# Patient Record
Sex: MALE | Race: WHITE | Employment: OTHER | URBAN - METROPOLITAN AREA
[De-identification: names, ages, dates, MRNs, and addresses within clinical notes are randomized per-mention and may not be internally consistent; named-entity substitution may affect disease eponyms.]

---

## 2017-01-12 ENCOUNTER — DOCUMENTATION ONLY (OUTPATIENT)
Dept: SLEEP MEDICINE | Age: 72
End: 2017-01-12

## 2017-01-12 ENCOUNTER — OFFICE VISIT (OUTPATIENT)
Dept: SLEEP MEDICINE | Age: 72
End: 2017-01-12

## 2017-01-12 VITALS
BODY MASS INDEX: 34.27 KG/M2 | SYSTOLIC BLOOD PRESSURE: 123 MMHG | OXYGEN SATURATION: 95 % | HEART RATE: 68 BPM | WEIGHT: 253 LBS | DIASTOLIC BLOOD PRESSURE: 72 MMHG | TEMPERATURE: 99 F | HEIGHT: 72 IN

## 2017-01-12 DIAGNOSIS — G47.33 OBSTRUCTIVE SLEEP APNEA: ICD-10-CM

## 2017-01-12 DIAGNOSIS — G25.81 RLS (RESTLESS LEGS SYNDROME): Primary | ICD-10-CM

## 2017-01-12 RX ORDER — ROPINIROLE 0.5 MG/1
1 TABLET, FILM COATED ORAL
Qty: 60 TAB | Refills: 5 | Status: SHIPPED | OUTPATIENT
Start: 2017-01-12 | End: 2017-04-17 | Stop reason: SDUPTHER

## 2017-01-12 NOTE — PROGRESS NOTES
PAP Education     · Physician orders were reviewed. · Patient was educated on the PAP usage with humidity  and proper settings   · A request for Resmed S9 Climate line be ordered, as patient has been dealing with humidity issues   · The patient demonstrated good understanding of the positive airway pressure device. General information regarding operations and maintenance of the device was provided. He was provided information on sleep apnea including coresponding risk factors and the importance of proper treatment. Marisa Hernandez He was asked to contact our office for any problems regarding his PAP therapy.

## 2017-01-12 NOTE — PROGRESS NOTES
217 [facility] [place]. [place], [address]  Tel.  [phone]  Fax. 100 [facility] 60  [place], [address] [facility]  Tel.  [phone]  Fax. [phone] [address] [address] [address]   Tel.  [phone]  Fax. [phone]     S>Bulmaro Maurer. is a 66 y.o. male seen for a positive airway pressure follow-up. He reports no problems using the device. He is 99% compliant over the past 6 months. The following problems are identified:    Drowsiness no Problems exhaling no   Snoring no Forget to put on no   Mask Comfortable yes Can't fall asleep no   Dry Mouth yes Mask falls off no   Air Leaking no Frequent awakenings no     Download reviewed. RLS symptoms controlled on 1 mg of requip. He needs a refill   He admits that his sleep has improved. Allergies   Allergen Reactions    Penicillins Hives    Bactrim [Sulfamethoxazole-Trimethoprim] Hives    Lisinopril (Bulk) Cough    Neurontin [Gabapentin] Other (comments)     drowsy    Zostavax [Zoster Vaccine Live (Pf)] Rash     See 9/10/13 visit       He has a current medication list which includes the following prescription(s): ropinirole, metoprolol succinate, clopidogrel, citalopram, omeprazole, valsartan, atorvastatin, vitamin d3, oxygen-air delivery systems, chlorthalidone, amlodipine, glucosamine-chondroitin, acetaminophen, aspirin delayed-release, allopurinol, folic acid/mv,fe,min, and hydrocodone-acetaminophen. .      He  has a past medical history of Abnormal stress echo (5/12/2015); Anemia NEC (03/2013); Anxiety; CAD (coronary artery disease) (2009); Calculus of kidney; Chronic kidney disease, stage III (moderate) (10/11/2009); Diabetes ([facility] [address] [facility] Difficult intubation; DJD (degenerative joint disease); GERD (gastroesophageal reflux disease) (10/11/2009); Gout; Hypertension; Kidney disease; Liver disease; Obesity; Obstructive sleep apnea (adult) (pediatric) (10/11/2009);  Peripheral neuropathy ([facility] [address] [facility] (10/11/2009); Prediabetes (1/7/2012); RLS (restless legs syndrome) (10/11/2009); and S/P coronary artery stent placement (5/12/2015). He also has no past medical history of Abuse; Congestive heart failure, unspecified; Contact dermatitis and other eczema, due to unspecified cause; Depression; Headache(784.0); Psychotic disorder; or Trauma. [place] Sleepiness Score: 3   and Modified F.O.S.Q. Score Total / 2: 20   which reflect improved sleep quality over therapy time. O>    Visit Vitals    /72    Pulse 68    Temp 99 °F (37.2 °C)    Ht 6' (1.829 m)    Wt 253 lb (114.8 kg)    SpO2 95%    BMI 34.31 kg/m2           General:   Alert, oriented, not in distress   Neck:   No JVD    Chest/Lungs:  symetrical lung expansion , no accessory muscle use    Extremities:  no obvious rashes , negative edema    Neuro:  No focal deficits ; No obvious tremor    Psych:  Normal affect ,  Normal countenance ;         A>    ICD-10-CM ICD-9-CM    1. RLS (restless legs syndrome) G25.81 333.94    2. Obstructive sleep apnea G47.33 327.23 rOPINIRole (REQUIP) 0.5 mg tablet      AMB SUPPLY ORDER     AHI = 50(4-14). On CPAP :  12 cmH2O. Compliant:      yes    Therapeutic Response:  Positive    P>      * Follow-up Disposition:  Return in about 1 year (around 1/12/2018). he will continue on his current pressure settings. I have ordered replacement supplies and climateline tubing    Teach how to adjust humidifer  * He was asked to contact our office for any problems regarding PAP therapy. * Counseling was provided regarding the importance of regular PAP use and on proper sleep hygiene and safe driving. * Re-enforced proper and regular cleaning for the device. 2. Restless legs syndrome - I have reviewed some aggravating factors for RLS with him. Requip renewed. Stretching exercises were advised and information regarding restless legs syndrome and stretches were provided at the time of discharge.     Stacie Quinteros MD  Diplomate in Sleep Medicine  [place]

## 2017-01-12 NOTE — PATIENT INSTRUCTIONS
217 [facility] [place]. [place], [address]  Tel.  [phone]  Fax. 100 [facility] 60  [place], [address] [facility] [address]  Tel.  [phone]  Fax. [phone] [address]  Tel.  [phone]  Fax. [phone]     PROPER SLEEP HYGIENE    What to avoid  · Do not have drinks with caffeine, such as coffee or black tea, for 8 hours before bed. · Do not smoke or use other types of tobacco near bedtime. Nicotine is a stimulant and can keep you awake. · Avoid drinking alcohol late in the evening, because it can cause you to wake in the middle of the night. · Do not eat a big meal close to bedtime. If you are hungry, eat a light snack. · Do not drink a lot of water close to bedtime, because the need to urinate may wake you up during the night. · Do not read or watch TV in bed. Use the bed only for sleeping and sexual activity. What to try  · Go to bed at the same time every night, and wake up at the same time every morning. Do not take naps during the day. · Keep your bedroom quiet, dark, and cool. · Get regular exercise, but not within 3 to 4 hours of your bedtime. .  · Sleep on a comfortable pillow and mattress. · If watching the clock makes you anxious, turn it facing away from you so you cannot see the time. · If you worry when you lie down, start a worry book. Well before bedtime, write down your worries, and then set the book and your concerns aside. · Try meditation or other relaxation techniques before you go to bed. · If you cannot fall asleep, get up and go to another room until you feel sleepy. Do something relaxing. Repeat your bedtime routine before you go to bed again. · Make your house quiet and calm about an hour before bedtime. Turn down the lights, turn off the TV, log off the computer, and turn down the volume on music. This can help you relax after a busy day.     Drowsy Driving  The [address] [place] [address] Road Traffic Safety Administration cites drowsiness as a causing factor in more than 199,447 police reported crashes annually, resulting in 76,000 injuries and 1,500 deaths. Other surveys suggest 55% of people polled have driven while drowsy in the past year, 23% had fallen asleep but not crashed, 3% crashed, and 2% had and accident due to drowsy driving. Who is at risk? Young Drivers: One study of drowsy driving accidents states that 55% of the drivers were under 25 years. Of those, 75% were male. Shift Workers and Travelers: People who work overnight or travel across time zones frequently are at higher risk of experiencing Circadian Rhythm Disorders. They are trying to work and function when their body is programed to sleep. Sleep Deprived: Lack of sleep has a serious impact on your ability to pay attention or focus on a task. Consistently getting less than the average of 8 hours your body needs creates partial or cumulative sleep deprivation. Untreated Sleep Disorders: Sleep Apnea, Narcolepsy, R.L.S., and other sleep disorders (untreated) prevent a person from getting enough restful sleep. This leads to excessive daytime sleepiness and increases the risk for drowsy driving accidents by up to 7 times. Medications / Alcohol: Even over the counter medications can cause drowsiness. Medications that impair a drivers attention should have a warning label. Alcohol naturally makes you sleepy and on its own can cause accidents. Combined with excessive drowsiness its effects are amplified. Signs of Drowsy Driving:   * You don't remember driving the last few miles   * You may drift out of your ella   * You are unable to focus and your thoughts wander   * You may yawn more often than normal   * You have difficulty keeping your eyes open / nodding off   * Missing traffic signs, speeding, or tailgating  Prevention-   Good sleep hygiene, lifestyle and behavioral choices have the most impact on drowsy driving.  There is no substitute for sleep and the average person requires 8 hours nightly. If you find yourself driving drowsy, stop and sleep. Consider the sleep hygiene tips provided during your visit as well. Medication Refill Policy: Refills for all medications require 1 week advance notice. Please have your pharmacy fax a refill request. We are unable to fax, or call in \"controled substance\" medications and you will need to pick these prescriptions up from our office. [place] [employer] Activation    Thank you for requesting access to Stormpulse. Please follow the instructions below to securely access and download your online medical record. Stormpulse allows you to send messages to your doctor, view your test results, renew your prescriptions, schedule appointments, and more. How Do I Sign Up? 1. In your internet browser, go to [email or web address]. 2. Click on the First Time User? Click Here link in the Sign In box. You will see the New Member Sign Up page. 3. Enter your Stormpulse Access Code exactly as it appears below. You will not need to use this code after youve completed the sign-up process. If you do not sign up before the expiration date, you must request a new code. Stormpulse Access Code: Activation code not generated  Current Stormpulse Status: Patient Declined (This is the date your Stormpulse access code will )    4. Enter the last four digits of your Social Security Number (xxxx) and Date of Birth (mm/dd/yyyy) as indicated and click Submit. You will be taken to the next sign-up page. 5. Create a Stormpulse ID. This will be your Stormpulse login ID and cannot be changed, so think of one that is secure and easy to remember. 6. Create a Stormpulse password. You can change your password at any time. 7. Enter your Password Reset Question and Answer. This can be used at a later time if you forget your password. 8. Enter your e-mail address.  You will receive e-mail notification when new information is available in Theravaschart. 9. Click Sign Up. You can now view and download portions of your medical record. 10. Click the Download Summary menu link to download a portable copy of your medical information. Additional Information    If you have questions, please call [phone]. Remember, Pixellet is NOT to be used for urgent needs. For medical emergencies, dial 913. [address] [place]. [place], [address]  Tel.  [phone]  Fax. 100 [facility] 60  [place], [address]  Tel.  [phone]  Fax. [phone] [address] [facility] [place] [address]  Tel.  [phone]  Fax. [phone]       Restless Legs Syndrome: After Your Visit  Your Care Instructions  Restless legs syndrome is a common nervous system problem. People with this syndrome feel a creeping, achy, or unpleasant feeling in the legs and an overpowering urge to move them. It often occurs in the evening and at night and can lead to sleep problems and tiredness. Your doctor may suggest doing a study of your sleep patterns to figure out what is happening when you try to sleep. For many people, getting regular exercise, eating well, and avoiding caffeine and tobacco relieve symptoms. If you try these changes and still have restless legs, medicine may help. Follow-up care is a key part of your treatment and safety. Be sure to make and go to all appointments, and call your doctor if you are having problems. It's also a good idea to know your test results and keep a list of the medicines you take. How can you care for yourself at home? · Take your medicines exactly as prescribed. Call your doctor if you think you are having a problem with your medicine. · Try bathing in very warm or very cold water. Applying a heating pad or ice bag to your legs may also help symptoms. · Stretch and massage your legs before bed or when discomfort begins.   · Vitamin E 400IU Daily supplementation has been shown to be effective treatment  · Get some exercise for at least 30 minutes a day on most days of the week. Stop exercising at least 3 hours before bedtime. · Try to plan for situations where you will need to remain seated for long stretches. For example, if you are traveling by car, plan some stops so you can get out and walk around. · Tell your doctor about any medicines you are taking. This includes all over-the-counter, prescription, and herbal medicines. Some medicines, such as antidepressants, antihistamines, and cold and sinus medicines, can make your symptoms worse. · Avoid caffeine products, such as coffee, tea, cola, and chocolate. Caffeine can interrupt your sleep and stimulate you. · Do not smoke. Nicotine can make restless legs worse. If you need help quitting, talk to your doctor about stop-smoking programs and medicines. These can increase your chances of quitting for good. Take steps to help you sleep better  · Get plenty of sunlight in the outdoors, particularly later in the afternoon. · Use the evening hours for settling down. Avoid activities that challenge you in the hours before bedtime. · Eat meals at regular times, and do not snack before bedtime. · Keep your bedroom quiet, dark, and cool. Try using a sleep mask and earplugs to help you sleep. · Limit how much you drink at night to reduce your need to get up to urinate. But do not go to bed thirsty. · Run a fan or other steady \"white noise\" during the night if noises wake you up. · [place] the bed for sleeping and sex. Do your reading or TV watching in another room. · Once you are in bed, relax from head to toe, and guide your mind to pleasant thoughts. · Do not stay in bed longer than 8 hours, and try to avoid naps. · If your symptoms usually improve around 4 a.m. to 6 a.m., try going to bed later than usual or allowing extra time for sleeping in to help you get the rest you need. When should you call for help?   Watch closely for changes in your health, and be sure to contact your doctor if:  · You are still not getting enough sleep. · Your symptoms become more severe or happen more often. Where can you learn more? Go to Couchy.com.be  Enter X429 in the search box to learn more about \"Restless Legs Syndrome: After Your Visit. \"   © 3946-8869 Healthwise, IID. Care instructions adapted under license by Shannan Peña (which disclaims liability or warranty for this information). This care instruction is for use with your licensed healthcare professional. If you have questions about a medical condition or this instruction, always ask your healthcare professional. [place] [address] any warranty or liability for your use of this information. Content Version: 2.2.63398; Last Revised: September 21, 2009    Restless Leg Syndrome Exercises  RLS Exercise 1  Start by getting into a standing position with your knees slightly bent, so that you are in a squat position. Your forearms should be resting on your thighs, close to your knees. You may grasp your opposite wrist for stability if needed. While maintaining this position, raise and lower your buttocks over and over until you can no longer do so. Repeat the exercise for as long as possible without feeling muscle strain or discomfort in your back or knees. RLS Exercise 2   Standing with your feet a little wider than shoulder width, squat down as low as you can while keeping your heels on the floor. Press your elbows against the knees. This will increase the stretch in your hips and inner thighs. Remember to keep your torso as upright as possible. RLS Exercise 3  Hang onto a railing or wall that is near a step. Place one foot on the edge of the step with the ball of your foot. Drop your heel and contract your glutes until you feel a stretch in the back of the entire leg.  Hold this position for 20 seconds and repeat with your other leg. If you notice that one calf is much tighter than the other, hold that side longer or stretch twice. RLS Exercise 4  While laying on your back with your leg in the air or while seated in a chair with your leg extended in front of you, perform ankle circles. You can do this by drawing large circles or the alphabet with your toes. There is no need for excessive movement through the knee. Simply move the toes and pivot your ankles. PROPER SLEEP HYGIENE    What to avoid  · Do not have drinks with caffeine, such as coffee or black tea, for 8 hours before bed. · Do not smoke or use other types of tobacco near bedtime. Nicotine is a stimulant and can keep you awake. · Avoid drinking alcohol late in the evening, because it can cause you to wake in the middle of the night. · Do not eat a big meal close to bedtime. If you are hungry, eat a light snack. · Do not drink a lot of water close to bedtime, because the need to urinate may wake you up during the night. · Do not read or watch TV in bed. Use the bed only for sleeping and sexual activity. What to try  · Go to bed at the same time every night, and wake up at the same time every morning. Do not take naps during the day. · Keep your bedroom quiet, dark, and cool. · Get regular exercise, but not within 3 to 4 hours of your bedtime. .  · Sleep on a comfortable pillow and mattress. · If watching the clock makes you anxious, turn it facing away from you so you cannot see the time. · If you worry when you lie down, start a worry book. Well before bedtime, write down your worries, and then set the book and your concerns aside. · Try meditation or other relaxation techniques before you go to bed. · If you cannot fall asleep, get up and go to another room until you feel sleepy. Do something relaxing. Repeat your bedtime routine before you go to bed again. · Make your house quiet and calm about an hour before bedtime.  Turn down the lights, turn off the TV, log off the computer, and turn down the volume on music. This can help you relax after a busy day. Drowsy Driving: The [employer] cites drowsiness as a causing factor in more than 002,521 police reported crashes annually, resulting in 76,000 injuries and 1,500 deaths. Other surveys suggest 55% of people polled have driven while drowsy in the past year, 23% had fallen asleep but not crashed, 3% crashed, and 2% had and accident due to drowsy driving. Who is at risk? Young Drivers: One study of drowsy driving accidents states that 55% of the drivers were under 25 years. Of those, 75% were male. Shift Workers and Travelers: People who work overnight or travel across time zones frequently are at higher risk of experiencing Circadian Rhythm Disorders. They are trying to work and function when their body is programed to sleep. Sleep Deprived: Lack of sleep has a serious impact on your ability to pay attention or focus on a task. Consistently getting less than the average of 8 hours your body needs creates partial or cumulative sleep deprivation. Untreated Sleep Disorders: Sleep Apnea, Narcolepsy, R.L.S., and other sleep disorders (untreated) prevent a person from getting enough restful sleep. This leads to excessive daytime sleepiness and increases the risk for drowsy driving accidents by up to 7 times. Medications / Alcohol: Even over the counter medications can cause drowsiness. Medications that impair a drivers attention should have a warning label. Alcohol naturally makes you sleepy and on its own can cause accidents. Combined with excessive drowsiness its effects are amplified.    Signs of Drowsy Driving:   * You don't remember driving the last few miles   * You may drift out of your ella   * You are unable to focus and your thoughts wander   * You may yawn more often than normal   * You have difficulty keeping your eyes open / nodding off   * Missing traffic signs, speeding, or tailgating  Prevention-   Good sleep hygiene, lifestyle and behavioral choices have the most impact on drowsy driving. There is no substitute for sleep and the average person requires 8 hours nightly. If you find yourself driving drowsy, stop and sleep. Consider the sleep hygiene tips provided during your visit as well. Medication Refill Policy: Refills for all medications require 1 week advance notice. Please have your pharmacy fax a refill request. We are unable to fax, or call in \"controled substance\" medications and you will need to pick these prescriptions up from our office. Photorankhart Activation    Thank you for requesting access to Photofy. Please follow the instructions below to securely access and download your online medical record. Photofy allows you to send messages to your doctor, view your test results, renew your prescriptions, schedule appointments, and more. How Do I Sign Up?    11. In your internet browser, go to [email or web address]. 12. Click on the First Time User? Click Here link in the Sign In box. You will see the New Member Sign Up page. 15. Enter your Photofy Access Code exactly as it appears below. You will not need to use this code after youve completed the sign-up process. If you do not sign up before the expiration date, you must request a new code. Photorankhart Access Code: Activation code not generated  Current Photofy Status: Patient Declined (This is the date your Mobicowt access code will )    14. Enter the last four digits of your Social Security Number (xxxx) and Date of Birth (mm/dd/yyyy) as indicated and click Submit. You will be taken to the next sign-up page. 15. Create a Mobicowt ID. This will be your Photofy login ID and cannot be changed, so think of one that is secure and easy to remember. 12. Create a Photofy password. You can change your password at any time. 16. Enter your Password Reset Question and Answer.  This can be used at a later time if you forget your password. 25. Enter your e-mail address. You will receive e-mail notification when new information is available in [address] 19. Click Sign Up. You can now view and download portions of your medical record. 20. Click the Download Summary menu link to download a portable copy of your medical information. Additional Information    If you have questions, please call [phone]. Remember, Sympler is NOT to be used for urgent needs. For medical emergencies, dial 911.

## 2017-01-12 NOTE — PROGRESS NOTES
Medication refill for Ropinirole 0.5mg tablet was called into [facility] on [address] on 1/12/17

## 2017-01-13 ENCOUNTER — DOCUMENTATION ONLY (OUTPATIENT)
Dept: SLEEP MEDICINE | Age: 72
End: 2017-01-13

## 2017-02-20 ENCOUNTER — TELEPHONE (OUTPATIENT)
Dept: INTERNAL MEDICINE CLINIC | Age: 72
End: 2017-02-20

## 2017-02-20 DIAGNOSIS — J20.9 ACUTE BRONCHITIS, UNSPECIFIED ORGANISM: Primary | ICD-10-CM

## 2017-02-20 RX ORDER — AZITHROMYCIN 250 MG/1
250 TABLET, FILM COATED ORAL SEE ADMIN INSTRUCTIONS
Qty: 6 TAB | Refills: 0 | Status: SHIPPED | OUTPATIENT
Start: 2017-02-20 | End: 2017-08-23

## 2017-02-20 NOTE — TELEPHONE ENCOUNTER
Spoke with [facility]. States pt has been coughing for 1.5 wks. She is not sure if pt has fever but pt has felt \"hot\". She said pt's chest has been hurting today from coughing. She states that pt has coughed up blood from coughing so hard. She also said they both received the pneumonia vaccine 2 wks ago. There is no available slots open. I  Advised her to call the front dest to see if they can get pt in today to be seen. I also advised that if pt can't be seen today to go to the ER or Urgent Care. Mila verbalized understanding.

## 2017-02-20 NOTE — TELEPHONE ENCOUNTER
Pt needs to be seen today. There are no appts available for me to schedule. Can you get pt in yet today?

## 2017-02-20 NOTE — TELEPHONE ENCOUNTER
Jeromy Luu called and states that she is needing a call back in regards getting an appt for the pt as she states that since having his pneumonia vaccine he has cough now. Please call Mila to advise.

## 2017-02-20 NOTE — TELEPHONE ENCOUNTER
Patient called complaining of coughing productive cough for 1.5 weeks. Denies fever, denies chest pain. Patient called this morning and unable to get appointment to see MD. I reviewed note documenting phone call. Denies fever. I reviewed medical problems in the chart and recent labs. Will prescribe z pack - advised to go ER if symptoms worsen tonight otherwise make an appointment in our office at earliest convenience.

## 2017-02-28 ENCOUNTER — OFFICE VISIT (OUTPATIENT)
Dept: INTERNAL MEDICINE CLINIC | Age: 72
End: 2017-02-28

## 2017-02-28 VITALS
RESPIRATION RATE: 19 BRPM | HEIGHT: 72 IN | BODY MASS INDEX: 34.27 KG/M2 | OXYGEN SATURATION: 97 % | DIASTOLIC BLOOD PRESSURE: 79 MMHG | WEIGHT: 253 LBS | TEMPERATURE: 98.2 F | HEART RATE: 65 BPM | SYSTOLIC BLOOD PRESSURE: 132 MMHG

## 2017-02-28 DIAGNOSIS — J40 BRONCHITIS: Primary | ICD-10-CM

## 2017-02-28 DIAGNOSIS — R68.89 FLU-LIKE SYMPTOMS: ICD-10-CM

## 2017-02-28 DIAGNOSIS — J04.11 ACUTE TRACHEITIS WITH OBSTRUCTION: ICD-10-CM

## 2017-02-28 LAB
QUICKVUE INFLUENZA TEST: NEGATIVE
VALID INTERNAL CONTROL?: YES

## 2017-02-28 RX ORDER — ALBUTEROL SULFATE 0.83 MG/ML
2.5 SOLUTION RESPIRATORY (INHALATION) ONCE
Qty: 1 EACH | Refills: 0
Start: 2017-02-28 | End: 2017-02-28

## 2017-02-28 RX ORDER — CODEINE PHOSPHATE AND GUAIFENESIN 10; 100 MG/5ML; MG/5ML
5 SOLUTION ORAL
Qty: 120 ML | Refills: 0 | Status: SHIPPED | OUTPATIENT
Start: 2017-02-28 | End: 2017-03-29

## 2017-02-28 RX ORDER — METHYLPREDNISOLONE 4 MG/1
4 TABLET ORAL
Qty: 1 DOSE PACK | Refills: 0 | Status: SHIPPED | OUTPATIENT
Start: 2017-02-28 | End: 2017-03-29

## 2017-02-28 RX ORDER — ALBUTEROL SULFATE 90 UG/1
1 AEROSOL, METERED RESPIRATORY (INHALATION)
Qty: 1 INHALER | Refills: 0 | Status: SHIPPED | OUTPATIENT
Start: 2017-02-28 | End: 2021-03-15 | Stop reason: DRUGHIGH

## 2017-02-28 RX ORDER — CEFDINIR 300 MG/1
300 CAPSULE ORAL 2 TIMES DAILY
Qty: 20 CAP | Refills: 0 | Status: SHIPPED | OUTPATIENT
Start: 2017-02-28 | End: 2017-03-29

## 2017-02-28 NOTE — PROGRESS NOTES
Tierra Perkins. is a 66 y.o. male who complains of ongoing cough for the past 2 weeks. Was prescribed Pennye Kelvin on 2/20. Not feeling any better. He has nasal congestion, discolored mucous. Bilateral sinus pressure. No ear pain. No fever. Notes chest congestion. Nonsmoker. No asthma. Increased water intake. Took cough medication over the counter. Past Medical History:   Diagnosis Date    Abnormal stress echo 5/12/2015    Anemia NEC 03/2013    Last 2-3 months; finished taking iron supplement    Anxiety     CAD (coronary artery disease) 2009    cath [facility]) revealed 20%RCA and 50%2nd diagonal    Calculus of kidney     Chronic kidney disease, stage III (moderate) 10/11/2009    30% kidney function    Diabetes ([address]     pre diabetic    Difficult intubation     DJD (degenerative joint disease)     GERD (gastroesophageal reflux disease) 10/11/2009    Gout     Hypertension     Kidney disease     Liver disease     fatty liver    Obesity     Obstructive sleep apnea (adult) (pediatric) 10/11/2009    nasal pillows; pressure set at 10-11 - uses cpap    Peripheral neuropathy ([address] 10/11/2009    Prediabetes 1/7/2012    RLS (restless legs syndrome) 10/11/2009    S/P coronary artery stent placement 5/12/2015    5/11/15 PCI./MALI to LCx           Review of Systems  Pertinent items are noted in HPI. Objective:     Visit Vitals    /79 (BP 1 Location: Left arm, BP Patient Position: Sitting)    Pulse 65    Temp 98.2 °F (36.8 °C) (Oral)    Resp 19    Ht 6' (1.829 m)    Wt 253 lb (114.8 kg)    SpO2 97%    BMI 34.31 kg/m2     Gen: Mildly ill appearing male no sob at rest.   HEENT:   PERRL,normal conjunctiva. External ear and canals normal, TMs no opacification or erythema,  Swollen nasal turbinates, no sinus pain on palpation,  OP no erythema, no exudates, MMM  Neck:  Supple. Thyroid normal size, nontender, without nodules. No masses or LAD  Resp:  Coarse rhonchi, diffuse wheezing.  No rales.   CV:  RRR, normal S1S2, no murmur. Assessment/Plan:       ICD-10-CM ICD-9-CM    1. Bronchitis J40 490 albuterol (PROVENTIL VENTOLIN) 2.5 mg /3 mL (0.083 %) nebulizer solution      ALBUTEROL, INHAL. SOL., FDA-APPROVED FINAL, NON-COMPOUND UNIT DOSE, 1 MG      INHAL RX, AIRWAY OBST/DX SPUTUM INDUCT      albuterol (PROVENTIL HFA, VENTOLIN HFA, PROAIR HFA) 90 mcg/actuation inhaler      methylPREDNISolone (MEDROL DOSEPACK) 4 mg tablet      cefdinir (OMNICEF) 300 mg capsule      guaiFENesin-codeine (ROBITUSSIN AC) 100-10 mg/5 mL solution   2. Flu-like symptoms R68.89 780.99 AMB POC RAPID INFLUENZA TEST   3. Acute tracheitis with obstruction  J04.11 464.11 INHAL RX, AIRWAY OBST/DX SPUTUM INDUCT       Follow-up Disposition:  Return if symptoms worsen or fail to improve.     Amanda Salvador MD

## 2017-02-28 NOTE — MR AVS SNAPSHOT
Visit Information Date & Time Provider Department Dept. Phone Encounter #  
 2/28/2017 11:45 AM Kirstin Mcwilliams, [address] [place] [address],[address] [phone] [id number] Follow-up Instructions Return if symptoms worsen or fail to improve. Your Appointments 3/29/2017 11:30 AM  
ROUTINE CARE with Kirstin Mcwilliams MD  
[facility]) Appt Note: 6 month follow up  
 [facility] [address] [address]  
[address] [facility] [address] [place] [address] Upcoming Health Maintenance Date Due  
 GLAUCOMA SCREENING Q2Y 8/5/2016 MEDICARE YEARLY EXAM 3/17/2017 Pneumococcal 65+ High/Highest Risk (2 of 2 - PPSV23) 6/20/2017 COLONOSCOPY 9/24/2024 DTaP/Tdap/Td series (2 - Td) 10/24/2026 Allergies as of 2/28/2017  Review Complete On: 2/28/2017 By: Kirstin Mcwilliams MD  
  
 Severity Noted Reaction Type Reactions Penicillins High 10/01/2009    Hives Bactrim [Sulfamethoxazole-trimethoprim]  03/31/2010   Systemic Hives Lisinopril (Bulk)  10/01/2009    Cough Neurontin [Gabapentin]  10/01/2009   Side Effect Other (comments) drowsy Zostavax [Zoster Vaccine Live (Pf)]  11/20/2013   Not Verified Rash See 9/10/13 visit Current Immunizations  Reviewed on 9/28/2016 Name Date Influenza Vaccine 10/1/2014, 10/1/2013 Influenza Vaccine (Quad) PF 10/7/2016, 9/30/2015 Influenza Vaccine Split 9/19/2012, 10/6/2011, 10/27/2010 Influenza Vaccine Whole 10/23/2008 Pneumococcal Vaccine (Unspecified Type) 6/20/2012 Tdap 10/24/2016 10:04 PM, 3/21/2016 Zoster Vaccine, Live 8/15/2013 Not reviewed this visit You Were Diagnosed With   
  
 Codes Comments Bronchitis    -  Primary ICD-10-CM: Z85 ICD-9-CM: 478 Flu-like symptoms     ICD-10-CM: R68.89 ICD-9-CM: 780.99 Acute tracheitis with obstruction     ICD-10-CM: J04.11 ICD-9-CM: 464.11 Vitals  BP  
 132/79 (BP 1 Location: Left arm, BP Patient Position: Sitting) Vitals History BMI and BSA Data Body Mass Index Body Surface Area  
 34.31 kg/m 2 2.41 m 2 Preferred Pharmacy Pharmacy Name Phone [id number] [place] - [address] [phone] Your Updated Medication List  
  
   
This list is accurate as of: 2/28/17 12:57 PM.  Always use your most recent med list.  
  
  
  
  
 * albuterol 2.5 mg /3 mL (0.083 %) nebulizer solution Commonly known as:  PROVENTIL VENTOLIN  
3 mL by Nebulization route once for 1 dose. * albuterol 90 mcg/actuation inhaler Commonly known as:  PROVENTIL HFA, VENTOLIN HFA, PROAIR HFA Take 1 Puff by inhalation every six (6) hours as needed for Wheezing. allopurinol 100 mg tablet Commonly known as:  Ardie Fleischer Take 100 mg by mouth every morning. amLODIPine 5 mg tablet Commonly known as:  Yell Haven Take 5 mg by mouth daily. aspirin delayed-release 81 mg tablet Take 81 mg by mouth daily. atorvastatin 40 mg tablet Commonly known as:  LIPITOR  
TAKE 1 TABLET BY MOUTH NIGHTLY*****STOP SIMVASTATIN****  
  
 azithromycin 250 mg tablet Commonly known as:  [place] Ping Take 1 Tab by mouth See Admin Instructions for 6 doses. Take 2 tabs on day one followed by 1 tab daily for a total of 5 days. cefdinir 300 mg capsule Commonly known as:  OMNICEF Take 1 Cap by mouth two (2) times a day. CENTRUM PO Take 1 Tab by mouth daily. chlorthalidone 25 mg tablet Commonly known as:  Oscar Nemesio Take 25 mg by mouth daily. citalopram 40 mg tablet Commonly known as:  CELEXA  
TAKE 1 TABLET BY MOUTH EVERY DAY  
  
 clopidogrel 75 mg Tab Commonly known as:  PLAVIX TAKE 1 TABLET BY MOUTH EVERY DAY  
  
 glucosamine-chondroitin 750-600 mg Tab Take 1 Tab by mouth daily. Brand: Move Free guaiFENesin-codeine 100-10 mg/5 mL solution Commonly known as:  ROBITUSSIN AC Take 5 mL by mouth three (3) times daily as needed for Cough. Max Daily Amount: 15 mL. HORIZON NASAL CPAP SYSTEM  
by Does Not Apply route. HYDROcodone-acetaminophen 5-325 mg per tablet Commonly known as:  Margarito Casey Take 1 Tab by mouth every four (4) hours as needed for Pain. Max Daily Amount: 6 Tabs. methylPREDNISolone 4 mg tablet Commonly known as:  Jewell Reason Take 1 Tab by mouth Specific Days and Specific Times. metoprolol succinate 25 mg XL tablet Commonly known as:  TOPROL-XL  
TAKE ONE TABLET BY MOUTH EVERY DAY  
  
 omeprazole 40 mg capsule Commonly known as:  PRILOSEC  
TAKE 1 CAPSULE BY MOUTH DAILY. rOPINIRole 0.5 mg tablet Commonly known as:  Mallissa Poles Take 2 Tabs by mouth daily (after dinner). Take 1.5 to 2 tablets daily after dinner TYLENOL EXTRA STRENGTH 500 mg tablet Generic drug:  acetaminophen Take 500 mg by mouth every eight (8) hours as needed. valsartan 160 mg tablet Commonly known as:  DIOVAN  
TAKE 1 TABLET BY MOUTH DAILY  
  
 VITAMIN D3 2,000 unit Cap capsule Generic drug:  Cholecalciferol (Vitamin D3) Take 2,000 Units by mouth daily. * Notice: This list has 2 medication(s) that are the same as other medications prescribed for you. Read the directions carefully, and ask your doctor or other care provider to review them with you. Prescriptions Printed Refills  
 guaiFENesin-codeine (ROBITUSSIN AC) 100-10 mg/5 mL solution 0 Sig: Take 5 mL by mouth three (3) times daily as needed for Cough. Max Daily Amount: 15 mL. Class: Print Route: Oral  
  
Prescriptions Sent to Pharmacy Refills  
 albuterol (PROVENTIL HFA, VENTOLIN HFA, PROAIR HFA) 90 mcg/actuation inhaler 0 Sig: Take 1 Puff by inhalation every six (6) hours as needed for Wheezing.   
 Class: Normal  
 Pharmacy: [address] Ph #: [phone] Route: Inhalation  
 methylPREDNISolone (MEDROL DOSEPACK) 4 mg tablet 0 Sig: Take 1 Tab by mouth Specific Days and Specific Times. Class: Normal  
 Pharmacy: [facility]/pharmacy [id number] [place] [address] [address] Ph #: [phone] Route: Oral  
 cefdinir (OMNICEF) 300 mg capsule 0 Sig: Take 1 Cap by mouth two (2) times a day. Class: Normal  
 Pharmacy: [facility]/pharmacy [id number] [place] [address] [address] Ph #: [phone] Route: Oral  
  
We Performed the Following ALBUTEROL, INHAL. SOL., FDA-APPROVED FINAL, NON-COMPOUND UNIT DOSE, 1 MG [ [facility]] AMB POC RAPID INFLUENZA TEST [60989 CPT(R)] INHAL RX, AIRWAY OBST/DX SPUTUM INDUCT [id number] CPT(R)] Follow-up Instructions Return if symptoms worsen or fail to improve. Please provide this summary of care documentation to your next provider. Your primary care clinician is listed as Nikki Bhandari. If you have any questions after today's visit, please call [phone].

## 2017-02-28 NOTE — PROGRESS NOTES
Reviewed record in preparation for visit and have obtained necessary documentation. Identified pt with two pt identifiers(name and ).     Chief Complaint   Patient presents with    Cold Symptoms     c/o of cough with green phlemg, sneezing, sore throat, body aches x 2 weeks denies chills            Coordination of Care Questionnaire:  :     1) Have you been to an emergency room, urgent care clinic since your last visit? no   Hospitalized since your last visit? no             2) Have you seen or consulted any other health care providers outside of [address] [place] [address] since your last visit? no  (Include any pap smears or colon screenings in this section.)

## 2017-03-08 ENCOUNTER — TELEPHONE (OUTPATIENT)
Dept: INTERNAL MEDICINE CLINIC | Age: 72
End: 2017-03-08

## 2017-03-08 RX ORDER — PANTOPRAZOLE SODIUM 40 MG/1
40 TABLET, DELAYED RELEASE ORAL DAILY
Qty: 30 TAB | Refills: 5 | Status: SHIPPED | OUTPATIENT
Start: 2017-03-08 | End: 2017-10-19 | Stop reason: SDUPTHER

## 2017-03-08 RX ORDER — ATORVASTATIN CALCIUM 40 MG/1
TABLET, FILM COATED ORAL
Qty: 90 TAB | Refills: 2 | Status: SHIPPED | OUTPATIENT
Start: 2017-03-08 | End: 2017-12-08 | Stop reason: SDUPTHER

## 2017-03-08 NOTE — TELEPHONE ENCOUNTER
Camryn//SPARKLE Carrera indicated states she needs a call back in reference to possible Drug Interaction. Please call.  Thank you

## 2017-03-29 ENCOUNTER — OFFICE VISIT (OUTPATIENT)
Dept: INTERNAL MEDICINE CLINIC | Age: 72
End: 2017-03-29

## 2017-03-29 VITALS
OXYGEN SATURATION: 96 % | SYSTOLIC BLOOD PRESSURE: 123 MMHG | HEART RATE: 71 BPM | RESPIRATION RATE: 18 BRPM | BODY MASS INDEX: 33.59 KG/M2 | TEMPERATURE: 97.9 F | WEIGHT: 248 LBS | HEIGHT: 72 IN | DIASTOLIC BLOOD PRESSURE: 73 MMHG

## 2017-03-29 DIAGNOSIS — G60.3 IDIOPATHIC PROGRESSIVE NEUROPATHY: Primary | ICD-10-CM

## 2017-03-29 DIAGNOSIS — L97.521 FOOT ULCER, LEFT, LIMITED TO BREAKDOWN OF SKIN (HCC): ICD-10-CM

## 2017-03-29 DIAGNOSIS — T14.8XXA BRUISING: ICD-10-CM

## 2017-03-29 DIAGNOSIS — R20.9 SENSATION OF COLD IN LOWER EXTREMITY: ICD-10-CM

## 2017-03-29 DIAGNOSIS — R73.09 ELEVATED GLUCOSE: ICD-10-CM

## 2017-03-29 LAB — HBA1C MFR BLD HPLC: NORMAL %

## 2017-03-29 RX ORDER — CEPHALEXIN 500 MG/1
500 CAPSULE ORAL 3 TIMES DAILY
Qty: 21 CAP | Refills: 0 | Status: SHIPPED | OUTPATIENT
Start: 2017-03-29 | End: 2017-08-23

## 2017-03-29 NOTE — PATIENT INSTRUCTIONS

## 2017-03-29 NOTE — PROGRESS NOTES
Kimberlyn Yusuf. is a 66 y.o. male who presents for followup. Changed to Protonix from prilosec due to plavix interaction. Right foot discolored for the past 1 week. No known injury. On Plavix and aspirin 81mg once a day. No different shoes. No prolonged walking or standing. No change in activities. Has been doing all of the grocery shopping since wife is sick. Has always been active at home. No NSAIDS. Notes an ulcer on the bottom of left foot for 2 weeks. It is painful. Has not taken any medication for discomfort. No new topicals. Has neuropathy bilateral feet to knee. Sees Dr. Lobito James, podiatry. Had bronchitis last week, better now. Had diarrhea, vomiting, resolved. Sees Dr. Breanna Mcarthur, nephrology, has appt next month.        Past Medical History:   Diagnosis Date    Abnormal stress echo 5/12/2015    Anemia NEC 03/2013    Last 2-3 months; finished taking iron supplement    Anxiety     CAD (coronary artery disease) 2009    cath [facility]) revealed 20%RCA and 50%2nd diagonal    Calculus of kidney     Chronic kidney disease, stage III (moderate) 10/11/2009    30% kidney function    Diabetes ([address]     pre diabetic    Difficult intubation     DJD (degenerative joint disease)     GERD (gastroesophageal reflux disease) 10/11/2009    Gout     Hypertension     Kidney disease     Liver disease     fatty liver    Obesity     Obstructive sleep apnea (adult) (pediatric) 10/11/2009    nasal pillows; pressure set at 10-11 - uses cpap    Peripheral neuropathy ([address] 10/11/2009    Prediabetes 1/7/2012    RLS (restless legs syndrome) 10/11/2009    S/P coronary artery stent placement 5/12/2015    5/11/15 PCI./MALI to LCx       Family History   Problem Relation Age of Onset    Heart Disease Father     Hypertension Father     Other Father      abdominal aneurysm     Dementia Mother     Alzheimer Mother     Heart Disease Brother     Heart Attack Brother     Heart Disease Maternal Grandmother     Heart Disease Paternal Grandmother     Heart Attack Paternal Grandmother     Heart Disease Paternal Grandfather     Heart Attack Paternal Grandfather     Elevated Lipids Son     Elevated Lipids Daughter     Cancer Neg Hx     Diabetes Neg Hx     Stroke Neg Hx        Social History     Social History    Marital status:      Spouse name: N/A    Number of children: N/A    Years of education: N/A     Occupational History    Not on file. Social History Main Topics    Smoking status: Former Smoker     Packs/day: 1.00     Years: 10.00     Types: Cigarettes     Quit date: 1/1/1968    Smokeless tobacco: Never Used    Alcohol use No    Drug use: No    Sexual activity: Yes     Partners: Female     Other Topics Concern    Not on file     Social History Narrative           Review of Systems  Pertinent items are noted in HPI. Objective:     Visit Vitals    /73 (BP 1 Location: Left arm, BP Patient Position: Sitting)    Pulse 71    Temp 97.9 °F (36.6 °C) (Oral)    Resp 18    Ht 6' (1.829 m)    Wt 248 lb (112.5 kg)    SpO2 96%    BMI 33.63 kg/m2     Gen: well appearing male  HEENT:   PERRL,normal conjunctiva. External ear and canals normal, TMs no opacification or erythema,  OP no erythema, no exudates, MMM  Neck:  Supple. Thyroid normal size, nontender, without nodules. No masses or LAD  Resp:  No wheezing, no rhonchi, no rales. CV:  RRR, normal S1S2, no murmur. GI: soft, nontender, without masses. No hepatosplenomegaly. Extrem:  +2 pulses, no edema, warm distally, bruising right foot. No cyanosis. Plantar ulcer left foot, clean based. Assessment/Plan:       ICD-10-CM ICD-9-CM    1. 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 Bruising T14.8 924.9    3. Foot ulcer, left, limited to breakdown of skin ([facility] [address] L97.521 707.15    4. Elevated glucose R73.09 790.29 AMB POC HEMOGLOBIN A1C   5. Sensation of cold in lower extremity R20.9 782. 9 ANKLE BRACHIAL INDEX       Follow-up Disposition:  Return for follow up pending tests.  Galdino Potter MD

## 2017-03-29 NOTE — PROGRESS NOTES
Reviewed record in preparation for visit and have obtained necessary documentation. Identified pt with two pt identifiers(name and ).     Chief Complaint   Patient presents with    Hypertension     6 month f/u     Skin Ulcer     c/o of left foot ulcer x 2 weeks c/o of Rt foot discoloration x 1 week     Skin Discoloration     c/o of Rt foot discoloration x 1 week            Coordination of Care Questionnaire:  :     1) Have you been to an emergency room, urgent care clinic since your last visit? no   Hospitalized since your last visit? no             2) Have you seen or consulted any other health care providers outside of [address] [place] [address] since your last visit? no  (Include any pap smears or colon screenings in this section.)

## 2017-03-29 NOTE — MR AVS SNAPSHOT
Visit Information Date & Time Provider Department Dept. Phone Encounter #  
 3/29/2017 11:30 AM Barb Pena, [address] [place] [address],[address] [phone] [id number] Follow-up Instructions Return for follow up pending tests. Heber Mancusohortencia Upcoming Health Maintenance Date Due  
 GLAUCOMA SCREENING Q2Y 8/5/2016 MEDICARE YEARLY EXAM 3/17/2017 Pneumococcal 65+ High/Highest Risk (2 of 2 - PPSV23) 6/20/2017 COLONOSCOPY 9/24/2024 DTaP/Tdap/Td series (2 - Td) 10/24/2026 Allergies as of 3/29/2017  Review Complete On: 3/29/2017 By: Barb Pena MD  
  
 Severity Noted Reaction Type Reactions Penicillins High 10/01/2009    Hives Bactrim [Sulfamethoxazole-trimethoprim]  03/31/2010   Systemic Hives Lisinopril (Bulk)  10/01/2009    Cough Neurontin [Gabapentin]  10/01/2009   Side Effect Other (comments) drowsy Zostavax [Zoster Vaccine Live (Pf)]  11/20/2013   Not Verified Rash See 9/10/13 visit Current Immunizations  Reviewed on 9/28/2016 Name Date Influenza Vaccine 10/1/2014, 10/1/2013 Influenza Vaccine (Quad) PF 10/7/2016, 9/30/2015 Influenza Vaccine Split 9/19/2012, 10/6/2011, 10/27/2010 Influenza Vaccine Whole 10/23/2008 Pneumococcal Vaccine (Unspecified Type) 6/20/2012 Tdap 10/24/2016 10:04 PM, 3/21/2016 Zoster Vaccine, Live 8/15/2013 Not reviewed this visit You Were Diagnosed With   
  
 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 
ICD-9-CM: 790.29 Sensation of cold in lower extremity     ICD-10-CM: R20.9 ICD-9-CM: 489. 9 Vitals BP Pulse Temp Resp Height(growth percentile) Weight(growth percentile)  123/73 (BP 1 Location: Left arm, BP Patient Position: Sitting) 71 97.9 °F (36.6 °C) (Oral) 18 6' (1.829 m) 248 lb (112.5 kg) SpO2 BMI Smoking Status 96% 33.63 kg/m2 Former Smoker Vitals History BMI and BSA Data Body Mass Index Body Surface Area  
 33.63 kg/m 2 2.39 m 2 Preferred Pharmacy Pharmacy Name Phone [id number] [address] [phone] Your Updated Medication List  
  
   
This list is accurate as of: 3/29/17 12:55 PM.  Always use your most recent med list.  
  
  
  
  
 albuterol 90 mcg/actuation inhaler Commonly known as:  PROVENTIL HFA, VENTOLIN HFA, PROAIR HFA Take 1 Puff by inhalation every six (6) hours as needed for Wheezing. allopurinol 100 mg tablet Commonly known as:  Orpha Conquest Take 100 mg by mouth every morning. amLODIPine 5 mg tablet Commonly known as:  Vish Mend Take 5 mg by mouth daily. aspirin delayed-release 81 mg tablet Take 81 mg by mouth daily. atorvastatin 40 mg tablet Commonly known as:  LIPITOR  
TAKE 1 TABLET BY MOUTH NIGHTLY*****STOP SIMVASTATIN****  
  
 azithromycin 250 mg tablet Commonly known as:  Hedwig Pine Take 1 Tab by mouth See Admin Instructions for 6 doses. Take 2 tabs on day one followed by 1 tab daily for a total of 5 days. CENTRUM PO Take 1 Tab by mouth daily. cephALEXin 500 mg capsule Commonly known as:  Ilia Fogo Take 1 Cap by mouth three (3) times daily. chlorthalidone 25 mg tablet Commonly known as:  Painter Males Take 25 mg by mouth daily. citalopram 40 mg tablet Commonly known as:  CELEXA  
TAKE 1 TABLET BY MOUTH EVERY DAY  
  
 clopidogrel 75 mg Tab Commonly known as:  PLAVIX TAKE 1 TABLET BY MOUTH EVERY DAY  
  
 glucosamine-chondroitin 750-600 mg Tab Take 1 Tab by mouth daily. Brand: Move Free HORIZON NASAL CPAP SYSTEM  
by Does Not Apply route. HYDROcodone-acetaminophen 5-325 mg per tablet Commonly known as:  Valdemar Pock  
 Take 1 Tab by mouth every four (4) hours as needed for Pain. Max Daily Amount: 6 Tabs. metoprolol succinate 25 mg XL tablet Commonly known as:  TOPROL-XL  
TAKE ONE TABLET BY MOUTH EVERY DAY  
  
 pantoprazole 40 mg tablet Commonly known as:  PROTONIX Take 1 Tab by mouth daily. rOPINIRole 0.5 mg tablet Commonly known as:  Rand Glaze Take 2 Tabs by mouth daily (after dinner). Take 1.5 to 2 tablets daily after dinner TYLENOL EXTRA STRENGTH 500 mg tablet Generic drug:  acetaminophen Take 500 mg by mouth every eight (8) hours as needed. valsartan 160 mg tablet Commonly known as:  DIOVAN  
TAKE 1 TABLET BY MOUTH DAILY  
  
 VITAMIN D3 2,000 unit Cap capsule Generic drug:  Cholecalciferol (Vitamin D3) Take 2,000 Units by mouth daily. Prescriptions Sent to Pharmacy Refills  
 cephALEXin (KEFLEX) 500 mg capsule 0 Sig: Take 1 Cap by mouth three (3) times daily. Class: Normal  
 Pharmacy: [facility]/pharmacy [address] [place] [address] [address]  #: [phone] Route: Oral  
  
We Performed the Following AMB POC HEMOGLOBIN A1C [75035 CPT(R)] Follow-up Instructions Return for follow up pending tests. Jad Mobley To-Do List   
 03/29/2017 Imaging:  ANKLE BRACHIAL INDEX Patient Instructions Learning About Diabetes Food Guidelines Your Care Instructions Meal planning is important to manage diabetes. It helps keep your blood sugar at a target level (which you set with your doctor). You don't have to eat special foods. You can eat what your family eats, including sweets once in a while. But you do have to pay attention to how often you eat and how much you eat of certain foods. You may want to work with a dietitian or a certified diabetes educator (CDE) to help you plan meals and snacks. A dietitian or CDE can also help you lose weight if that is one of your goals. What should you know about eating carbs? Managing the amount of carbohydrate (carbs) you eat is an important part of healthy meals when you have diabetes. Carbohydrate is found in many foods. · Learn which foods have carbs. And learn the amounts of carbs in different foods. ¨ Bread, cereal, pasta, and rice have about 15 grams of carbs in a serving. A serving is 1 slice of bread (1 ounce), ½ cup of cooked cereal, or 1/3 cup of cooked pasta or rice. ¨ Fruits have 15 grams of carbs in a serving. A serving is 1 small fresh fruit, such as an apple or orange; ½ of a banana; ½ cup of cooked or canned fruit; ½ cup of fruit juice; 1 cup of melon or raspberries; or 2 tablespoons of dried fruit. ¨ Milk and no-sugar-added yogurt have 15 grams of carbs in a serving. A serving is 1 cup of milk or 2/3 cup of no-sugar-added yogurt. ¨ Starchy vegetables have 15 grams of carbs in a serving. A serving is ½ cup of mashed potatoes or sweet potato; 1 cup winter squash; ½ of a small baked potato; ½ cup of cooked beans; or ½ cup cooked corn or green peas. · Learn how much carbs to eat each day and at each meal. A dietitian or CDE can teach you how to keep track of the amount of carbs you eat. This is called carbohydrate counting. · If you are not sure how to count carbohydrate grams, use the Plate Method to plan meals. It is a good, quick way to make sure that you have a balanced meal. It also helps you spread carbs throughout the day. ¨ Divide your plate by types of foods. Put non-starchy vegetables on half the plate, meat or other protein food on one-quarter of the plate, and a grain or starchy vegetable in the final quarter of the plate.  To this you can add a small piece of fruit and 1 cup of milk or yogurt, depending on how many carbs you are supposed to eat at a meal. 
· Try to eat about the same amount of carbs at each meal. Do not \"save up\" your daily allowance of carbs to eat at one meal. 
 · Proteins have very little or no carbs per serving. Examples of proteins are beef, chicken, turkey, fish, eggs, tofu, cheese, cottage cheese, and peanut butter. A serving size of meat is 3 ounces, which is about the size of a deck of cards. Examples of meat substitute serving sizes (equal to 1 ounce of meat) are 1/4 cup of cottage cheese, 1 egg, 1 tablespoon of peanut butter, and ½ cup of tofu. How can you eat out and still eat healthy? · Learn to estimate the serving sizes of foods that have carbohydrate. If you measure food at home, it will be easier to estimate the amount in a serving of restaurant food. · If the meal you order has too much carbohydrate (such as potatoes, corn, or baked beans), ask to have a low-carbohydrate food instead. Ask for a salad or green vegetables. · If you use insulin, check your blood sugar before and after eating out to help you plan how much to eat in the future. · If you eat more carbohydrate at a meal than you had planned, take a walk or do other exercise. This will help lower your blood sugar. What else should you know? · Limit saturated fat, such as the fat from meat and dairy products. This is a healthy choice because people who have diabetes are at higher risk of heart disease. So choose lean cuts of meat and nonfat or low-fat dairy products. Use olive or canola oil instead of butter or shortening when cooking. · Don't skip meals. Your blood sugar may drop too low if you skip meals and take insulin or certain medicines for diabetes. · Check with your doctor before you drink alcohol. Alcohol can cause your blood sugar to drop too low. Alcohol can also cause a bad reaction if you take certain diabetes medicines. Follow-up care is a key part of your treatment and safety. Be sure to make and go to all appointments, and call your doctor if you are having problems. It's also a good idea to know your test results and keep a list of the medicines you take. Where can you learn more? Go to http://antonieta-josie.info/. Enter U189 in the search box to learn more about \"Learning About Diabetes Food Guidelines. \" Current as of: May 23, 2016 Content Version: 11.2 © 4819-5532 [employer]. Care instructions adapted under license by [employer] (which disclaims liability or warranty for this information). If you have questions about a medical condition or this instruction, always ask your healthcare professional. [place] [address] any warranty or liability for your use of this information. Please provide this summary of care documentation to your next provider. Your primary care clinician is listed as Lyle Kaplan. If you have any questions after today's visit, please call [phone].

## 2017-04-04 ENCOUNTER — HOSPITAL ENCOUNTER (OUTPATIENT)
Dept: VASCULAR SURGERY | Age: 72
Discharge: HOME | End: 2017-04-04
Attending: FAMILY MEDICINE
Payer: MEDICARE

## 2017-04-04 DIAGNOSIS — R20.9 SENSATION OF COLD IN LOWER EXTREMITY: ICD-10-CM

## 2017-04-04 PROCEDURE — 93922 UPR/L XTREMITY ART 2 LEVELS: CPT

## 2017-04-04 NOTE — PROCEDURES
[facility]  *** FINAL REPORT ***    Name: Kip Landry  MRN: [id number]    Outpatient  : 26 Aug 1945  HIS Order #: [id number]  [address] [facility] Visit #: [id number]  Date: 2017    TYPE OF TEST: Peripheral Arterial Testing    REASON FOR TEST  Sensation of cold in lower extremity    Right Leg  Doppler:    Normal  Ankle/Brachial: 1.02    Left Leg  Doppler:    Normal  Ankle/Brachial: 1.13    INTERPRETATION/FINDINGS  PROCEDURE:  Ankle, brachial and digital arterial pressures, CW Doppler   waveforms and digital PPG waveforms were performed. 1. No evidence of significant peripheral arterial disease at rest in  the right leg. 2. No evidence of significant peripheral arterial disease at rest in  the left leg. 3. The right ankle/brachial index is 1.02 and the left ankle/brachial  index is 1.13.  4. The right great toe/brachial index is 0.84 and the left great  toe/brachial index is 0.76. ADDITIONAL COMMENTS    I have personally reviewed the data relevant to the interpretation of  this  study.     TECHNOLOGIST: Clemente Ny RVT  Signed: 2017 10:43 AM    PHYSICIAN: Jonathan Bruce MD  Signed: 2017 03:51 PM

## 2017-04-11 NOTE — PROGRESS NOTES
Reviewed results with patient per Dr. Shiela Ortega. I have reviewed the provider's instructions with the patient, answering all questions to his satisfaction.

## 2017-04-17 DIAGNOSIS — G47.33 OBSTRUCTIVE SLEEP APNEA: ICD-10-CM

## 2017-04-17 RX ORDER — ROPINIROLE 0.5 MG/1
1 TABLET, FILM COATED ORAL
Qty: 60 TAB | Refills: 5 | Status: SHIPPED | OUTPATIENT
Start: 2017-04-17 | End: 2017-12-15 | Stop reason: SDUPTHER

## 2017-06-15 RX ORDER — VALSARTAN 160 MG/1
TABLET ORAL
Qty: 90 TAB | Refills: 3 | Status: SHIPPED | OUTPATIENT
Start: 2017-06-15 | End: 2018-06-11 | Stop reason: SDUPTHER

## 2017-06-27 RX ORDER — CLOPIDOGREL BISULFATE 75 MG/1
TABLET ORAL
Qty: 30 TAB | Refills: 5 | Status: SHIPPED | OUTPATIENT
Start: 2017-06-27 | End: 2017-07-07 | Stop reason: SDUPTHER

## 2017-07-06 ENCOUNTER — TELEPHONE (OUTPATIENT)
Dept: CARDIOLOGY CLINIC | Age: 72
End: 2017-07-06

## 2017-07-06 NOTE — TELEPHONE ENCOUNTER
Spoke to patient using 2 patient identifiers. Per Alexis Hines, TODD, tomorrow's appt is fine, but if he worsens or feels he is not able to wait until tomorrow, encouraged him to be seen in the ED. Had a negative stress test in the fall, but they are not 100% reliable, we can get false negative results. Additionally, if he still has in inhaler to utilize it as it may be like an asthma exacerbation/allergy. Patient verbalized understanding with no further questions asked.

## 2017-07-06 NOTE — TELEPHONE ENCOUNTER
Yes, if patient worsens, or feels that he is not able to wait until tomorrow I encourage him to be seen in the ED. He had a neg stress test in the fall, but they are not 100% reliable, we can get false negative results.  Additionally if he still has an inhaler I would utilize this as it may be like an asthma exacerbation/allergy

## 2017-07-06 NOTE — TELEPHONE ENCOUNTER
Spoke to patient using 2 patient identifiers. Patient originally called for rx refill for metoprolol. Last seen on 9-12-16 and no future appt set up. Patient was told a  will call him for an appt and then refills. While conversing with patient, he sounded short of breath. Per patient, onset of other symptoms occurred yesterday after cutting the grass such as chest pressure/tightness, has a little bit hard time breathing, and dizziness. Patient stated he is drinking lots of fluids. Any recommendations for the patient at this time? Please advise.

## 2017-07-07 ENCOUNTER — HOSPITAL ENCOUNTER (OUTPATIENT)
Dept: LAB | Age: 72
Discharge: HOME | End: 2017-07-07
Payer: MEDICARE

## 2017-07-07 ENCOUNTER — OFFICE VISIT (OUTPATIENT)
Dept: CARDIOLOGY CLINIC | Age: 72
End: 2017-07-07

## 2017-07-07 VITALS
HEART RATE: 64 BPM | SYSTOLIC BLOOD PRESSURE: 112 MMHG | HEIGHT: 72 IN | WEIGHT: 251 LBS | OXYGEN SATURATION: 96 % | RESPIRATION RATE: 18 BRPM | BODY MASS INDEX: 34 KG/M2 | DIASTOLIC BLOOD PRESSURE: 60 MMHG

## 2017-07-07 DIAGNOSIS — S91.302A WOUND, OPEN, FOOT WITH COMPLICATION, LEFT, INITIAL ENCOUNTER: ICD-10-CM

## 2017-07-07 DIAGNOSIS — I20.8 ANGINAL CHEST PAIN AT REST (HCC): ICD-10-CM

## 2017-07-07 DIAGNOSIS — R60.9 DEPENDENT EDEMA: ICD-10-CM

## 2017-07-07 DIAGNOSIS — Z95.5 S/P CORONARY ARTERY STENT PLACEMENT: ICD-10-CM

## 2017-07-07 DIAGNOSIS — R06.09 DOE (DYSPNEA ON EXERTION): ICD-10-CM

## 2017-07-07 DIAGNOSIS — I25.10 ASHD (ARTERIOSCLEROTIC HEART DISEASE): ICD-10-CM

## 2017-07-07 DIAGNOSIS — N18.30 CHRONIC KIDNEY DISEASE, STAGE III (MODERATE) (HCC): ICD-10-CM

## 2017-07-07 DIAGNOSIS — I83.90 VARICOSE VEIN OF LEG: ICD-10-CM

## 2017-07-07 DIAGNOSIS — R07.9 CHEST PAIN, UNSPECIFIED TYPE: Primary | ICD-10-CM

## 2017-07-07 PROCEDURE — 85025 COMPLETE CBC W/AUTO DIFF WBC: CPT

## 2017-07-07 PROCEDURE — 85610 PROTHROMBIN TIME: CPT

## 2017-07-07 PROCEDURE — 36415 COLL VENOUS BLD VENIPUNCTURE: CPT

## 2017-07-07 PROCEDURE — 80053 COMPREHEN METABOLIC PANEL: CPT

## 2017-07-07 RX ORDER — NITROGLYCERIN 0.4 MG/1
0.4 TABLET SUBLINGUAL
Qty: 1 BOTTLE | Refills: 0 | Status: SHIPPED | OUTPATIENT
Start: 2017-07-07 | End: 2017-11-13 | Stop reason: SDUPTHER

## 2017-07-07 RX ORDER — CHLORTHALIDONE 25 MG/1
12.5 TABLET ORAL DAILY
Qty: 30 TAB | Refills: 0 | COMMUNITY
Start: 2017-07-07 | End: 2018-08-21 | Stop reason: SDUPTHER

## 2017-07-07 RX ORDER — CLOPIDOGREL BISULFATE 75 MG/1
75 TABLET ORAL DAILY
Qty: 30 TAB | Refills: 0 | Status: SHIPPED | OUTPATIENT
Start: 2017-07-07 | End: 2017-10-18 | Stop reason: SDUPTHER

## 2017-07-07 RX ORDER — ISOSORBIDE MONONITRATE 30 MG/1
15 TABLET, EXTENDED RELEASE ORAL DAILY
Qty: 15 TAB | Refills: 0 | Status: SHIPPED | OUTPATIENT
Start: 2017-07-07 | End: 2017-08-03 | Stop reason: SDUPTHER

## 2017-07-07 NOTE — PROGRESS NOTES
Ariana Kuo DNP, ANP-BC  Subjective/HPI:     Sony Landeros is a 66 y.o. male is here for symptom based appointment. He reports progressive dyspnea on exertion with limited physical activities, most concerning is in the last 3 days he is encountered 2 episodes of significant anterior chest pressure; one episode while cutting his grass which required him to come in and rest and second episode was the following day while sitting he developed sudden onset of chest pressure lasting a few minutes then resolved. Wife will also accompany patient to the appointment also reports he is having significant fatigue, increased amount of sleeping and is notable dyspnea even with taking a shower. He has been compliant with taking all his medications including Plavix. He is status post PTCA and stenting May 2015 of the circumflex however at the time he had a 50% stenosis of the ostium which had a negative FFR at the time. Additional concerns today upon viewing the posterior of PAD in our office, he has been treated next-door with podiatry for a ongoing left foot wound that has recently become reddened and tends to open and closed. Seen yesterday by podiatry wound was cleaned and dressed. It has been greater than 1 year since any vascular assessment has been performed on his lower extremities. He also reports dependent edema towards the end of the day with prolonged standing.     PCP Provider  Chip Faulkner MD  Past Medical History:   Diagnosis Date    Abnormal stress echo 5/12/2015    Anemia NEC 03/2013    Last 2-3 months; finished taking iron supplement    Anxiety     CAD (coronary artery disease) 2009    cath Sylvia Cobb) revealed 20%RCA and 50%2nd diagonal    Calculus of kidney     Chronic kidney disease, stage III (moderate) 10/11/2009    30% kidney function    Diabetes ([address]     pre diabetic    Difficult intubation     DJD (degenerative joint disease)     GERD (gastroesophageal reflux disease) 10/11/2009    Gout     Hypertension     Kidney disease     Liver disease     fatty liver    Obesity     Obstructive sleep apnea (adult) (pediatric) 10/11/2009    nasal pillows; pressure set at 10-11 - uses cpap    Peripheral neuropathy ([address] 10/11/2009    Prediabetes 1/7/2012    RLS (restless legs syndrome) 10/11/2009    S/P coronary artery stent placement 5/12/2015    5/11/15 PCI./MALI to LCx      Past Surgical History:   Procedure Laterality Date    HX HEART CATHETERIZATION  2009    HX HERNIA REPAIR      McTamaney/umbilical    HX ORTHOPAEDIC      left great toe pinning/plate    HX ORTHOPAEDIC      left shoulder manipulation    HX ORTHOPAEDIC  7/23/11     LEFT TOTAL KNEE ARTHROPLASTY    HX UROLOGICAL      basket extraction of kidney stones    OH COLONOSCOPY FLX DX W/COLLJ SPEC WHEN PFRMD  8/5/2010         OH COLSC FLX W/RMVL OF TUMOR POLYP LESION SNARE TQ  9/24/2014          Allergies   Allergen Reactions    Penicillins Hives    Bactrim [Sulfamethoxazole-Trimethoprim] Hives    Lisinopril (Bulk) Cough    Neurontin [Gabapentin] Other (comments)     drowsy    Zostavax [Zoster Vaccine Live (Pf)] Rash     See 9/10/13 visit      Family History   Problem Relation Age of Onset    Heart Disease Father     Hypertension Father     Other Father      abdominal aneurysm     Dementia Mother     Alzheimer Mother     Heart Disease Brother     Heart Attack Brother     Heart Disease Maternal Grandmother     Heart Disease Paternal Grandmother     Heart Attack Paternal Grandmother     Heart Disease Paternal Grandfather     Heart Attack Paternal Grandfather     Elevated Lipids Son     Elevated Lipids Daughter     Cancer Neg Hx     Diabetes Neg Hx     Stroke Neg Hx          Vitals:    07/07/17 0908 07/07/17 0917   BP: 114/62 112/60   Pulse: 64    Resp: 18    SpO2: 96%    Weight: 251 lb (113.9 kg)    Height: 6' (1.829 m)      Social History     Social History    Marital status:      Spouse name: N/A    Number of children: N/A    Years of education: N/A     Occupational History    Not on file. Social History Main Topics    Smoking status: Former Smoker     Packs/day: 1.00     Years: 10.00     Types: Cigarettes     Quit date: 1/1/1968    Smokeless tobacco: Never Used    Alcohol use No    Drug use: No    Sexual activity: Yes     Partners: Female     Other Topics Concern    Not on file     Social History Narrative       I have reviewed the nurses notes, vitals, problem list, allergy list, medical history, family, social history and medications. Review of Symptoms:    General: Pt denies excessive weight gain or loss. Pt is able to conduct ADL's however is experiencing dyspnea  HEENT: Denies blurred vision, headaches, epistaxis and difficulty swallowing. Respiratory: Denies shortness of breath, + SMITH, wheezing or stridor. Cardiovascular: Denies precordial pain, palpitations, + edema or PND  Gastrointestinal: Denies poor appetite, indigestion, abdominal pain or blood in stool  Musculoskeletal: Denies pain or swelling from muscles or joints  Neurologic: Denies tremor, paresthesias, or sensory motor disturbance. Has diffuse numbness bilateral feet  Skin: Denies rash, itching or texture change. Wound on left foot as indicated above  Psych: Denies depression      Physical Exam:      General: Well developed, in no acute distress, cooperative and alert  HEENT: No carotid bruits, no JVD, trach is midline. Neck Supple, PEERL, EOM intact. Heart:  Normal S1/S2 negative S3 or S4. Regular, no murmur, gallop or rub.   Respiratory: Clear bilaterally x 4, no wheezing or rales  Abdomen:   Soft, non-tender, no masses, bowel sounds are active.   Extremities: Trace bilateral ankle edema, normal cap refill, no cyanosis, atraumatic.    Neuro: A&Ox3, speech clear, gait has slight limp secondary to dressing on the left foot  Skin: Skin color is normal with the exception of bilateral feet noting on his left foot digit #2 and 3 has an area of redness, the underside of the foot currently has a dressing. Both feet have trace amounts of mottling. Visible skin on exam today does not show any amounts of excessive bruising or bleeding  Vascular: 2+ pulses symmetric in all extremities. 4 second cap refill bilateral dorsal surface of feet. There is bilateral ropelike varicosities in his lower extremities. Negative Homans sign bilateral    Cardiographics    ECG: Sinus with right bundle branch block unchanged from previous 3 tracings  Results for orders placed or performed during the hospital encounter of 05/11/15   EKG, 12 LEAD, INITIAL   Result Value Ref Range    Ventricular Rate 64 BPM    Atrial Rate 64 BPM    P-R Interval 150 ms    QRS Duration 114 ms    Q-T Interval 470 ms    QTC Calculation (Bezet) 484 ms    Calculated P Axis 56 degrees    Calculated T Axis 9 degrees    Diagnosis       Normal sinus rhythm  Incomplete right bundle branch block    When compared with ECG of 11-MAY-2015 13:26,  No significant change was found  Confirmed by Marcos Duran ([id number]) on 5/12/2015 7:44:46 AM           Cardiology Labs:  Lab Results   Component Value Date/Time    Cholesterol, total 142 09/28/2016 11:20 AM    HDL Cholesterol 37 09/28/2016 11:20 AM    LDL, calculated 48 09/28/2016 11:20 AM    Triglyceride 286 09/28/2016 11:20 AM       Lab Results   Component Value Date/Time    Sodium 144 09/28/2016 11:20 AM    Potassium 4.3 09/28/2016 11:20 AM    Chloride 106 09/28/2016 11:20 AM    CO2 24 09/28/2016 11:20 AM    Anion gap 9 05/12/2015 05:22 AM    Glucose 100 09/28/2016 11:20 AM    BUN 18 09/28/2016 11:20 AM    Creatinine 1.95 09/28/2016 11:20 AM    BUN/Creatinine ratio 9 09/28/2016 11:20 AM    GFR est AA 38 09/28/2016 11:20 AM    GFR est non-AA 33 09/28/2016 11:20 AM    Calcium 9.0 09/28/2016 11:20 AM    Bilirubin, total 0.7 09/28/2016 11:20 AM    AST (SGOT) 19 09/28/2016 11:20 AM    Alk.  phosphatase 98 09/28/2016 11:20 AM    Protein, total 6.7 09/28/2016 11:20 AM    Albumin 4.1 09/28/2016 11:20 AM    Globulin 4.1 12/13/2012 07:20 PM    A-G Ratio 1.6 09/28/2016 11:20 AM    ALT (SGPT) 23 09/28/2016 11:20 AM           Assessment:     Assessment:     Brianna Ortiz was seen today for chest pain. Diagnoses and all orders for this visit:    Chest pain, unspecified type  -     AMB POC EKG ROUTINE W/ 12 LEADS, INTER & REP  -     METABOLIC PANEL, COMPREHENSIVE  -     CBC WITH AUTOMATED DIFF  -     PROTHROMBIN TIME + INR  -     CARDIAC CATHETERIZATION; Future  -     ANKLE BRACHIAL INDEX; Future  -     DUPLEX LOWER EXT VENOUS BILAT; Future    ASHD (arteriosclerotic heart disease)  -     METABOLIC PANEL, COMPREHENSIVE  -     CBC WITH AUTOMATED DIFF  -     PROTHROMBIN TIME + INR  -     CARDIAC CATHETERIZATION; Future  -     ANKLE BRACHIAL INDEX; Future  -     DUPLEX LOWER EXT VENOUS BILAT; Future    SMITH (dyspnea on exertion)  -     METABOLIC PANEL, COMPREHENSIVE  -     CBC WITH AUTOMATED DIFF  -     PROTHROMBIN TIME + INR  -     CARDIAC CATHETERIZATION; Future  -     ANKLE BRACHIAL INDEX; Future  -     DUPLEX LOWER EXT VENOUS BILAT; Future    Chronic kidney disease, stage III (moderate)  -     METABOLIC PANEL, COMPREHENSIVE  -     CBC WITH AUTOMATED DIFF  -     PROTHROMBIN TIME + INR  -     CARDIAC CATHETERIZATION; Future  -     ANKLE BRACHIAL INDEX; Future  -     DUPLEX LOWER EXT VENOUS BILAT; Future    S/P coronary artery stent placement  -     METABOLIC PANEL, COMPREHENSIVE  -     CBC WITH AUTOMATED DIFF  -     PROTHROMBIN TIME + INR  -     CARDIAC CATHETERIZATION; Future  -     ANKLE BRACHIAL INDEX; Future  -     DUPLEX LOWER EXT VENOUS BILAT; Future    Anginal chest pain at rest ([address]  -     METABOLIC PANEL, COMPREHENSIVE  -     CBC WITH AUTOMATED DIFF  -     PROTHROMBIN TIME + INR  -     CARDIAC CATHETERIZATION; Future  -     ANKLE BRACHIAL INDEX; Future  -     DUPLEX LOWER EXT VENOUS BILAT;  Future    Wound, open, foot with complication, left, initial encounter    Varicose vein of leg    Dependent edema    Other orders  -     clopidogrel (PLAVIX) 75 mg tab; Take 1 Tab by mouth daily. -     nitroglycerin (NITROSTAT) 0.4 mg SL tablet; 1 Tab by SubLINGual route every five (5) minutes as needed for Chest Pain.  -     isosorbide mononitrate ER (IMDUR) 30 mg tablet; Take 0.5 Tabs by mouth daily. ICD-10-CM ICD-9-CM    1. Chest pain, unspecified type R07.9 786.50 AMB POC EKG ROUTINE W/ 12 LEADS, INTER & REP      METABOLIC PANEL, COMPREHENSIVE      CBC WITH AUTOMATED DIFF      PROTHROMBIN TIME + INR      CARDIAC CATHETERIZATION      ANKLE BRACHIAL INDEX      DUPLEX LOWER EXT VENOUS BILAT   2. ASHD (arteriosclerotic heart disease) X65.07 784.26 METABOLIC PANEL, COMPREHENSIVE      CBC WITH AUTOMATED DIFF      PROTHROMBIN TIME + INR      CARDIAC CATHETERIZATION      ANKLE BRACHIAL INDEX      DUPLEX LOWER EXT VENOUS BILAT   3. SMITH (dyspnea on exertion) K96.28 248.91 METABOLIC PANEL, COMPREHENSIVE      CBC WITH AUTOMATED DIFF      PROTHROMBIN TIME + INR      CARDIAC CATHETERIZATION      ANKLE BRACHIAL INDEX      DUPLEX LOWER EXT VENOUS BILAT   4. Chronic kidney disease, stage III (moderate) W11.5 525.6 METABOLIC PANEL, COMPREHENSIVE      CBC WITH AUTOMATED DIFF      PROTHROMBIN TIME + INR      CARDIAC CATHETERIZATION      ANKLE BRACHIAL INDEX      DUPLEX LOWER EXT VENOUS BILAT   5. S/P coronary artery stent placement X84.8 S46.95 METABOLIC PANEL, COMPREHENSIVE      CBC WITH AUTOMATED DIFF      PROTHROMBIN TIME + INR      CARDIAC CATHETERIZATION      ANKLE BRACHIAL INDEX      DUPLEX LOWER EXT VENOUS BILAT   6. Anginal chest pain at rest (HCC) N33.8 449.4 METABOLIC PANEL, COMPREHENSIVE      CBC WITH AUTOMATED DIFF      PROTHROMBIN TIME + INR      CARDIAC CATHETERIZATION      ANKLE BRACHIAL INDEX      DUPLEX LOWER EXT VENOUS BILAT   7. Wound, open, foot with complication, left, initial encounter S91.302A 892.1    8. Varicose vein of leg I83.90 454.9    9.  Dependent edema R60.9 782.3      Orders Placed This Encounter    ANKLE BRACHIAL INDEX     Standing Status:   Future     Standing Expiration Date:   2019     Order Specific Question:   Reason for Exam     Answer:   foot wound    DUPLEX LOWER EXT VENOUS BILAT     Standing Status:   Future     Standing Expiration Date:   4/3/8899    METABOLIC PANEL, COMPREHENSIVE    CBC WITH AUTOMATED DIFF    PROTHROMBIN TIME + INR    CARDIAC CATHETERIZATION     Standing Status:   Future     Standing Expiration Date:   2018     Order Specific Question:   Reason for Exam:     Answer:   angina    AMB POC EKG ROUTINE W/ 12 LEADS, INTER & REP     Order Specific Question:   Reason for Exam:     Answer:   routine    clopidogrel (PLAVIX) 75 mg tab     Sig: Take 1 Tab by mouth daily. Dispense:  30 Tab     Refill:  0    chlorthalidone (HYGROTEN) 25 mg tablet     Sig: Take 0.5 Tabs by mouth daily. Dispense:  30 Tab     Refill:  0    nitroglycerin (NITROSTAT) 0.4 mg SL tablet     Si Tab by SubLINGual route every five (5) minutes as needed for Chest Pain. Dispense:  1 Bottle     Refill:  0    isosorbide mononitrate ER (IMDUR) 30 mg tablet     Sig: Take 0.5 Tabs by mouth daily. Dispense:  15 Tab     Refill:  0        Plan:     1. Atherosclerotic heart disease: Patient presents with increasing dyspnea on exertion which is limiting his activities of daily life, new onset of anterior chest pain with and without exertion. He is currently on 2 antianginal medications in the form of beta-blocker and amlodipine. His symptoms are consistent with angina functional class III-IV. Patient was stress tested in 2016 with negative ischemia however extremely poor exercise performance which was consistent with his stress echo performance in 2015 which required PTCA and stenting of the circumflex. Patient reports his symptoms are similar if not worse than his intervention in May 2015.   Discussed risk and benefits of coronary angiography with PTCA and stenting patient willing to proceed. We will add third antianginal in the form of 15 mg of Imdur daily and as needed nitrates. He is to continue Plavix and aspirin. 2.  Mild dizziness with positional change: We will reduce chlorthalidone to 12.5 mg daily encourage hydration, patient advised drinking a gallon of iced tea and one pot of coffee a day is an excessive amount of caffeine and does not contribute to his hydration status. 3.  Left foot wound: Will screen for peripheral arterial disease with TABITHA. Patient reports podiatry is following wound however wife states not as pleased with his outcomes and may consider second opinion. Recommended if patient/wife feel this way I can certainly refer to the wound center behind our office. 4.  Bilateral varicosities and waxing and waning dependent edema: Suspect venous reflux, will evaluate with venous reflux study to be performed after cardiac catheterization. 5.  Hyperlipidemia: Maintain statin therapy  Follow-up when testing and procedure are complete  Coronary angiography planned with Dr. Yenifer Ambrose on July 13. Most likely will need hydration.     Rosio Salvador NP

## 2017-07-07 NOTE — PROGRESS NOTES
Chief Complaint   Patient presents with    Chest Pain     C/O CP and SOB with exertion for a couple of months.

## 2017-07-07 NOTE — MR AVS SNAPSHOT
Visit Information Date & Time Provider Department Dept. Phone Encounter #  
 7/7/2017  9:30 AM Darius Joshi NP [facility] [phone] Your Appointments 7/24/2017 10:00 AM  
ANKLE BRIACHIAL INDEX with VASCULAR, [facility] [address]) Appt Note: ANKLE BRACHIAL INDEX [09866 CPT(R)]  
 [address]  
[phone] [address]  
  
    
 7/25/2017 12:30 PM  
Office Visit with ECHO, [facility] [address]) Appt Note: DUPLEX LOWER EXT VENOUS BILAT [id number] CPT(R)] per m arnold [address]  
[phone] [address] Upcoming Health Maintenance Date Due  
 GLAUCOMA SCREENING Q2Y 8/5/2016 MEDICARE YEARLY EXAM 3/17/2017 Pneumococcal 65+ Low/Medium Risk (2 of 2 - PPSV23) 6/20/2017 INFLUENZA AGE 9 TO ADULT 8/1/2017 COLONOSCOPY 9/24/2024 DTaP/Tdap/Td series (2 - Td) 10/24/2026 Allergies as of 7/7/2017  Review Complete On: 7/7/2017 By: Darius Joshi NP Severity Noted Reaction Type Reactions Penicillins High 10/01/2009    Hives Bactrim [Sulfamethoxazole-trimethoprim]  03/31/2010   Systemic Hives Lisinopril (Bulk)  10/01/2009    Cough Neurontin [Gabapentin]  10/01/2009   Side Effect Other (comments) drowsy Zostavax [Zoster Vaccine Live (Pf)]  11/20/2013   Not Verified Rash See 9/10/13 visit Current Immunizations  Reviewed on 9/28/2016 Name Date Influenza Vaccine 10/1/2014, 10/1/2013 Influenza Vaccine (Quad) PF 10/7/2016, 9/30/2015 Influenza Vaccine Split 9/19/2012, 10/6/2011, 10/27/2010 Influenza Vaccine Whole 10/23/2008 Pneumococcal Vaccine (Unspecified Type) 6/20/2012 Tdap 10/24/2016 10:04 PM, 3/21/2016 Zoster Vaccine, Live 8/15/2013 Not reviewed this visit You Were Diagnosed With   
  
 Codes Comments Chest pain, unspecified type    -  Primary ICD-10-CM: R07.9 ICD-9-CM: 786.50 ASHD (arteriosclerotic heart disease)     ICD-10-CM: I25.10 ICD-9-CM: 414.00   
 SMITH (dyspnea on exertion)     ICD-10-CM: R06.09 
ICD-9-CM: 786.09 Chronic kidney disease, stage III (moderate)     ICD-10-CM: N18.3 ICD-9-CM: 585.3 S/P coronary artery stent placement     ICD-10-CM: Z95.5 ICD-9-CM: V45.82 Anginal chest pain at rest [facility])     ICD-10-CM: I20.8 ICD-9-CM: 413.0 Wound, open, foot with complication, left, initial encounter     ICD-10-CM: B08.814C ICD-9-CM: 892.1 Varicose vein of leg     ICD-10-CM: I83.90 ICD-9-CM: 454.9 Dependent edema     ICD-10-CM: R60.9 ICD-9-CM: 376. 3 Vitals BP Pulse Resp Height(growth percentile) Weight(growth percentile) SpO2  
 112/60 (BP 1 Location: Right arm, BP Patient Position: Sitting) 64 18 6' (1.829 m) 251 lb (113.9 kg) 96% BMI Smoking Status 34.04 kg/m2 Former Smoker Vitals History BMI and BSA Data Body Mass Index Body Surface Area 34.04 kg/m 2 2.41 m 2 Preferred Pharmacy Pharmacy Name Phone [facility]ACY #[id number], [place] - [address] [phone] Your Updated Medication List  
  
   
This list is accurate as of: 7/7/17 10:01 AM.  Always use your most recent med list.  
  
  
  
  
 albuterol 90 mcg/actuation inhaler Commonly known as:  PROVENTIL HFA, VENTOLIN HFA, PROAIR HFA Take 1 Puff by inhalation every six (6) hours as needed for Wheezing. allopurinol 100 mg tablet Commonly known as:  Cary Champ Take 100 mg by mouth every morning. amLODIPine 5 mg tablet Commonly known as:  Olimpia [place] Take 5 mg by mouth daily. aspirin delayed-release 81 mg tablet Take 81 mg by mouth daily. atorvastatin 40 mg tablet Commonly known as:  LIPITOR TAKE 1 TABLET BY MOUTH NIGHTLY*****STOP SIMVASTATIN****  
  
 azithromycin 250 mg tablet Commonly known as:  Mack Ignacio Take 1 Tab by mouth See Admin Instructions for 6 doses. Take 2 tabs on day one followed by 1 tab daily for a total of 5 days. CENTRUM PO Take 1 Tab by mouth daily. cephALEXin 500 mg capsule Commonly known as:  Soraya Hacker Take 1 Cap by mouth three (3) times daily. chlorthalidone 25 mg tablet Commonly known as:  Vanessa Sous Take 0.5 Tabs by mouth daily. citalopram 40 mg tablet Commonly known as:  CELEXA  
TAKE 1 TABLET BY MOUTH EVERY DAY  
  
 clopidogrel 75 mg Tab Commonly known as:  PLAVIX Take 1 Tab by mouth daily. glucosamine-chondroitin 750-600 mg Tab Take 1 Tab by mouth daily. Brand: [employer] NASAL CPAP SYSTEM  
by Does Not Apply route. HYDROcodone-acetaminophen 5-325 mg per tablet Commonly known as:  Jose Pare Take 1 Tab by mouth every four (4) hours as needed for Pain. Max Daily Amount: 6 Tabs. isosorbide mononitrate ER 30 mg tablet Commonly known as:  IMDUR Take 0.5 Tabs by mouth daily. metoprolol succinate 25 mg XL tablet Commonly known as:  TOPROL-XL  
TAKE ONE TABLET BY MOUTH EVERY DAY  
  
 nitroglycerin 0.4 mg SL tablet Commonly known as:  NITROSTAT  
1 Tab by SubLINGual route every five (5) minutes as needed for Chest Pain.  
  
 pantoprazole 40 mg tablet Commonly known as:  PROTONIX Take 1 Tab by mouth daily. rOPINIRole 0.5 mg tablet Commonly known as:  Artice Aquiles Take 2 Tabs by mouth daily (after dinner). Take 1.5 to 2 tablets daily after dinner TYLENOL EXTRA STRENGTH 500 mg tablet Generic drug:  acetaminophen Take 500 mg by mouth every eight (8) hours as needed. valsartan 160 mg tablet Commonly known as:  DIOVAN  
TAKE 1 TABLET BY MOUTH DAILY  
  
 VITAMIN D3 2,000 unit Cap capsule Generic drug:  Cholecalciferol (Vitamin D3) Take 2,000 Units by mouth daily. Prescriptions Sent to Pharmacy Refills  
 clopidogrel (PLAVIX) 75 mg tab 0 Sig: Take 1 Tab by mouth daily. Class: Normal  
 Pharmacy: [facility]/pharmacy [address] [place] [address] [address] Ph #: [phone] Route: Oral  
 nitroglycerin (NITROSTAT) 0.4 mg SL tablet 0 Si Tab by SubLINGual route every five (5) minutes as needed for Chest Pain. Class: Normal  
 Pharmacy: [facility]/pharmacy [address] [place] [address] [address] Ph #: [phone] Route: SubLINGual  
 isosorbide mononitrate ER (IMDUR) 30 mg tablet 0 Sig: Take 0.5 Tabs by mouth daily. Class: Normal  
 Pharmacy: [facility]/pharmacy [address] [place] [address] [address] Ph #: [phone] Route: Oral  
  
We Performed the Following AMB POC EKG ROUTINE W/ 12 LEADS, INTER & REP [38232 CPT(R)] CBC WITH AUTOMATED DIFF [88099 CPT(R)] METABOLIC PANEL, COMPREHENSIVE [44726 CPT(R)] PROTHROMBIN TIME + INR [70559 CPT(R)] To-Do List   
 07/10/2017 Imaging:  ANKLE BRACHIAL INDEX   
  
 2017 Cardiac Services:  CARDIAC CATHETERIZATION   
  
 2017 Imaging:  DUPLEX LOWER EXT VENOUS BILAT Introducing [facility]! Irma Landin introduces [facility] patient portal. Now you can access parts of your medical record, email your doctor's office, and request medication refills online. 1. In your internet browser, go to [email or web address] 2. Click on the First Time User? Click Here link in the Sign In box. You will see the New Member Sign Up page. 3. Enter your [facility] Access Code exactly as it appears below. You will not need to use this code after youve completed the sign-up process. If you do not sign up before the expiration date, you must request a new code. · [facility] Access Code: [email or web address] Expires: 10/5/2017  9:49 AM 
 
 4. Enter the last four digits of your Social Security Number (xxxx) and Date of Birth (mm/dd/yyyy) as indicated and click Submit. You will be taken to the next sign-up page. 5. Create a Fib[employer]ast ID. This will be your [employer] login ID and cannot be changed, so think of one that is secure and easy to remember. 6. Create a [employer] password. You can change your password at any time. 7. Enter your Password Reset Question and Answer. This can be used at a later time if you forget your password. 8. Enter your e-mail address. You will receive e-mail notification when new information is available in [address] 9. Click Sign Up. You can now view and download portions of your medical record. 10. Click the Download Summary menu link to download a portable copy of your medical information. If you have questions, please visit the Frequently Asked Questions section of the [employer] website. Remember, [employer] is NOT to be used for urgent needs. For medical emergencies, dial 911. Now available from your iPhone and Android! Please provide this summary of care documentation to your next provider. Your primary care clinician is listed as Sophia Bose. If you have any questions after today's visit, please call [phone].

## 2017-07-08 LAB
ALBUMIN SERPL-MCNC: 4 G/DL (ref 3.5–4.8)
ALBUMIN/GLOB SERPL: 1.6 {RATIO} (ref 1.2–2.2)
ALP SERPL-CCNC: 92 IU/L (ref 39–117)
ALT SERPL-CCNC: 18 IU/L (ref 0–44)
AST SERPL-CCNC: 17 IU/L (ref 0–40)
BASOPHILS # BLD AUTO: 0 X10E3/UL (ref 0–0.2)
BASOPHILS NFR BLD AUTO: 1 %
BILIRUB SERPL-MCNC: 0.5 MG/DL (ref 0–1.2)
BUN SERPL-MCNC: 20 MG/DL (ref 8–27)
BUN/CREAT SERPL: 10 (ref 10–24)
CALCIUM SERPL-MCNC: 8.9 MG/DL (ref 8.6–10.2)
CHLORIDE SERPL-SCNC: 105 MMOL/L (ref 96–106)
CO2 SERPL-SCNC: 20 MMOL/L (ref 18–29)
CREAT SERPL-MCNC: 2.02 MG/DL (ref 0.76–1.27)
EOSINOPHIL # BLD AUTO: 0.3 X10E3/UL (ref 0–0.4)
EOSINOPHIL NFR BLD AUTO: 5 %
ERYTHROCYTE [DISTWIDTH] IN BLOOD BY AUTOMATED COUNT: 14.6 % (ref 12.3–15.4)
GLOBULIN SER CALC-MCNC: 2.5 G/DL (ref 1.5–4.5)
GLUCOSE SERPL-MCNC: 126 MG/DL (ref 65–99)
HCT VFR BLD AUTO: 34.3 % (ref 37.5–51)
HGB BLD-MCNC: 11.2 G/DL (ref 12.6–17.7)
IMM GRANULOCYTES # BLD: 0 X10E3/UL (ref 0–0.1)
IMM GRANULOCYTES NFR BLD: 1 %
INR PPP: 1.1 (ref 0.8–1.2)
INTERPRETATION: NORMAL
LYMPHOCYTES # BLD AUTO: 1.9 X10E3/UL (ref 0.7–3.1)
LYMPHOCYTES NFR BLD AUTO: 37 %
MCH RBC QN AUTO: 30.7 PG (ref 26.6–33)
MCHC RBC AUTO-ENTMCNC: 32.7 G/DL (ref 31.5–35.7)
MCV RBC AUTO: 94 FL (ref 79–97)
MONOCYTES # BLD AUTO: 0.9 X10E3/UL (ref 0.1–0.9)
MONOCYTES NFR BLD AUTO: 17 %
NEUTROPHILS # BLD AUTO: 2 X10E3/UL (ref 1.4–7)
NEUTROPHILS NFR BLD AUTO: 39 %
PLATELET # BLD AUTO: 189 X10E3/UL (ref 150–379)
POTASSIUM SERPL-SCNC: 4.1 MMOL/L (ref 3.5–5.2)
PROT SERPL-MCNC: 6.5 G/DL (ref 6–8.5)
PROTHROMBIN TIME: 11.1 SEC (ref 9.1–12)
RBC # BLD AUTO: 3.65 X10E6/UL (ref 4.14–5.8)
SODIUM SERPL-SCNC: 143 MMOL/L (ref 134–144)
WBC # BLD AUTO: 5.2 X10E3/UL (ref 3.4–10.8)

## 2017-07-10 ENCOUNTER — TELEPHONE (OUTPATIENT)
Dept: CARDIOLOGY CLINIC | Age: 72
End: 2017-07-10

## 2017-07-10 RX ORDER — METOPROLOL SUCCINATE 25 MG/1
TABLET, EXTENDED RELEASE ORAL
Qty: 30 TAB | Refills: 5 | Status: SHIPPED | OUTPATIENT
Start: 2017-07-10 | End: 2017-10-18 | Stop reason: SDUPTHER

## 2017-07-10 NOTE — TELEPHONE ENCOUNTER
Spoke to patient using 2 patient identifiers that he needs to continue his plavix. Patient verbalized understanding.

## 2017-07-10 NOTE — TELEPHONE ENCOUNTER
Please call patient wants to know if he is supposed to be taking plavix? He said all scripts were called in but that one. Please call.  Thanks

## 2017-07-10 NOTE — TELEPHONE ENCOUNTER
Spoke to patient using 2 patient identifiers. Patient called the office stating that he has ran out of plavix. Rx written on 6/27/17 and 7/7/17. Called local pharmacy, but too soon to fill. Earliest refill is on July 19. Patient unable to locate pill bottle and stated he has been taking plavix once daily and last taken on 7/7/17. Called [facility] pharmacy again and was able to get an override for plavix. Patient stated he is having a cath done on 7/13/17 and  would like to know if he needs to continue on plavix. Please advise.

## 2017-07-13 ENCOUNTER — HOSPITAL ENCOUNTER (OUTPATIENT)
Dept: CARDIAC CATH/INVASIVE PROCEDURES | Age: 72
Discharge: HOME | End: 2017-07-13
Attending: INTERNAL MEDICINE | Admitting: INTERNAL MEDICINE
Payer: MEDICARE

## 2017-07-13 VITALS
OXYGEN SATURATION: 95 % | WEIGHT: 250 LBS | HEIGHT: 72 IN | SYSTOLIC BLOOD PRESSURE: 150 MMHG | RESPIRATION RATE: 18 BRPM | DIASTOLIC BLOOD PRESSURE: 65 MMHG | HEART RATE: 81 BPM | BODY MASS INDEX: 33.86 KG/M2 | TEMPERATURE: 97.6 F

## 2017-07-13 PROCEDURE — 77030019698 HC SYR ANGI MDLON MRTM -A

## 2017-07-13 PROCEDURE — 77030010221 HC SPLNT WR POS TELE -B

## 2017-07-13 PROCEDURE — 77030008543 HC TBNG MON PRSS MRTM -A

## 2017-07-13 PROCEDURE — 77030030195 HC CATH ANGI DX PRF4 MRTM -A

## 2017-07-13 PROCEDURE — 74011000250 HC RX REV CODE- 250: Performed by: INTERNAL MEDICINE

## 2017-07-13 PROCEDURE — 93458 L HRT ARTERY/VENTRICLE ANGIO: CPT

## 2017-07-13 PROCEDURE — 74011636320 HC RX REV CODE- 636/320

## 2017-07-13 PROCEDURE — C1769 GUIDE WIRE: HCPCS

## 2017-07-13 PROCEDURE — 74011250636 HC RX REV CODE- 250/636

## 2017-07-13 PROCEDURE — 77030015766

## 2017-07-13 PROCEDURE — C1894 INTRO/SHEATH, NON-LASER: HCPCS

## 2017-07-13 PROCEDURE — 77030019569 HC BND COMPR RAD TERU -B

## 2017-07-13 PROCEDURE — 77030004549 HC CATH ANGI DX PRF MRTM -A

## 2017-07-13 PROCEDURE — 74011000250 HC RX REV CODE- 250

## 2017-07-13 PROCEDURE — 74011250636 HC RX REV CODE- 250/636: Performed by: INTERNAL MEDICINE

## 2017-07-13 RX ORDER — FENTANYL CITRATE 50 UG/ML
25-50 INJECTION, SOLUTION INTRAMUSCULAR; INTRAVENOUS
Status: DISCONTINUED | OUTPATIENT
Start: 2017-07-13 | End: 2017-07-13 | Stop reason: ALTCHOICE

## 2017-07-13 RX ORDER — LIDOCAINE HYDROCHLORIDE 10 MG/ML
1-30 INJECTION, SOLUTION EPIDURAL; INFILTRATION; INTRACAUDAL; PERINEURAL
Status: DISCONTINUED | OUTPATIENT
Start: 2017-07-13 | End: 2017-07-13 | Stop reason: ALTCHOICE

## 2017-07-13 RX ORDER — HEPARIN SODIUM 200 [USP'U]/100ML
INJECTION, SOLUTION INTRAVENOUS
Status: COMPLETED
Start: 2017-07-13 | End: 2017-07-13

## 2017-07-13 RX ORDER — MIDAZOLAM HYDROCHLORIDE 1 MG/ML
.5-2 INJECTION, SOLUTION INTRAMUSCULAR; INTRAVENOUS
Status: DISCONTINUED | OUTPATIENT
Start: 2017-07-13 | End: 2017-07-13 | Stop reason: ALTCHOICE

## 2017-07-13 RX ORDER — VERAPAMIL HYDROCHLORIDE 2.5 MG/ML
INJECTION, SOLUTION INTRAVENOUS
Status: COMPLETED
Start: 2017-07-13 | End: 2017-07-13

## 2017-07-13 RX ORDER — VERAPAMIL HYDROCHLORIDE 2.5 MG/ML
2.5 INJECTION, SOLUTION INTRAVENOUS ONCE
Status: COMPLETED | OUTPATIENT
Start: 2017-07-13 | End: 2017-07-13

## 2017-07-13 RX ORDER — HEPARIN SODIUM 1000 [USP'U]/ML
2500 INJECTION, SOLUTION INTRAVENOUS; SUBCUTANEOUS ONCE
Status: COMPLETED | OUTPATIENT
Start: 2017-07-13 | End: 2017-07-13

## 2017-07-13 RX ORDER — MIDAZOLAM HYDROCHLORIDE 1 MG/ML
INJECTION, SOLUTION INTRAMUSCULAR; INTRAVENOUS
Status: COMPLETED
Start: 2017-07-13 | End: 2017-07-13

## 2017-07-13 RX ORDER — HEPARIN SODIUM 200 [USP'U]/100ML
500 INJECTION, SOLUTION INTRAVENOUS ONCE
Status: COMPLETED | OUTPATIENT
Start: 2017-07-13 | End: 2017-07-13

## 2017-07-13 RX ORDER — LIDOCAINE HYDROCHLORIDE 10 MG/ML
INJECTION, SOLUTION EPIDURAL; INFILTRATION; INTRACAUDAL; PERINEURAL
Status: COMPLETED
Start: 2017-07-13 | End: 2017-07-13

## 2017-07-13 RX ORDER — HEPARIN SODIUM 1000 [USP'U]/ML
INJECTION, SOLUTION INTRAVENOUS; SUBCUTANEOUS
Status: COMPLETED
Start: 2017-07-13 | End: 2017-07-13

## 2017-07-13 RX ORDER — FENTANYL CITRATE 50 UG/ML
INJECTION, SOLUTION INTRAMUSCULAR; INTRAVENOUS
Status: COMPLETED
Start: 2017-07-13 | End: 2017-07-13

## 2017-07-13 RX ADMIN — HEPARIN SODIUM 1000 UNITS: 200 INJECTION, SOLUTION INTRAVENOUS at 17:15

## 2017-07-13 RX ADMIN — HEPARIN SODIUM 2500 UNITS: 1000 INJECTION, SOLUTION INTRAVENOUS; SUBCUTANEOUS at 17:16

## 2017-07-13 RX ADMIN — FENTANYL CITRATE 50 MCG: 50 INJECTION, SOLUTION INTRAMUSCULAR; INTRAVENOUS at 16:59

## 2017-07-13 RX ADMIN — HEPARIN SODIUM 1000 UNITS: 200 INJECTION, SOLUTION INTRAVENOUS at 17:12

## 2017-07-13 RX ADMIN — NITROGLYCERIN 200 MCG: 5 INJECTION, SOLUTION INTRAVENOUS at 17:16

## 2017-07-13 RX ADMIN — MIDAZOLAM HYDROCHLORIDE 1 MG: 1 INJECTION INTRAMUSCULAR; INTRAVENOUS at 16:59

## 2017-07-13 RX ADMIN — VERAPAMIL HYDROCHLORIDE 2.5 MG: 2.5 INJECTION INTRAVENOUS at 17:16

## 2017-07-13 RX ADMIN — SODIUM ACETATE: 3.28 INJECTION, SOLUTION, CONCENTRATE INTRAVENOUS at 10:42

## 2017-07-13 RX ADMIN — IOPAMIDOL 90 ML: 755 INJECTION, SOLUTION INTRAVENOUS at 17:30

## 2017-07-13 RX ADMIN — LIDOCAINE HYDROCHLORIDE 2 ML: 10 INJECTION, SOLUTION EPIDURAL; INFILTRATION; INTRACAUDAL; PERINEURAL at 17:15

## 2017-07-13 RX ADMIN — MIDAZOLAM HYDROCHLORIDE 1 MG: 1 INJECTION, SOLUTION INTRAMUSCULAR; INTRAVENOUS at 16:59

## 2017-07-13 RX ADMIN — FENTANYL CITRATE 50 MCG: 50 INJECTION, SOLUTION INTRAMUSCULAR; INTRAVENOUS at 17:23

## 2017-07-13 RX ADMIN — MIDAZOLAM HYDROCHLORIDE 1 MG: 1 INJECTION, SOLUTION INTRAMUSCULAR; INTRAVENOUS at 17:22

## 2017-07-13 RX ADMIN — VERAPAMIL HYDROCHLORIDE 2.5 MG: 2.5 INJECTION, SOLUTION INTRAVENOUS at 17:16

## 2017-07-13 NOTE — IP AVS SNAPSHOT
[address] [facility] 
[phone] Patient: Lolis Lucia. MRN: [id number] SHY:[birth year] You are allergic to the following Allergen Reactions Penicillins Hives Bactrim (Sulfamethoxazole-Trimethoprim) Hives Lisinopril (Bulk) Cough Neurontin (Gabapentin) Other (comments) drowsy Zostavax (Zoster Vaccine Live (Pf)) Rash See 9/10/13 visit Recent Documentation Height Weight BMI Smoking Status 1.829 m 113.4 kg 33.91 kg/m2 Former Smoker Emergency Contacts Name Discharge Info Relation Home Work Mobile David Joseph  Spouse [3] [phone] About your hospitalization You were admitted on:  July 13, 2017 You last received care in the:  [facility] You were discharged on:  July 13, 2017 Unit phone number:  [phone] Why you were hospitalized Your primary diagnosis was:  Not on File Providers Seen During Your Hospitalizations Provider Role Specialty Primary office phone Arnold Obrien MD Attending Provider Cardiology [phone] Your Primary Care Physician (PCP) Primary Care Physician Office Phone Office Fax Gasper Felty [phone] Follow-up Information Follow up With Details Comments Contact Info Eliot Marino MD   [facility] [address] [facility] 
[phone] Your Appointments Monday July 24, 2017 10:00 AM EDT ANKLE BRIACHIAL INDEX with VASCULAR, [facility] [address] [facility]  
[phone] Tuesday July 25, 2017 12:30 PM EDT Office Visit with ECHO, [facility] [address] [facility]  
[phone] Current Discharge Medication List  
  
ASK your doctor about these medications Dose & Instructions Dispensing Information Comments Morning Noon Evening Bedtime  
 albuterol 90 mcg/actuation inhaler Commonly known as:  PROVENTIL HFA, VENTOLIN HFA, PROAIR HFA Your last dose was: Your next dose is:    
   
   
 Dose:  1 Puff Take 1 Puff by inhalation every six (6) hours as needed for Wheezing. Quantity:  1 Inhaler Refills:  0  
     
   
   
   
  
 allopurinol 100 mg tablet Commonly known as:  Zahida Campuzano Your last dose was: Your next dose is:    
   
   
 Dose:  100 mg Take 100 mg by mouth every morning. Refills:  0  
     
   
   
   
  
 amLODIPine 5 mg tablet Commonly known as:  Anastasia Richards Your last dose was: Your next dose is:    
   
   
 Dose:  5 mg Take 5 mg by mouth daily. Refills:  0  
     
   
   
   
  
 aspirin delayed-release 81 mg tablet Your last dose was: Your next dose is:    
   
   
 Dose:  81 mg Take 81 mg by mouth daily. Refills:  0  
     
   
   
   
  
 atorvastatin 40 mg tablet Commonly known as:  LIPITOR Your last dose was: Your next dose is: TAKE 1 TABLET BY MOUTH NIGHTLY*****STOP SIMVASTATIN**** Quantity:  90 Tab Refills:  2  
     
   
   
   
  
 azithromycin 250 mg tablet Commonly known as:  Marlo Gent Your last dose was: Your next dose is:    
   
   
 Dose:  250 mg Take 1 Tab by mouth See Admin Instructions for 6 doses. Take 2 tabs on day one followed by 1 tab daily for a total of 5 days. Quantity:  6 Tab Refills:  0 CENTRUM PO Your last dose was: Your next dose is:    
   
   
 Dose:  1 Tab Take 1 Tab by mouth daily. Refills:  0  
     
   
   
   
  
 cephALEXin 500 mg capsule Commonly known as:  Junius Alpers Your last dose was:     
   
Your next dose is:    
   
   
 Dose:  500 mg  
 Take 1 Cap by mouth three (3) times daily. Quantity:  21 Cap Refills:  0 Tolerates cephalosporins  
    
   
   
   
  
 chlorthalidone 25 mg tablet Commonly known as:  Lupis Renee Your last dose was: Your next dose is:    
   
   
 Dose:  12.5 mg Take 0.5 Tabs by mouth daily. Quantity:  30 Tab Refills:  0  
     
   
   
   
  
 citalopram 40 mg tablet Commonly known as:  Alcon Bustamantech Your last dose was: Your next dose is: TAKE 1 TABLET BY MOUTH EVERY DAY Quantity:  90 Tab Refills:  3  
     
   
   
   
  
 clopidogrel 75 mg Tab Commonly known as:  PLAVIX Your last dose was: Your next dose is:    
   
   
 Dose:  75 mg Take 1 Tab by mouth daily. Quantity:  30 Tab Refills:  0  
     
   
   
   
  
 glucosamine-chondroitin 750-600 mg Tab Your last dose was: Your next dose is:    
   
   
 Dose:  1 Tab Take 1 Tab by mouth daily. Brand: Move Free Refills:  0  
     
   
   
   
  
 HORIZON NASAL CPAP SYSTEM Your last dose was: Your next dose is:    
   
   
 by Does Not Apply route. Refills:  0 HYDROcodone-acetaminophen 5-325 mg per tablet Commonly known as:  Tellis Points Your last dose was: Your next dose is:    
   
   
 Dose:  1 Tab Take 1 Tab by mouth every four (4) hours as needed for Pain. Max Daily Amount: 6 Tabs. Quantity:  20 Tab Refills:  0  
     
   
   
   
  
 isosorbide mononitrate ER 30 mg tablet Commonly known as:  IMDUR Your last dose was: Your next dose is:    
   
   
 Dose:  15 mg Take 0.5 Tabs by mouth daily. Quantity:  15 Tab Refills:  0  
     
   
   
   
  
 metoprolol succinate 25 mg XL tablet Commonly known as:  TOPROL-XL Your last dose was: Your next dose is: TAKE ONE TABLET BY MOUTH EVERY DAY Quantity:  30 Tab Refills:  5  
     
   
   
   
  
 nitroglycerin 0.4 mg SL tablet Commonly known as:  NITROSTAT Your last dose was: Your next dose is:    
   
   
 Dose:  0.4 mg  
1 Tab by SubLINGual route every five (5) minutes as needed for Chest Pain. Quantity:  1 Bottle Refills:  0  
     
   
   
   
  
 pantoprazole 40 mg tablet Commonly known as:  PROTONIX Your last dose was: Your next dose is:    
   
   
 Dose:  40 mg Take 1 Tab by mouth daily. Quantity:  30 Tab Refills:  5 In place of prilosec  
    
   
   
   
  
 rOPINIRole 0.5 mg tablet Commonly known as:  Isaias Lute Your last dose was: Your next dose is:    
   
   
 Dose:  1 mg Take 2 Tabs by mouth daily (after dinner). Take 1.5 to 2 tablets daily after dinner Quantity:  60 Tab Refills:  5  
     
   
   
   
  
 TYLENOL EXTRA STRENGTH 500 mg tablet Generic drug:  acetaminophen Your last dose was: Your next dose is:    
   
   
 Dose:  500 mg Take 500 mg by mouth every eight (8) hours as needed. Refills:  0  
     
   
   
   
  
 valsartan 160 mg tablet Commonly known as:  DIOVAN Your last dose was: Your next dose is: TAKE 1 TABLET BY MOUTH DAILY Quantity:  90 Tab Refills:  3 VITAMIN D3 2,000 unit Cap capsule Generic drug:  Cholecalciferol (Vitamin D3) Your last dose was: Your next dose is:    
   
   
 Dose:  2000 Units Take 2,000 Units by mouth daily. Refills:  2 Discharge Instructions None Discharge Orders None ACO Transitions of Care [place] [address] Jaleesa Gallegos offers a voluntary care coordination program to provide high quality service and care to [facility] fee-for-service beneficiaries. Amanda Sims was designed to help you enhance your health and well-being through the following services: ? Transitions of Care  support for individuals who are transitioning from one care setting to another (example: Hospital to home). ? Chronic and Complex Care Coordination  support for individuals and caregivers of those with serious or chronic illnesses or with more than one chronic (ongoing) condition and those who take a number of different medications. If you meet specific medical criteria, a Critical access hospital[address] may call you directly to coordinate your care with your primary care physician and your other care providers. For questions about the [facility] programs, please, contact your physicians office. For general questions or additional information about Accountable Care Organizations: 
Please visit www.medicare.gov/acos. html or call 1-800-MEDICARE (9-654.125.8071) TTY users should call 0-882.363.4878. Introducing [facility] SERVICES! Coral Zaidi introduces DocRun patient portal. Now you can access parts of your medical record, email your doctor's office, and request medication refills online. 1. In your internet browser, go to [email or web address] 2. Click on the First Time User? Click Here link in the Sign In box. You will see the New Member Sign Up page. 3. Enter your DocRun Access Code exactly as it appears below. You will not need to use this code after youve completed the sign-up process. If you do not sign up before the expiration date, you must request a new code. · DocRun Access Code: [email or web address] Expires: 10/5/2017  9:49 AM 
 
4. Enter the last four digits of your Social Security Number (xxxx) and Date of Birth (mm/dd/yyyy) as indicated and click Submit. You will be taken to the next sign-up page. 5. Create a [employer] ID. This will be your DocRun login ID and cannot be changed, so think of one that is secure and easy to remember. 6. Create a [employer] password. You can change your password at any time. 7. Enter your Password Reset Question and Answer. This can be used at a later time if you forget your password. 8. Enter your e-mail address. You will receive e-mail notification when new information is available in [address] 9. Click Sign Up. You can now view and download portions of your medical record. 10. Click the Download Summary menu link to download a portable copy of your medical information. If you have questions, please visit the Frequently Asked Questions section of the [employer] website. Remember, [employer] is NOT to be used for urgent needs. For medical emergencies, dial 911. Now available from your iPhone and Android! General Information Please provide this summary of care documentation to your next provider. Patient Signature:  ____________________________________________________________ Date:  ____________________________________________________________  
  
Nahid Arzola Provider Signature:  ____________________________________________________________ Date:  ____________________________________________________________

## 2017-07-13 NOTE — PROGRESS NOTES
Cardiac Cath Lab Recovery Arrival Note:      Janie Wolf. arrived to Cardiac Cath Lab, Recovery Area. Staff introduced to patient. Patient identifiers verified with NAME and DATE OF BIRTH. Procedure verified with patient. Consent forms reviewed and signed by patient or authorized representative and verified. Allergies verified. Patient and family oriented to department. Patient and family informed of procedure and plan of care. Questions answered with review. Patient prepped for procedure, per orders from physician, prior to arrival.    Patient on cardiac monitor, non-invasive blood pressure, SPO2 monitor. On Room air. Patient is A&Ox 3. Patient reports no complaints of pain or discomfort. Patient in stretcher, in low position, with side rails up, call bell within reach, patient instructed to call if assistance as needed. Patient prep in: [address], [place]. Family in: lobby  Prep by: Jaky Payne.  Myriam Gracia

## 2017-07-13 NOTE — PROGRESS NOTES
TRANSFER - IN REPORT:    Verbal report received from Ignacio(name) on [employer].  being received from cath lab(unit) for routine progression of care      Report consisted of patients Situation, Background, Assessment and   Recommendations(SBAR). Information from the following report(s) Procedure Summary, Intake/Output, MAR, Recent Results and Cardiac Rhythm NSR was reviewed with the receiving nurse. Opportunity for questions and clarification was provided. Assessment completed upon patients arrival to unit and care assumed. 4 cc removed from TR band for patent hemostasis. Discharge instructions reviewed with pt using teach-back method. Pt was able to verbalize understanding of follow up appointments, medications, diet and activity instructions. Opportunity for questions provided. Bedside shift change report given to Clark Walsh RN (oncoming nurse) by Brittany Smith RN (offgoing nurse). Report included the following information SBAR, Kardex, Intake/Output, MAR, Recent Results and Cardiac Rhythm NSR.

## 2017-07-13 NOTE — PROGRESS NOTES
TRANSFER - OUT REPORT:    Verbal report given to Gloria Padilla RN(name) on [employer].  being transferred to IVCU(unit) for routine post - op       Report consisted of patients Situation, Background, Assessment and   Recommendations(SBAR). Information from the following report(s) SBAR, Procedure Summary, MAR and Recent Results was reviewed with the receiving nurse. Lines:   Peripheral IV 07/13/17 Right Antecubital (Active)        Opportunity for questions and clarification was provided.       Patient transported with:   Registered Nurse  Tech

## 2017-07-14 NOTE — PROGRESS NOTES
1900 - Bedside report from [facility].  R radial site benign. No complaints. +PPP. 2030 - Up to chair, voiding, eating and drinking well    2100 - Ambulation in hallway 300 feet without difficulty or complaints. R wrist bengin, +PPP. Dressed, IV out, tele off. Dc w belongings to care of son. Discharge instructions reviewed w and given to pt by prior RN.

## 2017-07-17 ENCOUNTER — PATIENT OUTREACH (OUTPATIENT)
Dept: CARDIOLOGY CLINIC | Age: 72
End: 2017-07-17

## 2017-07-17 NOTE — INTERVAL H&P NOTE
H&P Update:  Patrice Thomas was seen and examined. History and physical has been reviewed. The patient has been examined.  There have been no significant clinical changes since the completion of the originally dated History and Physical.    Signed By: Mateus Trejo MD     July 17, 2017 11:03 AM

## 2017-07-17 NOTE — PROGRESS NOTES
This note will not be viewable in [address] Pt returned my call. Verified name and . We reviewed medications and pt reports that he has all new medications. Pt reports that cardiac cath site is well healed. Patient reminded that there is a cardiologist on call 24 hours a day / 7 days a week (M-F 5pm to 8am and from Friday 5pm until Monday 8a for the weekend) should the patient have questions or concerns. Patient reminded to call 911 if situation is emergent or patient feels the situation is emergent. Confirmed with pt that he has follow up appointments at [facility]. Pt states understanding to not life more than 3-5 lbs for 7 days. Pt states appreciation for call and states that I may continue to call.

## 2017-07-17 NOTE — H&P (VIEW-ONLY)
Julee Peabody DNP, ANP-BC  Subjective/HPI:     Rina Sanders is a 66 y.o. male is here for symptom based appointment. He reports progressive dyspnea on exertion with limited physical activities, most concerning is in the last 3 days he is encountered 2 episodes of significant anterior chest pressure; one episode while cutting his grass which required him to come in and rest and second episode was the following day while sitting he developed sudden onset of chest pressure lasting a few minutes then resolved. Wife will also accompany patient to the appointment also reports he is having significant fatigue, increased amount of sleeping and is notable dyspnea even with taking a shower. He has been compliant with taking all his medications including Plavix. He is status post PTCA and stenting May 2015 of the circumflex however at the time he had a 50% stenosis of the ostium which had a negative FFR at the time. Additional concerns today upon viewing the posterior of PAD in our office, he has been treated next-door with podiatry for a ongoing left foot wound that has recently become reddened and tends to open and closed. Seen yesterday by podiatry wound was cleaned and dressed. It has been greater than 1 year since any vascular assessment has been performed on his lower extremities. He also reports dependent edema towards the end of the day with prolonged standing.     PCP Provider  Julita Conley MD  Past Medical History:   Diagnosis Date    Abnormal stress echo 5/12/2015    Anemia NEC 03/2013    Last 2-3 months; finished taking iron supplement    Anxiety     CAD (coronary artery disease) 2009    cath Kathy Seals) revealed 20%RCA and 50%2nd diagonal    Calculus of kidney     Chronic kidney disease, stage III (moderate) 10/11/2009    30% kidney function    Diabetes ([facility]     pre diabetic    Difficult intubation     DJD (degenerative joint disease)     GERD (gastroesophageal reflux disease) 10/11/2009    Gout     Hypertension     Kidney disease     Liver disease     fatty liver    Obesity     Obstructive sleep apnea (adult) (pediatric) 10/11/2009    nasal pillows; pressure set at 10-11 - uses cpap    Peripheral neuropathy ([address] 10/11/2009    Prediabetes 1/7/2012    RLS (restless legs syndrome) 10/11/2009    S/P coronary artery stent placement 5/12/2015    5/11/15 PCI./MALI to LCx      Past Surgical History:   Procedure Laterality Date    HX HEART CATHETERIZATION  2009    HX HERNIA REPAIR      McTamaney/umbilical    HX ORTHOPAEDIC      left great toe pinning/plate    HX ORTHOPAEDIC      left shoulder manipulation    HX ORTHOPAEDIC  7/23/11     LEFT TOTAL KNEE ARTHROPLASTY    HX UROLOGICAL      basket extraction of kidney stones    SD COLONOSCOPY FLX DX W/COLLJ SPEC WHEN PFRMD  8/5/2010         SD COLSC FLX W/RMVL OF TUMOR POLYP LESION SNARE TQ  9/24/2014          Allergies   Allergen Reactions    Penicillins Hives    Bactrim [Sulfamethoxazole-Trimethoprim] Hives    Lisinopril (Bulk) Cough    Neurontin [Gabapentin] Other (comments)     drowsy    Zostavax [Zoster Vaccine Live (Pf)] Rash     See 9/10/13 visit      Family History   Problem Relation Age of Onset    Heart Disease Father     Hypertension Father     Other Father      abdominal aneurysm     Dementia Mother     Alzheimer Mother     Heart Disease Brother     Heart Attack Brother     Heart Disease Maternal Grandmother     Heart Disease Paternal Grandmother     Heart Attack Paternal Grandmother     Heart Disease Paternal Grandfather     Heart Attack Paternal Grandfather     Elevated Lipids Son     Elevated Lipids Daughter     Cancer Neg Hx     Diabetes Neg Hx     Stroke Neg Hx          Vitals:    07/07/17 0908 07/07/17 0917   BP: 114/62 112/60   Pulse: 64    Resp: 18    SpO2: 96%    Weight: 251 lb (113.9 kg)    Height: 6' (1.829 m)      Social History     Social History    Marital status:      Spouse name: N/A    Number of children: N/A    Years of education: N/A     Occupational History    Not on file. Social History Main Topics    Smoking status: Former Smoker     Packs/day: 1.00     Years: 10.00     Types: Cigarettes     Quit date: 1/1/1968    Smokeless tobacco: Never Used    Alcohol use No    Drug use: No    Sexual activity: Yes     Partners: Female     Other Topics Concern    Not on file     Social History Narrative       I have reviewed the nurses notes, vitals, problem list, allergy list, medical history, family, social history and medications. Review of Symptoms:    General: Pt denies excessive weight gain or loss. Pt is able to conduct ADL's however is experiencing dyspnea  HEENT: Denies blurred vision, headaches, epistaxis and difficulty swallowing. Respiratory: Denies shortness of breath, + SMITH, wheezing or stridor. Cardiovascular: Denies precordial pain, palpitations, + edema or PND  Gastrointestinal: Denies poor appetite, indigestion, abdominal pain or blood in stool  Musculoskeletal: Denies pain or swelling from muscles or joints  Neurologic: Denies tremor, paresthesias, or sensory motor disturbance. Has diffuse numbness bilateral feet  Skin: Denies rash, itching or texture change. Wound on left foot as indicated above  Psych: Denies depression      Physical Exam:      General: Well developed, in no acute distress, cooperative and alert  HEENT: No carotid bruits, no JVD, trach is midline. Neck Supple, PEERL, EOM intact. Heart:  Normal S1/S2 negative S3 or S4. Regular, no murmur, gallop or rub.   Respiratory: Clear bilaterally x 4, no wheezing or rales  Abdomen:   Soft, non-tender, no masses, bowel sounds are active.   Extremities: Trace bilateral ankle edema, normal cap refill, no cyanosis, atraumatic.    Neuro: A&Ox3, speech clear, gait has slight limp secondary to dressing on the left foot  Skin: Skin color is normal with the exception of bilateral feet noting on his left foot digit #2 and 3 has an area of redness, the underside of the foot currently has a dressing. Both feet have trace amounts of mottling. Visible skin on exam today does not show any amounts of excessive bruising or bleeding  Vascular: 2+ pulses symmetric in all extremities. 4 second cap refill bilateral dorsal surface of feet. There is bilateral ropelike varicosities in his lower extremities. Negative Homans sign bilateral    Cardiographics    ECG: Sinus with right bundle branch block unchanged from previous 3 tracings  Results for orders placed or performed during the hospital encounter of 05/11/15   EKG, 12 LEAD, INITIAL   Result Value Ref Range    Ventricular Rate 64 BPM    Atrial Rate 64 BPM    P-R Interval 150 ms    QRS Duration 114 ms    Q-T Interval 470 ms    QTC Calculation (Bezet) 484 ms    Calculated P Axis 56 degrees    Calculated T Axis 9 degrees    Diagnosis       Normal sinus rhythm  Incomplete right bundle branch block    When compared with ECG of 11-MAY-2015 13:26,  No significant change was found  Confirmed by Kelly Hyatt ([id number]) on 5/12/2015 7:44:46 AM           Cardiology Labs:  Lab Results   Component Value Date/Time    Cholesterol, total 142 09/28/2016 11:20 AM    HDL Cholesterol 37 09/28/2016 11:20 AM    LDL, calculated 48 09/28/2016 11:20 AM    Triglyceride 286 09/28/2016 11:20 AM       Lab Results   Component Value Date/Time    Sodium 144 09/28/2016 11:20 AM    Potassium 4.3 09/28/2016 11:20 AM    Chloride 106 09/28/2016 11:20 AM    CO2 24 09/28/2016 11:20 AM    Anion gap 9 05/12/2015 05:22 AM    Glucose 100 09/28/2016 11:20 AM    BUN 18 09/28/2016 11:20 AM    Creatinine 1.95 09/28/2016 11:20 AM    BUN/Creatinine ratio 9 09/28/2016 11:20 AM    GFR est AA 38 09/28/2016 11:20 AM    GFR est non-AA 33 09/28/2016 11:20 AM    Calcium 9.0 09/28/2016 11:20 AM    Bilirubin, total 0.7 09/28/2016 11:20 AM    AST (SGOT) 19 09/28/2016 11:20 AM    Alk.  phosphatase 98 09/28/2016 11:20 AM    Protein, total 6.7 09/28/2016 11:20 AM    Albumin 4.1 09/28/2016 11:20 AM    Globulin 4.1 12/13/2012 07:20 PM    A-G Ratio 1.6 09/28/2016 11:20 AM    ALT (SGPT) 23 09/28/2016 11:20 AM           Assessment:     Assessment:     Siva Aguilar was seen today for chest pain. Diagnoses and all orders for this visit:    Chest pain, unspecified type  -     AMB POC EKG ROUTINE W/ 12 LEADS, INTER & REP  -     METABOLIC PANEL, COMPREHENSIVE  -     CBC WITH AUTOMATED DIFF  -     PROTHROMBIN TIME + INR  -     CARDIAC CATHETERIZATION; Future  -     ANKLE BRACHIAL INDEX; Future  -     DUPLEX LOWER EXT VENOUS BILAT; Future    ASHD (arteriosclerotic heart disease)  -     METABOLIC PANEL, COMPREHENSIVE  -     CBC WITH AUTOMATED DIFF  -     PROTHROMBIN TIME + INR  -     CARDIAC CATHETERIZATION; Future  -     ANKLE BRACHIAL INDEX; Future  -     DUPLEX LOWER EXT VENOUS BILAT; Future    SMITH (dyspnea on exertion)  -     METABOLIC PANEL, COMPREHENSIVE  -     CBC WITH AUTOMATED DIFF  -     PROTHROMBIN TIME + INR  -     CARDIAC CATHETERIZATION; Future  -     ANKLE BRACHIAL INDEX; Future  -     DUPLEX LOWER EXT VENOUS BILAT; Future    Chronic kidney disease, stage III (moderate)  -     METABOLIC PANEL, COMPREHENSIVE  -     CBC WITH AUTOMATED DIFF  -     PROTHROMBIN TIME + INR  -     CARDIAC CATHETERIZATION; Future  -     ANKLE BRACHIAL INDEX; Future  -     DUPLEX LOWER EXT VENOUS BILAT; Future    S/P coronary artery stent placement  -     METABOLIC PANEL, COMPREHENSIVE  -     CBC WITH AUTOMATED DIFF  -     PROTHROMBIN TIME + INR  -     CARDIAC CATHETERIZATION; Future  -     ANKLE BRACHIAL INDEX; Future  -     DUPLEX LOWER EXT VENOUS BILAT; Future    Anginal chest pain at rest ([address]  -     METABOLIC PANEL, COMPREHENSIVE  -     CBC WITH AUTOMATED DIFF  -     PROTHROMBIN TIME + INR  -     CARDIAC CATHETERIZATION; Future  -     ANKLE BRACHIAL INDEX; Future  -     DUPLEX LOWER EXT VENOUS BILAT;  Future    Wound, open, foot with complication, left, initial encounter    Varicose vein of leg    Dependent edema    Other orders  -     clopidogrel (PLAVIX) 75 mg tab; Take 1 Tab by mouth daily. -     nitroglycerin (NITROSTAT) 0.4 mg SL tablet; 1 Tab by SubLINGual route every five (5) minutes as needed for Chest Pain.  -     isosorbide mononitrate ER (IMDUR) 30 mg tablet; Take 0.5 Tabs by mouth daily. ICD-10-CM ICD-9-CM    1. Chest pain, unspecified type R07.9 786.50 AMB POC EKG ROUTINE W/ 12 LEADS, INTER & REP      METABOLIC PANEL, COMPREHENSIVE      CBC WITH AUTOMATED DIFF      PROTHROMBIN TIME + INR      CARDIAC CATHETERIZATION      ANKLE BRACHIAL INDEX      DUPLEX LOWER EXT VENOUS BILAT   2. ASHD (arteriosclerotic heart disease) U53.20 119.49 METABOLIC PANEL, COMPREHENSIVE      CBC WITH AUTOMATED DIFF      PROTHROMBIN TIME + INR      CARDIAC CATHETERIZATION      ANKLE BRACHIAL INDEX      DUPLEX LOWER EXT VENOUS BILAT   3. SMITH (dyspnea on exertion) C22.00 719.04 METABOLIC PANEL, COMPREHENSIVE      CBC WITH AUTOMATED DIFF      PROTHROMBIN TIME + INR      CARDIAC CATHETERIZATION      ANKLE BRACHIAL INDEX      DUPLEX LOWER EXT VENOUS BILAT   4. Chronic kidney disease, stage III (moderate) Y62.3 597.4 METABOLIC PANEL, COMPREHENSIVE      CBC WITH AUTOMATED DIFF      PROTHROMBIN TIME + INR      CARDIAC CATHETERIZATION      ANKLE BRACHIAL INDEX      DUPLEX LOWER EXT VENOUS BILAT   5. S/P coronary artery stent placement O93.4 B72.42 METABOLIC PANEL, COMPREHENSIVE      CBC WITH AUTOMATED DIFF      PROTHROMBIN TIME + INR      CARDIAC CATHETERIZATION      ANKLE BRACHIAL INDEX      DUPLEX LOWER EXT VENOUS BILAT   6. Anginal chest pain at rest ([facility]) M38.1 609.3 METABOLIC PANEL, COMPREHENSIVE      CBC WITH AUTOMATED DIFF      PROTHROMBIN TIME + INR      CARDIAC CATHETERIZATION      ANKLE BRACHIAL INDEX      DUPLEX LOWER EXT VENOUS BILAT   7. Wound, open, foot with complication, left, initial encounter S91.302A 892.1    8. Varicose vein of leg I83.90 454.9    9.  Dependent edema R60.9 782.3      Orders Placed This Encounter    ANKLE BRACHIAL INDEX     Standing Status:   Future     Standing Expiration Date:   2019     Order Specific Question:   Reason for Exam     Answer:   foot wound    DUPLEX LOWER EXT VENOUS BILAT     Standing Status:   Future     Standing Expiration Date:   8890    METABOLIC PANEL, COMPREHENSIVE    CBC WITH AUTOMATED DIFF    PROTHROMBIN TIME + INR    CARDIAC CATHETERIZATION     Standing Status:   Future     Standing Expiration Date:   2018     Order Specific Question:   Reason for Exam:     Answer:   angina    AMB POC EKG ROUTINE W/ 12 LEADS, INTER & REP     Order Specific Question:   Reason for Exam:     Answer:   routine    clopidogrel (PLAVIX) 75 mg tab     Sig: Take 1 Tab by mouth daily. Dispense:  30 Tab     Refill:  0    chlorthalidone (HYGROTEN) 25 mg tablet     Sig: Take 0.5 Tabs by mouth daily. Dispense:  30 Tab     Refill:  0    nitroglycerin (NITROSTAT) 0.4 mg SL tablet     Si Tab by SubLINGual route every five (5) minutes as needed for Chest Pain. Dispense:  1 Bottle     Refill:  0    isosorbide mononitrate ER (IMDUR) 30 mg tablet     Sig: Take 0.5 Tabs by mouth daily. Dispense:  15 Tab     Refill:  0        Plan:     1. Atherosclerotic heart disease: Patient presents with increasing dyspnea on exertion which is limiting his activities of daily life, new onset of anterior chest pain with and without exertion. He is currently on 2 antianginal medications in the form of beta-blocker and amlodipine. His symptoms are consistent with angina functional class IIIIV. Patient was stress tested in 2016 with negative ischemia however extremely poor exercise performance which was consistent with his stress echo performance in 2015 which required PTCA and stenting of the circumflex. Patient reports his symptoms are similar if not worse than his intervention in May 2015.   Discussed risk and benefits of coronary angiography with PTCA and stenting patient willing to proceed. We will add third antianginal in the form of 15 mg of Imdur daily and as needed nitrates. He is to continue Plavix and aspirin. 2.  Mild dizziness with positional change: We will reduce chlorthalidone to 12.5 mg daily encourage hydration, patient advised drinking a gallon of iced tea and one pot of coffee a day is an excessive amount of caffeine and does not contribute to his hydration status. 3.  Left foot wound: Will screen for peripheral arterial disease with TABITHA. Patient reports podiatry is following wound however wife states not as pleased with his outcomes and may consider second opinion. Recommended if patient/wife feel this way I can certainly refer to the wound center behind our office. 4.  Bilateral varicosities and waxing and waning dependent edema: Suspect venous reflux, will evaluate with venous reflux study to be performed after cardiac catheterization. 5.  Hyperlipidemia: Maintain statin therapy  Follow-up when testing and procedure are complete  Coronary angiography planned with Dr. Majo Lai on July 13. Most likely will need hydration.     Pallavi Corona, TODD

## 2017-07-17 NOTE — PROGRESS NOTES
This note will not be viewable in [address] Called pt to follow up on hospital visit to [facility], discharged on 7/13/17. I left a message with pt's wife stating my name, NN at [facility], and direct office telephone number. PCP NN notified of call.

## 2017-07-20 ENCOUNTER — TELEPHONE (OUTPATIENT)
Dept: INTERNAL MEDICINE CLINIC | Age: 72
End: 2017-07-20

## 2017-07-20 DIAGNOSIS — L97.529 FOOT ULCER, LEFT, WITH UNSPECIFIED SEVERITY (HCC): Primary | ICD-10-CM

## 2017-07-24 ENCOUNTER — TELEPHONE (OUTPATIENT)
Dept: CARDIOLOGY CLINIC | Age: 72
End: 2017-07-24

## 2017-07-24 ENCOUNTER — OFFICE VISIT (OUTPATIENT)
Dept: CARDIOLOGY CLINIC | Age: 72
End: 2017-07-24

## 2017-07-24 DIAGNOSIS — M79.89 LEG SWELLING: Primary | ICD-10-CM

## 2017-08-04 ENCOUNTER — OFFICE VISIT (OUTPATIENT)
Dept: CARDIOLOGY CLINIC | Age: 72
End: 2017-08-04

## 2017-08-04 DIAGNOSIS — Z95.5 S/P CORONARY ARTERY STENT PLACEMENT: ICD-10-CM

## 2017-08-04 DIAGNOSIS — I25.10 ASHD (ARTERIOSCLEROTIC HEART DISEASE): ICD-10-CM

## 2017-08-04 DIAGNOSIS — R07.9 CHEST PAIN, UNSPECIFIED TYPE: ICD-10-CM

## 2017-08-04 DIAGNOSIS — N18.30 CHRONIC KIDNEY DISEASE, STAGE III (MODERATE) (HCC): ICD-10-CM

## 2017-08-04 DIAGNOSIS — I20.8 ANGINAL CHEST PAIN AT REST (HCC): ICD-10-CM

## 2017-08-04 DIAGNOSIS — R06.09 DOE (DYSPNEA ON EXERTION): ICD-10-CM

## 2017-08-15 ENCOUNTER — OFFICE VISIT (OUTPATIENT)
Dept: CARDIOLOGY CLINIC | Age: 72
End: 2017-08-15

## 2017-08-15 VITALS
HEIGHT: 72 IN | DIASTOLIC BLOOD PRESSURE: 66 MMHG | WEIGHT: 251.2 LBS | BODY MASS INDEX: 34.02 KG/M2 | SYSTOLIC BLOOD PRESSURE: 122 MMHG | HEART RATE: 77 BPM | RESPIRATION RATE: 18 BRPM | OXYGEN SATURATION: 97 %

## 2017-08-15 DIAGNOSIS — E78.2 MIXED HYPERLIPIDEMIA: Chronic | ICD-10-CM

## 2017-08-15 DIAGNOSIS — I10 ESSENTIAL HYPERTENSION, BENIGN: Primary | Chronic | ICD-10-CM

## 2017-08-15 DIAGNOSIS — Z95.5 S/P CORONARY ARTERY STENT PLACEMENT: ICD-10-CM

## 2017-08-15 DIAGNOSIS — I25.10 CORONARY ARTERY DISEASE INVOLVING NATIVE CORONARY ARTERY OF NATIVE HEART WITHOUT ANGINA PECTORIS: ICD-10-CM

## 2017-08-15 DIAGNOSIS — I25.10 CORONARY ARTERY DISEASE DUE TO LIPID RICH PLAQUE: ICD-10-CM

## 2017-08-15 DIAGNOSIS — R73.03 PREDIABETES: ICD-10-CM

## 2017-08-15 DIAGNOSIS — I25.83 CORONARY ARTERY DISEASE DUE TO LIPID RICH PLAQUE: ICD-10-CM

## 2017-08-15 DIAGNOSIS — G47.33 OBSTRUCTIVE SLEEP APNEA: ICD-10-CM

## 2017-08-15 NOTE — PROGRESS NOTES
Chief Complaint   Patient presents with   [facility] Follow Up     both sides of neck are painful,sharp pain

## 2017-08-15 NOTE — PROGRESS NOTES
[address] [place] [address] [address], [place], [address] [facility]     Terence Azul is a 66 y.o. male. Last seen 1.5 years ago. Subjective: The patient reports that he feels well since catheterization on 7/13/17. He reports sharp pains in his neck. Patient denies any exertional chest pain, dyspnea, palpitations, syncope, orthopnea, edema or paroxysmal nocturnal dyspnea.       Patient Active Problem List    Diagnosis Date Noted    Chest pain 07/07/2017    Coronary artery disease due to lipid rich plaque 04/27/2016    Coronary artery disease involving native coronary artery of native heart without angina pectoris 03/16/2016    S/P coronary artery stent placement 05/12/2015    Benign essential tremor 01/22/2014    Hypertensive kidney disease with chronic kidney disease stage III 11/22/2013    Iron deficiency 05/23/2013    Obesity 01/30/2013    Gout 01/30/2013    Prediabetes 01/07/2012    Chronic kidney disease, stage III (moderate) 10/11/2009    Mixed hyperlipidemia 10/11/2009    Obstructive sleep apnea 10/11/2009    RLS (restless legs syndrome) 10/11/2009    Peripheral neuropathy ([address] 10/11/2009    DJD (degenerative joint disease), multiple sites 10/11/2009    Essential hypertension, benign 10/11/2009    Allergic rhinitis 10/11/2009    GERD (gastroesophageal reflux disease) 10/11/2009    ED (erectile dysfunction) 10/11/2009    Anxiety associated with depression 10/11/2009      Leisa Domínguez MD  Past Medical History:   Diagnosis Date    Abnormal stress echo 5/12/2015    Anemia NEC 03/2013    Last 2-3 months; finished taking iron supplement    Anxiety     CAD (coronary artery disease) 2009    cath Sumayarosemary Sheehan) revealed 20%RCA and 50%2nd diagonal    Calculus of kidney     Chronic kidney disease, stage III (moderate) 10/11/2009    30% kidney function    Diabetes ([address]     pre diabetic    Difficult intubation     DJD (degenerative joint disease)     GERD (gastroesophageal reflux disease) 10/11/2009    Gout     Hypertension     Kidney disease     Liver disease     fatty liver    Obesity     Obstructive sleep apnea (adult) (pediatric) 10/11/2009    nasal pillows; pressure set at 10-11 - uses cpap    Peripheral neuropathy ([address] 10/11/2009    Prediabetes 1/7/2012    RLS (restless legs syndrome) 10/11/2009    S/P coronary artery stent placement 5/12/2015    5/11/15 PCI./MALI to LCx      Past Surgical History:   Procedure Laterality Date    HX HEART CATHETERIZATION  2009    HX HERNIA REPAIR      McTamaney/umbilical    HX ORTHOPAEDIC      left great toe pinning/plate    HX ORTHOPAEDIC      left shoulder manipulation    HX ORTHOPAEDIC  7/23/11     LEFT TOTAL KNEE ARTHROPLASTY    HX UROLOGICAL      basket extraction of kidney stones    IN COLONOSCOPY FLX DX W/COLLJ SPEC WHEN PFRMD  8/5/2010         IN COLSC FLX W/RMVL OF TUMOR POLYP LESION SNARE TQ  9/24/2014          Allergies   Allergen Reactions    Penicillins Hives    Bactrim [Sulfamethoxazole-Trimethoprim] Hives    Lisinopril (Bulk) Cough    Neurontin [Gabapentin] Other (comments)     drowsy    Zostavax [Zoster Vaccine Live (Pf)] Rash     See 9/10/13 visit      Family History   Problem Relation Age of Onset    Heart Disease Father     Hypertension Father     Other Father      abdominal aneurysm     Dementia Mother     Alzheimer Mother     Heart Disease Brother     Heart Attack Brother     Heart Disease Maternal Grandmother     Heart Disease Paternal Grandmother     Heart Attack Paternal Grandmother     Heart Disease Paternal Grandfather     Heart Attack Paternal Grandfather     Elevated Lipids Son     Elevated Lipids Daughter     Cancer Neg Hx     Diabetes Neg Hx     Stroke Neg Hx       Social History     Social History    Marital status:      Spouse name: N/A    Number of children: N/A    Years of education: N/A     Occupational History    Not on file.      Social History Main Topics    Smoking status: Former Smoker     Packs/day: 1.00     Years: 10.00     Types: Cigarettes     Quit date: 1/1/1968    Smokeless tobacco: Never Used    Alcohol use No    Drug use: No    Sexual activity: Yes     Partners: Female     Other Topics Concern    Not on file     Social History Narrative           Review of Systems  Constitutional: Negative for fever, chills, malaise/fatigue and diaphoresis. Respiratory: Negative for cough, hemoptysis, sputum production, shortness of breath and wheezing. Cardiovascular: Negative for chest pain, palpitations, orthopnea, claudication, leg swelling and PND. Gastrointestinal: Negative for heartburn, nausea, vomiting, blood in stool and melena. Genitourinary: Negative for dysuria and flank pain. Musculoskeletal: Negative for joint pain and back pain. Skin: Negative for rash. Neurological: Negative for focal weakness, seizures, loss of consciousness, weakness and headaches. Endo/Heme/Allergies: Does not bruise/bleed easily. Psychiatric/Behavioral: Negative for memory loss. The patient does not have insomnia. Physical Exam:    Visit Vitals    /66 (BP 1 Location: Left arm, BP Patient Position: Sitting)    Pulse 77    Resp 18    Ht 6' (1.829 m)    Wt 251 lb 3.2 oz (113.9 kg)    SpO2 97%    BMI 34.07 kg/m2     Wt Readings from Last 3 Encounters:   08/15/17 251 lb 3.2 oz (113.9 kg)   07/13/17 250 lb (113.4 kg)   07/07/17 251 lb (113.9 kg)       Gen: NAD    Mental Status - Alert. General Appearance - Not in acute distress. Neck - no JVD    Chest and Lung Exam   Inspection: Accessory muscles - No use of accessory muscles in breathing. Auscultation:   Breath sounds: - Normal.     Cardiovascular   Inspection: Jugular vein - Bilateral - Inspection Normal.   Palpation/Percussion:   Apical Impulse: - Normal.   Auscultation: Rhythm - Regular. Heart Sounds - S1 WNL and S2 WNL. No S3 or S4. Murmurs & Other Heart Sounds: Auscultation of the heart reveals - No Murmurs. Peripheral Vascular   Upper Extremity: Inspection - Bilateral - No Cyanotic nailbeds or Digital clubbing. Lower Extremity:   Palpation: Edema - Bilateral - No edema. Abdomen: Soft, non-tender, bowel sounds are active. Neuro: A&O times 3, CN and motor grossly WNL    Cardiographics  EKG 8/15/17 - SR, RBBB     Assessment:     Encounter Diagnoses   Name Primary?  Essential hypertension, benign Yes    Mixed hyperlipidemia     Coronary artery disease due to lipid rich plaque     Coronary artery disease involving native coronary artery of native heart without angina pectoris     S/P coronary artery stent placement     Obstructive sleep apnea     Prediabetes           Plan:     Patient had a negative catheterization on 7/13/17that demonstrated mild disease. BP is at target. EKG is fine. Discussed importance of weight loss and diet. Encouraged him to follow up with Santos Rodriges MD if neck pains worsen. Follow up in 6 months or PRN.      Written by Brannon Manuel, as dictated by Percy Martinez MD.

## 2017-08-23 ENCOUNTER — OFFICE VISIT (OUTPATIENT)
Dept: INTERNAL MEDICINE CLINIC | Age: 72
End: 2017-08-23

## 2017-08-23 VITALS
OXYGEN SATURATION: 96 % | TEMPERATURE: 98.8 F | HEART RATE: 70 BPM | HEIGHT: 72 IN | RESPIRATION RATE: 18 BRPM | DIASTOLIC BLOOD PRESSURE: 57 MMHG | SYSTOLIC BLOOD PRESSURE: 103 MMHG | WEIGHT: 252 LBS | BODY MASS INDEX: 34.13 KG/M2

## 2017-08-23 DIAGNOSIS — Z00.00 ROUTINE MEDICAL EXAM: ICD-10-CM

## 2017-08-23 DIAGNOSIS — G60.3 IDIOPATHIC PROGRESSIVE NEUROPATHY: ICD-10-CM

## 2017-08-23 DIAGNOSIS — I25.10 CORONARY ARTERY DISEASE DUE TO LIPID RICH PLAQUE: ICD-10-CM

## 2017-08-23 DIAGNOSIS — I10 ESSENTIAL HYPERTENSION, BENIGN: Chronic | ICD-10-CM

## 2017-08-23 DIAGNOSIS — N18.30 CHRONIC KIDNEY DISEASE, STAGE III (MODERATE) (HCC): ICD-10-CM

## 2017-08-23 DIAGNOSIS — I25.83 CORONARY ARTERY DISEASE DUE TO LIPID RICH PLAQUE: ICD-10-CM

## 2017-08-23 DIAGNOSIS — R73.03 PREDIABETES: ICD-10-CM

## 2017-08-23 DIAGNOSIS — R73.09 ELEVATED GLUCOSE: ICD-10-CM

## 2017-08-23 DIAGNOSIS — E78.2 MIXED HYPERLIPIDEMIA: Primary | Chronic | ICD-10-CM

## 2017-08-23 NOTE — MR AVS SNAPSHOT
Visit Information Date & Time Provider Department Dept. Phone Encounter #  
 8/23/2017  2:15 PM Julita Courser, [address] [phone] [id number] Follow-up Instructions Return for followup for fasting labs and at 6 months. .  
  
Your Appointments 3/6/2018 10:15 AM  
6 MONTH with Mark Winston MD  
[facility] [address] [facility]) Appt Note: $0CP 8/15/17ksr [address] [facility] [address]  
[phone]  
  
   
 [address] Upcoming Health Maintenance Date Due  
 GLAUCOMA SCREENING Q2Y 8/5/2016 Pneumococcal 65+ Low/Medium Risk (2 of 2 - PPSV23) 6/20/2017 INFLUENZA AGE 9 TO ADULT 8/1/2017 MEDICARE YEARLY EXAM 8/24/2018 COLONOSCOPY 9/24/2024 DTaP/Tdap/Td series (2 - Td) 10/24/2026 Allergies as of 8/23/2017  Review Complete On: 8/23/2017 By: Jaqueline Hall Severity Noted Reaction Type Reactions Penicillins High 10/01/2009    Hives Bactrim [Sulfamethoxazole-trimethoprim]  03/31/2010   Systemic Hives Lisinopril (Bulk)  10/01/2009    Cough Neurontin [Gabapentin]  10/01/2009   Side Effect Other (comments) drowsy Zostavax [Zoster Vaccine Live (Pf)]  11/20/2013   Not Verified Rash See 9/10/13 visit Current Immunizations  Reviewed on 9/28/2016 Name Date Influenza Vaccine 10/1/2014, 10/1/2013 Influenza Vaccine (Quad) PF 10/7/2016, 9/30/2015 Influenza Vaccine Split 9/19/2012, 10/6/2011, 10/27/2010 Influenza Vaccine Whole 10/23/2008 Tdap 10/24/2016 10:04 PM, 3/21/2016 ZZZ-RETIRED (DO NOT USE) Pneumococcal Vaccine (Unspecified Type) 6/20/2012 Zoster Vaccine, Live 8/15/2013 Not reviewed this visit You Were Diagnosed With   
  
 Codes Comments Mixed hyperlipidemia    -  Primary ICD-10-CM: G59.0 ICD-9-CM: 272.2 Essential hypertension, benign     ICD-10-CM: I10 
ICD-9-CM: 401.1 Idiopathic progressive neuropathy     ICD-10-CM: G60.3 ICD-9-CM: 356.4 Coronary artery disease due to lipid rich plaque     ICD-10-CM: I25.10, I25.83 ICD-9-CM: 414.00, 414.3 Prediabetes     ICD-10-CM: R73.03 
ICD-9-CM: 790.29 Chronic kidney disease, stage III (moderate)     ICD-10-CM: N18.3 ICD-9-CM: 173. 3 Elevated glucose     ICD-10-CM: R73.09 
ICD-9-CM: 790.29 Routine medical exam     ICD-10-CM: Z00.00 ICD-9-CM: V70.0 Vitals BP Pulse Temp Resp Height(growth percentile) Weight(growth percentile) 103/57 (BP 1 Location: Left arm, BP Patient Position: Sitting) 70 98.8 °F (37.1 °C) (Oral) 18 6' (1.829 m) 252 lb (114.3 kg) SpO2 BMI Smoking Status 96% 34.18 kg/m2 Former Smoker Vitals History BMI and BSA Data Body Mass Index Body Surface Area  
 34.18 kg/m 2 2.41 m 2 Preferred Pharmacy Pharmacy Name Phone [id number]- [place], [place] - [address] [phone] Your Updated Medication List  
  
   
This list is accurate as of: 8/23/17  2:58 PM.  Always use your most recent med list.  
  
  
  
  
 albuterol 90 mcg/actuation inhaler Commonly known as:  PROVENTIL HFA, VENTOLIN HFA, PROAIR HFA Take 1 Puff by inhalation every six (6) hours as needed for Wheezing. allopurinol 100 mg tablet Commonly known as:  Joseph Merles Take 100 mg by mouth every morning. amLODIPine 5 mg tablet Commonly known as:  Zeyad Ordoñezs Take 5 mg by mouth daily. aspirin delayed-release 81 mg tablet Take 81 mg by mouth daily. atorvastatin 40 mg tablet Commonly known as:  LIPITOR  
TAKE 1 TABLET BY MOUTH NIGHTLY*****STOP SIMVASTATIN****  
  
 CENTRUM PO Take 1 Tab by mouth daily. chlorthalidone 25 mg tablet Commonly known as:  Leonela Noble Take 0.5 Tabs by mouth daily. citalopram 40 mg tablet Commonly known as:  CELEXA  
TAKE 1 TABLET BY MOUTH EVERY DAY  
  
 clopidogrel 75 mg Tab Commonly known as:  PLAVIX Take 1 Tab by mouth daily. glucosamine-chondroitin 750-600 mg Tab Take 1 Tab by mouth daily. Brand: Move [email or web address] NASAL CPAP SYSTEM  
by Does Not Apply route. HYDROcodone-acetaminophen 5-325 mg per tablet Commonly known as:  Verlee Shack Take 1 Tab by mouth every four (4) hours as needed for Pain. Max Daily Amount: 6 Tabs. isosorbide mononitrate ER 30 mg tablet Commonly known as:  IMDUR  
TAKE 0.5 TABS BY MOUTH DAILY. metoprolol succinate 25 mg XL tablet Commonly known as:  TOPROL-XL  
TAKE ONE TABLET BY MOUTH EVERY DAY  
  
 nitroglycerin 0.4 mg SL tablet Commonly known as:  NITROSTAT  
1 Tab by SubLINGual route every five (5) minutes as needed for Chest Pain.  
  
 pantoprazole 40 mg tablet Commonly known as:  PROTONIX Take 1 Tab by mouth daily. rOPINIRole 0.5 mg tablet Commonly known as:  Jemma Grammes Take 2 Tabs by mouth daily (after dinner). Take 1.5 to 2 tablets daily after dinner TYLENOL EXTRA STRENGTH 500 mg tablet Generic drug:  acetaminophen Take 500 mg by mouth every eight (8) hours as needed. valsartan 160 mg tablet Commonly known as:  DIOVAN  
TAKE 1 TABLET BY MOUTH DAILY  
  
 VITAMIN D3 2,000 unit Cap capsule Generic drug:  Cholecalciferol (Vitamin D3) Take 2,000 Units by mouth daily. Follow-up Instructions Return for followup for fasting labs and at 6 months. .  
  
To-Do List   
 08/23/2017 Lab:  HEMOGLOBIN A1C W/O EAG   
  
 08/23/2017 Lab:  LIPID PANEL   
  
 08/23/2017 Lab:  METABOLIC PANEL, BASIC Patient Instructions PATIENT INSTRUCTIONS: 
Prepared by Oksana Lovelaces date: 8-23-17 Medicare Part B Preventive Services Guidelines/Limitations Date last completed and Frequency Due Date Bone Mass Measurement 
(age 72 & older, biennial) Requires diagnosis related to osteoporosis or estrogen deficiency. Biennial benefit unless patient has history of long-term glucocorticoid tx or baseline is needed because initial test was by other method or for 
patients with vertebral abnormalities on x-ray Not Completed:  
 
 
Recommended every 2 years Not indicated. Cardiovascular Screening Blood Tests (every 5 years or annual if on cholesterol lowering meds) Total cholesterol, HDL, Triglycerides Order as a panel if possible  Due now, ordered. HIV Screening HIV Screening (includes patients at high risk and includes any patient that requests the test and pregnant women Covered once every 12 months. N/A N/A Hepatitis C screening tests Indicated for patients with at least one of the following: current or past history of IV drug use, those who had blood transfusion before 1992, those born between [facility]. N/A  N/A Colorectal Cancer Screening 
-Fecal occult blood test (annual) -Flexible sigmoidoscopy (5y) 
-Screening colonoscopy (10y) -Barium Enema Age 49-80; After age [de-identified] if history of abnormal results Completed:  
 9-24-14 Dr Savana Salmeron recommended repeat in 10 years  Due: Sept. 2024 OR As recommended by your PCP or Specialist 
  
Hepatitis B vaccines (covered for patients at high risk- hemophilia, ESRD, DM, body fluid contact 
 
______________ Prostate Cancer Screening (annually up to age 76) -Digital rectal exam 
-Prostate specific antigen (PSA) Three shot series covered once 
 
 
 
 
 
 
_________________ Annually (age 48 or over), ROBERTA not paid separately when covered E/M service is provided on the same date N/A 
 
 
 
 
 
 
 
______________ Completed: 
9-24-14 Recommended 
annually N/A 
 
 
 
 
 
 
 
_____________ Optional.   
Seasonal Influenza Vaccination (annually)  Completed:  
10-7-16 Recommended Annually Due: FALL 2017 TDAP Vaccination Covered by Medicare part D through the pharmacy- PCP provides prescription Completed:  
10-24-16 Recommended every 10 years Due:  
October 2026 Zoster (Shingles) Vaccination Covered by Medicare Part D through the pharmacy- PCP provides prescription Completed:  
8-7-13 Recommended once over age 61 This is a one-time vaccine Pneumococcal Vaccination (once after 65)  Not Completed:  
 
Recommended once over the age of 72 Due: Feb. 2018 
1 year following Prevnar 13 This is a one-time vaccine Prevnar 13 vaccine  Prevnar 13   
2-6-17 Recommended once over the age of 72 This is a one-time vaccine Diabetes Screening Tests (at least every 3 years, Medicare covers annually or at 6-month intervals for prediabetic patients) Fasting blood sugar (FBS) or glucose tolerance test (GTT) Patient must be diagnosed with one of the following: 
-Hypertension, Dyslipidemia, obesity, previous impaired FBS or GTT 
Or any two of the following: overweight, FH of diabetes, age ? 72, history of gestational diabetes, birth of baby weighing more than 9 pounds Completed: Your A1c was 6.0 on 3-16-16 Recommended every -6 months for pre-diabetics Due: NOW 
 
 
 
 
 
OR 
As recommended by your PCP or Specialist  
Lung Cancer Screening-with low dose CT for patients who meet all criteria:  
-Age 50-69 
-either a current smoker or have quit in the past 15 yrs 
-have a smoking history of 30 pack years or more Covered every 12 months N/A Former smoker Quit [id number] N/A Ultrasound Screening for Abdominal Aortic Aneurysm (AAA) (once) Patient must be referred through IPPE and not have had a screening for abdominal aortic aneurysm before under Medicare. Limited to patients who meet one of the following criteria: 
- Men who are 73-68 years old and have smoked more than 100 cigarettes in their lifetime. 
-Anyone with a FH of AAA 
-Anyone recommended for screening by USPSTF Former smoker Quit 0182 Please contact RN/Nurse Navigator with any questions about your visit or instructions today. Thank you for the opportunity for us to participate in your care. Rotator Cuff: Exercises Your Care Instructions Here are some examples of typical rehabilitation exercises for your condition. Start each exercise slowly. Ease off the exercise if you start to have pain. Your doctor or physical therapist will tell you when you can start these exercises and which ones will work best for you. How to do the exercises Pendulum swing Note: If you have pain in your back, do not do this exercise. 1. Hold on to a table or the back of a chair with your good arm. Then bend forward a little and let your sore arm hang straight down. This exercise does not use the arm muscles. Rather, use your legs and your hips to create movement that makes your arm swing freely. 2. Use the movement from your hips and legs to guide the slightly swinging arm back and forth like a pendulum (or elephant trunk). Then guide it in circles that start small (about the size of a dinner plate). Make the circles a bit larger each day, as your pain allows. 3. Do this exercise for 5 minutes, 5 to 7 times each day. 4. As you have less pain, try bending over a little farther to do this exercise. This will increase the amount of movement at your shoulder. Posterior stretching exercise 1. Hold the elbow of your injured arm with your other hand. 2. Use your hand to pull your injured arm gently up and across your body. You will feel a gentle stretch across the back of your injured shoulder. 3. Hold for at least 15 to 30 seconds. Then slowly lower your arm. 4. Repeat 2 to 4 times. Up-the-back stretch Note: Your doctor or physical therapist may want you to wait to do this stretch until you have regained most of your range of motion and strength. You can do this stretch in different ways. Hold any of these stretches for at least 15 to 30 seconds. Repeat them 2 to 4 times. 1. Put your hand in your back pocket. Let it rest there to stretch your shoulder. 2. With your other hand, hold your injured arm (palm outward) behind your back by the wrist. Pull your arm up gently to stretch your shoulder. 3. Next, put a towel over your other shoulder. Put the hand of your injured arm behind your back. Now hold the back end of the towel. With the other hand, hold the front end of the towel in front of your body. Pull gently on the front end of the towel. This will bring your hand farther up your back to stretch your shoulder. Overhead stretch 1. Standing about an arm's length away, grasp onto a solid surface. You could use a countertop, a doorknob, or the back of a sturdy chair. 2. With your knees slightly bent, bend forward with your arms straight. Lower your upper body, and let your shoulders stretch. 3. As your shoulders are able to stretch farther, you may need to take a step or two backward. 4. Hold for at least 15 to 30 seconds. Then stand up and relax. If you had stepped back during your stretch, step forward so you can keep your hands on the solid surface. 5. Repeat 2 to 4 times. Shoulder flexion (lying down) Note: To make a wand for this exercise, use a piece of PVC pipe or a broom handle with the broom removed. Make the wand about a foot wider than your shoulders. 1. Lie on your back, holding a wand with both hands. Your palms should face down as you hold the wand. 2. Keeping your elbows straight, slowly raise your arms over your head. Raise them until you feel a stretch in your shoulders, upper back, and chest. 
3. Hold for 15 to 30 seconds. 4. Repeat 2 to 4 times. Shoulder rotation (lying down) Note: To make a wand for this exercise, use a piece of PVC pipe or a broom handle with the broom removed. Make the wand about a foot wider than your shoulders. 1. Lie on your back. Hold a wand with both hands with your elbows bent and palms up. 2. Keep your elbows close to your body, and move the wand across your body toward the sore arm. 3. Hold for 8 to 12 seconds. 4. Repeat 2 to 4 times. Wall climbing (to the side) Note: Avoid any movement that is straight to your side, and be careful not to arch your back. Your arm should stay about 30 degrees to the front of your side. 1. Stand with your side to a wall so that your fingers can just touch it at an angle about 30 degrees toward the front of your body. 2. Walk the fingers of your injured arm up the wall as high as pain permits. Try not to shrug your shoulder up toward your ear as you move your arm up. 3. Hold that position for a count of at least 15 to 20. 
4. Walk your fingers back down to the starting position. 5. Repeat at least 2 to 4 times. Try to reach higher each time. Wall climbing (to the front) Note: During this stretching exercise, be careful not to arch your back. 1. Face a wall, and stand so your fingers can just touch it. 2. Keeping your shoulder down, walk the fingers of your injured arm up the wall as high as pain permits. (Don't shrug your shoulder up toward your ear.) 3. Hold your arm in that position for at least 15 to 30 seconds. 4. Slowly walk your fingers back down to where you started. 5. Repeat at least 2 to 4 times. Try to reach higher each time. Shoulder blade squeeze 1. Stand with your arms at your sides, and squeeze your shoulder blades together. Do not raise your shoulders up as you squeeze. 2. Hold 6 seconds. 3. Repeat 8 to 12 times. Scapular exercise: Arm reach 1. Lie flat on your back. This exercise is a very slight motion that starts with your arms raised (elbows straight, arms straight). 2. From this position, reach higher toward the scar or ceiling. Keep your elbows straight. All motion should be from your shoulder blade only. 3. Relax your arms back to where you started. 4. Repeat 8 to 12 times. Arm raise to the side Note: During this strengthening exercise, your arm should stay about 30 degrees to the front of your side. 1. Slowly raise your injured arm to the side, with your thumb facing up. Raise your arm 60 degrees at the most (shoulder level is 90 degrees). 2. Hold the position for 3 to 5 seconds. Then lower your arm back to your side. If you need to, bring your \"good\" arm across your body and place it under the elbow as you lower your injured arm. Use your good arm to keep your injured arm from dropping down too fast. 
3. Repeat 8 to 12 times. 4. When you first start out, don't hold any extra weight in your hand. As you get stronger, you may use a 1-pound to 2-pound dumbbell or a small can of food. Shoulder flexor and extensor exercise Note: These are isometric exercises. That means you contract your muscles without actually moving. · Push forward (flex): Stand facing a wall or doorjamb, about 6 inches or less back. Hold your injured arm against your body. Make a closed fist with your thumb on top. Then gently push your hand forward into the wall with about 25% to 50% of your strength. Don't let your body move backward as you push. Hold for about 6 seconds. Relax for a few seconds. Repeat 8 to 12 times. · Push backward (extend): Stand with your back flat against a wall. Your upper arm should be against the wall, with your elbow bent 90 degrees (your hand straight ahead). Push your elbow gently back against the wall with about 25% to 50% of your strength. Don't let your body move forward as you push. Hold for about 6 seconds. Relax for a few seconds. Repeat 8 to 12 times. Scapular exercise: Wall push-ups Note: This exercise is best done with your fingers somewhat turned out, rather than straight up and down. 1. Stand facing a wall, about 12 inches to 18 inches away. 2. Place your hands on the wall at shoulder height. 3. Slowly bend your elbows and bring your face to the wall.  Keep your back and hips straight. 4. Push back to where you started. 5. Repeat 8 to 12 times. 6. When you can do this exercise against a wall comfortably, you can try it against a counter. You can then slowly progress to the end of a couch, then to a sturdy chair, and finally to the floor. Scapular exercise: Retraction Note: For this exercise, you will need elastic exercise material, such as surgical tubing or Thera-Band. 1. Put the band around a solid object at about waist level. (A bedpost will work well.) Each hand should hold an end of the band. 2. With your elbows at your sides and bent to 90 degrees, pull the band back. Your shoulder blades should move toward each other. Then move your arms back where you started. 3. Repeat 8 to 12 times. 4. If you have good range of motion in your shoulders, try this exercise with your arms lifted out to the sides. Keep your elbows at a 90-degree angle. Raise the elastic band up to about shoulder level. Pull the band back to move your shoulder blades toward each other. Then move your arms back where you started. Internal rotator strengthening exercise 1. Start by tying a piece of elastic exercise material to a doorknob. You can use surgical tubing or Thera-Band. 2. Stand or sit with your shoulder relaxed and your elbow bent 90 degrees. Your upper arm should rest comfortably against your side. Squeeze a rolled towel between your elbow and your body for comfort. This will help keep your arm at your side. 3. Hold one end of the elastic band in the hand of the painful arm. 4. Slowly rotate your forearm toward your body until it touches your belly. Slowly move it back to where you started. 5. Keep your elbow and upper arm firmly tucked against the towel roll or at your side. 6. Repeat 8 to 12 times. External rotator strengthening exercise 1. Start by tying a piece of elastic exercise material to a doorknob.  You can use surgical tubing or Thera-Band. (You may also hold one end of the band in each hand.) 2. Stand or sit with your shoulder relaxed and your elbow bent 90 degrees. Your upper arm should rest comfortably against your side. Squeeze a rolled towel between your elbow and your body for comfort. This will help keep your arm at your side. 3. Hold one end of the elastic band with the hand of the painful arm. 4. Start with your forearm across your belly. Slowly rotate the forearm out away from your body. Keep your elbow and upper arm tucked against the towel roll or the side of your body until you begin to feel tightness in your shoulder. Slowly move your arm back to where you started. 5. Repeat 8 to 12 times. Follow-up care is a key part of your treatment and safety. Be sure to make and go to all appointments, and call your doctor if you are having problems. It's also a good idea to know your test results and keep a list of the medicines you take. Where can you learn more? Go to http://antonieta-josie.info/. Enter Kathrin Poster in the search box to learn more about \"Rotator Cuff: Exercises. \" Current as of: March 21, 2017 Content Version: 11.3 © 6809-8928 [employer]. Care instructions adapted under license by [employer] (which disclaims liability or warranty for this information). If you have questions about a medical condition or this instruction, always ask your healthcare professional. [place] [address] any warranty or liability for your use of this information. Introducing [facility]! Trevor Kenny introduces K[employer]ers patient portal. Now you can access parts of your medical record, email your doctor's office, and request medication refills online. 1. In your internet browser, go to [email or web address]. [email or web address]. [email or web address] 2. Click on the First Time User? Click Here link in the Sign In box. You will see the New Member Sign Up page. 3. Enter your Aphria Access Code exactly as it appears below. You will not need to use this code after youve completed the sign-up process. If you do not sign up before the expiration date, you must request a new code. · Aphria Access Code: [email or web address] Expires: 10/5/2017  9:49 AM 
 
4. Enter the last four digits of your Social Security Number (xxxx) and Date of Birth (mm/dd/yyyy) as indicated and click Submit. You will be taken to the next sign-up page. 5. Create a Aphria ID. This will be your Aphria login ID and cannot be changed, so think of one that is secure and easy to remember. 6. Create a Aphria password. You can change your password at any time. 7. Enter your Password Reset Question and Answer. This can be used at a later time if you forget your password. 8. Enter your e-mail address. You will receive e-mail notification when new information is available in [address]. 9. Click Sign Up. You can now view and download portions of your medical record. 10. Click the Download Summary menu link to download a portable copy of your medical information. If you have questions, please visit the Frequently Asked Questions section of the Aphria website. Remember, A[employer]ria is NOT to be used for urgent needs. For medical emergencies, dial 911. Now available from your iPhone and Android! Please provide this summary of care documentation to your next provider. Your primary care clinician is listed as Day Boyce. If you have any questions after today's visit, please call [phone].

## 2017-08-23 NOTE — PROGRESS NOTES
1. Have you been to the ER, urgent care clinic since your last visit? Hospitalized since your last visit? yes    2. Have you seen or consulted any other health care providers outside of the [address] [place] [address] since your last visit? Include any pap smears or colon screening.  no

## 2017-08-23 NOTE — PATIENT INSTRUCTIONS
PATIENT INSTRUCTIONS:  Prepared by Gianfranco Abdalla date: 8-23-17  Medicare Part B Preventive Services Guidelines/Limitations Date last completed and Frequency Due Date   Bone Mass Measurement  (age 72 & older, biennial) Requires diagnosis related to osteoporosis or estrogen deficiency. Biennial benefit unless patient has history of long-term glucocorticoid tx or baseline is needed because initial test was by other method or for  patients with vertebral abnormalities on x-ray   Not Completed:       Recommended every 2 years Not indicated. Cardiovascular Screening Blood Tests (every 5 years or annual if on cholesterol lowering meds)  Total cholesterol, HDL, Triglycerides   Order as a panel if possible  Due now, ordered. HIV Screening  HIV Screening (includes patients at high risk and includes any patient that requests the test and pregnant women   Covered once every 12 months. N/A N/A   Hepatitis C screening tests Indicated for patients with at least one of the following: current or past history of IV drug use, those who had blood transfusion before 1992, those born between [facility]. N/A  N/A   Colorectal Cancer Screening  -Fecal occult blood test (annual)  -Flexible sigmoidoscopy (5y)  -Screening colonoscopy (10y)  -Barium Enema Age 49-80;  After age [de-identified] if history of abnormal results Completed:    9-24-14    Dr Steven Torres recommended repeat in 10 years  Due: Sept. 2024      OR  As recommended by your PCP or Specialist     Hepatitis B vaccines  (covered for patients at high risk- hemophilia, ESRD, DM, body fluid contact    ______________  Prostate Cancer Screening (annually up to age 76)  -Digital rectal exam  -Prostate specific antigen (PSA)   Three shot series covered once              _________________  Annually (age 48 or over), ROBERTA not paid separately when covered E/M service is provided on the same date N/A                ______________  Completed:  9-24-14      Recommended  annually N/A                _____________  Optional.    Seasonal Influenza Vaccination (annually)  Completed:   10-7-16    Recommended Annually   Due: FALL 2017   TDAP Vaccination Covered by Medicare part D through the pharmacy- PCP provides prescription Completed:   10-24-16    Recommended every 10 years   Due:   October 2026   Zoster (Shingles) Vaccination Covered by Medicare Part D through the pharmacy- PCP provides prescription Completed:   8-7-13    Recommended once over age 61    This is a one-time vaccine   Pneumococcal Vaccination (once after 72)  Not Completed:     Recommended once over the age of 72 Due: Feb. 2018  1 year following Prevnar 13   This is a one-time vaccine   Prevnar 13 vaccine  Prevnar 13    2-6-17    Recommended once over the age of 72     This is a one-time vaccine       Diabetes Screening Tests (at least every 3 years, Medicare covers annually or at 6-month intervals for prediabetic patients)     Fasting blood sugar (FBS) or glucose tolerance test (GTT) Patient must be diagnosed with one of the following:  -Hypertension, Dyslipidemia, obesity, previous impaired FBS or GTT  Or any two of the following: overweight, FH of diabetes, age ? 72, history of gestational diabetes, birth of baby weighing more than 9 pounds Completed: Your A1c was 6.0 on 3-16-16          Recommended every -6 months for pre-diabetics Due: NOW            OR  As recommended by your PCP or Specialist   Lung Cancer Screening-with low dose CT for patients who meet all criteria:   -Age 50-69  -either a current smoker or have quit in the past 15 yrs  -have a smoking history of 30 pack years or more   Covered every 12 months N/A    Former smoker  Quit 1968 N/A     Ultrasound Screening for Abdominal Aortic Aneurysm (AAA) (once) Patient must be referred through [facility] and not have had a screening for abdominal aortic aneurysm before under Medicare.   Limited to patients who meet one of the following criteria:  - Men who are 65-75 years old and have smoked more than 100 cigarettes in their lifetime.  -Anyone with a FH of AAA  -Anyone recommended for screening by [facility] Former smoker  Quit 1968      Please contact RN/Nurse Navigator with any questions about your visit or instructions today. Thank you for the opportunity for us to participate in your care. Rotator Cuff: Exercises  Your Care Instructions  Here are some examples of typical rehabilitation exercises for your condition. Start each exercise slowly. Ease off the exercise if you start to have pain. Your doctor or physical therapist will tell you when you can start these exercises and which ones will work best for you. How to do the exercises  Pendulum swing    Note: If you have pain in your back, do not do this exercise. 1. Hold on to a table or the back of a chair with your good arm. Then bend forward a little and let your sore arm hang straight down. This exercise does not use the arm muscles. Rather, use your legs and your hips to create movement that makes your arm swing freely. 2. Use the movement from your hips and legs to guide the slightly swinging arm back and forth like a pendulum (or elephant trunk). Then guide it in circles that start small (about the size of a dinner plate). Make the circles a bit larger each day, as your pain allows. 3. Do this exercise for 5 minutes, 5 to 7 times each day. 4. As you have less pain, try bending over a little farther to do this exercise. This will increase the amount of movement at your shoulder. Posterior stretching exercise    1. Hold the elbow of your injured arm with your other hand. 2. Use your hand to pull your injured arm gently up and across your body. You will feel a gentle stretch across the back of your injured shoulder. 3. Hold for at least 15 to 30 seconds. Then slowly lower your arm. 4. Repeat 2 to 4 times.   Up-the-back stretch    Note: Your doctor or physical therapist may want you to wait to do this stretch until you have regained most of your range of motion and strength. You can do this stretch in different ways. Hold any of these stretches for at least 15 to 30 seconds. Repeat them 2 to 4 times. 1. Put your hand in your back pocket. Let it rest there to stretch your shoulder. 2. With your other hand, hold your injured arm (palm outward) behind your back by the wrist. Pull your arm up gently to stretch your shoulder. 3. Next, put a towel over your other shoulder. Put the hand of your injured arm behind your back. Now hold the back end of the towel. With the other hand, hold the front end of the towel in front of your body. Pull gently on the front end of the towel. This will bring your hand farther up your back to stretch your shoulder. Overhead stretch    1. Standing about an arm's length away, grasp onto a solid surface. You could use a countertop, a doorknob, or the back of a sturdy chair. 2. With your knees slightly bent, bend forward with your arms straight. Lower your upper body, and let your shoulders stretch. 3. As your shoulders are able to stretch farther, you may need to take a step or two backward. 4. Hold for at least 15 to 30 seconds. Then stand up and relax. If you had stepped back during your stretch, step forward so you can keep your hands on the solid surface. 5. Repeat 2 to 4 times. Shoulder flexion (lying down)    Note: To make a wand for this exercise, use a piece of PVC pipe or a broom handle with the broom removed. Make the wand about a foot wider than your shoulders. 1. Lie on your back, holding a wand with both hands. Your palms should face down as you hold the wand. 2. Keeping your elbows straight, slowly raise your arms over your head. Raise them until you feel a stretch in your shoulders, upper back, and chest.  3. Hold for 15 to 30 seconds. 4. Repeat 2 to 4 times.   Shoulder rotation (lying down)    Note: To make a wand for this exercise, use a piece of PVC pipe or a broom handle with the broom removed. Make the wand about a foot wider than your shoulders. 1. Lie on your back. Hold a wand with both hands with your elbows bent and palms up. 2. Keep your elbows close to your body, and move the wand across your body toward the sore arm. 3. Hold for 8 to 12 seconds. 4. Repeat 2 to 4 times. Wall climbing (to the side)    Note: Avoid any movement that is straight to your side, and be careful not to arch your back. Your arm should stay about 30 degrees to the front of your side. 1. Stand with your side to a wall so that your fingers can just touch it at an angle about 30 degrees toward the front of your body. 2. Walk the fingers of your injured arm up the wall as high as pain permits. Try not to shrug your shoulder up toward your ear as you move your arm up. 3. Hold that position for a count of at least 15 to 20.  4. Walk your fingers back down to the starting position. 5. Repeat at least 2 to 4 times. Try to reach higher each time. Wall climbing (to the front)    Note: During this stretching exercise, be careful not to arch your back. 1. Face a wall, and stand so your fingers can just touch it. 2. Keeping your shoulder down, walk the fingers of your injured arm up the wall as high as pain permits. (Don't shrug your shoulder up toward your ear.)  3. Hold your arm in that position for at least 15 to 30 seconds. 4. Slowly walk your fingers back down to where you started. 5. Repeat at least 2 to 4 times. Try to reach higher each time. Shoulder blade squeeze    1. Stand with your arms at your sides, and squeeze your shoulder blades together. Do not raise your shoulders up as you squeeze. 2. Hold 6 seconds. 3. Repeat 8 to 12 times. Scapular exercise: Arm reach    1. Lie flat on your back. This exercise is a very slight motion that starts with your arms raised (elbows straight, arms straight). 2. From this position, reach higher toward the scar or ceiling.  Keep your elbows straight. All motion should be from your shoulder blade only. 3. Relax your arms back to where you started. 4. Repeat 8 to 12 times. Arm raise to the side    Note: During this strengthening exercise, your arm should stay about 30 degrees to the front of your side. 1. Slowly raise your injured arm to the side, with your thumb facing up. Raise your arm 60 degrees at the most (shoulder level is 90 degrees). 2. Hold the position for 3 to 5 seconds. Then lower your arm back to your side. If you need to, bring your \"good\" arm across your body and place it under the elbow as you lower your injured arm. Use your good arm to keep your injured arm from dropping down too fast.  3. Repeat 8 to 12 times. 4. When you first start out, don't hold any extra weight in your hand. As you get stronger, you may use a 1-pound to 2-pound dumbbell or a small can of food. Shoulder flexor and extensor exercise    Note: These are isometric exercises. That means you contract your muscles without actually moving. · Push forward (flex): Stand facing a wall or doorjamb, about 6 inches or less back. Hold your injured arm against your body. Make a closed fist with your thumb on top. Then gently push your hand forward into the wall with about 25% to 50% of your strength. Don't let your body move backward as you push. Hold for about 6 seconds. Relax for a few seconds. Repeat 8 to 12 times. · Push backward (extend): Stand with your back flat against a wall. Your upper arm should be against the wall, with your elbow bent 90 degrees (your hand straight ahead). Push your elbow gently back against the wall with about 25% to 50% of your strength. Don't let your body move forward as you push. Hold for about 6 seconds. Relax for a few seconds. Repeat 8 to 12 times. Scapular exercise: Wall push-ups    Note: This exercise is best done with your fingers somewhat turned out, rather than straight up and down.   1. Stand facing a wall, about 12 inches to 18 inches away. 2. Place your hands on the wall at shoulder height. 3. Slowly bend your elbows and bring your face to the wall. Keep your back and hips straight. 4. Push back to where you started. 5. Repeat 8 to 12 times. 6. When you can do this exercise against a wall comfortably, you can try it against a counter. You can then slowly progress to the end of a couch, then to a sturdy chair, and finally to the floor. Scapular exercise: Retraction    Note: For this exercise, you will need elastic exercise material, such as surgical tubing or Thera-Band. 1. Put the band around a solid object at about waist level. (A bedpost will work well.) Each hand should hold an end of the band. 2. With your elbows at your sides and bent to 90 degrees, pull the band back. Your shoulder blades should move toward each other. Then move your arms back where you started. 3. Repeat 8 to 12 times. 4. If you have good range of motion in your shoulders, try this exercise with your arms lifted out to the sides. Keep your elbows at a 90-degree angle. Raise the elastic band up to about shoulder level. Pull the band back to move your shoulder blades toward each other. Then move your arms back where you started. Internal rotator strengthening exercise    1. Start by tying a piece of elastic exercise material to a doorknob. You can use surgical tubing or Thera-Band. 2. Stand or sit with your shoulder relaxed and your elbow bent 90 degrees. Your upper arm should rest comfortably against your side. Squeeze a rolled towel between your elbow and your body for comfort. This will help keep your arm at your side. 3. Hold one end of the elastic band in the hand of the painful arm. 4. Slowly rotate your forearm toward your body until it touches your belly. Slowly move it back to where you started. 5. Keep your elbow and upper arm firmly tucked against the towel roll or at your side. 6. Repeat 8 to 12 times.   External rotator strengthening exercise    1. Start by tying a piece of elastic exercise material to a doorknob. You can use surgical tubing or Thera-Band. (You may also hold one end of the band in each hand.)  2. Stand or sit with your shoulder relaxed and your elbow bent 90 degrees. Your upper arm should rest comfortably against your side. Squeeze a rolled towel between your elbow and your body for comfort. This will help keep your arm at your side. 3. Hold one end of the elastic band with the hand of the painful arm. 4. Start with your forearm across your belly. Slowly rotate the forearm out away from your body. Keep your elbow and upper arm tucked against the towel roll or the side of your body until you begin to feel tightness in your shoulder. Slowly move your arm back to where you started. 5. Repeat 8 to 12 times. Follow-up care is a key part of your treatment and safety. Be sure to make and go to all appointments, and call your doctor if you are having problems. It's also a good idea to know your test results and keep a list of the medicines you take. Where can you learn more? Go to http://antonieta-josie.info/. Enter Timbo Price in the search box to learn more about \"Rotator Cuff: Exercises. \"  Current as of: March 21, 2017  Content Version: 11.3  © 2569-9486 [employer]. Care instructions adapted under license by [employer] (which disclaims liability or warranty for this information). If you have questions about a medical condition or this instruction, always ask your healthcare professional. [place] [address] any warranty or liability for your use of this information.

## 2017-08-23 NOTE — PROGRESS NOTES
Rina Sanders is a 74 y.o. male who presents for follow up. Seen in March. Changed to Protonix from prilosec due to plavix interaction.      Heart cath 7/13. Has CAD. On Plavix and aspirin 81mg once a day. Walking some. No sx with exertion. BP at goal.  Taking imdur 30mg 1/2 tablet. No recurrent chest pain. Heart cath on 7/13 with medically manageable CAD. On lipitor. No myalgia. Notes an ulcer on the bottom of left foot is healed. Has neuropathy bilateral feet to knee. Sees Dr. Justin Aponte, nephrology, stable at [facility]. Anemic. Every 6 months. No NSAIDS. Drinking water regularly. Glendell Kand on right shoulder, hit a post when he tripped. Pain with movement of shoulder. Occurred about 2 weeks ago.        Past Medical History:   Diagnosis Date    Abnormal stress echo 5/12/2015    Anemia NEC 03/2013    Last 2-3 months; finished taking iron supplement    Anxiety     CAD (coronary artery disease) 2009    cath [facility]) revealed 20%RCA and 50%2nd diagonal    Calculus of kidney     Chronic kidney disease, stage III (moderate) 10/11/2009    30% kidney function    Diabetes ([address]     pre diabetic    Difficult intubation     DJD (degenerative joint disease)     GERD (gastroesophageal reflux disease) 10/11/2009    Gout     Hypertension     Kidney disease     Liver disease     fatty liver    Obesity     Obstructive sleep apnea (adult) (pediatric) 10/11/2009    nasal pillows; pressure set at 10-11 - uses cpap    Peripheral neuropathy ([address] 10/11/2009    Prediabetes 1/7/2012    RLS (restless legs syndrome) 10/11/2009    S/P coronary artery stent placement 5/12/2015    5/11/15 PCI./MALI to LCx       Family History   Problem Relation Age of Onset    Heart Disease Father     Hypertension Father     Other Father      abdominal aneurysm     Dementia Mother     Alzheimer Mother     Heart Disease Brother     Heart Attack Brother     Heart Disease Maternal Grandmother     Heart Disease Paternal Grandmother     Heart Attack Paternal Grandmother     Heart Disease Paternal Grandfather     Heart Attack Paternal Grandfather     Elevated Lipids Son     Elevated Lipids Daughter     Cancer Neg Hx     Diabetes Neg Hx     Stroke Neg Hx        Social History     Social History    Marital status:      Spouse name: N/A    Number of children: N/A    Years of education: N/A     Occupational History    Not on file. Social History Main Topics    Smoking status: Former Smoker     Packs/day: 1.00     Years: 10.00     Types: Cigarettes     Quit date: 1/1/1968    Smokeless tobacco: Never Used    Alcohol use No    Drug use: No    Sexual activity: Yes     Partners: Female     Other Topics Concern    Not on file     Social History Narrative           Review of Systems  Pertinent items are noted in HPI. Objective:     Visit Vitals    /57 (BP 1 Location: Left arm, BP Patient Position: Sitting)    Pulse 70    Temp 98.8 °F (37.1 °C) (Oral)    Resp 18    Ht 6' (1.829 m)    Wt 252 lb (114.3 kg)    SpO2 96%    BMI 34.18 kg/m2     Gen: well appearing male  HEENT:   PERRL,normal conjunctiva. External ear and canals normal, TMs no opacification or erythema,  OP no erythema, no exudates, MMM  Neck:  Supple. Thyroid normal size, nontender, without nodules. No masses or LAD  Resp:  No wheezing, no rhonchi, no rales. CV:  RRR, normal S1S2, no murmur. GI: soft, nontender, without masses. No hepatosplenomegaly. Extrem:  +2 pulses, no edema, warm distally      Assessment/Plan:     1. Mixed hyperlipidemia- Recommend AHA diet and regular cardiovascular exercise. Continue statin therapy. - LIPID PANEL; Future    2. Essential hypertension, benign- Recommend following a lower sodium diet and regular cardiovascular exercise. Blood pressure goal is less than 130/85 on average. Advised compliance with blood pressure medication and regular follow up.     3. Idiopathic progressive neuropathy      4. Coronary artery disease due to lipid rich plaque    5. Prediabetes- Recommend following diabetic diet, exercise and maintain a healthy weight. 6. Chronic kidney disease, stage III (moderate)    - METABOLIC PANEL, BASIC; Future    7. Elevated glucose    - HEMOGLOBIN A1C W/O EAG; Future    8. Routine medical exam- Recommend heart healthy diet and regular cardiovascular exercise. Follow-up Disposition:  Return for followup for fasting labs and at 6 months.  Carlita Hess MD

## 2017-08-24 ENCOUNTER — HOSPITAL ENCOUNTER (OUTPATIENT)
Dept: LAB | Age: 72
Discharge: HOME | End: 2017-08-24
Payer: MEDICARE

## 2017-08-24 ENCOUNTER — LAB ONLY (OUTPATIENT)
Dept: INTERNAL MEDICINE CLINIC | Age: 72
End: 2017-08-24

## 2017-08-24 DIAGNOSIS — N18.30 CHRONIC KIDNEY DISEASE, STAGE III (MODERATE) (HCC): ICD-10-CM

## 2017-08-24 DIAGNOSIS — E78.2 MIXED HYPERLIPIDEMIA: Chronic | ICD-10-CM

## 2017-08-24 DIAGNOSIS — R73.09 ELEVATED GLUCOSE: ICD-10-CM

## 2017-08-24 PROCEDURE — 83036 HEMOGLOBIN GLYCOSYLATED A1C: CPT

## 2017-08-24 PROCEDURE — 36415 COLL VENOUS BLD VENIPUNCTURE: CPT

## 2017-08-24 PROCEDURE — 80061 LIPID PANEL: CPT

## 2017-08-24 PROCEDURE — 80048 BASIC METABOLIC PNL TOTAL CA: CPT

## 2017-08-24 NOTE — LETTER
8/30/2017 2:21 PM 
 
Mr. Amelia Gardner [address] [id number] Dear Amelia Stewart: 
 
Please find your most recent results below. Resulted Orders LIPID PANEL Result Value Ref Range Cholesterol, total 140 100 - 199 mg/dL Triglyceride 168 (H) 0 - 149 mg/dL HDL Cholesterol 45 >39 mg/dL VLDL, calculated 34 5 - 40 mg/dL LDL, calculated 61 0 - 99 mg/dL Narrative Performed at:  [address]  [id number] : Sue Matthew MD, Phone:  [phone] HEMOGLOBIN A1C W/O EAG Result Value Ref Range Hemoglobin A1c 5.8 (H) 4.8 - 5.6 % Comment:  
            Pre-diabetes: 5.7 - 6.4 Diabetes: >6.4 Glycemic control for adults with diabetes: <7.0 Narrative Performed at:  [address]  [id number] : Sue Matthew MD, Phone:  [phone] METABOLIC PANEL, BASIC Result Value Ref Range Glucose 93 65 - 99 mg/dL BUN 20 8 - 27 mg/dL Creatinine 1.83 (H) 0.76 - 1.27 mg/dL GFR est non-AA 36 (L) >59 mL/min/1.73 GFR est AA 41 (L) >59 mL/min/1.73  
 BUN/Creatinine ratio 11 10 - 24 Sodium 144 134 - 144 mmol/L Potassium 4.4 3.5 - 5.2 mmol/L Chloride 105 96 - 106 mmol/L  
 CO2 24 18 - 29 mmol/L Calcium 9.3 8.6 - 10.2 mg/dL Narrative Performed at:  [address]  [id number] : Sue Matthew MD, Phone:  [phone] RECOMMENDATIONS: 
Diabetes improving, cholesterol controlled.  Impaired kidney function is stable. No change in medications.   6 month follow up. Please call me if you have any questions: [phone] Sincerely, [facility]

## 2017-08-25 LAB
BUN SERPL-MCNC: 20 MG/DL (ref 8–27)
BUN/CREAT SERPL: 11 (ref 10–24)
CALCIUM SERPL-MCNC: 9.3 MG/DL (ref 8.6–10.2)
CHLORIDE SERPL-SCNC: 105 MMOL/L (ref 96–106)
CHOLEST SERPL-MCNC: 140 MG/DL (ref 100–199)
CO2 SERPL-SCNC: 24 MMOL/L (ref 18–29)
CREAT SERPL-MCNC: 1.83 MG/DL (ref 0.76–1.27)
GLUCOSE SERPL-MCNC: 93 MG/DL (ref 65–99)
HBA1C MFR BLD: 5.8 % (ref 4.8–5.6)
HDLC SERPL-MCNC: 45 MG/DL
LDLC SERPL CALC-MCNC: 61 MG/DL (ref 0–99)
POTASSIUM SERPL-SCNC: 4.4 MMOL/L (ref 3.5–5.2)
SODIUM SERPL-SCNC: 144 MMOL/L (ref 134–144)
TRIGL SERPL-MCNC: 168 MG/DL (ref 0–149)
VLDLC SERPL CALC-MCNC: 34 MG/DL (ref 5–40)

## 2017-08-27 NOTE — PROGRESS NOTES
Diabetes improving, cholesterol controlled. Impaired kidney function is stable. No change in medications. 6 month follow up.

## 2017-09-20 ENCOUNTER — CLINICAL SUPPORT (OUTPATIENT)
Dept: INTERNAL MEDICINE CLINIC | Age: 72
End: 2017-09-20

## 2017-09-20 DIAGNOSIS — Z23 ENCOUNTER FOR IMMUNIZATION: ICD-10-CM

## 2017-09-20 NOTE — PROGRESS NOTES
After obtaining consent, and per verbal order from Dr. Moncho Hernandez, patient received influenza vaccine given by Lora Schmitt LPN in right Deltoid. Fluzone 0.5 mL IM now. Patient was observed for 10 minutes post injection. Patient tolerated injection well.

## 2017-09-20 NOTE — PATIENT INSTRUCTIONS
Vaccine Information Statement    Influenza (Flu) Vaccine (Inactivated or Recombinant): What you need to know    Many Vaccine Information Statements are available in [place] and other languages. See www.immunize.org/vis  Hojas de Información Sobre Vacunas están disponibles en Español y en muchos otros idiomas. Visite www.immunize.org/vis    1. Why get vaccinated? Influenza (flu) is a contagious disease that spreads around the [place] every year, usually between October and May. Flu is caused by influenza viruses, and is spread mainly by coughing, sneezing, and close contact. Anyone can get flu. Flu strikes suddenly and can last several days. Symptoms vary by age, but can include:   fever/chills   sore throat   muscle aches   fatigue   cough   headache    runny or stuffy nose    Flu can also lead to pneumonia and blood infections, and cause diarrhea and seizures in children. If you have a medical condition, such as heart or lung disease, flu can make it worse. Flu is more dangerous for some people. Infants and young children, people 72years of age and older, pregnant women, and people with certain health conditions or a weakened immune system are at greatest risk. Each year thousands of people in the [facility] die from flu, and many more are hospitalized. Flu vaccine can:   keep you from getting flu,   make flu less severe if you do get it, and   keep you from spreading flu to your family and other people. 2. Inactivated and recombinant flu vaccines    A dose of flu vaccine is recommended every flu season. Children 6 months through 6years of age may need two doses during the same flu season. Everyone else needs only one dose each flu season.        Some inactivated flu vaccines contain a very small amount of a mercury-based preservative called thimerosal. Studies have not shown thimerosal in vaccines to be harmful, but flu vaccines that do not contain thimerosal are available. There is no live flu virus in flu shots. They cannot cause the flu. There are many flu viruses, and they are always changing. Each year a new flu vaccine is made to protect against three or four viruses that are likely to cause disease in the upcoming flu season. But even when the vaccine doesnt exactly match these viruses, it may still provide some protection    Flu vaccine cannot prevent:   flu that is caused by a virus not covered by the vaccine, or   illnesses that look like flu but are not. It takes about 2 weeks for protection to develop after vaccination, and protection lasts through the flu season. 3. Some people should not get this vaccine    Tell the person who is giving you the vaccine:     If you have any severe, life-threatening allergies. If you ever had a life-threatening allergic reaction after a dose of flu vaccine, or have a severe allergy to any part of this vaccine, you may be advised not to get vaccinated. Most, but not all, types of flu vaccine contain a small amount of egg protein.  If you ever had Guillain-Barré Syndrome (also called GBS). Some people with a history of GBS should not get this vaccine. This should be discussed with your doctor.  If you are not feeling well. It is usually okay to get flu vaccine when you have a mild illness, but you might be asked to come back when you feel better. 4. Risks of a vaccine reaction    With any medicine, including vaccines, there is a chance of reactions. These are usually mild and go away on their own, but serious reactions are also possible. Most people who get a flu shot do not have any problems with it.      Minor problems following a flu shot include:    soreness, redness, or swelling where the shot was given     hoarseness   sore, red or itchy eyes   cough   fever   aches   headache   itching   fatigue  If these problems occur, they usually begin soon after the shot and last 1 or 2 days. More serious problems following a flu shot can include the following:     There may be a small increased risk of Guillain-Barré Syndrome (GBS) after inactivated flu vaccine. This risk has been estimated at 1 or 2 additional cases per million people vaccinated. This is much lower than the risk of severe complications from flu, which can be prevented by flu vaccine.  Young children who get the flu shot along with pneumococcal vaccine (PCV13) and/or DTaP vaccine at the same time might be slightly more likely to have a seizure caused by fever. Ask your doctor for more information. Tell your doctor if a child who is getting flu vaccine has ever had a seizure. Problems that could happen after any injected vaccine:      People sometimes faint after a medical procedure, including vaccination. Sitting or lying down for about 15 minutes can help prevent fainting, and injuries caused by a fall. Tell your doctor if you feel dizzy, or have vision changes or ringing in the ears.  Some people get severe pain in the shoulder and have difficulty moving the arm where a shot was given. This happens very rarely.  Any medication can cause a severe allergic reaction. Such reactions from a vaccine are very rare, estimated at about 1 in a million doses, and would happen within a few minutes to a few hours after the vaccination. As with any medicine, there is a very remote chance of a vaccine causing a serious injury or death. The safety of vaccines is always being monitored. For more information, visit: www.cdc.gov/vaccinesafety/    5. What if there is a serious reaction? What should I look for?  Look for anything that concerns you, such as signs of a severe allergic reaction, very high fever, or unusual behavior.     Signs of a severe allergic reaction can include hives, swelling of the face and throat, difficulty breathing, a fast heartbeat, dizziness, and weakness - usually within a few minutes to a few hours after the vaccination. What should I do?  If you think it is a severe allergic reaction or other emergency that cant wait, call 9-1-1 and get the person to the nearest hospital. Otherwise, call your doctor.  Reactions should be reported to the Vaccine Adverse Event Reporting System (VAERS). Your doctor should file this report, or you can do it yourself through  the VAERS web site at www.vaers. [facility].gov, or by calling 0-687.736.6042. VAERS does not give medical advice. 6. The National Vaccine Injury Compensation Program    The [facility] Vaccine Injury Compensation Program (VICP) is a federal program that was created to compensate people who may have been injured by certain vaccines. Persons who believe they may have been injured by a vaccine can learn about the program and about filing a claim by calling 7-150.873.3213 or visiting the [employer] website at www.[facility].gov/vaccinecompensation. There is a time limit to file a claim for compensation. 7. How can I learn more?  Ask your healthcare provider. He or she can give you the vaccine package insert or suggest other sources of information.  Call your local or state health department.  Contact the Centers for Disease Control and Prevention (CDC):  - Call 3-786.637.6967 (1-800-CDC-INFO) or  - Visit CDCs website at www.cdc.gov/flu    Vaccine Information Statement   Inactivated Influenza Vaccine   8/7/2015  40 CHARLIE Do 317EX-29    Department of Health and Human Services  Centers for Disease Control and Prevention    Office Use Only

## 2017-10-01 DIAGNOSIS — F41.8 ANXIETY ASSOCIATED WITH DEPRESSION: ICD-10-CM

## 2017-10-01 RX ORDER — CITALOPRAM 40 MG/1
TABLET, FILM COATED ORAL
Qty: 90 TAB | Refills: 3 | Status: SHIPPED | OUTPATIENT
Start: 2017-10-01 | End: 2018-09-16 | Stop reason: SDUPTHER

## 2017-10-18 RX ORDER — METOPROLOL SUCCINATE 25 MG/1
TABLET, EXTENDED RELEASE ORAL
Qty: 90 TAB | Refills: 1 | Status: SHIPPED | OUTPATIENT
Start: 2017-10-18 | End: 2018-04-20 | Stop reason: SDUPTHER

## 2017-10-18 RX ORDER — CLOPIDOGREL BISULFATE 75 MG/1
75 TABLET ORAL DAILY
Qty: 90 TAB | Refills: 1 | Status: SHIPPED | OUTPATIENT
Start: 2017-10-18 | End: 2018-04-20 | Stop reason: SDUPTHER

## 2017-10-18 RX ORDER — ISOSORBIDE MONONITRATE 30 MG/1
TABLET, EXTENDED RELEASE ORAL
Qty: 45 TAB | Refills: 1 | Status: SHIPPED | OUTPATIENT
Start: 2017-10-18 | End: 2018-04-20 | Stop reason: SDUPTHER

## 2017-10-19 RX ORDER — PANTOPRAZOLE SODIUM 40 MG/1
TABLET, DELAYED RELEASE ORAL
Qty: 30 TAB | Refills: 5 | Status: SHIPPED | OUTPATIENT
Start: 2017-10-19 | End: 2018-04-16 | Stop reason: SDUPTHER

## 2017-11-13 RX ORDER — NITROGLYCERIN 0.4 MG/1
TABLET SUBLINGUAL
Qty: 1 TAB | Refills: 0 | Status: SHIPPED | OUTPATIENT
Start: 2017-11-13 | End: 2019-01-14 | Stop reason: SDUPTHER

## 2017-11-14 ENCOUNTER — TELEPHONE (OUTPATIENT)
Dept: CARDIOLOGY CLINIC | Age: 72
End: 2017-11-14

## 2017-11-14 NOTE — TELEPHONE ENCOUNTER
Rx for nitroglycerin 04 mg tablet called to [facility] pharmacy, qty 1 bottle, no refills, take 1 tab by sublingual route every 5 minutes as needed for chest pain per Titus Scheuermann.

## 2017-12-06 ENCOUNTER — OFFICE VISIT (OUTPATIENT)
Dept: INTERNAL MEDICINE CLINIC | Age: 72
End: 2017-12-06

## 2017-12-06 VITALS
BODY MASS INDEX: 34.1 KG/M2 | DIASTOLIC BLOOD PRESSURE: 68 MMHG | SYSTOLIC BLOOD PRESSURE: 127 MMHG | TEMPERATURE: 98.6 F | HEIGHT: 72 IN | OXYGEN SATURATION: 97 % | RESPIRATION RATE: 16 BRPM | WEIGHT: 251.8 LBS | HEART RATE: 69 BPM

## 2017-12-06 DIAGNOSIS — L98.491 NEUROPATHIC ULCER, LIMITED TO BREAKDOWN OF SKIN (HCC): Primary | ICD-10-CM

## 2017-12-06 RX ORDER — CEPHALEXIN 500 MG/1
500 CAPSULE ORAL 3 TIMES DAILY
Qty: 21 CAP | Refills: 0 | Status: SHIPPED | OUTPATIENT
Start: 2017-12-06 | End: 2018-02-28

## 2017-12-06 NOTE — PROGRESS NOTES
Tiajuana Leyden is a 74 y.o. male who presents with wife. Concerned about a sore on the bottom of left foot for several months. Sees Dr. An Rebolledo, podiatry. Was seeing Dr Keon Quintero in the past.  The wound will heal and recur. No recent antibiotics. The wound is bleeding and draining today. It is sore. No fever.         Past Medical History:   Diagnosis Date    Abnormal stress echo 5/12/2015    Anemia NEC 03/2013    Last 2-3 months; finished taking iron supplement    Anxiety     CAD (coronary artery disease) 2009    cath [facility]) revealed 20%RCA and 50%2nd diagonal    Calculus of kidney     Chronic kidney disease, stage III (moderate) 10/11/2009    30% kidney function    Diabetes ([facility] [address]     pre diabetic    Difficult intubation     DJD (degenerative joint disease)     GERD (gastroesophageal reflux disease) 10/11/2009    Gout     Hypertension     Kidney disease     Liver disease     fatty liver    Obesity     Obstructive sleep apnea (adult) (pediatric) 10/11/2009    nasal pillows; pressure set at 10-11 - uses cpap    Peripheral neuropathy 10/11/2009    Prediabetes 1/7/2012    RLS (restless legs syndrome) 10/11/2009    S/P coronary artery stent placement 5/12/2015    5/11/15 PCI./MALI to LCx       Family History   Problem Relation Age of Onset    Heart Disease Father     Hypertension Father     Other Father      abdominal aneurysm     Dementia Mother     Alzheimer Mother     Heart Disease Brother     Heart Attack Brother     Heart Disease Maternal Grandmother     Heart Disease Paternal Grandmother     Heart Attack Paternal Grandmother     Heart Disease Paternal Grandfather     Heart Attack Paternal Grandfather     Elevated Lipids Son     Elevated Lipids Daughter     Cancer Neg Hx     Diabetes Neg Hx     Stroke Neg Hx        Social History     Social History    Marital status:      Spouse name: N/A    Number of children: N/A    Years of education: N/A Occupational History    Not on file. Social History Main Topics    Smoking status: Former Smoker     Packs/day: 1.00     Years: 10.00     Types: Cigarettes     Quit date: 1/1/1968    Smokeless tobacco: Never Used    Alcohol use No    Drug use: No    Sexual activity: Yes     Partners: Female     Birth control/ protection: None     Other Topics Concern    Not on file     Social History Narrative       Current Outpatient Prescriptions on File Prior to Visit   Medication Sig Dispense Refill    nitroglycerin (NITROSTAT) 0.4 mg SL tablet TAKE 1 TABLET BY SUBLINGUAL ROUTE EVERY 5 MINUTES AS NEEDED FOR CHEST PAIN. 1 Tab 0    pantoprazole (PROTONIX) 40 mg tablet TAKE 1 TAB BY MOUTH DAILY. 30 Tab 5    isosorbide mononitrate ER (IMDUR) 30 mg tablet TAKE 0.5 TABS BY MOUTH DAILY. 45 Tab 1    metoprolol succinate (TOPROL-XL) 25 mg XL tablet TAKE ONE TABLET BY MOUTH EVERY DAY 90 Tab 1    clopidogrel (PLAVIX) 75 mg tab Take 1 Tab by mouth daily. 90 Tab 1    citalopram (CELEXA) 40 mg tablet TAKE 1 TABLET BY MOUTH EVERY DAY 90 Tab 3    chlorthalidone (HYGROTEN) 25 mg tablet Take 0.5 Tabs by mouth daily. 30 Tab 0    valsartan (DIOVAN) 160 mg tablet TAKE 1 TABLET BY MOUTH DAILY 90 Tab 3    rOPINIRole (REQUIP) 0.5 mg tablet Take 2 Tabs by mouth daily (after dinner). Take 1.5 to 2 tablets daily after dinner 60 Tab 5    VITAMIN D3 2,000 unit cap capsule Take 2,000 Units by mouth daily. 2    OXYGEN-AIR DELIVERY SYSTEMS (HORIZON NASAL CPAP SYSTEM) by Does Not Apply route.  amLODIPine (NORVASC) 5 mg tablet Take 5 mg by mouth daily.  GLUCOSAMINE HCL/CHONDR PETERSEN A NA (GLUCOSAMINE-CHONDROITIN) 750-600 mg Tab Take 1 Tab by mouth daily. Brand: Move Free      acetaminophen (TYLENOL EXTRA STRENGTH) 500 mg tablet Take 500 mg by mouth every eight (8) hours as needed.  aspirin delayed-release 81 mg tablet Take 81 mg by mouth daily.  allopurinol (ZYLOPRIM) 100 mg tablet Take 100 mg by mouth every morning.  MULTIVITS W-FE,OTHER MIN (CENTRUM PO) Take 1 Tab by mouth daily.  albuterol (PROVENTIL HFA, VENTOLIN HFA, PROAIR HFA) 90 mcg/actuation inhaler Take 1 Puff by inhalation every six (6) hours as needed for Wheezing. 1 Inhaler 0    HYDROcodone-acetaminophen (NORCO) 5-325 mg per tablet Take 1 Tab by mouth every four (4) hours as needed for Pain. Max Daily Amount: 6 Tabs. 20 Tab 0     No current facility-administered medications on file prior to visit. Review of Systems  Pertinent items are noted in HPI. Objective:     Visit Vitals    /68 (BP 1 Location: Left arm, BP Patient Position: Sitting)    Pulse 69    Temp 98.6 °F (37 °C) (Oral)    Resp 16    Ht 6' (1.829 m)    Wt 251 lb 12.8 oz (114.2 kg)    SpO2 97%    BMI 34.15 kg/m2     Gen: well appearing male  Resp:  No wheezing, no rhonchi, no rales. CV:  RRR, normal S1S2, no murmur. Extrem:  +2 pulses, no edema, warm distally, decreased sensation to light touch both feet plantar surface. Left foot with callus with breakdown, tender on palpation, no purulent discharge. Assessment/Plan:       ICD-10-CM ICD-9-CM    1. Neuropathic ulcer, limited to breakdown of skin ([facility]) L98.491 707.9 cephALEXin (KEFLEX) 500 mg capsule   scheduled with Dr. Humberto Fernandez, podiatry today. Follow-up Disposition:  Return if symptoms worsen or fail to improve.     Luis Melchor MD

## 2017-12-06 NOTE — PROGRESS NOTES
Chief Complaint   Patient presents with    Foot Problem     left foot sore on bottom of sole off and on x 8 months    Foot Pain     x 2 weeks     Visit Vitals    /68 (BP 1 Location: Left arm, BP Patient Position: Sitting)    Pulse 69    Temp 98.6 °F (37 °C) (Oral)    Resp 16    Ht 6' (1.829 m)    Wt 251 lb 12.8 oz (114.2 kg)    SpO2 97%    BMI 34.15 kg/m2     Reviewed record In preparation for visit and have obtained necessary documentation    1. Have you been to the ER, urgent care clinic since your last visit? Hospitalized since your last visit? NO    2. Have you seen or consulted any other health care providers outside of the [address] [place] [address] since your last visit? Include any pap smears or colon screening. NO    Patient does not have advance directive on file.

## 2017-12-07 ENCOUNTER — TELEPHONE (OUTPATIENT)
Dept: INTERNAL MEDICINE CLINIC | Age: 72
End: 2017-12-07

## 2017-12-07 NOTE — TELEPHONE ENCOUNTER
PSR received a call from [facility] with [facility]. Gia Aly transferred call. Pt is requesting for a pre-op appointment prior to 12/21/17. Pt states he is having foot surgery on 12/21/17. No openings prior to 12/21/17. Please advise.

## 2017-12-08 RX ORDER — ATORVASTATIN CALCIUM 40 MG/1
TABLET, FILM COATED ORAL
Qty: 90 TAB | Refills: 2 | Status: SHIPPED | OUTPATIENT
Start: 2017-12-08 | End: 2018-09-05 | Stop reason: SDUPTHER

## 2017-12-13 ENCOUNTER — CLINICAL SUPPORT (OUTPATIENT)
Dept: INTERNAL MEDICINE CLINIC | Age: 72
End: 2017-12-13

## 2017-12-13 ENCOUNTER — HOSPITAL ENCOUNTER (OUTPATIENT)
Dept: LAB | Age: 72
Discharge: HOME | End: 2017-12-13
Payer: MEDICARE

## 2017-12-13 VITALS
RESPIRATION RATE: 18 BRPM | SYSTOLIC BLOOD PRESSURE: 124 MMHG | TEMPERATURE: 98 F | OXYGEN SATURATION: 97 % | BODY MASS INDEX: 33.13 KG/M2 | WEIGHT: 250 LBS | DIASTOLIC BLOOD PRESSURE: 75 MMHG | HEIGHT: 73 IN | HEART RATE: 70 BPM

## 2017-12-13 DIAGNOSIS — Z01.810 PREOPERATIVE CARDIOVASCULAR EXAMINATION: ICD-10-CM

## 2017-12-13 DIAGNOSIS — I25.10 CORONARY ARTERY DISEASE INVOLVING NATIVE CORONARY ARTERY OF NATIVE HEART WITHOUT ANGINA PECTORIS: ICD-10-CM

## 2017-12-13 DIAGNOSIS — L97.521 NEUROPATHIC ULCER OF LEFT FOOT, LIMITED TO BREAKDOWN OF SKIN (HCC): Primary | ICD-10-CM

## 2017-12-13 PROCEDURE — 85025 COMPLETE CBC W/AUTO DIFF WBC: CPT

## 2017-12-13 PROCEDURE — 36415 COLL VENOUS BLD VENIPUNCTURE: CPT

## 2017-12-13 PROCEDURE — 80053 COMPREHEN METABOLIC PANEL: CPT

## 2017-12-13 NOTE — TELEPHONE ENCOUNTER
Patient states he needs a call back in reference to getting a Surgical Clearance appt for upcoming surgery on 12/21/17. Patient states left message last week & has not heard back. Please call.  Thank you

## 2017-12-13 NOTE — PROGRESS NOTES
Chief Complaint   Patient presents with    Pre-op Exam     EKG     1. Have you been to the ER, urgent care clinic since your last visit? Hospitalized since your last visit? No    2. Have you seen or consulted any other health care providers outside of the [address] [place] [address] since your last visit? Include any pap smears or colon screening.  No

## 2017-12-13 NOTE — TELEPHONE ENCOUNTER
Called patient. Two patient identifiers verified. Advised per Dr. Yara Hitchcock, does not need appt. Just needs Nurse visit for EKG.    Patient scheduled for today at 11:30AM.

## 2017-12-14 LAB
ALBUMIN SERPL-MCNC: 4.2 G/DL (ref 3.5–4.8)
ALBUMIN/GLOB SERPL: 1.7 {RATIO} (ref 1.2–2.2)
ALP SERPL-CCNC: 102 IU/L (ref 39–117)
ALT SERPL-CCNC: 19 IU/L (ref 0–44)
AST SERPL-CCNC: 20 IU/L (ref 0–40)
BASOPHILS # BLD AUTO: 0.1 X10E3/UL (ref 0–0.2)
BASOPHILS NFR BLD AUTO: 1 %
BILIRUB SERPL-MCNC: 0.5 MG/DL (ref 0–1.2)
BUN SERPL-MCNC: 23 MG/DL (ref 8–27)
BUN/CREAT SERPL: 11 (ref 10–24)
CALCIUM SERPL-MCNC: 9.2 MG/DL (ref 8.6–10.2)
CHLORIDE SERPL-SCNC: 104 MMOL/L (ref 96–106)
CO2 SERPL-SCNC: 26 MMOL/L (ref 18–29)
CREAT SERPL-MCNC: 2.09 MG/DL (ref 0.76–1.27)
EOSINOPHIL # BLD AUTO: 0.4 X10E3/UL (ref 0–0.4)
EOSINOPHIL NFR BLD AUTO: 4 %
ERYTHROCYTE [DISTWIDTH] IN BLOOD BY AUTOMATED COUNT: 13.9 % (ref 12.3–15.4)
GFR SERPLBLD CREATININE-BSD FMLA CKD-EPI: 30 ML/MIN/1.73
GFR SERPLBLD CREATININE-BSD FMLA CKD-EPI: 35 ML/MIN/1.73
GLOBULIN SER CALC-MCNC: 2.5 G/DL (ref 1.5–4.5)
GLUCOSE SERPL-MCNC: 88 MG/DL (ref 65–99)
HCT VFR BLD AUTO: 32.4 % (ref 37.5–51)
HGB BLD-MCNC: 10.8 G/DL (ref 13–17.7)
IMM GRANULOCYTES # BLD: 0.1 X10E3/UL (ref 0–0.1)
IMM GRANULOCYTES NFR BLD: 1 %
LYMPHOCYTES # BLD AUTO: 2.5 X10E3/UL (ref 0.7–3.1)
LYMPHOCYTES NFR BLD AUTO: 30 %
MCH RBC QN AUTO: 29.9 PG (ref 26.6–33)
MCHC RBC AUTO-ENTMCNC: 33.3 G/DL (ref 31.5–35.7)
MCV RBC AUTO: 90 FL (ref 79–97)
MONOCYTES # BLD AUTO: 1.1 X10E3/UL (ref 0.1–0.9)
MONOCYTES NFR BLD AUTO: 13 %
NEUTROPHILS # BLD AUTO: 4.3 X10E3/UL (ref 1.4–7)
NEUTROPHILS NFR BLD AUTO: 51 %
PLATELET # BLD AUTO: 260 X10E3/UL (ref 150–379)
POTASSIUM SERPL-SCNC: 4.6 MMOL/L (ref 3.5–5.2)
PROT SERPL-MCNC: 6.7 G/DL (ref 6–8.5)
RBC # BLD AUTO: 3.61 X10E6/UL (ref 4.14–5.8)
SODIUM SERPL-SCNC: 142 MMOL/L (ref 134–144)
WBC # BLD AUTO: 8.5 X10E3/UL (ref 3.4–10.8)

## 2017-12-15 DIAGNOSIS — G47.33 OBSTRUCTIVE SLEEP APNEA: ICD-10-CM

## 2017-12-15 RX ORDER — ROPINIROLE 0.5 MG/1
1 TABLET, FILM COATED ORAL
Qty: 60 TAB | Refills: 5 | Status: SHIPPED | OUTPATIENT
Start: 2017-12-15 | End: 2018-06-18 | Stop reason: SDUPTHER

## 2017-12-18 NOTE — PERIOP NOTES
Call to [place]/Dr. Sandoval's office to advise pt taking Plavix and needs instructions, also requested orders- LM on VM.

## 2017-12-18 NOTE — PERIOP NOTES
[facility]  Ambulatory Surgery Unit  Pre-operative Instructions    Surgery/Procedure Date  12/21/17            Tentative Arrival Time 0815      1. On the day of your surgery/procedure, please report to the Ambulatory Surgery Unit Registration Desk and sign in at your designated time. The Ambulatory Surgery Unit is located in [facility] on the [facility] side of the [facility] across from the [facility]. Please have all of your health insurance cards and a photo ID. 2. You must have someone with you to drive you home, as you should not drive a car for 24 hours following anesthesia. Please make arrangements for a responsible adult friend or family member to stay with you for at least the first 24 hours after your surgery. 3. Do not have anything to eat or drink (including water, gum, mints, coffee, juice) after midnight   12/20/17. This may not apply to medications prescribed by your physician. (Please note below the special instructions with medications to take the morning of surgery, if applicable.)    4. We recommend you do not drink any alcoholic beverages for 24 hours before and after your surgery. 5. Contact your surgeons office for instructions on the following medications: non-steroidal anti-inflammatory drugs (i.e. Advil, Aleve), vitamins, and supplements. (Some surgeons will want you to stop these medications prior to surgery and others may allow you to take them)   **If you are currently taking Plavix, Coumadin, Aspirin and/or other blood-thinning agents, contact your surgeon for instructions. ** Your surgeon will partner with the physician prescribing these medications to determine if it is safe to stop or if you need to continue taking. Please do not stop taking these medications without instructions from your surgeon.     6. In an effort to help prevent surgical site infection, we ask that you shower with an anti-bacterial soap (i.e. Dial or Safeguard) for 3 days prior to and on the morning of surgery, using a fresh towel after each shower. (Please begin this process with fresh bed linens.) Do not apply any lotions, powders, or deodorants after the shower on the day of your procedure. If applicable, please do not shave the operative site for 48 hours prior to surgery. 7. Wear comfortable clothes. Wear glasses instead of contacts. Do not bring any jewelry or money (other than copays or fees as instructed). Do not wear make-up, particularly mascara, the morning of your surgery. Do not wear nail polish, particularly if you are having foot /hand surgery. Wear your hair loose or down, no ponytails, buns, richie pins or clips. All body piercings must be removed. 8. You should understand that if you do not follow these instructions your surgery may be cancelled. If your physical condition changes (i.e. fever, cold or flu) please contact your surgeon as soon as possible. 9. It is important that you be on time. If a situation occurs where you may be late, or if you have any questions or problems, please call [phone].    10. Your surgery time may be subject to change. You will receive a phone call the day prior to surgery to confirm your arrival time. 11. Pediatric patients: please bring a change of clothes, diapers, bottle/sippy cup, pacifier, etc.      Special Instructions: Take all medications and inhalers, as prescribed, on the morning of surgery with a sip of water EXCEPT: none      I understand a pre-operative phone call will be made to verify my surgery time. In the event that I am not available, I give permission for a message to be left on my answering service and/or with another person?       Yes     (instructions given verbally during phone assessment- pt voiced understanding)     ___________________      ___________________      ________________  (Signature of Patient)          (Witness)                   (Date and Time)

## 2017-12-19 ENCOUNTER — TELEPHONE (OUTPATIENT)
Dept: INTERNAL MEDICINE CLINIC | Age: 72
End: 2017-12-19

## 2017-12-19 ENCOUNTER — ANESTHESIA EVENT (OUTPATIENT)
Dept: SURGERY | Age: 72
End: 2017-12-19
Payer: MEDICARE

## 2017-12-19 NOTE — TELEPHONE ENCOUNTER
#[phone] Pt states that the physician doing surgery on 12-21-17 has not received any of the pre op information needed.

## 2017-12-19 NOTE — TELEPHONE ENCOUNTER
Called patient. Two patient identifiers verified. Advised patient preop forms, labs, and EKG were faxed on 12/18/17 with confirmation received. Will refax to Dr. Sevilla Slice office. Patient verbalized understanding.

## 2017-12-19 NOTE — PROGRESS NOTES
Notify patient anemia stable. Impaired kidney function stable. Lab flow sheet printed to fax with preop information to Dr. Ivania Mcgraw.  Surgery on 12/21

## 2017-12-20 RX ORDER — FENTANYL CITRATE 50 UG/ML
25 INJECTION, SOLUTION INTRAMUSCULAR; INTRAVENOUS
Status: CANCELLED | OUTPATIENT
Start: 2017-12-20

## 2017-12-20 RX ORDER — HYDROMORPHONE HYDROCHLORIDE 1 MG/ML
.2-.5 INJECTION, SOLUTION INTRAMUSCULAR; INTRAVENOUS; SUBCUTANEOUS ONCE
Status: CANCELLED | OUTPATIENT
Start: 2017-12-20 | End: 2017-12-20

## 2017-12-20 RX ORDER — ONDANSETRON 2 MG/ML
4 INJECTION INTRAMUSCULAR; INTRAVENOUS AS NEEDED
Status: CANCELLED | OUTPATIENT
Start: 2017-12-20

## 2017-12-20 RX ORDER — DIPHENHYDRAMINE HYDROCHLORIDE 50 MG/ML
12.5 INJECTION, SOLUTION INTRAMUSCULAR; INTRAVENOUS AS NEEDED
Status: CANCELLED | OUTPATIENT
Start: 2017-12-20 | End: 2017-12-20

## 2017-12-20 RX ORDER — SODIUM CHLORIDE, SODIUM LACTATE, POTASSIUM CHLORIDE, CALCIUM CHLORIDE 600; 310; 30; 20 MG/100ML; MG/100ML; MG/100ML; MG/100ML
25 INJECTION, SOLUTION INTRAVENOUS CONTINUOUS
Status: CANCELLED | OUTPATIENT
Start: 2017-12-20

## 2017-12-20 RX ORDER — OXYCODONE AND ACETAMINOPHEN 5; 325 MG/1; MG/1
1 TABLET ORAL ONCE
Status: CANCELLED | OUTPATIENT
Start: 2017-12-20 | End: 2017-12-20

## 2017-12-20 RX ORDER — MORPHINE SULFATE 10 MG/ML
2 INJECTION, SOLUTION INTRAMUSCULAR; INTRAVENOUS
Status: CANCELLED | OUTPATIENT
Start: 2017-12-20

## 2017-12-20 RX ORDER — SODIUM CHLORIDE 0.9 % (FLUSH) 0.9 %
5-10 SYRINGE (ML) INJECTION AS NEEDED
Status: CANCELLED | OUTPATIENT
Start: 2017-12-20

## 2017-12-20 NOTE — PROGRESS NOTES
Verified two patient identifiers, name and . Glenys Veliz provided with lab results. Pt verbalized understanding of information discussed w/ no further questions at this time.

## 2017-12-21 ENCOUNTER — TELEPHONE (OUTPATIENT)
Dept: CARDIOLOGY CLINIC | Age: 72
End: 2017-12-21

## 2017-12-21 ENCOUNTER — ANESTHESIA (OUTPATIENT)
Dept: SURGERY | Age: 72
End: 2017-12-21
Payer: MEDICARE

## 2017-12-21 ENCOUNTER — HOSPITAL ENCOUNTER (OUTPATIENT)
Age: 72
Setting detail: OUTPATIENT SURGERY
Discharge: HOME | End: 2017-12-21
Attending: PODIATRIST | Admitting: PODIATRIST
Payer: MEDICARE

## 2017-12-21 VITALS
HEIGHT: 72 IN | HEART RATE: 64 BPM | OXYGEN SATURATION: 98 % | TEMPERATURE: 97.5 F | SYSTOLIC BLOOD PRESSURE: 132 MMHG | BODY MASS INDEX: 33.86 KG/M2 | RESPIRATION RATE: 21 BRPM | DIASTOLIC BLOOD PRESSURE: 69 MMHG | WEIGHT: 250 LBS

## 2017-12-21 PROCEDURE — 74011250636 HC RX REV CODE- 250/636: Performed by: ANESTHESIOLOGY

## 2017-12-21 PROCEDURE — 74011250636 HC RX REV CODE- 250/636

## 2017-12-21 RX ORDER — SODIUM CHLORIDE 0.9 % (FLUSH) 0.9 %
5-10 SYRINGE (ML) INJECTION EVERY 8 HOURS
Status: DISCONTINUED | OUTPATIENT
Start: 2017-12-21 | End: 2017-12-21 | Stop reason: HOSPADM

## 2017-12-21 RX ORDER — SODIUM CHLORIDE 0.9 % (FLUSH) 0.9 %
5-10 SYRINGE (ML) INJECTION AS NEEDED
Status: DISCONTINUED | OUTPATIENT
Start: 2017-12-21 | End: 2017-12-21 | Stop reason: HOSPADM

## 2017-12-21 RX ORDER — CLINDAMYCIN PHOSPHATE 900 MG/50ML
900 INJECTION INTRAVENOUS ONCE
Status: DISCONTINUED | OUTPATIENT
Start: 2017-12-21 | End: 2017-12-21 | Stop reason: HOSPADM

## 2017-12-21 RX ORDER — SODIUM CHLORIDE, SODIUM LACTATE, POTASSIUM CHLORIDE, CALCIUM CHLORIDE 600; 310; 30; 20 MG/100ML; MG/100ML; MG/100ML; MG/100ML
25 INJECTION, SOLUTION INTRAVENOUS CONTINUOUS
Status: DISCONTINUED | OUTPATIENT
Start: 2017-12-21 | End: 2017-12-21 | Stop reason: HOSPADM

## 2017-12-21 RX ORDER — LIDOCAINE HYDROCHLORIDE 10 MG/ML
0.1 INJECTION, SOLUTION EPIDURAL; INFILTRATION; INTRACAUDAL; PERINEURAL AS NEEDED
Status: DISCONTINUED | OUTPATIENT
Start: 2017-12-21 | End: 2017-12-21 | Stop reason: HOSPADM

## 2017-12-21 RX ADMIN — SODIUM CHLORIDE, SODIUM LACTATE, POTASSIUM CHLORIDE, AND CALCIUM CHLORIDE 25 ML/HR: 600; 310; 30; 20 INJECTION, SOLUTION INTRAVENOUS at 09:04

## 2017-12-21 NOTE — ANESTHESIA PREPROCEDURE EVALUATION
Anesthetic History     Increased risk of difficult airway          Review of Systems / Medical History  Patient summary reviewed, nursing notes reviewed and pertinent labs reviewed    Pulmonary        Sleep apnea: CPAP           Neuro/Psych              Cardiovascular    Hypertension: well controlled          CAD (s/p Stent 2015)    Exercise tolerance: >4 METS  Comments: Cath 07/17 with mild CAD   GI/Hepatic/Renal     GERD: well controlled    Renal disease: CRI  Liver disease (Fatty liver)     Endo/Other    Diabetes: well controlled, type 2    Morbid obesity and arthritis    Comments: Diet controlled DMII Other Findings   Comments: Gout  Restless leg syndrome           Physical Exam    Airway  Mallampati: III  TM Distance: > 6 cm  Neck ROM: normal range of motion   Mouth opening: Normal     Cardiovascular  Regular rate and rhythm,  S1 and S2 normal,  no murmur, click, rub, or gallop  Rhythm: regular  Rate: normal      Pertinent negatives: No murmur   Dental    Dentition: Caps/crowns     Pulmonary  Breath sounds clear to auscultation               Abdominal  GI exam deferred       Other Findings            Anesthetic Plan    ASA: 3  Anesthesia type: MAC and total IV anesthesia          Induction: Intravenous  Anesthetic plan and risks discussed with: Patient      Took BB at 7 am

## 2017-12-21 NOTE — DISCHARGE INSTRUCTIONS
Patient, and family, informed to contact cardiology office for recommendations for Plavix for surgery. Also, to contact Dr Rose Bound office for rescheduling surgery, and to resume all home medications.

## 2017-12-21 NOTE — TELEPHONE ENCOUNTER
Spoke with patient  Verified patient with 2 patient identifiers    Informed I have spoken with Keke BROOKS and she says that pt is cleared for surgical procedure from cardiac standpoint. Confirmed with patient last stent was in 2015 and Dr Georgia Viera is aware at time of last OV that he is still taking Plavix. Informed pt was verbally informed by Keke BROOKS can hold Plavix 5 days prior to procedure. Pt verbalized understanding.

## 2017-12-21 NOTE — PERIOP NOTES
Dr Ericka Strickland office informed nursing staff that Dr Fredy Raphael will unable to perform procedure today due to illness in the family. Patient, and family updated, and informed to contact cardiology office for recommendations for Plavix for surgery. Also, to contact Dr Ericka Strickland office for rescheduling surgery.

## 2017-12-21 NOTE — IP AVS SNAPSHOT
[address] [facility] 
[phone] Patient: Dwight Cabrera MRN: [id number] [facility]:[id number]/[id number] About your hospitalization You were admitted on:  December 21, 2017 You last received care in the:  [facility] HOLDING You were discharged on:  December 21, 2017 Why you were hospitalized Your primary diagnosis was:  Not on File Discharge Orders None A check darlene indicates which time of day the medication should be taken. My Medications ASK your physician about these medications Instructions Each Dose to Equal  
 Morning Noon Evening Bedtime  
 albuterol 90 mcg/actuation inhaler Commonly known as:  PROVENTIL HFA, VENTOLIN HFA, PROAIR HFA Your last dose was: Your next dose is: Take 1 Puff by inhalation every six (6) hours as needed for Wheezing. 1 Puff  
    
   
   
   
  
 allopurinol 100 mg tablet Commonly known as:  Helane Sizer Your last dose was: Your next dose is: Take 100 mg by mouth every morning. 100 mg  
    
   
   
   
  
 amLODIPine 5 mg tablet Commonly known as:  [place] Blade Your last dose was: Your next dose is: Take 5 mg by mouth daily. 5 mg  
    
   
   
   
  
 aspirin delayed-release 81 mg tablet Your last dose was: Your next dose is: Take 81 mg by mouth daily. 81 mg  
    
   
   
   
  
 atorvastatin 40 mg tablet Commonly known as:  LIPITOR Your last dose was: Your next dose is: TAKE 1 TABLET BY MOUTH NIGHTLY*****STOP SIMVASTATIN****  
     
   
   
   
  
 CENTRUM PO Your last dose was: Your next dose is: Take 1 Tab by mouth daily. 1 Tab  
    
   
   
   
  
 cephALEXin 500 mg capsule Commonly known as:  Ana Maria Reji Your last dose was: Your next dose is: Take 1 Cap by mouth three (3) times daily. 500 mg  
    
   
   
   
  
 chlorthalidone 25 mg tablet Commonly known as:  Rena Ahumada Your last dose was: Your next dose is: Take 0.5 Tabs by mouth daily. 12.5 mg  
    
   
   
   
  
 citalopram 40 mg tablet Commonly known as:  Yash Rape Your last dose was: Your next dose is: TAKE 1 TABLET BY MOUTH EVERY DAY  
     
   
   
   
  
 clopidogrel 75 mg Tab Commonly known as:  PLAVIX Your last dose was: Your next dose is: Take 1 Tab by mouth daily. 75 mg  
    
   
   
   
  
 glucosamine-chondroitin 750-600 mg Tab Your last dose was: Your next dose is: Take 1 Tab by mouth daily. Brand: Move Free 1 Tab HORIZON NASAL CPAP SYSTEM Your last dose was: Your next dose is:    
   
   
 by Does Not Apply route. HYDROcodone-acetaminophen 5-325 mg per tablet Commonly known as:  Cathy Punt Your last dose was: Your next dose is: Take 1 Tab by mouth every four (4) hours as needed for Pain. Max Daily Amount: 6 Tabs. 1 Tab  
    
   
   
   
  
 isosorbide mononitrate ER 30 mg tablet Commonly known as:  IMDUR Your last dose was: Your next dose is: TAKE 0.5 TABS BY MOUTH DAILY. metoprolol succinate 25 mg XL tablet Commonly known as:  TOPROL-XL Your last dose was: Your next dose is: TAKE ONE TABLET BY MOUTH EVERY DAY  
     
   
   
   
  
 nitroglycerin 0.4 mg SL tablet Commonly known as:  NITROSTAT Your last dose was: Your next dose is: TAKE 1 TABLET BY SUBLINGUAL ROUTE EVERY 5 MINUTES AS NEEDED FOR CHEST PAIN. pantoprazole 40 mg tablet Commonly known as:  PROTONIX Your last dose was: Your next dose is: TAKE 1 TAB BY MOUTH DAILY. rOPINIRole 0.5 mg tablet Commonly known as:  Karma Sexton Your last dose was: Your next dose is: Take 2 Tabs by mouth daily (after dinner). Take 1.5 to 2 tablets daily after dinner 1 mg TYLENOL EXTRA STRENGTH 500 mg tablet Generic drug:  acetaminophen Your last dose was: Your next dose is: Take 500 mg by mouth every eight (8) hours as needed. 500 mg  
    
   
   
   
  
 valsartan 160 mg tablet Commonly known as:  DIOVAN Your last dose was: Your next dose is: TAKE 1 TABLET BY MOUTH DAILY  
     
   
   
   
  
 VITAMIN D3 2,000 unit Cap capsule Generic drug:  Cholecalciferol (Vitamin D3) Your last dose was: Your next dose is: Take 2,000 Units by mouth daily. 2000 Units Discharge Instructions Patient, and family, informed to contact cardiology office for recommendations for Plavix for surgery. Also, to contact Dr Candis Molina office for rescheduling surgery, and to resume all home medications. ACO Transitions of Care [facility] [address] offers a voluntary care coordination program to provide high quality service and care to [facility] fee-for-service beneficiaries. Merrick Teixeirao was designed to help you enhance your health and well-being through the following services: ? Transitions of Care  support for individuals who are transitioning from one care setting to another (example: Hospital to home). ? Chronic and Complex Care Coordination  support for individuals and caregivers of those with serious or chronic illnesses or with more than one chronic (ongoing) condition and those who take a number of different medications.   
 
 
If you meet specific medical criteria, a [employer] Coordinator may call you directly to coordinate your care with your primary care physician and your other care providers. For questions about the [facility] programs, please, contact your physicians office. For general questions or additional information about Accountable Care Organizations: 
Please visit www.medicare.gov/acos. html or call 1-800-MEDICARE (5-739.511.2608) TTY users should call 2-555.848.1394. Introducing [facility]! Georgia Hidalgo introduces [employer] patient portal. Now you can access parts of your medical record, email your doctor's office, and request medication refills online. 1. In your internet browser, go to [email or web address] 2. Click on the First Time User? Click Here link in the Sign In box. You will see the New Member Sign Up page. 3. Enter your [employer] Access Code exactly as it appears below. You will not need to use this code after youve completed the sign-up process. If you do not sign up before the expiration date, you must request a new code. · [employer] Access Code: [email or web address] Expires: 3/7/2018  3:41 PM 
 
4. Enter the last four digits of your Social Security Number (xxxx) and Date of Birth (mm/dd/yyyy) as indicated and click Submit. You will be taken to the next sign-up page. 5. Create a [employer] ID. This will be your [employer] login ID and cannot be changed, so think of one that is secure and easy to remember. 6. Create a [employer] password. You can change your password at any time. 7. Enter your Password Reset Question and Answer. This can be used at a later time if you forget your password. 8. Enter your e-mail address. You will receive e-mail notification when new information is available in [address] 9. Click Sign Up. You can now view and download portions of your medical record. 10. Click the Download Summary menu link to download a portable copy of your medical information. If you have questions, please visit the Frequently Asked Questions section of the MyChart website. Remember, MyChart is NOT to be used for urgent needs. For medical emergencies, dial 911. Now available from your iPhone and Android! Providers Seen During Your Hospitalization Provider Specialty Primary office phone [facility] [phone] Your Primary Care Physician (PCP) Primary Care Physician Office Phone Office Fax Ignacia Hodgkin [phone] You are allergic to the following Allergen Reactions Penicillins Hives Bactrim (Sulfamethoxazole-Trimethoprim) Hives Lisinopril (Bulk) Cough Neurontin (Gabapentin) Other (comments) drowsy Zostavax (Zoster Vaccine Live (Pf)) Rash See 9/10/13 visit Recent Documentation Height Weight BMI Smoking Status 1.829 m 113.4 kg 33.91 kg/m2 Former Smoker Emergency Contacts Name Discharge Info Relation Home Work Mobile David Joseph  Spouse [3] [phone] Patient Belongings The following personal items are in your possession at time of discharge: 
  Dental Appliances: None  Visual Aid: Glasses      Home Medications: None   Jewelry: None  Clothing: With patient    Other Valuables: Eyeglasses (with family) Please provide this summary of care documentation to your next provider. Signatures-by signing, you are acknowledging that this After Visit Summary has been reviewed with you and you have received a copy. Patient Signature:  ____________________________________________________________ Date:  ____________________________________________________________  
  
Althia Kras Provider Signature:  ____________________________________________________________ Date:  ____________________________________________________________

## 2017-12-21 NOTE — TELEPHONE ENCOUNTER
Pt was scheduled to have foot procedure done today that was cancelled. Patient says  Physician now requesting pt see if need cardiac clearance and question if need to stop blood thinner prior to procedure?

## 2017-12-22 NOTE — PERIOP NOTES
[facility]  Preoperative Instructions        Surgery Date 12/26/17        Time of Arrival 6:00am    1. On the day of your surgery, please report to the Surgical Services Registration Desk and sign in at your designated time. The Surgery Center is located to the right of the Emergency Room. 2. You must have someone with you to drive you home. You should not drive a car for 24 hours following surgery. Please make arrangements for a friend or family member to stay with you for the first 24 hours after your surgery. 3. Do not have anything to eat or drink (including water, gum, mints, coffee, juice) after midnight 12/25/17    . ? This may not apply to medications prescribed by your physician. ?(Please note below the special instructions with medications to take the morning of your procedure.)    4. We recommend you do not drink any alcoholic beverages for 24 hours before and after your surgery. 5. Contact your surgeons office for instructions on the following medications: non-steroidal anti-inflammatory drugs (i.e. Advil, Aleve), vitamins, and supplements. (Some surgeons will want you to stop these medications prior to surgery and others may allow you to take them)  **If you are currently taking Plavix, Coumadin, Aspirin and/or other blood-thinning agents, contact your surgeon for instructions. ** Your surgeon will partner with the physician prescribing these medications to determine if it is safe to stop or if you need to continue taking. Please do not stop taking these medications without instructions from your surgeon    6. Wear comfortable clothes. Wear glasses instead of contacts. Do not bring any money or jewelry. Please bring picture ID, insurance card, and any prearranged co-payment or hospital payment. Do not wear make-up, particularly mascara the morning of your surgery. Do not wear nail polish, particularly if you are having foot /hand surgery.   Wear your hair loose or down, no ponytails, buns, richie pins or clips. All body piercings must be removed. Please shower with antibacterial soap for three consecutive days before and on the morning of surgery, but do not apply any lotions, powders or deodorants after the shower on the day of surgery. Please use a fresh towels after each shower. Please sleep in clean clothes and change bed linens the night before surgery. Please do not shave for 48 hours prior to surgery. Shaving of the face is acceptable. 7. You should understand that if you do not follow these instructions your surgery may be cancelled. If your physical condition changes (I.e. fever, cold or flu) please contact your surgeon as soon as possible. 8. It is important that you be on time. If a situation occurs where you may be late, please call [phone] (OR Holding Area). 9. If you have any questions and or problems, please call [phone] (Pre-admission Testing). 10. Your surgery time may be subject to change. You will receive a phone call the evening prior if your time changes. 11.  If having outpatient surgery, you must have someone to drive you here, stay with you during the duration of your stay, and to drive you home at time of discharge. 12.   In an effort to improve the efficiency, privacy, and safety for all of our Pre-op patients visitors are not allowed in the Holding area. Once you arrive and are registered your family/visitors will be asked to remain in the waiting room. The Pre-op staff will get you from the Surgical Waiting Area and will explain to you and your family/visitors that the Pre-op phase is beginning. The staff will answer any questions and provide instructions for tracking of the patient, by use of the existing tracking number and color-coded status board in the waiting room.   At this time the staff will also ask for your designated spokesperson information in the event that the physician or staff need to provide an update or obtain any pertinent information. The designated spokesperson will be notified if the physician needs to speak to family during the pre-operative phase. If at any time your family/visitors has questions or concerns they may approach the volunteer desk in the waiting area for assistance. Special Instructions:May use inhaler as needed    MEDICATIONS TO TAKE THE MORNING OF SURGERY WITH A SIP OF WATER: Amlodipine, Celexa, Isosorbide, Metoprolol, Protonix. May have Tylenol or Norco if needed      I understand a pre-operative phone call will be made to verify my surgery time. In the event that I am not available, I give permission for a message to be left on my answering service and/or with another person?   Yes          ___________________      __________   _________    (Signature of Patient)             (Witness)                (Date and Time)

## 2017-12-26 ENCOUNTER — ANESTHESIA (OUTPATIENT)
Dept: SURGERY | Age: 72
End: 2017-12-26
Payer: MEDICARE

## 2017-12-26 ENCOUNTER — ANESTHESIA EVENT (OUTPATIENT)
Dept: SURGERY | Age: 72
End: 2017-12-26
Payer: MEDICARE

## 2017-12-26 ENCOUNTER — HOSPITAL ENCOUNTER (OUTPATIENT)
Age: 72
Setting detail: OUTPATIENT SURGERY
Discharge: HOME | End: 2017-12-26
Attending: PODIATRIST | Admitting: PODIATRIST
Payer: MEDICARE

## 2017-12-26 VITALS
HEIGHT: 72 IN | OXYGEN SATURATION: 94 % | SYSTOLIC BLOOD PRESSURE: 125 MMHG | HEART RATE: 64 BPM | TEMPERATURE: 98.6 F | WEIGHT: 252.21 LBS | BODY MASS INDEX: 34.16 KG/M2 | RESPIRATION RATE: 16 BRPM | DIASTOLIC BLOOD PRESSURE: 56 MMHG

## 2017-12-26 DIAGNOSIS — G89.18 POST-OPERATIVE PAIN: Primary | ICD-10-CM

## 2017-12-26 PROCEDURE — 74011250636 HC RX REV CODE- 250/636

## 2017-12-26 PROCEDURE — 76060000035 HC ANESTHESIA 2 TO 2.5 HR: Performed by: PODIATRIST

## 2017-12-26 PROCEDURE — 74011000250 HC RX REV CODE- 250

## 2017-12-26 PROCEDURE — 76010000131 HC OR TIME 2 TO 2.5 HR: Performed by: PODIATRIST

## 2017-12-26 PROCEDURE — 77030031139 HC SUT VCRL2 J&J -A: Performed by: PODIATRIST

## 2017-12-26 PROCEDURE — 77030008841 HC WRE K MCRA -A: Performed by: PODIATRIST

## 2017-12-26 PROCEDURE — 77030018836 HC SOL IRR NACL ICUM -A: Performed by: PODIATRIST

## 2017-12-26 PROCEDURE — 77030020268 HC MISC GENERAL SUPPLY: Performed by: PODIATRIST

## 2017-12-26 PROCEDURE — 77030006788 HC BLD SAW OSC STRY -B: Performed by: PODIATRIST

## 2017-12-26 PROCEDURE — 74011250636 HC RX REV CODE- 250/636: Performed by: ANESTHESIOLOGY

## 2017-12-26 PROCEDURE — 77030002916 HC SUT ETHLN J&J -A: Performed by: PODIATRIST

## 2017-12-26 PROCEDURE — 77030020269 HC MISC IMPL: Performed by: PODIATRIST

## 2017-12-26 PROCEDURE — 77030036687 HC SHOE PSTOP S2SG -A

## 2017-12-26 PROCEDURE — 76210000006 HC OR PH I REC 0.5 TO 1 HR: Performed by: PODIATRIST

## 2017-12-26 PROCEDURE — 74011250636 HC RX REV CODE- 250/636: Performed by: PODIATRIST

## 2017-12-26 PROCEDURE — 77030031749 HC WRE K DISP TRIL -B: Performed by: PODIATRIST

## 2017-12-26 PROCEDURE — 77030020782 HC GWN BAIR PAWS FLX 3M -B

## 2017-12-26 PROCEDURE — 76210000020 HC REC RM PH II FIRST 0.5 HR: Performed by: PODIATRIST

## 2017-12-26 PROCEDURE — 77030000032 HC CUF TRNQT ZIMM -B: Performed by: PODIATRIST

## 2017-12-26 PROCEDURE — C1713 ANCHOR/SCREW BN/BN,TIS/BN: HCPCS | Performed by: PODIATRIST

## 2017-12-26 PROCEDURE — 77030011640 HC PAD GRND REM COVD -A: Performed by: PODIATRIST

## 2017-12-26 PROCEDURE — 74011000250 HC RX REV CODE- 250: Performed by: PODIATRIST

## 2017-12-26 DEVICE — Z DUP USE 2304023 K WIRE FIX L6IN DIA0.045IN [16001645] [MICRO AIRE SURGICAL INST]: Type: IMPLANTABLE DEVICE | Status: FUNCTIONAL

## 2017-12-26 DEVICE — IMPLANTABLE DEVICE: Type: IMPLANTABLE DEVICE | Site: FOOT | Status: FUNCTIONAL

## 2017-12-26 RX ORDER — SODIUM CHLORIDE, SODIUM LACTATE, POTASSIUM CHLORIDE, CALCIUM CHLORIDE 600; 310; 30; 20 MG/100ML; MG/100ML; MG/100ML; MG/100ML
25 INJECTION, SOLUTION INTRAVENOUS CONTINUOUS
Status: DISCONTINUED | OUTPATIENT
Start: 2017-12-26 | End: 2017-12-26 | Stop reason: HOSPADM

## 2017-12-26 RX ORDER — KETAMINE HYDROCHLORIDE 100 MG/ML
INJECTION, SOLUTION INTRAMUSCULAR; INTRAVENOUS AS NEEDED
Status: DISCONTINUED | OUTPATIENT
Start: 2017-12-26 | End: 2017-12-26 | Stop reason: HOSPADM

## 2017-12-26 RX ORDER — MIDAZOLAM HYDROCHLORIDE 1 MG/ML
INJECTION, SOLUTION INTRAMUSCULAR; INTRAVENOUS AS NEEDED
Status: DISCONTINUED | OUTPATIENT
Start: 2017-12-26 | End: 2017-12-26 | Stop reason: HOSPADM

## 2017-12-26 RX ORDER — GLYCOPYRROLATE 0.2 MG/ML
INJECTION INTRAMUSCULAR; INTRAVENOUS AS NEEDED
Status: DISCONTINUED | OUTPATIENT
Start: 2017-12-26 | End: 2017-12-26 | Stop reason: HOSPADM

## 2017-12-26 RX ORDER — BUPIVACAINE HYDROCHLORIDE AND EPINEPHRINE 2.5; 5 MG/ML; UG/ML
30 INJECTION, SOLUTION EPIDURAL; INFILTRATION; INTRACAUDAL; PERINEURAL ONCE
Status: DISCONTINUED | OUTPATIENT
Start: 2017-12-26 | End: 2017-12-26 | Stop reason: HOSPADM

## 2017-12-26 RX ORDER — PROMETHAZINE HYDROCHLORIDE 25 MG/1
25 TABLET ORAL
Qty: 20 TAB | Refills: 0 | Status: SHIPPED | OUTPATIENT
Start: 2017-12-26 | End: 2019-06-20

## 2017-12-26 RX ORDER — OXYCODONE AND ACETAMINOPHEN 7.5; 325 MG/1; MG/1
1 TABLET ORAL
Qty: 35 TAB | Refills: 0 | Status: SHIPPED | OUTPATIENT
Start: 2017-12-26 | End: 2018-02-28

## 2017-12-26 RX ORDER — CLINDAMYCIN PHOSPHATE 900 MG/50ML
900 INJECTION INTRAVENOUS ONCE
Status: COMPLETED | OUTPATIENT
Start: 2017-12-26 | End: 2017-12-26

## 2017-12-26 RX ORDER — FENTANYL CITRATE 50 UG/ML
INJECTION, SOLUTION INTRAMUSCULAR; INTRAVENOUS AS NEEDED
Status: DISCONTINUED | OUTPATIENT
Start: 2017-12-26 | End: 2017-12-26 | Stop reason: HOSPADM

## 2017-12-26 RX ORDER — LIDOCAINE HYDROCHLORIDE 20 MG/ML
INJECTION, SOLUTION EPIDURAL; INFILTRATION; INTRACAUDAL; PERINEURAL AS NEEDED
Status: DISCONTINUED | OUTPATIENT
Start: 2017-12-26 | End: 2017-12-26 | Stop reason: HOSPADM

## 2017-12-26 RX ORDER — PROPOFOL 10 MG/ML
INJECTION, EMULSION INTRAVENOUS AS NEEDED
Status: DISCONTINUED | OUTPATIENT
Start: 2017-12-26 | End: 2017-12-26 | Stop reason: HOSPADM

## 2017-12-26 RX ORDER — ONDANSETRON 2 MG/ML
INJECTION INTRAMUSCULAR; INTRAVENOUS AS NEEDED
Status: DISCONTINUED | OUTPATIENT
Start: 2017-12-26 | End: 2017-12-26 | Stop reason: HOSPADM

## 2017-12-26 RX ORDER — PROPOFOL 10 MG/ML
INJECTION, EMULSION INTRAVENOUS
Status: DISCONTINUED | OUTPATIENT
Start: 2017-12-26 | End: 2017-12-26 | Stop reason: HOSPADM

## 2017-12-26 RX ORDER — PHENYLEPHRINE HCL IN 0.9% NACL 0.4MG/10ML
SYRINGE (ML) INTRAVENOUS AS NEEDED
Status: DISCONTINUED | OUTPATIENT
Start: 2017-12-26 | End: 2017-12-26 | Stop reason: HOSPADM

## 2017-12-26 RX ADMIN — LIDOCAINE HYDROCHLORIDE 60 MG: 20 INJECTION, SOLUTION EPIDURAL; INFILTRATION; INTRACAUDAL; PERINEURAL at 08:21

## 2017-12-26 RX ADMIN — CLINDAMYCIN PHOSPHATE 900 MG: 18 INJECTION, SOLUTION INTRAVENOUS at 08:17

## 2017-12-26 RX ADMIN — Medication 80 MCG: at 09:33

## 2017-12-26 RX ADMIN — PROPOFOL 30 MG: 10 INJECTION, EMULSION INTRAVENOUS at 10:16

## 2017-12-26 RX ADMIN — MIDAZOLAM HYDROCHLORIDE 1 MG: 1 INJECTION, SOLUTION INTRAMUSCULAR; INTRAVENOUS at 08:12

## 2017-12-26 RX ADMIN — SODIUM CHLORIDE, SODIUM LACTATE, POTASSIUM CHLORIDE, AND CALCIUM CHLORIDE 25 ML/HR: 600; 310; 30; 20 INJECTION, SOLUTION INTRAVENOUS at 07:22

## 2017-12-26 RX ADMIN — KETAMINE HYDROCHLORIDE 20 MG: 100 INJECTION, SOLUTION INTRAMUSCULAR; INTRAVENOUS at 09:02

## 2017-12-26 RX ADMIN — BUPIVACAINE HYDROCHLORIDE AND EPINEPHRINE 75 MG: 2.5; 5 INJECTION, SOLUTION EPIDURAL; INFILTRATION; INTRACAUDAL; PERINEURAL at 08:30

## 2017-12-26 RX ADMIN — PROPOFOL 30 MG: 10 INJECTION, EMULSION INTRAVENOUS at 09:53

## 2017-12-26 RX ADMIN — GLYCOPYRROLATE 0.1 MG: 0.2 INJECTION INTRAMUSCULAR; INTRAVENOUS at 09:02

## 2017-12-26 RX ADMIN — FENTANYL CITRATE 25 MCG: 50 INJECTION, SOLUTION INTRAMUSCULAR; INTRAVENOUS at 08:21

## 2017-12-26 RX ADMIN — ONDANSETRON 4 MG: 2 INJECTION INTRAMUSCULAR; INTRAVENOUS at 10:10

## 2017-12-26 RX ADMIN — FENTANYL CITRATE 25 MCG: 50 INJECTION, SOLUTION INTRAMUSCULAR; INTRAVENOUS at 09:00

## 2017-12-26 RX ADMIN — PROPOFOL 30 MG: 10 INJECTION, EMULSION INTRAVENOUS at 09:00

## 2017-12-26 RX ADMIN — PROPOFOL 30 MG: 10 INJECTION, EMULSION INTRAVENOUS at 08:21

## 2017-12-26 RX ADMIN — PROPOFOL 100 MCG/KG/MIN: 10 INJECTION, EMULSION INTRAVENOUS at 08:21

## 2017-12-26 NOTE — PERIOP NOTES
Patient: Taryn Wright MRN: [id number]  SSN: [id number]   YOB: 1945  Age: 74 y.o. Sex: male     Patient is status post Procedure(s):  ELEVATING OSTEOTOMY METATARSAL II LEFT FOOT, DEBRIDE ULCER LEFT FOOT, APPLY AMNIOTIC MEMBRANE, REMOVAL OF HARDWARE LEFT FOOT. Surgeon(s) and Role:     * Larwence Blend, DPM - Primary    Local/Dose/Irrigation:  12 ML LOCAL INJECTION LEFT FOOT                  Peripheral IV 12/26/17 Right Antecubital (Active)   Site Assessment Clean, dry, & intact 12/26/2017 10:30 AM   Phlebitis Assessment 0 12/26/2017 10:30 AM   Infiltration Assessment 0 12/26/2017 10:30 AM   Dressing Status Clean, dry, & intact 12/26/2017 10:30 AM   Dressing Type Tape;Transparent 12/26/2017 10:30 AM   Hub Color/Line Status Pink; Infusing 12/26/2017 10:30 AM                           Dressing/Packing:  Wound Foot Left-DRESSING TYPE: 4 x 4;Elastic bandage;Gauze; Non-adherent; Sutures; Xeroform (KERLIX ROLL) (12/26/17 0900)  Splint/Cast:  ]    Other:  NONE

## 2017-12-26 NOTE — ANESTHESIA PREPROCEDURE EVALUATION
Anesthetic History   No history of anesthetic complications            Review of Systems / Medical History  Patient summary reviewed, nursing notes reviewed and pertinent labs reviewed    Pulmonary        Sleep apnea: CPAP        Comments: Former smoker - Quit 1968 - 10 pack years   Neuro/Psych         Psychiatric history    Comments: Restless Legs Syndrome  Anxiety/Depression  Peripheral Neuropathy Cardiovascular    Hypertension: well controlled          CAD and hyperlipidemia    Exercise tolerance: >4 METS     GI/Hepatic/Renal     GERD    Renal disease: CRI and stones  Liver disease    Comments: CRI, Stage III  Fatty Liver Endo/Other    Diabetes: well controlled, type 2    Obesity, arthritis and anemia  Pertinent negatives: No morbid obesity  Comments: Prediabetes Other Findings   Comments: Gout  ED           Physical Exam    Airway  Mallampati: III  TM Distance: > 6 cm  Neck ROM: normal range of motion   Mouth opening: Normal     Cardiovascular  Regular rate and rhythm,  S1 and S2 normal,  no murmur, click, rub, or gallop             Dental    Dentition: Caps/crowns     Pulmonary  Breath sounds clear to auscultation               Abdominal  GI exam deferred       Other Findings            Anesthetic Plan    ASA: 3  Anesthesia type: MAC          Induction: Intravenous  Anesthetic plan and risks discussed with: Patient

## 2017-12-26 NOTE — PERIOP NOTES
For dc home. Vswnl. Denies pain, nausea. dsg to L foot d&i. Went over [employer] instructions w/pt and family including Rx and f/up. Verbalized understanding. dc'd home.

## 2017-12-26 NOTE — ANESTHESIA POSTPROCEDURE EVALUATION
Post-Anesthesia Evaluation and Assessment    Patient: Shannon Lora MRN: [id number]  SSN: [id number]    YOB: 1945  Age: 74 y.o. Sex: male       Cardiovascular Function/Vital Signs  Visit Vitals    /66    Pulse 65    Temp 36.3 °C (97.4 °F)    Resp 14    Ht 6' (1.829 m)    Wt 114.4 kg (252 lb 3.3 oz)    SpO2 100%    BMI 34.21 kg/m2       Patient is status post MAC anesthesia for Procedure(s):  ELEVATING OSTEOTOMY METATARSAL II LEFT FOOT, DEBRIDE ULCER LEFT FOOT, APPLY AMNIOTIC MEMBRANE, REMOVAL OF HARDWARE LEFT FOOT. Nausea/Vomiting: None    Postoperative hydration reviewed and adequate. Pain:  Pain Scale 1: Numeric (0 - 10) (12/26/17 1115)  Pain Intensity 1: 0 (12/26/17 1115)   Managed    Neurological Status:   Neuro (WDL): Exceptions to WDL (12/26/17 1030)  Neuro  Neurologic State: Drowsy (12/26/17 1030)  Orientation Level: Oriented X4 (12/26/17 1030)  Cognition: Follows commands (12/26/17 1030)  Speech: Clear (12/26/17 1030)  LUE Motor Response: Purposeful (12/26/17 1030)  LLE Motor Response: Purposeful;Numbness (neuropathy and local) (12/26/17 1030)  RUE Motor Response: Purposeful (12/26/17 1030)  RLE Motor Response: Purposeful;Numbness (neuropathy) (12/26/17 1030)   At baseline    Mental Status and Level of Consciousness: Arousable    Pulmonary Status:   O2 Device: Room air (12/26/17 1115)   Adequate oxygenation and airway patent    Complications related to anesthesia: None    Post-anesthesia assessment completed.  No concerns    Signed By: Heber Krishna MD     December 26, 2017

## 2017-12-26 NOTE — CONSULTS
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 POSTOPERATIVE DIAGNOSIS:  Plantarflexed second metatarsal chronic ulceration and retained internal fixation. PROCEDURES:  1. Second metatarsal elevating osteotomy. 2.  Removal of internal fixation. 3.  Debridement of ulcer and application of amniotic membrane. SURGEON:  Juma Ortiz DPM    ANESTHESIA:  Monitored anesthesia care. ESTIMATED BLOOD LOSS:  20 mL. COMPLICATIONS:  None. PATHOLOGY:  None. INDICATIONS:  The patient has chronic ulceration submetatarsal 2. We discussed the surgery, the risks, the complications. He is aware and agreeable. DESCRIPTION OF PROCEDURE:  He was taken to the operating room, placed on the operating table in supine position. After adequate induction of general anesthesia, the patient was prepped and draped in usual sterile manner. Attention was directed towards the right foot where an osteotomy was made over the second metatarsal, carried down to the level of superficial fascia. Superficial fascia was peeled back and there was fibrosis and arthritis noted in the joint. I debrided the arthritis with a rongeur and oval lynda and released the joint with a 15 blade and a McGlamry elevator. The screws were going to be in the way. I was trying to cut the osteotomy and the screws had to be removed. Once both screws were removed, a long offset V osteotomy was performed elevated 3 mm and stabilized with two 2.0 screws. Once both screws were tightened down, I got a good position, the area was lavaged. The deep structures reapproximated with 3-0 Vicryl, superficial fascia with 4-0 Vicryl and skin was reapproximated with 4-0 nylon.       The ulcer was debrided with a 10 blade and then an amniotic membrane was placed over this followed by an Adaptic and then a dry sterile dressing was placed over the foot. The patient left the operating room, sent to the recovery room in stable condition.       DENIS Odonnell  D: 12/26/2017 10:33     T: 12/26/2017 11:03  JOB #: [id number]

## 2017-12-26 NOTE — BRIEF OP NOTE
BRIEF OPERATIVE NOTE    Date of Procedure: 12/26/2017   Preoperative Diagnosis: PLANTAR FLEXED METATARSAL 2 LEFT FOOT, ULCER LEFT FOOT  Postoperative Diagnosis: PLANTAR FLEXED METATARSAL 2, LEFT FOOT, ULCER LEFT FOOT, RETAINED INTERNAL FIXATION  Procedure(s):  ELEVATING OSTEOTOMY METATARSAL 2 LEFT FOOT, DEBRIDE ULCER LEFT FOOT, APPLY AMNIOTIC MEMBRANE  REMOVE SCREWS LEFT FOOT  Surgeon(s) and Role:     * Taylor QuezadarDENIS - Primary         Assistant Staff:       Surgical Staff:  Circ-1: Bennye Bumpers, RN; Angélica Meyer RN  Scrub Tech-1: Sammy Burr  Event Time In   Incision Start 1402   Incision Close 1020     Anesthesia: MAC   Estimated Blood Loss: 20CC  Specimens: * No specimens in log *   Findings: CHRONIC ULCER   Complications: NONE  Implants:   Implant Name Type Inv.  Item Serial No.  Lot No. LRB No. Used Action   TRILLIANT TIGER CANNULATED SCREW 2.0, 20 MM   [employer] NA Left 1 Implanted   TIGER CANNULATED 2.0 SCREW 22 MM    [employer] NA Left 1 Implanted   CRYOPRESERVED HUMAN AMNIOTIC MEMBRANE ALLOGRAFT 4 X 4 CM     [id number] [employer] MED[employer] NA Left 1 Implanted

## 2017-12-26 NOTE — PERIOP NOTES
1030-Handoff Report from Operating Room to PACU    Report received from [address] and RACHID Acevedo CRNA regarding [employer]. Surgeon(s):  Yovani Monroe DPM  And Procedure(s) (LRB):  ELEVATING OSTEOTOMY METATARSAL II LEFT FOOT, DEBRIDE ULCER LEFT FOOT, APPLY AMNIOTIC MEMBRANE (Left)  confirmed   with allergies and dressings discussed. Anesthesia type, drugs, patient history, complications, estimated blood loss, vital signs, intake and output, and last pain medication, lines and temperature were reviewed. 1120- TRANSFER - OUT REPORT:    Verbal report given to Cindy Apgar RN(name) on [employer]  being transferred to phase II(unit) for routine post - op       Report consisted of patients Situation, Background, Assessment and   Recommendations(SBAR). Information from the following report(s) SBAR, Kardex, OR Summary, Procedure Summary, Intake/Output, MAR and Recent Results was reviewed with the receiving nurse. Opportunity for questions and clarification was provided.       Patient transported with:   Registered Nurse

## 2017-12-26 NOTE — IP AVS SNAPSHOT
[address] [facility] 
[phone] Patient: Oksana Napier MRN: [id number]:[id number] My Medications STOP taking these medications TYLENOL EXTRA STRENGTH 500 mg tablet Generic drug:  acetaminophen TAKE these medications as instructed Instructions Each Dose to Equal  
 Morning Noon Evening Bedtime  
 albuterol 90 mcg/actuation inhaler Commonly known as:  PROVENTIL HFA, VENTOLIN HFA, PROAIR HFA Your last dose was: Your next dose is: Take 1 Puff by inhalation every six (6) hours as needed for Wheezing. 1 Puff  
    
   
   
   
  
 allopurinol 100 mg tablet Commonly known as:  Kamilavaughn Shields Your last dose was: Your next dose is: Take 100 mg by mouth every morning. 100 mg  
    
   
   
   
  
 amLODIPine 5 mg tablet Commonly known as:  Erick Fisher Your last dose was: Your next dose is: Take 5 mg by mouth daily. 5 mg  
    
   
   
   
  
 aspirin delayed-release 81 mg tablet Your last dose was: Your next dose is: Take 81 mg by mouth daily. 81 mg  
    
   
   
   
  
 atorvastatin 40 mg tablet Commonly known as:  LIPITOR Your last dose was: Your next dose is: TAKE 1 TABLET BY MOUTH NIGHTLY*****STOP SIMVASTATIN****  
     
   
   
   
  
 CENTRUM PO Your last dose was: Your next dose is: Take 1 Tab by mouth daily. 1 Tab  
    
   
   
   
  
 cephALEXin 500 mg capsule Commonly known as:  Amy Desanctis Your last dose was: Your next dose is: Take 1 Cap by mouth three (3) times daily. 500 mg  
    
   
   
   
  
 chlorthalidone 25 mg tablet Commonly known as:  Lakisha Monica Your last dose was: Your next dose is: Take 0.5 Tabs by mouth daily.   
 12.5 mg  
    
   
   
   
  
 citalopram 40 mg tablet Commonly known as:  Karen Porter Your last dose was: Your next dose is: TAKE 1 TABLET BY MOUTH EVERY DAY  
     
   
   
   
  
 clopidogrel 75 mg Tab Commonly known as:  PLAVIX Your last dose was: Your next dose is: Take 1 Tab by mouth daily. 75 mg  
    
   
   
   
  
 glucosamine-chondroitin 750-600 mg Tab Your last dose was: Your next dose is: Take 1 Tab by mouth daily. Brand: Move Free 1 Tab HORIZON NASAL CPAP SYSTEM Your last dose was: Your next dose is:    
   
   
 by Does Not Apply route. HYDROcodone-acetaminophen 5-325 mg per tablet Commonly known as:  Keyla Centers Your last dose was: Your next dose is: Take 1 Tab by mouth every four (4) hours as needed for Pain. Max Daily Amount: 6 Tabs. 1 Tab  
    
   
   
   
  
 isosorbide mononitrate ER 30 mg tablet Commonly known as:  IMDUR Your last dose was: Your next dose is: TAKE 0.5 TABS BY MOUTH DAILY. metoprolol succinate 25 mg XL tablet Commonly known as:  TOPROL-XL Your last dose was: Your next dose is: TAKE ONE TABLET BY MOUTH EVERY DAY  
     
   
   
   
  
 nitroglycerin 0.4 mg SL tablet Commonly known as:  NITROSTAT Your last dose was: Your next dose is: TAKE 1 TABLET BY SUBLINGUAL ROUTE EVERY 5 MINUTES AS NEEDED FOR CHEST PAIN. oxyCODONE-acetaminophen 7.5-325 mg per tablet Commonly known as:  PERCOCET 7.5 Your last dose was: Your next dose is: Take 1 Tab by mouth every four (4) hours as needed for Pain. Max Daily Amount: 6 Tabs. Indications: Pain 1 Tab  
    
   
   
   
  
 pantoprazole 40 mg tablet Commonly known as:  PROTONIX Your last dose was: Your next dose is: TAKE 1 TAB BY MOUTH DAILY. promethazine 25 mg tablet Commonly known as:  PHENERGAN Your last dose was: Your next dose is: Take 1 Tab by mouth every six (6) hours as needed for Nausea. Indications: Pain Treatment Adjunct, POST-OPERATIVE NAUSEA AND VOMITING  
 25 mg  
    
   
   
   
  
 rOPINIRole 0.5 mg tablet Commonly known as:  Mathew Desai Your last dose was: Your next dose is: Take 2 Tabs by mouth daily (after dinner). Take 1.5 to 2 tablets daily after dinner 1 mg  
    
   
   
   
  
 valsartan 160 mg tablet Commonly known as:  DIOVAN Your last dose was: Your next dose is: TAKE 1 TABLET BY MOUTH DAILY  
     
   
   
   
  
 VITAMIN D3 2,000 unit Cap capsule Generic drug:  Cholecalciferol (Vitamin D3) Your last dose was: Your next dose is: Take 2,000 Units by mouth daily. 2000 Units Where to Get Your Medications Information on where to get these meds will be given to you by the nurse or doctor. ! Ask your nurse or doctor about these medications  
  oxyCODONE-acetaminophen 7.5-325 mg per tablet  
 promethazine 25 mg tablet

## 2017-12-26 NOTE — DISCHARGE INSTRUCTIONS
INSTRUCTIONS FOLLOWING FOOT SURGERY    ACTIVITY:  · Elevate feet for 48 hours  · Use ice as directed by your doctor  · Use crutches / walker as directed by your doctor, and follow your doctors instructions as to your weight bearing status - you can bear weight on the surgical side but you must be in a surgical shoe    DIET:  · Clear liquids until no nausea or vomiting; then light diet for the first day  · An advance to regular diet on second day, unless your doctor orders otherwise. PAIN:  · Take pain medication as directed by your doctor. · Call your doctor if pain is not relieved by medication. · Do not take aspirin or blood thinners until directed by your doctor. DRESSING CARE: keep dressing clean and dry, do not remove       FOLLOW-UP PHONE CALLS:  · Calls will be made by nursing staff. · If you had any problems, call your doctor as needed. CALL YOUR DOCTOR IF:  · Excessive bleeding that does not stop after holding mild pressure over the area  · Temperature of 101° F or above  · Redness, excessive swelling or bruising, and/or green or yellow, smelly discharge from incision  · Loss of sensation -cold, white, or blue toes    AFTER ANESTHESIA :   · For the first 24 hours: do not drive, drink alcoholic beverages, or make important decisions. · Be aware of dizziness following anesthesia and while taking pain medication.       DISCHARGE MEDICATIONS:         APPOINTMENT DATE/TIME: please call for an appointment    YOUR DOCTORS PHONE NUMBER: [phone]    Patients signature:  Date: 12/26/2017  Discharging Nurse:     Guille MANZANARES VISIT Appointment in: One Week      DISCHARGE SUMMARY from Nurse    PATIENT INSTRUCTIONS:    After general anesthesia or intravenous sedation, for 24 hours or while taking prescription Narcotics:  · Limit your activities  · Do not drive and operate hazardous machinery  · Do not make important personal or business decisions  · Do  not drink alcoholic beverages  · If you have not urinated within 8 hours after discharge, please contact your surgeon on call. Report the following to your surgeon:  · Excessive pain, swelling, redness or odor of or around the surgical area  · Temperature over 100.5  · Nausea and vomiting lasting longer than 4 hours or if unable to take medications  · Any signs of decreased circulation or nerve impairment to extremity: change in color, persistent  numbness, tingling, coldness or increase pain  · Any questions    What to do at Home:    *  Please give a list of your current medications to your Primary Care Provider. *  Please update this list whenever your medications are discontinued, doses are      changed, or new medications (including over-the-counter products) are added. *  Please carry medication information at all times in case of emergency situations. These are general instructions for a healthy lifestyle:    No smoking/ No tobacco products/ Avoid exposure to second hand smoke  Surgeon General's Warning:  Quitting smoking now greatly reduces serious risk to your health. Obesity, smoking, and sedentary lifestyle greatly increases your risk for illness    A healthy diet, regular physical exercise & weight monitoring are important for maintaining a healthy lifestyle    You may be retaining fluid if you have a history of heart failure or if you experience any of the following symptoms:  Weight gain of 3 pounds or more overnight or 5 pounds in a week, increased swelling in our hands or feet or shortness of breath while lying flat in bed. Please call your doctor as soon as you notice any of these symptoms; do not wait until your next office visit. Recognize signs and symptoms of STROKE:    F-face looks uneven    A-arms unable to move or move unevenly    S-speech slurred or non-existent    T-time-call 911 as soon as signs and symptoms begin-DO NOT go       Back to bed or wait to see if you get better-TIME IS BRAIN.     Warning Signs of HEART ATTACK     Call 911 if you have these symptoms:   Chest discomfort. Most heart attacks involve discomfort in the center of the chest that lasts more than a few minutes, or that goes away and comes back. It can feel like uncomfortable pressure, squeezing, fullness, or pain.  Discomfort in other areas of the upper body. Symptoms can include pain or discomfort in one or both arms, the back, neck, jaw, or stomach.  Shortness of breath with or without chest discomfort.  Other signs may include breaking out in a cold sweat, nausea, or lightheadedness. Don't wait more than five minutes to call 911 - MINUTES MATTER! Fast action can save your life. Calling 911 is almost always the fastest way to get lifesaving treatment. Emergency Medical Services staff can begin treatment when they arrive -- up to an hour sooner than if someone gets to the hospital by car. The discharge information has been reviewed with the patient and caregiver. The patient and caregiver verbalized understanding. Discharge medications reviewed with the patient and caregiver and appropriate educational materials and side effects teaching were provided. ___________________________________________________________________________________________________________________________________    A common side effect of anesthesia following surgery is nausea and/or vomiting. In order to decrease symptoms, it is wise to avoid foods that are high in fat, greasy foods, milk products, and spicy foods for the first 24 hours. Acceptable foods for the first 24 hours following surgery include but are not limited to:     soup   broth    toast    crackers    applesauce    bananas    mashed potatoes,   soft or scrambled eggs   oatmeal    jello    It is important to eat when taking your pain medication. This will help to prevent nausea. If possible, please try to time your meals with your medications.     It is very important to stay hydrated following surgery. Sip fluids frequently while awake. Avoid acidic drinks such as citrus juices and soda for 24 hours. Carbonated beverages may cause bloating and gas. Acceptable fluids include:    - water (flavor packets may add variety)  - coffee or tea (in moderation)  - Gatorade  - Rob-aid  - apple juice  - cranberry juice    You are encouraged to cough and deep breathe every hour when awake. This will help to prevent respiratory complications following anesthesia. You may want to hug a pillow when coughing and sneezing to add additional support to the surgical area and to decrease discomfort if you had abdominal or chest surgery. If you are discharged home with support stockings, you may remove them after 24 hours. Support stockings are used to help prevent blood clots in the legs following surgery. Please take time to review all of your Home Care Instructions and Medication Information sheets provided in your discharge packet. If you have any questions, please contact your surgeons office. Thank you. Narcotic-Analgesic/Acetaminophen (By mouth)   Relieves pain. Brand Name(s): Capital w/Codeine, [place], Echt, [place], Lorcet HD, Lorcet Plus, Lortab 10/325, Lortab 5/325, Lortab 7.5/325, Lortab Elixir, [place], [place], Kiran, Trezix, Tylenol With Codeine No. 4   There may be other brand names for this medicine. When This Medicine Should Not Be Used: You should not use this medicine if you have had an allergic reaction to acetaminophen, codeine, hydrocodone, propoxyphene, or sulfites. You should not use this medicine if you have had an allergic reaction to any other opioid pain medicine. How to Use This Medicine:   Liquid, Tablet, Capsule  · Your doctor will tell you how much medicine to use. Do not use more than directed. · This medicine contains acetaminophen.  Read the labels of all other medicines you are using to see if they also contain acetaminophen, or ask your doctor or pharmacist. Do not use more than 4 grams (4,000 milligrams) total of acetaminophen in one day. · Drink plenty of liquids to help avoid constipation. If a dose is missed:   · Some of these medicines need to be used on a fixed schedule. If you miss a dose or forget to use your medicine, call your doctor pharmacist for instructions. Do not use extra medicine to make up for a missed dose. How to Store and Dispose of This Medicine:   · Store the medicine in a closed container at room temperature, away from heat, moisture, and direct light. Do not refrigerate or freeze the medicine. · Ask your pharmacist, doctor, or health caregiver about the best way to dispose of any outdated medicine or medicine no longer needed. · Keep all medicine out of the reach of children. Never share your medicine with anyone. Drugs and Foods to Avoid:   Ask your doctor or pharmacist before using any other medicine, including over-the-counter medicines, vitamins, and herbal products. · Make sure your doctor knows if you are using a monoamine oxidase inhibitor (MAOI) medicine, such as Eldepryl®, Marplan®, [place], or Parnate®. Make sure your doctor knows if you are also using a medicine to treat depression, such as amitriptyline, doxepin, nortriptyline, Elavil®, Pamelor®, or Sinequan®. Make sure your doctor knows if you are taking an anticholinergic medicine, such as atropine, methscopolamine, or scopolamine. · Tell your doctor if you use anything else that makes you sleepy. Some examples are allergy medicine, narcotic pain medicine, and alcohol. · Do not drink alcohol while you are using this medicine. Warnings While Using This Medicine:   · Make sure your doctor knows if you are pregnant or breast feeding, or if you have a head injury, or other conditions that may cause an increase in intercranial pressure (pressure inside your head).  Make sure your doctor knows if you have severe kidney problems or severe liver problems, or if you have hypothyroidism (lack of thyroid function). Make sure your doctor knows if you have [place]'s disease (adrenal gland disease), or if you have enlarged prostate or urethral stricture. Make sure your doctor knows if you have any abdominal problems, or if you have lung disease or asthma. · This medicine may make you dizzy or drowsy. Avoid driving, using machines, or anything else that could be dangerous if you are not alert. · This medicine can be habit-forming. Do not use more than your prescribed dose. Call your doctor if you think your medicine is not working. · Tell any doctor or dentist who treats you that you are using this medicine. This medicine may affect certain medical test results. · This medicine may cause constipation, especially with long-term use. Ask your doctor if you should use a laxative to prevent and treat constipation. · When a mother is breastfeeding and takes codeine, there is a very small chance that this medicine could cause serious side effects in the baby. This is because codeine works differently in a few women, so their breast milk contains too much medicine. If you take codeine, be alert for these signs of overdose in your nursing baby: sleeping more than usual, trouble breastfeeding, trouble breathing, or being limp and weak. Call the baby's doctor right away if you think there is a problem. If you cannot talk to the doctor, take the baby to the emergency room or call 911. Possible Side Effects While Using This Medicine:   Call your doctor right away if you notice any of these side effects:  · Allergic reaction: Itching or hives, swelling in your face or hands, swelling or tingling in your mouth or throat, chest tightness, trouble breathing  · Dizziness. · Seeing or hearing things that are not there. · Very slow heartbeat. If you notice these less serious side effects, talk with your doctor:   · Change in how much or how often you urinate. · Cold, clammy skin.   · Feeling unusually anxious, excited, fearful, or tired. · Nausea, vomiting, constipation, stomach pain or upset, or heartburn. · Skin rash. · Vision changes. If you notice other side effects that you think are caused by this medicine, tell your doctor. Call your doctor for medical advice about side effects. You may report side effects to FDA at 0-662-FDA-9539  © 2017 [employer] Information is for End User's use only and may not be sold, redistributed or otherwise used for commercial purposes. The above information is an  only. It is not intended as medical advice for individual conditions or treatments. Talk to your doctor, nurse or pharmacist before following any medical regimen to see if it is safe and effective for you. Promethazine (Phenergan, Promacot) - (By mouth)   Why this medicine is used:   Treats allergies and motion sickness. Also helps control nausea and vomiting. Contact a nurse or doctor right away if you have:  · Fast, pounding, or uneven heartbeat; lightheadedness or fainting  · Slow heartbeat, trouble breathing or speaking  · Extreme tiredness or weakness  · Fever, sweating, confusion, uneven heartbeat, muscle stiffness  · Twitching, muscle movements you cannot control     Common side effects:  · Drowsiness  · Nausea, vomiting, constipation, dry mouth  © 2017 [employer] Information is for End User's use only and may not be sold, redistributed or otherwise used for commercial purposes.

## 2017-12-26 NOTE — IP AVS SNAPSHOT
[address] [facility] 
[phone] Patient: Graciela Dial MRN: [id number] About your hospitalization You were admitted on:  December 26, 2017 You last received care in the:  [facility] PACU You were discharged on:  December 26, 2017 Why you were hospitalized Your primary diagnosis was:  Not on File Things You Need To Do (next 8 weeks) Follow up with Levy Miramontes MD  
  
Phone:  [phone] Where:  [address] [facility] [address], [address] [facility] [address], [facility], [address] [address] Discharge Orders None A check darlene indicates which time of day the medication should be taken. My Medications STOP taking these medications TYLENOL EXTRA STRENGTH 500 mg tablet Generic drug:  acetaminophen TAKE these medications as instructed Instructions Each Dose to Equal  
 Morning Noon Evening Bedtime  
 albuterol 90 mcg/actuation inhaler Commonly known as:  PROVENTIL HFA, VENTOLIN HFA, PROAIR HFA Your last dose was: Your next dose is: Take 1 Puff by inhalation every six (6) hours as needed for Wheezing. 1 Puff  
    
   
   
   
  
 allopurinol 100 mg tablet Commonly known as:  [place] Ringer Your last dose was: Your next dose is: Take 100 mg by mouth every morning. 100 mg  
    
   
   
   
  
 amLODIPine 5 mg tablet Commonly known as:  Arva Brazen Your last dose was: Your next dose is: Take 5 mg by mouth daily. 5 mg  
    
   
   
   
  
 aspirin delayed-release 81 mg tablet Your last dose was: Your next dose is: Take 81 mg by mouth daily. 81 mg  
    
   
   
   
  
 atorvastatin 40 mg tablet Commonly known as:  LIPITOR Your last dose was: Your next dose is: TAKE 1 TABLET BY MOUTH NIGHTLY*****STOP SIMVASTATIN****  
     
   
   
   
  
 CENTRUM PO Your last dose was: Your next dose is: Take 1 Tab by mouth daily. 1 Tab  
    
   
   
   
  
 cephALEXin 500 mg capsule Commonly known as:  Jing Ha Your last dose was: Your next dose is: Take 1 Cap by mouth three (3) times daily. 500 mg  
    
   
   
   
  
 chlorthalidone 25 mg tablet Commonly known as:  Penelope Bedoya Your last dose was: Your next dose is: Take 0.5 Tabs by mouth daily. 12.5 mg  
    
   
   
   
  
 citalopram 40 mg tablet Commonly known as:  Aaron Hinojosa Your last dose was: Your next dose is: TAKE 1 TABLET BY MOUTH EVERY DAY  
     
   
   
   
  
 clopidogrel 75 mg Tab Commonly known as:  PLAVIX Your last dose was: Your next dose is: Take 1 Tab by mouth daily. 75 mg  
    
   
   
   
  
 glucosamine-chondroitin 750-600 mg Tab Your last dose was: Your next dose is: Take 1 Tab by mouth daily. Brand: Move Free 1 Tab HORIZON NASAL CPAP SYSTEM Your last dose was: Your next dose is:    
   
   
 by Does Not Apply route. HYDROcodone-acetaminophen 5-325 mg per tablet Commonly known as:  Emerson Tavarez Your last dose was: Your next dose is: Take 1 Tab by mouth every four (4) hours as needed for Pain. Max Daily Amount: 6 Tabs. 1 Tab  
    
   
   
   
  
 isosorbide mononitrate ER 30 mg tablet Commonly known as:  IMDUR Your last dose was: Your next dose is: TAKE 0.5 TABS BY MOUTH DAILY. metoprolol succinate 25 mg XL tablet Commonly known as:  TOPROL-XL Your last dose was: Your next dose is:  TAKE ONE TABLET BY MOUTH EVERY DAY  
     
   
   
   
  
 nitroglycerin 0.4 mg SL tablet Commonly known as:  NITROSTAT Your last dose was: Your next dose is: TAKE 1 TABLET BY SUBLINGUAL ROUTE EVERY 5 MINUTES AS NEEDED FOR CHEST PAIN. oxyCODONE-acetaminophen 7.5-325 mg per tablet Commonly known as:  PERCOCET 7.5 Your last dose was: Your next dose is: Take 1 Tab by mouth every four (4) hours as needed for Pain. Max Daily Amount: 6 Tabs. Indications: Pain 1 Tab  
    
   
   
   
  
 pantoprazole 40 mg tablet Commonly known as:  PROTONIX Your last dose was: Your next dose is: TAKE 1 TAB BY MOUTH DAILY. promethazine 25 mg tablet Commonly known as:  PHENERGAN Your last dose was: Your next dose is: Take 1 Tab by mouth every six (6) hours as needed for Nausea. Indications: Pain Treatment Adjunct, POST-OPERATIVE NAUSEA AND VOMITING  
 25 mg  
    
   
   
   
  
 rOPINIRole 0.5 mg tablet Commonly known as:  Marlan Ball Your last dose was: Your next dose is: Take 2 Tabs by mouth daily (after dinner). Take 1.5 to 2 tablets daily after dinner 1 mg  
    
   
   
   
  
 valsartan 160 mg tablet Commonly known as:  DIOVAN Your last dose was: Your next dose is: TAKE 1 TABLET BY MOUTH DAILY  
     
   
   
   
  
 VITAMIN D3 2,000 unit Cap capsule Generic drug:  Cholecalciferol (Vitamin D3) Your last dose was: Your next dose is: Take 2,000 Units by mouth daily. 2000 Units Where to Get Your Medications Information on where to get these meds will be given to you by the nurse or doctor. ! Ask your nurse or doctor about these medications  
  oxyCODONE-acetaminophen 7.5-325 mg per tablet  
 promethazine 25 mg tablet Discharge Instructions INSTRUCTIONS FOLLOWING FOOT SURGERY ACTIVITY: 
· Elevate feet for 48 hours · Use ice as directed by your doctor · Use crutches / walker as directed by your doctor, and follow your doctors instructions as to your weight bearing status - you can bear weight on the surgical side but you must be in a surgical shoe DIET: 
· Clear liquids until no nausea or vomiting; then light diet for the first day · An advance to regular diet on second day, unless your doctor orders otherwise. PAIN: 
· Take pain medication as directed by your doctor. · Call your doctor if pain is not relieved by medication. · Do not take aspirin or blood thinners until directed by your doctor. DRESSING CARE: keep dressing clean and dry, do not remove FOLLOW-UP PHONE CALLS: 
· Calls will be made by nursing staff. · If you had any problems, call your doctor as needed. CALL YOUR DOCTOR IF: 
· Excessive bleeding that does not stop after holding mild pressure over the area · Temperature of 101° F or above · Redness, excessive swelling or bruising, and/or green or yellow, smelly discharge from incision · Loss of sensation cold, white, or blue toes AFTER ANESTHESIA :  
· For the first 24 hours: do not drive, drink alcoholic beverages, or make important decisions. · Be aware of dizziness following anesthesia and while taking pain medication. DISCHARGE MEDICATIONS:  
   
 
APPOINTMENT DATE/TIME: please call for an appointment YOUR DOCTORS PHONE NUMBER: [phone] Patients signature: 
Date: 12/26/2017 Discharging Nurse:  
 
FOLLOW UP VISIT Appointment in: One Week DISCHARGE SUMMARY from Nurse PATIENT INSTRUCTIONS: 
 
After general anesthesia or intravenous sedation, for 24 hours or while taking prescription Narcotics: · Limit your activities · Do not drive and operate hazardous machinery · Do not make important personal or business decisions · Do  not drink alcoholic beverages · If you have not urinated within 8 hours after discharge, please contact your surgeon on call. Report the following to your surgeon: 
· Excessive pain, swelling, redness or odor of or around the surgical area · Temperature over 100.5 · Nausea and vomiting lasting longer than 4 hours or if unable to take medications · Any signs of decreased circulation or nerve impairment to extremity: change in color, persistent  numbness, tingling, coldness or increase pain · Any questions What to do at Home: *  Please give a list of your current medications to your Primary Care Provider. *  Please update this list whenever your medications are discontinued, doses are 
    changed, or new medications (including over-the-counter products) are added. *  Please carry medication information at all times in case of emergency situations. These are general instructions for a healthy lifestyle: No smoking/ No tobacco products/ Avoid exposure to second hand smoke Surgeon General's Warning:  Quitting smoking now greatly reduces serious risk to your health. Obesity, smoking, and sedentary lifestyle greatly increases your risk for illness A healthy diet, regular physical exercise & weight monitoring are important for maintaining a healthy lifestyle You may be retaining fluid if you have a history of heart failure or if you experience any of the following symptoms:  Weight gain of 3 pounds or more overnight or 5 pounds in a week, increased swelling in our hands or feet or shortness of breath while lying flat in bed. Please call your doctor as soon as you notice any of these symptoms; do not wait until your next office visit. Recognize signs and symptoms of STROKE: 
 
 
? soup 
? broth 
?  toast  
? crackers ? applesauce 
? bananas  
? mashed potatoes, 
? soft or scrambled eggs 
? oatmeal 
?  jello It is important to eat when taking your pain medication. This will help to prevent nausea. If possible, please try to time your meals with your medications. It is very important to stay hydrated following surgery. Sip fluids frequently while awake. Avoid acidic drinks such as citrus juices and soda for 24 hours. Carbonated beverages may cause bloating and gas. Acceptable fluids include: 
 
? water (flavor packets may add variety) ? coffee or tea (in moderation) ? Gatorade ? Albert Luster ? apple juice 
? cranberry juice You are encouraged to cough and deep breathe every hour when awake. This will help to prevent respiratory complications following anesthesia. You may want to hug a pillow when coughing and sneezing to add additional support to the surgical area and to decrease discomfort if you had abdominal or chest surgery. If you are discharged home with support stockings, you may remove them after 24 hours. Support stockings are used to help prevent blood clots in the legs following surgery. Please take time to review all of your Home Care Instructions and Medication Information sheets provided in your discharge packet. If you have any questions, please contact your surgeons office. Thank you. Narcotic-Analgesic/Acetaminophen (By mouth) Relieves pain. Brand Name(s): Capital w/Codeine, [place], Echt, [place], Lorcet HD, Lorcet Plus, Lortab 10/325, Lortab 5/325, Lortab 7.5/325, Lortab Elixir, [place], [place], Kiran, Trezix, Tylenol With Codeine No. 4 There may be other brand names for this medicine. When This Medicine Should Not Be Used: You should not use this medicine if you have had an allergic reaction to acetaminophen, codeine, hydrocodone, propoxyphene, or sulfites. You should not use this medicine if you have had an allergic reaction to any other opioid pain medicine. How to Use This Medicine:  
Liquid, Tablet, Capsule · Your doctor will tell you how much medicine to use. Do not use more than directed. · This medicine contains acetaminophen.  Read the labels of all other medicines you are using to see if they also contain acetaminophen, or ask your doctor or pharmacist. Do not use more than 4 grams (4,000 milligrams) total of acetaminophen in one day. · Drink plenty of liquids to help avoid constipation. If a dose is missed: · Some of these medicines need to be used on a fixed schedule. If you miss a dose or forget to use your medicine, call your doctor pharmacist for instructions. Do not use extra medicine to make up for a missed dose. How to Store and Dispose of This Medicine: · Store the medicine in a closed container at room temperature, away from heat, moisture, and direct light. Do not refrigerate or freeze the medicine. · Ask your pharmacist, doctor, or health caregiver about the best way to dispose of any outdated medicine or medicine no longer needed. · Keep all medicine out of the reach of children. Never share your medicine with anyone. Drugs and Foods to Avoid: Ask your doctor or pharmacist before using any other medicine, including over-the-counter medicines, vitamins, and herbal products. · Make sure your doctor knows if you are using a monoamine oxidase inhibitor (MAOI) medicine, such as Eldepryl®, Marplan®, [place], or Parnate®. Make sure your doctor knows if you are also using a medicine to treat depression, such as amitriptyline, doxepin, nortriptyline, Elavil®, Pamelor®, or Sinequan®. Make sure your doctor knows if you are taking an anticholinergic medicine, such as atropine, methscopolamine, or scopolamine. · Tell your doctor if you use anything else that makes you sleepy. Some examples are allergy medicine, narcotic pain medicine, and alcohol. · Do not drink alcohol while you are using this medicine. Warnings While Using This Medicine: · Make sure your doctor knows if you are pregnant or breast feeding, or if you have a head injury, or other conditions that may cause an increase in intercranial pressure (pressure inside your head). Make sure your doctor knows if you have severe kidney problems or severe liver problems, or if you have hypothyroidism (lack of thyroid function). Make sure your doctor knows if you have Clark's disease (adrenal gland disease), or if you have enlarged prostate or urethral stricture. Make sure your doctor knows if you have any abdominal problems, or if you have lung disease or asthma. · This medicine may make you dizzy or drowsy. Avoid driving, using machines, or anything else that could be dangerous if you are not alert. · This medicine can be habit-forming. Do not use more than your prescribed dose. Call your doctor if you think your medicine is not working. · Tell any doctor or dentist who treats you that you are using this medicine. This medicine may affect certain medical test results. · This medicine may cause constipation, especially with long-term use. Ask your doctor if you should use a laxative to prevent and treat constipation. · When a mother is breastfeeding and takes codeine, there is a very small chance that this medicine could cause serious side effects in the baby. This is because codeine works differently in a few women, so their breast milk contains too much medicine. If you take codeine, be alert for these signs of overdose in your nursing baby: sleeping more than usual, trouble breastfeeding, trouble breathing, or being limp and weak. Call the baby's doctor right away if you think there is a problem. If you cannot talk to the doctor, take the baby to the emergency room or call 911. Possible Side Effects While Using This Medicine:  
Call your doctor right away if you notice any of these side effects: · Allergic reaction: Itching or hives, swelling in your face or hands, swelling or tingling in your mouth or throat, chest tightness, trouble breathing · Dizziness. · Seeing or hearing things that are not there. · Very slow heartbeat. If you notice these less serious side effects, talk with your doctor: · Change in how much or how often you urinate. · Cold, clammy skin. · Feeling unusually anxious, excited, fearful, or tired. · Nausea, vomiting, constipation, stomach pain or upset, or heartburn. · Skin rash. · Vision changes. If you notice other side effects that you think are caused by this medicine, tell your doctor. Call your doctor for medical advice about side effects. You may report side effects to FDA at 7-692-FDA-6959 © 2017 [address] Information is for End User's use only and may not be sold, redistributed or otherwise used for commercial purposes. The above information is an  only. It is not intended as medical advice for individual conditions or treatments. Talk to your doctor, nurse or pharmacist before following any medical regimen to see if it is safe and effective for you. Promethazine (Phenergan, Promacot) - (By mouth) Why this medicine is used:  
Treats allergies and motion sickness. Also helps control nausea and vomiting. Contact a nurse or doctor right away if you have: 
· Fast, pounding, or uneven heartbeat; lightheadedness or fainting · Slow heartbeat, trouble breathing or speaking · Extreme tiredness or weakness · Fever, sweating, confusion, uneven heartbeat, muscle stiffness · Twitching, muscle movements you cannot control Common side effects: 
· Drowsiness · Nausea, vomiting, constipation, dry mouth © 2017 [address] Information is for End User's use only and may not be sold, redistributed or otherwise used for commercial purposes. ACO Transitions of Care Introducing [employer] [address] Jaleesa Gallegos offers a voluntary care coordination program to provide high quality service and care to [facility] fee-for-service beneficiaries. Jame Lazo was designed to help you enhance your health and well-being through the following services: ? Transitions of Care  support for individuals who are transitioning from one care setting to another (example: Hospital to home). ? Chronic and Complex Care Coordination  support for individuals and caregivers of those with serious or chronic illnesses or with more than one chronic (ongoing) condition and those who take a number of different medications. If you meet specific medical criteria, a [facility] [address] may call you directly to coordinate your care with your primary care physician and your other care providers. For questions about the [facility] programs, please, contact your physicians office. For general questions or additional information about Accountable Care Organizations: 
Please visit www.medicare.gov/acos. html or call 1-800-MEDICARE (4-649.907.4093) TTY users should call 3-920.733.5507. Introducing [facility]! [facility] introduces [employer] patient portal. Now you can access parts of your medical record, email your doctor's office, and request medication refills online. 1. In your internet browser, go to [email or web address] 2. Click on the First Time User? Click Here link in the Sign In box. You will see the New Member Sign Up page. 3. Enter your [employer] Access Code exactly as it appears below. You will not need to use this code after youve completed the sign-up process. If you do not sign up before the expiration date, you must request a new code. · [employer] Access Code: [email or web address] Expires: 3/7/2018  3:41 PM 
 
4. Enter the last four digits of your Social Security Number (xxxx) and Date of Birth (mm/dd/yyyy) as indicated and click Submit. You will be taken to the next sign-up page. 5. Create a [employer] ID.  This will be your [employer] login ID and cannot be changed, so think of one that is secure and easy to remember. 6. Create a [employer] password. You can change your password at any time. 7. Enter your Password Reset Question and Answer. This can be used at a later time if you forget your password. 8. Enter your e-mail address. You will receive e-mail notification when new information is available in [address] 9. Click Sign Up. You can now view and download portions of your medical record. 10. Click the Download Summary menu link to download a portable copy of your medical information. If you have questions, please visit the Frequently Asked Questions section of the [employer] website. Remember, [employer] is NOT to be used for urgent needs. For medical emergencies, dial 911. Now available from your iPhone and Android! Providers Seen During Your Hospitalization Provider Specialty Primary office phone Roshan Guerrero [address] Podiatry [phone] Your Primary Care Physician (PCP) Primary Care Physician Office Phone Office Fax Osvaldo Wyman [phone] You are allergic to the following Allergen Reactions Penicillins Hives Bactrim (Sulfamethoxazole-Trimethoprim) Hives Codeine Itching Lisinopril (Bulk) Cough Neurontin (Gabapentin) Other (comments) drowsy Zostavax (Zoster Vaccine Live (Pf)) Rash See 9/10/13 visit Recent Documentation Height Weight BMI Smoking Status 1.829 m 114.4 kg 34.21 kg/m2 Former Smoker Emergency Contacts Name Discharge Info Relation Home Work Mobile David Joseph DISCHARGE CAREGIVER [3] Spouse [3] [phone] Patient Belongings  The following personal items are in your possession at time of discharge: 
  Dental Appliances: None  Visual Aid: Glasses, With patient   Hearing Aids/Status: Does not own  Home Medications: None   Jewelry: None Clothing: Undergarments, With patient, Footwear, Jacket/Coat (home clothing)    Other Valuables: Eyeglasses  Personal Items Sent to Safe: No valuables to send to security Please provide this summary of care documentation to your next provider. Signatures-by signing, you are acknowledging that this After Visit Summary has been reviewed with you and you have received a copy. Patient Signature:  ____________________________________________________________ Date:  ____________________________________________________________  
  
Normal Senna Provider Signature:  ____________________________________________________________ Date:  ____________________________________________________________

## 2018-01-11 NOTE — OP NOTES
[facility]  ACUTE CARE OP NOTE    Court Primer  MR#: [id number]  : 1945  ACCOUNT #: [de-identified]   DATE OF SERVICE: 2017    SURGEON:  Jenniffer Singh MD.     PREOPERATIVE DIAGNOSIS:  Plantarflexed metatarsal 2 left foot, ulceration left foot. POSTOPERATIVE DIAGNOSIS:  Plantarflexed metatarsal 2 ulceration left foot, and retained internal fixation. PROCEDURES PERFORMED:  Elevating osteotomy metatarsal 2 left foot, debridement of ulcer and application of amniotic membrane and removal of screws. ANESTHESIA:  Monitored anesthesia care. ESTIMATED BLOOD LOSS:  5 mL. COMPLICATIONS:  None. SPECIMENS REMOVED:  None. INDICATIONS:  The patient has a chronic ulceration submetatarsal 2. He had a previous surgery done by another physician and had continual ulceration and prominence of the second met. DESCRIPTION OF PROCEDURE:  He was taken to the operating room and placed on the operating table in supine position after adequate induction of intravenous sedation, the patient was blocked with 0.5% Marcaine with epinephrine. At this time, attention was directed towards the left foot where an incision was made over the second metatarsal, carried down to the level of superficial fascia down to the level of bone. Upon entering the bone, I performed an offset V osteotomy in the sagittal plane to help elevate the osteotomy; however, it was impossible to complete the cut because the screws were in the way. I had to lynda down the dorsal aspect of the bone, find the screws and remove them. Once the screws were removed, I completed the cut and placed 2 Trilliant 2.0 screws across the osteotomy site after elevating it approximately 3-4 mm. The second metatarsal was elevated and then stabilized with two screws. Once this was accomplished, the area was flushed. The deep structures closed with 4-0 Vicryl and skin was closed with 4-0 nylon.   Excisional debridement of the plantar ulceration was performed with a 10 blade down to healthy bleeding tissue and then an amniotic membrane was secured to that followed by a dry sterile dressing. He was taken from the operating room to the recovery room in stable condition.       DENIS Salazar  D: 01/11/2018 09:47     T: 01/11/2018 10:48  JOB #: [id number]

## 2018-02-28 ENCOUNTER — OFFICE VISIT (OUTPATIENT)
Dept: INTERNAL MEDICINE CLINIC | Age: 73
End: 2018-02-28

## 2018-02-28 ENCOUNTER — HOSPITAL ENCOUNTER (OUTPATIENT)
Dept: LAB | Age: 73
Discharge: HOME | End: 2018-02-28
Payer: MEDICARE

## 2018-02-28 VITALS
WEIGHT: 249.6 LBS | DIASTOLIC BLOOD PRESSURE: 62 MMHG | OXYGEN SATURATION: 98 % | RESPIRATION RATE: 18 BRPM | TEMPERATURE: 99.1 F | HEIGHT: 72 IN | SYSTOLIC BLOOD PRESSURE: 105 MMHG | HEART RATE: 84 BPM | BODY MASS INDEX: 33.81 KG/M2

## 2018-02-28 DIAGNOSIS — D64.9 ANEMIA, UNSPECIFIED TYPE: ICD-10-CM

## 2018-02-28 DIAGNOSIS — E78.2 MIXED HYPERLIPIDEMIA: Chronic | ICD-10-CM

## 2018-02-28 DIAGNOSIS — I12.9 HYPERTENSIVE KIDNEY DISEASE WITH CHRONIC KIDNEY DISEASE STAGE III (HCC): Chronic | ICD-10-CM

## 2018-02-28 DIAGNOSIS — I10 ESSENTIAL HYPERTENSION, BENIGN: Primary | Chronic | ICD-10-CM

## 2018-02-28 DIAGNOSIS — N18.30 HYPERTENSIVE KIDNEY DISEASE WITH CHRONIC KIDNEY DISEASE STAGE III (HCC): Chronic | ICD-10-CM

## 2018-02-28 DIAGNOSIS — R35.0 URINARY FREQUENCY: ICD-10-CM

## 2018-02-28 DIAGNOSIS — I25.83 CORONARY ARTERY DISEASE DUE TO LIPID RICH PLAQUE: ICD-10-CM

## 2018-02-28 DIAGNOSIS — Z12.5 SCREENING FOR PROSTATE CANCER: ICD-10-CM

## 2018-02-28 DIAGNOSIS — I25.10 CORONARY ARTERY DISEASE DUE TO LIPID RICH PLAQUE: ICD-10-CM

## 2018-02-28 PROCEDURE — 84153 ASSAY OF PSA TOTAL: CPT

## 2018-02-28 PROCEDURE — 36415 COLL VENOUS BLD VENIPUNCTURE: CPT

## 2018-02-28 PROCEDURE — 83970 ASSAY OF PARATHORMONE: CPT

## 2018-02-28 PROCEDURE — 80061 LIPID PANEL: CPT

## 2018-02-28 PROCEDURE — 80069 RENAL FUNCTION PANEL: CPT

## 2018-02-28 PROCEDURE — 85027 COMPLETE CBC AUTOMATED: CPT

## 2018-02-28 NOTE — PROGRESS NOTES
Chief Complaint   Patient presents with    Medication Evaluation     Visit Vitals    /62 (BP 1 Location: Left arm, BP Patient Position: Sitting)    Pulse 84    Temp 99.1 °F (37.3 °C) (Oral)    Resp 18    Ht 6' (1.829 m)    Wt 249 lb 9.6 oz (113.2 kg)    SpO2 98%    BMI 33.85 kg/m2     Reviewed record In preparation for visit and have obtained necessary documentation    1. Have you been to the ER, urgent care clinic since your last visit? Hospitalized since your last visit? NO    2. Have you seen or consulted any other health care providers outside of the [address] [place] [address] since your last visit? Include any pap smears or colon screening. NO    Patient does not have advance directive on file and has received paperwork.

## 2018-02-28 NOTE — PROGRESS NOTES
Latosha Win is a 74 y.o. male who presents for follow up. In with wife. Had pain in the left side, \"soreness\". Not like kidney stone episodes. No lifting or injury. No rash or bruising. The pain is gone now. Normal BM and normal urination     Had left foot surgery with Dr. Romana Lopez for neuropathic ulcer 12/26. Wound is completely healed. Denies neuropathic pain. Denies change in SMITH, no chest pain, no palpitations. Has CAD, followed by cardiology. Creatinine 2.08 and anemic 10.8. December 2017. Sees Dr. Sharath Piper, nephrologist. appt next week.   Avoid NSAIDS      Past Medical History:   Diagnosis Date    Abnormal stress echo 5/12/2015    Anemia NEC 03/2013    Last 2-3 months; finished taking iron supplement    Anxiety     CAD (coronary artery disease) 2009    cath Rahul Jefferson) revealed 20%RCA and 50%2nd diagonal    Calculus of kidney     Chronic kidney disease     sees nephrologist every 6 months    Chronic kidney disease, stage III (moderate) 10/11/2009    30% kidney function    Diabetes ([address]     Pre-diabetic    DJD (degenerative joint disease)     GERD (gastroesophageal reflux disease) 10/11/2009    controlled with medication    Gout     Hypertension     Liver disease     fatty liver    Obesity     Obstructive sleep apnea (adult) (pediatric) 10/11/2009    nasal pillows; pressure set at 10-11 - uses cpap    Peripheral neuropathy 10/11/2009    bilateral feet (numbness)    Prediabetes     RLS (restless legs syndrome) 10/11/2009    S/P coronary artery stent placement 5/12/2015    5/11/15 PCI./MALI to LCx       Family History   Problem Relation Age of Onset    Heart Disease Father     Hypertension Father     Other Father      abdominal aneurysm     Dementia Mother     Alzheimer Mother     Heart Disease Brother     Heart Attack Brother     Heart Disease Maternal Grandmother     Heart Disease Paternal Grandmother     Heart Attack Paternal Grandmother     Heart Disease Paternal Grandfather     Heart Attack Paternal Grandfather     Elevated Lipids Son     Elevated Lipids Daughter     Cancer Neg Hx     Diabetes Neg Hx     Stroke Neg Hx        Social History     Social History    Marital status:      Spouse name: N/A    Number of children: N/A    Years of education: N/A     Occupational History    Not on file. Social History Main Topics    Smoking status: Former Smoker     Packs/day: 1.00     Years: 10.00     Types: Cigarettes     Quit date: 1/1/1968    Smokeless tobacco: Never Used    Alcohol use No    Drug use: No    Sexual activity: Yes     Partners: Female     Birth control/ protection: None     Other Topics Concern    Not on file     Social History Narrative           Review of Systems  Pertinent items are noted in HPI. Objective:     Visit Vitals    /62 (BP 1 Location: Left arm, BP Patient Position: Sitting)    Pulse 84    Temp 99.1 °F (37.3 °C) (Oral)    Resp 18    Ht 6' (1.829 m)    Wt 249 lb 9.6 oz (113.2 kg)    SpO2 98%    BMI 33.85 kg/m2     Gen: well appearing male  HEENT:   PERRL,normal conjunctiva. External ear and canals normal, TMs no opacification or erythema,  OP no erythema, no exudates, MMM  Neck: No masses or LAD  Resp:  No wheezing, no rhonchi, no rales. CV:  RRR, normal S1S2, no murmur. GI: soft, nontender, without masses. No hepatosplenomegaly. Extrem:  +2 pulses, no edema, warm distally      Assessment/Plan:     1. Urinary frequency    - PSA W/ REFLX FREE PSA    2. Screening for prostate cancer    - PSA W/ REFLX FREE PSA    3. Hypertensive kidney disease with chronic kidney disease stage III-followed by nephrology. Avoid NSAIDS. - PTH INTACT  - RENAL FUNCTION PANEL    4. Mixed hyperlipidemia- Recommend AHA diet. Continue statin therapy. - LIPID PANEL    5. Essential hypertension, benign- Recommend following a lower sodium diet and stay active. Blood pressure goal is less than 130/85 on average. Advised compliance with blood pressure medication and regular follow up. - CBC W/O DIFF    6. Coronary artery disease due to lipid rich plaque-follows with cardiology. 7. Anemia, unspecified type-stable. Due to renal disease. Follow-up Disposition:  Return in about 6 months (around 8/28/2018) for follow up on HTN.   Ashley Doran MD

## 2018-02-28 NOTE — MR AVS SNAPSHOT
[address] [place] [address] 
[phone] Patient: Taryn Wright MRN:  [id number] Visit Information Date & Time Provider Department Dept. Phone Encounter #  
 2/28/2018  2:15 PM Oli Akins, [address] [id number] Follow-up Instructions Return in about 6 months (around 8/28/2018) for follow up on HTN. .  
  
Your Appointments 3/6/2018 10:15 AM  
6 MONTH with Bulmaro Locke MD  
[facility] [address]) Appt Note: $0CP 8/15/17ksr [address] [address]  
[phone]  
  
   
 [address] Upcoming Health Maintenance Date Due  
 GLAUCOMA SCREENING Q2Y 8/5/2016 Pneumococcal 65+ Low/Medium Risk (2 of 2 - PPSV23) 6/20/2017 MEDICARE YEARLY EXAM 8/24/2018 COLONOSCOPY 9/24/2024 DTaP/Tdap/Td series (2 - Td) 10/24/2026 Allergies as of 2/28/2018  Review Complete On: 2/28/2018 By: Oli Akins MD  
  
 Severity Noted Reaction Type Reactions Penicillins High 10/01/2009    Hives Bactrim [Sulfamethoxazole-trimethoprim]  03/31/2010   Systemic Hives Codeine  12/26/2017    Itching Lisinopril (Bulk)  10/01/2009    Cough Neurontin [Gabapentin]  10/01/2009   Side Effect Other (comments) drowsy Zostavax [Zoster Vaccine Live (Pf)]  11/20/2013   Not Verified Rash See 9/10/13 visit Current Immunizations  Reviewed on 9/28/2016 Name Date Influenza Vaccine 10/1/2014, 10/1/2013 Influenza Vaccine (Quad) PF 9/20/2017  2:25 PM, 10/7/2016, 9/30/2015 Influenza Vaccine Split 9/19/2012, 10/6/2011, 10/27/2010 Influenza Vaccine Whole 10/23/2008 Tdap 10/24/2016 10:04 PM, 3/21/2016 ZZZ-RETIRED (DO NOT USE) Pneumococcal Vaccine (Unspecified Type) 6/20/2012 Zoster Vaccine, Live 8/15/2013 Not reviewed this visit You Were Diagnosed With   
  
 Codes Comments Essential hypertension, benign    -  Primary ICD-10-CM: I10 
ICD-9-CM: 401.1 Urinary frequency     ICD-10-CM: R35.0 ICD-9-CM: 788.41 Screening for prostate cancer     ICD-10-CM: Z12.5 ICD-9-CM: V76.44 Hypertensive kidney disease with chronic kidney disease stage III     ICD-10-CM: I12.9, N18.3 ICD-9-CM: 403.90, 585.3 Mixed hyperlipidemia     ICD-10-CM: E78.2 ICD-9-CM: 272.2 Coronary artery disease due to lipid rich plaque     ICD-10-CM: I25.10, I25.83 ICD-9-CM: 414.00, 414.3 Anemia, unspecified type     ICD-10-CM: D64.9 ICD-9-CM: 326. 9 Vitals BP Pulse Temp Resp Height(growth percentile) Weight(growth percentile) 105/62 (BP 1 Location: Left arm, BP Patient Position: Sitting) 84 99.1 °F (37.3 °C) (Oral) 18 6' (1.829 m) 249 lb 9.6 oz (113.2 kg) SpO2 BMI Smoking Status 98% 33.85 kg/m2 Former Smoker BMI and BSA Data Body Mass Index Body Surface Area  
 33.85 kg/m 2 2.4 m 2 Preferred Pharmacy Pharmacy Name Phone [facility]/PHARMACY #[id number], [place] - [address] [phone] Your Updated Medication List  
  
   
This list is accurate as of 2/28/18  3:34 PM.  Always use your most recent med list.  
  
  
  
  
 albuterol 90 mcg/actuation inhaler Commonly known as:  PROVENTIL HFA, VENTOLIN HFA, PROAIR HFA Take 1 Puff by inhalation every six (6) hours as needed for Wheezing. allopurinol 100 mg tablet Commonly known as:  Sheldon Alden Take 100 mg by mouth every morning. amLODIPine 5 mg tablet Commonly known as:  Bosmaggie Shield Take 5 mg by mouth daily. aspirin delayed-release 81 mg tablet Take 81 mg by mouth daily. atorvastatin 40 mg tablet Commonly known as:  LIPITOR  
TAKE 1 TABLET BY MOUTH NIGHTLY*****STOP SIMVASTATIN****  
  
 CENTRUM PO Take 1 Tab by mouth daily. chlorthalidone 25 mg tablet Commonly known as:  Leatha John Take 0.5 Tabs by mouth daily. citalopram 40 mg tablet Commonly known as:  CELEXA  
TAKE 1 TABLET BY MOUTH EVERY DAY  
  
 clopidogrel 75 mg Tab Commonly known as:  PLAVIX Take 1 Tab by mouth daily. glucosamine-chondroitin 750-600 mg Tab Take 1 Tab by mouth daily. Brand: [employer] NASAL CPAP SYSTEM  
by Does Not Apply route. isosorbide mononitrate ER 30 mg tablet Commonly known as:  IMDUR  
TAKE 0.5 TABS BY MOUTH DAILY. metoprolol succinate 25 mg XL tablet Commonly known as:  TOPROL-XL  
TAKE ONE TABLET BY MOUTH EVERY DAY  
  
 nitroglycerin 0.4 mg SL tablet Commonly known as:  NITROSTAT  
TAKE 1 TABLET BY SUBLINGUAL ROUTE EVERY 5 MINUTES AS NEEDED FOR CHEST PAIN. pantoprazole 40 mg tablet Commonly known as:  PROTONIX  
TAKE 1 TAB BY MOUTH DAILY. promethazine 25 mg tablet Commonly known as:  PHENERGAN Take 1 Tab by mouth every six (6) hours as needed for Nausea. Indications: Pain Treatment Adjunct, POST-OPERATIVE NAUSEA AND VOMITING  
  
 rOPINIRole 0.5 mg tablet Commonly known as:  Brandi Darek Take 2 Tabs by mouth daily (after dinner). Take 1.5 to 2 tablets daily after dinner  
  
 valsartan 160 mg tablet Commonly known as:  DIOVAN  
TAKE 1 TABLET BY MOUTH DAILY  
  
 VITAMIN D3 2,000 unit Cap capsule Generic drug:  Cholecalciferol (Vitamin D3) Take 2,000 Units by mouth daily. We Performed the Following CBC W/O DIFF [29487 CPT(R)] LIPID PANEL [90860 CPT(R)] PSA W/ REFLX FREE PSA [96443 CPT(R)] PTH INTACT [72910 CPT(R)] RENAL FUNCTION PANEL [15354 CPT(R)] Follow-up Instructions Return in about 6 months (around 8/28/2018) for follow up on HTN. .  
  
  
Introducing [facility] & HEALTH SERVICES! [facility] [email or web address] introduces [employer] patient portal. Now you can access parts of your medical record, email your doctor's office, and request medication refills online. 1. In your internet browser, go to [email or web address] 2. Click on the First Time User? Click Here link in the Sign In box. You will see the New Member Sign Up page. 3. Enter your [employer] Access Code exactly as it appears below. You will not need to use this code after youve completed the sign-up process. If you do not sign up before the expiration date, you must request a new code. · [employer] Access Code: [email or web address] Expires: 3/7/2018  3:41 PM 
 
4. Enter the last four digits of your Social Security Number (xxxx) and Date of Birth (mm/dd/yyyy) as indicated and click Submit. You will be taken to the next sign-up page. 5. Create a When You Wisht ID. This will be your [employer] login ID and cannot be changed, so think of one that is secure and easy to remember. 6. Create a [employer] password. You can change your password at any time. 7. Enter your Password Reset Question and Answer. This can be used at a later time if you forget your password. 8. Enter your e-mail address. You will receive e-mail notification when new information is available in [address] 9. Click Sign Up. You can now view and download portions of your medical record. 10. Click the Download Summary menu link to download a portable copy of your medical information. If you have questions, please visit the Frequently Asked Questions section of the [employer] website. Remember, [employer] is NOT to be used for urgent needs. For medical emergencies, dial 911. Now available from your iPhone and Android! Please provide this summary of care documentation to your next provider. Your primary care clinician is listed as Harsha Swan. If you have any questions after today's visit, please call [phone].

## 2018-03-01 LAB
ALBUMIN SERPL-MCNC: 4.2 G/DL (ref 3.5–4.8)
BUN SERPL-MCNC: 17 MG/DL (ref 8–27)
BUN/CREAT SERPL: 9 (ref 10–24)
CALCIUM SERPL-MCNC: 9.3 MG/DL (ref 8.6–10.2)
CHLORIDE SERPL-SCNC: 102 MMOL/L (ref 96–106)
CHOLEST SERPL-MCNC: 142 MG/DL (ref 100–199)
CO2 SERPL-SCNC: 25 MMOL/L (ref 18–29)
CREAT SERPL-MCNC: 1.96 MG/DL (ref 0.76–1.27)
ERYTHROCYTE [DISTWIDTH] IN BLOOD BY AUTOMATED COUNT: 14.6 % (ref 12.3–15.4)
GFR SERPLBLD CREATININE-BSD FMLA CKD-EPI: 33 ML/MIN/1.73
GFR SERPLBLD CREATININE-BSD FMLA CKD-EPI: 38 ML/MIN/1.73
GLUCOSE SERPL-MCNC: 82 MG/DL (ref 65–99)
HCT VFR BLD AUTO: 34 % (ref 37.5–51)
HDLC SERPL-MCNC: 39 MG/DL
HGB BLD-MCNC: 11.4 G/DL (ref 13–17.7)
LDLC SERPL CALC-MCNC: 55 MG/DL (ref 0–99)
MCH RBC QN AUTO: 30.4 PG (ref 26.6–33)
MCHC RBC AUTO-ENTMCNC: 33.5 G/DL (ref 31.5–35.7)
MCV RBC AUTO: 91 FL (ref 79–97)
PHOSPHATE SERPL-MCNC: 3.6 MG/DL (ref 2.5–4.5)
PLATELET # BLD AUTO: 244 X10E3/UL (ref 150–379)
POTASSIUM SERPL-SCNC: 4.1 MMOL/L (ref 3.5–5.2)
PSA SERPL-MCNC: 1.8 NG/ML (ref 0–4)
PTH-INTACT SERPL-MCNC: 37 PG/ML (ref 15–65)
RBC # BLD AUTO: 3.75 X10E6/UL (ref 4.14–5.8)
REFLEX CRITERIA: NORMAL
SODIUM SERPL-SCNC: 143 MMOL/L (ref 134–144)
TRIGL SERPL-MCNC: 238 MG/DL (ref 0–149)
VLDLC SERPL CALC-MCNC: 48 MG/DL (ref 5–40)
WBC # BLD AUTO: 7.4 X10E3/UL (ref 3.4–10.8)

## 2018-03-06 ENCOUNTER — OFFICE VISIT (OUTPATIENT)
Dept: CARDIOLOGY CLINIC | Age: 73
End: 2018-03-06

## 2018-03-06 VITALS
HEIGHT: 72 IN | WEIGHT: 251.2 LBS | HEART RATE: 73 BPM | DIASTOLIC BLOOD PRESSURE: 72 MMHG | RESPIRATION RATE: 18 BRPM | OXYGEN SATURATION: 95 % | BODY MASS INDEX: 34.02 KG/M2 | SYSTOLIC BLOOD PRESSURE: 122 MMHG

## 2018-03-06 DIAGNOSIS — I25.83 CORONARY ARTERY DISEASE DUE TO LIPID RICH PLAQUE: ICD-10-CM

## 2018-03-06 DIAGNOSIS — Z95.5 S/P CORONARY ARTERY STENT PLACEMENT: ICD-10-CM

## 2018-03-06 DIAGNOSIS — R73.03 PREDIABETES: ICD-10-CM

## 2018-03-06 DIAGNOSIS — N18.30 CHRONIC KIDNEY DISEASE, STAGE III (MODERATE) (HCC): ICD-10-CM

## 2018-03-06 DIAGNOSIS — G47.33 OBSTRUCTIVE SLEEP APNEA: ICD-10-CM

## 2018-03-06 DIAGNOSIS — I25.10 CORONARY ARTERY DISEASE DUE TO LIPID RICH PLAQUE: ICD-10-CM

## 2018-03-06 DIAGNOSIS — E78.2 MIXED HYPERLIPIDEMIA: Chronic | ICD-10-CM

## 2018-03-06 DIAGNOSIS — I10 ESSENTIAL HYPERTENSION, BENIGN: Primary | Chronic | ICD-10-CM

## 2018-03-06 DIAGNOSIS — E66.09 CLASS 1 OBESITY DUE TO EXCESS CALORIES WITH BODY MASS INDEX (BMI) OF 34.0 TO 34.9 IN ADULT, UNSPECIFIED WHETHER SERIOUS COMORBIDITY PRESENT: ICD-10-CM

## 2018-03-06 NOTE — PROGRESS NOTES
1. Have you been to the ER, urgent care clinic since your last visit? Hospitalized since your last visit? No.    2. Have you seen or consulted any other health care providers outside of the [address] [place] [address] since your last visit? Include any pap smears or colon screening. No.          Chief Complaint   Patient presents with    Other     6 month-chest heaviness that comes and goes     HAS NOT HAD CHLORTHALIDONE IN 1 MONTH.

## 2018-03-06 NOTE — MR AVS SNAPSHOT
[facility] [address] [phone] Patient: Shirline Boeck MRN: [id number] Visit Information Date & Time Provider Department Dept. Phone Encounter #  
 3/6/2018 10:15 AM Neo Morrison, [address] [place] [address] [facility] [id number] Upcoming Health Maintenance Date Due  
 GLAUCOMA SCREENING Q2Y 8/5/2016 Pneumococcal 65+ Low/Medium Risk (2 of 2 - PPSV23) 6/20/2017 MEDICARE YEARLY EXAM 8/24/2018 COLONOSCOPY 9/24/2024 DTaP/Tdap/Td series (2 - Td) 10/24/2026 Allergies as of 3/6/2018  Review Complete On: 3/6/2018 By: Neo Morrison MD  
  
 Severity Noted Reaction Type Reactions Penicillins High 10/01/2009    Hives Bactrim [Sulfamethoxazole-trimethoprim]  03/31/2010   Systemic Hives Codeine  12/26/2017    Itching Lisinopril (Bulk)  10/01/2009    Cough Neurontin [Gabapentin]  10/01/2009   Side Effect Other (comments) drowsy Zostavax [Zoster Vaccine Live (Pf)]  11/20/2013   Not Verified Rash See 9/10/13 visit Current Immunizations  Reviewed on 9/28/2016 Name Date Influenza Vaccine 10/1/2014, 10/1/2013 Influenza Vaccine (Quad) PF 9/20/2017  2:25 PM, 10/7/2016, 9/30/2015 Influenza Vaccine Split 9/19/2012, 10/6/2011, 10/27/2010 Influenza Vaccine Whole 10/23/2008 Tdap 10/24/2016 10:04 PM, 3/21/2016 ZZZ-RETIRED (DO NOT USE) Pneumococcal Vaccine (Unspecified Type) 6/20/2012 Zoster Vaccine, Live 8/15/2013 Not reviewed this visit You Were Diagnosed With   
  
 Codes Comments Essential hypertension, benign    -  Primary ICD-10-CM: I10 
ICD-9-CM: 401.1 Mixed hyperlipidemia     ICD-10-CM: E78.2 ICD-9-CM: 272.2 Coronary artery disease due to lipid rich plaque     ICD-10-CM: I25.10, I25.83 ICD-9-CM: 414.00, 414.3 S/P coronary artery stent placement     ICD-10-CM: Z95.5 ICD-9-CM: V45.82   
 Class 1 obesity due to excess calories with body mass index (BMI) of 34.0 to 34.9 in adult, unspecified whether serious comorbidity present     ICD-10-CM: E66.09, Z68.34 
ICD-9-CM: 278.00, V85.34 Prediabetes     ICD-10-CM: R73.03 
ICD-9-CM: 790.29 Chronic kidney disease, stage III (moderate)     ICD-10-CM: N18.3 ICD-9-CM: 380. 3 Obstructive sleep apnea     ICD-10-CM: G47.33 
ICD-9-CM: 327.23 Vitals BP Pulse Resp Height(growth percentile) Weight(growth percentile) SpO2  
 122/72 (BP 1 Location: Right arm, BP Patient Position: Sitting) 73 18 6' (1.829 m) 251 lb 3.2 oz (113.9 kg) 95% BMI Smoking Status 34.07 kg/m2 Former Smoker Vitals History BMI and BSA Data Body Mass Index Body Surface Area 34.07 kg/m 2 2.41 m 2 Preferred Pharmacy Pharmacy Name Phone [facility]/PHARMACY #[id number], [place] - [address] [phone] Your Updated Medication List  
  
   
This list is accurate as of 3/6/18 11:13 AM.  Always use your most recent med list.  
  
  
  
  
 albuterol 90 mcg/actuation inhaler Commonly known as:  PROVENTIL HFA, VENTOLIN HFA, PROAIR HFA Take 1 Puff by inhalation every six (6) hours as needed for Wheezing. allopurinol 100 mg tablet Commonly known as:  Daryl Jameson Take 100 mg by mouth every morning. amLODIPine 5 mg tablet Commonly known as:  Jon Nelson Take 5 mg by mouth daily. aspirin delayed-release 81 mg tablet Take 81 mg by mouth daily. atorvastatin 40 mg tablet Commonly known as:  LIPITOR  
TAKE 1 TABLET BY MOUTH NIGHTLY*****STOP SIMVASTATIN****  
  
 CENTRUM PO Take 1 Tab by mouth daily. chlorthalidone 25 mg tablet Commonly known as:  Angela Mar Take 0.5 Tabs by mouth daily. citalopram 40 mg tablet Commonly known as:  CELEXA  
TAKE 1 TABLET BY MOUTH EVERY DAY  
  
 clopidogrel 75 mg Tab Commonly known as:  PLAVIX Take 1 Tab by mouth daily. glucosamine-chondroitin 750-600 mg Tab Take 1 Tab by mouth daily. Brand: [employer] NASAL CPAP SYSTEM  
by Does Not Apply route. isosorbide mononitrate ER 30 mg tablet Commonly known as:  IMDUR  
TAKE 0.5 TABS BY MOUTH DAILY. metoprolol succinate 25 mg XL tablet Commonly known as:  TOPROL-XL  
TAKE ONE TABLET BY MOUTH EVERY DAY  
  
 nitroglycerin 0.4 mg SL tablet Commonly known as:  NITROSTAT  
TAKE 1 TABLET BY SUBLINGUAL ROUTE EVERY 5 MINUTES AS NEEDED FOR CHEST PAIN. pantoprazole 40 mg tablet Commonly known as:  PROTONIX  
TAKE 1 TAB BY MOUTH DAILY. promethazine 25 mg tablet Commonly known as:  PHENERGAN Take 1 Tab by mouth every six (6) hours as needed for Nausea. Indications: Pain Treatment Adjunct, POST-OPERATIVE NAUSEA AND VOMITING  
  
 rOPINIRole 0.5 mg tablet Commonly known as:  Quoc Mcneal Take 2 Tabs by mouth daily (after dinner). Take 1.5 to 2 tablets daily after dinner  
  
 valsartan 160 mg tablet Commonly known as:  DIOVAN  
TAKE 1 TABLET BY MOUTH DAILY  
  
 VITAMIN D3 2,000 unit Cap capsule Generic drug:  Cholecalciferol (Vitamin D3) Take 2,000 Units by mouth daily. We Performed the Following AMB POC EKG ROUTINE W/ 12 LEADS, INTER & REP [14561 CPT(R)] Introducing [facility] SERVICES! Oli Hayes introduces [employer] patient portal. Now you can access parts of your medical record, email your doctor's office, and request medication refills online. 1. In your internet browser, go to [email or web address] 2. Click on the First Time User? Click Here link in the Sign In box. You will see the New Member Sign Up page. 3. Enter your [employer] Access Code exactly as it appears below. You will not need to use this code after youve completed the sign-up process. If you do not sign up before the expiration date, you must request a new code. · [employer] Access Code: [email or web address] Expires: 3/7/2018  3:41 PM 
 
4. Enter the last four digits of your Social Security Number (xxxx) and Date of Birth (mm/dd/yyyy) as indicated and click Submit. You will be taken to the next sign-up page. 5. Create a [employer] ID. This will be your [employer] login ID and cannot be changed, so think of one that is secure and easy to remember. 6. Create a [employer] password. You can change your password at any time. 7. Enter your Password Reset Question and Answer. This can be used at a later time if you forget your password. 8. Enter your e-mail address. You will receive e-mail notification when new information is available in [address] 9. Click Sign Up. You can now view and download portions of your medical record. 10. Click the Download Summary menu link to download a portable copy of your medical information. If you have questions, please visit the Frequently Asked Questions section of the [employer] website. Remember, [employer] is NOT to be used for urgent needs. For medical emergencies, dial 911. Now available from your iPhone and Android! Please provide this summary of care documentation to your next provider. Your primary care clinician is listed as Shanika Mcguire. If you have any questions after today's visit, please call [phone].

## 2018-03-06 NOTE — PROGRESS NOTES
[address] [place] [address], [place], [address] [facility]     Jonny Graham is a 74 y.o. male. Last seen 7 months ago. Subjective:       Mr. Eva Robles continues to have stable chest pressure, unchanged since last cath. He otherwise feels well overall. He states he has not had chlorthalidone for the past month. Patient denies any dyspnea, palpitations, syncope, orthopnea, edema or paroxysmal nocturnal dyspnea.       Patient Active Problem List    Diagnosis Date Noted    Chest pain 07/07/2017    Coronary artery disease due to lipid rich plaque 04/27/2016    Coronary artery disease involving native coronary artery of native heart without angina pectoris 03/16/2016    S/P coronary artery stent placement 05/12/2015    Benign essential tremor 01/22/2014    Hypertensive kidney disease with chronic kidney disease stage III 11/22/2013    Iron deficiency 05/23/2013    Obesity 01/30/2013    Gout 01/30/2013    Prediabetes 01/07/2012    Chronic kidney disease, stage III (moderate) 10/11/2009    Mixed hyperlipidemia 10/11/2009    Obstructive sleep apnea 10/11/2009    RLS (restless legs syndrome) 10/11/2009    Peripheral neuropathy 10/11/2009    DJD (degenerative joint disease), multiple sites 10/11/2009    Essential hypertension, benign 10/11/2009    Allergic rhinitis 10/11/2009    GERD (gastroesophageal reflux disease) 10/11/2009    ED (erectile dysfunction) 10/11/2009    Anxiety associated with depression 10/11/2009      Den Alvarez MD  Past Medical History:   Diagnosis Date    Abnormal stress echo 5/12/2015    Anemia NEC 03/2013    Last 2-3 months; finished taking iron supplement    Anxiety     CAD (coronary artery disease) 2009    cath [facility]) revealed 20%RCA and 50%2nd diagonal    Calculus of kidney     Chronic kidney disease     sees nephrologist every 6 months    Chronic kidney disease, stage III (moderate) 10/11/2009    30% kidney function    Diabetes ([address]     Pre-diabetic    DJD (degenerative joint disease)     GERD (gastroesophageal reflux disease) 10/11/2009    controlled with medication    Gout     Hypertension     Liver disease     fatty liver    Obesity     Obstructive sleep apnea (adult) (pediatric) 10/11/2009    nasal pillows; pressure set at 10-11 - uses cpap    Peripheral neuropathy 10/11/2009    bilateral feet (numbness)    Prediabetes     RLS (restless legs syndrome) 10/11/2009    S/P coronary artery stent placement 5/12/2015    5/11/15 PCI./MALI to LCx      Past Surgical History:   Procedure Laterality Date    HX HEART CATHETERIZATION  2009, 7/17    x1 stent    HX HERNIA REPAIR      [place]aney/umbilical    HX KNEE REPLACEMENT Left 7/23/11     LEFT TOTAL KNEE ARTHROPLASTY    HX ORTHOPAEDIC      left great toe pinning/plate    HX ORTHOPAEDIC      left shoulder manipulation    HX UROLOGICAL      basket extraction of kidney stones    FL COLONOSCOPY FLX DX W/COLLJ SPEC WHEN PFRMD  8/5/2010         FL COLSC FLX W/RMVL OF TUMOR POLYP LESION SNARE TQ  9/24/2014          Allergies   Allergen Reactions    Penicillins Hives    Bactrim [Sulfamethoxazole-Trimethoprim] Hives    Codeine Itching    Lisinopril (Bulk) Cough    Neurontin [Gabapentin] Other (comments)     drowsy    Zostavax [Zoster Vaccine Live (Pf)] Rash     See 9/10/13 visit      Family History   Problem Relation Age of Onset    Heart Disease Father     Hypertension Father     Other Father      abdominal aneurysm     Dementia Mother     Alzheimer Mother     Heart Disease Brother     Heart Attack Brother     Heart Disease Maternal Grandmother     Heart Disease Paternal Grandmother     Heart Attack Paternal Grandmother     Heart Disease Paternal Grandfather     Heart Attack Paternal Grandfather     Elevated Lipids Son     Elevated Lipids Daughter     Cancer Neg Hx     Diabetes Neg Hx     Stroke Neg Hx       Social History     Social History    Marital status:      Spouse name: N/A  Number of children: N/A    Years of education: N/A     Occupational History    Not on file. Social History Main Topics    Smoking status: Former Smoker     Packs/day: 1.00     Years: 10.00     Types: Cigarettes     Quit date: 1/1/1968    Smokeless tobacco: Never Used    Alcohol use No    Drug use: No    Sexual activity: Yes     Partners: Female     Birth control/ protection: None     Other Topics Concern    Not on file     Social History Narrative           Review of Systems  Constitutional: Negative for fever, chills, malaise/fatigue and diaphoresis. Respiratory: Negative for cough, hemoptysis, sputum production, shortness of breath and wheezing. Cardiovascular: Negative for palpitations, orthopnea, claudication, leg swelling and PND. +stable chest pressure  Gastrointestinal: Negative for heartburn, nausea, vomiting, blood in stool and melena. Genitourinary: Negative for dysuria and flank pain. Musculoskeletal: Negative for joint pain and back pain. Skin: Negative for rash. Neurological: Negative for focal weakness, seizures, loss of consciousness, weakness and headaches. Endo/Heme/Allergies: Does not bruise/bleed easily. Psychiatric/Behavioral: Negative for memory loss. The patient does not have insomnia. Physical Exam:    Visit Vitals    /72 (BP 1 Location: Right arm, BP Patient Position: Sitting)    Pulse 73    Resp 18    Ht 6' (1.829 m)    Wt 251 lb 3.2 oz (113.9 kg)    SpO2 95%    BMI 34.07 kg/m2     Wt Readings from Last 3 Encounters:   03/06/18 251 lb 3.2 oz (113.9 kg)   02/28/18 249 lb 9.6 oz (113.2 kg)   12/26/17 252 lb 3.3 oz (114.4 kg)       Gen: NAD    Mental Status - Alert. General Appearance - Not in acute distress. Neck - no JVD    Chest and Lung Exam   Inspection: Accessory muscles - No use of accessory muscles in breathing.    Auscultation:   Breath sounds: - Normal.     Cardiovascular   Inspection: Jugular vein - Bilateral - Inspection Normal. Palpation/Percussion:   Apical Impulse: - Normal.   Auscultation: Rhythm - Regular. Heart Sounds - S1 WNL and S2 WNL. No S3 or S4. Murmurs & Other Heart Sounds: Auscultation of the heart reveals - No Murmurs. Peripheral Vascular   Upper Extremity: Inspection - Bilateral - No Cyanotic nailbeds or Digital clubbing. Lower Extremity:   Palpation: Edema - Bilateral - No edema. Abdomen: Soft, non-tender, bowel sounds are active. Neuro: A&O times 3, CN and motor grossly WNL    Cardiographics  EKG 03/06/18-SR, RBBB     Assessment:     Encounter Diagnoses   Name Primary?  Essential hypertension, benign Yes    Mixed hyperlipidemia     Coronary artery disease due to lipid rich plaque     S/P coronary artery stent placement     Class 1 obesity due to excess calories with body mass index (BMI) of 34.0 to 34.9 in adult, unspecified whether serious comorbidity present     Prediabetes     Chronic kidney disease, stage III (moderate)     Obstructive sleep apnea           Plan:     Mr. Saran Parker denies any interval changes. He continues to have stable chest pain which is unchanged since most recent cath July 2017. He has been off Chlorthalidone x 1 month, but BP today is good- informed him to take only as needed for swelling. EKG is fine. Follow up in 6 months or PRN.      Written by Gutierrez Gaviria, as dictated by Manuel Flaherty MD.

## 2018-03-14 ENCOUNTER — TELEPHONE (OUTPATIENT)
Dept: INTERNAL MEDICINE CLINIC | Age: 73
End: 2018-03-14

## 2018-03-14 NOTE — TELEPHONE ENCOUNTER
Pt is advising if blood work results from last week can be sent to Dr. Rubina Sosa ([address]) . Pt states they have an appt tomorrow 03/14/18 at 10:00 am.  Best contact number is [phone].        Message received & copied from [facility]

## 2018-03-14 NOTE — PROGRESS NOTES
Called patient. Two patient identifiers verified. Advised of results and recommendations per Dr. Dominick Palm. Patient verbalized understanding and states no questions at this time.

## 2018-04-12 ENCOUNTER — OFFICE VISIT (OUTPATIENT)
Dept: INTERNAL MEDICINE CLINIC | Age: 73
End: 2018-04-12

## 2018-04-12 VITALS
BODY MASS INDEX: 34.06 KG/M2 | TEMPERATURE: 98.6 F | SYSTOLIC BLOOD PRESSURE: 105 MMHG | OXYGEN SATURATION: 94 % | RESPIRATION RATE: 16 BRPM | DIASTOLIC BLOOD PRESSURE: 63 MMHG | HEART RATE: 73 BPM | WEIGHT: 251.5 LBS | HEIGHT: 72 IN

## 2018-04-12 DIAGNOSIS — E78.2 MIXED HYPERLIPIDEMIA: Chronic | ICD-10-CM

## 2018-04-12 DIAGNOSIS — M25.561 CHRONIC PAIN OF RIGHT KNEE: ICD-10-CM

## 2018-04-12 DIAGNOSIS — G60.3 IDIOPATHIC PROGRESSIVE NEUROPATHY: ICD-10-CM

## 2018-04-12 DIAGNOSIS — M25.511 CHRONIC PAIN OF BOTH SHOULDERS: ICD-10-CM

## 2018-04-12 DIAGNOSIS — N18.30 CHRONIC KIDNEY DISEASE, STAGE III (MODERATE) (HCC): ICD-10-CM

## 2018-04-12 DIAGNOSIS — M65.331 TRIGGER MIDDLE FINGER OF RIGHT HAND: ICD-10-CM

## 2018-04-12 DIAGNOSIS — I10 ESSENTIAL HYPERTENSION, BENIGN: Primary | Chronic | ICD-10-CM

## 2018-04-12 DIAGNOSIS — M25.512 CHRONIC PAIN OF BOTH SHOULDERS: ICD-10-CM

## 2018-04-12 DIAGNOSIS — G25.81 RLS (RESTLESS LEGS SYNDROME): ICD-10-CM

## 2018-04-12 DIAGNOSIS — M25.561 RIGHT KNEE PAIN, UNSPECIFIED CHRONICITY: Primary | ICD-10-CM

## 2018-04-12 DIAGNOSIS — G89.29 CHRONIC PAIN OF BOTH SHOULDERS: ICD-10-CM

## 2018-04-12 DIAGNOSIS — I25.10 CORONARY ARTERY DISEASE INVOLVING NATIVE CORONARY ARTERY OF NATIVE HEART WITHOUT ANGINA PECTORIS: ICD-10-CM

## 2018-04-12 DIAGNOSIS — G89.29 CHRONIC PAIN OF RIGHT KNEE: ICD-10-CM

## 2018-04-12 NOTE — MR AVS SNAPSHOT
[address] [facility] 
[phone] Patient: Ryan Alfredo MRN:  [id number] Visit Information Date & Time Provider Department Dept. Phone Encounter #  
 4/12/2018  2:15 PM Dina Campbell, [address] [phone] [id number] Follow-up Instructions Return in about 6 months (around 10/12/2018) for follow up on medications/medicare wellness. Pranav Dials Upcoming Health Maintenance Date Due Pneumococcal 65+ Low/Medium Risk (2 of 2 - PPSV23) 6/20/2017 GLAUCOMA SCREENING Q2Y 1/4/2019* MEDICARE YEARLY EXAM 8/24/2018 COLONOSCOPY 9/24/2024 DTaP/Tdap/Td series (2 - Td) 10/24/2026 *Topic was postponed. The date shown is not the original due date. Allergies as of 4/12/2018  Review Complete On: 4/12/2018 By: Dina Campbell MD  
  
 Severity Noted Reaction Type Reactions Penicillins High 10/01/2009    Hives Bactrim [Sulfamethoxazole-trimethoprim]  03/31/2010   Systemic Hives Codeine  12/26/2017    Itching Lisinopril (Bulk)  10/01/2009    Cough Neurontin [Gabapentin]  10/01/2009   Side Effect Other (comments) drowsy Zostavax [Zoster Vaccine Live (Pf)]  11/20/2013   Not Verified Rash See 9/10/13 visit Current Immunizations  Reviewed on 9/28/2016 Name Date Influenza Vaccine 10/1/2014, 10/1/2013 Influenza Vaccine (Quad) PF 9/20/2017  2:25 PM, 10/7/2016, 9/30/2015 Influenza Vaccine Split 9/19/2012, 10/6/2011, 10/27/2010 Influenza Vaccine Whole 10/23/2008 Tdap 10/24/2016 10:04 PM, 3/21/2016 ZZZ-RETIRED (DO NOT USE) Pneumococcal Vaccine (Unspecified Type) 6/20/2012 Zoster Vaccine, Live 8/15/2013 Not reviewed this visit You Were Diagnosed With   
  
 Codes Comments Essential hypertension, benign    -  Primary ICD-10-CM: I10 
ICD-9-CM: 401.1 Trigger middle finger of right hand     ICD-10-CM: M65.331 ICD-9-CM: 727.03 Chronic pain of right knee     ICD-10-CM: M25.561, G89.29 ICD-9-CM: 719.46, 338.29 Chronic pain of both shoulders     ICD-10-CM: M25.511, G89.29, M25.512 ICD-9-CM: 719.41, 338.29 Mixed hyperlipidemia     ICD-10-CM: E78.2 ICD-9-CM: 272.2 Chronic kidney disease, stage III (moderate)     ICD-10-CM: N18.3 ICD-9-CM: 585.3 RLS (restless legs syndrome)     ICD-10-CM: G25.81 ICD-9-CM: 333.94 Idiopathic progressive neuropathy     ICD-10-CM: G60.3 ICD-9-CM: 356.4 Coronary artery disease involving native coronary artery of native heart without angina pectoris     ICD-10-CM: I25.10 ICD-9-CM: 414.01 Vitals BP Pulse Temp Resp Height(growth percentile) Weight(growth percentile) 105/63 (BP 1 Location: Left arm, BP Patient Position: Sitting) 73 98.6 °F (37 °C) (Oral) 16 6' (1.829 m) 251 lb 8 oz (114.1 kg) SpO2 BMI Smoking Status 94% 34.11 kg/m2 Former Smoker BMI and BSA Data Body Mass Index Body Surface Area  
 34.11 kg/m 2 2.41 m 2 Preferred Pharmacy Pharmacy Name Phone [id number]- [place], [place] - [address] [phone] Your Updated Medication List  
  
   
This list is accurate as of 4/12/18  2:41 PM.  Always use your most recent med list.  
  
  
  
  
 albuterol 90 mcg/actuation inhaler Commonly known as:  PROVENTIL HFA, VENTOLIN HFA, PROAIR HFA Take 1 Puff by inhalation every six (6) hours as needed for Wheezing. allopurinol 100 mg tablet Commonly known as:  Cary Champ Take 100 mg by mouth every morning. amLODIPine 5 mg tablet Commonly known as:  Olimpia Knotton Take 5 mg by mouth daily. aspirin delayed-release 81 mg tablet Take 81 mg by mouth daily. atorvastatin 40 mg tablet Commonly known as:  LIPITOR  
TAKE 1 TABLET BY MOUTH NIGHTLY*****STOP SIMVASTATIN****  
  
 CENTRUM PO Take 1 Tab by mouth daily. chlorthalidone 25 mg tablet Commonly known as:  Dai George Take 0.5 Tabs by mouth daily. citalopram 40 mg tablet Commonly known as:  CELEXA  
TAKE 1 TABLET BY MOUTH EVERY DAY  
  
 clopidogrel 75 mg Tab Commonly known as:  PLAVIX Take 1 Tab by mouth daily. glucosamine-chondroitin 750-600 mg Tab Take 1 Tab by mouth daily. Brand: Move SiCortex NASAL CPAP SYSTEM  
by Does Not Apply route. isosorbide mononitrate ER 30 mg tablet Commonly known as:  IMDUR  
TAKE 0.5 TABS BY MOUTH DAILY. metoprolol succinate 25 mg XL tablet Commonly known as:  TOPROL-XL  
TAKE ONE TABLET BY MOUTH EVERY DAY  
  
 nitroglycerin 0.4 mg SL tablet Commonly known as:  NITROSTAT  
TAKE 1 TABLET BY SUBLINGUAL ROUTE EVERY 5 MINUTES AS NEEDED FOR CHEST PAIN. pantoprazole 40 mg tablet Commonly known as:  PROTONIX  
TAKE 1 TAB BY MOUTH DAILY. promethazine 25 mg tablet Commonly known as:  PHENERGAN Take 1 Tab by mouth every six (6) hours as needed for Nausea. Indications: Pain Treatment Adjunct, POST-OPERATIVE NAUSEA AND VOMITING  
  
 rOPINIRole 0.5 mg tablet Commonly known as:  Milus Law Take 2 Tabs by mouth daily (after dinner). Take 1.5 to 2 tablets daily after dinner  
  
 valsartan 160 mg tablet Commonly known as:  DIOVAN  
TAKE 1 TABLET BY MOUTH DAILY  
  
 VITAMIN D3 2,000 unit Cap capsule Generic drug:  Cholecalciferol (Vitamin D3) Take 2,000 Units by mouth daily. We Performed the Following REFERRAL TO ORTHOPEDICS [QWK473 Custom] Comments:  
 Right 3rd digit trigger finger. Follow-up Instructions Return in about 6 months (around 10/12/2018) for follow up on medications/medicare wellness. Rohit Chambers To-Do List   
 04/12/2018 Imaging:  XR KNEE RT MAX 2 VWS Referral Information Referral ID Referred By Referred To  
  
 [id number] Kei Huston MD   
   [address] [facility] [address] [facility] [facility] Phone: [phone] Fax: [phone] Visits Status Start Date End Date 1 New Request 4/12/18 4/12/19 If your referral has a status of pending review or denied, additional information will be sent to support the outcome of this decision. Patient Instructions Rotator Cuff: Exercises Your Care Instructions Here are some examples of typical rehabilitation exercises for your condition. Start each exercise slowly. Ease off the exercise if you start to have pain. Your doctor or physical therapist will tell you when you can start these exercises and which ones will work best for you. How to do the exercises Pendulum swing If you have pain in your back, do not do this exercise. 1. Hold on to a table or the back of a chair with your good arm. Then bend forward a little and let your sore arm hang straight down. This exercise does not use the arm muscles. Rather, use your legs and your hips to create movement that makes your arm swing freely. 2. Use the movement from your hips and legs to guide the slightly swinging arm back and forth like a pendulum (or elephant trunk). Then guide it in circles that start small (about the size of a dinner plate). Make the circles a bit larger each day, as your pain allows. 3. Do this exercise for 5 minutes, 5 to 7 times each day. 4. As you have less pain, try bending over a little farther to do this exercise. This will increase the amount of movement at your shoulder. Posterior stretching exercise 1. Hold the elbow of your injured arm with your other hand. 2. Use your hand to pull your injured arm gently up and across your body. You will feel a gentle stretch across the back of your injured shoulder. 3. Hold for at least 15 to 30 seconds. Then slowly lower your arm. 4. Repeat 2 to 4 times. Up-the-back stretch Your doctor or physical therapist may want you to wait to do this stretch until you have regained most of your range of motion and strength. You can do this stretch in different ways. Hold any of these stretches for at least 15 to 30 seconds. Repeat them 2 to 4 times. 1. Put your hand in your back pocket. Let it rest there to stretch your shoulder. 2. With your other hand, hold your injured arm (palm outward) behind your back by the wrist. Pull your arm up gently to stretch your shoulder. 3. Next, put a towel over your other shoulder. Put the hand of your injured arm behind your back. Now hold the back end of the towel. With the other hand, hold the front end of the towel in front of your body. Pull gently on the front end of the towel. This will bring your hand farther up your back to stretch your shoulder. Overhead stretch 1. Standing about an arm's length away, grasp onto a solid surface. You could use a countertop, a doorknob, or the back of a sturdy chair. 2. With your knees slightly bent, bend forward with your arms straight. Lower your upper body, and let your shoulders stretch. 3. As your shoulders are able to stretch farther, you may need to take a step or two backward. 4. Hold for at least 15 to 30 seconds. Then stand up and relax. If you had stepped back during your stretch, step forward so you can keep your hands on the solid surface. 5. Repeat 2 to 4 times. Shoulder flexion (lying down) To make a wand for this exercise, use a piece of PVC pipe or a broom handle with the broom removed. Make the wand about a foot wider than your shoulders. 1. Lie on your back, holding a wand with both hands. Your palms should face down as you hold the wand. 2. Keeping your elbows straight, slowly raise your arms over your head. Raise them until you feel a stretch in your shoulders, upper back, and chest. 
3. Hold for 15 to 30 seconds. 4. Repeat 2 to 4 times. Shoulder rotation (lying down) To make a wand for this exercise, use a piece of PVC pipe or a broom handle with the broom removed. Make the wand about a foot wider than your shoulders. 1. Lie on your back. Hold a wand with both hands with your elbows bent and palms up. 2. Keep your elbows close to your body, and move the wand across your body toward the sore arm. 3. Hold for 8 to 12 seconds. 4. Repeat 2 to 4 times. Wall climbing (to the side) Avoid any movement that is straight to your side, and be careful not to arch your back. Your arm should stay about 30 degrees to the front of your side. 1. Stand with your side to a wall so that your fingers can just touch it at an angle about 30 degrees toward the front of your body. 2. Walk the fingers of your injured arm up the wall as high as pain permits. Try not to shrug your shoulder up toward your ear as you move your arm up. 3. Hold that position for a count of at least 15 to 20. 
4. Walk your fingers back down to the starting position. 5. Repeat at least 2 to 4 times. Try to reach higher each time. Wall climbing (to the front) During this stretching exercise, be careful not to arch your back. 1. Face a wall, and stand so your fingers can just touch it. 2. Keeping your shoulder down, walk the fingers of your injured arm up the wall as high as pain permits. (Don't shrug your shoulder up toward your ear.) 3. Hold your arm in that position for at least 15 to 30 seconds. 4. Slowly walk your fingers back down to where you started. 5. Repeat at least 2 to 4 times. Try to reach higher each time. Shoulder blade squeeze 1. Stand with your arms at your sides, and squeeze your shoulder blades together. Do not raise your shoulders up as you squeeze. 2. Hold 6 seconds. 3. Repeat 8 to 12 times. Scapular exercise: Arm reach 1. Lie flat on your back. This exercise is a very slight motion that starts with your arms raised (elbows straight, arms straight). 2. From this position, reach higher toward the scar or ceiling.  Keep your elbows straight. All motion should be from your shoulder blade only. 3. Relax your arms back to where you started. 4. Repeat 8 to 12 times. Arm raise to the side During this strengthening exercise, your arm should stay about 30 degrees to the front of your side. 1. Slowly raise your injured arm to the side, with your thumb facing up. Raise your arm 60 degrees at the most (shoulder level is 90 degrees). 2. Hold the position for 3 to 5 seconds. Then lower your arm back to your side. If you need to, bring your \"good\" arm across your body and place it under the elbow as you lower your injured arm. Use your good arm to keep your injured arm from dropping down too fast. 
3. Repeat 8 to 12 times. 4. When you first start out, don't hold any extra weight in your hand. As you get stronger, you may use a 1-pound to 2-pound dumbbell or a small can of food. Shoulder flexor and extensor exercise These are isometric exercises. That means you contract your muscles without actually moving. 1. Push forward (flex): Stand facing a wall or doorjamb, about 6 inches or less back. Hold your injured arm against your body. Make a closed fist with your thumb on top. Then gently push your hand forward into the wall with about 25% to 50% of your strength. Don't let your body move backward as you push. Hold for about 6 seconds. Relax for a few seconds. Repeat 8 to 12 times. 2. Push backward (extend): Stand with your back flat against a wall. Your upper arm should be against the wall, with your elbow bent 90 degrees (your hand straight ahead). Push your elbow gently back against the wall with about 25% to 50% of your strength. Don't let your body move forward as you push. Hold for about 6 seconds. Relax for a few seconds. Repeat 8 to 12 times. Scapular exercise: Wall push-ups This exercise is best done with your fingers somewhat turned out, rather than straight up and down. 1. Stand facing a wall, about 12 inches to 18 inches away. 2. Place your hands on the wall at shoulder height. 3. Slowly bend your elbows and bring your face to the wall. Keep your back and hips straight. 4. Push back to where you started. 5. Repeat 8 to 12 times. 6. When you can do this exercise against a wall comfortably, you can try it against a counter. You can then slowly progress to the end of a couch, then to a sturdy chair, and finally to the floor. Scapular exercise: Retraction For this exercise, you will need elastic exercise material, such as surgical tubing or Thera-Band. 1. Put the band around a solid object at about waist level. (A bedpost will work well.) Each hand should hold an end of the band. 2. With your elbows at your sides and bent to 90 degrees, pull the band back. Your shoulder blades should move toward each other. Then move your arms back where you started. 3. Repeat 8 to 12 times. 4. If you have good range of motion in your shoulders, try this exercise with your arms lifted out to the sides. Keep your elbows at a 90-degree angle. Raise the elastic band up to about shoulder level. Pull the band back to move your shoulder blades toward each other. Then move your arms back where you started. Internal rotator strengthening exercise 1. Start by tying a piece of elastic exercise material to a doorknob. You can use surgical tubing or Thera-Band. 2. Stand or sit with your shoulder relaxed and your elbow bent 90 degrees. Your upper arm should rest comfortably against your side. Squeeze a rolled towel between your elbow and your body for comfort. This will help keep your arm at your side. 3. Hold one end of the elastic band in the hand of the painful arm. 4. Slowly rotate your forearm toward your body until it touches your belly. Slowly move it back to where you started. 5. Keep your elbow and upper arm firmly tucked against the towel roll or at your side. 6. Repeat 8 to 12 times. External rotator strengthening exercise 1. Start by tying a piece of elastic exercise material to a doorknob. You can use surgical tubing or Thera-Band. (You may also hold one end of the band in each hand.) 2. Stand or sit with your shoulder relaxed and your elbow bent 90 degrees. Your upper arm should rest comfortably against your side. Squeeze a rolled towel between your elbow and your body for comfort. This will help keep your arm at your side. 3. Hold one end of the elastic band with the hand of the painful arm. 4. Start with your forearm across your belly. Slowly rotate the forearm out away from your body. Keep your elbow and upper arm tucked against the towel roll or the side of your body until you begin to feel tightness in your shoulder. Slowly move your arm back to where you started. 5. Repeat 8 to 12 times. Follow-up care is a key part of your treatment and safety. Be sure to make and go to all appointments, and call your doctor if you are having problems. It's also a good idea to know your test results and keep a list of the medicines you take. Where can you learn more? Go to http://antonieta-josie.info/. Enter Angie Brambila in the search box to learn more about \"Rotator Cuff: Exercises. \" Current as of: March 21, 2017 Content Version: 11.4 © 5381-0815 Healthwise, Incorporated. Care instructions adapted under license by Socialbomb (which disclaims liability or warranty for this information). If you have questions about a medical condition or this instruction, always ask your healthcare professional. [place] [address] any warranty or liability for your use of this information. Knee: Exercises Your Care Instructions Here are some examples of exercises for your knee. Start each exercise slowly. Ease off the exercise if you start to have pain.  
Your doctor or physical therapist will tell you when you can start these exercises and which ones will work best for you. How to do the exercises [facility] [employer] 5. Sit with your leg straight and supported on the floor or a firm bed. (If you feel discomfort in the front or back of your knee, place a small towel roll under your knee.) 6. Tighten the muscles on top of your thigh by pressing the back of your knee flat down to the floor. (If you feel discomfort under your kneecap, place a small towel roll under your knee.) 7. Hold for about 6 seconds, then rest for up to 10 seconds. 8. Do 8 to 12 repetitions several times a day. Straight-leg raises to the front 5. Lie on your back with your good knee bent so that your foot rests flat on the floor. Your injured leg should be straight. Make sure that your low back has a normal curve. You should be able to slip your flat hand in between the floor and the small of your back, with your palm touching the floor and your back touching the back of your hand. 6. Tighten the thigh muscles in the injured leg by pressing the back of your knee flat down to the floor. Hold your knee straight. 7. Keeping the thigh muscles tight, lift your injured leg up so that your heel is about 12 inches off the floor. Hold for about 6 seconds and then lower slowly. 8. Do 8 to 12 repetitions, 3 times a day. Straight-leg raises to the outside 4. Lie on your side, with your injured leg on top. 5. Tighten the front thigh muscles of your injured leg to keep your knee straight. 6. Keep your hip and your leg straight in line with the rest of your body, and keep your knee pointing forward. Do not drop your hip back. 7. Lift your injured leg straight up toward the ceiling, about 12 inches off the floor. Hold for about 6 seconds, then slowly lower your leg. 8. Do 8 to 12 repetitions. Straight-leg raises to the back 6. Lie on your stomach, and lift your leg straight up behind you (toward the ceiling). 7. Lift your toes about 6 inches off the floor, hold for about 6 seconds, then lower slowly. 8. Do 8 to 12 repetitions. Straight-leg raises to the inside 5. Lie on the side of your body with the injured leg. 6. You can either prop your other (good) leg up on a chair, or you can bend your good knee and put that foot in front of your injured knee. Do not drop your hip back. 7. Tighten the muscles on the front of your thigh to straighten your injured knee. 8. Keep your kneecap pointing forward, and lift your whole leg up toward the ceiling about 6 inches. Hold for about 6 seconds, then lower slowly. 9. Do 8 to 12 repetitions. Heel dig bridging 5. Lie on your back with both knees bent and your ankles bent so that only your heels are digging into the floor. Your knees should be bent about 90 degrees. 6. Then push your heels into the floor, squeeze your buttocks, and lift your hips off the floor until your shoulders, hips, and knees are all in a straight line. 7. Hold for about 6 seconds as you continue to breathe normally, and then slowly lower your hips back down to the floor and rest for up to 10 seconds. 8. Do 8 to 12 repetitions. Hamstring curls 6. Lie on your stomach with your knees straight. If your kneecap is uncomfortable, roll up a washcloth and put it under your leg just above your kneecap. 7. Lift the foot of your injured leg by bending the knee so that you bring the foot up toward your buttock. If this motion hurts, try it without bending your knee quite as far. This may help you avoid any painful motion. 8. Slowly lower your leg back to the floor. 9. Do 8 to 12 repetitions. 10. With permission from your doctor or physical therapist, you may also want to add a cuff weight to your ankle (not more than 5 pounds). With weight, you do not have to lift your leg more than 12 inches to get a hamstring workout. Shallow standing knee bends Do this exercise only if you have very little pain; if you have no clicking, locking, or giving way if you have an injured knee; and if it does not hurt while you are doing 8 to 12 repetitions. 6. Stand with your hands lightly resting on a counter or chair in front of you. Put your feet shoulder-width apart. 7. Slowly bend your knees so that you squat down like you are going to sit in a chair. Make sure your knees do not go in front of your toes. 8. Lower yourself about 6 inches. Your heels should remain on the floor at all times. 9. Rise slowly to a standing position. Heel raises 4. Stand with your feet a few inches apart, with your hands lightly resting on a counter or chair in front of you. 5. Slowly raise your heels off the floor while keeping your knees straight. 6. Hold for about 6 seconds, then slowly lower your heels to the floor. 7. Do 8 to 12 repetitions several times during the day. Follow-up care is a key part of your treatment and safety. Be sure to make and go to all appointments, and call your doctor if you are having problems. It's also a good idea to know your test results and keep a list of the medicines you take. Where can you learn more? Go to http://antonieta-josie.info/. Enter S019 in the search box to learn more about \"Knee: Exercises. \" Current as of: March 21, 2017 Content Version: 11.4 © 8108-1280 [employer]el. Care instructions adapted under license by [employer] (which disclaims liability or warranty for this information). If you have questions about a medical condition or this instruction, always ask your healthcare professional. [place] [address] any warranty or liability for your use of this information. Introducing [facility]! [place] [employer] introduces [facility] patient portal. Now you can access parts of your medical record, email your doctor's office, and request medication refills online. 1. In your internet browser, go to [email or web address] 2. Click on the First Time User? Click Here link in the Sign In box. You will see the New Member Sign Up page. 3. Enter your ShoutOmatic Access Code exactly as it appears below. You will not need to use this code after youve completed the sign-up process. If you do not sign up before the expiration date, you must request a new code. · ShoutOmatic Access Code: [email or web address] Expires: 7/11/2018  2:41 PM 
 
4. Enter the last four digits of your Social Security Number (xxxx) and Date of Birth (mm/dd/yyyy) as indicated and click Submit. You will be taken to the next sign-up page. 5. Create a SimulScribet ID. This will be your ShoutOmatic login ID and cannot be changed, so think of one that is secure and easy to remember. 6. Create a ShoutOmatic password. You can change your password at any time. 7. Enter your Password Reset Question and Answer. This can be used at a later time if you forget your password. 8. Enter your e-mail address. You will receive e-mail notification when new information is available in [address] 9. Click Sign Up. You can now view and download portions of your medical record. 10. Click the Download Summary menu link to download a portable copy of your medical information. If you have questions, please visit the Frequently Asked Questions section of the ShoutOmatic website. Remember, ShoutOmatic is NOT to be used for urgent needs. For medical emergencies, dial 911. Now available from your iPhone and Android! Please provide this summary of care documentation to your next provider. Your primary care clinician is listed as Anita Reaper. If you have any questions after today's visit, please call [phone].

## 2018-04-12 NOTE — PROGRESS NOTES
Milagros Callahan is a 74 y.o. male who presents for follow up with wife. Reports bilateral shoulder pain. Taking tylenol infrequently. Avoids NSAIDS. Heat is helpful. No ortho evaluation. Pain with lifting overhead. Had left shoulder manipulation with Dr. Anita Mercado. Has right knee pain if sitting for too long. Prior TKA on left. Right 3rd digit trigger finger. Not painful    Had left foot surgery with Dr. Carolee Koehler for neuropathic ulcer 12/26. Wound is completely healed. Denies neuropathic pain. Treated for HTN, BP controlled. No dizziness. Sees Dr. Cynthia Wetzel, nephrology, creatinine 1.96. Stable anemia. Every 6 months.        Past Medical History:   Diagnosis Date    Abnormal stress echo 5/12/2015    Anemia NEC 03/2013    Last 2-3 months; finished taking iron supplement    Anxiety     CAD (coronary artery disease) 2009    cath [facility]) revealed 20%RCA and 50%2nd diagonal    Calculus of kidney     Chronic kidney disease     sees nephrologist every 6 months    Chronic kidney disease, stage III (moderate) 10/11/2009    30% kidney function    Diabetes ([address]     Pre-diabetic    DJD (degenerative joint disease)     GERD (gastroesophageal reflux disease) 10/11/2009    controlled with medication    Gout     Hypertension     Liver disease     fatty liver    Obesity     Obstructive sleep apnea (adult) (pediatric) 10/11/2009    nasal pillows; pressure set at 10-11 - uses cpap    Peripheral neuropathy 10/11/2009    bilateral feet (numbness)    Prediabetes     RLS (restless legs syndrome) 10/11/2009    S/P coronary artery stent placement 5/12/2015    5/11/15 PCI./MALI to LCx       Family History   Problem Relation Age of Onset    Heart Disease Father     Hypertension Father     Other Father      abdominal aneurysm     Dementia Mother     Alzheimer Mother     Heart Disease Brother     Heart Attack Brother     Heart Disease Maternal Grandmother     Heart Disease Paternal Grandmother  Heart Attack Paternal Grandmother     Heart Disease Paternal Grandfather     Heart Attack Paternal Grandfather     Elevated Lipids Son     Elevated Lipids Daughter     Cancer Neg Hx     Diabetes Neg Hx     Stroke Neg Hx        Social History     Social History    Marital status:      Spouse name: N/A    Number of children: N/A    Years of education: N/A     Occupational History    Not on file. Social History Main Topics    Smoking status: Former Smoker     Packs/day: 1.00     Years: 10.00     Types: Cigarettes     Quit date: 1/1/1968    Smokeless tobacco: Never Used    Alcohol use No    Drug use: No    Sexual activity: Yes     Partners: Female     Birth control/ protection: None     Other Topics Concern    Not on file     Social History Narrative           Review of Systems  Pertinent items are noted in HPI. Objective:     Visit Vitals    /63 (BP 1 Location: Left arm, BP Patient Position: Sitting)    Pulse 73    Temp 98.6 °F (37 °C) (Oral)    Resp 16    Ht 6' (1.829 m)    Wt 251 lb 8 oz (114.1 kg)    SpO2 94%    BMI 34.11 kg/m2     Gen: well appearing male  HEENT:   PERRL,  OP no erythema, no exudates, MMM  Neck:  No masses or LAD  Resp:  No wheezing, no rhonchi, no rales. CV:  RRR, normal S1S2  GI: soft, nontender, no masses. Extrem:  +2 pulses, trace edema, warm distally, varicosities, limited ROM both shoulder on abduction. Assessment/Plan:     1. Trigger middle finger of right hand    - REFERRAL TO ORTHOPEDICS    2. Chronic pain of right knee    - XR KNEE RT MAX 2 VWS; Future    3. Chronic pain of both shoulders      4. Mixed hyperlipidemia- Recommend AHA diet. Continue statin therapy. 5. Essential hypertension, benign- Recommend following a lower sodium diet and stay active. Blood pressure goal is less than 130/85 on average. Advised compliance with blood pressure medication and regular follow up.     6. Chronic kidney disease, stage III (moderate)-followed by nephrology. 7. RLS (restless legs syndrome)    8. Idiopathic progressive neuropathy-no neuropathic pain    9. Coronary artery disease involving native coronary artery of native heart without angina pectoris-stable on medication. Follow-up Disposition:  Return in about 6 months (around 10/12/2018) for follow up on medications/medicare wellness.  Cheri Cm MD

## 2018-04-12 NOTE — PATIENT INSTRUCTIONS
Rotator Cuff: Exercises  Your Care Instructions  Here are some examples of typical rehabilitation exercises for your condition. Start each exercise slowly. Ease off the exercise if you start to have pain. Your doctor or physical therapist will tell you when you can start these exercises and which ones will work best for you. How to do the exercises  Pendulum swing    If you have pain in your back, do not do this exercise. 1. Hold on to a table or the back of a chair with your good arm. Then bend forward a little and let your sore arm hang straight down. This exercise does not use the arm muscles. Rather, use your legs and your hips to create movement that makes your arm swing freely. 2. Use the movement from your hips and legs to guide the slightly swinging arm back and forth like a pendulum (or elephant trunk). Then guide it in circles that start small (about the size of a dinner plate). Make the circles a bit larger each day, as your pain allows. 3. Do this exercise for 5 minutes, 5 to 7 times each day. 4. As you have less pain, try bending over a little farther to do this exercise. This will increase the amount of movement at your shoulder. Posterior stretching exercise    1. Hold the elbow of your injured arm with your other hand. 2. Use your hand to pull your injured arm gently up and across your body. You will feel a gentle stretch across the back of your injured shoulder. 3. Hold for at least 15 to 30 seconds. Then slowly lower your arm. 4. Repeat 2 to 4 times. Up-the-back stretch    Your doctor or physical therapist may want you to wait to do this stretch until you have regained most of your range of motion and strength. You can do this stretch in different ways. Hold any of these stretches for at least 15 to 30 seconds. Repeat them 2 to 4 times. 1. Put your hand in your back pocket. Let it rest there to stretch your shoulder.   2. With your other hand, hold your injured arm (palm outward) behind your back by the wrist. Pull your arm up gently to stretch your shoulder. 3. Next, put a towel over your other shoulder. Put the hand of your injured arm behind your back. Now hold the back end of the towel. With the other hand, hold the front end of the towel in front of your body. Pull gently on the front end of the towel. This will bring your hand farther up your back to stretch your shoulder. Overhead stretch    1. Standing about an arm's length away, grasp onto a solid surface. You could use a countertop, a doorknob, or the back of a sturdy chair. 2. With your knees slightly bent, bend forward with your arms straight. Lower your upper body, and let your shoulders stretch. 3. As your shoulders are able to stretch farther, you may need to take a step or two backward. 4. Hold for at least 15 to 30 seconds. Then stand up and relax. If you had stepped back during your stretch, step forward so you can keep your hands on the solid surface. 5. Repeat 2 to 4 times. Shoulder flexion (lying down)    To make a wand for this exercise, use a piece of PVC pipe or a broom handle with the broom removed. Make the wand about a foot wider than your shoulders. 1. Lie on your back, holding a wand with both hands. Your palms should face down as you hold the wand. 2. Keeping your elbows straight, slowly raise your arms over your head. Raise them until you feel a stretch in your shoulders, upper back, and chest.  3. Hold for 15 to 30 seconds. 4. Repeat 2 to 4 times. Shoulder rotation (lying down)    To make a wand for this exercise, use a piece of PVC pipe or a broom handle with the broom removed. Make the wand about a foot wider than your shoulders. 1. Lie on your back. Hold a wand with both hands with your elbows bent and palms up. 2. Keep your elbows close to your body, and move the wand across your body toward the sore arm. 3. Hold for 8 to 12 seconds. 4. Repeat 2 to 4 times.   Wall climbing (to the side)    Avoid any movement that is straight to your side, and be careful not to arch your back. Your arm should stay about 30 degrees to the front of your side. 1. Stand with your side to a wall so that your fingers can just touch it at an angle about 30 degrees toward the front of your body. 2. Walk the fingers of your injured arm up the wall as high as pain permits. Try not to shrug your shoulder up toward your ear as you move your arm up. 3. Hold that position for a count of at least 15 to 20.  4. Walk your fingers back down to the starting position. 5. Repeat at least 2 to 4 times. Try to reach higher each time. Wall climbing (to the front)    During this stretching exercise, be careful not to arch your back. 1. Face a wall, and stand so your fingers can just touch it. 2. Keeping your shoulder down, walk the fingers of your injured arm up the wall as high as pain permits. (Don't shrug your shoulder up toward your ear.)  3. Hold your arm in that position for at least 15 to 30 seconds. 4. Slowly walk your fingers back down to where you started. 5. Repeat at least 2 to 4 times. Try to reach higher each time. Shoulder blade squeeze    1. Stand with your arms at your sides, and squeeze your shoulder blades together. Do not raise your shoulders up as you squeeze. 2. Hold 6 seconds. 3. Repeat 8 to 12 times. Scapular exercise: Arm reach    1. Lie flat on your back. This exercise is a very slight motion that starts with your arms raised (elbows straight, arms straight). 2. From this position, reach higher toward the scar or ceiling. Keep your elbows straight. All motion should be from your shoulder blade only. 3. Relax your arms back to where you started. 4. Repeat 8 to 12 times. Arm raise to the side    During this strengthening exercise, your arm should stay about 30 degrees to the front of your side. 1. Slowly raise your injured arm to the side, with your thumb facing up.  Raise your arm 60 degrees at the most (shoulder level is 90 degrees). 2. Hold the position for 3 to 5 seconds. Then lower your arm back to your side. If you need to, bring your \"good\" arm across your body and place it under the elbow as you lower your injured arm. Use your good arm to keep your injured arm from dropping down too fast.  3. Repeat 8 to 12 times. 4. When you first start out, don't hold any extra weight in your hand. As you get stronger, you may use a 1-pound to 2-pound dumbbell or a small can of food. Shoulder flexor and extensor exercise    These are isometric exercises. That means you contract your muscles without actually moving. 1. Push forward (flex): Stand facing a wall or doorjamb, about 6 inches or less back. Hold your injured arm against your body. Make a closed fist with your thumb on top. Then gently push your hand forward into the wall with about 25% to 50% of your strength. Don't let your body move backward as you push. Hold for about 6 seconds. Relax for a few seconds. Repeat 8 to 12 times. 2. Push backward (extend): Stand with your back flat against a wall. Your upper arm should be against the wall, with your elbow bent 90 degrees (your hand straight ahead). Push your elbow gently back against the wall with about 25% to 50% of your strength. Don't let your body move forward as you push. Hold for about 6 seconds. Relax for a few seconds. Repeat 8 to 12 times. Scapular exercise: Wall push-ups    This exercise is best done with your fingers somewhat turned out, rather than straight up and down. 1. Stand facing a wall, about 12 inches to 18 inches away. 2. Place your hands on the wall at shoulder height. 3. Slowly bend your elbows and bring your face to the wall. Keep your back and hips straight. 4. Push back to where you started. 5. Repeat 8 to 12 times. 6. When you can do this exercise against a wall comfortably, you can try it against a counter.  You can then slowly progress to the end of a couch, then to a sturdy chair, and finally to the floor. Scapular exercise: Retraction    For this exercise, you will need elastic exercise material, such as surgical tubing or Thera-Band. 1. Put the band around a solid object at about waist level. (A bedpost will work well.) Each hand should hold an end of the band. 2. With your elbows at your sides and bent to 90 degrees, pull the band back. Your shoulder blades should move toward each other. Then move your arms back where you started. 3. Repeat 8 to 12 times. 4. If you have good range of motion in your shoulders, try this exercise with your arms lifted out to the sides. Keep your elbows at a 90-degree angle. Raise the elastic band up to about shoulder level. Pull the band back to move your shoulder blades toward each other. Then move your arms back where you started. Internal rotator strengthening exercise    1. Start by tying a piece of elastic exercise material to a doorknob. You can use surgical tubing or Thera-Band. 2. Stand or sit with your shoulder relaxed and your elbow bent 90 degrees. Your upper arm should rest comfortably against your side. Squeeze a rolled towel between your elbow and your body for comfort. This will help keep your arm at your side. 3. Hold one end of the elastic band in the hand of the painful arm. 4. Slowly rotate your forearm toward your body until it touches your belly. Slowly move it back to where you started. 5. Keep your elbow and upper arm firmly tucked against the towel roll or at your side. 6. Repeat 8 to 12 times. External rotator strengthening exercise    1. Start by tying a piece of elastic exercise material to a doorknob. You can use surgical tubing or Thera-Band. (You may also hold one end of the band in each hand.)  2. Stand or sit with your shoulder relaxed and your elbow bent 90 degrees. Your upper arm should rest comfortably against your side. Squeeze a rolled towel between your elbow and your body for comfort.  This will help keep your arm at your side. 3. Hold one end of the elastic band with the hand of the painful arm. 4. Start with your forearm across your belly. Slowly rotate the forearm out away from your body. Keep your elbow and upper arm tucked against the towel roll or the side of your body until you begin to feel tightness in your shoulder. Slowly move your arm back to where you started. 5. Repeat 8 to 12 times. Follow-up care is a key part of your treatment and safety. Be sure to make and go to all appointments, and call your doctor if you are having problems. It's also a good idea to know your test results and keep a list of the medicines you take. Where can you learn more? Go to http://antonieta-josie.info/. Enter Noreen Mcqueen in the search box to learn more about \"Rotator Cuff: Exercises. \"  Current as of: March 21, 2017  Content Version: 11.4  © 6452-2543 [employer]. Care instructions adapted under license by [employer] (which disclaims liability or warranty for this information). If you have questions about a medical condition or this instruction, always ask your healthcare professional. [place] [address] any warranty or liability for your use of this information. Knee: Exercises  Your Care Instructions  Here are some examples of exercises for your knee. Start each exercise slowly. Ease off the exercise if you start to have pain. Your doctor or physical therapist will tell you when you can start these exercises and which ones will work best for you. How to do the exercises  Quad sets    5. Sit with your leg straight and supported on the floor or a firm bed. (If you feel discomfort in the front or back of your knee, place a small towel roll under your knee.)  6. Tighten the muscles on top of your thigh by pressing the back of your knee flat down to the floor.  (If you feel discomfort under your kneecap, place a small towel roll under your knee.)  7. Hold for about 6 seconds, then rest for up to 10 seconds. 8. Do 8 to 12 repetitions several times a day. Straight-leg raises to the front    5. Lie on your back with your good knee bent so that your foot rests flat on the floor. Your injured leg should be straight. Make sure that your low back has a normal curve. You should be able to slip your flat hand in between the floor and the small of your back, with your palm touching the floor and your back touching the back of your hand. 6. Tighten the thigh muscles in the injured leg by pressing the back of your knee flat down to the floor. Hold your knee straight. 7. Keeping the thigh muscles tight, lift your injured leg up so that your heel is about 12 inches off the floor. Hold for about 6 seconds and then lower slowly. 8. Do 8 to 12 repetitions, 3 times a day. Straight-leg raises to the outside    4. Lie on your side, with your injured leg on top. 5. Tighten the front thigh muscles of your injured leg to keep your knee straight. 6. Keep your hip and your leg straight in line with the rest of your body, and keep your knee pointing forward. Do not drop your hip back. 7. Lift your injured leg straight up toward the ceiling, about 12 inches off the floor. Hold for about 6 seconds, then slowly lower your leg. 8. Do 8 to 12 repetitions. Straight-leg raises to the back    6. Lie on your stomach, and lift your leg straight up behind you (toward the ceiling). 7. Lift your toes about 6 inches off the floor, hold for about 6 seconds, then lower slowly. 8. Do 8 to 12 repetitions. Straight-leg raises to the inside    5. Lie on the side of your body with the injured leg. 6. You can either prop your other (good) leg up on a chair, or you can bend your good knee and put that foot in front of your injured knee. Do not drop your hip back. 7. Tighten the muscles on the front of your thigh to straighten your injured knee.   8. Keep your kneecap pointing forward, and lift your whole leg up toward the ceiling about 6 inches. Hold for about 6 seconds, then lower slowly. 9. Do 8 to 12 repetitions. Heel dig bridging    5. Lie on your back with both knees bent and your ankles bent so that only your heels are digging into the floor. Your knees should be bent about 90 degrees. 6. Then push your heels into the floor, squeeze your buttocks, and lift your hips off the floor until your shoulders, hips, and knees are all in a straight line. 7. Hold for about 6 seconds as you continue to breathe normally, and then slowly lower your hips back down to the floor and rest for up to 10 seconds. 8. Do 8 to 12 repetitions. Hamstring curls    6. Lie on your stomach with your knees straight. If your kneecap is uncomfortable, roll up a washcloth and put it under your leg just above your kneecap. 7. Lift the foot of your injured leg by bending the knee so that you bring the foot up toward your buttock. If this motion hurts, try it without bending your knee quite as far. This may help you avoid any painful motion. 8. Slowly lower your leg back to the floor. 9. Do 8 to 12 repetitions. 10. With permission from your doctor or physical therapist, you may also want to add a cuff weight to your ankle (not more than 5 pounds). With weight, you do not have to lift your leg more than 12 inches to get a hamstring workout. Shallow standing knee bends    Do this exercise only if you have very little pain; if you have no clicking, locking, or giving way if you have an injured knee; and if it does not hurt while you are doing 8 to 12 repetitions. 6. Stand with your hands lightly resting on a counter or chair in front of you. Put your feet shoulder-width apart. 7. Slowly bend your knees so that you squat down like you are going to sit in a chair. Make sure your knees do not go in front of your toes. 8. Lower yourself about 6 inches. Your heels should remain on the floor at all times.   9. Rise slowly to a standing position. Heel raises    4. Stand with your feet a few inches apart, with your hands lightly resting on a counter or chair in front of you. 5. Slowly raise your heels off the floor while keeping your knees straight. 6. Hold for about 6 seconds, then slowly lower your heels to the floor. 7. Do 8 to 12 repetitions several times during the day. Follow-up care is a key part of your treatment and safety. Be sure to make and go to all appointments, and call your doctor if you are having problems. It's also a good idea to know your test results and keep a list of the medicines you take. Where can you learn more? Go to http://antonieta-josie.info/. Enter L129 in the search box to learn more about \"Knee: Exercises. \"  Current as of: March 21, 2017  Content Version: 11.4  © 8998-4582 [employer]. Care instructions adapted under license by [employer] (which disclaims liability or warranty for this information). If you have questions about a medical condition or this instruction, always ask your healthcare professional. [address] any warranty or liability for your use of this information.

## 2018-04-12 NOTE — PROGRESS NOTES
Reviewed record in preparation for visit and have obtained necessary documentation. Identified pt with two pt identifiers(name and ). Chief Complaint   Patient presents with    Medication Evaluation       Health Maintenance Due   Topic Date Due    Pneumococcal Vaccine (2 of 2 - PPSV23) 2017       Mr. Kirt Kilgore has a reminder for a \"due or due soon\" health maintenance. I have asked that he discuss this further with his primary care provider for follow-up on this health maintenance. Coordination of Care Questionnaire:  :     1) Have you been to an emergency room, urgent care clinic since your last visit? no   Hospitalized since your last visit? no             2) Have you seen or consulted any other health care providers outside of [address] [place] [address] since your last visit? no  (Include any pap smears or colon screenings in this section.)    3) In the event something were to happen to you and you were unable to speak on your behalf, do you have an Advance Directive/ Living Will in place stating your wishes? YES    Do you have an Advance Directive on file? no    4) Are you interested in receiving information on Advance Directives? NO    Patient is accompanied by spouse I have received verbal consent from Michelle Linda to discuss any/all medical information while they are present in the room.

## 2018-04-16 RX ORDER — PANTOPRAZOLE SODIUM 40 MG/1
TABLET, DELAYED RELEASE ORAL
Qty: 30 TAB | Refills: 5 | Status: SHIPPED | OUTPATIENT
Start: 2018-04-16 | End: 2018-10-17 | Stop reason: SDUPTHER

## 2018-04-20 RX ORDER — METOPROLOL SUCCINATE 25 MG/1
TABLET, EXTENDED RELEASE ORAL
Qty: 90 TAB | Refills: 1 | Status: SHIPPED | OUTPATIENT
Start: 2018-04-20 | End: 2018-10-16 | Stop reason: SDUPTHER

## 2018-04-20 RX ORDER — ISOSORBIDE MONONITRATE 30 MG/1
TABLET, EXTENDED RELEASE ORAL
Qty: 45 TAB | Refills: 1 | Status: SHIPPED | OUTPATIENT
Start: 2018-04-20 | End: 2018-10-16 | Stop reason: SDUPTHER

## 2018-04-20 RX ORDER — CLOPIDOGREL BISULFATE 75 MG/1
TABLET ORAL
Qty: 90 TAB | Refills: 1 | Status: SHIPPED | OUTPATIENT
Start: 2018-04-20 | End: 2018-10-16 | Stop reason: SDUPTHER

## 2018-06-11 RX ORDER — VALSARTAN 160 MG/1
TABLET ORAL
Qty: 90 TAB | Refills: 3 | Status: SHIPPED | OUTPATIENT
Start: 2018-06-11 | End: 2018-08-22 | Stop reason: ALTCHOICE

## 2018-06-18 ENCOUNTER — DOCUMENTATION ONLY (OUTPATIENT)
Dept: SLEEP MEDICINE | Age: 73
End: 2018-06-18

## 2018-06-18 DIAGNOSIS — G47.33 OBSTRUCTIVE SLEEP APNEA: ICD-10-CM

## 2018-06-18 RX ORDER — ROPINIROLE 0.5 MG/1
0.5 TABLET, FILM COATED ORAL
Qty: 60 TAB | Refills: 5 | Status: SHIPPED | OUTPATIENT
Start: 2018-06-18 | End: 2019-01-14 | Stop reason: SDUPTHER

## 2018-06-18 NOTE — PROGRESS NOTES
Per Dr. Lancaster Pillow 0.5 mg #60 with 5 refills was phoned into [facility] pharmacy on 06/18/2018.

## 2018-07-12 ENCOUNTER — OFFICE VISIT (OUTPATIENT)
Dept: INTERNAL MEDICINE CLINIC | Age: 73
End: 2018-07-12

## 2018-07-12 VITALS
HEIGHT: 72 IN | HEART RATE: 64 BPM | WEIGHT: 253 LBS | TEMPERATURE: 97.9 F | BODY MASS INDEX: 34.27 KG/M2 | OXYGEN SATURATION: 97 % | DIASTOLIC BLOOD PRESSURE: 65 MMHG | SYSTOLIC BLOOD PRESSURE: 112 MMHG | RESPIRATION RATE: 16 BRPM

## 2018-07-12 DIAGNOSIS — Z87.442 HISTORY OF KIDNEY STONES: ICD-10-CM

## 2018-07-12 DIAGNOSIS — R10.9 LEFT FLANK PAIN: Primary | ICD-10-CM

## 2018-07-12 LAB
BILIRUB UR QL STRIP: NEGATIVE
GLUCOSE UR-MCNC: NEGATIVE MG/DL
KETONES P FAST UR STRIP-MCNC: NEGATIVE MG/DL
PH UR STRIP: 5.5 [PH] (ref 4.6–8)
PROT UR QL STRIP: NORMAL
SP GR UR STRIP: 1.03 (ref 1–1.03)
UA UROBILINOGEN AMB POC: NORMAL (ref 0.2–1)
URINALYSIS CLARITY POC: CLEAR
URINALYSIS COLOR POC: YELLOW
URINE BLOOD POC: NEGATIVE
URINE LEUKOCYTES POC: NEGATIVE
URINE NITRITES POC: NEGATIVE

## 2018-07-12 RX ORDER — OXYCODONE AND ACETAMINOPHEN 5; 325 MG/1; MG/1
TABLET ORAL
Qty: 30 TAB | Refills: 0 | Status: SHIPPED | OUTPATIENT
Start: 2018-07-12 | End: 2019-06-20

## 2018-07-12 NOTE — PROGRESS NOTES
Wm Smith is a 74 y.o. male who presents for followup. In with wife. He has to lift his wife at times, due to advanced parkinson's disease. Reports ongoing bilateral shoulder pain. Reports left flank pain today. Prior kidney stones with stenting. Drinking tea and coffee. Minimal water intake. No urinary symptoms. This pain is not as bad prior kidney stone pain. Reports bilateral shoulder pain. Taking tylenol infrequently. Avoids NSAIDS. Heat is helpful. No ortho evaluation. Pain with lifting overhead. Had left shoulder manipulation with Dr. Momo Gilliland. Has right knee pain if sitting for too long. Prior TKA on left. Sees Dr. Solomon Mendoza, nephrology, creatinine 1.96. Stable anemia.   Every 6 months.        Past Medical History:   Diagnosis Date    Abnormal stress echo 5/12/2015    Anemia NEC 03/2013    Last 2-3 months; finished taking iron supplement    Anxiety     CAD (coronary artery disease) 2009    cath [place]) revealed 20%RCA and 50%2nd diagonal    Calculus of kidney     Chronic kidney disease     sees nephrologist every 6 months    Chronic kidney disease, stage III (moderate) 10/11/2009    30% kidney function    Diabetes ([facility] [address]     Pre-diabetic    DJD (degenerative joint disease)     GERD (gastroesophageal reflux disease) 10/11/2009    controlled with medication    Gout     Hypertension     Liver disease     fatty liver    Obesity     Obstructive sleep apnea (adult) (pediatric) 10/11/2009    nasal pillows; pressure set at 10-11 - uses cpap    Peripheral neuropathy 10/11/2009    bilateral feet (numbness)    Prediabetes     RLS (restless legs syndrome) 10/11/2009    S/P coronary artery stent placement 5/12/2015    5/11/15 PCI./MALI to LCx       Family History   Problem Relation Age of Onset    Heart Disease Father     Hypertension Father     Other Father      abdominal aneurysm     Dementia Mother     Alzheimer Mother     Heart Disease Brother     Heart Attack Brother     Heart Disease Maternal Grandmother     Heart Disease Paternal Grandmother     Heart Attack Paternal Grandmother     Heart Disease Paternal Grandfather     Heart Attack Paternal Grandfather     Elevated Lipids Son     Elevated Lipids Daughter     Cancer Neg Hx     Diabetes Neg Hx     Stroke Neg Hx        Social History     Social History    Marital status:      Spouse name: N/A    Number of children: N/A    Years of education: N/A     Occupational History    Not on file. Social History Main Topics    Smoking status: Former Smoker     Packs/day: 1.00     Years: 10.00     Types: Cigarettes     Quit date: 1/1/1968    Smokeless tobacco: Never Used    Alcohol use No    Drug use: No    Sexual activity: Yes     Partners: Female     Birth control/ protection: None     Other Topics Concern    Not on file     Social History Narrative         Review of Systems  Pertinent items are noted in HPI. Objective:     Visit Vitals    /65 (BP 1 Location: Left arm, BP Patient Position: Sitting)    Pulse 64    Temp 97.9 °F (36.6 °C) (Oral)    Resp 16    Ht 6' (1.829 m)    Wt 253 lb (114.8 kg)    SpO2 97%    BMI 34.31 kg/m2     Gen: well appearing male  HEENT:   PERRL,normal conjunctiva. External ear and canals normal, TMs no opacification or erythema,  OP no erythema, no exudates, MMM  Neck:  Supple. Thyroid normal size, nontender, without nodules. No masses or LAD  Resp:  No wheezing, no rhonchi, no rales. CV:  RRR, normal S1S2, no murmur. GI: soft, nontender, without masses. No hepatosplenomegaly. Left CVAT  Extrem:  +2 pulses, no edema, warm distally      Assessment/Plan:       ICD-10-CM ICD-9-CM    1. Left flank pain R10.9 789.09 AMB POC URINALYSIS DIP STICK AUTO W/O MICRO      oxyCODONE-acetaminophen (PERCOCET) 5-325 mg per tablet      CT ABD PELV WO CONT   2.  History of kidney stones Z87.442 V13.01 AMB POC URINALYSIS DIP STICK AUTO W/O MICRO      CT ABD PELV WO CONT       Follow-up Disposition:  Return if symptoms worsen or fail to improve.     Alyson Valles MD

## 2018-07-12 NOTE — MR AVS SNAPSHOT
[facility] [address] 
[phone] Patient: Kyler Hernandez MRN:  [id number] Visit Information Date & Time Provider Department Dept. Phone Encounter #  
 7/12/2018  2:15 PM James Pierce, [address] [place] [address],[address] [phone] [id number] Follow-up Instructions Return if symptoms worsen or fail to improve. Your Appointments 9/19/2018 11:00 AM  
ESTABLISHED PATIENT with Clara Vann MD  
[facility] [address] [facility]) Appt Note: p/pt schdl 6mon fu W/Dr RICHTER  
 [address] [facility] [address]  
[phone] [address] [place] [address] [address] Upcoming Health Maintenance Date Due Pneumococcal 65+ Low/Medium Risk (2 of 2 - PPSV23) 6/20/2017 GLAUCOMA SCREENING Q2Y 1/4/2019* Influenza Age 5 to Adult 8/1/2018 MEDICARE YEARLY EXAM 8/24/2018 COLONOSCOPY 9/24/2024 DTaP/Tdap/Td series (2 - Td) 10/24/2026 *Topic was postponed. The date shown is not the original due date. Allergies as of 7/12/2018  Review Complete On: 7/12/2018 By: James Pierce MD  
  
 Severity Noted Reaction Type Reactions Penicillins High 10/01/2009    Hives Bactrim [Sulfamethoxazole-trimethoprim]  03/31/2010   Systemic Hives Codeine  12/26/2017    Itching Lisinopril (Bulk)  10/01/2009    Cough Neurontin [Gabapentin]  10/01/2009   Side Effect Other (comments) drowsy Zostavax [Zoster Vaccine Live (Pf)]  11/20/2013   Not Verified Rash See 9/10/13 visit Current Immunizations  Reviewed on 9/28/2016 Name Date Influenza Vaccine 10/1/2014, 10/1/2013 Influenza Vaccine (Quad) PF 9/20/2017  2:25 PM, 10/7/2016, 9/30/2015 Influenza Vaccine Split 9/19/2012, 10/6/2011, 10/27/2010 Influenza Vaccine Whole 10/23/2008 Tdap 10/24/2016 10:04 PM, 3/21/2016 ZZZ-RETIRED (DO NOT USE) Pneumococcal Vaccine (Unspecified Type) 6/20/2012 Zoster Vaccine, Live 8/15/2013 Not reviewed this visit You Were Diagnosed With   
  
 Codes Comments Left flank pain    -  Primary ICD-10-CM: R10.9 ICD-9-CM: 789.09 History of kidney stones     ICD-10-CM: Z87.442 ICD-9-CM: V13.01 Vitals BP Pulse Temp Resp Height(growth percentile) Weight(growth percentile) 112/65 (BP 1 Location: Left arm, BP Patient Position: Sitting) 64 97.9 °F (36.6 °C) (Oral) 16 6' (1.829 m) 253 lb (114.8 kg) SpO2 BMI Smoking Status 97% 34.31 kg/m2 Former Smoker BMI and BSA Data Body Mass Index Body Surface Area  
 34.31 kg/m 2 2.41 m 2 Preferred Pharmacy Pharmacy Name Phone [facility]/PHARMACY #[id number]- [facility] [address] [phone] Your Updated Medication List  
  
   
This list is accurate as of 7/12/18  3:46 PM.  Always use your most recent med list.  
  
  
  
  
 albuterol 90 mcg/actuation inhaler Commonly known as:  PROVENTIL HFA, VENTOLIN HFA, PROAIR HFA Take 1 Puff by inhalation every six (6) hours as needed for Wheezing. allopurinol 100 mg tablet Commonly known as:  Mathew Friedman Take 100 mg by mouth every morning. amLODIPine 5 mg tablet Commonly known as:  Silver Escobar Take 5 mg by mouth daily. aspirin delayed-release 81 mg tablet Take 81 mg by mouth daily. atorvastatin 40 mg tablet Commonly known as:  LIPITOR  
TAKE 1 TABLET BY MOUTH NIGHTLY*****STOP SIMVASTATIN****  
  
 CENTRUM PO Take 1 Tab by mouth daily. chlorthalidone 25 mg tablet Commonly known as:  Maegan Fleeting Take 0.5 Tabs by mouth daily. citalopram 40 mg tablet Commonly known as:  CELEXA  
TAKE 1 TABLET BY MOUTH EVERY DAY  
  
 clopidogrel 75 mg Tab Commonly known as:  PLAVIX TAKE 1 TAB BY MOUTH DAILY. glucosamine-chondroitin 750-600 mg Tab Take 1 Tab by mouth daily. Brand: Move Verivue NASAL CPAP SYSTEM  
by Does Not Apply route. isosorbide mononitrate ER 30 mg tablet Commonly known as:  IMDUR  
TAKE 1/2 TABLETS BY MOUTH DAILY  
  
 metoprolol succinate 25 mg XL tablet Commonly known as:  TOPROL-XL  
TAKE ONE TABLET BY MOUTH EVERY DAY  
  
 nitroglycerin 0.4 mg SL tablet Commonly known as:  NITROSTAT  
TAKE 1 TABLET BY SUBLINGUAL ROUTE EVERY 5 MINUTES AS NEEDED FOR CHEST PAIN. oxyCODONE-acetaminophen 5-325 mg per tablet Commonly known as:  PERCOCET Take 1-2 tablets every 6 hours as needed for pain. pantoprazole 40 mg tablet Commonly known as:  PROTONIX  
TAKE 1 TAB BY MOUTH DAILY. promethazine 25 mg tablet Commonly known as:  PHENERGAN Take 1 Tab by mouth every six (6) hours as needed for Nausea. Indications: Pain Treatment Adjunct, POST-OPERATIVE NAUSEA AND VOMITING  
  
 rOPINIRole 0.5 mg tablet Commonly known as:  Paula Jona Take 1 Tab by mouth daily (after dinner). Take 1.5 to 2 tablets daily after dinner  
  
 valsartan 160 mg tablet Commonly known as:  DIOVAN  
TAKE 1 TABLET BY MOUTH DAILY  
  
 VITAMIN D3 2,000 unit Cap capsule Generic drug:  Cholecalciferol (Vitamin D3) Take 2,000 Units by mouth daily. Prescriptions Printed Refills  
 oxyCODONE-acetaminophen (PERCOCET) 5-325 mg per tablet 0 Sig: Take 1-2 tablets every 6 hours as needed for pain. Class: Print We Performed the Following AMB POC URINALYSIS DIP STICK AUTO W/O MICRO [66546 CPT(R)] Follow-up Instructions Return if symptoms worsen or fail to improve. To-Do List   
 07/12/2018 Imaging:  CT ABD PELV WO CONT Introducing [facility]! Alcides Thomson introduces [employer] patient portal. Now you can access parts of your medical record, email your doctor's office, and request medication refills online.    
 
1. In your internet browser, go to [email or web address] 2. Click on the First Time User? Click Here link in the Sign In box. You will see the New Member Sign Up page. 3. Enter your SocialDial Access Code exactly as it appears below. You will not need to use this code after youve completed the sign-up process. If you do not sign up before the expiration date, you must request a new code. · SocialDial Access Code: [email or web address] Expires: 10/10/2018  3:45 PM 
 
4. Enter the last four digits of your Social Security Number (xxxx) and Date of Birth (mm/dd/yyyy) as indicated and click Submit. You will be taken to the next sign-up page. 5. Create a Knowromt ID. This will be your SocialDial login ID and cannot be changed, so think of one that is secure and easy to remember. 6. Create a SocialDial password. You can change your password at any time. 7. Enter your Password Reset Question and Answer. This can be used at a later time if you forget your password. 8. Enter your e-mail address. You will receive e-mail notification when new information is available in [address] 9. Click Sign Up. You can now view and download portions of your medical record. 10. Click the Download Summary menu link to download a portable copy of your medical information. If you have questions, please visit the Frequently Asked Questions section of the SocialDial website. Remember, SocialDial is NOT to be used for urgent needs. For medical emergencies, dial 911. Now available from your iPhone and Android! Please provide this summary of care documentation to your next provider. Your primary care clinician is listed as Cat Rondon. If you have any questions after today's visit, please call [phone].

## 2018-07-13 ENCOUNTER — HOSPITAL ENCOUNTER (OUTPATIENT)
Dept: CT IMAGING | Age: 73
Discharge: HOME | End: 2018-07-13
Attending: FAMILY MEDICINE
Payer: MEDICARE

## 2018-07-13 ENCOUNTER — TELEPHONE (OUTPATIENT)
Dept: INTERNAL MEDICINE CLINIC | Age: 73
End: 2018-07-13

## 2018-07-13 DIAGNOSIS — R10.9 LEFT FLANK PAIN: ICD-10-CM

## 2018-07-13 DIAGNOSIS — Z87.442 HISTORY OF KIDNEY STONES: ICD-10-CM

## 2018-07-13 PROCEDURE — 74176 CT ABD & PELVIS W/O CONTRAST: CPT

## 2018-07-13 NOTE — TELEPHONE ENCOUNTER
Patient called the office stating that he needs to know what time he needs to be at the hospital for his ultrasound today. Vish [place] to have patient call radiology and ask what time they would like him to arrive. Pt verbalized understanding of information discussed w/ no further questions at this time.

## 2018-07-13 NOTE — TELEPHONE ENCOUNTER
Pt called to speak with the nurse and would like a call back.  Pt best contact number [phone].              Copy/paste Caleb Johnson

## 2018-08-07 ENCOUNTER — HOSPITAL ENCOUNTER (EMERGENCY)
Age: 73
Discharge: HOME | End: 2018-08-07
Attending: EMERGENCY MEDICINE
Payer: MEDICARE

## 2018-08-07 VITALS
SYSTOLIC BLOOD PRESSURE: 152 MMHG | OXYGEN SATURATION: 100 % | HEART RATE: 81 BPM | HEIGHT: 73 IN | WEIGHT: 246.91 LBS | TEMPERATURE: 98.4 F | BODY MASS INDEX: 32.72 KG/M2 | RESPIRATION RATE: 16 BRPM | DIASTOLIC BLOOD PRESSURE: 75 MMHG

## 2018-08-07 DIAGNOSIS — M54.5 ACUTE LEFT-SIDED LOW BACK PAIN, WITH SCIATICA PRESENCE UNSPECIFIED: Primary | ICD-10-CM

## 2018-08-07 LAB
APPEARANCE UR: CLEAR
BACTERIA URNS QL MICRO: NEGATIVE /HPF
BILIRUB UR QL: NEGATIVE
COLOR UR: ABNORMAL
EPITH CASTS URNS QL MICRO: ABNORMAL /LPF
GLUCOSE UR STRIP.AUTO-MCNC: NEGATIVE MG/DL
HGB UR QL STRIP: NEGATIVE
HYALINE CASTS URNS QL MICRO: ABNORMAL /LPF (ref 0–5)
KETONES UR QL STRIP.AUTO: ABNORMAL MG/DL
LEUKOCYTE ESTERASE UR QL STRIP.AUTO: NEGATIVE
NITRITE UR QL STRIP.AUTO: NEGATIVE
PH UR STRIP: 5 [PH] (ref 5–8)
PROT UR STRIP-MCNC: 100 MG/DL
RBC #/AREA URNS HPF: ABNORMAL /HPF (ref 0–5)
SP GR UR REFRACTOMETRY: 1.02 (ref 1–1.03)
UA: UC IF INDICATED,UAUC: ABNORMAL
UROBILINOGEN UR QL STRIP.AUTO: 1 EU/DL (ref 0.2–1)
WBC URNS QL MICRO: ABNORMAL /HPF (ref 0–4)

## 2018-08-07 PROCEDURE — 99283 EMERGENCY DEPT VISIT LOW MDM: CPT

## 2018-08-07 PROCEDURE — 81001 URINALYSIS AUTO W/SCOPE: CPT | Performed by: PHYSICIAN ASSISTANT

## 2018-08-07 PROCEDURE — 74011250637 HC RX REV CODE- 250/637: Performed by: PHYSICIAN ASSISTANT

## 2018-08-07 RX ORDER — CYCLOBENZAPRINE HCL 10 MG
10 TABLET ORAL
Qty: 20 TAB | Refills: 0 | Status: SHIPPED | OUTPATIENT
Start: 2018-08-07 | End: 2019-06-20

## 2018-08-07 RX ORDER — OXYCODONE AND ACETAMINOPHEN 5; 325 MG/1; MG/1
2 TABLET ORAL
Status: COMPLETED | OUTPATIENT
Start: 2018-08-07 | End: 2018-08-07

## 2018-08-07 RX ORDER — OXYCODONE AND ACETAMINOPHEN 5; 325 MG/1; MG/1
1 TABLET ORAL
Qty: 12 TAB | Refills: 0 | Status: SHIPPED | OUTPATIENT
Start: 2018-08-07 | End: 2019-01-14

## 2018-08-07 RX ORDER — CYCLOBENZAPRINE HCL 10 MG
10 TABLET ORAL
Status: COMPLETED | OUTPATIENT
Start: 2018-08-07 | End: 2018-08-07

## 2018-08-07 RX ADMIN — OXYCODONE HYDROCHLORIDE AND ACETAMINOPHEN 2 TABLET: 5; 325 TABLET ORAL at 12:20

## 2018-08-07 RX ADMIN — CYCLOBENZAPRINE HYDROCHLORIDE 10 MG: 10 TABLET, FILM COATED ORAL at 12:19

## 2018-08-07 NOTE — ED PROVIDER NOTES
EMERGENCY DEPARTMENT HISTORY AND PHYSICAL EXAM      Date: 8/7/2018  Patient Name: Mya Frye    History of Presenting Illness     Chief Complaint   Patient presents with    Back Pain     Pt woke up with Left side back pain. Pt denies injury. History Provided By: Patient and Patient's Son    HPI: Mya Frye, 74 y.o. male presents ambulatory to the ED with cc of 1 day of 9 out of 10 non traumatic constant aching left lower back pain that is worse with bending and lifting. He does have a history of kidney stones but no urinary symptoms. He does help to lift and care for his wife who has Parkinson's disease. He does have a history of on again off again lower back pain. He specifically denies any fevers, chills, nausea, vomiting, chest pain, shortness of breath, headache, rash, diarrhea, sweating or weight loss. Chief Complaint: lower back pain  Duration: 1 Days  Timing:  Constant  Location: left lower back  Quality: Aching  Severity: 10 out of 10  Modifying Factors: worse with movement  Associated Symptoms: denies any other associated signs or symptoms    There are no other complaints, changes, or physical findings at this time. PCP: Maylin Bassett MD    Current Outpatient Prescriptions   Medication Sig Dispense Refill    oxyCODONE-acetaminophen (PERCOCET) 5-325 mg per tablet Take 1 Tab by mouth every four (4) hours as needed for Pain. Max Daily Amount: 6 Tabs. 12 Tab 0    cyclobenzaprine (FLEXERIL) 10 mg tablet Take 1 Tab by mouth three (3) times daily as needed for Muscle Spasm(s). 20 Tab 0    oxyCODONE-acetaminophen (PERCOCET) 5-325 mg per tablet Take 1-2 tablets every 6 hours as needed for pain. 30 Tab 0    rOPINIRole (REQUIP) 0.5 mg tablet Take 1 Tab by mouth daily (after dinner).  Take 1.5 to 2 tablets daily after dinner 60 Tab 5    valsartan (DIOVAN) 160 mg tablet TAKE 1 TABLET BY MOUTH DAILY 90 Tab 3    isosorbide mononitrate ER (IMDUR) 30 mg tablet TAKE 1/2 TABLETS BY MOUTH DAILY 45 Tab 1    metoprolol succinate (TOPROL-XL) 25 mg XL tablet TAKE ONE TABLET BY MOUTH EVERY DAY 90 Tab 1    clopidogrel (PLAVIX) 75 mg tab TAKE 1 TAB BY MOUTH DAILY. 90 Tab 1    pantoprazole (PROTONIX) 40 mg tablet TAKE 1 TAB BY MOUTH DAILY. 30 Tab 5    promethazine (PHENERGAN) 25 mg tablet Take 1 Tab by mouth every six (6) hours as needed for Nausea. Indications: Pain Treatment Adjunct, POST-OPERATIVE NAUSEA AND VOMITING 20 Tab 0    atorvastatin (LIPITOR) 40 mg tablet TAKE 1 TABLET BY MOUTH NIGHTLY**STOP SIMVASTATIN** (Patient taking differently: TAKE 1 TABLET BY MOUTH NIGHTLY) 90 Tab 2    nitroglycerin (NITROSTAT) 0.4 mg SL tablet TAKE 1 TABLET BY SUBLINGUAL ROUTE EVERY 5 MINUTES AS NEEDED FOR CHEST PAIN. 1 Tab 0    citalopram (CELEXA) 40 mg tablet TAKE 1 TABLET BY MOUTH EVERY DAY 90 Tab 3    chlorthalidone (HYGROTEN) 25 mg tablet Take 0.5 Tabs by mouth daily. 30 Tab 0    albuterol (PROVENTIL HFA, VENTOLIN HFA, PROAIR HFA) 90 mcg/actuation inhaler Take 1 Puff by inhalation every six (6) hours as needed for Wheezing. 1 Inhaler 0    VITAMIN D3 2,000 unit cap capsule Take 2,000 Units by mouth daily. 2    OXYGEN-AIR DELIVERY SYSTEMS (HORIZON NASAL CPAP SYSTEM) by Does Not Apply route.  amLODIPine (NORVASC) 5 mg tablet Take 5 mg by mouth daily.  GLUCOSAMINE HCL/CHONDR PETERSEN A NA (GLUCOSAMINE-CHONDROITIN) 750-600 mg Tab Take 1 Tab by mouth daily. Brand: Move Free      aspirin delayed-release 81 mg tablet Take 81 mg by mouth daily.  allopurinol (ZYLOPRIM) 100 mg tablet Take 100 mg by mouth every morning.  MULTIVITS W-FE,OTHER MIN (CENTRUM PO) Take 1 Tab by mouth daily.        Past History     Past Medical History:  Past Medical History:   Diagnosis Date    Abnormal stress echo 5/12/2015    Anemia NEC 03/2013    Last 2-3 months; finished taking iron supplement    Anxiety     CAD (coronary artery disease) 2009    cath [email or web address]) revealed 20%RCA and 50%2nd diagonal    Calculus of kidney     Chronic kidney disease     sees nephrologist every 6 months    Chronic kidney disease, stage III (moderate) 10/11/2009    30% kidney function    Diabetes ([address]     Pre-diabetic    DJD (degenerative joint disease)     GERD (gastroesophageal reflux disease) 10/11/2009    controlled with medication    Gout     Hypertension     Liver disease     fatty liver    Obesity     Obstructive sleep apnea (adult) (pediatric) 10/11/2009    nasal pillows; pressure set at 10-11 - uses cpap    Peripheral neuropathy 10/11/2009    bilateral feet (numbness)    Prediabetes     RLS (restless legs syndrome) 10/11/2009    S/P coronary artery stent placement 5/12/2015    5/11/15 PCI./MALI to LCx       Past Surgical History:  Past Surgical History:   Procedure Laterality Date    HX HEART CATHETERIZATION  2009, 7/17    x1 stent    HX HERNIA REPAIR      [place]/umbilical    HX KNEE REPLACEMENT Left 7/23/11     LEFT TOTAL KNEE ARTHROPLASTY    HX ORTHOPAEDIC      left great toe pinning/plate    HX ORTHOPAEDIC      left shoulder manipulation    HX UROLOGICAL      basket extraction of kidney stones    IN COLONOSCOPY FLX DX W/COLLJ SPEC WHEN PFRMD  8/5/2010         IN COLSC FLX W/RMVL OF TUMOR POLYP LESION SNARE TQ  9/24/2014            Family History:  Family History   Problem Relation Age of Onset    Heart Disease Father     Hypertension Father     Other Father      abdominal aneurysm     Dementia Mother     Alzheimer Mother     Heart Disease Brother     Heart Attack Brother     Heart Disease Maternal Grandmother     Heart Disease Paternal Grandmother     Heart Attack Paternal Grandmother     Heart Disease Paternal Grandfather     Heart Attack Paternal Grandfather     Elevated Lipids Son     Elevated Lipids Daughter     Cancer Neg Hx     Diabetes Neg Hx     Stroke Neg Hx        Social History:  Social History   Substance Use Topics    Smoking status: Former Smoker     Packs/day: 1.00 Years: 10.00     Types: Cigarettes     Quit date: 1/1/1968    Smokeless tobacco: Never Used    Alcohol use No       Allergies: Allergies   Allergen Reactions    Penicillins Hives    Bactrim [Sulfamethoxazole-Trimethoprim] Hives    Codeine Itching    Lisinopril (Bulk) Cough    Neurontin [Gabapentin] Other (comments)     drowsy    Zostavax [Zoster Vaccine Live (Pf)] Rash     See 9/10/13 visit     Review of Systems   Review of Systems   Constitutional: Negative for fatigue and fever. HENT: Negative for congestion, ear pain and rhinorrhea. Eyes: Negative for pain and redness. Respiratory: Negative for cough and wheezing. Cardiovascular: Negative for chest pain and palpitations. Gastrointestinal: Negative for abdominal pain, nausea and vomiting. Genitourinary: Negative for dysuria, frequency and urgency. Musculoskeletal: Positive for back pain. Negative for neck pain and neck stiffness. Skin: Negative for rash and wound. Neurological: Negative for weakness, light-headedness, numbness and headaches. Physical Exam   Physical Exam   Constitutional: He is oriented to person, place, and time. He appears well-developed and well-nourished. Non-toxic appearance. No distress. HENT:   Head: Normocephalic and atraumatic. Head is without right periorbital erythema and without left periorbital erythema. Right Ear: External ear normal.   Left Ear: External ear normal.   Nose: Nose normal.   Mouth/Throat: Uvula is midline. No trismus in the jaw. Eyes: Conjunctivae and EOM are normal. Pupils are equal, round, and reactive to light. No scleral icterus. Neck: Normal range of motion and full passive range of motion without pain. Cardiovascular: Normal rate, regular rhythm and normal heart sounds. Pulmonary/Chest: Effort normal and breath sounds normal. No accessory muscle usage. No tachypnea. No respiratory distress. He has no decreased breath sounds. He has no wheezes. Abdominal: Soft. There is no tenderness. There is no rigidity and no guarding. Musculoskeletal: Normal range of motion. Lumbar back: He exhibits tenderness. Back:    LOWER BACK:  Independently moves from laying to sitting to standing. No bruising, redness or swelling. No step off. Left sided lower back pain  No CVA tenderness   Neurological: He is alert and oriented to person, place, and time. He is not disoriented. No cranial nerve deficit or sensory deficit. GCS eye subscore is 4. GCS verbal subscore is 5. GCS motor subscore is 6. Skin: Skin is intact. No rash noted. Psychiatric: He has a normal mood and affect. His speech is normal.   Nursing note and vitals reviewed. Diagnostic Study Results     Labs -     Recent Results (from the past 12 hour(s))   URINALYSIS W/ REFLEX CULTURE    Collection Time: 08/07/18 12:21 PM   Result Value Ref Range    Color YELLOW/STRAW      Appearance CLEAR CLEAR      Specific gravity 1.025 1.003 - 1.030      pH (UA) 5.0 5.0 - 8.0      Protein 100 (A) NEG mg/dL    Glucose NEGATIVE  NEG mg/dL    Ketone TRACE (A) NEG mg/dL    Bilirubin NEGATIVE  NEG      Blood NEGATIVE  NEG      Urobilinogen 1.0 0.2 - 1.0 EU/dL    Nitrites NEGATIVE  NEG      Leukocyte Esterase NEGATIVE  NEG      WBC 0-4 0 - 4 /hpf    RBC 0-5 0 - 5 /hpf    Epithelial cells FEW FEW /lpf    Bacteria NEGATIVE  NEG /hpf    UA:UC IF INDICATED CULTURE NOT INDICATED BY UA RESULT CNI      Hyaline cast 2-5 0 - 5 /lpf       Radiologic Studies -   No orders to display     CT Results  (Last 48 hours)    None        CXR Results  (Last 48 hours)    None        Medical Decision Making   I am the first provider for this patient. I reviewed the vital signs, available nursing notes, past medical history, past surgical history, family history and social history. Vital Signs-Reviewed the patient's vital signs.   Patient Vitals for the past 12 hrs:   Temp Pulse Resp BP SpO2   08/07/18 1126 98.4 °F (36.9 °C) 81 16 152/75 100 % Pulse Oximetry Analysis - 100% on RA    Records Reviewed: Nursing Notes, Old Medical Records, Previous Radiology Studies, Previous Laboratory Studies and     Provider Notes (Medical Decision Making): Afebrile; well appearing; reassuring exam; no specific injury; given history of kidney stones will screen with a urine; recent CT reviewed;  plan as below    ED Course:   Initial assessment performed. The patients presenting problems have been discussed, and they are in agreement with the care plan formulated and outlined with them. I have encouraged them to ask questions as they arise throughout their visit. Disposition:  Discharge    PLAN:  1. Current Discharge Medication List      START taking these medications    Details   !! oxyCODONE-acetaminophen (PERCOCET) 5-325 mg per tablet Take 1 Tab by mouth every four (4) hours as needed for Pain. Max Daily Amount: 6 Tabs. Qty: 12 Tab, Refills: 0    Associated Diagnoses: Acute left-sided low back pain, with sciatica presence unspecified      cyclobenzaprine (FLEXERIL) 10 mg tablet Take 1 Tab by mouth three (3) times daily as needed for Muscle Spasm(s). Qty: 20 Tab, Refills: 0       !! - Potential duplicate medications found. Please discuss with provider. CONTINUE these medications which have NOT CHANGED    Details   !! oxyCODONE-acetaminophen (PERCOCET) 5-325 mg per tablet Take 1-2 tablets every 6 hours as needed for pain. Qty: 30 Tab, Refills: 0    Associated Diagnoses: Left flank pain       !! - Potential duplicate medications found. Please discuss with provider.         2.   Follow-up Information     Follow up With Details Comments Contact Info    Bryson Selby MD Schedule an appointment as soon as possible for a visit As needed [address] [facility]  [address]      Jose Miguel Moser MD Schedule an appointment as soon as possible for a visit ORTHO / SPINE: as needed if symptoms persist [phone] [facility]  [phone]          Return to ED if worse     Diagnosis     Clinical Impression:   1.  Acute left-sided low back pain, with sciatica presence unspecified

## 2018-08-07 NOTE — ED NOTES
ER PA discharged patient. Patient ambulatory out of ED with family member. Family member is  of vehicle.

## 2018-08-17 ENCOUNTER — TELEPHONE (OUTPATIENT)
Dept: CARDIOLOGY CLINIC | Age: 73
End: 2018-08-17

## 2018-08-17 NOTE — TELEPHONE ENCOUNTER
Please call patient regarding vitamin D3 and Chlorthalidone prescriptions.    Thanks, [employer]

## 2018-08-21 NOTE — TELEPHONE ENCOUNTER
Submitted medication refill for chlorthalidone    Returned patient call to advise of refill and inform need call PCP to refill Vitamin D3.   Patient has 6 mo follow up scheduled 9/19/2018 11 am.

## 2018-08-22 ENCOUNTER — TELEPHONE (OUTPATIENT)
Dept: INTERNAL MEDICINE CLINIC | Age: 73
End: 2018-08-22

## 2018-08-22 RX ORDER — CHLORTHALIDONE 25 MG/1
12.5 TABLET ORAL DAILY
Qty: 30 TAB | Refills: 0 | Status: SHIPPED | OUTPATIENT
Start: 2018-08-22 | End: 2018-10-24 | Stop reason: SDUPTHER

## 2018-08-22 RX ORDER — IRBESARTAN 150 MG/1
150 TABLET ORAL
Qty: 90 TAB | Refills: 3 | Status: SHIPPED | OUTPATIENT
Start: 2018-08-22 | End: 2019-08-07 | Stop reason: SDUPTHER

## 2018-08-22 NOTE — TELEPHONE ENCOUNTER
Maeve Lee called from [facility], [place] [address] in regards to a medication switch for the pt from valsartan. Best contact number is [phone].        Message received & copied from [facility]

## 2018-08-23 NOTE — TELEPHONE ENCOUNTER
Spoke with patient. Two pt identifiers confirmed. Patient notified that his valsartan has been changed to irbesartan. Pt verbalized understanding of information discussed w/ no further questions at this time.

## 2018-08-31 ENCOUNTER — TELEPHONE (OUTPATIENT)
Dept: INTERNAL MEDICINE CLINIC | Age: 73
End: 2018-08-31

## 2018-08-31 NOTE — TELEPHONE ENCOUNTER
Spoke with patient, permission given to speak with Kiet Olvera. Kiet Olvera states that patients has been having dizzy spells and nausea. Kiet Olvera states that these symptoms started shortly after he started the Avapro. Kiet Olvera states that they are concerned that they may need to change patients medication. Advised that I make Dr. Mana Clay aware of what is going on and will call her back with a plan. Pt verbalized understanding of information discussed w/ no further questions at this time.

## 2018-08-31 NOTE — TELEPHONE ENCOUNTER
Called patient. Spoke with wife. Given Chlorthalidone 25mg 1/2 tablet a day by Dr. Aldo Osuna. He was changed from valsartan to irbesartan. He is feeling dizzy all week. Checking BP. Recommend hold the chlorthalidone and check blood pressure this weekend. If blood pressure is 100, then also hold irbesartan. To call next week regarding symptoms.

## 2018-08-31 NOTE — TELEPHONE ENCOUNTER
#[phone] Jarek Vitale states pt's new htn medication is making him sick and dizzy. She must get a call back prior to the weekend.

## 2018-09-05 RX ORDER — ATORVASTATIN CALCIUM 40 MG/1
TABLET, FILM COATED ORAL
Qty: 90 TAB | Refills: 2 | Status: SHIPPED | OUTPATIENT
Start: 2018-09-05 | End: 2019-06-02 | Stop reason: SDUPTHER

## 2018-09-06 ENCOUNTER — TELEPHONE (OUTPATIENT)
Dept: INTERNAL MEDICINE CLINIC | Age: 73
End: 2018-09-06

## 2018-09-16 DIAGNOSIS — F41.8 ANXIETY ASSOCIATED WITH DEPRESSION: ICD-10-CM

## 2018-09-16 RX ORDER — CITALOPRAM 40 MG/1
TABLET, FILM COATED ORAL
Qty: 90 TAB | Refills: 3 | Status: SHIPPED | OUTPATIENT
Start: 2018-09-16 | End: 2019-09-06 | Stop reason: SDUPTHER

## 2018-09-18 ENCOUNTER — OFFICE VISIT (OUTPATIENT)
Dept: CARDIOLOGY CLINIC | Age: 73
End: 2018-09-18

## 2018-09-18 VITALS
RESPIRATION RATE: 18 BRPM | WEIGHT: 250.4 LBS | HEIGHT: 73 IN | OXYGEN SATURATION: 96 % | SYSTOLIC BLOOD PRESSURE: 110 MMHG | BODY MASS INDEX: 33.19 KG/M2 | DIASTOLIC BLOOD PRESSURE: 68 MMHG | HEART RATE: 87 BPM

## 2018-09-18 DIAGNOSIS — I25.10 ASHD (ARTERIOSCLEROTIC HEART DISEASE): ICD-10-CM

## 2018-09-18 DIAGNOSIS — R73.03 PREDIABETES: ICD-10-CM

## 2018-09-18 DIAGNOSIS — Z95.5 S/P CORONARY ARTERY STENT PLACEMENT: ICD-10-CM

## 2018-09-18 DIAGNOSIS — I25.83 CORONARY ARTERY DISEASE DUE TO LIPID RICH PLAQUE: ICD-10-CM

## 2018-09-18 DIAGNOSIS — I10 ESSENTIAL HYPERTENSION, BENIGN: Primary | ICD-10-CM

## 2018-09-18 DIAGNOSIS — G47.33 OBSTRUCTIVE SLEEP APNEA: ICD-10-CM

## 2018-09-18 DIAGNOSIS — E78.2 MIXED HYPERLIPIDEMIA: ICD-10-CM

## 2018-09-18 DIAGNOSIS — I25.10 CORONARY ARTERY DISEASE DUE TO LIPID RICH PLAQUE: ICD-10-CM

## 2018-09-18 NOTE — PROGRESS NOTES
[address] [place] [address], [place], [address] [facility]     Natalee Hu is a 74 y.o. male. Last seen 6 months ago. Subjective:     Mr. Maria Teresa Santana states he is doing well from a cardiac standpoint. His previous chest pain resolved. His Valsartan was switched to Amlodipine 5 mg daily due to the Valsartan recall. He had some previous back pain, and was given a pain medication, which he was unable to tolerate. He denies any dyspnea, palpitations, syncope, orthopnea, edema or paroxysmal nocturnal dyspnea.      Patient Active Problem List    Diagnosis Date Noted    History of kidney stones 07/12/2018    Chest pain 07/07/2017    Coronary artery disease due to lipid rich plaque 04/27/2016    Coronary artery disease involving native coronary artery of native heart without angina pectoris 03/16/2016    S/P coronary artery stent placement 05/12/2015    Benign essential tremor 01/22/2014    Hypertensive kidney disease with chronic kidney disease stage III 11/22/2013    Iron deficiency 05/23/2013    Obesity 01/30/2013    Gout 01/30/2013    Prediabetes 01/07/2012    Chronic kidney disease, stage III (moderate) 10/11/2009    Mixed hyperlipidemia 10/11/2009    Obstructive sleep apnea 10/11/2009    RLS (restless legs syndrome) 10/11/2009    Peripheral neuropathy 10/11/2009    DJD (degenerative joint disease), multiple sites 10/11/2009    Essential hypertension, benign 10/11/2009    Allergic rhinitis 10/11/2009    GERD (gastroesophageal reflux disease) 10/11/2009    ED (erectile dysfunction) 10/11/2009    Anxiety associated with depression 10/11/2009      Larisa Garcia MD  Past Medical History:   Diagnosis Date    Abnormal stress echo 5/12/2015    Anemia NEC 03/2013    Last 2-3 months; finished taking iron supplement    Anxiety     CAD (coronary artery disease) 2009    [facility]) revealed 20%RCA and 50%2nd diagonal    Calculus of kidney     Chronic kidney disease     sees nephrologist every 6 months    Chronic kidney disease, stage III (moderate) 10/11/2009    30% kidney function    Diabetes ([facility] [address]     Pre-diabetic    DJD (degenerative joint disease)     GERD (gastroesophageal reflux disease) 10/11/2009    controlled with medication    Gout     Hypertension     Liver disease     fatty liver    Obesity     Obstructive sleep apnea (adult) (pediatric) 10/11/2009    nasal pillows; pressure set at 10-11 - uses cpap    Peripheral neuropathy 10/11/2009    bilateral feet (numbness)    Prediabetes     RLS (restless legs syndrome) 10/11/2009    S/P coronary artery stent placement 5/12/2015    5/11/15 PCI./MALI to LCx      Past Surgical History:   Procedure Laterality Date    HX HEART CATHETERIZATION  2009, 7/17    x1 stent    HX HERNIA REPAIR      McTamaney/umbilical    HX KNEE REPLACEMENT Left 7/23/11     LEFT TOTAL KNEE ARTHROPLASTY    HX ORTHOPAEDIC      left great toe pinning/plate    HX ORTHOPAEDIC      left shoulder manipulation    HX UROLOGICAL      basket extraction of kidney stones    PA COLONOSCOPY FLX DX W/COLLJ SPEC WHEN PFRMD  8/5/2010         PA COLSC FLX W/RMVL OF TUMOR POLYP LESION SNARE TQ  9/24/2014          Allergies   Allergen Reactions    Penicillins Hives    Bactrim [Sulfamethoxazole-Trimethoprim] Hives    Codeine Itching    Lisinopril (Bulk) Cough    Neurontin [Gabapentin] Other (comments)     drowsy    Zostavax [Zoster Vaccine Live (Pf)] Rash     See 9/10/13 visit      Family History   Problem Relation Age of Onset    Heart Disease Father     Hypertension Father     Other Father      abdominal aneurysm     Dementia Mother     Alzheimer Mother     Heart Disease Brother     Heart Attack Brother     Heart Disease Maternal Grandmother     Heart Disease Paternal Grandmother     Heart Attack Paternal Grandmother     Heart Disease Paternal Grandfather     Heart Attack Paternal Grandfather     Elevated Lipids Son     Elevated Lipids Daughter    KeithDelia Cancer Neg Hx     Diabetes Neg Hx     Stroke Neg Hx       Social History     Social History    Marital status:      Spouse name: N/A    Number of children: N/A    Years of education: N/A     Occupational History    Not on file. Social History Main Topics    Smoking status: Former Smoker     Packs/day: 1.00     Years: 10.00     Types: Cigarettes     Quit date: 1/1/1968    Smokeless tobacco: Never Used    Alcohol use No    Drug use: No    Sexual activity: Yes     Partners: Female     Birth control/ protection: None     Other Topics Concern    Not on file     Social History Narrative           Review of Systems  Constitutional: Negative for fever, chills, malaise/fatigue and diaphoresis. Respiratory: Negative for cough, hemoptysis, sputum production, shortness of breath and wheezing. Cardiovascular: Negative for palpitations, orthopnea, claudication, leg swelling and PND. +stable chest pressure  Gastrointestinal: Negative for heartburn, nausea, vomiting, blood in stool and melena. Genitourinary: Negative for dysuria and flank pain. Musculoskeletal: Negative for joint pain and back pain. Skin: Negative for rash. Neurological: Negative for focal weakness, seizures, loss of consciousness, weakness and headaches. Endo/Heme/Allergies: Does not bruise/bleed easily. Psychiatric/Behavioral: Negative for memory loss. The patient does not have insomnia. Physical Exam:    Visit Vitals    /68 (BP 1 Location: Left arm, BP Patient Position: Sitting)    Pulse 87    Resp 18    Ht 6' 1\" (1.854 m)    Wt 250 lb 6.4 oz (113.6 kg)    SpO2 96%    BMI 33.04 kg/m2     Wt Readings from Last 3 Encounters:   09/18/18 250 lb 6.4 oz (113.6 kg)   08/07/18 246 lb 14.6 oz (112 kg)   07/12/18 253 lb (114.8 kg)       Gen: NAD    Mental Status - Alert. General Appearance - Not in acute distress.      Neck - no JVD    Chest and Lung Exam   Inspection: Accessory muscles - No use of accessory muscles in breathing. Auscultation:   Breath sounds: - Normal.     Cardiovascular   Inspection: Jugular vein - Bilateral - Inspection Normal.   Palpation/Percussion:   Apical Impulse: - Normal.   Auscultation: Rhythm - Regular. Heart Sounds - S1 WNL and S2 WNL. No S3 or S4. Murmurs & Other Heart Sounds: Auscultation of the heart reveals - No Murmurs. Peripheral Vascular   Upper Extremity: Inspection - Bilateral - No Cyanotic nailbeds or Digital clubbing. Lower Extremity:   Palpation: Edema - Bilateral - No edema. Abdomen: Soft, non-tender, bowel sounds are active. Neuro: A&O times 3, CN and motor grossly WNL    Cardiographics  EKG 09/18/18- SR, Intraventricular conduction delay     Assessment:     Encounter Diagnoses   Name Primary?  Essential hypertension, benign Yes    Mixed hyperlipidemia     Coronary artery disease due to lipid rich plaque     S/P coronary artery stent placement     Prediabetes     Obstructive sleep apnea     ASHD (arteriosclerotic heart disease)       Plan:     Mr. Silver Bowser states his previous chest pressure has resolved. EKG is fine. BP is at goal. Lipids at goal. Continue current medical regimen. Follow up in 6 months or PRN. Written by Alize Loco, as dictated by Dr. Sky Ruggiero.

## 2018-09-18 NOTE — PROGRESS NOTES
1. Have you been to the ER, urgent care clinic since your last visit? Hospitalized since your last visit? Seen in ER in August for neck/back pain. 2. Have you seen or consulted any other health care providers outside of the [address] [place] [address] since your last visit? Include any pap smears or colon screening.    No.        Chief Complaint   Patient presents with    Other     6 month-soboe

## 2018-09-25 ENCOUNTER — TELEPHONE (OUTPATIENT)
Dept: INTERNAL MEDICINE CLINIC | Age: 73
End: 2018-09-25

## 2018-09-25 NOTE — TELEPHONE ENCOUNTER
Spoke with patient. Two pt identifiers confirmed. Patient states that he thinks that he has pink eye. Patient states that his eye are pink and have yellow-green drainage. Patient states that he was around his grandson and now his grandson has pink eye. Advised patient that he needs to be seen. Advised patient that we do not have any availability this afternoon. Offered patient an appointment with Dr. Carlos Jones for 09/26/18. Patient declined, states that he has another appointment tomorrow. Advised patient that he will need to be seen at Urgent Care today because if he does have pink eye, he is contagious. Pt verbalized understanding of information discussed w/ no further questions at this time.

## 2018-09-25 NOTE — TELEPHONE ENCOUNTER
Pt is requesting a call back in [facility] to him having pink eye. He would like to know if he can get a medication called in or if he needs to come in for an appointment.  Best contact number: [phone]       Message received & copied from [facility]

## 2018-10-12 ENCOUNTER — OFFICE VISIT (OUTPATIENT)
Dept: INTERNAL MEDICINE CLINIC | Age: 73
End: 2018-10-12

## 2018-10-12 VITALS
BODY MASS INDEX: 32.92 KG/M2 | HEART RATE: 87 BPM | SYSTOLIC BLOOD PRESSURE: 105 MMHG | HEIGHT: 73 IN | WEIGHT: 248.4 LBS | RESPIRATION RATE: 18 BRPM | DIASTOLIC BLOOD PRESSURE: 67 MMHG | OXYGEN SATURATION: 94 %

## 2018-10-12 DIAGNOSIS — E78.2 MIXED HYPERLIPIDEMIA: Chronic | ICD-10-CM

## 2018-10-12 DIAGNOSIS — G25.81 RLS (RESTLESS LEGS SYNDROME): ICD-10-CM

## 2018-10-12 DIAGNOSIS — R73.09 ELEVATED GLUCOSE: ICD-10-CM

## 2018-10-12 DIAGNOSIS — R35.0 URINARY FREQUENCY: ICD-10-CM

## 2018-10-12 DIAGNOSIS — G47.33 OBSTRUCTIVE SLEEP APNEA: ICD-10-CM

## 2018-10-12 DIAGNOSIS — I25.10 CORONARY ARTERY DISEASE INVOLVING NATIVE CORONARY ARTERY OF NATIVE HEART WITHOUT ANGINA PECTORIS: ICD-10-CM

## 2018-10-12 DIAGNOSIS — I10 ESSENTIAL HYPERTENSION, BENIGN: Primary | Chronic | ICD-10-CM

## 2018-10-12 NOTE — PROGRESS NOTES
Dez Calderón is a 74 y.o. male who presents for follow up. In with wife. Saw ortho, Georgina Green. Took baclofen for lumbar spasm, better now. Prior kidney stones with stenting. Drinking tea and coffee. increased water intake. No urinary symptoms.   
  
Reports bilateral shoulder pain. Taking tylenol infrequently. Avoids NSAIDS. Heat is helpful.  No ortho evaluation.  Pain with lifting overhead.  Had left shoulder manipulation with Dr. Bonita Mackenzie. Will get injection in shoulders for pain. Has right knee pain, has OA.   Prior TKA on left.  recently fell on left knee and had bruising. It is sore, not giving out.  
  
Sees Dr. Mari Messer, nephrology, creatinine 1.96.  Stable anemia.  Every 6 months. Treated for HTN. BP controlled. Will get SMITH at times. No worsening. No edema or orthopnea. Has CPAP for MELODY. Has RLS, taking requip. Has peripheral neuropathy. Sees Dr Jcarlos Marie, podiatry, better since surgery. PSA in February, Dr. Mark Hopper, urology. Past Medical History:  
Diagnosis Date  Abnormal stress echo 5/12/2015  Anemia NEC 03/2013 Last 2-3 months; finished taking iron supplement  Anxiety  CAD (coronary artery disease) 2009  
 [facility]) revealed 20%RCA and 50%2nd diagonal  
 Calculus of kidney  Chronic kidney disease   
 sees nephrologist every 6 months  Chronic kidney disease, stage III (moderate) ([address] 10/11/2009  
 30% kidney function  Diabetes ([address] Pre-diabetic  DJD (degenerative joint disease)  GERD (gastroesophageal reflux disease) 10/11/2009  
 controlled with medication  Gout  Hypertension  Liver disease   
 fatty liver  Obesity  Obstructive sleep apnea (adult) (pediatric) 10/11/2009  
 nasal pillows; pressure set at 10-11 - uses cpap  Peripheral neuropathy 10/11/2009  
 bilateral feet (numbness)  Prediabetes  RLS (restless legs syndrome) 10/11/2009  S/P coronary artery stent placement 2015 5/11/15 PCI./MALI to LCx Family History Problem Relation Age of Onset  Heart Disease Father  Hypertension Father  Other Father   
     abdominal aneurysm  Dementia Mother  Alzheimer Mother  Heart Disease Brother  Heart Attack Brother  Heart Disease Maternal Grandmother  Heart Disease Paternal Grandmother  Heart Attack Paternal Grandmother  Heart Disease Paternal Grandfather  Heart Attack Paternal Grandfather  Elevated Lipids Son  Elevated Lipids Daughter  Cancer Neg Hx  Diabetes Neg Hx  Stroke Neg Hx Social History Socioeconomic History  Marital status:  Spouse name: Not on file  Number of children: Not on file  Years of education: Not on file  Highest education level: Not on file Social Needs  Financial resource strain: Not on file  Food insecurity - worry: Not on file  Food insecurity - inability: Not on file  Transportation needs - medical: Not on file  Transportation needs - non-medical: Not on file Occupational History  Not on file Tobacco Use  Smoking status: Former Smoker Packs/day: 1.00 Years: 10.00 Pack years: 10.00 Types: Cigarettes Last attempt to quit: 1968 Years since quittin.8  Smokeless tobacco: Never Used Substance and Sexual Activity  Alcohol use: No  
  Alcohol/week: 0.0 oz  Drug use: No  
 Sexual activity: Yes  
  Partners: Female Birth control/protection: None Other Topics Concern  Not on file Social History Narrative  Not on file Review of Systems Pertinent items are noted in HPI. Objective:  
 
Visit Vitals /67 (BP 1 Location: Left arm, BP Patient Position: Sitting) Pulse 87 Resp 18 Ht 6' 1\" (1.854 m) Wt 248 lb 6.4 oz (112.7 kg) SpO2 94% BMI 32.77 kg/m² Gen: well appearing male HEENT:   PERRL,normal conjunctiva. OP no erythema, no exudates, MMM Neck:   No masses or LAD Resp:  No wheezing, no rhonchi, no rales. CV:  RRR, normal S1S2, no murmur. GI: soft, nontender, without masses. No hepatosplenomegaly. Extrem:  +2 pulses, no edema, warm distally Assessment/Plan: ICD-10-CM ICD-9-CM 1. Essential hypertension, benign I10 401.1 2. Mixed hyperlipidemia E78.2 272.2 HEPATIC FUNCTION PANEL  
   LIPID PANEL 3. Obstructive sleep apnea G47.33 327.23   
4. RLS (restless legs syndrome) G25.81 333.94   
5. Coronary artery disease involving native coronary artery of native heart without angina pectoris I25.10 414.01   
6. Urinary frequency R35.0 788.41 PSA W/ REFLX FREE PSA 7. Elevated glucose R73.09 790.29 HEMOGLOBIN A1C W/O EAG Follow-up Disposition: 
Return in about 6 months (around 4/12/2019) for follow up on medications. Chloe Carrillo MD 
 
Discussed the patient's BMI with him. The BMI follow up plan is as follows:  
 
dietary management education, guidance, and counseling 
encourage exercise 
monitor weight 
prescribed dietary intake An After Visit Summary was printed and given to the patient.

## 2018-10-12 NOTE — PROGRESS NOTES
Chief Complaint Patient presents with  Follow-up Reviewed record in preparation for visit and have obtained necessary documentation. Identified pt with two pt identifiers(name and ). Chief Complaint Patient presents with  Follow-up There were no vitals filed for this visit. Coordination of Care Questionnaire: 
:  
 
1) Have you been to an emergency room, urgent care clinic since your last visit? no  
Hospitalized since your last visit? no          
 
2) Have you seen or consulted any other health care providers outside of [address] [place] [address] since your last visit? no  (Include any pap smears or colon screenings in this section.) 3) In the event something were to happen to you and you were unable to speak on your behalf, do you have an Advance Directive/ Living Will in place stating your wishes? NO Do you have an Advance Directive on file? no 
 
4) Are you interested in receiving information on Advance Directives? NO Patient is accompanied by spouse I have received verbal consent from Caridad Arora to discuss any/all medical information while they are present in the room.

## 2018-10-16 RX ORDER — CLOPIDOGREL BISULFATE 75 MG/1
TABLET ORAL
Qty: 90 TAB | Refills: 1 | Status: SHIPPED | OUTPATIENT
Start: 2018-10-16 | End: 2019-04-12 | Stop reason: SDUPTHER

## 2018-10-16 RX ORDER — ISOSORBIDE MONONITRATE 30 MG/1
TABLET, EXTENDED RELEASE ORAL
Qty: 45 TAB | Refills: 1 | Status: SHIPPED | OUTPATIENT
Start: 2018-10-16 | End: 2019-04-12 | Stop reason: SDUPTHER

## 2018-10-16 RX ORDER — METOPROLOL SUCCINATE 25 MG/1
TABLET, EXTENDED RELEASE ORAL
Qty: 90 TAB | Refills: 1 | Status: SHIPPED | OUTPATIENT
Start: 2018-10-16 | End: 2019-04-12 | Stop reason: SDUPTHER

## 2018-10-17 RX ORDER — PANTOPRAZOLE SODIUM 40 MG/1
TABLET, DELAYED RELEASE ORAL
Qty: 30 TAB | Refills: 5 | Status: SHIPPED | OUTPATIENT
Start: 2018-10-17 | End: 2019-04-11 | Stop reason: SDUPTHER

## 2018-10-20 NOTE — PATIENT INSTRUCTIONS
Body Mass Index: Care Instructions Your Care Instructions Body mass index (BMI) can help you see if your weight is raising your risk for health problems. It uses a formula to compare how much you weigh with how tall you are. · A BMI lower than 18.5 is considered underweight. · A BMI between 18.5 and 24.9 is considered healthy. · A BMI between 25 and 29.9 is considered overweight. A BMI of 30 or higher is considered obese. If your BMI is in the normal range, it means that you have a lower risk for weight-related health problems. If your BMI is in the overweight or obese range, you may be at increased risk for weight-related health problems, such as high blood pressure, heart disease, stroke, arthritis or joint pain, and diabetes. If your BMI is in the underweight range, you may be at increased risk for health problems such as fatigue, lower protection (immunity) against illness, muscle loss, bone loss, hair loss, and hormone problems. BMI is just one measure of your risk for weight-related health problems. You may be at higher risk for health problems if you are not active, you eat an unhealthy diet, or you drink too much alcohol or use tobacco products. Follow-up care is a key part of your treatment and safety. Be sure to make and go to all appointments, and call your doctor if you are having problems. It's also a good idea to know your test results and keep a list of the medicines you take. How can you care for yourself at home? · Practice healthy eating habits. This includes eating plenty of fruits, vegetables, whole grains, lean protein, and low-fat dairy. · If your doctor recommends it, get more exercise. Walking is a good choice. Bit by bit, increase the amount you walk every day. Try for at least 30 minutes on most days of the week. · Do not smoke. Smoking can increase your risk for health problems.  If you need help quitting, talk to your doctor about stop-smoking programs and medicines. These can increase your chances of quitting for good. · Limit alcohol to 2 drinks a day for men and 1 drink a day for women. Too much alcohol can cause health problems. If you have a BMI higher than 25 · Your doctor may do other tests to check your risk for weight-related health problems. This may include measuring the distance around your waist. A waist measurement of more than 40 inches in men or 35 inches in women can increase the risk of weight-related health problems. · Talk with your doctor about steps you can take to stay healthy or improve your health. You may need to make lifestyle changes to lose weight and stay healthy, such as changing your diet and getting regular exercise. If you have a BMI lower than 18.5 · Your doctor may do other tests to check your risk for health problems. · Talk with your doctor about steps you can take to stay healthy or improve your health. You may need to make lifestyle changes to gain or maintain weight and stay healthy, such as getting more healthy foods in your diet and doing exercises to build muscle. Where can you learn more? Go to http://antonieta-josie.info/. Enter S176 in the search box to learn more about \"Body Mass Index: Care Instructions. \" Current as of: October 13, 2016 Content Version: 11.4 © 6789-5473 [employer]. Care instructions adapted under license by [employer] (which disclaims liability or warranty for this information). If you have questions about a medical condition or this instruction, always ask your healthcare professional. [address] any warranty or liability for your use of this information.

## 2018-11-21 ENCOUNTER — CLINICAL SUPPORT (OUTPATIENT)
Dept: INTERNAL MEDICINE CLINIC | Age: 73
End: 2018-11-21

## 2018-11-21 DIAGNOSIS — Z23 ENCOUNTER FOR IMMUNIZATION: Primary | ICD-10-CM

## 2018-11-21 NOTE — PROGRESS NOTES
After obtaining consent, and per verbal order from Dr. Nathaniel Smith, patient received influenza vaccine given by Sergo Shi LPN in left Deltoid. Influenza Vaccine 0.5 mL IM now. Patient was observed for 10 minutes post injection. Patient tolerated injection well. VIS given.

## 2018-11-29 ENCOUNTER — TELEPHONE (OUTPATIENT)
Dept: SLEEP MEDICINE | Age: 73
End: 2018-11-29

## 2018-11-29 DIAGNOSIS — G47.33 OBSTRUCTIVE SLEEP APNEA: Primary | ICD-10-CM

## 2018-11-29 NOTE — TELEPHONE ENCOUNTER
Patient called into the office requesting an order for a replacement device to be faxed to [facility]. Please put order in system.

## 2018-12-03 ENCOUNTER — DOCUMENTATION ONLY (OUTPATIENT)
Dept: SLEEP MEDICINE | Age: 73
End: 2018-12-03

## 2018-12-18 RX ORDER — CHLORTHALIDONE 25 MG/1
TABLET ORAL
Qty: 30 TAB | Refills: 0 | Status: SHIPPED | OUTPATIENT
Start: 2018-12-18 | End: 2019-02-14 | Stop reason: SDUPTHER

## 2019-01-14 ENCOUNTER — OFFICE VISIT (OUTPATIENT)
Dept: INTERNAL MEDICINE CLINIC | Age: 74
End: 2019-01-14

## 2019-01-14 VITALS
OXYGEN SATURATION: 96 % | DIASTOLIC BLOOD PRESSURE: 64 MMHG | SYSTOLIC BLOOD PRESSURE: 110 MMHG | WEIGHT: 248 LBS | HEIGHT: 73 IN | TEMPERATURE: 97.9 F | RESPIRATION RATE: 16 BRPM | HEART RATE: 67 BPM | BODY MASS INDEX: 32.87 KG/M2

## 2019-01-14 DIAGNOSIS — I10 ESSENTIAL HYPERTENSION, BENIGN: Primary | Chronic | ICD-10-CM

## 2019-01-14 DIAGNOSIS — N18.30 CHRONIC KIDNEY DISEASE, STAGE III (MODERATE) (HCC): ICD-10-CM

## 2019-01-14 DIAGNOSIS — Z00.00 MEDICARE ANNUAL WELLNESS VISIT, SUBSEQUENT: ICD-10-CM

## 2019-01-14 DIAGNOSIS — E78.2 MIXED HYPERLIPIDEMIA: Chronic | ICD-10-CM

## 2019-01-14 DIAGNOSIS — G60.3 IDIOPATHIC PROGRESSIVE NEUROPATHY: ICD-10-CM

## 2019-01-14 DIAGNOSIS — R73.03 PREDIABETES: ICD-10-CM

## 2019-01-14 DIAGNOSIS — G47.33 OBSTRUCTIVE SLEEP APNEA: ICD-10-CM

## 2019-01-14 DIAGNOSIS — I25.10 CORONARY ARTERY DISEASE DUE TO LIPID RICH PLAQUE: ICD-10-CM

## 2019-01-14 DIAGNOSIS — I25.83 CORONARY ARTERY DISEASE DUE TO LIPID RICH PLAQUE: ICD-10-CM

## 2019-01-14 RX ORDER — ROPINIROLE 0.5 MG/1
TABLET, FILM COATED ORAL
Qty: 60 TAB | Refills: 5 | Status: SHIPPED | OUTPATIENT
Start: 2019-01-14 | End: 2020-03-03 | Stop reason: SDUPTHER

## 2019-01-14 RX ORDER — NITROGLYCERIN 0.4 MG/1
TABLET SUBLINGUAL
Qty: 1 BOTTLE | Refills: 0 | Status: SHIPPED | OUTPATIENT
Start: 2019-01-14 | End: 2020-03-04 | Stop reason: SDUPTHER

## 2019-01-14 NOTE — PATIENT INSTRUCTIONS
Medicare Wellness Visit, Male The best way to live healthy is to have a lifestyle where you eat a well-balanced diet, exercise regularly, limit alcohol use, and quit all forms of tobacco/nicotine, if applicable. Regular preventive services are another way to keep healthy. Preventive services (vaccines, screening tests, monitoring & exams) can help personalize your care plan, which helps you manage your own care. Screening tests can find health problems at the earliest stages, when they are easiest to treat. [address] [facility] follows the current, evidence-based guidelines published by the [facility] ([facility]) when recommending preventive services for our patients. Because we follow these guidelines, sometimes recommendations change over time as research supports it. (For example, a prostate screening blood test is no longer routinely recommended for men with no symptoms.) Of course, you and your doctor may decide to screen more often for some diseases, based on your risk and co-morbidities (chronic disease you are already diagnosed with). Preventive services for you include: - Medicare offers their members a free annual wellness visit, which is time for you and your primary care provider to discuss and plan for your preventive service needs. Take advantage of this benefit every year! 
-All adults over age 72 should receive the recommended pneumonia vaccines. Current USPSTF guidelines recommend a series of two vaccines for the best pneumonia protection.  
-All adults should have a flu vaccine yearly and an ECG.  All adults age 61 and older should receive a shingles vaccine once in their lifetime.   
-All adults age 38-68 who are overweight should have a diabetes screening test once every three years.  
-Other screening tests & preventive services for persons with diabetes include: an eye exam to screen for diabetic retinopathy, a kidney function test, a foot exam, and stricter control over your cholesterol.  
-Cardiovascular screening for adults with routine risk involves an electrocardiogram (ECG) at intervals determined by the provider.  
-Colorectal cancer screening should be done for adults age 54-65 with no increased risk factors for colorectal cancer. There are a number of acceptable methods of screening for this type of cancer. Each test has its own benefits and drawbacks. Discuss with your provider what is most appropriate for you during your annual wellness visit. The different tests include: colonoscopy (considered the best screening method), a fecal occult blood test, a fecal DNA test, and sigmoidoscopy. 
-All adults born between [facility] should be screened once for Hepatitis C. 
-An Abdominal Aortic Aneurysm (AAA) Screening is recommended for men age 73-68 who has ever smoked in their lifetime.

## 2019-01-14 NOTE — PROGRESS NOTES
Ryan Alfredo is a 74 y.o. male who presents for follow up. In with wife. Reports bilateral shoulder pain. Taking tylenol infrequently. Avoids NSAIDS.  had injections. Pain with lifting overhead. Dr. Fredy Law. Had CAD, some SMITH. Active at home. No chest pain.  
     
Sees Dr. Shai Suh, nephrology. Follow up every 6 months.  
  
Treated for HTN. BP controlled. Will get SMITH at times. No worsening. No edema or orthopnea. Has CPAP for MELODY.   
  
Has RLS, taking requip 0.5mg, wants to try 2. .  Has peripheral neuropathy. Sees Dr Jyothi Hayward, podiatry. Reports more swelling in left leg for a few months. Taking tylenol for pain. Some relief.  
  
PSA 1.8 Feb 2018. Sees Dr. Ayo Moncada, urology. History of kidney stones with stenting. Saw Dr. Geno Moncada, ortho, for right trigger finger. Injection helped, recurred. Past Medical History:  
Diagnosis Date  Abnormal stress echo 5/12/2015  Anemia NEC 03/2013 Last 2-3 months; finished taking iron supplement  Anxiety  CAD (coronary artery disease) 2009  
 cath [facility]) revealed 20%RCA and 50%2nd diagonal  
 Calculus of kidney  Chronic kidney disease   
 sees nephrologist every 6 months  Chronic kidney disease, stage III (moderate) ([address] 10/11/2009  
 30% kidney function  Diabetes ([address] Pre-diabetic  DJD (degenerative joint disease)  GERD (gastroesophageal reflux disease) 10/11/2009  
 controlled with medication  Gout  Hypertension  Liver disease   
 fatty liver  Obesity  Obstructive sleep apnea (adult) (pediatric) 10/11/2009  
 nasal pillows; pressure set at 10-11 - uses cpap  Peripheral neuropathy 10/11/2009  
 bilateral feet (numbness)  Prediabetes  RLS (restless legs syndrome) 10/11/2009  S/P coronary artery stent placement 5/12/2015 5/11/15 PCI./MALI to LCx Family History Problem Relation Age of Onset  Heart Disease Father  Hypertension Father  Other Father abdominal aneurysm  Dementia Mother  Alzheimer Mother  Heart Disease Brother  Heart Attack Brother  Heart Disease Maternal Grandmother  Heart Disease Paternal Grandmother  Heart Attack Paternal Grandmother  Heart Disease Paternal Grandfather  Heart Attack Paternal Grandfather  Elevated Lipids Son  Elevated Lipids Daughter  Cancer Neg Hx  Diabetes Neg Hx  Stroke Neg Hx Social History Socioeconomic History  Marital status:  Spouse name: Not on file  Number of children: Not on file  Years of education: Not on file  Highest education level: Not on file Social Needs  Financial resource strain: Not on file  Food insecurity - worry: Not on file  Food insecurity - inability: Not on file  Transportation needs - medical: Not on file  Transportation needs - non-medical: Not on file Occupational History  Not on file Tobacco Use  Smoking status: Former Smoker Packs/day: 1.00 Years: 10.00 Pack years: 10.00 Types: Cigarettes Last attempt to quit: 1968 Years since quittin.0  Smokeless tobacco: Never Used Substance and Sexual Activity  Alcohol use: No  
  Alcohol/week: 0.0 oz  Drug use: No  
 Sexual activity: Yes  
  Partners: Female Birth control/protection: None Other Topics Concern  Not on file Social History Narrative  Not on file Review of Systems Pertinent items are noted in HPI. Objective:  
 
Visit Vitals /64 (BP 1 Location: Left arm, BP Patient Position: Sitting) Pulse 67 Temp 97.9 °F (36.6 °C) (Oral) Resp 16 Ht 6' 1\" (1.854 m) Wt 248 lb (112.5 kg) SpO2 96% BMI 32.72 kg/m² Gen: well appearing male Resp:  No wheezing, no rhonchi, no rales. CV:  RRR, normal S1S2 Extrem:  +2 pulses, +1 edema, warm distally Assessment/Plan: ICD-10-CM ICD-9-CM 1. Essential hypertension, benign I10 401.1 2. Medicare annual wellness visit, subsequent Z00.00 V70.0 3. Coronary artery disease due to lipid rich plaque I25.10 414.00 I25.83 414.3 4. Idiopathic progressive neuropathy G60.3 356.4 5. Chronic kidney disease, stage III (moderate) (HCC) N18.3 585.3 6. Mixed hyperlipidemia E78.2 272.2 7. Prediabetes R73.03 790.29   
8. Obstructive sleep apnea G47.33 327.23 rOPINIRole (REQUIP) 0.5 mg tablet Follow-up Disposition: 
Return for follow up for fasting labs after Feb 1, follow up 6 months.  Deborah Edouard MD

## 2019-01-14 NOTE — PROGRESS NOTES
This is the Subsequent Medicare Annual Wellness Exam, performed 12 months or more after the Initial AWV or the last Subsequent AWV I have reviewed the patient's medical history in detail and updated the computerized patient record. History Past Medical History:  
Diagnosis Date  Abnormal stress echo 5/12/2015  Anemia NEC 03/2013 Last 2-3 months; finished taking iron supplement  Anxiety  CAD (coronary artery disease) 2009  
 cath [facility]) revealed 20%RCA and 50%2nd diagonal  
 Calculus of kidney  Chronic kidney disease   
 sees nephrologist every 6 months  Chronic kidney disease, stage III (moderate) ([facility] 10/11/2009  
 30% kidney function  Diabetes ([facility] Pre-diabetic  DJD (degenerative joint disease)  GERD (gastroesophageal reflux disease) 10/11/2009  
 controlled with medication  Gout  Hypertension  Liver disease   
 fatty liver  Obesity  Obstructive sleep apnea (adult) (pediatric) 10/11/2009  
 nasal pillows; pressure set at 10-11 - uses cpap  Peripheral neuropathy 10/11/2009  
 bilateral feet (numbness)  Prediabetes  RLS (restless legs syndrome) 10/11/2009  S/P coronary artery stent placement 5/12/2015 5/11/15 PCI./MALI to LCx Past Surgical History:  
Procedure Laterality Date  HX HEART CATHETERIZATION  2009, 7/17  
 x1 stent  HX HERNIA REPAIR McTamaney/umbilical  
 HX KNEE REPLACEMENT Left 7/23/11 LEFT TOTAL KNEE ARTHROPLASTY  HX ORTHOPAEDIC    
 left great toe pinning/plate  HX ORTHOPAEDIC    
 left shoulder manipulation  HX UROLOGICAL    
 basket extraction of kidney stones  OH COLONOSCOPY FLX DX W/COLLJ SPEC WHEN PFRMD  8/5/2010  OH COLSC FLX W/RMVL OF TUMOR POLYP LESION SNARE TQ  9/24/2014 Allergies Allergen Reactions  Penicillins Hives  Bactrim [Sulfamethoxazole-Trimethoprim] Hives  Codeine Itching  Lisinopril (Bulk) Cough  Neurontin [Gabapentin] Other (comments) drowsy  Zostavax [Zoster Vaccine Live (Pf)] Rash See 9/10/13 visit Family History Problem Relation Age of Onset  Heart Disease Father  Hypertension Father  Other Father   
     abdominal aneurysm  Dementia Mother  Alzheimer Mother  Heart Disease Brother  Heart Attack Brother  Heart Disease Maternal Grandmother  Heart Disease Paternal Grandmother  Heart Attack Paternal Grandmother  Heart Disease Paternal Grandfather  Heart Attack Paternal Grandfather  Elevated Lipids Son  Elevated Lipids Daughter  Cancer Neg Hx  Diabetes Neg Hx  Stroke Neg Hx Social History Tobacco Use  Smoking status: Former Smoker Packs/day: 1.00 Years: 10.00 Pack years: 10.00 Types: Cigarettes Last attempt to quit: 1968 Years since quittin.0  Smokeless tobacco: Never Used Substance Use Topics  Alcohol use: No  
  Alcohol/week: 0.0 oz Patient Active Problem List  
Diagnosis Code  Chronic kidney disease, stage III (moderate) (HCC) N18.3  Mixed hyperlipidemia E78.2  Obstructive sleep apnea G47.33  
 RLS (restless legs syndrome) G25.81  
 Peripheral neuropathy G62.9  
 DJD (degenerative joint disease), multiple sites M15.9  
 Essential hypertension, benign I10  
 Allergic rhinitis J30.9  GERD (gastroesophageal reflux disease) K21.9  ED (erectile dysfunction) N52.9  Anxiety associated with depression F41.8  Prediabetes R73.03  
 Obesity E66.9  
 Gout M10.9  Iron deficiency E61.1  Hypertensive kidney disease with chronic kidney disease stage III (HCC) I12.9, N18.3  Benign essential tremor G25.0  S/P coronary artery stent placement Z95.5  Coronary artery disease involving native coronary artery of native heart without angina pectoris I25.10  Coronary artery disease due to lipid rich plaque I25.10, I25.83  Chest pain R07.9  History of kidney stones Z87.442 Depression Risk Factor Screening: PHQ over the last two weeks 3/16/2016 PHQ Not Done Active Diagnosis of Depression or Bipolar Disorder Alcohol Risk Factor Screening: You do not drink alcohol or very rarely. Functional Ability and Level of Safety:  
Hearing Loss Hearing is good. Activities of Daily Living The home contains: no safety equipment. Patient does total self care Fall Risk Fall Risk Assessment, last 12 mths 7/12/2018 Able to walk? Yes Fall in past 12 months? No  
Fall with injury? -  
Number of falls in past 12 months - Fall Risk Score -  
 
 
Abuse Screen Patient is not abused Cognitive Screening Evaluation of Cognitive Function: 
Has your family/caregiver stated any concerns about your memory: no 
Normal 
 
Patient Care Team  
Patient Care Team: Court Dang MD as PCP - General (Internal Medicine) Manju Rubalcava (Inactive) as Physician (Dermatology) Izaiah Ocampo MD as Physician (Cardiology) Maverick Yang MD as Physician (Nephrology) Sabrina Cast MD as Physician (Neurology) Jerry Douglass MD as Physician (Orthopedic Surgery) Devendra Bermudez MD (Gastroenterology) Corey Ryan, JAY as Nurse Navigator Violette Gonzales MD (Sleep Medicine) Milagros Fernando DPM as Consulting Provider (Podiatry) Alpa Peacock NP as Nurse Practitioner (Nurse Practitioner) Joseph Rodriguez RN as Ambulatory Care Navigator (Cardiology) Assessment/Plan Education and counseling provided: 
Are appropriate based on today's review and evaluation Diagnoses and all orders for this visit: 
 
1. Medicare annual wellness visit, subsequent Health Maintenance Due Topic Date Due  Shingrix Vaccine Age 50> (1 of 2) 08/26/1993  AAA Screening 73-67 YO Male Smoking Patients  08/26/2008  GLAUCOMA SCREENING Q2Y  08/05/2016  Pneumococcal 65+ Low/Medium Risk (2 of 2 - PPSV23) 06/20/2017  MEDICARE YEARLY EXAM  08/24/2018

## 2019-01-23 ENCOUNTER — HOSPITAL ENCOUNTER (OUTPATIENT)
Dept: CT IMAGING | Age: 74
Discharge: HOME | End: 2019-01-23
Attending: ORTHOPAEDIC SURGERY
Payer: MEDICARE

## 2019-01-23 DIAGNOSIS — M19.011 OSTEOARTHRITIS OF RIGHT GLENOHUMERAL JOINT: ICD-10-CM

## 2019-01-23 PROCEDURE — 73200 CT UPPER EXTREMITY W/O DYE: CPT

## 2019-02-15 RX ORDER — CHLORTHALIDONE 25 MG/1
TABLET ORAL
Qty: 30 TAB | Refills: 0 | Status: SHIPPED | OUTPATIENT
Start: 2019-02-15 | End: 2019-04-14 | Stop reason: SDUPTHER

## 2019-04-11 RX ORDER — PANTOPRAZOLE SODIUM 40 MG/1
TABLET, DELAYED RELEASE ORAL
Qty: 30 TAB | Refills: 5 | Status: SHIPPED | OUTPATIENT
Start: 2019-04-11 | End: 2019-10-15 | Stop reason: SDUPTHER

## 2019-04-12 RX ORDER — METOPROLOL SUCCINATE 25 MG/1
TABLET, EXTENDED RELEASE ORAL
Qty: 90 TAB | Refills: 1 | Status: SHIPPED | OUTPATIENT
Start: 2019-04-12 | End: 2019-10-09 | Stop reason: SDUPTHER

## 2019-04-12 RX ORDER — ISOSORBIDE MONONITRATE 30 MG/1
TABLET, EXTENDED RELEASE ORAL
Qty: 45 TAB | Refills: 1 | Status: SHIPPED | OUTPATIENT
Start: 2019-04-12 | End: 2019-11-25 | Stop reason: SDUPTHER

## 2019-04-12 RX ORDER — CLOPIDOGREL BISULFATE 75 MG/1
TABLET ORAL
Qty: 90 TAB | Refills: 1 | Status: SHIPPED | OUTPATIENT
Start: 2019-04-12 | End: 2019-10-09 | Stop reason: SDUPTHER

## 2019-04-17 RX ORDER — CHLORTHALIDONE 25 MG/1
TABLET ORAL
Qty: 30 TAB | Refills: 0 | Status: SHIPPED | OUTPATIENT
Start: 2019-04-17 | End: 2019-06-15 | Stop reason: SDUPTHER

## 2019-05-31 ENCOUNTER — APPOINTMENT (OUTPATIENT)
Dept: CT IMAGING | Age: 74
End: 2019-05-31
Attending: EMERGENCY MEDICINE
Payer: MEDICARE

## 2019-05-31 ENCOUNTER — HOSPITAL ENCOUNTER (EMERGENCY)
Age: 74
Discharge: HOME | End: 2019-05-31
Attending: EMERGENCY MEDICINE
Payer: MEDICARE

## 2019-05-31 VITALS
RESPIRATION RATE: 22 BRPM | DIASTOLIC BLOOD PRESSURE: 69 MMHG | WEIGHT: 246.91 LBS | TEMPERATURE: 98.2 F | SYSTOLIC BLOOD PRESSURE: 144 MMHG | HEART RATE: 69 BPM | OXYGEN SATURATION: 94 % | BODY MASS INDEX: 33.44 KG/M2 | HEIGHT: 72 IN
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
## 2019-05-31 DIAGNOSIS — R42 DIZZINESS: Primary | ICD-10-CM

## 2019-05-31 LAB
ALBUMIN SERPL-MCNC: 3.4 G/DL (ref 3.5–5)
ALBUMIN/GLOB SERPL: 1 {RATIO} (ref 1.1–2.2)
ALP SERPL-CCNC: 103 U/L (ref 45–117)
ALT SERPL-CCNC: 27 U/L (ref 12–78)
ANION GAP SERPL CALC-SCNC: 4 MMOL/L (ref 5–15)
APPEARANCE UR: CLEAR
AST SERPL-CCNC: 17 U/L (ref 15–37)
BACTERIA URNS QL MICRO: NEGATIVE /HPF
BASOPHILS # BLD: 0.1 K/UL (ref 0–0.1)
BASOPHILS NFR BLD: 1 % (ref 0–1)
BILIRUB SERPL-MCNC: 0.5 MG/DL (ref 0.2–1)
BILIRUB UR QL: NEGATIVE
BUN SERPL-MCNC: 23 MG/DL (ref 6–20)
BUN/CREAT SERPL: 11 (ref 12–20)
CALCIUM SERPL-MCNC: 8.4 MG/DL (ref 8.5–10.1)
CHLORIDE SERPL-SCNC: 113 MMOL/L (ref 97–108)
CO2 SERPL-SCNC: 26 MMOL/L (ref 21–32)
COLOR UR: ABNORMAL
CREAT SERPL-MCNC: 2.04 MG/DL (ref 0.7–1.3)
DIFFERENTIAL METHOD BLD: ABNORMAL
EOSINOPHIL # BLD: 0.3 K/UL (ref 0–0.4)
EOSINOPHIL NFR BLD: 4 % (ref 0–7)
EPITH CASTS URNS QL MICRO: ABNORMAL /LPF
ERYTHROCYTE [DISTWIDTH] IN BLOOD BY AUTOMATED COUNT: 14 % (ref 11.5–14.5)
GLOBULIN SER CALC-MCNC: 3.5 G/DL (ref 2–4)
GLUCOSE SERPL-MCNC: 87 MG/DL (ref 65–100)
GLUCOSE UR STRIP.AUTO-MCNC: NEGATIVE MG/DL
HCT VFR BLD AUTO: 31.8 % (ref 36.6–50.3)
HGB BLD-MCNC: 10.4 G/DL (ref 12.1–17)
HGB UR QL STRIP: NEGATIVE
HYALINE CASTS URNS QL MICRO: ABNORMAL /LPF (ref 0–5)
IMM GRANULOCYTES # BLD AUTO: 0.1 K/UL (ref 0–0.04)
IMM GRANULOCYTES NFR BLD AUTO: 1 % (ref 0–0.5)
KETONES UR QL STRIP.AUTO: NEGATIVE MG/DL
LEUKOCYTE ESTERASE UR QL STRIP.AUTO: NEGATIVE
LYMPHOCYTES # BLD: 2.5 K/UL (ref 0.8–3.5)
LYMPHOCYTES NFR BLD: 33 % (ref 12–49)
MCH RBC QN AUTO: 31.6 PG (ref 26–34)
MCHC RBC AUTO-ENTMCNC: 32.7 G/DL (ref 30–36.5)
MCV RBC AUTO: 96.7 FL (ref 80–99)
MONOCYTES # BLD: 0.9 K/UL (ref 0–1)
MONOCYTES NFR BLD: 12 % (ref 5–13)
NEUTS SEG # BLD: 3.6 K/UL (ref 1.8–8)
NEUTS SEG NFR BLD: 49 % (ref 32–75)
NITRITE UR QL STRIP.AUTO: NEGATIVE
NRBC # BLD: 0 K/UL (ref 0–0.01)
NRBC BLD-RTO: 0 PER 100 WBC
PH UR STRIP: 5.5 [PH] (ref 5–8)
PLATELET # BLD AUTO: 204 K/UL (ref 150–400)
PMV BLD AUTO: 9.2 FL (ref 8.9–12.9)
POTASSIUM SERPL-SCNC: 4 MMOL/L (ref 3.5–5.1)
PROT SERPL-MCNC: 6.9 G/DL (ref 6.4–8.2)
PROT UR STRIP-MCNC: ABNORMAL MG/DL
RBC # BLD AUTO: 3.29 M/UL (ref 4.1–5.7)
RBC #/AREA URNS HPF: ABNORMAL /HPF (ref 0–5)
SODIUM SERPL-SCNC: 143 MMOL/L (ref 136–145)
SP GR UR REFRACTOMETRY: 1.02 (ref 1–1.03)
TROPONIN I BLD-MCNC: <0.04 NG/ML (ref 0–0.08)
UA: UC IF INDICATED,UAUC: ABNORMAL
UROBILINOGEN UR QL STRIP.AUTO: 0.2 EU/DL (ref 0.2–1)
WBC # BLD AUTO: 7.4 K/UL (ref 4.1–11.1)
WBC URNS QL MICRO: ABNORMAL /HPF (ref 0–4)

## 2019-05-31 PROCEDURE — 80053 COMPREHEN METABOLIC PANEL: CPT

## 2019-05-31 PROCEDURE — 36415 COLL VENOUS BLD VENIPUNCTURE: CPT

## 2019-05-31 PROCEDURE — 86618 LYME DISEASE ANTIBODY: CPT

## 2019-05-31 PROCEDURE — 72125 CT NECK SPINE W/O DYE: CPT

## 2019-05-31 PROCEDURE — 81001 URINALYSIS AUTO W/SCOPE: CPT

## 2019-05-31 PROCEDURE — 85025 COMPLETE CBC W/AUTO DIFF WBC: CPT

## 2019-05-31 PROCEDURE — 93005 ELECTROCARDIOGRAM TRACING: CPT

## 2019-05-31 PROCEDURE — 70450 CT HEAD/BRAIN W/O DYE: CPT

## 2019-05-31 PROCEDURE — 99285 EMERGENCY DEPT VISIT HI MDM: CPT

## 2019-05-31 PROCEDURE — 84484 ASSAY OF TROPONIN QUANT: CPT

## 2019-05-31 RX ORDER — DOXYCYCLINE 100 MG/1
100 CAPSULE ORAL 2 TIMES DAILY
Qty: 20 CAP | Refills: 0 | Status: SHIPPED | OUTPATIENT
Start: 2019-05-31 | End: 2019-06-10

## 2019-05-31 NOTE — ED NOTES
Assumed care of pt ambulatory from triage. Pt reports he tripped and fell 2 weeks ago at [facility]. Reports \"it knocked me out\" and states he hit his head. Pt reports ongoing dizziness since fall with blurred vision. Denies vomiting. 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 Pt placed on cardiac monitor x3. VSS.

## 2019-05-31 NOTE — ED NOTES
DORY Guzman reviewed discharge instructions with the patient. The patient verbalized understanding. All questions and concerns were addressed. The patient declined a wheelchair and is discharged ambulatory in the care of family members with instructions and prescriptions in hand. Pt is alert and oriented x 4. Respirations are clear and unlabored.

## 2019-05-31 NOTE — DISCHARGE INSTRUCTIONS
Patient Education        Dizziness: Care Instructions  Your Care Instructions  Dizziness is the feeling of unsteadiness or fuzziness in your head. It is different than having vertigo, which is a feeling that the room is spinning or that you are moving or falling. It is also different from lightheadedness, which is the feeling that you are about to faint. It can be hard to know what causes dizziness. Some people feel dizzy when they have migraine headaches. Sometimes bouts of flu can make you feel dizzy. Some medical conditions, such as heart problems or high blood pressure, can make you feel dizzy. Many medicines can cause dizziness, including medicines for high blood pressure, pain, or anxiety. If a medicine causes your symptoms, your doctor may recommend that you stop or change the medicine. If it is a problem with your heart, you may need medicine to help your heart work better. If there is no clear reason for your symptoms, your doctor may suggest watching and waiting for a while to see if the dizziness goes away on its own. Follow-up care is a key part of your treatment and safety. Be sure to make and go to all appointments, and call your doctor if you are having problems. It's also a good idea to know your test results and keep a list of the medicines you take. How can you care for yourself at home? · If your doctor recommends or prescribes medicine, take it exactly as directed. Call your doctor if you think you are having a problem with your medicine. · Do not drive while you feel dizzy. · Try to prevent falls. Steps you can take include:  ? Using nonskid mats, adding grab bars near the tub, and using night-lights. ? Clearing your home so that walkways are free of anything you might trip on.  ? Letting family and friends know that you have been feeling dizzy. This will help them know how to help you. When should you call for help? Call 911 anytime you think you may need emergency care.  For example, call if:    · You passed out (lost consciousness).     · You have dizziness along with symptoms of a heart attack. These may include:  ? Chest pain or pressure, or a strange feeling in the chest.  ? Sweating. ? Shortness of breath. ? Nausea or vomiting. ? Pain, pressure, or a strange feeling in the back, neck, jaw, or upper belly or in one or both shoulders or arms. ? Lightheadedness or sudden weakness. ? A fast or irregular heartbeat.     · You have symptoms of a stroke. These may include:  ? Sudden numbness, tingling, weakness, or loss of movement in your face, arm, or leg, especially on only one side of your body. ? Sudden vision changes. ? Sudden trouble speaking. ? Sudden confusion or trouble understanding simple statements. ? Sudden problems with walking or balance. ? A sudden, severe headache that is different from past headaches.    Call your doctor now or seek immediate medical care if:    · You feel dizzy and have a fever, headache, or ringing in your ears.     · You have new or increased nausea and vomiting.     · Your dizziness does not go away or comes back.    Watch closely for changes in your health, and be sure to contact your doctor if:    · You do not get better as expected. Where can you learn more? Go to http://antonieta-josie.info/. Enter L210 in the search box to learn more about \"Dizziness: Care Instructions. \"  Current as of: September 23, 2018  Content Version: 11.9  © 2293-4699 [employer]. Care instructions adapted under license by [employer] (which disclaims liability or warranty for this information). If you have questions about a medical condition or this instruction, always ask your healthcare professional. [place] [address] any warranty or liability for your use of this information. Patient Education        Tick Bite: Care Instructions  Your Care Instructions    Ticks are small spiderlike animals.  They bite to fasten themselves onto your skin and feed on your blood. Ticks can carry diseases. But most ticks do not carry diseases, and most tick bites do not cause serious health problems. Some people may have an allergic reaction to a tick bite. This reaction may be mild, with symptoms like itching and swelling. In rare cases, a severe allergic reaction may occur. Most of the time, all you need to do for a tick bite is relieve any symptoms you may have. Follow-up care is a key part of your treatment and safety. Be sure to make and go to all appointments, and call your doctor if you are having problems. It's also a good idea to know your test results and keep a list of the medicines you take. How can you care for yourself at home? · Put ice or a cold pack on the bite for 15 to 20 minutes once an hour. Put a thin cloth between the ice and your skin. · Try an over-the-counter medicine to relieve itching, redness, swelling, and pain. Be safe with medicines. Read and follow all instructions on the label. ? Take an antihistamine medicine, such as a nondrowsy one like loratadine (Claritin) or one that might make you sleepy like diphenhydramine (Benadryl). These medicines may help relieve itching, redness, and swelling. ? Use a spray of local anesthetic that contains benzocaine, such as Solarcaine. It may help relieve pain. If your skin reacts to the spray, stop using it. ? Put calamine lotion on the skin. It may help relieve itching. To avoid tick bites  · Avoid ticks:  ? Learn where ticks are found in your community, and stay away from those areas if possible. ? Cover as much of your body as possible when you work or play in grassy or wooded areas. ? Use insect repellents, such as products containing DEET. You can spray them on your skin. ? Take steps to control ticks on your property if you live in an area where Lyme disease occurs.  Clear leaves, brush, tall grasses, woodpiles, and stone fences from around your house and the edges of your yard or garden. This may help get rid of ticks. · When you come in from outdoors, check your body for ticks, including your groin, head, and underarms. The ticks may be about the size of a sesame seed. If no one else can help you check for ticks on your scalp, comb your hair with a fine-tooth comb. · If you find a tick, remove it quickly. Use tweezers to grasp the tick as close to its mouth (the part in your skin) as possible. Slowly pull the tick straight out--do not twist or yank--until its mouth releases from your skin. If part of the tick stays in the skin, leave it alone. It will likely come out on its own in a few days. · Ticks can come into your house on clothing, outdoor gear, and pets. These ticks can fall off and attach to you. ? Check your clothing and outdoor gear. Remove any ticks you find. Then put your clothing in a clothes dryer on high heat for 1 hour to kill any ticks that might remain. ? Check your pets for ticks after they have been outdoors. · When hiking in the woods, carry a small dry jar or ziplock bag. If you find a tick on your body, remove the tick and put it in the jar or bag. Store the container in the freezer so you can give it to your doctor if symptoms develop. The tick can be tested to learn whether it is carrying the bacteria that cause Lyme disease. When should you call for help? Call 911 anytime you think you may need emergency care. For example, call if:    · You have symptoms of a severe allergic reaction. These may include:  ? Sudden raised, red areas (hives) all over your body. ? Swelling of the throat, mouth, lips, or tongue. ? Trouble breathing. ? Passing out (losing consciousness). Or you may feel very lightheaded or suddenly feel weak, confused, or restless.    Call your doctor now or seek immediate medical care if:    · You have signs of infection, such as:  ? Increased pain, swelling, warmth, or redness around the bite. ?  Red streaks leading from the bite. ? Pus draining from the bite. ? A fever.    Watch closely for changes in your health, and be sure to contact your doctor if:    · You develop a new rash.     · You have joint pain.     · You are very tired.     · You have flu-like symptoms.     · You have symptoms for more than 1 week. Where can you learn more? Go to http://antonieta-josie.info/. Enter R832 in the search box to learn more about \"Tick Bite: Care Instructions. \"  Current as of: September 23, 2018  Content Version: 11.9  © 1635-6903 [employer]. Care instructions adapted under license by [employer] (which disclaims liability or warranty for this information). If you have questions about a medical condition or this instruction, always ask your healthcare professional. [address] any warranty or liability for your use of this information.

## 2019-06-01 LAB
ATRIAL RATE: 68 BPM
CALCULATED P AXIS, ECG09: 57 DEGREES
CALCULATED R AXIS, ECG10: 23 DEGREES
CALCULATED T AXIS, ECG11: 22 DEGREES
DIAGNOSIS, 93000: NORMAL
P-R INTERVAL, ECG05: 138 MS
Q-T INTERVAL, ECG07: 470 MS
QRS DURATION, ECG06: 114 MS
QTC CALCULATION (BEZET), ECG08: 499 MS
VENTRICULAR RATE, ECG03: 68 BPM

## 2019-06-03 LAB — B BURGDOR IGG+IGM SER-ACNC: <0.91 ISR (ref 0–0.9)

## 2019-06-04 ENCOUNTER — TELEPHONE (OUTPATIENT)
Dept: CARDIOLOGY CLINIC | Age: 74
End: 2019-06-04

## 2019-06-04 DIAGNOSIS — E78.2 MIXED HYPERLIPIDEMIA: Primary | Chronic | ICD-10-CM

## 2019-06-04 NOTE — TELEPHONE ENCOUNTER
----- Message from Efren Ortega NP sent at 6/3/2019  9:34 PM EDT -----  Regarding: labs  I renewed lipitor for 90 days, he is over due for labs CMP, lipids ck, needs to be drawn in the next few weeks. Spoke with patient  Verified patient with 2 patient identifiers    Pt informed, verbalized understanding.   Mailed lab slip

## 2019-06-07 ENCOUNTER — HOSPITAL ENCOUNTER (OUTPATIENT)
Dept: LAB | Age: 74
Discharge: HOME | End: 2019-06-07
Payer: MEDICARE

## 2019-06-07 PROCEDURE — 80053 COMPREHEN METABOLIC PANEL: CPT

## 2019-06-07 PROCEDURE — 82550 ASSAY OF CK (CPK): CPT

## 2019-06-07 PROCEDURE — 80061 LIPID PANEL: CPT

## 2019-06-07 PROCEDURE — 36415 COLL VENOUS BLD VENIPUNCTURE: CPT

## 2019-06-08 LAB
ALBUMIN SERPL-MCNC: 4.2 G/DL (ref 3.5–4.8)
ALBUMIN/GLOB SERPL: 1.9 {RATIO} (ref 1.2–2.2)
ALP SERPL-CCNC: 99 IU/L (ref 39–117)
ALT SERPL-CCNC: 18 IU/L (ref 0–44)
AST SERPL-CCNC: 20 IU/L (ref 0–40)
BILIRUB SERPL-MCNC: 0.5 MG/DL (ref 0–1.2)
BUN SERPL-MCNC: 21 MG/DL (ref 8–27)
BUN/CREAT SERPL: 11 (ref 10–24)
CALCIUM SERPL-MCNC: 9 MG/DL (ref 8.6–10.2)
CHLORIDE SERPL-SCNC: 110 MMOL/L (ref 96–106)
CHOLEST SERPL-MCNC: 142 MG/DL (ref 100–199)
CK SERPL-CCNC: 82 U/L (ref 24–204)
CO2 SERPL-SCNC: 23 MMOL/L (ref 20–29)
CREAT SERPL-MCNC: 1.99 MG/DL (ref 0.76–1.27)
GLOBULIN SER CALC-MCNC: 2.2 G/DL (ref 1.5–4.5)
GLUCOSE SERPL-MCNC: 94 MG/DL (ref 65–99)
HDLC SERPL-MCNC: 42 MG/DL
INTERPRETATION, 910389: NORMAL
INTERPRETATION: NORMAL
LDLC SERPL CALC-MCNC: 72 MG/DL (ref 0–99)
PDF IMAGE, 910387: NORMAL
POTASSIUM SERPL-SCNC: 4 MMOL/L (ref 3.5–5.2)
PROT SERPL-MCNC: 6.4 G/DL (ref 6–8.5)
SODIUM SERPL-SCNC: 146 MMOL/L (ref 134–144)
TRIGL SERPL-MCNC: 139 MG/DL (ref 0–149)
VLDLC SERPL CALC-MCNC: 28 MG/DL (ref 5–40)

## 2019-06-10 ENCOUNTER — TELEPHONE (OUTPATIENT)
Dept: CARDIOLOGY CLINIC | Age: 74
End: 2019-06-10

## 2019-06-10 NOTE — TELEPHONE ENCOUNTER
----- Message from Triny Álvarez NP sent at 6/10/2019 11:35 AM EDT -----  Ailyn Ramirez,    Please call the patient and inform that lipids look good. LDL is at goal.  Continue same meds, should be diligent about low fat, low cholesterol diet. Kidney function is stable, electrolytes within normal limits. Thanks,  Bakari Wilkinson with patient  Verified patient with 2 patient identifiers    Pt notified, states went to ED 5/30/2019 due to low BP and chlorthaliidone was discontinued.

## 2019-06-10 NOTE — TELEPHONE ENCOUNTER
Please call patient he wants to know if he is suppose to continue taking    Chlorthalidone 25 mg    [phone].     Thanks  Jaydon Carter

## 2019-06-10 NOTE — TELEPHONE ENCOUNTER
Aftab,    Please call the patient and inform that lipids look good. LDL is at goal.  Continue same meds, should be diligent about low fat, low cholesterol diet. Kidney function is stable, electrolytes within normal limits.     Thanks,  Crissy Junior

## 2019-06-11 ENCOUNTER — TELEPHONE (OUTPATIENT)
Dept: SLEEP MEDICINE | Age: 74
End: 2019-06-11

## 2019-06-19 RX ORDER — CHLORTHALIDONE 25 MG/1
TABLET ORAL
Qty: 30 TAB | Refills: 0 | Status: SHIPPED | OUTPATIENT
Start: 2019-06-19 | End: 2019-08-13 | Stop reason: SDUPTHER

## 2019-06-20 ENCOUNTER — OFFICE VISIT (OUTPATIENT)
Dept: INTERNAL MEDICINE CLINIC | Age: 74
End: 2019-06-20

## 2019-06-20 VITALS
DIASTOLIC BLOOD PRESSURE: 64 MMHG | HEIGHT: 72 IN | RESPIRATION RATE: 18 BRPM | SYSTOLIC BLOOD PRESSURE: 121 MMHG | TEMPERATURE: 97.9 F | WEIGHT: 253 LBS | BODY MASS INDEX: 34.27 KG/M2 | OXYGEN SATURATION: 97 % | HEART RATE: 68 BPM

## 2019-06-20 DIAGNOSIS — I25.10 CORONARY ARTERY DISEASE DUE TO LIPID RICH PLAQUE: ICD-10-CM

## 2019-06-20 DIAGNOSIS — E78.2 MIXED HYPERLIPIDEMIA: Chronic | ICD-10-CM

## 2019-06-20 DIAGNOSIS — I10 ESSENTIAL HYPERTENSION, BENIGN: Primary | ICD-10-CM

## 2019-06-20 DIAGNOSIS — G47.33 OBSTRUCTIVE SLEEP APNEA: ICD-10-CM

## 2019-06-20 DIAGNOSIS — G60.3 IDIOPATHIC PROGRESSIVE NEUROPATHY: ICD-10-CM

## 2019-06-20 DIAGNOSIS — K21.9 GASTROESOPHAGEAL REFLUX DISEASE, ESOPHAGITIS PRESENCE NOT SPECIFIED: ICD-10-CM

## 2019-06-20 DIAGNOSIS — I25.83 CORONARY ARTERY DISEASE DUE TO LIPID RICH PLAQUE: ICD-10-CM

## 2019-06-20 DIAGNOSIS — N18.30 CHRONIC KIDNEY DISEASE, STAGE III (MODERATE) (HCC): ICD-10-CM

## 2019-06-20 RX ORDER — DOXYCYCLINE 100 MG/1
TABLET ORAL
Qty: 30 TAB | Refills: 1 | Status: SHIPPED | OUTPATIENT
Start: 2019-06-20 | End: 2019-08-30 | Stop reason: ALTCHOICE

## 2019-06-20 NOTE — PROGRESS NOTES
Tiajuana Leyden is a 74 y.o. male who presents for follow up. 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 Last seen in January    Seen in ED May 31 with tick bite and dizziness. He reported a fall 2 weeks prior to ED visit. Had tripped. Hit head. Has some BPV. Given RX for lymes, negative antibodies. Had CAD, some SMITH. Active at home. No chest pain. Sees Dr. Rosa Kerr, cardiology. Higher salt diet, convenience foods. Has swelling.          Sees Dr. Lorie Gilford, nephrology. Follow up every 6 months. Creatinine 1.99 June 7.      Treated for HTN. BP controlled.  notes SMITH, stable. No worsening. No edema or orthopnea. Has CPAP for MELODY.  getting new machine. Dr. Brenda Hahn. Is sleepy during the day. Up at night, 3-4 times a night. To help wife. Had bilateral shoulder pain when last seen. Taking tylenol prn. Sees Dr. Alondra Alcazar, ortho. Both shoulder injected on 4/29. Injections every 3 months, helpful. Today, the pain is controlled. Occasional tylenol. Bilateral knee pain, no brace used. Has peripheral neuropathy. No neuropathic pain.        Past Medical History:   Diagnosis Date    Abnormal stress echo 5/12/2015    Anemia NEC 03/2013    Last 2-3 months; finished taking iron supplement    Anxiety     CAD (coronary artery disease) 2009    cath Alyssa Bloodgood) revealed 20%RCA and 50%2nd diagonal    Calculus of kidney     Chronic kidney disease     sees nephrologist every 6 months    Chronic kidney disease, stage III (moderate) ([address] 10/11/2009    30% kidney function    Diabetes ([address]     Pre-diabetic    DJD (degenerative joint disease)     GERD (gastroesophageal reflux disease) 10/11/2009    controlled with medication    Gout     Hypertension     Liver disease     fatty liver    Obesity     Obstructive sleep apnea (adult) (pediatric) 10/11/2009    nasal pillows; pressure set at 10-11 - uses cpap    Peripheral neuropathy 10/11/2009    bilateral feet (numbness)    Prediabetes     RLS (restless legs syndrome) 10/11/2009    S/P coronary artery stent placement 2015    5/11/15 PCI./MALI to LCx       Family History   Problem Relation Age of Onset    Heart Disease Father     Hypertension Father     Other Father         abdominal aneurysm     Dementia Mother     Alzheimer Mother     Heart Disease Brother     Heart Attack Brother     Heart Disease Maternal Grandmother     Heart Disease Paternal Grandmother     Heart Attack Paternal Grandmother     Heart Disease Paternal Grandfather     Heart Attack Paternal Grandfather     Elevated Lipids Son     Elevated Lipids Daughter     Cancer Neg Hx     Diabetes Neg Hx     Stroke Neg Hx        Social History     Socioeconomic History    Marital status:      Spouse name: Not on file    Number of children: Not on file    Years of education: Not on file    Highest education level: Not on file   Occupational History    Not on file   Social Needs    Financial resource strain: Not on file    Food insecurity:     Worry: Not on file     Inability: Not on file    Transportation needs:     Medical: Not on file     Non-medical: Not on file   Tobacco Use    Smoking status: Former Smoker     Packs/day: 1.00     Years: 10.00     Pack years: 10.00     Types: Cigarettes     Last attempt to quit: 1968     Years since quittin.5    Smokeless tobacco: Never Used   Substance and Sexual Activity    Alcohol use: No     Alcohol/week: 0.0 oz    Drug use: No    Sexual activity: Yes     Partners: Female     Birth control/protection: None   Lifestyle    Physical activity:     Days per week: Not on file     Minutes per session: Not on file    Stress: Not on file   Relationships    Social connections:     Talks on phone: Not on file     Gets together: Not on file     Attends Muslim service: Not on file     Active member of club or organization: Not on file     Attends meetings of clubs or organizations: Not on file     Relationship status: Not on file    Intimate partner violence:     Fear of current or ex partner: Not on file     Emotionally abused: Not on file     Physically abused: Not on file     Forced sexual activity: Not on file   Other Topics Concern    Not on file   Social History Narrative    Not on file           Review of Systems  Pertinent items are noted in HPI. Objective:     Visit Vitals  /64 (BP 1 Location: Left arm, BP Patient Position: Sitting)   Pulse 68   Temp 97.9 °F (36.6 °C) (Oral)   Resp 18   Ht 6' (1.829 m)   Wt 253 lb (114.8 kg)   SpO2 97%   BMI 34.31 kg/m²     Gen: well appearing male  HEENT:   PERRL,normal conjunctiva. External ear and canals normal, TMs no opacification or erythema,  OP no erythema, no exudates, MMM  Neck:   No masses or LAD  Resp:  No wheezing, no rhonchi, no rales. CV:  RRR, normal S1S2    GI: soft, nontender, without masses. No hepatosplenomegaly. Extrem:  +2 pulses, no edema, warm distally      Assessment/Plan:       ICD-10-CM ICD-9-CM    1. Essential hypertension, benign I10 401.1    2. Coronary artery disease due to lipid rich plaque I25.10 414.00     I25.83 414.3    3. Gastroesophageal reflux disease, esophagitis presence not specified K21.9 530.81    4. Idiopathic progressive neuropathy G60.3 356.4    5. Obstructive sleep apnea G47.33 327.23    6. Chronic kidney disease, stage III (moderate) (HCC) N18.3 585.3    7. Mixed hyperlipidemia E78.2 272.2    PATIENT TO CHECK ON THE CHLORTHALIDONE DOSE 25MG 1/2 TABLET DAILY. CHANGE THE CITALOPRAM 40MG TO EVENING TO REDUCE SLEEPINESS    USE INHALER BEFORE EXERTION. Follow-up and Dispositions    · Return in about 6 months (around 12/20/2019) for follow up on medications.   Tigre Pedraza MD

## 2019-06-20 NOTE — PATIENT INSTRUCTIONS
Office Policies Phone calls/patient messages: Please allow up to 24 hours for someone in the office to contact you about your call or message. Be mindful your provider may be out of the office or your message may require further review. We encourage you to use MOGL for your messages as this is a faster, more efficient way to communicate with our office Medication Refills: 
         
Prescription medications require 48-72 business hours to process. We encourage you to use MOGL for your refills. For controlled medications: Please allow 72 business hours to process. Certain medications may require you to  a written prescription at our office. NO narcotic/controlled medications will be prescribed after 4pm Monday through Friday or on weekends Form/Paperwork Completion: 
         
Please note a $25 fee may incur for all paperwork for completed by our providers. We ask that you allow 7-10 business days. Pre-payment is due prior to picking up/faxing the completed form. You may also download your forms to MOGL to have your doctor print off. CALL DR MITCHELL, NEPHROLOGY, TO CHECK ON YOUR LABS. YOU HAVE ALREADY HAD MOST TESTS. FIND OUT IF THEY NEED ANY OTHER LABS DONE. CHECK ON THE CHLORTHALIDONE 25MG 1/2 TABLET DAILY. CHANGE THE CITALOPRAM 40MG TO EVENING TO REDUCE SLEEPINESS 
 
USE INHALER BEFORE EXERTION.

## 2019-07-01 ENCOUNTER — DOCUMENTATION ONLY (OUTPATIENT)
Dept: SLEEP MEDICINE | Age: 74
End: 2019-07-01

## 2019-07-01 ENCOUNTER — OFFICE VISIT (OUTPATIENT)
Dept: SLEEP MEDICINE | Age: 74
End: 2019-07-01

## 2019-07-01 VITALS
DIASTOLIC BLOOD PRESSURE: 79 MMHG | OXYGEN SATURATION: 96 % | HEART RATE: 71 BPM | HEIGHT: 72 IN | BODY MASS INDEX: 34.27 KG/M2 | WEIGHT: 253 LBS | SYSTOLIC BLOOD PRESSURE: 154 MMHG

## 2019-07-01 DIAGNOSIS — I25.10 CORONARY ARTERY DISEASE INVOLVING NATIVE CORONARY ARTERY OF NATIVE HEART WITHOUT ANGINA PECTORIS: ICD-10-CM

## 2019-07-01 DIAGNOSIS — G47.33 OBSTRUCTIVE SLEEP APNEA: Primary | ICD-10-CM

## 2019-07-01 NOTE — PATIENT INSTRUCTIONS
[address] [place], [address]  Tel.  [phone]  Fax. [address] [facility] [address]  [address] [facility] [address], [address]  Tel.  [phone]  Fax. [phone] [address] [facility] [place] Huang  Tel.  [phone]  Fax. [phone]     PROPER SLEEP HYGIENE    What to avoid  · Do not have drinks with caffeine, such as coffee or black tea, for 8 hours before bed. · Do not smoke or use other types of tobacco near bedtime. Nicotine is a stimulant and can keep you awake. · Avoid drinking alcohol late in the evening, because it can cause you to wake in the middle of the night. · Do not eat a big meal close to bedtime. If you are hungry, eat a light snack. · Do not drink a lot of water close to bedtime, because the need to urinate may wake you up during the night. · Do not read or watch TV in bed. Use the bed only for sleeping and sexual activity. What to try  · Go to bed at the same time every night, and wake up at the same time every morning. Do not take naps during the day. · Keep your bedroom quiet, dark, and cool. · Get regular exercise, but not within 3 to 4 hours of your bedtime. .  · Sleep on a comfortable pillow and mattress. · If watching the clock makes you anxious, turn it facing away from you so you cannot see the time. · If you worry when you lie down, start a worry book. Well before bedtime, write down your worries, and then set the book and your concerns aside. · Try meditation or other relaxation techniques before you go to bed. · If you cannot fall asleep, get up and go to another room until you feel sleepy. Do something relaxing. Repeat your bedtime routine before you go to bed again. · Make your house quiet and calm about an hour before bedtime. Turn down the lights, turn off the TV, log off the computer, and turn down the volume on music. This can help you relax after a busy day.     Drowsy Driving  The [address] [place] [address] [employer] Traffic Safety Administration cites drowsiness as a causing factor in more than 735,871 police reported crashes annually, resulting in 76,000 injuries and 1,500 deaths. Other surveys suggest 55% of people polled have driven while drowsy in the past year, 23% had fallen asleep but not crashed, 3% crashed, and 2% had and accident due to drowsy driving. Who is at risk? Young Drivers: One study of drowsy driving accidents states that 55% of the drivers were under 25 years. Of those, 75% were male. Shift Workers and Travelers: People who work overnight or travel across time zones frequently are at higher risk of experiencing Circadian Rhythm Disorders. They are trying to work and function when their body is programed to sleep. Sleep Deprived: Lack of sleep has a serious impact on your ability to pay attention or focus on a task. Consistently getting less than the average of 8 hours your body needs creates partial or cumulative sleep deprivation. Untreated Sleep Disorders: Sleep Apnea, Narcolepsy, R.L.S., and other sleep disorders (untreated) prevent a person from getting enough restful sleep. This leads to excessive daytime sleepiness and increases the risk for drowsy driving accidents by up to 7 times. Medications / Alcohol: Even over the counter medications can cause drowsiness. Medications that impair a drivers attention should have a warning label. Alcohol naturally makes you sleepy and on its own can cause accidents. Combined with excessive drowsiness its effects are amplified. Signs of Drowsy Driving:   * You don't remember driving the last few miles   * You may drift out of your ella   * You are unable to focus and your thoughts wander   * You may yawn more often than normal   * You have difficulty keeping your eyes open / nodding off   * Missing traffic signs, speeding, or tailgating  Prevention-   Good sleep hygiene, lifestyle and behavioral choices have the most impact on drowsy driving.  There is no substitute for sleep and the average person requires 8 hours nightly. If you find yourself driving drowsy, stop and sleep. Consider the sleep hygiene tips provided during your visit as well. Medication Refill Policy: Refills for all medications require 1 week advance notice. Please have your pharmacy fax a refill request. We are unable to fax, or call in \"controled substance\" medications and you will need to pick these prescriptions up from our office. [employer] Activation    Thank you for requesting access to [employer]. Please follow the instructions below to securely access and download your online medical record. [employer] allows you to send messages to your doctor, view your test results, renew your prescriptions, schedule appointments, and more. How Do I Sign Up? 1. In your internet browser, go to [email or web address]. 2. Click on the First Time User? Click Here link in the Sign In box. You will see the New Member Sign Up page. 3. Enter your [employer] Access Code exactly as it appears below. You will not need to use this code after youve completed the sign-up process. If you do not sign up before the expiration date, you must request a new code. [employer] Access Code: Activation code not generated  Current [employer] Status: Active (This is the date your [employer] access code will )    4. Enter the last four digits of your Social Security Number (xxxx) and Date of Birth (mm/dd/yyyy) as indicated and click Submit. You will be taken to the next sign-up page. 5. Create a [employer] [facility] ID. This will be your [employer] login ID and cannot be changed, so think of one that is secure and easy to remember. 6. Create a [employer] password. You can change your password at any time. 7. Enter your Password Reset Question and Answer. This can be used at a later time if you forget your password. 8. Enter your e-mail address. You will receive e-mail notification when new information is available in [address]   9. Click Sign Up. You can now view and download portions of your medical record. 10. Click the Download Summary menu link to download a portable copy of your medical information. Additional Information    If you have questions, please call [phone]. Remember, [employer] is NOT to be used for urgent needs. For medical emergencies, dial 911.

## 2019-07-01 NOTE — PROGRESS NOTES
[address] [facility] [place]. [place], [address]  Tel.  [phone]  Fax. [address] [facility] [address]  [place], [address]  Tel.  [phone]  Fax. [phone] [address] [facility] Carmelo Huang   Tel.  [phone]  Fax. [phone]     S>Gilbert Marleta Cranker is a 74 y.o. male seen for a positive airway pressure follow-up. He reports no problems using the device. The following problems are identified:    Drowsiness no Problems exhaling no   Snoring no Forget to put on no   Mask Comfortable yes Can't fall asleep no   Dry Mouth Yes humidifier stopped working 3 months ago Mask falls off no   Air Leaking no Frequent awakenings no     Download reviewed. He admits that his sleep has improved. Therapy Apnea Index averaged over PAP use: 4 /hr which reflects improved sleep breathing condition. Allergies   Allergen Reactions    Penicillins Hives    Bactrim [Sulfamethoxazole-Trimethoprim] Hives    Codeine Itching    Lisinopril (Bulk) Cough    Neurontin [Gabapentin] Other (comments)     drowsy    Zostavax [Zoster Vaccine Live (Pf)] Rash     See 9/10/13 visit       He has a current medication list which includes the following prescription(s): doxycycline, chlorthalidone, atorvastatin, metoprolol succinate, clopidogrel, isosorbide mononitrate er, pantoprazole, ropinirole, citalopram, irbesartan, vitamin d3, oxygen-air delivery systems, amlodipine, glucosamine-chondroitin, aspirin delayed-release, allopurinol, multivitamin/iron/folic acid, nitroglycerin, and albuterol. .      He  has a past medical history of Abnormal stress echo (5/12/2015), Anemia NEC (03/2013), Anxiety, CAD (coronary artery disease) (2009), Calculus of kidney, Chronic kidney disease, Chronic kidney disease, stage III (moderate) ([address] (10/11/2009), Diabetes ([facility] [address] DJD (degenerative joint disease), GERD (gastroesophageal reflux disease) (10/11/2009), Gout, Hypertension, Liver disease, Obesity, Obstructive sleep apnea (adult) (pediatric) (10/11/2009), Peripheral neuropathy (10/11/2009), Prediabetes, RLS (restless legs syndrome) (10/11/2009), and S/P coronary artery stent placement (5/12/2015). [place] Sleepiness Score: 5   and Modified F.O.S.Q. Score Total / 2: 19.5    O>    Visit Vitals  /79 (BP 1 Location: Right arm, BP Patient Position: Sitting)   Pulse 71   Ht 6' (1.829 m)   Wt 253 lb (114.8 kg)   SpO2 96%   BMI 34.31 kg/m²           General:   Alert, oriented, not in distress   Neck:   No JVD    Chest/Lungs:  symetrical lung expansion , no accessory muscle use    Extremities:  no obvious rashes , negative edema    Neuro:  No focal deficits ; No obvious tremor    Psych:  Normal affect ,  Normal countenance ;         A>    ICD-10-CM ICD-9-CM    1. Obstructive sleep apnea G47.33 327.23 AMB SUPPLY ORDER   2. Coronary artery disease involving native coronary artery of native heart without angina pectoris I25.10 414.01      AHI = 50(4-14). On CPAP :  12 cmH2O. Compliant:      yes    Therapeutic Response:  Positive    P>      *   Follow-up and Dispositions    · Return in about 3 months (around 10/1/2019). Patient's PAP device has exceeded its  Lifespan. Replacement device ordered. he is compliant with PAP therapy and PAP continues to benefit patient and remains necessary for control of his sleep apnea. I have reviewed medicare requirements regarding PAP usage    * He was asked to contact our office for any problems regarding PAP therapy. * Counseling was provided regarding the importance of regular PAP use and on proper sleep hygiene and safe driving. * Re-enforced proper and regular cleaning for the device. I have counseled the patient regarding the benefits of weight loss. 2. Coronary Artery Disease - he continues on his current regimen. I have reviewed the relationship between heart disease and sleep disordered breathing.     Electronically signed by    Christa Fenton MD  Diplomate in Sleep Medicine  [facility]

## 2019-07-09 ENCOUNTER — OFFICE VISIT (OUTPATIENT)
Dept: CARDIOLOGY CLINIC | Age: 74
End: 2019-07-09

## 2019-07-09 VITALS
BODY MASS INDEX: 32.8 KG/M2 | SYSTOLIC BLOOD PRESSURE: 120 MMHG | HEIGHT: 72 IN | WEIGHT: 242.2 LBS | HEART RATE: 72 BPM | OXYGEN SATURATION: 95 % | RESPIRATION RATE: 16 BRPM | DIASTOLIC BLOOD PRESSURE: 62 MMHG

## 2019-07-09 DIAGNOSIS — R06.09 DOE (DYSPNEA ON EXERTION): ICD-10-CM

## 2019-07-09 DIAGNOSIS — I10 ESSENTIAL HYPERTENSION, BENIGN: Chronic | ICD-10-CM

## 2019-07-09 DIAGNOSIS — I25.118 CORONARY ARTERY DISEASE OF NATIVE ARTERY OF NATIVE HEART WITH STABLE ANGINA PECTORIS (HCC): Primary | ICD-10-CM

## 2019-07-09 DIAGNOSIS — R07.9 CHEST PAIN, UNSPECIFIED TYPE: ICD-10-CM

## 2019-07-09 DIAGNOSIS — E78.2 MIXED HYPERLIPIDEMIA: Chronic | ICD-10-CM

## 2019-07-09 RX ORDER — BACLOFEN 10 MG/1
TABLET ORAL 3 TIMES DAILY
COMMUNITY
End: 2020-10-13

## 2019-07-09 NOTE — LETTER
7/16/19 Patient: Markell Hope YOB: 1945 Date of Visit: 7/9/2019 Nico Francis MD 
[address] [facility] [address] [address] VIA In Basket Dear Nico Francis MD, Thank you for referring Mr. Markell Hope to [address] [place] [address] [place] for evaluation. My notes for this consultation are attached. If you have questions, please do not hesitate to call me. I look forward to following your patient along with you.  
 
 
Sincerely, 
 
Horace Mason MD

## 2019-07-09 NOTE — PROGRESS NOTES
1. Have you been to the ER, urgent care clinic since your last visit? Hospitalized since your last visit? Seen on 5/31/19 for a fall. 2. Have you seen or consulted any other health care providers outside of the [address] [place] [address] since your last visit? Include any pap smears or colon screening.    No.      Chief Complaint   Patient presents with    Follow-up     10 month-sob and chest pain on lf side for about a month

## 2019-07-09 NOTE — PROGRESS NOTES
[address] [place] [address] [address], [place], [address] [facility]     Anisha Hathaway is a 74 y.o. male. Last seen 10 months ago. Subjective:     Anisha Hathaway reports he is doing well from a cardiac standpoint. He had a recent fall from tripping. He also reports recent onset of SOB and chest tightness. He also has LE edema. His recent CRE is 3. He denies any dyspnea, palpitations, syncope, orthopnea, edema or paroxysmal nocturnal dyspnea.      Patient Active Problem List    Diagnosis Date Noted    History of kidney stones 07/12/2018    Chest pain 07/07/2017    Coronary artery disease due to lipid rich plaque 04/27/2016    Coronary artery disease involving native coronary artery of native heart without angina pectoris 03/16/2016    S/P coronary artery stent placement 05/12/2015    Benign essential tremor 01/22/2014    Hypertensive kidney disease with chronic kidney disease stage III ([address] 11/22/2013    Iron deficiency 05/23/2013    Obesity 01/30/2013    Gout 01/30/2013    Prediabetes 01/07/2012    Chronic kidney disease, stage III (moderate) ([address] 10/11/2009    Mixed hyperlipidemia 10/11/2009    Obstructive sleep apnea 10/11/2009    RLS (restless legs syndrome) 10/11/2009    Peripheral neuropathy 10/11/2009    DJD (degenerative joint disease), multiple sites 10/11/2009    Essential hypertension, benign 10/11/2009    Allergic rhinitis 10/11/2009    GERD (gastroesophageal reflux disease) 10/11/2009    ED (erectile dysfunction) 10/11/2009    Anxiety associated with depression 10/11/2009      Selma Quiroz MD  Past Medical History:   Diagnosis Date    Abnormal stress echo 5/12/2015    Anemia NEC 03/2013    Last 2-3 months; finished taking iron supplement    Anxiety     CAD (coronary artery disease) 2009    cath Janet Minus) revealed 20%RCA and 50%2nd diagonal    Calculus of kidney     Chronic kidney disease     sees nephrologist every 6 months    Chronic kidney disease, stage III (moderate) ([facility] 10/11/2009    30% kidney function    Diabetes ([facility]     Pre-diabetic    DJD (degenerative joint disease)     GERD (gastroesophageal reflux disease) 10/11/2009    controlled with medication    Gout     Hypertension     Liver disease     fatty liver    Obesity     Obstructive sleep apnea (adult) (pediatric) 10/11/2009    nasal pillows; pressure set at 10-11 - uses cpap    Peripheral neuropathy 10/11/2009    bilateral feet (numbness)    Prediabetes     RLS (restless legs syndrome) 10/11/2009    S/P coronary artery stent placement 5/12/2015    5/11/15 PCI./MALI to LCx      Past Surgical History:   Procedure Laterality Date    HX HEART CATHETERIZATION  2009, 7/17    x1 stent    HX HERNIA REPAIR      McTamaney/umbilical    HX KNEE REPLACEMENT Left 7/23/11     LEFT TOTAL KNEE ARTHROPLASTY    HX ORTHOPAEDIC      left great toe pinning/plate    HX ORTHOPAEDIC      left shoulder manipulation    HX UROLOGICAL      basket extraction of kidney stones    [place] COLONOSCOPY FLX DX W/COLLJ SPEC WHEN PFRMD  8/5/2010         [place] COLSC FLX W/RMVL OF TUMOR POLYP LESION SNARE TQ  9/24/2014          Allergies   Allergen Reactions    Penicillins Hives    Bactrim [Sulfamethoxazole-Trimethoprim] Hives    Codeine Itching    Lisinopril (Bulk) Cough    Neurontin [Gabapentin] Other (comments)     drowsy    Zostavax [Zoster Vaccine Live (Pf)] Rash     See 9/10/13 visit      Family History   Problem Relation Age of Onset    Heart Disease Father     Hypertension Father     Other Father         abdominal aneurysm     Dementia Mother     Alzheimer Mother     Heart Disease Brother     Heart Attack Brother     Heart Disease Maternal Grandmother     Heart Disease Paternal Grandmother     Heart Attack Paternal Grandmother     Heart Disease Paternal Grandfather     Heart Attack Paternal Grandfather     Elevated Lipids Son     Elevated Lipids Daughter     Cancer Neg Hx     Diabetes Neg Hx     Stroke Neg Hx       Social History     Socioeconomic History    Marital status:      Spouse name: Not on file    Number of children: Not on file    Years of education: Not on file    Highest education level: Not on file   Occupational History    Not on file   Social Needs    Financial resource strain: Not on file    Food insecurity:     Worry: Not on file     Inability: Not on file    Transportation needs:     Medical: Not on file     Non-medical: Not on file   Tobacco Use    Smoking status: Former Smoker     Packs/day: 1.00     Years: 10.00     Pack years: 10.00     Types: Cigarettes     Last attempt to quit: 1968     Years since quittin.5    Smokeless tobacco: Never Used   Substance and Sexual Activity    Alcohol use: No     Alcohol/week: 0.0 oz    Drug use: No    Sexual activity: Yes     Partners: Female     Birth control/protection: None   Lifestyle    Physical activity:     Days per week: Not on file     Minutes per session: Not on file    Stress: Not on file   Relationships    Social connections:     Talks on phone: Not on file     Gets together: Not on file     Attends Confucianism service: Not on file     Active member of club or organization: Not on file     Attends meetings of clubs or organizations: Not on file     Relationship status: Not on file    Intimate partner violence:     Fear of current or ex partner: Not on file     Emotionally abused: Not on file     Physically abused: Not on file     Forced sexual activity: Not on file   Other Topics Concern    Not on file   Social History Narrative    Not on file           Review of Systems  Constitutional: Negative for fever, chills, malaise/fatigue and diaphoresis. Respiratory: Negative for cough, hemoptysis, sputum production, and wheezing. +SOB  Cardiovascular: Negative for palpitations, orthopnea, claudication, leg swelling and PND.  +stable chest pressure, chest tightness  Gastrointestinal: Negative for heartburn, nausea, vomiting, blood in stool and melena. Genitourinary: Negative for dysuria and flank pain. Musculoskeletal: Negative for joint pain and back pain. Skin: Negative for rash. Neurological: Negative for focal weakness, seizures, loss of consciousness, weakness and headaches. Endo/Heme/Allergies: Does not bruise/bleed easily. Psychiatric/Behavioral: Negative for memory loss. The patient does not have insomnia. Physical Exam:    Visit Vitals  /62 (BP 1 Location: Left arm, BP Patient Position: Sitting)   Pulse 72   Resp 16   Ht 6' (1.829 m)   Wt 242 lb 3.2 oz (109.9 kg)   SpO2 95%   BMI 32.85 kg/m²     Wt Readings from Last 3 Encounters:   07/12/19 250 lb (113.4 kg)   07/09/19 242 lb 3.2 oz (109.9 kg)   07/01/19 253 lb (114.8 kg)       Gen: NAD    Mental Status - Alert. General Appearance - Not in acute distress. Neck - no JVD    Chest and Lung Exam   Inspection: Accessory muscles - No use of accessory muscles in breathing. Auscultation:   Breath sounds: - Normal.     Cardiovascular   Inspection: Jugular vein - Bilateral - Inspection Normal.   Palpation/Percussion:   Apical Impulse: - Normal.   Auscultation: Rhythm - Regular. Heart Sounds - S1 WNL and S2 WNL. No S3 or S4. Murmurs & Other Heart Sounds: Auscultation of the heart reveals - No Murmurs. Peripheral Vascular   Upper Extremity: Inspection - Bilateral - No Cyanotic nailbeds or Digital clubbing. Lower Extremity:  Palpation: +LE edema    Abdomen: Soft, non-tender, bowel sounds are active. Neuro: A&O times 3, CN and motor grossly WNL    Cardiographics  EKG 07/16/19- SR, Intraventricular conduction delay  EKG 7/9/19 - SR IVCD     Assessment:     Encounter Diagnoses   Name Primary?     Essential hypertension, benign     Mixed hyperlipidemia     Coronary artery disease of native artery of native heart with stable angina pectoris (HCC) Yes    Chest pain, unspecified type     SMITH (dyspnea on exertion)       Plan:     Mr. Terry Roy states his his chest pain has returned, as well as SOB. A stress test has been ordered for the near future. . EKG is fine. BP is at goal. Lipids at goal. Continue current medical regimen. Follow up in 6 months or PRN.      Written by Karolyn Velasco, as dictated by Triny Sifuentes M.D.

## 2019-07-30 ENCOUNTER — OFFICE VISIT (OUTPATIENT)
Dept: CARDIOLOGY CLINIC | Age: 74
End: 2019-07-30

## 2019-07-30 ENCOUNTER — HOSPITAL ENCOUNTER (OUTPATIENT)
Dept: LAB | Age: 74
Discharge: HOME | End: 2019-07-30
Payer: MEDICARE

## 2019-07-30 VITALS
DIASTOLIC BLOOD PRESSURE: 62 MMHG | HEART RATE: 74 BPM | RESPIRATION RATE: 18 BRPM | HEIGHT: 72 IN | OXYGEN SATURATION: 95 % | SYSTOLIC BLOOD PRESSURE: 110 MMHG | WEIGHT: 246.6 LBS | BODY MASS INDEX: 33.4 KG/M2

## 2019-07-30 DIAGNOSIS — Z95.5 S/P CORONARY ARTERY STENT PLACEMENT: ICD-10-CM

## 2019-07-30 DIAGNOSIS — I20.8 STABLE ANGINA PECTORIS (HCC): Primary | ICD-10-CM

## 2019-07-30 DIAGNOSIS — I10 ESSENTIAL HYPERTENSION, BENIGN: ICD-10-CM

## 2019-07-30 DIAGNOSIS — E78.2 MIXED HYPERLIPIDEMIA: ICD-10-CM

## 2019-07-30 DIAGNOSIS — I25.118 CORONARY ARTERY DISEASE OF NATIVE ARTERY OF NATIVE HEART WITH STABLE ANGINA PECTORIS (HCC): ICD-10-CM

## 2019-07-30 DIAGNOSIS — I25.10 CORONARY ARTERY DISEASE INVOLVING NATIVE CORONARY ARTERY OF NATIVE HEART WITHOUT ANGINA PECTORIS: Primary | ICD-10-CM

## 2019-07-30 PROCEDURE — 36415 COLL VENOUS BLD VENIPUNCTURE: CPT

## 2019-07-30 PROCEDURE — 85610 PROTHROMBIN TIME: CPT

## 2019-07-30 PROCEDURE — 85025 COMPLETE CBC W/AUTO DIFF WBC: CPT

## 2019-07-30 PROCEDURE — 80053 COMPREHEN METABOLIC PANEL: CPT

## 2019-07-30 RX ORDER — KETOCONAZOLE 20 MG/ML
SHAMPOO TOPICAL
COMMUNITY
Start: 2018-05-07 | End: 2021-03-15 | Stop reason: DRUGHIGH

## 2019-07-30 RX ORDER — FLUOCINONIDE TOPICAL SOLUTION USP, 0.05% 0.5 MG/ML
SOLUTION TOPICAL
COMMUNITY
Start: 2018-05-07 | End: 2020-01-13

## 2019-07-30 NOTE — LETTER
7/30/19 Patient: Massimo Manley YOB: 1945 Date of Visit: 7/30/2019 Johanny Rod MD 
[address] [address] VIA In Basket Dear Johanny Rod MD, Thank you for referring Mr. Massimo Manley to [facility]qvi for evaluation. My notes for this consultation are attached. If you have questions, please do not hesitate to call me. I look forward to following your patient along with you.  
 
 
Sincerely, 
 
Antonino Jenkins MD

## 2019-07-30 NOTE — PROGRESS NOTES
1. Have you been to the ER, urgent care clinic since your last visit? Hospitalized since your last visit? No.    2. Have you seen or consulted any other health care providers outside of the [address] [place] [address] since your last visit? Include any pap smears or colon screening.    No.      Chief Complaint   Patient presents with    Results     here to discuss stress test results

## 2019-07-30 NOTE — PROGRESS NOTES
[address] [facility] [address], [facility], [address] [facility]     Mavis Tidwell is a 74 y.o. male. Last seen by me 3 weeks ago. Subjective:     Mavis Tidwell reports he is still having chest pain. Myoview was positive for a new area of ischemia. He is adherent with ASA and Plavix.      Patient Active Problem List    Diagnosis Date Noted    History of kidney stones 07/12/2018    Chest pain 07/07/2017    Coronary artery disease due to lipid rich plaque 04/27/2016    Coronary artery disease involving native coronary artery of native heart without angina pectoris 03/16/2016    S/P coronary artery stent placement 05/12/2015    Benign essential tremor 01/22/2014    Hypertensive kidney disease with chronic kidney disease stage III ([address] 11/22/2013    Iron deficiency 05/23/2013    Obesity 01/30/2013    Gout 01/30/2013    Prediabetes 01/07/2012    Chronic kidney disease, stage III (moderate) ([address] 10/11/2009    Mixed hyperlipidemia 10/11/2009    Obstructive sleep apnea 10/11/2009    RLS (restless legs syndrome) 10/11/2009    Peripheral neuropathy 10/11/2009    DJD (degenerative joint disease), multiple sites 10/11/2009    Essential hypertension, benign 10/11/2009    Allergic rhinitis 10/11/2009    GERD (gastroesophageal reflux disease) 10/11/2009    ED (erectile dysfunction) 10/11/2009    Anxiety associated with depression 10/11/2009      Lázaro Wolf MD  Past Medical History:   Diagnosis Date    Abnormal stress echo 5/12/2015    Anemia NEC 03/2013    Last 2-3 months; finished taking iron supplement    Anxiety     CAD (coronary artery disease) 2009    cath Jame Borden) revealed 20%RCA and 50%2nd diagonal    Calculus of kidney     Chronic kidney disease     sees nephrologist every 6 months    Chronic kidney disease, stage III (moderate) ([address] 10/11/2009    30% kidney function    Diabetes ([address]     Pre-diabetic    DJD (degenerative joint disease)     GERD (gastroesophageal reflux disease) 10/11/2009    controlled with medication    Gout     Hypertension     Liver disease     fatty liver    Obesity     Obstructive sleep apnea (adult) (pediatric) 10/11/2009    nasal pillows; pressure set at 10-11 - uses cpap    Peripheral neuropathy 10/11/2009    bilateral feet (numbness)    Prediabetes     RLS (restless legs syndrome) 10/11/2009    S/P coronary artery stent placement 5/12/2015    5/11/15 PCI./MALI to LCx      Past Surgical History:   Procedure Laterality Date    HX HEART CATHETERIZATION  2009, 7/17    x1 stent    HX HERNIA REPAIR      McTamaney/umbilical    HX KNEE REPLACEMENT Left 7/23/11     LEFT TOTAL KNEE ARTHROPLASTY    HX ORTHOPAEDIC      left great toe pinning/plate    HX ORTHOPAEDIC      left shoulder manipulation    HX UROLOGICAL      basket extraction of kidney stones    [place] COLONOSCOPY FLX DX W/COLLJ SPEC WHEN PFRMD  8/5/2010         [place] COLSC FLX W/RMVL OF TUMOR POLYP LESION SNARE TQ  9/24/2014          Allergies   Allergen Reactions    Penicillins Hives    Bactrim [Sulfamethoxazole-Trimethoprim] Hives    Codeine Itching    Lisinopril (Bulk) Cough    Neurontin [Gabapentin] Other (comments)     drowsy    Zostavax [Zoster Vaccine Live (Pf)] Rash     See 9/10/13 visit      Family History   Problem Relation Age of Onset    Heart Disease Father     Hypertension Father     Other Father         abdominal aneurysm     Dementia Mother     Alzheimer Mother     Heart Disease Brother     Heart Attack Brother     Heart Disease Maternal Grandmother     Heart Disease Paternal Grandmother     Heart Attack Paternal Grandmother     Heart Disease Paternal Grandfather     Heart Attack Paternal Grandfather     Elevated Lipids Son     Elevated Lipids Daughter     Cancer Neg Hx     Diabetes Neg Hx     Stroke Neg Hx       Social History     Socioeconomic History    Marital status:      Spouse name: Not on file    Number of children: Not on file    Years of education: Not on file    Highest education level: Not on file   Occupational History    Not on file   Social Needs    Financial resource strain: Not on file    Food insecurity:     Worry: Not on file     Inability: Not on file    Transportation needs:     Medical: Not on file     Non-medical: Not on file   Tobacco Use    Smoking status: Former Smoker     Packs/day: 1.00     Years: 10.00     Pack years: 10.00     Types: Cigarettes     Last attempt to quit: 1968     Years since quittin.6    Smokeless tobacco: Never Used   Substance and Sexual Activity    Alcohol use: No     Alcohol/week: 0.0 standard drinks    Drug use: No    Sexual activity: Yes     Partners: Female     Birth control/protection: None   Lifestyle    Physical activity:     Days per week: Not on file     Minutes per session: Not on file    Stress: Not on file   Relationships    Social connections:     Talks on phone: Not on file     Gets together: Not on file     Attends Sikh service: Not on file     Active member of club or organization: Not on file     Attends meetings of clubs or organizations: Not on file     Relationship status: Not on file    Intimate partner violence:     Fear of current or ex partner: Not on file     Emotionally abused: Not on file     Physically abused: Not on file     Forced sexual activity: Not on file   Other Topics Concern    Not on file   Social History Narrative    Not on file           Review of Systems  Constitutional: Negative for fever, chills, malaise/fatigue and diaphoresis. Respiratory: Negative for cough, hemoptysis, SOB, sputum production, and wheezing. Cardiovascular: Negative for palpitations, orthopnea, claudication, leg swelling and PND. +stable chest pressure, chest tightness  Gastrointestinal: Negative for heartburn, nausea, vomiting, blood in stool and melena. Genitourinary: Negative for dysuria and flank pain.   Musculoskeletal: Negative for joint pain and back pain.  Skin: Negative for rash. Neurological: Negative for focal weakness, seizures, loss of consciousness, weakness and headaches. Endo/Heme/Allergies: Does not bruise/bleed easily. Psychiatric/Behavioral: Negative for memory loss. The patient does not have insomnia. Physical Exam:    Visit Vitals  /62 (BP 1 Location: Left arm, BP Patient Position: Sitting)   Pulse 74   Resp 18   Ht 6' (1.829 m)   Wt 246 lb 9.6 oz (111.9 kg)   SpO2 95%   BMI 33.44 kg/m²     Wt Readings from Last 3 Encounters:   07/30/19 246 lb 9.6 oz (111.9 kg)   07/12/19 250 lb (113.4 kg)   07/09/19 242 lb 3.2 oz (109.9 kg)       Gen: NAD    Mental Status - Alert. General Appearance - Not in acute distress. Neck - no JVD    Chest and Lung Exam   Inspection: Accessory muscles - No use of accessory muscles in breathing. Auscultation:   Breath sounds: - Normal.     Cardiovascular   Inspection: Jugular vein - Bilateral - Inspection Normal.   Palpation/Percussion:   Apical Impulse: - Normal.   Auscultation: Rhythm - Regular. Heart Sounds - S1 WNL and S2 WNL. No S3 or S4. Murmurs & Other Heart Sounds: Auscultation of the heart reveals - No Murmurs. Peripheral Vascular   Upper Extremity: Inspection - Bilateral - No Cyanotic nailbeds or Digital clubbing. Lower Extremity:  Palpation: no edema    Abdomen: Soft, non-tender, bowel sounds are active. Neuro: A&O times 3, CN and motor grossly WNL    Cardiographics  EKG 07/30/19- SR, Intraventricular conduction delay  EKG 7/9/19 - SR IVCD  Nuclear stress test 7/12/19 - perfusion defect small-moderate in size, wall motion abnormalities      Assessment:     Encounter Diagnoses   Name Primary?     Stable angina pectoris ([address] Yes    Coronary artery disease of native artery of native heart with stable angina pectoris ([address]     Essential hypertension, benign     Mixed hyperlipidemia     S/P coronary artery stent placement       Plan:     Mr. Terry Roy states his his chest pain his still ongoing on two antianginals. His nuclear stress test 7/12/19 showed new perfusion defect small-moderate in size, wall-motion abnormalities. Will evaluate futher with CBC, CMP, INR, and cardiac cath. He is adherent with ASA 81mg/d and Plavix 75mg/d.       Written by Mica Britt, as dictated by Kayli Garner M.D.

## 2019-07-30 NOTE — H&P (VIEW-ONLY)
[address] [place] [address] [address], [place], [address] [facility] Raina Ramirez is a 74 y.o. male. Last seen by me 3 weeks ago. Subjective:  
 
Raina Ramirez reports he is still having chest pain. Myoview was positive for a new area of ischemia. He is adherent with ASA and Plavix. Patient Active Problem List  
 Diagnosis Date Noted  History of kidney stones 07/12/2018  Chest pain 07/07/2017  Coronary artery disease due to lipid rich plaque 04/27/2016  Coronary artery disease involving native coronary artery of native heart without angina pectoris 03/16/2016  S/P coronary artery stent placement 05/12/2015  Benign essential tremor 01/22/2014  Hypertensive kidney disease with chronic kidney disease stage III ([address] 11/22/2013  Iron deficiency 05/23/2013  Obesity 01/30/2013  Gout 01/30/2013  Prediabetes 01/07/2012  Chronic kidney disease, stage III (moderate) ([address] 10/11/2009  Mixed hyperlipidemia 10/11/2009  Obstructive sleep apnea 10/11/2009  RLS (restless legs syndrome) 10/11/2009  Peripheral neuropathy 10/11/2009  DJD (degenerative joint disease), multiple sites 10/11/2009  Essential hypertension, benign 10/11/2009  Allergic rhinitis 10/11/2009  GERD (gastroesophageal reflux disease) 10/11/2009  ED (erectile dysfunction) 10/11/2009  Anxiety associated with depression 10/11/2009 James Badillo MD 
Past Medical History:  
Diagnosis Date  Abnormal stress echo 5/12/2015  Anemia NEC 03/2013 Last 2-3 months; finished taking iron supplement  Anxiety  CAD (coronary artery disease) 2009  
 cath [facility]) revealed 20%RCA and 50%2nd diagonal  
 Calculus of kidney  Chronic kidney disease   
 sees nephrologist every 6 months  Chronic kidney disease, stage III (moderate) ([address] 10/11/2009  
 30% kidney function  Diabetes ([address] Pre-diabetic  DJD (degenerative joint disease)  GERD (gastroesophageal reflux disease) 10/11/2009  
 controlled with medication  Gout  Hypertension  Liver disease   
 fatty liver  Obesity  Obstructive sleep apnea (adult) (pediatric) 10/11/2009  
 nasal pillows; pressure set at 10-11 - uses cpap  Peripheral neuropathy 10/11/2009  
 bilateral feet (numbness)  Prediabetes  RLS (restless legs syndrome) 10/11/2009  S/P coronary artery stent placement 5/12/2015 5/11/15 PCI./MALI to LCx Past Surgical History:  
Procedure Laterality Date  HX HEART CATHETERIZATION  2009, 7/17  
 x1 stent  HX HERNIA REPAIR [place]/umbilical  
 HX KNEE REPLACEMENT Left 7/23/11 LEFT TOTAL KNEE ARTHROPLASTY  HX ORTHOPAEDIC    
 left great toe pinning/plate  HX ORTHOPAEDIC    
 left shoulder manipulation  HX UROLOGICAL    
 basket extraction of kidney stones  AL COLONOSCOPY FLX DX W/COLLJ SPEC WHEN PFRMD  8/5/2010  AL COLSC FLX W/RMVL OF TUMOR POLYP LESION SNARE TQ  9/24/2014 Allergies Allergen Reactions  Penicillins Hives  Bactrim [Sulfamethoxazole-Trimethoprim] Hives  Codeine Itching  Lisinopril (Bulk) Cough  Neurontin [Gabapentin] Other (comments) drowsy  Zostavax [Zoster Vaccine Live (Pf)] Rash See 9/10/13 visit Family History Problem Relation Age of Onset  Heart Disease Father  Hypertension Father  Other Father   
     abdominal aneurysm  Dementia Mother  Alzheimer Mother  Heart Disease Brother  Heart Attack Brother  Heart Disease Maternal Grandmother  Heart Disease Paternal Grandmother  Heart Attack Paternal Grandmother  Heart Disease Paternal Grandfather  Heart Attack Paternal Grandfather  Elevated Lipids Son  Elevated Lipids Daughter  Cancer Neg Hx  Diabetes Neg Hx  Stroke Neg Hx Social History Socioeconomic History  Marital status:  Spouse name: Not on file  Number of children: Not on file  Years of education: Not on file  Highest education level: Not on file Occupational History  Not on file Social Needs  Financial resource strain: Not on file  Food insecurity:  
  Worry: Not on file Inability: Not on file  Transportation needs:  
  Medical: Not on file Non-medical: Not on file Tobacco Use  Smoking status: Former Smoker Packs/day: 1.00 Years: 10.00 Pack years: 10.00 Types: Cigarettes Last attempt to quit: 1968 Years since quittin.6  Smokeless tobacco: Never Used Substance and Sexual Activity  Alcohol use: No  
  Alcohol/week: 0.0 standard drinks  Drug use: No  
 Sexual activity: Yes  
  Partners: Female Birth control/protection: None Lifestyle  Physical activity:  
  Days per week: Not on file Minutes per session: Not on file  Stress: Not on file Relationships  Social connections:  
  Talks on phone: Not on file Gets together: Not on file Attends Anabaptist service: Not on file Active member of club or organization: Not on file Attends meetings of clubs or organizations: Not on file Relationship status: Not on file  Intimate partner violence:  
  Fear of current or ex partner: Not on file Emotionally abused: Not on file Physically abused: Not on file Forced sexual activity: Not on file Other Topics Concern  Not on file Social History Narrative  Not on file Review of Systems Constitutional: Negative for fever, chills, malaise/fatigue and diaphoresis. Respiratory: Negative for cough, hemoptysis, SOB, sputum production, and wheezing. Cardiovascular: Negative for palpitations, orthopnea, claudication, leg swelling and PND. +stable chest pressure, chest tightness Gastrointestinal: Negative for heartburn, nausea, vomiting, blood in stool and melena. Genitourinary: Negative for dysuria and flank pain. Musculoskeletal: Negative for joint pain and back pain. Skin: Negative for rash. Neurological: Negative for focal weakness, seizures, loss of consciousness, weakness and headaches. Endo/Heme/Allergies: Does not bruise/bleed easily. Psychiatric/Behavioral: Negative for memory loss. The patient does not have insomnia. Physical Exam:   
Visit Vitals /62 (BP 1 Location: Left arm, BP Patient Position: Sitting) Pulse 74 Resp 18 Ht 6' (1.829 m) Wt 246 lb 9.6 oz (111.9 kg) SpO2 95% BMI 33.44 kg/m² Wt Readings from Last 3 Encounters:  
07/30/19 246 lb 9.6 oz (111.9 kg) 07/12/19 250 lb (113.4 kg) 07/09/19 242 lb 3.2 oz (109.9 kg) Gen: NAD Mental Status - Alert. General Appearance - Not in acute distress. Neck - no JVD Chest and Lung Exam  
Inspection: Accessory muscles - No use of accessory muscles in breathing. Auscultation:  
Breath sounds: - Normal.  
 
Cardiovascular Inspection: Jugular vein - Bilateral - Inspection Normal.  
Palpation/Percussion:  
Apical Impulse: - Normal.  
Auscultation: Rhythm - Regular. Heart Sounds - S1 WNL and S2 WNL. No S3 or S4. Murmurs & Other Heart Sounds: Auscultation of the heart reveals - No Murmurs. Peripheral Vascular Upper Extremity: Inspection - Bilateral - No Cyanotic nailbeds or Digital clubbing. Lower Extremity: 
Palpation: no edema Abdomen: Soft, non-tender, bowel sounds are active. Neuro: A&O times 3, CN and motor grossly WNL Cardiographics EKG 07/30/19- SR, Intraventricular conduction delay EKG 7/9/19 - SR IVCD Nuclear stress test 7/12/19 - perfusion defect small-moderate in size, wall motion abnormalities Assessment:  
 
Encounter Diagnoses Name Primary?  Stable angina pectoris ([address] Yes  Coronary artery disease of native artery of native heart with stable angina pectoris ([address]  Essential hypertension, benign  Mixed hyperlipidemia  S/P coronary artery stent placement Plan: Mr. Yana Pichardo states his his chest pain his still ongoing on two antianginals. His nuclear stress test 7/12/19 showed new perfusion defect small-moderate in size, wall-motion abnormalities. Will evaluate futher with CBC, CMP, INR, and cardiac cath. He is adherent with ASA 81mg/d and Plavix 75mg/d.  
 
 
Written by Gael Avendano, as dictated by Elvin Rahman M.D.

## 2019-07-31 PROBLEM — I20.8 STABLE ANGINA (HCC): Status: ACTIVE | Noted: 2019-07-31

## 2019-07-31 LAB
ALBUMIN SERPL-MCNC: 4.1 G/DL (ref 3.5–4.8)
ALBUMIN/GLOB SERPL: 1.7 {RATIO} (ref 1.2–2.2)
ALP SERPL-CCNC: 100 IU/L (ref 39–117)
ALT SERPL-CCNC: 21 IU/L (ref 0–44)
AST SERPL-CCNC: 17 IU/L (ref 0–40)
BASOPHILS # BLD AUTO: 0 X10E3/UL (ref 0–0.2)
BASOPHILS NFR BLD AUTO: 0 %
BILIRUB SERPL-MCNC: 0.5 MG/DL (ref 0–1.2)
BUN SERPL-MCNC: 28 MG/DL (ref 8–27)
BUN/CREAT SERPL: 13 (ref 10–24)
CALCIUM SERPL-MCNC: 9.3 MG/DL (ref 8.6–10.2)
CHLORIDE SERPL-SCNC: 109 MMOL/L (ref 96–106)
CO2 SERPL-SCNC: 20 MMOL/L (ref 20–29)
CREAT SERPL-MCNC: 2.11 MG/DL (ref 0.76–1.27)
EOSINOPHIL # BLD AUTO: 0.4 X10E3/UL (ref 0–0.4)
EOSINOPHIL NFR BLD AUTO: 4 %
ERYTHROCYTE [DISTWIDTH] IN BLOOD BY AUTOMATED COUNT: 15.6 % (ref 12.3–15.4)
GLOBULIN SER CALC-MCNC: 2.4 G/DL (ref 1.5–4.5)
GLUCOSE SERPL-MCNC: 85 MG/DL (ref 65–99)
HCT VFR BLD AUTO: 33.7 % (ref 37.5–51)
HGB BLD-MCNC: 11.4 G/DL (ref 13–17.7)
IMM GRANULOCYTES # BLD AUTO: 0.1 X10E3/UL (ref 0–0.1)
IMM GRANULOCYTES NFR BLD AUTO: 1 %
INR PPP: 1 (ref 0.8–1.2)
LYMPHOCYTES # BLD AUTO: 3.6 X10E3/UL (ref 0.7–3.1)
LYMPHOCYTES NFR BLD AUTO: 38 %
MCH RBC QN AUTO: 31.9 PG (ref 26.6–33)
MCHC RBC AUTO-ENTMCNC: 33.8 G/DL (ref 31.5–35.7)
MCV RBC AUTO: 94 FL (ref 79–97)
MONOCYTES # BLD AUTO: 0.9 X10E3/UL (ref 0.1–0.9)
MONOCYTES NFR BLD AUTO: 9 %
NEUTROPHILS # BLD AUTO: 4.5 X10E3/UL (ref 1.4–7)
NEUTROPHILS NFR BLD AUTO: 48 %
PLATELET # BLD AUTO: 198 X10E3/UL (ref 150–450)
POTASSIUM SERPL-SCNC: 4.1 MMOL/L (ref 3.5–5.2)
PROT SERPL-MCNC: 6.5 G/DL (ref 6–8.5)
PROTHROMBIN TIME: 10.6 SEC (ref 9.1–12)
RBC # BLD AUTO: 3.57 X10E6/UL (ref 4.14–5.8)
SODIUM SERPL-SCNC: 144 MMOL/L (ref 134–144)
WBC # BLD AUTO: 9.4 X10E3/UL (ref 3.4–10.8)

## 2019-08-05 ENCOUNTER — HOSPITAL ENCOUNTER (OUTPATIENT)
Age: 74
Discharge: HOME | End: 2019-08-05
Attending: INTERNAL MEDICINE | Admitting: INTERNAL MEDICINE
Payer: MEDICARE

## 2019-08-05 VITALS
SYSTOLIC BLOOD PRESSURE: 148 MMHG | WEIGHT: 250 LBS | HEIGHT: 72 IN | BODY MASS INDEX: 33.86 KG/M2 | OXYGEN SATURATION: 95 % | DIASTOLIC BLOOD PRESSURE: 70 MMHG | RESPIRATION RATE: 18 BRPM | TEMPERATURE: 97.4 F | HEART RATE: 68 BPM

## 2019-08-05 DIAGNOSIS — I20.8 STABLE ANGINA (HCC): ICD-10-CM

## 2019-08-05 DIAGNOSIS — I20.8 STABLE ANGINA PECTORIS (HCC): ICD-10-CM

## 2019-08-05 PROCEDURE — 77030019697 HC SYR ANGI INFL MRTM -B: Performed by: INTERNAL MEDICINE

## 2019-08-05 PROCEDURE — C1887 CATHETER, GUIDING: HCPCS | Performed by: INTERNAL MEDICINE

## 2019-08-05 PROCEDURE — 74011250637 HC RX REV CODE- 250/637: Performed by: INTERNAL MEDICINE

## 2019-08-05 PROCEDURE — 93458 L HRT ARTERY/VENTRICLE ANGIO: CPT | Performed by: INTERNAL MEDICINE

## 2019-08-05 PROCEDURE — 77030008543 HC TBNG MON PRSS MRTM -A: Performed by: INTERNAL MEDICINE

## 2019-08-05 PROCEDURE — C1725 CATH, TRANSLUMIN NON-LASER: HCPCS | Performed by: INTERNAL MEDICINE

## 2019-08-05 PROCEDURE — 77030015766: Performed by: INTERNAL MEDICINE

## 2019-08-05 PROCEDURE — C1769 GUIDE WIRE: HCPCS | Performed by: INTERNAL MEDICINE

## 2019-08-05 PROCEDURE — 99153 MOD SED SAME PHYS/QHP EA: CPT | Performed by: INTERNAL MEDICINE

## 2019-08-05 PROCEDURE — 77030004549 HC CATH ANGI DX PRF MRTM -A: Performed by: INTERNAL MEDICINE

## 2019-08-05 PROCEDURE — 77030019569 HC BND COMPR RAD TERU -B: Performed by: INTERNAL MEDICINE

## 2019-08-05 PROCEDURE — 74011250636 HC RX REV CODE- 250/636

## 2019-08-05 PROCEDURE — 74011250636 HC RX REV CODE- 250/636: Performed by: INTERNAL MEDICINE

## 2019-08-05 PROCEDURE — 99152 MOD SED SAME PHYS/QHP 5/>YRS: CPT | Performed by: INTERNAL MEDICINE

## 2019-08-05 PROCEDURE — 93005 ELECTROCARDIOGRAM TRACING: CPT

## 2019-08-05 PROCEDURE — 76937 US GUIDE VASCULAR ACCESS: CPT | Performed by: INTERNAL MEDICINE

## 2019-08-05 PROCEDURE — 92928 PRQ TCAT PLMT NTRAC ST 1 LES: CPT | Performed by: INTERNAL MEDICINE

## 2019-08-05 PROCEDURE — 92933 PRQ TRLML C ATHRC ST ANGIOP1: CPT | Performed by: INTERNAL MEDICINE

## 2019-08-05 PROCEDURE — 77030012468 HC VLV BLEEDBK CNTRL ABBT -B: Performed by: INTERNAL MEDICINE

## 2019-08-05 PROCEDURE — 77030010221 HC SPLNT WR POS TELE -B: Performed by: INTERNAL MEDICINE

## 2019-08-05 PROCEDURE — 93571 IV DOP VEL&/PRESS C FLO 1ST: CPT | Performed by: INTERNAL MEDICINE

## 2019-08-05 PROCEDURE — C1874 STENT, COATED/COV W/DEL SYS: HCPCS | Performed by: INTERNAL MEDICINE

## 2019-08-05 PROCEDURE — 74011000250 HC RX REV CODE- 250: Performed by: INTERNAL MEDICINE

## 2019-08-05 PROCEDURE — C1894 INTRO/SHEATH, NON-LASER: HCPCS | Performed by: INTERNAL MEDICINE

## 2019-08-05 PROCEDURE — 74011000258 HC RX REV CODE- 258: Performed by: INTERNAL MEDICINE

## 2019-08-05 PROCEDURE — 77030030195 HC CATH ANGI DX PRF4 MRTM -A: Performed by: INTERNAL MEDICINE

## 2019-08-05 PROCEDURE — 74011636320 HC RX REV CODE- 636/320: Performed by: INTERNAL MEDICINE

## 2019-08-05 DEVICE — XIENCE SIERRA™ EVEROLIMUS ELUTING CORONARY STENT SYSTEM 3.25 MM X 15 MM / RAPID-EXCHANGE
Type: IMPLANTABLE DEVICE | Site: CORONARY | Status: FUNCTIONAL
Brand: XIENCE SIERRA™

## 2019-08-05 RX ORDER — CLOPIDOGREL BISULFATE 75 MG/1
TABLET ORAL AS NEEDED
Status: DISCONTINUED | OUTPATIENT
Start: 2019-08-05 | End: 2019-08-05 | Stop reason: HOSPADM

## 2019-08-05 RX ORDER — HEPARIN SODIUM 200 [USP'U]/100ML
INJECTION, SOLUTION INTRAVENOUS
Status: COMPLETED | OUTPATIENT
Start: 2019-08-05 | End: 2019-08-05

## 2019-08-05 RX ORDER — FENTANYL CITRATE 50 UG/ML
INJECTION, SOLUTION INTRAMUSCULAR; INTRAVENOUS AS NEEDED
Status: DISCONTINUED | OUTPATIENT
Start: 2019-08-05 | End: 2019-08-05 | Stop reason: HOSPADM

## 2019-08-05 RX ORDER — LIDOCAINE HYDROCHLORIDE 10 MG/ML
INJECTION, SOLUTION EPIDURAL; INFILTRATION; INTRACAUDAL; PERINEURAL AS NEEDED
Status: DISCONTINUED | OUTPATIENT
Start: 2019-08-05 | End: 2019-08-05 | Stop reason: HOSPADM

## 2019-08-05 RX ORDER — MIDAZOLAM HYDROCHLORIDE 1 MG/ML
INJECTION, SOLUTION INTRAMUSCULAR; INTRAVENOUS AS NEEDED
Status: DISCONTINUED | OUTPATIENT
Start: 2019-08-05 | End: 2019-08-05 | Stop reason: HOSPADM

## 2019-08-05 RX ORDER — HEPARIN SODIUM 1000 [USP'U]/ML
INJECTION, SOLUTION INTRAVENOUS; SUBCUTANEOUS AS NEEDED
Status: DISCONTINUED | OUTPATIENT
Start: 2019-08-05 | End: 2019-08-05 | Stop reason: HOSPADM

## 2019-08-05 RX ORDER — VERAPAMIL HYDROCHLORIDE 2.5 MG/ML
INJECTION, SOLUTION INTRAVENOUS AS NEEDED
Status: DISCONTINUED | OUTPATIENT
Start: 2019-08-05 | End: 2019-08-05 | Stop reason: HOSPADM

## 2019-08-05 NOTE — PROGRESS NOTES
Patient arrived post cardiac cath with stent placememnt. Right radial site with TR band intact and patent at 13cc. Patient connected to monitor. VS stable at this time. Will continue to monitor.

## 2019-08-05 NOTE — PROGRESS NOTES
Orders received to discharged patient. AVS, education and post operative care explained to patient. Patient does not have any questions at this time. Patient to be taken to front entrance and released to family.

## 2019-08-05 NOTE — ROUTINE PROCESS
0041: Pt prepped and consented for cardiac cath. Per MD, will not give Asprin or Plavix bc pt took them late last night. Also, per MD will hydrate at 150/ hr x2 hours.

## 2019-08-05 NOTE — Clinical Note
Lesion: Located in the Proximal LAD. Stent inserted. Stent deployed. Single technique used. First inflation pressure = 16 herb; inflation time: 20 sec.

## 2019-08-05 NOTE — Clinical Note
TRANSFER - OUT REPORT:  
 
Verbal report given to: JAY KHOURY. Report consisted of patient's Situation, Background, Assessment and  
Recommendations(SBAR). Opportunity for questions and clarification was provided. Patient transported with a Registered Nurse and [address] [place] [address] / "[employer] [facility] [place] [facility]. Patient transported to: [facility].

## 2019-08-05 NOTE — PROGRESS NOTES
Yenifer Clemons is recovering post-procedure. R radial site dressing is CDI without swelling or bleeding, but slight bruising proximal.  VSS. Rhythm sinus. Yenifer Clemons denies complaints at this time.   If recovery continues to progress without complication, discharge is planned for later today after [address], NP  DNP, RN, AGACNP-BC

## 2019-08-05 NOTE — Clinical Note
Lesion: Located in the Proximal LAD. Pressure wire inserted and measurements taken. FFR value = 0.72.

## 2019-08-05 NOTE — Clinical Note
Single view of the left ventricle obtained using power injection. Total volume = 30 mL. Rate = 10 mL/sec. Pressure = 900 PSI.

## 2019-08-05 NOTE — INTERVAL H&P NOTE
H&P Update: 
Cayla Jacobson was seen and examined. History and physical has been reviewed. The patient has been examined.  There have been no significant clinical changes since the completion of the originally dated History and Physical.

## 2019-08-05 NOTE — DISCHARGE INSTRUCTIONS
[address]  [phone]        Patient ID:  Glenroy Ruggiero  [id number]  52 y.o.  1945    Admit Date: 8/5/2019    Discharge Date: 8/5/2019     Admitting Physician: Andres Pope MD     Discharge Physician: Mahad Diaz NP    Admission Diagnoses:   Stable angina [facility]) [I20.8]    Discharge Diagnoses:   Principal Problem:    Stable angina [facility]) (7/31/2019)      Overview: Added automatically from request for surgery [id number]    Active Problems:    S/P coronary artery stent placement (5/12/2015)      Overview: 5/11/15 PCI./MALI to LCx            8/5/19:  MALI LAD        Discharge Condition: Good    Cardiology Procedures this Admission:  Left heart catheterization with PCI    Disposition: home    Reference discharge instructions provided by nursing for diet and activity. Signed:  Mahad Diaz NP  8/5/2019  2:29 PM      Radial Cardiac Catheterization/Angiography Discharge Instructions    It is normal to feel tired the first couple days. Take it easy and follow the physicians instructions. CHECK THE CATHETER INSERTION SITE DAILY:    Remove the wrist dressing 24 hours after the procedure. You may shower 24 hours after the procedure. Wash with soap and water and pat dry. Gentle cleaning of the site with soap and water is sufficient, cover with a dry clean dressing or bandage. Do not apply creams or powders to the area. No soaking the wrist for 3 days. Leave the puncture site open to air after 24 hours post-procedure. CALL THE PHYSICIANS:     If the site becomes red, swollen or feels warm to the touch  If there is bleeding or drainage or if there is unusual pain at the radial site. If there is any minor oozing, you may apply a band-aid and remove after 12 hours.    If the bleeding continues, hold pressure with the middle finger against the puncture site and the thumb against the back of the wrist,call 911 to be transported to the hospital.  DO NOT DRIVE YOURSELF, OR HAVE ANYONE ELSE DRIVE YOU - CALL [phone]. ACTIVITY:   For the first 24 hours do not manipulate the wrist.  No lifting, pushing or pulling over 3-5 pounds with the affected wrist for 7 daysand no straining the insertion site. Do not life grocery bags or the garbage can, do not run the vacuum  or  for 7 days. Start with short walks as in the hospital and gradually increase as tolerated each day. It is recommended to walk 30 minutes 5-7 days per week. Follow your physicians instructions on activity. Avoid walking outside in extremes of heat or cold. Walk inside when it is cold and windy or hot and humid. Things to keep in mind:  No driving for at least 24 hours, or as designated by your physician. Limit the number of times you go up and down the stairs  Take rests and pace yourself with activity. Be careful and do not strain with bowel movements. MEDICATIONS:    Take all medications as prescribed  Call your physician if you have any questions  Keep an updated list of your medications with you at all times and give a list to your physician and pharmacist    SIGNS AND SYMPTOMS:   Be cautious of symptoms of angina or recurrent symptoms such as chest discomfort, unusual shortness of breath or fatigue. These could be symptoms of restenosis, a new blockage or a heart attack. If your symptoms are relieved with rest it is still recommended that you notify your physician of recurrent chest pain or discomfort. For CHEST PAIN or symptoms of angina not relieved with rest:  If the discomfort is not relieved with rest, and you have been prescribed Nitroglycerin, take as directed (taken under the tongue, one at a time 5 minutes apart for a total of 3 doses). If the discomfort is not relieved after the 3rd nitroglycerin, call 911. If you have not been prescribed Nitroglycerin  and your chest discomfort is not relieved with rest, call 911.      AFTER CARE: Follow up with your physician as instructed. Follow a heart healthy diet with proper portion control, daily stress management, daily exercise, blood pressure and cholesterol control , and smoking cessation.

## 2019-08-06 LAB
ATRIAL RATE: 61 BPM
CALCULATED P AXIS, ECG09: 58 DEGREES
CALCULATED R AXIS, ECG10: 24 DEGREES
CALCULATED T AXIS, ECG11: 8 DEGREES
DIAGNOSIS, 93000: NORMAL
P-R INTERVAL, ECG05: 144 MS
Q-T INTERVAL, ECG07: 470 MS
QRS DURATION, ECG06: 118 MS
QTC CALCULATION (BEZET), ECG08: 473 MS
VENTRICULAR RATE, ECG03: 61 BPM

## 2019-08-07 ENCOUNTER — TELEPHONE (OUTPATIENT)
Dept: INTERNAL MEDICINE CLINIC | Age: 74
End: 2019-08-07

## 2019-08-07 RX ORDER — IRBESARTAN 150 MG/1
TABLET ORAL
Qty: 90 TAB | Refills: 3 | Status: SHIPPED | OUTPATIENT
Start: 2019-08-07 | End: 2019-08-07 | Stop reason: RX

## 2019-08-07 RX ORDER — TELMISARTAN 40 MG/1
40 TABLET ORAL DAILY
Qty: 90 TAB | Refills: 3 | Status: SHIPPED | OUTPATIENT
Start: 2019-08-07 | End: 2020-08-12

## 2019-08-13 RX ORDER — CHLORTHALIDONE 25 MG/1
TABLET ORAL
Qty: 15 TAB | Refills: 1 | Status: SHIPPED | OUTPATIENT
Start: 2019-08-13 | End: 2019-10-12 | Stop reason: SDUPTHER

## 2019-08-16 ENCOUNTER — TELEPHONE (OUTPATIENT)
Dept: INTERNAL MEDICINE CLINIC | Age: 74
End: 2019-08-16

## 2019-08-19 NOTE — TELEPHONE ENCOUNTER
Spoke with patient. Two pt identifiers confirmed. Patient states that he recently had a cardiac stent placed by Dr. José Miguel Devlin and was advised to followup with Dr. Griselda Goldberg. Patient offered an appointment on 08/30/19. Appointment accepted. Patient advised if anything changes or if unable to keep this appointment to call the office  Pt verbalized understanding of information discussed w/ no further questions at this time.

## 2019-08-20 ENCOUNTER — OFFICE VISIT (OUTPATIENT)
Dept: CARDIOLOGY CLINIC | Age: 74
End: 2019-08-20

## 2019-08-20 VITALS
BODY MASS INDEX: 33.56 KG/M2 | SYSTOLIC BLOOD PRESSURE: 108 MMHG | OXYGEN SATURATION: 97 % | HEART RATE: 69 BPM | HEIGHT: 72 IN | WEIGHT: 247.8 LBS | RESPIRATION RATE: 18 BRPM | DIASTOLIC BLOOD PRESSURE: 54 MMHG

## 2019-08-20 DIAGNOSIS — R07.9 CHEST PAIN, UNSPECIFIED TYPE: ICD-10-CM

## 2019-08-20 DIAGNOSIS — I45.10 RBBB: ICD-10-CM

## 2019-08-20 DIAGNOSIS — Z95.5 S/P CORONARY ARTERY STENT PLACEMENT: ICD-10-CM

## 2019-08-20 DIAGNOSIS — R06.09 DOE (DYSPNEA ON EXERTION): ICD-10-CM

## 2019-08-20 DIAGNOSIS — I10 ESSENTIAL HYPERTENSION, BENIGN: Primary | Chronic | ICD-10-CM

## 2019-08-20 DIAGNOSIS — I25.10 CORONARY ARTERY DISEASE INVOLVING NATIVE CORONARY ARTERY OF NATIVE HEART WITHOUT ANGINA PECTORIS: ICD-10-CM

## 2019-08-20 RX ORDER — LOSARTAN POTASSIUM 25 MG/1
TABLET ORAL DAILY
COMMUNITY
End: 2019-08-30

## 2019-08-20 NOTE — LETTER
8/20/19 Patient: Ilia Monson YOB: 1945 Date of Visit: 8/20/2019 Candelario Park MD 
[address] [place] [address] VIA In Basket Dear Candelario Park MD, Thank you for referring Mr. Ilia Monson to [address] [place] [address] for evaluation. My notes for this consultation are attached. If you have questions, please do not hesitate to call me. I look forward to following your patient along with you.  
 
 
Sincerely, 
 
Shekhar Houston MD

## 2019-08-20 NOTE — PROGRESS NOTES
1. Have you been to the ER, urgent care clinic since your last visit? Hospitalized since your last visit? Seen on 8/5/19.    2. Have you seen or consulted any other health care providers outside of the [address] [place] [address] since your last visit? Include any pap smears or colon screening.    No.        Chief Complaint   Patient presents with   [facility] Follow Up     f/u from cardiac cath- pt denies any cardiac symptoms

## 2019-08-30 ENCOUNTER — OFFICE VISIT (OUTPATIENT)
Dept: INTERNAL MEDICINE CLINIC | Age: 74
End: 2019-08-30

## 2019-08-30 VITALS
OXYGEN SATURATION: 98 % | HEIGHT: 72 IN | RESPIRATION RATE: 16 BRPM | WEIGHT: 249 LBS | BODY MASS INDEX: 33.72 KG/M2 | SYSTOLIC BLOOD PRESSURE: 108 MMHG | HEART RATE: 81 BPM | TEMPERATURE: 98.1 F | DIASTOLIC BLOOD PRESSURE: 58 MMHG

## 2019-08-30 DIAGNOSIS — I10 ESSENTIAL HYPERTENSION: ICD-10-CM

## 2019-08-30 DIAGNOSIS — E78.00 PURE HYPERCHOLESTEROLEMIA: ICD-10-CM

## 2019-08-30 DIAGNOSIS — I25.10 CORONARY ARTERY DISEASE INVOLVING NATIVE CORONARY ARTERY OF NATIVE HEART WITHOUT ANGINA PECTORIS: ICD-10-CM

## 2019-08-30 DIAGNOSIS — M54.31 RIGHT SIDED SCIATICA: Primary | ICD-10-CM

## 2019-08-30 RX ORDER — ATORVASTATIN CALCIUM 40 MG/1
TABLET, FILM COATED ORAL
Qty: 90 TAB | Refills: 0 | Status: SHIPPED | OUTPATIENT
Start: 2019-08-30 | End: 2019-11-21 | Stop reason: SDUPTHER

## 2019-08-30 RX ORDER — METHYLPREDNISOLONE 4 MG/1
4 TABLET ORAL
Qty: 1 DOSE PACK | Refills: 0 | Status: SHIPPED | OUTPATIENT
Start: 2019-08-30 | End: 2020-01-10

## 2019-08-30 NOTE — PROGRESS NOTES
Mavis Tidwell is a 66 y.o. male who presents with wife. Sees Dr. Silvino Acevedo, cardiology. abnormal stress test Admitted 8/5. Right radial cath. LAD 50% stented to 0%. RCA not stented. EF 55-60%. On plavix 75mg daily and aspirin 81mg daily. No NSAID use. Feels better since stent. Was fatigued, CP and SMITH. All resolved. On lipitor 40mg daily. No myalgias. LDL 72 in June. BP on micardis 40mg, metoprolol XL 25mg daily. , amlodipine 5mg once a day. and chlorthalidone 25mg daily. Was changing a tire and tire fell onto right foot. Right sciatica. Bruising left forearm.          Past Medical History:   Diagnosis Date    Abnormal stress echo 5/12/2015    Anemia NEC 03/2013    Last 2-3 months; finished taking iron supplement    Anxiety     CAD (coronary artery disease) 2009    cath [facility]) revealed 20%RCA and 50%2nd diagonal    Calculus of kidney     Chronic kidney disease     sees nephrologist every 6 months    Chronic kidney disease, stage III (moderate) ([address] 10/11/2009    30% kidney function    Diabetes ([address]     Pre-diabetic    DJD (degenerative joint disease)     GERD (gastroesophageal reflux disease) 10/11/2009    controlled with medication    Gout     Hypertension     Liver disease     fatty liver    Obesity     Obstructive sleep apnea (adult) (pediatric) 10/11/2009    nasal pillows; pressure set at 10-11 - uses cpap    Peripheral neuropathy 10/11/2009    bilateral feet (numbness)    Prediabetes     RLS (restless legs syndrome) 10/11/2009    S/P coronary artery stent placement 5/12/2015    5/11/15 PCI./MALI to LCx       Family History   Problem Relation Age of Onset    Heart Disease Father     Hypertension Father     Other Father         abdominal aneurysm     Dementia Mother     Alzheimer Mother     Heart Disease Brother     Heart Attack Brother     Heart Disease Maternal Grandmother     Heart Disease Paternal Grandmother     Heart Attack Paternal Grandmother     Heart Disease Paternal Grandfather     Heart Attack Paternal Grandfather     Elevated Lipids Son     Elevated Lipids Daughter     Cancer Neg Hx     Diabetes Neg Hx     Stroke Neg Hx        Social History     Socioeconomic History    Marital status:      Spouse name: Not on file    Number of children: Not on file    Years of education: Not on file    Highest education level: Not on file   Occupational History    Not on file   Social Needs    Financial resource strain: Not on file    Food insecurity:     Worry: Not on file     Inability: Not on file    Transportation needs:     Medical: Not on file     Non-medical: Not on file   Tobacco Use    Smoking status: Former Smoker     Packs/day: 1.00     Years: 10.00     Pack years: 10.00     Types: Cigarettes     Last attempt to quit: 1968     Years since quittin.6    Smokeless tobacco: Never Used   Substance and Sexual Activity    Alcohol use: No     Alcohol/week: 0.0 standard drinks    Drug use: No    Sexual activity: Yes     Partners: Female     Birth control/protection: None   Lifestyle    Physical activity:     Days per week: Not on file     Minutes per session: Not on file    Stress: Not on file   Relationships    Social connections:     Talks on phone: Not on file     Gets together: Not on file     Attends Sikh service: Not on file     Active member of club or organization: Not on file     Attends meetings of clubs or organizations: Not on file     Relationship status: Not on file    Intimate partner violence:     Fear of current or ex partner: Not on file     Emotionally abused: Not on file     Physically abused: Not on file     Forced sexual activity: Not on file   Other Topics Concern    Not on file   Social History Narrative    Not on file       Current Outpatient Medications on File Prior to Visit   Medication Sig Dispense Refill    chlorthalidone (HYGROTEN) 25 mg tablet TAKE 1/2 TABLET BY MOUTH EVERY DAY 15 Tab 1  telmisartan (MICARDIS) 40 mg tablet Take 1 Tab by mouth daily. 90 Tab 3    ketoconazole (NIZORAL) 2 % shampoo Apply  to affected area.  fluocinoNIDE (LIDEX) 0.05 % external solution Apply  to affected area.  metoprolol succinate (TOPROL-XL) 25 mg XL tablet TAKE 1 TABLET BY MOUTH EVERY DAY 90 Tab 1    clopidogrel (PLAVIX) 75 mg tab TAKE 1 TABLET BY MOUTH EVERY DAY 90 Tab 1    isosorbide mononitrate ER (IMDUR) 30 mg tablet TAKE 1/2 TABLET BY MOUTH EVERY DAY 45 Tab 1    rOPINIRole (REQUIP) 0.5 mg tablet Take 1- 2 tablets daily in the evening 60 Tab 5    nitroglycerin (NITROSTAT) 0.4 mg SL tablet TAKE 1 TABLET BY SUBLINGUAL ROUTE EVERY 5 MINUTES AS NEEDED FOR CHEST PAIN. 1 Bottle 0    citalopram (CELEXA) 40 mg tablet TAKE 1 TABLET BY MOUTH EVERY DAY 90 Tab 3    albuterol (PROVENTIL HFA, VENTOLIN HFA, PROAIR HFA) 90 mcg/actuation inhaler Take 1 Puff by inhalation every six (6) hours as needed for Wheezing. 1 Inhaler 0    VITAMIN D3 2,000 unit cap capsule Take 2,000 Units by mouth daily. 2    OXYGEN-AIR DELIVERY SYSTEMS (HORIZON NASAL CPAP SYSTEM) by Does Not Apply route.  amLODIPine (NORVASC) 5 mg tablet Take 5 mg by mouth daily.  GLUCOSAMINE HCL/CHONDR PETERSEN A NA (GLUCOSAMINE-CHONDROITIN) 750-600 mg Tab Take 1 Tab by mouth daily. Brand: Move Free      aspirin delayed-release 81 mg tablet Take 81 mg by mouth daily.  allopurinol (ZYLOPRIM) 100 mg tablet Take 100 mg by mouth every morning.  MULTIVITS W-FE,OTHER MIN (CENTRUM PO) Take 1 Tab by mouth daily.  baclofen (LIORESAL) 10 mg tablet Take  by mouth three (3) times daily. No current facility-administered medications on file prior to visit. Review of Systems  Pertinent items are noted in HPI.     Objective:     Visit Vitals  /58 (BP 1 Location: Right arm, BP Patient Position: Sitting)   Pulse 81   Temp 98.1 °F (36.7 °C) (Oral)   Resp 16   Ht 6' (1.829 m)   Wt 249 lb (112.9 kg)   SpO2 98%   BMI 33.77 kg/m² Gen: well appearing male  Resp:  No wheezing, no rhonchi, no rales. CV:  RRR, normal S1S2, no murmur. Skin: forearm bruising bilaterally, right forefoot bruising, no hip bruising. Assessment/Plan:       ICD-10-CM ICD-9-CM    1. Right sided sciatica M54.31 724.3 methylPREDNISolone (MEDROL DOSEPACK) 4 mg tablet   2. Coronary artery disease involving native coronary artery of native heart without angina pectoris I25.10 414.01    3. Essential hypertension I10 401.9    4.  Pure hypercholesterolemia E78.00 272.0            Cristi Day MD

## 2019-08-30 NOTE — PATIENT INSTRUCTIONS
Office Policies    Phone calls/patient messages:            Please allow up to 24 hours for someone in the office to contact you about your call or message. Be mindful your provider may be out of the office or your message may require further review. We encourage you to use [employer] for your messages as this is a faster, more efficient way to communicate with our office                         Medication Refills:            Prescription medications require 48-72 business hours to process. We encourage you to use [employer] for your refills. For controlled medications: Please allow 72 business hours to process. Certain medications may require you to  a written prescription at our office. NO narcotic/controlled medications will be prescribed after 4pm Monday through Friday or on weekends              Form/Paperwork Completion:            Please note a $25 fee may incur for all paperwork for completed by our providers. We ask that you allow 7-10 business days. Pre-payment is due prior to picking up/faxing the completed form. You may also download your forms to [employer] to have your doctor print off. CHECK YOUR MEDICATIONS. WE HAVE (telmisartan) MICARDIS AND (cozaar) LOSARTAN. WE WANT ONE OF THEM. LAST PRESCRIBED WAS MICARDIS 40MG ONCE A DAY.

## 2019-08-30 NOTE — PROGRESS NOTES
Chief Complaint   Patient presents with    Fall     right hip pain from the fall x 2 days ago    Follow-up     heart Stent placement

## 2019-09-06 DIAGNOSIS — F41.8 ANXIETY ASSOCIATED WITH DEPRESSION: ICD-10-CM

## 2019-09-06 RX ORDER — CITALOPRAM 40 MG/1
TABLET, FILM COATED ORAL
Qty: 90 TAB | Refills: 3 | Status: SHIPPED | OUTPATIENT
Start: 2019-09-06 | End: 2020-09-14

## 2019-09-12 ENCOUNTER — HOSPITAL ENCOUNTER (OUTPATIENT)
Dept: CARDIAC REHAB | Age: 74
Discharge: HOME | End: 2019-09-12
Payer: MEDICARE

## 2019-09-12 VITALS — BODY MASS INDEX: 33.67 KG/M2 | HEIGHT: 72 IN | WEIGHT: 248.6 LBS

## 2019-09-12 PROCEDURE — 93798 PHYS/QHP OP CAR RHAB W/ECG: CPT

## 2019-09-12 NOTE — CARDIO/PULMONARY
Cardiopulmonary Rehab Orientation: Met with Bernice De Oliveira for the initial session. Mr Belia Pearson is a 66year-old patient of Dr. Afua Barnard, who presents to rehab for cardiac conditioning and strengthening, S/P PCI/stent on 8/5/2019; LVEF 55 %. History also includes CKD Stage 3,Hyperlipidemia,MELODY,RLS,Peripheral neuropathy,DJD,HTN,GERD, CAD, fatty liver,left knee replacement,Allergic to Bactrim,Codeine,Lisinopril,Neurontin,Zostavax. Immunization for influenza vaccine  and pneumonia vaccine UTD. Pt is former smoker. Limitations: left knee replaced and both shoulders bother him. Patient was given an educational notebook. 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    She is restless all night and he states he is up most of the night with her. He says however they do have dinner with family/friends and try to stay somewhat social.    Made a fall risk but no recent falls and slight unsteady gait he states due to left knee replacement.

## 2019-09-16 ENCOUNTER — HOSPITAL ENCOUNTER (OUTPATIENT)
Dept: CARDIAC REHAB | Age: 74
Discharge: HOME | End: 2019-09-16
Payer: MEDICARE

## 2019-09-16 VITALS — BODY MASS INDEX: 33.63 KG/M2 | WEIGHT: 248 LBS

## 2019-09-16 PROCEDURE — 93798 PHYS/QHP OP CAR RHAB W/ECG: CPT

## 2019-09-16 PROCEDURE — 93797 PHYS/QHP OP CAR RHAB WO ECG: CPT | Performed by: DIETITIAN, REGISTERED

## 2019-09-18 ENCOUNTER — HOSPITAL ENCOUNTER (OUTPATIENT)
Dept: CARDIAC REHAB | Age: 74
Discharge: HOME | End: 2019-09-18
Payer: MEDICARE

## 2019-09-18 VITALS — BODY MASS INDEX: 33.63 KG/M2 | WEIGHT: 248 LBS

## 2019-09-18 PROCEDURE — 93798 PHYS/QHP OP CAR RHAB W/ECG: CPT

## 2019-09-20 ENCOUNTER — HOSPITAL ENCOUNTER (OUTPATIENT)
Dept: CARDIAC REHAB | Age: 74
Discharge: HOME | End: 2019-09-20
Payer: MEDICARE

## 2019-09-20 VITALS — BODY MASS INDEX: 33.91 KG/M2 | WEIGHT: 250 LBS

## 2019-09-20 PROCEDURE — 93798 PHYS/QHP OP CAR RHAB W/ECG: CPT

## 2019-09-23 ENCOUNTER — HOSPITAL ENCOUNTER (OUTPATIENT)
Dept: CARDIAC REHAB | Age: 74
Discharge: HOME | End: 2019-09-23
Payer: MEDICARE

## 2019-09-23 VITALS — BODY MASS INDEX: 33.77 KG/M2 | WEIGHT: 249 LBS

## 2019-09-23 PROCEDURE — 93798 PHYS/QHP OP CAR RHAB W/ECG: CPT

## 2019-09-25 ENCOUNTER — HOSPITAL ENCOUNTER (OUTPATIENT)
Dept: CARDIAC REHAB | Age: 74
Discharge: HOME | End: 2019-09-25
Payer: MEDICARE

## 2019-09-25 VITALS — BODY MASS INDEX: 33.63 KG/M2 | WEIGHT: 248 LBS

## 2019-09-25 PROCEDURE — 93798 PHYS/QHP OP CAR RHAB W/ECG: CPT

## 2019-09-25 PROCEDURE — 93797 PHYS/QHP OP CAR RHAB WO ECG: CPT | Performed by: DIETITIAN, REGISTERED

## 2019-09-25 NOTE — PROGRESS NOTES
Cardiac Rehab Nutrition Assessment - 1:1 Evaluation     NAME: Sherron Blum : 1945 AGE: 66 y.o. GENDER: male  CARDIAC REHAB ADMITTING DIAGNOSIS: stents    Relevant Comorbidites:  HTN, dyslipidemia, Pre-DM    LABS:   Lab Results   Component Value Date/Time    Hemoglobin A1c 5.8 (H) 2017 10:41 AM    Hemoglobin A1c (POC) 5.9% 2013 02:51 PM     Lab Results   Component Value Date/Time    Cholesterol, total 142 2019 09:07 AM    HDL Cholesterol 42 2019 09:07 AM    LDL, calculated 72 2019 09:07 AM    VLDL, calculated 28 2019 09:07 AM    Triglyceride 139 2019 09:07 AM     MEDICATIONS/SUPPLEMENTS:   [unfilled]  Prior to Admission medications    Medication Sig Start Date End Date Taking? Authorizing Provider   citalopram (CELEXA) 40 mg tablet TAKE 1 TABLET BY MOUTH EVERY DAY 19   Ivania Varghese MD   atorvastatin (LIPITOR) 40 mg tablet TAKE 1 TABLET BY MOUTH AT BEDTIME 19   Rosa Stewart NP   methylPREDNISolone (MEDROL DOSEPACK) 4 mg tablet Take 1 Tab by mouth Specific Days and Specific Times. 19   Ivania Varghese MD   chlorthalidone (HYGROTEN) 25 mg tablet TAKE 1/2 TABLET BY MOUTH EVERY DAY 19   Mia Caceres NP   telmisartan (MICARDIS) 40 mg tablet Take 1 Tab by mouth daily. 19   Ivania Varghese MD   ketoconazole (NIZORAL) 2 % shampoo Apply  to affected area. 18   Provider, Historical   fluocinoNIDE (LIDEX) 0.05 % external solution Apply  to affected area. 18   Provider, Historical   baclofen (LIORESAL) 10 mg tablet Take  by mouth three (3) times daily.     Provider, Historical   metoprolol succinate (TOPROL-XL) 25 mg XL tablet TAKE 1 TABLET BY MOUTH EVERY DAY 19   Rosa Stewart NP   clopidogrel (PLAVIX) 75 mg tab TAKE 1 TABLET BY MOUTH EVERY DAY 19   Rosa Stewart NP   isosorbide mononitrate ER (IMDUR) 30 mg tablet TAKE 1/2 TABLET BY MOUTH EVERY DAY 19   Rosa Stewart NP   rOPINIRole (REQUIP) 0.5 mg tablet Take 1- 2 tablets daily in the evening 1/14/19   Suri Quiñones MD   nitroglycerin (NITROSTAT) 0.4 mg SL tablet TAKE 1 TABLET BY SUBLINGUAL ROUTE EVERY 5 MINUTES AS NEEDED FOR CHEST PAIN. 1/14/19   Suri Quiñones MD   albuterol (PROVENTIL HFA, VENTOLIN HFA, PROAIR HFA) 90 mcg/actuation inhaler Take 1 Puff by inhalation every six (6) hours as needed for Wheezing. 2/28/17   Suri Quiñones MD   VITAMIN D3 2,000 unit cap capsule Take 2,000 Units by mouth daily. 3/3/15   Provider, Historical   OXYGEN-AIR DELIVERY SYSTEMS (HORIZON NASAL CPAP SYSTEM) by Does Not Apply route. Provider, Historical   amLODIPine (NORVASC) 5 mg tablet Take 5 mg by mouth daily. 6/16/14   Provider, Historical   GLUCOSAMINE HCL/CHONDR PETERSEN A NA (GLUCOSAMINE-CHONDROITIN) 750-600 mg Tab Take 1 Tab by mouth daily. Brand: Move Free    Provider, Historical   aspirin delayed-release 81 mg tablet Take 81 mg by mouth daily. Provider, Historical   allopurinol (ZYLOPRIM) 100 mg tablet Take 100 mg by mouth every morning. 6/5/12   Provider, Historical   MULTIVITS W-FE,OTHER MIN (CENTRUM PO) Take 1 Tab by mouth daily.     Provider, Historical     ANTHROPOMETRICS:    Ht Readings from Last 1 Encounters:   09/12/19 6' (1.829 m)      Wt Readings from Last 1 Encounters:   09/23/19 112.9 kg (249 lb)      IBW: 178 # +/- 10%  %IBW: 140 % +/- 10%    BMI: 33.7 kg/M2 Category: Obesity Class I  Waist: 46  inches    Reported Wt Hx:  Wt Readings from Last 10 Encounters:   09/23/19 112.9 kg (249 lb)   09/20/19 113.4 kg (250 lb)   09/18/19 112.5 kg (248 lb)   09/16/19 112.5 kg (248 lb)   09/12/19 112.8 kg (248 lb 9.6 oz)   08/30/19 112.9 kg (249 lb)   08/20/19 112.4 kg (247 lb 12.8 oz)   08/05/19 113.4 kg (250 lb)   07/30/19 111.9 kg (246 lb 9.6 oz)   07/12/19 113.4 kg (250 lb)   ]  Reported Diet Hx:    Rate Your Plate Score: 54  (Score 58-72: Making many healthy choices; 41-57: Some choices need improving 24-40: many choices need improving)     24 Hour Diet Recall  Breakfast 2 toaster strudel or eggs, toast, sausage   Lunch Ham/cheese sandwich   Dinner Hamburger steak w/gravy, fries, applesauce; out to eat 3-4x week   Snacks Little Alia cakes   Beverages 32 oz sweet tea/day, water       Environmental/Social:  Lives with wife who has Parkinsons; does the cooking      NUTRITION INTERVENTION:  Nutrition 60 minute one-on-one education & goal setting with [address] with Shannan Navarro relevant labs compared to ideals. Reviewed weight history and patient's verbalized weight goal as well as any real or perceived barriers to obtaining the goal. Collaborated with patient to set a specific short and long term weight goal.     Reviewed Rate Your Plate and conducted a verbal diet recall. Assessed for environmental, financial, psychosocial, physical and comorbidities that may impact the food and eating patterns / behaviors of [employer] with patient to set specific nutrient goals as well as specific food / behavior changes that will help patient meet the overall goal of following a heart healthy eating pattern (using guidelines as set forth by the American Heart Association and modeled after healthful eating patterns as recognized by the USDA Dietary Guidelines such as DASH, Mediterranean or plant-based). Briefly reviewed with Shannan Navarro the nutrition information in the Cardiac Rehab patient education book and encouraged Shannan Navarro to read thoroughly, ask questions as needed, and use for future reference for heart healthy nutrition information. Shannan Navarro is scheduled to participate in Cardiac Rehab group nutrition classes.     PATIENT GOALS:    Weight Goals:  Short Term Weight Goal: 235 lbs  Long Term Weight Goal: 200 lbs    Nutrition Goals:  Daily Recommendations:  Calories: 1606-0184 /day  (using Kevin Hernandez with AF x1.2-1.3-500)    Saturated Fat: no more than 12 g/day  Trans Fat: 0 g/day  Sodium: no more than 3342-4400 mg/day  Fruit: 2-3 / day  Vegetables: 3-4 ser/day    Other:  1. Read food labels for sodium and sat fat and limit to above recommendations  2. Substitute sweets with fruit and sugar free pudding  3. Try Splenda in tea    Keeping a food diary was recommended. Questions addressed. Follow-up plans discussed. Mitch Tenorio Jr verbalized understanding.             Aleena Saenz RD

## 2019-09-27 ENCOUNTER — HOSPITAL ENCOUNTER (OUTPATIENT)
Dept: CARDIAC REHAB | Age: 74
Discharge: HOME | End: 2019-09-27
Payer: MEDICARE

## 2019-09-27 VITALS — WEIGHT: 247 LBS | BODY MASS INDEX: 33.5 KG/M2

## 2019-09-27 PROCEDURE — 93798 PHYS/QHP OP CAR RHAB W/ECG: CPT

## 2019-09-30 ENCOUNTER — APPOINTMENT (OUTPATIENT)
Dept: CARDIAC REHAB | Age: 74
End: 2019-09-30
Payer: MEDICARE

## 2019-09-30 ENCOUNTER — HOSPITAL ENCOUNTER (OUTPATIENT)
Dept: CARDIAC REHAB | Age: 74
Discharge: HOME | End: 2019-09-30
Payer: MEDICARE

## 2019-09-30 VITALS — BODY MASS INDEX: 33.77 KG/M2 | WEIGHT: 249 LBS

## 2019-09-30 PROCEDURE — 93797 PHYS/QHP OP CAR RHAB WO ECG: CPT

## 2019-09-30 PROCEDURE — 93798 PHYS/QHP OP CAR RHAB W/ECG: CPT

## 2019-10-02 ENCOUNTER — HOSPITAL ENCOUNTER (OUTPATIENT)
Dept: CARDIAC REHAB | Age: 74
Discharge: HOME | End: 2019-10-02
Payer: MEDICARE

## 2019-10-02 VITALS — BODY MASS INDEX: 33.36 KG/M2 | WEIGHT: 246 LBS

## 2019-10-02 PROCEDURE — 93798 PHYS/QHP OP CAR RHAB W/ECG: CPT

## 2019-10-03 ENCOUNTER — OFFICE VISIT (OUTPATIENT)
Dept: SLEEP MEDICINE | Age: 74
End: 2019-10-03

## 2019-10-03 VITALS
DIASTOLIC BLOOD PRESSURE: 54 MMHG | OXYGEN SATURATION: 95 % | RESPIRATION RATE: 18 BRPM | WEIGHT: 246 LBS | HEART RATE: 70 BPM | HEIGHT: 72 IN | SYSTOLIC BLOOD PRESSURE: 103 MMHG | BODY MASS INDEX: 33.32 KG/M2

## 2019-10-03 DIAGNOSIS — I10 ESSENTIAL HYPERTENSION: ICD-10-CM

## 2019-10-03 DIAGNOSIS — G47.33 OBSTRUCTIVE SLEEP APNEA: Primary | ICD-10-CM

## 2019-10-03 NOTE — PATIENT INSTRUCTIONS
[address] [facility] [place]. [place], [address]  Tel.  [phone]  Fax. 100 [facility] 60  [place], [address]  Tel.  [phone]  Fax. [phone] [address]  Tel.  [phone]  Fax. [phone]     PROPER SLEEP HYGIENE    What to avoid  · Do not have drinks with caffeine, such as coffee or black tea, for 8 hours before bed. · Do not smoke or use other types of tobacco near bedtime. Nicotine is a stimulant and can keep you awake. · Avoid drinking alcohol late in the evening, because it can cause you to wake in the middle of the night. · Do not eat a big meal close to bedtime. If you are hungry, eat a light snack. · Do not drink a lot of water close to bedtime, because the need to urinate may wake you up during the night. · Do not read or watch TV in bed. Use the bed only for sleeping and sexual activity. What to try  · Go to bed at the same time every night, and wake up at the same time every morning. Do not take naps during the day. · Keep your bedroom quiet, dark, and cool. · Get regular exercise, but not within 3 to 4 hours of your bedtime. .  · Sleep on a comfortable pillow and mattress. · If watching the clock makes you anxious, turn it facing away from you so you cannot see the time. · If you worry when you lie down, start a worry book. Well before bedtime, write down your worries, and then set the book and your concerns aside. · Try meditation or other relaxation techniques before you go to bed. · If you cannot fall asleep, get up and go to another room until you feel sleepy. Do something relaxing. Repeat your bedtime routine before you go to bed again. · Make your house quiet and calm about an hour before bedtime. Turn down the lights, turn off the TV, log off the computer, and turn down the volume on music. This can help you relax after a busy day.     Drowsy Driving  The [address] [place] [address] Road Traffic Safety Administration cites drowsiness as a causing factor in more than 471,185 police reported crashes annually, resulting in 76,000 injuries and 1,500 deaths. Other surveys suggest 55% of people polled have driven while drowsy in the past year, 23% had fallen asleep but not crashed, 3% crashed, and 2% had and accident due to drowsy driving. Who is at risk? Young Drivers: One study of drowsy driving accidents states that 55% of the drivers were under 25 years. Of those, 75% were male. Shift Workers and Travelers: People who work overnight or travel across time zones frequently are at higher risk of experiencing Circadian Rhythm Disorders. They are trying to work and function when their body is programed to sleep. Sleep Deprived: Lack of sleep has a serious impact on your ability to pay attention or focus on a task. Consistently getting less than the average of 8 hours your body needs creates partial or cumulative sleep deprivation. Untreated Sleep Disorders: Sleep Apnea, Narcolepsy, R.L.S., and other sleep disorders (untreated) prevent a person from getting enough restful sleep. This leads to excessive daytime sleepiness and increases the risk for drowsy driving accidents by up to 7 times. Medications / Alcohol: Even over the counter medications can cause drowsiness. Medications that impair a drivers attention should have a warning label. Alcohol naturally makes you sleepy and on its own can cause accidents. Combined with excessive drowsiness its effects are amplified. Signs of Drowsy Driving:   * You don't remember driving the last few miles   * You may drift out of your ella   * You are unable to focus and your thoughts wander   * You may yawn more often than normal   * You have difficulty keeping your eyes open / nodding off   * Missing traffic signs, speeding, or tailgating  Prevention-   Good sleep hygiene, lifestyle and behavioral choices have the most impact on drowsy driving.  There is no substitute for sleep and the average person requires 8 hours nightly. If you find yourself driving drowsy, stop and sleep. Consider the sleep hygiene tips provided during your visit as well. Medication Refill Policy: Refills for all medications require 1 week advance notice. Please have your pharmacy fax a refill request. We are unable to fax, or call in \"controled substance\" medications and you will need to pick these prescriptions up from our office. [employer]    Thank you for requesting access to [facility]. Please follow the instructions below to securely access and download your online medical record. [facility] allows you to send messages to your doctor, view your test results, renew your prescriptions, schedule appointments, and more. How Do I Sign Up? 1. In your internet browser, go to [email or web address]. 2. Click on the First Time User? Click Here link in the Sign In box. You will see the New Member Sign Up page. 3. Enter your [facility] Access Code exactly as it appears below. You will not need to use this code after youve completed the sign-up process. If you do not sign up before the expiration date, you must request a new code. [facility] Access Code: Activation code not generated  Current [facility] Status: Active (This is the date your [facility] access code will )    4. Enter the last four digits of your Social Security Number (xxxx) and Date of Birth (mm/dd/yyyy) as indicated and click Submit. You will be taken to the next sign-up page. 5. Create a [facility] ID. This will be your [facility] login ID and cannot be changed, so think of one that is secure and easy to remember. 6. Create a [facility] password. You can change your password at any time. 7. Enter your Password Reset Question and Answer. This can be used at a later time if you forget your password. 8. Enter your e-mail address. You will receive e-mail notification when new information is available in [address]   9. Click Sign Up. You can now view and download portions of your medical record. 10. Click the Download Summary menu link to download a portable copy of your medical information. Additional Information    If you have questions, please call [phone]. Remember, [employer] is NOT to be used for urgent needs. For medical emergencies, dial 911.

## 2019-10-03 NOTE — PROGRESS NOTES
[address] [facility] [place]. [place], [address]  Tel.  [phone]  Fax. 100 [facility] 60  [place], [address]  Tel.  [phone]  Fax. [phone] [address] [facility] [address]  Tel.  [phone]  Fax. [phone]     S>Bulmaro Hines is a 66 y.o. male seen for a positive airway pressure follow-up. He reports no problems using the device. The following problems are identified:    Drowsiness no Problems exhaling no   Snoring no Forget to put on no   Mask Comfortable yes Can't fall asleep no   Dry Mouth no Mask falls off no   Air Leaking no Frequent awakenings Yes to help his wife who is chronically ill needing assistance     Feels mask is noisy on exhale    He admits that his sleep has improved. Therapy Apnea Index averaged over PAP use: 4 /hr which reflects improved  sleep breathing condition. Allergies   Allergen Reactions    Penicillins Hives    Bactrim [Sulfamethoxazole-Trimethoprim] Hives    Codeine Itching    Lisinopril (Bulk) Cough    Neurontin [Gabapentin] Other (comments)     drowsy    Zostavax [Zoster Vaccine Live (Pf)] Rash     See 9/10/13 visit       He has a current medication list which includes the following prescription(s): citalopram, atorvastatin, methylprednisolone, chlorthalidone, telmisartan, ketoconazole, fluocinonide, baclofen, metoprolol succinate, clopidogrel, isosorbide mononitrate er, ropinirole, nitroglycerin, albuterol, vitamin d3, oxygen-air delivery systems, amlodipine, glucosamine-chondroitin, aspirin delayed-release, allopurinol, and multivitamin/iron/folic acid. Sammy Licona       He  has a past medical history of Abnormal stress echo (5/12/2015), Anemia NEC (03/2013), Anxiety, CAD (coronary artery disease) (2009), Calculus of kidney, Chronic kidney disease, Chronic kidney disease, stage III (moderate) ([address] (10/11/2009), Diabetes ([facility] DJD (degenerative joint disease), GERD (gastroesophageal reflux disease) (10/11/2009), Gout, Hypertension, Liver disease, Obesity, Obstructive sleep apnea (adult) (pediatric) (10/11/2009), Peripheral neuropathy (10/11/2009), Prediabetes, RLS (restless legs syndrome) (10/11/2009), and S/P coronary artery stent placement (5/12/2015). [place] Sleepiness Score: 5   and Modified F.O.S.Q. Score Total / 2: 20   which reflect improved sleep quality over therapy time. O>    Visit Vitals  /54 (BP 1 Location: Left arm, BP Patient Position: Sitting)   Pulse 70   Resp 18   Ht 6' (1.829 m)   Wt 246 lb (111.6 kg)   SpO2 95%   BMI 33.36 kg/m²           General:   Alert, oriented, not in distress   Neck:   No JVD    Chest/Lungs:  symetrical lung expansion , no accessory muscle use    Extremities:  no obvious rashes , negative edema    Neuro:  No focal deficits ; No obvious tremor    Psych:  Normal affect ,  Normal countenance ;           A>    ICD-10-CM ICD-9-CM    1. Obstructive sleep apnea G47.33 327.23    2. Essential hypertension I10 401.9      AHI = 50(2014). On CPAP :  12-14 cmH2O. Compliant:      yes    Therapeutic Response:  Positive    P>      *   Follow-up and Dispositions    · Return in about 1 year (around 10/3/2020). * Mask/humidification education done in the office - see tech notes. he is compliant with PAP therapy and PAP continues to benefit patient and remains necessary for control of his sleep apnea. he will continue on his current pressure settings. * He was asked to contact our office for any problems regarding PAP therapy. * Counseling was provided regarding the importance of regular PAP use and on proper sleep hygiene and safe driving. * Re-enforced proper and regular cleaning for the device. 2. Hypertension - he continues on his current regimen. I have reviewed the relationship between hypertension as it relates to sleep-disordered breathing.      Electronically signed by    Michael Bolden MD  Diplomate in Sleep Medicine  [facility]

## 2019-10-04 ENCOUNTER — HOSPITAL ENCOUNTER (OUTPATIENT)
Dept: CARDIAC REHAB | Age: 74
Discharge: HOME | End: 2019-10-04
Payer: MEDICARE

## 2019-10-04 VITALS — WEIGHT: 247 LBS | BODY MASS INDEX: 33.5 KG/M2

## 2019-10-04 PROCEDURE — 93798 PHYS/QHP OP CAR RHAB W/ECG: CPT

## 2019-10-07 ENCOUNTER — APPOINTMENT (OUTPATIENT)
Dept: CARDIAC REHAB | Age: 74
End: 2019-10-07
Payer: MEDICARE

## 2019-10-09 RX ORDER — METOPROLOL SUCCINATE 25 MG/1
TABLET, EXTENDED RELEASE ORAL
Qty: 90 TAB | Refills: 1 | Status: SHIPPED | OUTPATIENT
Start: 2019-10-09 | End: 2020-04-02

## 2019-10-09 RX ORDER — CLOPIDOGREL BISULFATE 75 MG/1
TABLET ORAL
Qty: 90 TAB | Refills: 1 | Status: SHIPPED | OUTPATIENT
Start: 2019-10-09 | End: 2020-04-02

## 2019-10-11 ENCOUNTER — HOSPITAL ENCOUNTER (OUTPATIENT)
Dept: CARDIAC REHAB | Age: 74
Discharge: HOME | End: 2019-10-11
Payer: MEDICARE

## 2019-10-11 VITALS — WEIGHT: 247 LBS | BODY MASS INDEX: 33.5 KG/M2

## 2019-10-11 PROCEDURE — 93798 PHYS/QHP OP CAR RHAB W/ECG: CPT

## 2019-10-14 ENCOUNTER — HOSPITAL ENCOUNTER (OUTPATIENT)
Dept: CARDIAC REHAB | Age: 74
Discharge: HOME | End: 2019-10-14
Payer: MEDICARE

## 2019-10-14 VITALS — BODY MASS INDEX: 33.5 KG/M2 | WEIGHT: 247 LBS

## 2019-10-14 PROCEDURE — 93798 PHYS/QHP OP CAR RHAB W/ECG: CPT

## 2019-10-14 RX ORDER — CHLORTHALIDONE 25 MG/1
TABLET ORAL
Qty: 15 TAB | Refills: 1 | Status: SHIPPED | OUTPATIENT
Start: 2019-10-14 | End: 2019-12-12 | Stop reason: SDUPTHER

## 2019-10-15 RX ORDER — PANTOPRAZOLE SODIUM 40 MG/1
TABLET, DELAYED RELEASE ORAL
Qty: 30 TAB | Refills: 5 | Status: SHIPPED | OUTPATIENT
Start: 2019-10-15 | End: 2020-02-11

## 2019-10-16 ENCOUNTER — HOSPITAL ENCOUNTER (OUTPATIENT)
Dept: CARDIAC REHAB | Age: 74
Discharge: HOME | End: 2019-10-16
Payer: MEDICARE

## 2019-10-16 VITALS — BODY MASS INDEX: 33.36 KG/M2 | WEIGHT: 246 LBS

## 2019-10-16 PROCEDURE — 93798 PHYS/QHP OP CAR RHAB W/ECG: CPT

## 2019-10-18 ENCOUNTER — HOSPITAL ENCOUNTER (OUTPATIENT)
Dept: CARDIAC REHAB | Age: 74
Discharge: HOME | End: 2019-10-18
Payer: MEDICARE

## 2019-10-18 VITALS — WEIGHT: 246 LBS | BODY MASS INDEX: 33.36 KG/M2

## 2019-10-18 PROCEDURE — 93798 PHYS/QHP OP CAR RHAB W/ECG: CPT

## 2019-10-21 ENCOUNTER — HOSPITAL ENCOUNTER (OUTPATIENT)
Dept: CARDIAC REHAB | Age: 74
Discharge: HOME | End: 2019-10-21
Payer: MEDICARE

## 2019-10-21 VITALS — WEIGHT: 245 LBS | BODY MASS INDEX: 33.23 KG/M2

## 2019-10-21 PROCEDURE — 93798 PHYS/QHP OP CAR RHAB W/ECG: CPT

## 2019-10-23 ENCOUNTER — HOSPITAL ENCOUNTER (OUTPATIENT)
Dept: CARDIAC REHAB | Age: 74
Discharge: HOME | End: 2019-10-23
Payer: MEDICARE

## 2019-10-23 VITALS — BODY MASS INDEX: 33.36 KG/M2 | WEIGHT: 246 LBS

## 2019-10-23 PROCEDURE — 93798 PHYS/QHP OP CAR RHAB W/ECG: CPT

## 2019-10-25 ENCOUNTER — HOSPITAL ENCOUNTER (OUTPATIENT)
Dept: CARDIAC REHAB | Age: 74
Discharge: HOME | End: 2019-10-25
Payer: MEDICARE

## 2019-10-25 VITALS — WEIGHT: 247 LBS | BODY MASS INDEX: 33.5 KG/M2

## 2019-10-25 PROCEDURE — 93798 PHYS/QHP OP CAR RHAB W/ECG: CPT

## 2019-10-28 ENCOUNTER — HOSPITAL ENCOUNTER (OUTPATIENT)
Dept: CARDIAC REHAB | Age: 74
Discharge: HOME | End: 2019-10-28
Payer: MEDICARE

## 2019-10-28 VITALS — BODY MASS INDEX: 33.63 KG/M2 | WEIGHT: 248 LBS

## 2019-10-28 PROCEDURE — 93798 PHYS/QHP OP CAR RHAB W/ECG: CPT

## 2019-10-30 ENCOUNTER — HOSPITAL ENCOUNTER (OUTPATIENT)
Dept: CARDIAC REHAB | Age: 74
Discharge: HOME | End: 2019-10-30
Payer: MEDICARE

## 2019-10-30 VITALS — BODY MASS INDEX: 33.5 KG/M2 | WEIGHT: 247 LBS

## 2019-10-30 PROCEDURE — 93798 PHYS/QHP OP CAR RHAB W/ECG: CPT

## 2019-11-01 ENCOUNTER — HOSPITAL ENCOUNTER (OUTPATIENT)
Dept: CARDIAC REHAB | Age: 74
Discharge: HOME | End: 2019-11-01
Payer: MEDICARE

## 2019-11-01 VITALS — BODY MASS INDEX: 33.36 KG/M2 | WEIGHT: 246 LBS

## 2019-11-01 PROCEDURE — 93798 PHYS/QHP OP CAR RHAB W/ECG: CPT

## 2019-11-06 ENCOUNTER — HOSPITAL ENCOUNTER (OUTPATIENT)
Dept: CARDIAC REHAB | Age: 74
Discharge: HOME | End: 2019-11-06
Payer: MEDICARE

## 2019-11-06 VITALS — WEIGHT: 248 LBS | BODY MASS INDEX: 33.63 KG/M2

## 2019-11-06 PROCEDURE — 93798 PHYS/QHP OP CAR RHAB W/ECG: CPT

## 2019-11-08 ENCOUNTER — HOSPITAL ENCOUNTER (OUTPATIENT)
Dept: CARDIAC REHAB | Age: 74
Discharge: HOME | End: 2019-11-08
Payer: MEDICARE

## 2019-11-08 VITALS — WEIGHT: 248 LBS | BODY MASS INDEX: 33.63 KG/M2

## 2019-11-08 PROCEDURE — 93798 PHYS/QHP OP CAR RHAB W/ECG: CPT

## 2019-11-11 ENCOUNTER — HOSPITAL ENCOUNTER (OUTPATIENT)
Dept: CARDIAC REHAB | Age: 74
Discharge: HOME | End: 2019-11-11
Payer: MEDICARE

## 2019-11-11 VITALS — WEIGHT: 246 LBS | BODY MASS INDEX: 33.36 KG/M2

## 2019-11-11 PROCEDURE — 93798 PHYS/QHP OP CAR RHAB W/ECG: CPT

## 2019-11-13 ENCOUNTER — HOSPITAL ENCOUNTER (OUTPATIENT)
Dept: CARDIAC REHAB | Age: 74
Discharge: HOME | End: 2019-11-13
Payer: MEDICARE

## 2019-11-13 VITALS — WEIGHT: 246 LBS | BODY MASS INDEX: 33.36 KG/M2

## 2019-11-13 PROCEDURE — 93798 PHYS/QHP OP CAR RHAB W/ECG: CPT

## 2019-11-15 ENCOUNTER — APPOINTMENT (OUTPATIENT)
Dept: CARDIAC REHAB | Age: 74
End: 2019-11-15
Payer: MEDICARE

## 2019-11-18 ENCOUNTER — HOSPITAL ENCOUNTER (OUTPATIENT)
Dept: CARDIAC REHAB | Age: 74
Discharge: HOME | End: 2019-11-18
Payer: MEDICARE

## 2019-11-18 PROCEDURE — 93798 PHYS/QHP OP CAR RHAB W/ECG: CPT

## 2019-11-20 ENCOUNTER — HOSPITAL ENCOUNTER (OUTPATIENT)
Dept: CARDIAC REHAB | Age: 74
Discharge: HOME | End: 2019-11-20
Payer: MEDICARE

## 2019-11-20 VITALS — WEIGHT: 245 LBS | BODY MASS INDEX: 33.23 KG/M2

## 2019-11-20 PROCEDURE — 93798 PHYS/QHP OP CAR RHAB W/ECG: CPT

## 2019-11-21 RX ORDER — ATORVASTATIN CALCIUM 40 MG/1
TABLET, FILM COATED ORAL
Qty: 90 TAB | Refills: 0 | Status: SHIPPED | OUTPATIENT
Start: 2019-11-21 | End: 2020-01-13 | Stop reason: DRUGHIGH

## 2019-11-21 NOTE — TELEPHONE ENCOUNTER
#[phone] pt states he just had a stent put in and needs to be seen. Pt would like to be seen with his wife, Izzy Scales. Please see her note. 06-Sep-2018

## 2019-11-22 ENCOUNTER — HOSPITAL ENCOUNTER (OUTPATIENT)
Dept: CARDIAC REHAB | Age: 74
Discharge: HOME | End: 2019-11-22
Payer: MEDICARE

## 2019-11-22 VITALS — WEIGHT: 246 LBS | BODY MASS INDEX: 33.36 KG/M2

## 2019-11-22 PROCEDURE — 93798 PHYS/QHP OP CAR RHAB W/ECG: CPT

## 2019-11-25 ENCOUNTER — HOSPITAL ENCOUNTER (OUTPATIENT)
Dept: CARDIAC REHAB | Age: 74
Discharge: HOME | End: 2019-11-25
Payer: MEDICARE

## 2019-11-25 VITALS — WEIGHT: 246 LBS | BODY MASS INDEX: 33.36 KG/M2

## 2019-11-25 PROCEDURE — 93798 PHYS/QHP OP CAR RHAB W/ECG: CPT

## 2019-11-25 RX ORDER — ISOSORBIDE MONONITRATE 30 MG/1
TABLET, EXTENDED RELEASE ORAL
Qty: 45 TAB | Refills: 1 | Status: SHIPPED | OUTPATIENT
Start: 2019-11-25 | End: 2020-05-13

## 2019-12-02 ENCOUNTER — HOSPITAL ENCOUNTER (OUTPATIENT)
Dept: CARDIAC REHAB | Age: 74
Discharge: HOME | End: 2019-12-02
Payer: MEDICARE

## 2019-12-02 VITALS — BODY MASS INDEX: 33.23 KG/M2 | WEIGHT: 245 LBS

## 2019-12-02 PROCEDURE — 93798 PHYS/QHP OP CAR RHAB W/ECG: CPT

## 2019-12-06 ENCOUNTER — HOSPITAL ENCOUNTER (OUTPATIENT)
Dept: CARDIAC REHAB | Age: 74
Discharge: HOME | End: 2019-12-06
Payer: MEDICARE

## 2019-12-06 VITALS — WEIGHT: 246 LBS | BODY MASS INDEX: 33.36 KG/M2

## 2019-12-06 PROCEDURE — 93798 PHYS/QHP OP CAR RHAB W/ECG: CPT

## 2019-12-09 ENCOUNTER — HOSPITAL ENCOUNTER (OUTPATIENT)
Dept: CARDIAC REHAB | Age: 74
Discharge: HOME | End: 2019-12-09
Payer: MEDICARE

## 2019-12-09 VITALS — WEIGHT: 246 LBS | BODY MASS INDEX: 33.36 KG/M2

## 2019-12-09 PROCEDURE — 93798 PHYS/QHP OP CAR RHAB W/ECG: CPT

## 2019-12-11 ENCOUNTER — HOSPITAL ENCOUNTER (OUTPATIENT)
Dept: CARDIAC REHAB | Age: 74
Discharge: HOME | End: 2019-12-11
Payer: MEDICARE

## 2019-12-11 VITALS — BODY MASS INDEX: 33.36 KG/M2 | WEIGHT: 246 LBS

## 2019-12-11 PROCEDURE — 93798 PHYS/QHP OP CAR RHAB W/ECG: CPT

## 2019-12-12 RX ORDER — CHLORTHALIDONE 25 MG/1
TABLET ORAL
Qty: 15 TAB | Refills: 1 | Status: SHIPPED | OUTPATIENT
Start: 2019-12-12 | End: 2020-02-10

## 2020-01-08 ENCOUNTER — HOSPITAL ENCOUNTER (INPATIENT)
Age: 75
LOS: 2 days | Discharge: HOME | DRG: 068 | End: 2020-01-10
Attending: EMERGENCY MEDICINE | Admitting: INTERNAL MEDICINE
Payer: MEDICARE

## 2020-01-08 ENCOUNTER — APPOINTMENT (OUTPATIENT)
Dept: CT IMAGING | Age: 75
DRG: 068 | End: 2020-01-08
Attending: EMERGENCY MEDICINE
Payer: MEDICARE

## 2020-01-08 DIAGNOSIS — I63.9 ACUTE CVA (CEREBROVASCULAR ACCIDENT) (HCC): ICD-10-CM

## 2020-01-08 DIAGNOSIS — I65.23 SYMPTOMATIC CAROTID ARTERY STENOSIS, BILATERAL: ICD-10-CM

## 2020-01-08 DIAGNOSIS — I65.23 BILATERAL CAROTID ARTERY STENOSIS: ICD-10-CM

## 2020-01-08 DIAGNOSIS — H53.9 VISUAL DISTURBANCE OF ONE EYE: Primary | ICD-10-CM

## 2020-01-08 DIAGNOSIS — E78.5 DYSLIPIDEMIA, GOAL LDL BELOW 70: ICD-10-CM

## 2020-01-08 LAB
ALBUMIN SERPL-MCNC: 3.6 G/DL (ref 3.5–5)
ALBUMIN/GLOB SERPL: 1 {RATIO} (ref 1.1–2.2)
ALP SERPL-CCNC: 112 U/L (ref 45–117)
ALT SERPL-CCNC: 26 U/L (ref 12–78)
ANION GAP SERPL CALC-SCNC: 7 MMOL/L (ref 5–15)
APPEARANCE UR: CLEAR
AST SERPL-CCNC: 18 U/L (ref 15–37)
BACTERIA URNS QL MICRO: NEGATIVE /HPF
BASOPHILS # BLD: 0 K/UL (ref 0–0.1)
BASOPHILS NFR BLD: 0 % (ref 0–1)
BILIRUB SERPL-MCNC: 0.6 MG/DL (ref 0.2–1)
BILIRUB UR QL: NEGATIVE
BUN SERPL-MCNC: 28 MG/DL (ref 6–20)
BUN/CREAT SERPL: 13 (ref 12–20)
CALCIUM SERPL-MCNC: 9.1 MG/DL (ref 8.5–10.1)
CHLORIDE SERPL-SCNC: 109 MMOL/L (ref 97–108)
CO2 SERPL-SCNC: 28 MMOL/L (ref 21–32)
COLOR UR: NORMAL
CREAT BLD-MCNC: 2.4 MG/DL (ref 0.6–1.3)
CREAT SERPL-MCNC: 2.14 MG/DL (ref 0.7–1.3)
DIFFERENTIAL METHOD BLD: ABNORMAL
EOSINOPHIL # BLD: 0.4 K/UL (ref 0–0.4)
EOSINOPHIL NFR BLD: 3 % (ref 0–7)
EPITH CASTS URNS QL MICRO: NORMAL /LPF
ERYTHROCYTE [DISTWIDTH] IN BLOOD BY AUTOMATED COUNT: 14.6 % (ref 11.5–14.5)
GLOBULIN SER CALC-MCNC: 3.6 G/DL (ref 2–4)
GLUCOSE SERPL-MCNC: 86 MG/DL (ref 65–100)
GLUCOSE UR STRIP.AUTO-MCNC: NEGATIVE MG/DL
HCT VFR BLD AUTO: 33.7 % (ref 36.6–50.3)
HGB BLD-MCNC: 10.8 G/DL (ref 12.1–17)
HGB UR QL STRIP: NEGATIVE
HYALINE CASTS URNS QL MICRO: NORMAL /LPF (ref 0–5)
IMM GRANULOCYTES # BLD AUTO: 0.1 K/UL (ref 0–0.04)
IMM GRANULOCYTES NFR BLD AUTO: 1 % (ref 0–0.5)
INR PPP: 1.1 (ref 0.9–1.1)
KETONES UR QL STRIP.AUTO: NEGATIVE MG/DL
LEUKOCYTE ESTERASE UR QL STRIP.AUTO: NEGATIVE
LYMPHOCYTES # BLD: 3 K/UL (ref 0.8–3.5)
LYMPHOCYTES NFR BLD: 27 % (ref 12–49)
MCH RBC QN AUTO: 31.5 PG (ref 26–34)
MCHC RBC AUTO-ENTMCNC: 32 G/DL (ref 30–36.5)
MCV RBC AUTO: 98.3 FL (ref 80–99)
MONOCYTES # BLD: 1.3 K/UL (ref 0–1)
MONOCYTES NFR BLD: 12 % (ref 5–13)
NEUTS SEG # BLD: 6.3 K/UL (ref 1.8–8)
NEUTS SEG NFR BLD: 57 % (ref 32–75)
NITRITE UR QL STRIP.AUTO: NEGATIVE
NRBC # BLD: 0 K/UL (ref 0–0.01)
NRBC BLD-RTO: 0 PER 100 WBC
PH UR STRIP: 6 [PH] (ref 5–8)
PLATELET # BLD AUTO: 200 K/UL (ref 150–400)
PMV BLD AUTO: 9.7 FL (ref 8.9–12.9)
POTASSIUM SERPL-SCNC: 3.9 MMOL/L (ref 3.5–5.1)
PROT SERPL-MCNC: 7.2 G/DL (ref 6.4–8.2)
PROT UR STRIP-MCNC: NEGATIVE MG/DL
PROTHROMBIN TIME: 10.9 SEC (ref 9–11.1)
RBC # BLD AUTO: 3.43 M/UL (ref 4.1–5.7)
RBC #/AREA URNS HPF: NORMAL /HPF (ref 0–5)
SODIUM SERPL-SCNC: 144 MMOL/L (ref 136–145)
SP GR UR REFRACTOMETRY: 1.02 (ref 1–1.03)
TROPONIN I SERPL-MCNC: <0.05 NG/ML
UA: UC IF INDICATED,UAUC: NORMAL
UROBILINOGEN UR QL STRIP.AUTO: 0.2 EU/DL (ref 0.2–1)
WBC # BLD AUTO: 11 K/UL (ref 4.1–11.1)
WBC URNS QL MICRO: NORMAL /HPF (ref 0–4)

## 2020-01-08 PROCEDURE — 85610 PROTHROMBIN TIME: CPT

## 2020-01-08 PROCEDURE — 82565 ASSAY OF CREATININE: CPT

## 2020-01-08 PROCEDURE — 85025 COMPLETE CBC W/AUTO DIFF WBC: CPT

## 2020-01-08 PROCEDURE — 80053 COMPREHEN METABOLIC PANEL: CPT

## 2020-01-08 PROCEDURE — 70496 CT ANGIOGRAPHY HEAD: CPT

## 2020-01-08 PROCEDURE — 96374 THER/PROPH/DIAG INJ IV PUSH: CPT

## 2020-01-08 PROCEDURE — 84484 ASSAY OF TROPONIN QUANT: CPT

## 2020-01-08 PROCEDURE — 36415 COLL VENOUS BLD VENIPUNCTURE: CPT

## 2020-01-08 PROCEDURE — 81001 URINALYSIS AUTO W/SCOPE: CPT

## 2020-01-08 PROCEDURE — 74011250636 HC RX REV CODE- 250/636: Performed by: EMERGENCY MEDICINE

## 2020-01-08 PROCEDURE — 0042T CT PERF W CBF: CPT

## 2020-01-08 PROCEDURE — 65660000000 HC RM CCU STEPDOWN

## 2020-01-08 PROCEDURE — 70450 CT HEAD/BRAIN W/O DYE: CPT

## 2020-01-08 PROCEDURE — 93005 ELECTROCARDIOGRAM TRACING: CPT

## 2020-01-08 PROCEDURE — 99285 EMERGENCY DEPT VISIT HI MDM: CPT

## 2020-01-08 RX ORDER — SODIUM CHLORIDE 0.9 % (FLUSH) 0.9 %
10 SYRINGE (ML) INJECTION
Status: COMPLETED | OUTPATIENT
Start: 2020-01-08 | End: 2020-01-08

## 2020-01-08 RX ORDER — FENTANYL CITRATE 50 UG/ML
50 INJECTION, SOLUTION INTRAMUSCULAR; INTRAVENOUS ONCE
Status: COMPLETED | OUTPATIENT
Start: 2020-01-08 | End: 2020-01-08

## 2020-01-08 RX ADMIN — FENTANYL CITRATE 50 MCG: 50 INJECTION, SOLUTION INTRAMUSCULAR; INTRAVENOUS at 22:50

## 2020-01-08 RX ADMIN — IOPAMIDOL 110 ML: 755 INJECTION, SOLUTION INTRAVENOUS at 21:47

## 2020-01-08 RX ADMIN — SODIUM CHLORIDE 1000 ML: 900 INJECTION, SOLUTION INTRAVENOUS at 22:15

## 2020-01-08 RX ADMIN — Medication 10 ML: at 21:47

## 2020-01-09 ENCOUNTER — APPOINTMENT (OUTPATIENT)
Dept: VASCULAR SURGERY | Age: 75
DRG: 068 | End: 2020-01-09
Attending: INTERNAL MEDICINE
Payer: MEDICARE

## 2020-01-09 ENCOUNTER — APPOINTMENT (OUTPATIENT)
Dept: NON INVASIVE DIAGNOSTICS | Age: 75
DRG: 068 | End: 2020-01-09
Attending: INTERNAL MEDICINE
Payer: MEDICARE

## 2020-01-09 ENCOUNTER — APPOINTMENT (OUTPATIENT)
Dept: MRI IMAGING | Age: 75
DRG: 068 | End: 2020-01-09
Attending: INTERNAL MEDICINE
Payer: MEDICARE

## 2020-01-09 PROBLEM — I65.23 BILATERAL CAROTID ARTERY STENOSIS: Status: ACTIVE | Noted: 2020-01-09

## 2020-01-09 LAB
ATRIAL RATE: 75 BPM
AV VELOCITY RATIO: 0.74
CALCULATED R AXIS, ECG10: 6 DEGREES
CALCULATED T AXIS, ECG11: 23 DEGREES
CHOLEST SERPL-MCNC: 117 MG/DL
CRP SERPL HS-MCNC: 6.3 MG/L
DIAGNOSIS, 93000: NORMAL
ECHO AO ROOT DIAM: 3.31 CM
ECHO AV AREA PEAK VELOCITY: 2 CM2
ECHO AV AREA/BSA PEAK VELOCITY: 0.8 CM2/M2
ECHO AV PEAK GRADIENT: 12.5 MMHG
ECHO AV PEAK VELOCITY: 176.69 CM/S
ECHO EST RA PRESSURE: 10 MMHG
ECHO LA AREA 4C: 17.8 CM2
ECHO LA MAJOR AXIS: 4.09 CM
ECHO LA TO AORTIC ROOT RATIO: 1.24
ECHO LA VOL 4C: 42.07 ML (ref 18–58)
ECHO LA VOLUME INDEX A4C: 17.59 ML/M2 (ref 16–28)
ECHO LV E' LATERAL VELOCITY: 9.69 CM/S
ECHO LV E' SEPTAL VELOCITY: 8.98 CM/S
ECHO LV EDV TEICHHOLZ: 0.43 ML
ECHO LV ESV TEICHHOLZ: 0.22 ML
ECHO LV INTERNAL DIMENSION DIASTOLIC: 4.31 CM (ref 4.2–5.9)
ECHO LV INTERNAL DIMENSION SYSTOLIC: 3.24 CM
ECHO LV IVSD: 1.9 CM (ref 0.6–1)
ECHO LV MASS 2D: 340.5 G (ref 88–224)
ECHO LV MASS INDEX 2D: 142.3 G/M2 (ref 49–115)
ECHO LV POSTERIOR WALL DIASTOLIC: 1.28 CM (ref 0.6–1)
ECHO LV POSTERIOR WALL SYSTOLIC: 1.81 CM
ECHO LVOT DIAM: 1.83 CM
ECHO LVOT PEAK GRADIENT: 6.9 MMHG
ECHO LVOT PEAK VELOCITY: 131.46 CM/S
ECHO LVOT SV: 94 ML
ECHO LVOT VTI: 35.66 CM
ECHO MV A VELOCITY: 65.81 CM/S
ECHO MV E DECELERATION TIME (DT): 248.2 MS
ECHO MV E VELOCITY: 82.05 CM/S
ECHO MV E/A RATIO: 1.25
ECHO MV E/E' LATERAL: 8.47
ECHO MV E/E' RATIO (AVERAGED): 8.8
ECHO MV E/E' SEPTAL: 9.14
ECHO PULMONARY ARTERY SYSTOLIC PRESSURE (PASP): 37.1 MMHG
ECHO RA AREA 4C: 15.29 CM2
ECHO RIGHT VENTRICULAR SYSTOLIC PRESSURE (RVSP): 37.1 MMHG
ECHO TV REGURGITANT MAX VELOCITY: 260.27 CM/S
ECHO TV REGURGITANT PEAK GRADIENT: 27.1 MMHG
ERYTHROCYTE [SEDIMENTATION RATE] IN BLOOD: 54 MM/HR (ref 0–20)
EST. AVERAGE GLUCOSE BLD GHB EST-MCNC: 126 MG/DL
HBA1C MFR BLD: 6 % (ref 4–5.6)
HDLC SERPL-MCNC: 41 MG/DL
HDLC SERPL: 2.9 {RATIO} (ref 0–5)
LDLC SERPL CALC-MCNC: 56.8 MG/DL (ref 0–100)
LEFT CCA DIST DIAS: 18.7 CM/S
LEFT CCA DIST SYS: 107.9 CM/S
LEFT CCA PROX DIAS: 24.5 CM/S
LEFT CCA PROX SYS: 151 CM/S
LEFT ECA DIAS: 0 CM/S
LEFT ECA SYS: 150.7 CM/S
LEFT ICA DIST DIAS: 16.8 CM/S
LEFT ICA DIST SYS: 66.1 CM/S
LEFT ICA MID DIAS: 28.7 CM/S
LEFT ICA MID SYS: 151.4 CM/S
LEFT ICA PROX DIAS: 38.5 CM/S
LEFT ICA PROX SYS: 213.3 CM/S
LEFT ICA/CCA SYS: 1.98
LEFT SUBCLAVIAN DIAS: 0 CM/S
LEFT SUBCLAVIAN SYS: 135.2 CM/S
LEFT VERTEBRAL DIAS: 13.99 CM/S
LEFT VERTEBRAL SYS: 72.4 CM/S
LIPID PROFILE,FLP: NORMAL
LVFS 2D: 24.92 %
LVOT MG: 4.39 MMHG
LVOT MV: 1 CM/S
LVSV (TEICH): 17.19 ML
MV DEC SLOPE: 3.31
P-R INTERVAL, ECG05: 220 MS
Q-T INTERVAL, ECG07: 480 MS
QRS DURATION, ECG06: 96 MS
QTC CALCULATION (BEZET), ECG08: 536 MS
RIGHT CCA DIST DIAS: 18.4 CM/S
RIGHT CCA DIST SYS: 134.5 CM/S
RIGHT CCA PROX DIAS: 14.9 CM/S
RIGHT CCA PROX SYS: 143 CM/S
RIGHT ECA DIAS: 0 CM/S
RIGHT ECA SYS: 151.3 CM/S
RIGHT ICA DIST DIAS: 21.4 CM/S
RIGHT ICA DIST SYS: 98.3 CM/S
RIGHT ICA MID DIAS: 19.2 CM/S
RIGHT ICA MID SYS: 91.9 CM/S
RIGHT ICA PROX DIAS: 21.5 CM/S
RIGHT ICA PROX SYS: 104.5 CM/S
RIGHT ICA/CCA SYS: 0.8
RIGHT SUBCLAVIAN DIAS: 0 CM/S
RIGHT SUBCLAVIAN SYS: 107 CM/S
RIGHT VERTEBRAL DIAS: 12.82 CM/S
RIGHT VERTEBRAL SYS: 50.4 CM/S
TRIGL SERPL-MCNC: 96 MG/DL (ref ?–150)
VENTRICULAR RATE, ECG03: 75 BPM
VLDLC SERPL CALC-MCNC: 19.2 MG/DL

## 2020-01-09 PROCEDURE — 36415 COLL VENOUS BLD VENIPUNCTURE: CPT

## 2020-01-09 PROCEDURE — 74011636320 HC RX REV CODE- 636/320: Performed by: EMERGENCY MEDICINE

## 2020-01-09 PROCEDURE — 74011250636 HC RX REV CODE- 250/636: Performed by: INTERNAL MEDICINE

## 2020-01-09 PROCEDURE — 86141 C-REACTIVE PROTEIN HS: CPT

## 2020-01-09 PROCEDURE — 65660000000 HC RM CCU STEPDOWN

## 2020-01-09 PROCEDURE — 80061 LIPID PANEL: CPT

## 2020-01-09 PROCEDURE — 93306 TTE W/DOPPLER COMPLETE: CPT

## 2020-01-09 PROCEDURE — 70551 MRI BRAIN STEM W/O DYE: CPT

## 2020-01-09 PROCEDURE — 97161 PT EVAL LOW COMPLEX 20 MIN: CPT

## 2020-01-09 PROCEDURE — 93880 EXTRACRANIAL BILAT STUDY: CPT

## 2020-01-09 PROCEDURE — 85652 RBC SED RATE AUTOMATED: CPT

## 2020-01-09 PROCEDURE — 97116 GAIT TRAINING THERAPY: CPT

## 2020-01-09 PROCEDURE — 83036 HEMOGLOBIN GLYCOSYLATED A1C: CPT

## 2020-01-09 PROCEDURE — 97165 OT EVAL LOW COMPLEX 30 MIN: CPT

## 2020-01-09 PROCEDURE — 74011250637 HC RX REV CODE- 250/637: Performed by: INTERNAL MEDICINE

## 2020-01-09 RX ORDER — BACLOFEN 10 MG/1
10 TABLET ORAL 3 TIMES DAILY
Status: DISCONTINUED | OUTPATIENT
Start: 2020-01-09 | End: 2020-01-10 | Stop reason: HOSPADM

## 2020-01-09 RX ORDER — HEPARIN SODIUM 5000 [USP'U]/ML
5000 INJECTION, SOLUTION INTRAVENOUS; SUBCUTANEOUS EVERY 8 HOURS
Status: DISCONTINUED | OUTPATIENT
Start: 2020-01-09 | End: 2020-01-10 | Stop reason: HOSPADM

## 2020-01-09 RX ORDER — LABETALOL HYDROCHLORIDE 5 MG/ML
5 INJECTION, SOLUTION INTRAVENOUS
Status: DISCONTINUED | OUTPATIENT
Start: 2020-01-09 | End: 2020-01-10 | Stop reason: HOSPADM

## 2020-01-09 RX ORDER — AMLODIPINE BESYLATE 5 MG/1
5 TABLET ORAL DAILY
Status: DISCONTINUED | OUTPATIENT
Start: 2020-01-09 | End: 2020-01-10 | Stop reason: HOSPADM

## 2020-01-09 RX ORDER — CLOPIDOGREL BISULFATE 75 MG/1
75 TABLET ORAL DAILY
Status: DISCONTINUED | OUTPATIENT
Start: 2020-01-09 | End: 2020-01-10 | Stop reason: HOSPADM

## 2020-01-09 RX ORDER — PANTOPRAZOLE SODIUM 40 MG/1
40 TABLET, DELAYED RELEASE ORAL
Status: DISCONTINUED | OUTPATIENT
Start: 2020-01-09 | End: 2020-01-10 | Stop reason: HOSPADM

## 2020-01-09 RX ORDER — ACETAMINOPHEN 325 MG/1
650 TABLET ORAL
Status: DISCONTINUED | OUTPATIENT
Start: 2020-01-09 | End: 2020-01-10 | Stop reason: HOSPADM

## 2020-01-09 RX ORDER — ALLOPURINOL 100 MG/1
100 TABLET ORAL
Status: DISCONTINUED | OUTPATIENT
Start: 2020-01-09 | End: 2020-01-10 | Stop reason: HOSPADM

## 2020-01-09 RX ORDER — ALBUTEROL SULFATE 90 UG/1
1 AEROSOL, METERED RESPIRATORY (INHALATION)
Status: DISCONTINUED | OUTPATIENT
Start: 2020-01-09 | End: 2020-01-09

## 2020-01-09 RX ORDER — CITALOPRAM 20 MG/1
40 TABLET, FILM COATED ORAL DAILY
Status: DISCONTINUED | OUTPATIENT
Start: 2020-01-09 | End: 2020-01-10 | Stop reason: HOSPADM

## 2020-01-09 RX ORDER — ISOSORBIDE MONONITRATE 30 MG/1
15 TABLET, EXTENDED RELEASE ORAL DAILY
Status: DISCONTINUED | OUTPATIENT
Start: 2020-01-09 | End: 2020-01-10 | Stop reason: HOSPADM

## 2020-01-09 RX ORDER — ATORVASTATIN CALCIUM 40 MG/1
80 TABLET, FILM COATED ORAL
Status: DISCONTINUED | OUTPATIENT
Start: 2020-01-09 | End: 2020-01-10 | Stop reason: HOSPADM

## 2020-01-09 RX ORDER — NITROGLYCERIN 0.4 MG/1
0.4 TABLET SUBLINGUAL AS NEEDED
Status: DISCONTINUED | OUTPATIENT
Start: 2020-01-09 | End: 2020-01-10 | Stop reason: HOSPADM

## 2020-01-09 RX ORDER — METOPROLOL SUCCINATE 25 MG/1
25 TABLET, EXTENDED RELEASE ORAL DAILY
Status: DISCONTINUED | OUTPATIENT
Start: 2020-01-09 | End: 2020-01-10 | Stop reason: HOSPADM

## 2020-01-09 RX ORDER — CHLORTHALIDONE 25 MG/1
12.5 TABLET ORAL DAILY
Status: DISCONTINUED | OUTPATIENT
Start: 2020-01-09 | End: 2020-01-10 | Stop reason: HOSPADM

## 2020-01-09 RX ORDER — ROPINIROLE 0.25 MG/1
0.5 TABLET, FILM COATED ORAL
Status: DISCONTINUED | OUTPATIENT
Start: 2020-01-09 | End: 2020-01-10 | Stop reason: HOSPADM

## 2020-01-09 RX ORDER — ACETAMINOPHEN 650 MG/1
650 SUPPOSITORY RECTAL
Status: DISCONTINUED | OUTPATIENT
Start: 2020-01-09 | End: 2020-01-10 | Stop reason: HOSPADM

## 2020-01-09 RX ORDER — ALBUTEROL SULFATE 0.83 MG/ML
2.5 SOLUTION RESPIRATORY (INHALATION)
Status: DISCONTINUED | OUTPATIENT
Start: 2020-01-09 | End: 2020-01-10 | Stop reason: HOSPADM

## 2020-01-09 RX ORDER — ASPIRIN 81 MG/1
81 TABLET ORAL DAILY
Status: DISCONTINUED | OUTPATIENT
Start: 2020-01-09 | End: 2020-01-10 | Stop reason: HOSPADM

## 2020-01-09 RX ADMIN — HEPARIN SODIUM 5000 UNITS: 5000 INJECTION INTRAVENOUS; SUBCUTANEOUS at 10:34

## 2020-01-09 RX ADMIN — BACLOFEN 10 MG: 10 TABLET ORAL at 08:17

## 2020-01-09 RX ADMIN — CITALOPRAM HYDROBROMIDE 40 MG: 20 TABLET ORAL at 08:16

## 2020-01-09 RX ADMIN — METOPROLOL SUCCINATE 25 MG: 25 TABLET, EXTENDED RELEASE ORAL at 08:17

## 2020-01-09 RX ADMIN — HEPARIN SODIUM 5000 UNITS: 5000 INJECTION INTRAVENOUS; SUBCUTANEOUS at 02:41

## 2020-01-09 RX ADMIN — HEPARIN SODIUM 5000 UNITS: 5000 INJECTION INTRAVENOUS; SUBCUTANEOUS at 17:03

## 2020-01-09 RX ADMIN — ALLOPURINOL 100 MG: 100 TABLET ORAL at 08:17

## 2020-01-09 RX ADMIN — ACETAMINOPHEN 650 MG: 325 TABLET ORAL at 17:04

## 2020-01-09 RX ADMIN — BACLOFEN 10 MG: 10 TABLET ORAL at 17:03

## 2020-01-09 RX ADMIN — BACLOFEN 10 MG: 10 TABLET ORAL at 21:20

## 2020-01-09 RX ADMIN — ISOSORBIDE MONONITRATE 15 MG: 30 TABLET, EXTENDED RELEASE ORAL at 08:17

## 2020-01-09 RX ADMIN — ROPINIROLE HYDROCHLORIDE 0.5 MG: 0.25 TABLET, FILM COATED ORAL at 21:19

## 2020-01-09 RX ADMIN — ASPIRIN 81 MG: 81 TABLET ORAL at 08:17

## 2020-01-09 RX ADMIN — CHLORTHALIDONE 12.5 MG: 25 TABLET ORAL at 10:35

## 2020-01-09 RX ADMIN — ACETAMINOPHEN 650 MG: 325 TABLET ORAL at 08:16

## 2020-01-09 RX ADMIN — ACETAMINOPHEN 650 MG: 325 TABLET ORAL at 02:41

## 2020-01-09 RX ADMIN — PANTOPRAZOLE SODIUM 40 MG: 40 TABLET, DELAYED RELEASE ORAL at 08:17

## 2020-01-09 RX ADMIN — CLOPIDOGREL BISULFATE 75 MG: 75 TABLET ORAL at 08:17

## 2020-01-09 RX ADMIN — ACETAMINOPHEN 650 MG: 325 TABLET ORAL at 21:19

## 2020-01-09 RX ADMIN — AMLODIPINE BESYLATE 5 MG: 5 TABLET ORAL at 08:16

## 2020-01-09 RX ADMIN — ATORVASTATIN CALCIUM 80 MG: 40 TABLET, FILM COATED ORAL at 21:20

## 2020-01-09 NOTE — PROGRESS NOTES
Orders received, chart reviewed. Pt currently off the floor to vascular. Will defer PT evaluation however f/u once pt returns.  Thank you    Jeffrey Vargas, PT, DPT

## 2020-01-09 NOTE — PROGRESS NOTES
Bedside and Verbal shift change report given to [facility] [address] (oncoming nurse) by Donnie Odom RN (offgoing nurse). Report included the following information SBAR, ED Summary and Recent Results.     Zone Phone:   [phone]      Significant changes during shift:  New admit         Patient Information    Malena Velasco  66 y.o.  1/8/2020  9:19 PM by Krissy Mathis MD. Malena Velasco was admitted from Home    Problem List    Patient Active Problem List    Diagnosis Date Noted    Symptomatic carotid artery stenosis, bilateral 01/08/2020    Acute CVA (cerebrovascular accident) ([address] 01/08/2020    Pure hypercholesterolemia 08/30/2019    Right sided sciatica 08/30/2019    Stable angina ([address] 07/31/2019    History of kidney stones 07/12/2018    Chest pain 07/07/2017    Coronary artery disease due to lipid rich plaque 04/27/2016    Coronary artery disease involving native coronary artery of native heart without angina pectoris 03/16/2016    S/P coronary artery stent placement 05/12/2015    Benign essential tremor 01/22/2014    Hypertensive kidney disease with chronic kidney disease stage III ([address] 11/22/2013    Iron deficiency 05/23/2013    Obesity 01/30/2013    Gout 01/30/2013    Prediabetes 01/07/2012    Chronic kidney disease, stage III (moderate) ([address] 10/11/2009    Mixed hyperlipidemia 10/11/2009    Obstructive sleep apnea 10/11/2009    RLS (restless legs syndrome) 10/11/2009    Peripheral neuropathy 10/11/2009    DJD (degenerative joint disease), multiple sites 10/11/2009    Essential hypertension 10/11/2009    Allergic rhinitis 10/11/2009    GERD (gastroesophageal reflux disease) 10/11/2009    ED (erectile dysfunction) 10/11/2009    Anxiety associated with depression 10/11/2009     Past Medical History:   Diagnosis Date    Abnormal stress echo 5/12/2015    Anemia NEC 03/2013    Last 2-3 months; finished taking iron supplement    Anxiety     CAD (coronary artery disease) 2009    cath Kvng Renee) revealed 20%RCA and 50%2nd diagonal    Calculus of kidney     Chronic kidney disease     sees nephrologist every 6 months    Chronic kidney disease, stage III (moderate) ([facility] 10/11/2009    30% kidney function    Diabetes ([facility] [address]     Pre-diabetic    DJD (degenerative joint disease)     GERD (gastroesophageal reflux disease) 10/11/2009    controlled with medication    Gout     Hypertension     Liver disease     fatty liver    Obesity     Obstructive sleep apnea (adult) (pediatric) 10/11/2009    nasal pillows; pressure set at 10-11 - uses cpap    Peripheral neuropathy 10/11/2009    bilateral feet (numbness)    Prediabetes     RLS (restless legs syndrome) 10/11/2009    S/P coronary artery stent placement 5/12/2015    5/11/15 PCI./MALI to LCx         Core Measures:    CVA: Yes Yes  CHF:No No  PNA:No No        Activity Status:    OOB to Chair No  Ambulated this shift Yes   Bed Rest No    Supplemental O2: (If Applicable)    NC No  NRB No  Venti-mask No  On 0 Liters/min      LINES AND DRAINS:    PIV    DVT prophylaxis:    DVT prophylaxis Med- Yes  DVT prophylaxis SCD or PHONG- No     Wounds: (If Applicable)    Wounds- No    Location n/a    Patient Safety:    Falls Score Total Score: 2  Safety Level_______  Bed Alarm On? Yes  Sitter?  No    Plan for upcoming shift:  MRI screening, MRI, ECHO, Carotid , Vascular consult, neurology consult, PT/OT, SLP, CM        Discharge Plan: No TBD    Active Consults:  IP CONSULT TO NEUROLOGY  IP CONSULT TO VASCULAR SURGERY

## 2020-01-09 NOTE — PROGRESS NOTES
Received report on this patient from [address] [place] [address] [phone], awaiting patients arrival to unit.

## 2020-01-09 NOTE — ROUTINE PROCESS
MRI NOTE    MRI screening sheet needs to be completed and signed before the MRI can be performed.     Please call [phone]  When this is done

## 2020-01-09 NOTE — PROGRESS NOTES
Initial note: CM attempted to meet with pt at bedside; pt currently working with PT. CM will revisit pt's room once PT concludes session to make contact, introduce role, and complete initial assessment. 12:46 PM:  Initial assessment completed & detailed below:    Reason for Admission: symptomatic carotid artery stenosis, bilateral, acute CVA                  RRAT Score: 19 - moderate risk                Do you (patient/family) have any concerns for transition/discharge? Pt appears to have access to adequate familial support network between his wife (Lia Fishman: [phone]) and daughter. No barriers identified throughout initial assessment that present a concern for CM at this time. Plan for utilizing home health: PT/OT/SLP consulted; CM will continue to follow to determine if pt has any post-acute therapy needs. Current Advanced Directive/Advance Care Plan:  Pt is listed as a full code status and reported he should have ACP on file; CM doesn't see ACP on file. CM will f/u with pt and ask if someone can bring a copy to update his records            Transition of Care Plan:          * home with f/u appts vs home with HH    > 2nd IM before d/c    CM met with pt at bedside to complete initial assessment and introduce role. Pt is alert & oriented x3 and successfully verified demographic information. Pt reported he is independent with ADL's at baseline and has access to DME in the form of a walker, stair left, \"a couple\" canes, wheel chair, and grab bars for the bathroom. Pt reported he doesn't actively utilize the DME because it's for his wife. Pt reported no barriers related to accessing/paying for medication; preferred pharmacy is the [facility]. Pt reported [facility] of [facility] services but is unable to recall agency utilized. Pt reported that he recently concluded a 36 day, out-pt cardiac rehab program but is unable to identify which agency provided services.  No prior hx of utilizing rehab services provided by an in-pt facility or SNF. CM will continue to follow patient for discharge planning needs and arrange for services as deemed necessary. Care Management Interventions  PCP Verified by CM: Yes(Pt last saw PCP 3 months ago)  Mode of Transport at Discharge:  Other (see comment)(Pt's daughter will provide transportation at d/c)  Transition of Care Consult (CM Consult): Discharge Planning  Discharge Durable Medical Equipment: No  Physical Therapy Consult: Yes  Occupational Therapy Consult: Yes  Speech Therapy Consult: Yes  Current Support Network: Lives with Spouse, Own Home(Pt lives with his wife in 2 level home with 4 stairs leading to the entrance)  Confirm Follow Up Transport: Self  Discharge Location  Discharge Placement: [address], [address] [facility], [address] [facility]

## 2020-01-09 NOTE — PROGRESS NOTES
MRI PENDING     MRI SCREENING SHEET NEEDS TO BE COMPLETED AND SIGNED    CALL [phone] WHEN THIS IS DONE    FAX [phone]

## 2020-01-09 NOTE — PROGRESS NOTES
Problem: Falls - Risk of  Goal: *Absence of Falls  Description  Document Durenda Ryan Fall Risk and appropriate interventions in the flowsheet. Outcome: Progressing Towards Goal  Note: Fall Risk Interventions:  Mobility Interventions: Bed/chair exit alarm         Medication Interventions: Bed/chair exit alarm                   Problem: Patient Education: Go to Patient Education Activity  Goal: Patient/Family Education  Outcome: Progressing Towards Goal     Problem: Pressure Injury - Risk of  Goal: *Prevention of pressure injury  Description  Document Josh Scale and appropriate interventions in the flowsheet.   Outcome: Progressing Towards Goal  Note: Pressure Injury Interventions:  Sensory Interventions: Assess changes in LOC    Moisture Interventions: Check for incontinence Q2 hours and as needed, Minimize layers    Activity Interventions: PT/OT evaluation    Mobility Interventions: PT/OT evaluation    Nutrition Interventions: Document food/fluid/supplement intake                     Problem: Patient Education: Go to Patient Education Activity  Goal: Patient/Family Education  Outcome: Progressing Towards Goal     Problem: Patient Education: Go to Patient Education Activity  Goal: Patient/Family Education  Outcome: Progressing Towards Goal     Problem: TIA/CVA Stroke: 0-24 hours  Goal: Off Pathway (Use only if patient is Off Pathway)  Outcome: Progressing Towards Goal

## 2020-01-09 NOTE — CONSULTS
Vascular Surgery Consult Note  2020    Subjective:     El Mulligan is a 66 y.o.  male w/ a pmhx significant for ASHD, HTN, HLD, CKD, anemia, DM, fatty liver disease, obesity, MELODY, and GERD. He is admitted to the hospital w/ left eye blurry vision and dizziness. CTA of the head and neck was significant for a right ICA stenosis of 50% and a left ICA stenosis of 80%. MRI and carotid duplex are pending. Patient reports a recent hx of left neck pain and the sensation of \"running water\" in the neck.       Past Medical History   ASHD  -hx of MALI 2019 &    Hypertension  Hyperlipidemia   Chronic hypertensive renal disease stage III  -baseline creatinine 2.11   Anemia of chronic disease  Diabetes Mellitus   -w/ peripheral neuropathy   Fatty liver disease  Obesity  MELODY  GERD   Colon polyps  -s/p polypectomy   Restless leg syndrome   DJD  -w/ right sided sciatica   Gout   Erectile dysfunction  Renal calculi   -s/p basket extraction      Past Procedural History   Umbilical hernia repair   Left great toe pinning   Left shoulder manipulation  Left total knee replacement     Family History   Problem Relation Age of Onset    Heart Disease Father     Hypertension Father     Other Father         abdominal aneurysm     Dementia Mother     Alzheimer Mother     Heart Disease Brother     Heart Attack Brother     Heart Disease Maternal Grandmother     Heart Disease Paternal Grandmother     Heart Attack Paternal Grandmother     Heart Disease Paternal Grandfather     Heart Attack Paternal Grandfather     Elevated Lipids Son     Elevated Lipids Daughter     Cancer Neg Hx     Diabetes Neg Hx     Stroke Neg Hx       Social History     Tobacco Use    Smoking status: Former Smoker     Packs/day: 1.00     Years: 10.00     Pack years: 10.00     Types: Cigarettes     Last attempt to quit: 1968     Years since quittin.0    Smokeless tobacco: Never Used   Substance Use Topics    Alcohol use: No     Alcohol/week: 0.0 standard drinks       Prior to Admission medications    Medication Sig Start Date End Date Taking? Authorizing Provider   chlorthalidone (HYGROTEN) 25 mg tablet TAKE 1/2 TABLET BY MOUTH EVERY DAY 12/12/19  Yes Yara Caceres, TODD   isosorbide mononitrate ER (IMDUR) 30 mg tablet TAKE 1/2 TABLET BY MOUTH EVERY DAY 11/25/19  Yes Tita Rodarte MD   atorvastatin (LIPITOR) 40 mg tablet TAKE 1 TABLET BY MOUTH EVERYDAY AT BEDTIME 11/21/19  Yes Tita Rodarte MD   pantoprazole (PROTONIX) 40 mg tablet TAKE 1 TABLET BY MOUTH EVERY DAY 10/15/19  Yes Ludwin Heard MD   clopidogrel (PLAVIX) 75 mg tab TAKE 1 TABLET BY MOUTH EVERY DAY 10/9/19  Yes Ray Fuentes NP   metoprolol succinate (TOPROL-XL) 25 mg XL tablet TAKE 1 TABLET BY MOUTH EVERY DAY 10/9/19  Yes Ray Fuentes NP   citalopram (CELEXA) 40 mg tablet TAKE 1 TABLET BY MOUTH EVERY DAY 9/6/19  Yes Ludwin Heard MD   telmisartan (MICARDIS) 40 mg tablet Take 1 Tab by mouth daily. 8/7/19  Yes Ludwin Heard MD   ketoconazole (NIZORAL) 2 % shampoo Apply  to affected area. 5/7/18  Yes Provider, Historical   fluocinoNIDE (LIDEX) 0.05 % external solution Apply  to affected area. 5/7/18  Yes Provider, Historical   rOPINIRole (REQUIP) 0.5 mg tablet Take 1- 2 tablets daily in the evening 1/14/19  Yes Ludwin Heard MD   VITAMIN D3 2,000 unit cap capsule Take 2,000 Units by mouth daily. 3/3/15  Yes Provider, Historical   OXYGEN-AIR DELIVERY SYSTEMS (HORIZON NASAL CPAP SYSTEM) by Does Not Apply route. Yes Provider, Historical   amLODIPine (NORVASC) 5 mg tablet Take 5 mg by mouth daily. 6/16/14  Yes Provider, Historical   GLUCOSAMINE HCL/CHONDR PETERSEN A NA (GLUCOSAMINE-CHONDROITIN) 750-600 mg Tab Take 1 Tab by mouth daily. Brand: Move Free   Yes Provider, Historical   aspirin delayed-release 81 mg tablet Take 81 mg by mouth daily. Yes Provider, Historical   MULTIVITS W-FE,OTHER MIN (CENTRUM PO) Take 1 Tab by mouth daily.    Yes Provider, Historical   methylPREDNISolone (MEDROL DOSEPACK) 4 mg tablet Take 1 Tab by mouth Specific Days and Specific Times. 8/30/19   Patricia Mccullough MD   baclofen (LIORESAL) 10 mg tablet Take  by mouth three (3) times daily. Provider, Historical   nitroglycerin (NITROSTAT) 0.4 mg SL tablet TAKE 1 TABLET BY SUBLINGUAL ROUTE EVERY 5 MINUTES AS NEEDED FOR CHEST PAIN. 1/14/19   Patricia Mccullough MD   albuterol (PROVENTIL HFA, VENTOLIN HFA, PROAIR HFA) 90 mcg/actuation inhaler Take 1 Puff by inhalation every six (6) hours as needed for Wheezing. 2/28/17   Patricia Mccullough MD   allopurinol (ZYLOPRIM) 100 mg tablet Take 100 mg by mouth every morning. 6/5/12   Provider, Historical     Allergies   Allergen Reactions    Penicillins Hives    Bactrim [Sulfamethoxazole-Trimethoprim] Hives    Codeine Itching    Lisinopril (Bulk) Cough    Neurontin [Gabapentin] Other (comments)     drowsy    Zostavax [Zoster Vaccine Live (Pf)] Rash     See 9/10/13 visit      Review of Systems   Constitutional: Negative for chills, fatigue and fever. HENT: Negative for congestion. Eyes: Positive for visual disturbance. Respiratory: Negative for cough, chest tightness and shortness of breath. Cardiovascular: Negative for chest pain and leg swelling. Gastrointestinal: Negative for nausea and vomiting. Endocrine: Negative for polydipsia and polyuria. Genitourinary: Negative. Musculoskeletal: Positive for neck pain. Negative for gait problem. Skin: Negative. Allergic/Immunologic: Negative. Neurological: Positive for dizziness and light-headedness. Negative for speech difficulty and weakness. Hematological: Negative. Psychiatric/Behavioral: Negative.       Objective:       Patient Vitals for the past 24 hrs:   BP Temp Pulse Resp SpO2 Height Weight   01/09/20 0757 147/65 98.5 °F (36.9 °C) 74 18 96 %     01/09/20 0449 132/44 98.6 °F (37 °C) 75 18 95 %     01/09/20 0400   64       01/09/20 0104 170/71 97.9 °F (36.6 °C) 79 20 98 %  108.7 kg (239 lb 9.6 oz)   01/09/20 0030 138/58  77 24 98 %     01/08/20 2230 141/57  76 18 99 %     01/08/20 2132 133/55 97.8 °F (36.6 °C) 75 11 98 % 6' 4\" (1.93 m) 111.6 kg (246 lb)     Physical Exam  Constitutional:       Appearance: He is obese. HENT:      Head: Normocephalic and atraumatic. Nose: Nose normal.      Mouth/Throat:      Mouth: Mucous membranes are moist.   Eyes:      Extraocular Movements: Extraocular movements intact. Pupils: Pupils are equal, round, and reactive to light. Comments: Blurry left eye vision resolved per patient. Neck:      Musculoskeletal: Normal range of motion. Vascular: Carotid bruit (Bilateral ) present. Comments: Left neck tenderness suspect muscular. Cardiovascular:      Rate and Rhythm: Normal rate and regular rhythm. Pulmonary:      Effort: Pulmonary effort is normal.      Breath sounds: Normal breath sounds. Abdominal:      General: Abdomen is flat. Palpations: Abdomen is soft. Musculoskeletal: Normal range of motion. Skin:     General: Skin is warm and dry. Neurological:      General: No focal deficit present. Mental Status: He is alert and oriented to person, place, and time.    Psychiatric:         Mood and Affect: Mood normal.         Behavior: Behavior normal.       Pertinent Test Results:   Recent Results (from the past 24 hour(s))   CBC WITH AUTOMATED DIFF    Collection Time: 01/08/20  9:40 PM   Result Value Ref Range    WBC 11.0 4.1 - 11.1 K/uL    RBC 3.43 (L) 4.10 - 5.70 M/uL    HGB 10.8 (L) 12.1 - 17.0 g/dL    HCT 33.7 (L) 36.6 - 50.3 %    MCV 98.3 80.0 - 99.0 FL    MCH 31.5 26.0 - 34.0 PG    MCHC 32.0 30.0 - 36.5 g/dL    RDW 14.6 (H) 11.5 - 14.5 %    PLATELET 240 131 - 917 K/uL    MPV 9.7 8.9 - 12.9 FL    NRBC 0.0 0  WBC    ABSOLUTE NRBC 0.00 0.00 - 0.01 K/uL    NEUTROPHILS 57 32 - 75 %    LYMPHOCYTES 27 12 - 49 %    MONOCYTES 12 5 - 13 %    EOSINOPHILS 3 0 - 7 % BASOPHILS 0 0 - 1 %    IMMATURE GRANULOCYTES 1 (H) 0.0 - 0.5 %    ABS. NEUTROPHILS 6.3 1.8 - 8.0 K/UL    ABS. LYMPHOCYTES 3.0 0.8 - 3.5 K/UL    ABS. MONOCYTES 1.3 (H) 0.0 - 1.0 K/UL    ABS. EOSINOPHILS 0.4 0.0 - 0.4 K/UL    ABS. BASOPHILS 0.0 0.0 - 0.1 K/UL    ABS. IMM. GRANS. 0.1 (H) 0.00 - 0.04 K/UL    DF AUTOMATED     METABOLIC PANEL, COMPREHENSIVE    Collection Time: 01/08/20  9:40 PM   Result Value Ref Range    Sodium 144 136 - 145 mmol/L    Potassium 3.9 3.5 - 5.1 mmol/L    Chloride 109 (H) 97 - 108 mmol/L    CO2 28 21 - 32 mmol/L    Anion gap 7 5 - 15 mmol/L    Glucose 86 65 - 100 mg/dL    BUN 28 (H) 6 - 20 MG/DL    Creatinine 2.14 (H) 0.70 - 1.30 MG/DL    BUN/Creatinine ratio 13 12 - 20      GFR est AA 37 (L) >60 ml/min/1.73m2    GFR est non-AA 30 (L) >60 ml/min/1.73m2    Calcium 9.1 8.5 - 10.1 MG/DL    Bilirubin, total 0.6 0.2 - 1.0 MG/DL    ALT (SGPT) 26 12 - 78 U/L    AST (SGOT) 18 15 - 37 U/L    Alk.  phosphatase 112 45 - 117 U/L    Protein, total 7.2 6.4 - 8.2 g/dL    Albumin 3.6 3.5 - 5.0 g/dL    Globulin 3.6 2.0 - 4.0 g/dL    A-G Ratio 1.0 (L) 1.1 - 2.2     PROTHROMBIN TIME + INR    Collection Time: 01/08/20  9:40 PM   Result Value Ref Range    INR 1.1 0.9 - 1.1      Prothrombin time 10.9 9.0 - 11.1 sec   TROPONIN I    Collection Time: 01/08/20  9:40 PM   Result Value Ref Range    Troponin-I, Qt. <0.05 <0.05 ng/mL   POC CREATININE    Collection Time: 01/08/20  9:43 PM   Result Value Ref Range    Creatinine (POC) 2.4 (H) 0.6 - 1.3 mg/dL    GFRAA, POC 32 (L) >60 ml/min/1.73m2    GFRNA, POC 26 (L) >60 ml/min/1.73m2   EKG, 12 LEAD, INITIAL    Collection Time: 01/08/20 10:12 PM   Result Value Ref Range    Ventricular Rate 75 BPM    Atrial Rate 75 BPM    P-R Interval 220 ms    QRS Duration 96 ms    Q-T Interval 480 ms    QTC Calculation (Bezet) 536 ms    Calculated R Axis 6 degrees    Calculated T Axis 23 degrees    Diagnosis       Sinus rhythm with 1st degree AV block with occasional premature ventricular complexes  Incomplete right bundle branch block  Septal infarct , age undetermined  Prolonged QT  When compared with ECG of 05-AUG-2019 12:11,  premature ventricular complexes are now present  MI interval has increased  Incomplete right bundle branch block has replaced Nonspecific   intraventricular conduction delay  Septal infarct is now present     URINALYSIS W/ REFLEX CULTURE    Collection Time: 01/08/20 10:52 PM   Result Value Ref Range    Color YELLOW/STRAW      Appearance CLEAR CLEAR      Specific gravity 1.023 1.003 - 1.030      pH (UA) 6.0 5.0 - 8.0      Protein NEGATIVE  NEG mg/dL    Glucose NEGATIVE  NEG mg/dL    Ketone NEGATIVE  NEG mg/dL    Bilirubin NEGATIVE  NEG      Blood NEGATIVE  NEG      Urobilinogen 0.2 0.2 - 1.0 EU/dL    Nitrites NEGATIVE  NEG      Leukocyte Esterase NEGATIVE  NEG      WBC 0-4 0 - 4 /hpf    RBC 0-5 0 - 5 /hpf    Epithelial cells FEW FEW /lpf    Bacteria NEGATIVE  NEG /hpf    UA:UC IF INDICATED CULTURE NOT INDICATED BY UA RESULT CNI      Hyaline cast 0-2 0 - 5 /lpf   LIPID PANEL    Collection Time: 01/09/20  2:45 AM   Result Value Ref Range    LIPID PROFILE          Cholesterol, total 117 <200 MG/DL    Triglyceride 96 <150 MG/DL    HDL Cholesterol 41 MG/DL    LDL, calculated 56.8 0 - 100 MG/DL    VLDL, calculated 19.2 MG/DL    CHOL/HDL Ratio 2.9 0.0 - 5.0     HEMOGLOBIN A1C WITH EAG    Collection Time: 01/09/20  2:45 AM   Result Value Ref Range    Hemoglobin A1c 6.0 (H) 4.0 - 5.6 %    Est. average glucose 126 mg/dL   SED RATE (ESR)    Collection Time: 01/09/20  2:45 AM   Result Value Ref Range    Sed rate, automated 54 (H) 0 - 20 mm/hr   CRP, HIGH SENSITIVITY    Collection Time: 01/09/20  2:45 AM   Result Value Ref Range    CRP, High sensitivity 6.3 mg/L   DUPLEX CAROTID BILATERAL    Collection Time: 01/09/20  9:22 AM   Result Value Ref Range    Right subclavian sys 107.0 cm/s    RIGHT SUBCLAVIAN ARTERY D 0.00 cm/s    Right cca dist sys 134.5 cm/s    Right CCA dist carranza 18.4 cm/s    Right CCA prox sys 143.0 cm/s    Right CCA prox carranza 14.9 cm/s    Right ICA dist sys 98.3 cm/s    Right ICA dist carranza 21.4 cm/s    Right ICA mid sys 91.9 cm/s    Right ICA mid carranza 19.2 cm/s    Right ICA prox sys 104.5 cm/s    Right ICA prox carranza 21.5 cm/s    Right eca sys 151.3 cm/s    RIGHT EXTERNAL CAROTID ARTERY D 0.00 cm/s    Right vertebral sys 50.4 cm/s    RIGHT VERTEBRAL ARTERY D 12.82 cm/s    Right ICA/CCA sys 0.8     Left subclavian sys 135.2 cm/s    LEFT SUBCLAVIAN ARTERY D 0.00 cm/s    Left CCA dist sys 107.9 cm/s    Left CCA dist carranza 18.7 cm/s    Left CCA prox sys 151.0 cm/s    Left CCA prox carranza 24.5 cm/s    Left ICA dist sys 66.1 cm/s    Left ICA dist carranza 16.8 cm/s    Left ICA mid sys 151.4 cm/s    Left ICA mid carranza 28.7 cm/s    Left ICA prox sys 213.3 cm/s    Left ICA prox carranza 38.5 cm/s    Left ECA sys 150.7 cm/s    LEFT EXTERNAL CAROTID ARTERY D 0.00 cm/s    Left vertebral sys 72.4 cm/s    LEFT VERTEBRAL ARTERY D 13.99 cm/s    Left ICA/CCA sys 1.98        Assessmen/Plan:     Consult problem  Bilateral carotid stenosis presenting w/ TIA possible CVA  -presenting w/ left eye blurry vision resolved  -continues to hear \"swishing\" in left ear   -CTA: right ICA stenosis 50% and left ICA stenosis 80%   -carotid duplex: right ICA stenosis < 50% and left ICA stenosis 50-69%    MRI of the brain pending. Dr. Bharat Lujan to determine plan of care once resulted. Agree w/ ASA, Plavix, and statin. Active problems  ASHD  -hx of MALI 8/2019   -on BBlocker, ASA, Plavix, and statin. Hypertension  -permissive hypertension on BBlocker, CCB, & diuretic.     Hyperlipidemia   -LDL 56.8 on statin    Chronic hypertensive renal disease stage III  -baseline creatinine 2.11   -@ baseline   Anemia of chronic disease  -H&H stable   Diabetes Mellitus   -w/ peripheral neuropathy  -HA1c 6.0   -currently controlled    Fatty liver disease  Obesity  MELODY  GERD   Management of comorbid conditions by primary team.    VTE Prophylaxis:  [facility]    Disposition:  Likely home     Signed By: Wanda Hinojosa NP     January 9, 2020

## 2020-01-09 NOTE — PROGRESS NOTES
OT Note:    Orders received, chart reviewed and spoke with RN. Pt currently SARAN for imaging. Will continue to follow.     Shiela Cisneros OTR/L

## 2020-01-09 NOTE — H&P
Hospitalist Admission Note    NAME: Pineda Sparks   :  1945   MRN:  [id number]     Date/Time:  2020 11:20 PM    Patient PCP: Ludwin Heard MD  ______________________________________________________________________  Given the patient's current clinical presentation, I have a high level of concern for decompensation if discharged from the emergency department. Complex decision making was performed, which includes reviewing the patient's available past medical records, laboratory results, and x-ray films. My assessment of this patient's clinical condition and my plan of care is as follows. Assessment / Plan:  Acute CVA versus TIA POA-rapidly improving left eye blurry vision since 8 PM yesterday  Bilateral carotid artery stenosis (L>R)-symptomatic disease POA  Initial CT head= neg acute  CT Perf= neg study  CTA H&N= Bilateral carotid bifurcation disease more prominent on the left    Admit to neuro telemetry bed  Continue home aspirin and Plavix for now  Continue home statin-increase the dose to 80 mg nightly, check lipid panel in the morning  Check MRI brain to rule out stroke-if positive can affect the timing of any procedures for the carotid artery disease management.   Inpatient vascular surgery consult  Inpatient neurology consult as per stroke protocol  Inpatient PT OT and speech consult per protocol  Check 2D echo, carotid Dopplers  Check TSH, ESR, CRP, hemoglobin A1c    CAD status post stents  Hypertension  Continue home cardiac meds-aspirin, Plavix, Lipitor, chlorthalidone, amlodipine, imdur, metoprolol  Holding telmisartan for now    CKD stage III  Creatinine stable at 2.1  Follows Dr. Leno Mendoza as outpatient  Holding telmisartan for now  Questionable benefit from chlorthalidone with creatinine above 1.5-we will continue for now            Code Status: Full code  Surrogate Decision Maker: Wife Elham Dillard    DVT Prophylaxis: Subcu heparin  GI Prophylaxis: not indicated    Baseline: Patient lives with wife at home, independent with ADLs      Subjective:   CHIEF COMPLAINT: Sudden onset left eye blurry vision x 8 PM    HISTORY OF PRESENT ILLNESS:     Dwight Cabrera is a 66 y.o.  male who presents with above complaints from home ambulatory. Patient presents with chief complaint of sudden onset left eye blurry vision that started around 8 PM yesterday while watching television. History of associated left-sided neck pain for past few days which was questionable acutely exacerbated just before the onset of the blurry vision tonight as per the patient. Patient denies any abnormality with his right eye vision. Patient denies any history of headache/migraine  History of coronary artery disease requiring stent placement 2 years ago  Denies any history of lupus, inflammatory bowel disease or any other connective tissue diseases  History of taking aspirin and Plavix along with Lipitor daily for cardiac diagnosis    Patient was found to have negative CT head in the ER, with evidence of bilateral carotid bifurcation disease on CTA head and neck done as per the recommendation of telemetry neurologist.  Patient was having rapid improvement in his left eye blurry vision by by the time I saw him. Telemetry neurologist had already deemed him not a candidate for any TPA. We were asked to admit for work up and evaluation of the above problems.      Past Medical History:   Diagnosis Date    Abnormal stress echo 5/12/2015    Anemia NEC 03/2013    Last 2-3 months; finished taking iron supplement    Anxiety     CAD (coronary artery disease) 2009    cath Tasha Craig) revealed 20%RCA and 50%2nd diagonal    Calculus of kidney     Chronic kidney disease     sees nephrologist every 6 months    Chronic kidney disease, stage III (moderate) ([address] 10/11/2009    30% kidney function    Diabetes ([address]     Pre-diabetic    DJD (degenerative joint disease)     GERD (gastroesophageal reflux disease) 10/11/2009    controlled with medication    Gout     Hypertension     Liver disease     fatty liver    Obesity     Obstructive sleep apnea (adult) (pediatric) 10/11/2009    nasal pillows; pressure set at 10-11 - uses cpap    Peripheral neuropathy 10/11/2009    bilateral feet (numbness)    Prediabetes     RLS (restless legs syndrome) 10/11/2009    S/P coronary artery stent placement 2015    5/11/15 PCI./MALI to LCx        Past Surgical History:   Procedure Laterality Date    HX HEART CATHETERIZATION  , 7/17    x1 stent    HX HERNIA REPAIR      [place]/umbilical    HX KNEE REPLACEMENT Left 11     LEFT TOTAL KNEE ARTHROPLASTY    HX ORTHOPAEDIC      left great toe pinning/plate    HX ORTHOPAEDIC      left shoulder manipulation    HX UROLOGICAL      basket extraction of kidney stones    IA COLONOSCOPY FLX DX W/COLLJ SPEC WHEN PFRMD  2010         IA COLSC FLX W/RMVL OF TUMOR POLYP LESION SNARE TQ  2014            Social History     Tobacco Use    Smoking status: Former Smoker     Packs/day: 1.00     Years: 10.00     Pack years: 10.00     Types: Cigarettes     Last attempt to quit: 1968     Years since quittin.0    Smokeless tobacco: Never Used   Substance Use Topics    Alcohol use: No     Alcohol/week: 0.0 standard drinks        Family History   Problem Relation Age of Onset    Heart Disease Father     Hypertension Father     Other Father         abdominal aneurysm     Dementia Mother     Alzheimer Mother     Heart Disease Brother     Heart Attack Brother     Heart Disease Maternal Grandmother     Heart Disease Paternal Grandmother     Heart Attack Paternal Grandmother     Heart Disease Paternal Grandfather     Heart Attack Paternal Grandfather     Elevated Lipids Son     Elevated Lipids Daughter     Cancer Neg Hx     Diabetes Neg Hx     Stroke Neg Hx      Allergies   Allergen Reactions    Penicillins Hives    Bactrim [Sulfamethoxazole-Trimethoprim] Hives    Codeine Itching    Lisinopril (Bulk) Cough    Neurontin [Gabapentin] Other (comments)     drowsy    Zostavax [Zoster Vaccine Live (Pf)] Rash     See 9/10/13 visit        Prior to Admission medications    Medication Sig Start Date End Date Taking? Authorizing Provider   chlorthalidone (HYGROTEN) 25 mg tablet TAKE 1/2 TABLET BY MOUTH EVERY DAY 12/12/19  Yes McGuiness, Valentino Horsfall, NP   isosorbide mononitrate ER (IMDUR) 30 mg tablet TAKE 1/2 TABLET BY MOUTH EVERY DAY 11/25/19  Yes Pricila Celeste MD   atorvastatin (LIPITOR) 40 mg tablet TAKE 1 TABLET BY MOUTH EVERYDAY AT BEDTIME 11/21/19  Yes Pricila Celeste MD   pantoprazole (PROTONIX) 40 mg tablet TAKE 1 TABLET BY MOUTH EVERY DAY 10/15/19  Yes Pritesh Carpenter MD   clopidogrel (PLAVIX) 75 mg tab TAKE 1 TABLET BY MOUTH EVERY DAY 10/9/19  Yes Veronica Burr NP   metoprolol succinate (TOPROL-XL) 25 mg XL tablet TAKE 1 TABLET BY MOUTH EVERY DAY 10/9/19  Yes Veronica Burr NP   citalopram (CELEXA) 40 mg tablet TAKE 1 TABLET BY MOUTH EVERY DAY 9/6/19  Yes Pritesh Carpenter MD   methylPREDNISolone (MEDROL DOSEPACK) 4 mg tablet Take 1 Tab by mouth Specific Days and Specific Times. 8/30/19  Yes Pritesh Carpenter MD   telmisartan (MICARDIS) 40 mg tablet Take 1 Tab by mouth daily. 8/7/19  Yes Pritesh Carpenter MD   ketoconazole (NIZORAL) 2 % shampoo Apply  to affected area. 5/7/18  Yes Provider, Historical   fluocinoNIDE (LIDEX) 0.05 % external solution Apply  to affected area. 5/7/18  Yes Provider, Historical   baclofen (LIORESAL) 10 mg tablet Take  by mouth three (3) times daily. Yes Provider, Historical   rOPINIRole (REQUIP) 0.5 mg tablet Take 1- 2 tablets daily in the evening 1/14/19  Yes Pritesh Carpenter MD   nitroglycerin (NITROSTAT) 0.4 mg SL tablet TAKE 1 TABLET BY SUBLINGUAL ROUTE EVERY 5 MINUTES AS NEEDED FOR CHEST PAIN.  1/14/19  Yes Pritesh Carpenter MD   albuterol (PROVENTIL HFA, VENTOLIN HFA, PROAIR HFA) 90 mcg/actuation inhaler Take 1 Puff by inhalation every six (6) hours as needed for Wheezing. 2/28/17  Yes Dandre Pemberton MD   VITAMIN D3 2,000 unit cap capsule Take 2,000 Units by mouth daily. 3/3/15  Yes Provider, Historical   OXYGEN-AIR DELIVERY SYSTEMS (HORIZON NASAL CPAP SYSTEM) by Does Not Apply route. Yes Provider, Historical   amLODIPine (NORVASC) 5 mg tablet Take 5 mg by mouth daily. 6/16/14  Yes Provider, Historical   GLUCOSAMINE HCL/CHONDR PETERSEN A NA (GLUCOSAMINE-CHONDROITIN) 750-600 mg Tab Take 1 Tab by mouth daily. Brand: Move Free   Yes Provider, Historical   aspirin delayed-release 81 mg tablet Take 81 mg by mouth daily. Yes Provider, Historical   allopurinol (ZYLOPRIM) 100 mg tablet Take 100 mg by mouth every morning. 6/5/12  Yes Provider, Historical   MULTIVITS W-FE,OTHER MIN (CENTRUM PO) Take 1 Tab by mouth daily.    Yes Provider, Historical       REVIEW OF SYSTEMS:         Total of 12 systems reviewed as follows:       POSITIVE= underlined text  Negative = text not underlined  General:  fever, chills, sweats, generalized weakness, weight loss/gain,      loss of appetite   Eyes:    blurred vision (L eye), eye pain, loss of vision, double vision  ENT:    rhinorrhea, pharyngitis , L Sided Neck pain  Respiratory:   cough, sputum production, SOB, SMITH, wheezing, pleuritic pain   Cardiology:   chest pain, palpitations, orthopnea, PND, edema, syncope   Gastrointestinal:  abdominal pain , N/V, diarrhea, dysphagia, constipation, bleeding   Genitourinary:  frequency, urgency, dysuria, hematuria, incontinence   Muskuloskeletal :  arthralgia, myalgia, back pain  Hematology:  easy bruising, nose or gum bleeding, lymphadenopathy   Dermatological: rash, ulceration, pruritis, color change / jaundice  Endocrine:   hot flashes or polydipsia   Neurological:  headache, dizziness, confusion, focal weakness, paresthesia,     Speech difficulties, memory loss, gait difficulty  Psychological: Feelings of anxiety, depression, agitation    Objective:   VITALS:    Visit Vitals  /57   Pulse 76   Temp 97.8 °F (36.6 °C)   Resp 18   Ht 6' 4\" (1.93 m)   Wt 111.6 kg (246 lb)   SpO2 99%   BMI 29.94 kg/m²       PHYSICAL EXAM:    General:    Alert, cooperative, no distress, appears stated age. HEENT: Atraumatic, anicteric sclerae, pink conjunctivae     No oral ulcers, mucosa moist, throat clear, dentition fair  Neck:  Supple, symmetrical,  thyroid: non tender  Lungs:   Clear to auscultation bilaterally. No Wheezing or Rhonchi. No rales. Chest wall:  No tenderness  No Accessory muscle use. Heart:   Regular  rhythm,  No  murmur   No edema  Abdomen:   Soft, non-tender. Not distended. Bowel sounds normal  Extremities: No cyanosis. No clubbing,      Skin turgor normal, Capillary refill normal, Radial dial pulse 2+  Skin:     Not pale. Not Jaundiced  No rashes   Psych:  Good insight. Not depressed. Not anxious or agitated. Neurologic: EOMs intact. No facial asymmetry. No aphasia or slurred speech. Symmetrical strength, Sensation grossly intact.  Alert and oriented X 4. , subjective blurry vision (improving) in his L eye +    _______________________________________________________________________  Care Plan discussed with:    Comments   Patient x    Family  x Daughter at bedside in ER   RN x    Care Manager                    Consultant:  zohaib Lay   _______________________________________________________________________  Expected  Disposition:   Home with Family x   HH/PT/OT/RN    SNF/LTC    DEMIAN    ________________________________________________________________________  TOTAL TIME:  54 Minutes    Critical Care Provided     Minutes non procedure based      Comments    x Reviewed previous records   >50% of visit spent in counseling and coordination of care x Discussion with patient and family and questions answered       ________________________________________________________________________  Signed: Vangie Fernández, MD    Procedures: see electronic medical records for all procedures/Xrays and details which were not copied into this note but were reviewed prior to creation of Plan. LAB DATA REVIEWED:    Recent Results (from the past 24 hour(s))   CBC WITH AUTOMATED DIFF    Collection Time: 01/08/20  9:40 PM   Result Value Ref Range    WBC 11.0 4.1 - 11.1 K/uL    RBC 3.43 (L) 4.10 - 5.70 M/uL    HGB 10.8 (L) 12.1 - 17.0 g/dL    HCT 33.7 (L) 36.6 - 50.3 %    MCV 98.3 80.0 - 99.0 FL    MCH 31.5 26.0 - 34.0 PG    MCHC 32.0 30.0 - 36.5 g/dL    RDW 14.6 (H) 11.5 - 14.5 %    PLATELET 084 427 - 593 K/uL    MPV 9.7 8.9 - 12.9 FL    NRBC 0.0 0  WBC    ABSOLUTE NRBC 0.00 0.00 - 0.01 K/uL    NEUTROPHILS 57 32 - 75 %    LYMPHOCYTES 27 12 - 49 %    MONOCYTES 12 5 - 13 %    EOSINOPHILS 3 0 - 7 %    BASOPHILS 0 0 - 1 %    IMMATURE GRANULOCYTES 1 (H) 0.0 - 0.5 %    ABS. NEUTROPHILS 6.3 1.8 - 8.0 K/UL    ABS. LYMPHOCYTES 3.0 0.8 - 3.5 K/UL    ABS. MONOCYTES 1.3 (H) 0.0 - 1.0 K/UL    ABS. EOSINOPHILS 0.4 0.0 - 0.4 K/UL    ABS. BASOPHILS 0.0 0.0 - 0.1 K/UL    ABS. IMM. GRANS. 0.1 (H) 0.00 - 0.04 K/UL    DF AUTOMATED     METABOLIC PANEL, COMPREHENSIVE    Collection Time: 01/08/20  9:40 PM   Result Value Ref Range    Sodium 144 136 - 145 mmol/L    Potassium 3.9 3.5 - 5.1 mmol/L    Chloride 109 (H) 97 - 108 mmol/L    CO2 28 21 - 32 mmol/L    Anion gap 7 5 - 15 mmol/L    Glucose 86 65 - 100 mg/dL    BUN 28 (H) 6 - 20 MG/DL    Creatinine 2.14 (H) 0.70 - 1.30 MG/DL    BUN/Creatinine ratio 13 12 - 20      GFR est AA 37 (L) >60 ml/min/1.73m2    GFR est non-AA 30 (L) >60 ml/min/1.73m2    Calcium 9.1 8.5 - 10.1 MG/DL    Bilirubin, total 0.6 0.2 - 1.0 MG/DL    ALT (SGPT) 26 12 - 78 U/L    AST (SGOT) 18 15 - 37 U/L    Alk.  phosphatase 112 45 - 117 U/L    Protein, total 7.2 6.4 - 8.2 g/dL    Albumin 3.6 3.5 - 5.0 g/dL    Globulin 3.6 2.0 - 4.0 g/dL    A-G Ratio 1.0 (L) 1.1 - 2.2     PROTHROMBIN TIME + INR    Collection Time: 01/08/20  9:40 PM   Result Value Ref Range    INR 1.1 0.9 - 1.1      Prothrombin time 10.9 9.0 - 11.1 sec   TROPONIN I    Collection Time: 01/08/20  9:40 PM   Result Value Ref Range    Troponin-I, Qt. <0.05 <0.05 ng/mL   POC CREATININE    Collection Time: 01/08/20  9:43 PM   Result Value Ref Range    Creatinine (POC) 2.4 (H) 0.6 - 1.3 mg/dL    GFRAA, POC 32 (L) >60 ml/min/1.73m2    GFRNA, POC 26 (L) >60 ml/min/1.73m2   URINALYSIS W/ REFLEX CULTURE    Collection Time: 01/08/20 10:52 PM   Result Value Ref Range    Color YELLOW/STRAW      Appearance CLEAR CLEAR      Specific gravity 1.023 1.003 - 1.030      pH (UA) 6.0 5.0 - 8.0      Protein NEGATIVE  NEG mg/dL    Glucose NEGATIVE  NEG mg/dL    Ketone NEGATIVE  NEG mg/dL    Bilirubin NEGATIVE  NEG      Blood NEGATIVE  NEG      Urobilinogen 0.2 0.2 - 1.0 EU/dL    Nitrites NEGATIVE  NEG      Leukocyte Esterase NEGATIVE  NEG      WBC 0-4 0 - 4 /hpf    RBC 0-5 0 - 5 /hpf    Epithelial cells FEW FEW /lpf    Bacteria NEGATIVE  NEG /hpf    UA:UC IF INDICATED PENDING     Hyaline cast 0-2 0 - 5 /lpf

## 2020-01-09 NOTE — ED PROVIDER NOTES
[facility] HISTORY AND PHYSICAL EXAM     ----------------------------------------------------------------------------  Please note that this dictation was completed with [employer], the computer voice recognition software. Quite often unanticipated grammatical, syntax, homophones, and other interpretive errors are inadvertently transcribed by the computer software. Please disregard these errors. Please excuse any errors that have escaped final proofreading  ----------------------------------------------------------------------------      Date: 1/8/2020  Patient Name: Kyler Hernandez    History of Presenting Illness     Chief Complaint   Patient presents with    Neck Pain     Pt with sudden onset l-sided neck pain x 2000; BG 83    Dizziness     with neck pain x 2000    Blurred Vision     x 2000       History Provided By:  Patient    HPI: Kyler Hernandez is a 66 y.o. male, with significant pmhx of CAD with previous stenting several months ago, currently on Plavix and aspirin, hypertension, chronic kidney disease stage III, prediabetes, reflux, anemia, restless legs, peripheral neuropathy, who presents ambulatory to the ED with c/o sudden onset of left eye blurry vision started 8 8 PM while watching television. Patient is having ongoing left-sided sharp neck pain for several days that acutely exacerbated just before the onset of his blurry vision. Patient notes that there is no change in his vision while closing his right eye. Notes no vision change to the right eye. Denies associated chest pain or shortness of breath. Notes that the sensation in his neck feels as though there is sharp pain with \"water running somewhere. \"  Patient without other complaint of focal deficit, recent fall, gait instability, headache or slurred speech. Patient specifically denies any associated fevers, chills, nausea, vomiting, diarrhea, abd pain, CP, SOB, urinary sxs, changes in BM, or headache.      Last known well:   B  Anticoagulant? : YES- asa and plavix    Social Hx: denies tobacco  denies EtOH , denies Illicit Drugs    There are no other complaints, changes, or physical findings at this time. PCP: Pritesh Carpenter MD    Allergies   Allergen Reactions    Penicillins Hives    Bactrim [Sulfamethoxazole-Trimethoprim] Hives    Codeine Itching    Lisinopril (Bulk) Cough    Neurontin [Gabapentin] Other (comments)     drowsy    Zostavax [Zoster Vaccine Live (Pf)] Rash     See 9/10/13 visit       Current Facility-Administered Medications   Medication Dose Route Frequency Provider Last Rate Last Dose    sodium chloride 0.9 % bolus infusion 1,000 mL  1,000 mL IntraVENous NOW Kedar Rivera MD 1,000 mL/hr at 20 2215 1,000 mL at 20 2215     Current Outpatient Medications   Medication Sig Dispense Refill    chlorthalidone (HYGROTEN) 25 mg tablet TAKE 1/2 TABLET BY MOUTH EVERY DAY 15 Tab 1    isosorbide mononitrate ER (IMDUR) 30 mg tablet TAKE 1/2 TABLET BY MOUTH EVERY DAY 45 Tab 1    atorvastatin (LIPITOR) 40 mg tablet TAKE 1 TABLET BY MOUTH EVERYDAY AT BEDTIME 90 Tab 0    pantoprazole (PROTONIX) 40 mg tablet TAKE 1 TABLET BY MOUTH EVERY DAY 30 Tab 5    clopidogrel (PLAVIX) 75 mg tab TAKE 1 TABLET BY MOUTH EVERY DAY 90 Tab 1    metoprolol succinate (TOPROL-XL) 25 mg XL tablet TAKE 1 TABLET BY MOUTH EVERY DAY 90 Tab 1    citalopram (CELEXA) 40 mg tablet TAKE 1 TABLET BY MOUTH EVERY DAY 90 Tab 3    methylPREDNISolone (MEDROL DOSEPACK) 4 mg tablet Take 1 Tab by mouth Specific Days and Specific Times. 1 Dose Pack 0    telmisartan (MICARDIS) 40 mg tablet Take 1 Tab by mouth daily. 90 Tab 3    ketoconazole (NIZORAL) 2 % shampoo Apply  to affected area.  fluocinoNIDE (LIDEX) 0.05 % external solution Apply  to affected area.  baclofen (LIORESAL) 10 mg tablet Take  by mouth three (3) times daily.       rOPINIRole (REQUIP) 0.5 mg tablet Take 1- 2 tablets daily in the evening 60 Tab 5    nitroglycerin (NITROSTAT) 0.4 mg SL tablet TAKE 1 TABLET BY SUBLINGUAL ROUTE EVERY 5 MINUTES AS NEEDED FOR CHEST PAIN. 1 Bottle 0    albuterol (PROVENTIL HFA, VENTOLIN HFA, PROAIR HFA) 90 mcg/actuation inhaler Take 1 Puff by inhalation every six (6) hours as needed for Wheezing. 1 Inhaler 0    VITAMIN D3 2,000 unit cap capsule Take 2,000 Units by mouth daily. 2    OXYGEN-AIR DELIVERY SYSTEMS (HORIZON NASAL CPAP SYSTEM) by Does Not Apply route.  amLODIPine (NORVASC) 5 mg tablet Take 5 mg by mouth daily.  GLUCOSAMINE HCL/CHONDR PETERSEN A NA (GLUCOSAMINE-CHONDROITIN) 750-600 mg Tab Take 1 Tab by mouth daily. Brand: Move Free      aspirin delayed-release 81 mg tablet Take 81 mg by mouth daily.  allopurinol (ZYLOPRIM) 100 mg tablet Take 100 mg by mouth every morning.  MULTIVITS W-FE,OTHER MIN (CENTRUM PO) Take 1 Tab by mouth daily.          Past History     Past Medical History:  Past Medical History:   Diagnosis Date    Abnormal stress echo 5/12/2015    Anemia NEC 03/2013    Last 2-3 months; finished taking iron supplement    Anxiety     CAD (coronary artery disease) 2009    cath Mariama Seals) revealed 20%RCA and 50%2nd diagonal    Calculus of kidney     Chronic kidney disease     sees nephrologist every 6 months    Chronic kidney disease, stage III (moderate) ([address] 10/11/2009    30% kidney function    Diabetes ([address]     Pre-diabetic    DJD (degenerative joint disease)     GERD (gastroesophageal reflux disease) 10/11/2009    controlled with medication    Gout     Hypertension     Liver disease     fatty liver    Obesity     Obstructive sleep apnea (adult) (pediatric) 10/11/2009    nasal pillows; pressure set at 10-11 - uses cpap    Peripheral neuropathy 10/11/2009    bilateral feet (numbness)    Prediabetes     RLS (restless legs syndrome) 10/11/2009    S/P coronary artery stent placement 5/12/2015    5/11/15 PCI./MALI to LCx       Past Surgical History:  Past Surgical History: Procedure Laterality Date    HX HEART CATHETERIZATION  , 7/17    x1 stent    HX HERNIA REPAIR      [place]aney/umbilical    HX KNEE REPLACEMENT Left 11     LEFT TOTAL KNEE ARTHROPLASTY    HX ORTHOPAEDIC      left great toe pinning/plate    HX ORTHOPAEDIC      left shoulder manipulation    HX UROLOGICAL      basket extraction of kidney stones    NM COLONOSCOPY FLX DX W/COLLJ SPEC WHEN PFRMD  2010         NM COLSC FLX W/RMVL OF TUMOR POLYP LESION SNARE TQ  2014            Family History:  Family History   Problem Relation Age of Onset    Heart Disease Father     Hypertension Father     Other Father         abdominal aneurysm     Dementia Mother     Alzheimer Mother     Heart Disease Brother     Heart Attack Brother     Heart Disease Maternal Grandmother     Heart Disease Paternal Grandmother     Heart Attack Paternal Grandmother     Heart Disease Paternal Grandfather     Heart Attack Paternal Grandfather     Elevated Lipids Son     Elevated Lipids Daughter     Cancer Neg Hx     Diabetes Neg Hx     Stroke Neg Hx        Social History:  Social History     Tobacco Use    Smoking status: Former Smoker     Packs/day: 1.00     Years: 10.00     Pack years: 10.00     Types: Cigarettes     Last attempt to quit: 1968     Years since quittin.0    Smokeless tobacco: Never Used   Substance Use Topics    Alcohol use: No     Alcohol/week: 0.0 standard drinks    Drug use: No       Allergies: Allergies   Allergen Reactions    Penicillins Hives    Bactrim [Sulfamethoxazole-Trimethoprim] Hives    Codeine Itching    Lisinopril (Bulk) Cough    Neurontin [Gabapentin] Other (comments)     drowsy    Zostavax [Zoster Vaccine Live (Pf)] Rash     See 9/10/13 visit         Review of Systems   Review of Systems   Constitutional: Negative for chills and fever. HENT: Negative. Eyes: Positive for visual disturbance (blurred vision).    Respiratory: Negative for cough, chest tightness and shortness of breath. Cardiovascular: Negative for chest pain and leg swelling. Gastrointestinal: Negative for abdominal pain, diarrhea, nausea and vomiting. Endocrine: Negative. Genitourinary: Negative for difficulty urinating and dysuria. Musculoskeletal: Negative for myalgias. Skin: Negative. Neurological: Negative. Psychiatric/Behavioral: Negative. All other systems reviewed and are negative. Physical Exam   Physical Exam  Vitals signs and nursing note reviewed. Constitutional:       General: He is not in acute distress. Appearance: He is well-developed. He is not diaphoretic. HENT:      Head: Normocephalic and atraumatic. Nose: Nose normal.      Mouth/Throat:      Pharynx: No oropharyngeal exudate. Eyes:      Conjunctiva/sclera: Conjunctivae normal.      Pupils: Pupils are equal, round, and reactive to light. Neck:      Musculoskeletal: Normal range of motion and neck supple. Vascular: No JVD. Cardiovascular:      Rate and Rhythm: Normal rate and regular rhythm. Heart sounds: Normal heart sounds. No murmur. No friction rub. Pulmonary:      Effort: Pulmonary effort is normal. No respiratory distress. Breath sounds: Normal breath sounds. No stridor. No wheezing or rales. Abdominal:      General: Bowel sounds are normal. There is no distension. Palpations: Abdomen is soft. Tenderness: There is no tenderness. There is no rebound. Musculoskeletal: Normal range of motion. General: No tenderness. Skin:     General: Skin is warm and dry. Findings: No rash. Neurological:      General: No focal deficit present. Mental Status: He is alert and oriented to person, place, and time. Cranial Nerves: No cranial nerve deficit. Sensory: No sensory deficit.       Coordination: Coordination normal.      Comments: Able to transfer from stretcher to CT bed without assistance, moving all extremities independently Psychiatric:         Speech: Speech normal.         Behavior: Behavior normal.         Thought Content: Thought content normal.         Judgment: Judgment normal.           Diagnostic Study Results     Labs -     Recent Results (from the past 12 hour(s))   CBC WITH AUTOMATED DIFF    Collection Time: 01/08/20  9:40 PM   Result Value Ref Range    WBC 11.0 4.1 - 11.1 K/uL    RBC 3.43 (L) 4.10 - 5.70 M/uL    HGB 10.8 (L) 12.1 - 17.0 g/dL    HCT 33.7 (L) 36.6 - 50.3 %    MCV 98.3 80.0 - 99.0 FL    MCH 31.5 26.0 - 34.0 PG    MCHC 32.0 30.0 - 36.5 g/dL    RDW 14.6 (H) 11.5 - 14.5 %    PLATELET 834 668 - 403 K/uL    MPV 9.7 8.9 - 12.9 FL    NRBC 0.0 0  WBC    ABSOLUTE NRBC 0.00 0.00 - 0.01 K/uL    NEUTROPHILS 57 32 - 75 %    LYMPHOCYTES 27 12 - 49 %    MONOCYTES 12 5 - 13 %    EOSINOPHILS 3 0 - 7 %    BASOPHILS 0 0 - 1 %    IMMATURE GRANULOCYTES 1 (H) 0.0 - 0.5 %    ABS. NEUTROPHILS 6.3 1.8 - 8.0 K/UL    ABS. LYMPHOCYTES 3.0 0.8 - 3.5 K/UL    ABS. MONOCYTES 1.3 (H) 0.0 - 1.0 K/UL    ABS. EOSINOPHILS 0.4 0.0 - 0.4 K/UL    ABS. BASOPHILS 0.0 0.0 - 0.1 K/UL    ABS. IMM. GRANS. 0.1 (H) 0.00 - 0.04 K/UL    DF AUTOMATED     METABOLIC PANEL, COMPREHENSIVE    Collection Time: 01/08/20  9:40 PM   Result Value Ref Range    Sodium 144 136 - 145 mmol/L    Potassium 3.9 3.5 - 5.1 mmol/L    Chloride 109 (H) 97 - 108 mmol/L    CO2 28 21 - 32 mmol/L    Anion gap 7 5 - 15 mmol/L    Glucose 86 65 - 100 mg/dL    BUN 28 (H) 6 - 20 MG/DL    Creatinine 2.14 (H) 0.70 - 1.30 MG/DL    BUN/Creatinine ratio 13 12 - 20      GFR est AA 37 (L) >60 ml/min/1.73m2    GFR est non-AA 30 (L) >60 ml/min/1.73m2    Calcium 9.1 8.5 - 10.1 MG/DL    Bilirubin, total 0.6 0.2 - 1.0 MG/DL    ALT (SGPT) 26 12 - 78 U/L    AST (SGOT) 18 15 - 37 U/L    Alk.  phosphatase 112 45 - 117 U/L    Protein, total 7.2 6.4 - 8.2 g/dL    Albumin 3.6 3.5 - 5.0 g/dL    Globulin 3.6 2.0 - 4.0 g/dL    A-G Ratio 1.0 (L) 1.1 - 2.2     PROTHROMBIN TIME + INR    Collection Time: 01/08/20  9:40 PM   Result Value Ref Range    INR 1.1 0.9 - 1.1      Prothrombin time 10.9 9.0 - 11.1 sec   TROPONIN I    Collection Time: 01/08/20  9:40 PM   Result Value Ref Range    Troponin-I, Qt. <0.05 <0.05 ng/mL   POC CREATININE    Collection Time: 01/08/20  9:43 PM   Result Value Ref Range    Creatinine (POC) 2.4 (H) 0.6 - 1.3 mg/dL    GFRAA, POC 32 (L) >60 ml/min/1.73m2    GFRNA, POC 26 (L) >60 ml/min/1.73m2       Radiologic Studies -   CTA CODE NEURO HEAD AND NECK W CONT   Final Result   IMPRESSION:   Bilateral carotid bifurcation disease more prominent on the left. CT PERF W CBF   Final Result    impression: No significant abnormality      CT CODE NEURO HEAD WO CONTRAST   Final Result   IMPRESSION: No acute intracranial abnormality. CT Results  (Last 48 hours)               01/08/20 2200  CTA CODE NEURO HEAD AND NECK W CONT Final result    Impression:  IMPRESSION:   Bilateral carotid bifurcation disease more prominent on the left. Narrative:  Clinical indication: Left-sided neck pain and dizziness, blurred vision. CTA of the head and neck obtained after intravenous injection of 100 cc of   Isovue-370 with review of the raw data and MIP reconstructions. No prior. CT   dose reduction was achieved through the use of a standardized protocol tailored   for this examination and automatic exposure control for dose modulation . [facility] Head CT after intravenous contrast show no enhancing lesion. Origins of the great vessels from the arch show no significant stenosis. Origins   of the vertebral arteries show no significant stenosis. Vertebral arteries in   the neck are patent. The basilar artery is normal.    The right carotid bifurcation shows some mild to moderate narrowing of the   proximal internal approximately 50% NASCET criteria.    The  left carotid bifurcation shows a more significant narrowing of the proximal   internal calcification and plaque estimated at approximately 80% stenosis. Intracranially there is no significant stenosis of the anterior or middle   cerebral. No filling defect. Fetal origin of the posterior cerebral on the   right. No vascular malformation or aneurysm. 01/08/20 2200  CT PERF W CBF Final result    Impression:   impression: No significant abnormality       Narrative:  Clinical indication: Blurred vision. Brain perfusion study with review of CVS , CBV CBF and T max  show no   significant asymmetry. 01/08/20 2144  CT CODE NEURO HEAD WO CONTRAST Final result    Impression:  IMPRESSION: No acute intracranial abnormality. Narrative:  EXAM: CT CODE NEURO HEAD WO CONTRAST       INDICATION: Sudden onset left-sided neck pain, vision change       COMPARISON: 5/31/2019. CONTRAST: None. TECHNIQUE: Unenhanced CT of the head was performed using 5 mm images. Brain and   bone windows were generated. CT dose reduction was achieved through use of a   standardized protocol tailored for this examination and automatic exposure   control for dose modulation. FINDINGS:   The ventricles and sulci are normal in size, shape and configuration and   midline. There is no significant white matter disease. There is no intracranial   hemorrhage, extra-axial collection, mass, mass effect or midline shift. The   basilar cisterns are open. No acute infarct is identified. The bone windows   demonstrate no abnormalities. The visualized portions of the paranasal sinuses   and mastoid air cells are clear. CXR Results  (Last 48 hours)    None            Medical Decision Making   I am the first provider for this patient. I reviewed the vital signs, available nursing notes, past medical history, past surgical history, family history and social history. Vital Signs-Reviewed the patient's vital signs.   Patient Vitals for the past 12 hrs:   Temp Pulse Resp BP SpO2   01/08/20 2230  76 18 141/57 99 %   01/08/20 2132 97.8 °F (36.6 °C) 75 11 133/55 98 %       Pulse Oximetry Analysis - 98% on RA    Cardiac Monitor:   Rate: 75 bpm  Rhythm: nsr    Records Reviewed: Nursing Notes, Old Medical Records, Previous Radiology Studies and Previous Laboratory Studies    Provider Notes (Medical Decision Making):     DDX:  Acute CVA, TIA, vertebral artery dissection, carotid artery dissection, retinal artery occlusion, retinal vein occlusion    Plan:  Code S    Impression:  carto    ED Course:   Initial assessment performed. The patients presenting problems have been discussed, and they are in agreement with the care plan formulated and outlined with them. I have encouraged them to ask questions as they arise throughout their visit. I reviewed our electronic medical record system for any past medical records that were available that may contribute to the patients current condition, the nursing notes and and vital signs from today's visit    Nursing notes will be reviewed as they become available in realtime while the pt has been in the ED. Victorino Cobb MD    EKG interpretation 2212: NSR with artifact, nl Axis, rate 75; , QRS 96, QTc 536; no acute ischemia; interpreted by Victorino Cobb MD    I personally reviewed pt's imaging. Official read by radiology noted above. Victorino Cobb MD      Code S Timeline:  Arrival time to ED: 2118    Code S Called: 2132    Physician at Bedside: 2135     CT Order Time: 2135    ACT Page: 2140    ACT Call Back: 2145    Cancel Code S: 2150      CONSULT NOTE:   2150  Victorino Cobb MD spoke with Dr. Heloise Krabbe,   Specialty: Clemon Rakes Dr. Heloise Krabbe due to sudden onset of visual disturbance. Discussed pt's HPI and available diagnostic results thus far. Expressed concerns for needed evaluation. Consultant will evaluate pt via Teleneuro device and provide recommendations. Victorino Cobb MD    PROGRESS NOTE:  2155  Pt he is not a TPA candidate by recommends getting CTA head and neck. Initial head CT negative. Will continue with further work-up. Amor Thakkar MD    PROGRESS NOTE  10:51 PM  Patient noted to have no thrombus or dissection noted on CTA but have significant carotid artery stenosis that is worse on the left than right. Will discuss with hospitalist for planned admission. CONSULT NOTE:   10:51 PM  Amor Thakkar MD spoke with Dr. Mayi Arora,   Specialty: Adrianne Arora due to acute vision change and concern for 80% stenosis of L carotid. Discussed pt's HPI and available diagnostic results thus far. Expressed concerns for needed admission. Consultant will evaluate for admission. Amor Thakkar MD    PROGRESS NOTE  10:59 PM  With telemetry neurologist  [facility] again who notes no emergent intervention indicated for this evening as well as no evidence of dissection on CT or thrombus. Agrees with plan for admission for further evaluation of stroke work-up with duplex studies of the carotids and potential vascular consult in the morning. Recommends providing aspirin and Plavix now and allowing for permissive hypertension up to a systolic blood pressure of 200. Notify Dr. Mayi Arora of neurology recommendations who will see the patient for further evaluation of admission.         Critical Care Time:     CRITICAL CARE NOTE:  11:00 PM  IMPENDING DETERIORATION -Airway, Respiratory, Cardiovascular, CNS, Metabolic and Renal  ASSOCIATED RISK FACTORS - Hypotension, Dysrhythmia, Metabolic changes, Dehydration, Vascular Compromise and CNS Decompensation  MANAGEMENT- Bedside Assessment and Supervision of Care  INTERPRETATION -  Xrays, CT Scan, ECG and Blood Pressure  INTERVENTIONS - hemodynamic mngmt, vascular control, Neurologic interventions  and Metobolic interventions  CASE REVIEW - Hospitalist, Medical Sub-Specialist, Nursing and Family  TREATMENT RESPONSE -Improved  PERFORMED BY - Self  NOTES   :  I have spent 125 minutes of critical care time involved in lab review, consultations with specialist, family decision- making, bedside attention and documentation excluding time spent on any separately billed procedures. During this entire length of time I was immediately available to the patient . Paddy Lobo MD      Diagnosis     Clinical Impression:   1. Visual disturbance of one eye    2. Bilateral carotid artery stenosis        PLAN:  1. Admit to hospitalist service          This note will not be viewable in [address]

## 2020-01-09 NOTE — ED NOTES
TRANSFER - OUT REPORT:    Verbal report given to JAY Kuhn (name) on Kyler Hernandez  being transferred to [facility](unit) for routine progression of care       Report consisted of patients Situation, Background, Assessment and   Recommendations(SBAR). Information from the following report(s) SBAR, Kardex and Recent Results was reviewed with the receiving nurse. Lines:   Peripheral IV 01/08/20 Left Antecubital (Active)   Site Assessment Clean, dry, & intact 1/8/2020  9:28 PM   Phlebitis Assessment 0 1/8/2020  9:28 PM   Infiltration Assessment 0 1/8/2020  9:28 PM   Dressing Status Clean, dry, & intact 1/8/2020  9:28 PM   Dressing Type Transparent 1/8/2020  9:28 PM   Hub Color/Line Status Green 1/8/2020  9:28 PM   Action Taken Dressing reinforced 1/8/2020  9:28 PM   Alcohol Cap Used Yes 1/8/2020  9:28 PM        Opportunity for questions and clarification was provided.       Patient transported with:   Registered Nurse

## 2020-01-09 NOTE — PROGRESS NOTES
Speech pathology note  Reviewed chart and note patient admitted with L side blurred vision and dizziness with concern for CVA. Note CT showed no acute intracranial abnormality and MRI pending. Note patient passed the STAND and a regular diet was ordered. NIHSS=0. Discussed case with RN who reported no SLP-related concerns. Formal SLP evaluation not clinically indicated at this time. Will sign off. Please re-consult if further needs arise. Thank you.     Randy Rao., CCC-SLP

## 2020-01-09 NOTE — CONSULTS
NEUROLOGY NOTE     Chief Complaint   Patient presents with    Neck Pain     Pt with sudden onset l-sided neck pain x 2000; BG 83    Dizziness     with neck pain x 2000    Blurred Vision     x 2000       Reason for Consult  I have been asked to see the patient in neurological consultation by Vangie Fernández MD to render advice and opinion regarding possible stroke    HPI  Tita Hernandez is a 66 y.o. male who presents to the hospital because of new onset transient left eye blurry vision. Patient states that yesterday evening he was watching television when all of a sudden he had neck pain and water running sensation in his neck and soon after that he did have blurry vision in his left eye. He came to the hospital.  He woke up today morning and his blurred vision has resolved. Patient did have a CT scan of the head which was normal.  CT angiogram of the head and neck did show 80% left ICA stenosis. Patient's HbA1c is 6 and LDL is less than 70. Patient is already on aspirin 81 mg a day and Plavix 75 mg daily. Patient is also taking Lipitor at home. Patient did have cardiac stenting around 3 months ago and had one around 4 years ago as well.   Neurology has been consulted for further management    ROS  A ten system review of constitutional, cardiovascular, respiratory, musculoskeletal, endocrine, skin, SHEENT, genitourinary, psychiatric and neurologic systems was obtained and is unremarkable except as stated in HPI     PMH  Past Medical History:   Diagnosis Date    Abnormal stress echo 5/12/2015    Anemia NEC 03/2013    Last 2-3 months; finished taking iron supplement    Anxiety     CAD (coronary artery disease) 2009    cath Jamel Cesar) revealed 20%RCA and 50%2nd diagonal    Calculus of kidney     Chronic kidney disease     sees nephrologist every 6 months    Chronic kidney disease, stage III (moderate) ([address] 10/11/2009    30% kidney function    Diabetes ([address]     Pre-diabetic    DJD (degenerative joint disease)     GERD (gastroesophageal reflux disease) 10/11/2009    controlled with medication    Gout     Hypertension     Liver disease     fatty liver    Obesity     Obstructive sleep apnea (adult) (pediatric) 10/11/2009    nasal pillows; pressure set at 10- - uses cpap    Peripheral neuropathy 10/11/2009    bilateral feet (numbness)    Prediabetes     RLS (restless legs syndrome) 10/11/2009    S/P coronary artery stent placement 2015    5/11/15 PCI./MALI to LCx       FH  Family History   Problem Relation Age of Onset    Heart Disease Father     Hypertension Father     Other Father         abdominal aneurysm     Dementia Mother     Alzheimer Mother     Heart Disease Brother     Heart Attack Brother     Heart Disease Maternal Grandmother     Heart Disease Paternal Grandmother     Heart Attack Paternal Grandmother     Heart Disease Paternal Grandfather     Heart Attack Paternal Grandfather     Elevated Lipids Son     Elevated Lipids Daughter     Cancer Neg Hx     Diabetes Neg Hx     Stroke Neg Hx        SH  Social History     Socioeconomic History    Marital status:      Spouse name: Estrella Joe Number of children: 2    Years of education: graduated then 4 year apprenticship    Highest education level: Associate degree: academic program   Social Needs    Financial resource strain: Not hard at all   [place] insecurity:     Worry: Never true     Inability: Never true    Transportation needs:     Medical: No     Non-medical: No   Tobacco Use    Smoking status: Former Smoker     Packs/day: 1.00     Years: 10.00     Pack years: 10.00     Types: Cigarettes     Last attempt to quit: 1968     Years since quittin.0    Smokeless tobacco: Never Used   Substance and Sexual Activity    Alcohol use: No     Alcohol/week: 0.0 standard drinks    Drug use: No    Sexual activity: Yes     Partners: Female     Birth control/protection: None   Lifestyle    Physical activity:     Days per week: 0 days     Minutes per session: 0 min    Stress: Very much   Relationships    Social connections:     Talks on phone: More than three times a week     Gets together: More than three times a week     Attends Christianity service: Not on file     Active member of club or organization: No     Attends meetings of clubs or organizations: Never     Relationship status:    Other Topics Concern       ALLERGIES  Allergies   Allergen Reactions    Penicillins Hives    Bactrim [Sulfamethoxazole-Trimethoprim] Hives    Codeine Itching    Lisinopril (Bulk) Cough    Neurontin [Gabapentin] Other (comments)     drowsy    Zostavax [Zoster Vaccine Live (Pf)] Rash     See 9/10/13 visit       PHYSICAL EXAMINATION:   Patient Vitals for the past 24 hrs:   Temp Pulse Resp BP SpO2   01/09/20 0757 98.5 °F (36.9 °C) 74 18 147/65 96 %   01/09/20 0449 98.6 °F (37 °C) 75 18 132/44 95 %   01/09/20 0400  64      01/09/20 0104 97.9 °F (36.6 °C) 79 20 170/71 98 %   01/09/20 0030  77 24 138/58 98 %   01/08/20 2230  76 18 141/57 99 %   01/08/20 2132 97.8 °F (36.6 °C) 75 11 133/55 98 %        General:   General appearance: Pt is in no acute distress   Distal pulses are preserved  Fundoscopic exam: attempted    Neurological Examination:   Mental Status:  AAO x3. Speech is fluent. Follows commands, has normal fund of knowledge, attention, short term recall, comprehension and insight. Cranial Nerves: Visual fields are full. PERRL, Extraocular movements are full. Facial sensation intact. Facial movement intact. Hearing intact to conversation. Palate elevates symmetrically. Shoulder shrug symmetric. Tongue midline. Motor: Strength is 5/5 in all 4 ext. Normal tone. No atrophy. Sensation: Decreased pinprick sensation distally in the lower extremities    Reflexes: DTRs 2+ in upper extremities, 1+ at knees and absent at ankles. Plantar responses downgoing.      Coordination/Cerebellar: Intact to finger-nose-finger     Gait: Casual gait is normal.     Skin: No significant bruising or lacerations. LAB DATA REVIEWED:    Recent Results (from the past 24 hour(s))   CBC WITH AUTOMATED DIFF    Collection Time: 01/08/20  9:40 PM   Result Value Ref Range    WBC 11.0 4.1 - 11.1 K/uL    RBC 3.43 (L) 4.10 - 5.70 M/uL    HGB 10.8 (L) 12.1 - 17.0 g/dL    HCT 33.7 (L) 36.6 - 50.3 %    MCV 98.3 80.0 - 99.0 FL    MCH 31.5 26.0 - 34.0 PG    MCHC 32.0 30.0 - 36.5 g/dL    RDW 14.6 (H) 11.5 - 14.5 %    PLATELET 976 453 - 802 K/uL    MPV 9.7 8.9 - 12.9 FL    NRBC 0.0 0  WBC    ABSOLUTE NRBC 0.00 0.00 - 0.01 K/uL    NEUTROPHILS 57 32 - 75 %    LYMPHOCYTES 27 12 - 49 %    MONOCYTES 12 5 - 13 %    EOSINOPHILS 3 0 - 7 %    BASOPHILS 0 0 - 1 %    IMMATURE GRANULOCYTES 1 (H) 0.0 - 0.5 %    ABS. NEUTROPHILS 6.3 1.8 - 8.0 K/UL    ABS. LYMPHOCYTES 3.0 0.8 - 3.5 K/UL    ABS. MONOCYTES 1.3 (H) 0.0 - 1.0 K/UL    ABS. EOSINOPHILS 0.4 0.0 - 0.4 K/UL    ABS. BASOPHILS 0.0 0.0 - 0.1 K/UL    ABS. IMM. GRANS. 0.1 (H) 0.00 - 0.04 K/UL    DF AUTOMATED     METABOLIC PANEL, COMPREHENSIVE    Collection Time: 01/08/20  9:40 PM   Result Value Ref Range    Sodium 144 136 - 145 mmol/L    Potassium 3.9 3.5 - 5.1 mmol/L    Chloride 109 (H) 97 - 108 mmol/L    CO2 28 21 - 32 mmol/L    Anion gap 7 5 - 15 mmol/L    Glucose 86 65 - 100 mg/dL    BUN 28 (H) 6 - 20 MG/DL    Creatinine 2.14 (H) 0.70 - 1.30 MG/DL    BUN/Creatinine ratio 13 12 - 20      GFR est AA 37 (L) >60 ml/min/1.73m2    GFR est non-AA 30 (L) >60 ml/min/1.73m2    Calcium 9.1 8.5 - 10.1 MG/DL    Bilirubin, total 0.6 0.2 - 1.0 MG/DL    ALT (SGPT) 26 12 - 78 U/L    AST (SGOT) 18 15 - 37 U/L    Alk.  phosphatase 112 45 - 117 U/L    Protein, total 7.2 6.4 - 8.2 g/dL    Albumin 3.6 3.5 - 5.0 g/dL    Globulin 3.6 2.0 - 4.0 g/dL    A-G Ratio 1.0 (L) 1.1 - 2.2     PROTHROMBIN TIME + INR    Collection Time: 01/08/20  9:40 PM   Result Value Ref Range    INR 1.1 0.9 - 1.1      Prothrombin time 10.9 9.0 - 11.1 sec   TROPONIN I    Collection Time: 01/08/20  9:40 PM   Result Value Ref Range    Troponin-I, Qt. <0.05 <0.05 ng/mL   POC CREATININE    Collection Time: 01/08/20  9:43 PM   Result Value Ref Range    Creatinine (POC) 2.4 (H) 0.6 - 1.3 mg/dL    GFRAA, POC 32 (L) >60 ml/min/1.73m2    GFRNA, POC 26 (L) >60 ml/min/1.73m2   EKG, 12 LEAD, INITIAL    Collection Time: 01/08/20 10:12 PM   Result Value Ref Range    Ventricular Rate 75 BPM    Atrial Rate 75 BPM    P-R Interval 220 ms    QRS Duration 96 ms    Q-T Interval 480 ms    QTC Calculation (Bezet) 536 ms    Calculated R Axis 6 degrees    Calculated T Axis 23 degrees    Diagnosis       Sinus rhythm with 1st degree AV block with occasional premature ventricular   complexes  Incomplete right bundle branch block  Septal infarct , age undetermined  Prolonged QT  When compared with ECG of 05-AUG-2019 12:11,  premature ventricular complexes are now present  CA interval has increased  Incomplete right bundle branch block has replaced Nonspecific   intraventricular conduction delay  Septal infarct is now present     URINALYSIS W/ REFLEX CULTURE    Collection Time: 01/08/20 10:52 PM   Result Value Ref Range    Color YELLOW/STRAW      Appearance CLEAR CLEAR      Specific gravity 1.023 1.003 - 1.030      pH (UA) 6.0 5.0 - 8.0      Protein NEGATIVE  NEG mg/dL    Glucose NEGATIVE  NEG mg/dL    Ketone NEGATIVE  NEG mg/dL    Bilirubin NEGATIVE  NEG      Blood NEGATIVE  NEG      Urobilinogen 0.2 0.2 - 1.0 EU/dL    Nitrites NEGATIVE  NEG      Leukocyte Esterase NEGATIVE  NEG      WBC 0-4 0 - 4 /hpf    RBC 0-5 0 - 5 /hpf    Epithelial cells FEW FEW /lpf    Bacteria NEGATIVE  NEG /hpf    UA:UC IF INDICATED CULTURE NOT INDICATED BY UA RESULT CNI      Hyaline cast 0-2 0 - 5 /lpf   LIPID PANEL    Collection Time: 01/09/20  2:45 AM   Result Value Ref Range    LIPID PROFILE          Cholesterol, total 117 <200 MG/DL    Triglyceride 96 <150 MG/DL    HDL Cholesterol 41 MG/DL    LDL, calculated 56.8 0 - 100 MG/DL    VLDL, calculated 19.2 MG/DL    CHOL/HDL Ratio 2.9 0.0 - 5.0     HEMOGLOBIN A1C WITH EAG    Collection Time: 01/09/20  2:45 AM   Result Value Ref Range    Hemoglobin A1c 6.0 (H) 4.0 - 5.6 %    Est. average glucose 126 mg/dL   SED RATE (ESR)    Collection Time: 01/09/20  2:45 AM   Result Value Ref Range    Sed rate, automated 54 (H) 0 - 20 mm/hr   CRP, HIGH SENSITIVITY    Collection Time: 01/09/20  2:45 AM   Result Value Ref Range    CRP, High sensitivity 6.3 mg/L   DUPLEX CAROTID BILATERAL    Collection Time: 01/09/20  9:22 AM   Result Value Ref Range    Right subclavian sys 107.0 cm/s    RIGHT SUBCLAVIAN ARTERY D 0.00 cm/s    Right cca dist sys 134.5 cm/s    Right CCA dist carranza 18.4 cm/s    Right CCA prox sys 143.0 cm/s    Right CCA prox carranza 14.9 cm/s    Right ICA dist sys 98.3 cm/s    Right ICA dist carranza 21.4 cm/s    Right ICA mid sys 91.9 cm/s    Right ICA mid carranza 19.2 cm/s    Right ICA prox sys 104.5 cm/s    Right ICA prox carranza 21.5 cm/s    Right eca sys 151.3 cm/s    RIGHT EXTERNAL CAROTID ARTERY D 0.00 cm/s    Right vertebral sys 50.4 cm/s    RIGHT VERTEBRAL ARTERY D 12.82 cm/s    Right ICA/CCA sys 0.8     Left subclavian sys 135.2 cm/s    LEFT SUBCLAVIAN ARTERY D 0.00 cm/s    Left CCA dist sys 107.9 cm/s    Left CCA dist carranza 18.7 cm/s    Left CCA prox sys 151.0 cm/s    Left CCA prox carranza 24.5 cm/s    Left ICA dist sys 66.1 cm/s    Left ICA dist carranza 16.8 cm/s    Left ICA mid sys 151.4 cm/s    Left ICA mid carranza 28.7 cm/s    Left ICA prox sys 213.3 cm/s    Left ICA prox carranza 38.5 cm/s    Left ECA sys 150.7 cm/s    LEFT EXTERNAL CAROTID ARTERY D 0.00 cm/s    Left vertebral sys 72.4 cm/s    LEFT VERTEBRAL ARTERY D 13.99 cm/s    Left ICA/CCA sys 1.98         HOME MEDS  Prior to Admission Medications   Prescriptions Last Dose Informant Patient Reported? Taking?    GLUCOSAMINE HCL/CHONDR PETERSEN A NA (GLUCOSAMINE-CHONDROITIN) 750-600 mg Tab 1/8/2020 at Unknown time  Yes Yes   Sig: Take 1 Tab by mouth daily. Brand: Move Free   MULTIVITS W-FE,OTHER MIN (CENTRUM PO) 1/8/2020 at Unknown time  Yes Yes   Sig: Take 1 Tab by mouth daily. OXYGEN-AIR DELIVERY SYSTEMS (HORIZON NASAL CPAP SYSTEM) 1/8/2020 at Unknown time  Yes Yes   Sig: by Does Not Apply route. VITAMIN D3 2,000 unit cap capsule 1/8/2020 at Unknown time  Yes Yes   Sig: Take 2,000 Units by mouth daily. albuterol (PROVENTIL HFA, VENTOLIN HFA, PROAIR HFA) 90 mcg/actuation inhaler Not Taking at Unknown time  No No   Sig: Take 1 Puff by inhalation every six (6) hours as needed for Wheezing. allopurinol (ZYLOPRIM) 100 mg tablet   Yes No   Sig: Take 100 mg by mouth every morning. amLODIPine (NORVASC) 5 mg tablet 1/8/2020 at Unknown time  Yes Yes   Sig: Take 5 mg by mouth daily. aspirin delayed-release 81 mg tablet 1/2/2020 at Unknown time  Yes Yes   Sig: Take 81 mg by mouth daily. atorvastatin (LIPITOR) 40 mg tablet 1/8/2020 at Unknown time  No Yes   Sig: TAKE 1 TABLET BY MOUTH EVERYDAY AT BEDTIME   baclofen (LIORESAL) 10 mg tablet Not Taking at Unknown time  Yes No   Sig: Take  by mouth three (3) times daily. chlorthalidone (HYGROTEN) 25 mg tablet 1/8/2020 at Unknown time  No Yes   Sig: TAKE 1/2 TABLET BY MOUTH EVERY DAY   citalopram (CELEXA) 40 mg tablet 1/8/2020 at Unknown time  No Yes   Sig: TAKE 1 TABLET BY MOUTH EVERY DAY   clopidogrel (PLAVIX) 75 mg tab 1/8/2020 at Unknown time  No Yes   Sig: TAKE 1 TABLET BY MOUTH EVERY DAY   fluocinoNIDE (LIDEX) 0.05 % external solution 1/2/2020 at Unknown time  Yes Yes   Sig: Apply  to affected area. isosorbide mononitrate ER (IMDUR) 30 mg tablet 1/8/2020 at Unknown time  No Yes   Sig: TAKE 1/2 TABLET BY MOUTH EVERY DAY   ketoconazole (NIZORAL) 2 % shampoo 1/8/2020 at Unknown time  Yes Yes   Sig: Apply  to affected area. methylPREDNISolone (MEDROL DOSEPACK) 4 mg tablet Not Taking at Unknown time  No No   Sig: Take 1 Tab by mouth Specific Days and Specific Times.    metoprolol succinate (TOPROL-XL) 25 mg XL tablet 1/8/2020 at Unknown time  No Yes   Sig: TAKE 1 TABLET BY MOUTH EVERY DAY   nitroglycerin (NITROSTAT) 0.4 mg SL tablet Not Taking at Unknown time  No No   Sig: TAKE 1 TABLET BY SUBLINGUAL ROUTE EVERY 5 MINUTES AS NEEDED FOR CHEST PAIN. pantoprazole (PROTONIX) 40 mg tablet 1/8/2020 at Unknown time  No Yes   Sig: TAKE 1 TABLET BY MOUTH EVERY DAY   rOPINIRole (REQUIP) 0.5 mg tablet 1/8/2020 at Unknown time  No Yes   Sig: Take 1- 2 tablets daily in the evening   telmisartan (MICARDIS) 40 mg tablet 1/8/2020 at Unknown time  No Yes   Sig: Take 1 Tab by mouth daily. Facility-Administered Medications: None       CURRENT MEDS  Current Facility-Administered Medications   Medication Dose Route Frequency    allopurinol (ZYLOPRIM) tablet 100 mg  100 mg Oral 7am    amLODIPine (NORVASC) tablet 5 mg  5 mg Oral DAILY    aspirin delayed-release tablet 81 mg  81 mg Oral DAILY    atorvastatin (LIPITOR) tablet 80 mg  80 mg Oral QHS    baclofen (LIORESAL) tablet 10 mg  10 mg Oral TID    chlorthalidone (HYGROTEN) tablet 12.5 mg  12.5 mg Oral DAILY    citalopram (CELEXA) tablet 40 mg  40 mg Oral DAILY    clopidogrel (PLAVIX) tablet 75 mg  75 mg Oral DAILY    isosorbide mononitrate ER (IMDUR) tablet 15 mg  15 mg Oral DAILY    metoprolol succinate (TOPROL-XL) XL tablet 25 mg  25 mg Oral DAILY    pantoprazole (PROTONIX) tablet 40 mg  40 mg Oral ACB    rOPINIRole (REQUIP) tablet 0.5 mg  0.5 mg Oral QHS    heparin (porcine) injection 5,000 Units  5,000 Units SubCUTAneous Q8H       Stroke workup  MRI brain  Pending    CT head  Normal    CT perfusion  Normal    CT angiogram head and neck  Bilateral carotid bifurcation disease more prominent on the left.   50% right ICA stenosis and approximately 80% left ICA stenosis    Carotid ultrasound  50 to 69% left ICA stenosis  Less than 50% right ICA stenosis    Transthoracic echo  Pending    Stroke labs:  HgBA1c    Lab Results   Component Value Date/Time Hemoglobin A1c 6.0 (H) 01/09/2020 02:45 AM     LDL   Lab Results   Component Value Date/Time    LDL, calculated 56.8 01/09/2020 02:45 AM       IMPRESSION:  Pineda Sparks is a 66 y.o. male who presents with new onset transient left eye blurred vision. On evaluation patient did have 80 % left ICA stenosis. I suspect an embolic phenomena to the retinal artery on the left. Recommend vascular surgery evaluation. We will continue the patient on aspirin, Plavix and statin. MRI of the brain and TTE are pending. RECOMMENDATIONS:  - MRI brain w/o C - Pending  - TTE - Pending   vascular surgery consult  - Telemetry  - Permissive HTN (SBP<220/<120) for 24 hrs from symptom onset and then BP goal is less than 140/90  - Continue ASA 81 mg daily and Plavix 75 mg daily (was taking dual antiplatelet therapy at home)  - Continue atorvastatin 80 mg daily  - ST/OT/PT lila    Thank you very much for this consultation.       Thao Green MD  Neurologist

## 2020-01-09 NOTE — PROGRESS NOTES
OCCUPATIONAL THERAPY EVALUATION/DISCHARGE  Patient: Tierra Perkins (21 y.o. male)  Date: 1/9/2020  Primary Diagnosis: Symptomatic carotid artery stenosis, bilateral [I65.23]  Acute CVA (cerebrovascular accident) ([facility] [address] [I63.9]       Precautions:       ASSESSMENT  Based on the objective data described below, the patient presents with c/o L sided neck pain, mild pain with L knee flexion (present at baseline and Hx of L TKA), and impaired sensation in bilat feet (2/2 neuropathy). Reports resolution of diplopia and no dizziness during mobility. CT head was negative for acute processes. MRI pending. Per chart, Pt with bilateral carotid artery stenosis (L>R). Demonstrates no functional deficits impacting his safety with performing his dynamic ADL routine. Did recommend additional long handled AE to maximize Pts safety/independence with LB ADLs d/t reported difficulty maintaining legs crossed position during dressing. Pt was educated re: importance of performing daily skin checks to bilat feet 2/2 neuropathy and not wearing socks. Pt not in need of acute skilled OT, therefore OT will sign off. Current Level of Function (ADLs/self-care): Independent    Other factors to consider for discharge: None     PLAN :  Recommend with staff: None    Recommendation for discharge: (in order for the patient to meet his/her long term goals)  No skilled occupational therapy/ follow up rehabilitation needs identified at this time. This discharge recommendation:  Has been made in collaboration with the attending provider and/or case management    IF patient discharges home will need the following DME: Recommended Pt purchase sock aide and elastic shoe laces to maximize independence with LB ADLs d/t reported difficulty bending forward for long periods of time and d/t decreased knee AROM.         OBJECTIVE DATA SUMMARY:   HISTORY:   Past Medical History:   Diagnosis Date    Abnormal stress echo 5/12/2015    Anemia NEC 03/2013 Last 2-3 months; finished taking iron supplement    Anxiety     CAD (coronary artery disease) 2009    cath Anirudh Mcbride) revealed 20%RCA and 50%2nd diagonal    Calculus of kidney     Chronic kidney disease     sees nephrologist every 6 months    Chronic kidney disease, stage III (moderate) ([facility] [address] 10/11/2009    30% kidney function    Diabetes ([facility] [address]     Pre-diabetic    DJD (degenerative joint disease)     GERD (gastroesophageal reflux disease) 10/11/2009    controlled with medication    Gout     Hypertension     Liver disease     fatty liver    Obesity     Obstructive sleep apnea (adult) (pediatric) 10/11/2009    nasal pillows; pressure set at 10-11 - uses cpap    Peripheral neuropathy 10/11/2009    bilateral feet (numbness)    Prediabetes     RLS (restless legs syndrome) 10/11/2009    S/P coronary artery stent placement 5/12/2015    5/11/15 PCI./MALI to LCx     Past Surgical History:   Procedure Laterality Date    HX HEART CATHETERIZATION  2009, 7/17    x1 stent    HX HERNIA REPAIR      McTamaney/umbilical    HX KNEE REPLACEMENT Left 7/23/11     LEFT TOTAL KNEE ARTHROPLASTY    HX ORTHOPAEDIC      left great toe pinning/plate    HX ORTHOPAEDIC      left shoulder manipulation    HX UROLOGICAL      basket extraction of kidney stones    FL COLONOSCOPY FLX DX W/COLLJ SPEC WHEN PFRMD  8/5/2010         FL COLSC FLX W/RMVL OF TUMOR POLYP LESION SNARE TQ  9/24/2014            Prior Level of Function/Environment/Context: Retired. 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 Drives.      Expanded or extensive additional review of patient history:   Home Situation  Home Environment: Private residence  # Steps to Enter: 4(mostly uses ramp )  Wheelchair Ramp: Yes  One/Two Story Residence: Two story  # of Interior Steps: 13(lift chair available )  Interior Rails: Right  Lift Chair Available: Yes  Living Alone: No  Support Systems: Family member(s), Spouse/Significant Other/Partner  Patient Expects to be Discharged to[de-identified] Private residence  Current DME Used/Available at Home: Cyrilla Crape, straight, Walker, rolling, Grab bars, Shower chair  Tub or Shower Type: Shower    Hand dominance: Right    EXAMINATION OF PERFORMANCE DEFICITS:  Cognitive/Behavioral Status:  Neurologic State: Alert  Orientation Level: Oriented X4  Cognition: Appropriate decision making  Perception: Appears intact  Perseveration: No perseveration noted  Safety/Judgement: Awareness of environment    Skin: Intact    Edema: None    Hearing: Auditory  Auditory Impairment: None    Vision/Perceptual:                           Acuity: Within Defined Limits    Corrective Lenses: Glasses    Range of Motion:  AROM: Generally decreased, functional(decreased shoulder AROM BUEs)                         Strength:  Strength: Within functional limits                Coordination:  Coordination: Within functional limits  Fine Motor Skills-Upper: Left Intact; Right Intact    Gross Motor Skills-Upper: Left Intact; Right Intact    Tone & Sensation:  Tone: Normal  Sensation: Impaired(BLEs 2/2 neuropathy)                      Balance:  Sitting: Intact  Standing: Intact    Functional Mobility and Transfers for ADLs:  Bed Mobility:  Rolling: (received seated in chair)    Transfers:  Sit to Stand: Independent  Stand to Sit: Independent  Bed to Chair: Independent  Bathroom Mobility: Independent  Toilet Transfer : Independent  Tub Transfer: Independent  Shower Transfer: Independent    ADL Assessment:  Feeding: Independent    Oral Facial Hygiene/Grooming: Independent    Bathing: Independent    Upper Body Dressing: Independent    Lower Body Dressing: Modified independent(increased time d/t knee pain with legs crossed)    Toileting: Independent                ADL Intervention and task modifications:    Lower Body Dressing Assistance  Socks: Modified independent  Leg Crossed Method Used: Yes(with mild pain in L knee with legs crossed)  Position Performed: Seated in chair    Cognitive Retraining  Safety/Judgement: Awareness of environment      Occupational Therapy Evaluation Charge Determination   History Examination Decision-Making   LOW Complexity : Brief history review  LOW Complexity : 1-3 performance deficits relating to physical, cognitive , or psychosocial skils that result in activity limitations and / or participation restrictions  LOW Complexity : No comorbidities that affect functional and no verbal or physical assistance needed to complete eval tasks       Based on the above components, the patient evaluation is determined to be of the following complexity level: LOW   Pain Rating:  C/o pain in L side of neck and L knee with attempt at legs crossed. Activity Tolerance:   Good  Please refer to the flowsheet for vital signs taken during this treatment. After treatment patient left in no apparent distress:    Sitting in chair, Call bell within reach, Bed / chair alarm activated and Caregiver / family present    COMMUNICATION/EDUCATION:   The patients plan of care was discussed with: Physical Therapist, Registered Nurse and Patient.     Thank you for this referral.  Lamont Ingram, OTR/L  Time Calculation: 18 mins

## 2020-01-09 NOTE — PROGRESS NOTES
Hospitalist Progress Note    NAME: Debora Damon   :  1945   MRN:  [id number]       Assessment / Plan:  Sudden onset of left eye vision loss likely CVA  -MRI of the brain is pending at this time. Echo pending. -CTA shows bilateral carotid disease more on the left  -A1c 6.0, LDL is 56.8.  -Continue home aspirin and Plavix. Continue Lipitor 80 mg.  -Neurology is following. PT OT following.  -Vascular consulted due to left carotid stenosis    Bilateral carotid disease with prominent disease on left with Possible Embolic TIA  -Vascular consulted for evaluation    CAD status post stents  Hypertension  Continue home cardiac meds-aspirin, Plavix, Lipitor, chlorthalidone, amlodipine, imdur, metoprolol  Holding telmisartan for now     CKD stage III  Creatinine stable at 2.1  Follows Dr. Keaton Holland as outpatient  Holding telmisartan for now and consider resuming in a.m.     Code Status: Full code  Surrogate Decision Maker: Wife Jael Sinclair     DVT Prophylaxis: Subcu heparin  GI Prophylaxis: not indicated     Baseline: Patient lives with wife at home, independent with ADLs                     Body mass index is 29.17 kg/m². Subjective:     Chief Complaint / Reason for Physician Visit  Still has blurred vision in left eye. Denies weakness, tingling or numbness. Review of Systems:  Symptom Y/N Comments  Symptom Y/N Comments   Fever/Chills    Chest Pain     Poor Appetite    Edema     Cough    Abdominal Pain     Sputum    Joint Pain     SOB/SMITH    Pruritis/Rash     Nausea/vomit    Tolerating PT/OT     Diarrhea    Tolerating Diet     Constipation    Other       Could NOT obtain due to:      Objective:     VITALS:   Last 24hrs VS reviewed since prior progress note.  Most recent are:  Patient Vitals for the past 24 hrs:   Temp Pulse Resp BP SpO2   20 1205 98 °F (36.7 °C) 62 18 111/56 97 %   20 0757 98.5 °F (36.9 °C) 74 18 147/65 96 %   20 0449 98.6 °F (37 °C) 75 18 132/44 95 % 01/09/20 0400  64      01/09/20 0104 97.9 °F (36.6 °C) 79 20 170/71 98 %   01/09/20 0030  77 24 138/58 98 %   01/08/20 2230  76 18 141/57 99 %   01/08/20 2132 97.8 °F (36.6 °C) 75 11 133/55 98 %     No intake or output data in the 24 hours ending 01/09/20 1244     PHYSICAL EXAM:  General: WD, WN. Alert, cooperative, no acute distress    EENT:  EOMI. Anicteric sclerae. MMM  Resp:  CTA bilaterally, no wheezing or rales. No accessory muscle use  CV:  Regular  rhythm,  No edema  GI:  Soft, Non distended, Non tender.  +Bowel sounds  Neurologic:  Alert and oriented X 3, normal speech,   Psych:   Not anxious nor agitated  Skin:  No rashes.   No jaundice    Reviewed most current lab test results and cultures  YES  Reviewed most current radiology test results   YES  Review and summation of old records today    NO  Reviewed patient's current orders and MAR    YES  PMH/SH reviewed - no change compared to H&P          Current Facility-Administered Medications:     allopurinol (ZYLOPRIM) tablet 100 mg, 100 mg, Oral, 7am, Ro Desir MD, 100 mg at 01/09/20 0817    amLODIPine (NORVASC) tablet 5 mg, 5 mg, Oral, DAILY, Ro Desir MD, 5 mg at 01/09/20 0816    aspirin delayed-release tablet 81 mg, 81 mg, Oral, DAILY, Ro Desir MD, 81 mg at 01/09/20 0817    atorvastatin (LIPITOR) tablet 80 mg, 80 mg, Oral, QHS, Ro Desir MD    baclofen (LIORESAL) tablet 10 mg, 10 mg, Oral, TID, Berl Ro Jon MD, 10 mg at 01/09/20 0817    chlorthalidone (HYGROTEN) tablet 12.5 mg, 12.5 mg, Oral, DAILY, Ro Desir MD, 12.5 mg at 01/09/20 1035    citalopram (CELEXA) tablet 40 mg, 40 mg, Oral, DAILY, Ro Desir MD, 40 mg at 01/09/20 0816    clopidogrel (PLAVIX) tablet 75 mg, 75 mg, Oral, DAILY, Ro Desir MD, 75 mg at 01/09/20 0817    isosorbide mononitrate ER (IMDUR) tablet 15 mg, 15 mg, Oral, DAILY, Ro Pacheco MD, 15 mg at 01/09/20 0817    metoprolol succinate (TOPROL-XL) XL tablet 25 mg, 25 mg, Oral, DAILY, Ro Elizabeth MD, 25 mg at 01/09/20 0817    nitroglycerin (NITROSTAT) tablet 0.4 mg, 0.4 mg, SubLINGual, PRN, Omar Giles MD    pantoprazole (PROTONIX) tablet 40 mg, 40 mg, Oral, ACB, Ro Desir MD, 40 mg at 01/09/20 0817    rOPINIRole (REQUIP) tablet 0.5 mg, 0.5 mg, Oral, QHS, Ro Desir MD    acetaminophen (TYLENOL) tablet 650 mg, 650 mg, Oral, Q4H PRN, 650 mg at 01/09/20 0816 **OR** acetaminophen (TYLENOL) solution 650 mg, 650 mg, Per NG tube, Q4H PRN **OR** acetaminophen (TYLENOL) suppository 650 mg, 650 mg, Rectal, Q4H PRN, Ro Elizabeth MD    labetalol (NORMODYNE;TRANDATE) injection 5 mg, 5 mg, IntraVENous, Q10MIN PRN, Marii ZHOU MD    heparin (porcine) injection 5,000 Units, 5,000 Units, SubCUTAneous, Q8H, Marii ZHOU MD, 5,000 Units at 01/09/20 1034    albuterol (PROVENTIL VENTOLIN) nebulizer solution 2.5 mg, 2.5 mg, Nebulization, Q6H PRN, Rikki Dumont MD  ________________________________________________________________________  Care Plan discussed with:    Comments   Patient y    Family      RN y    Care Manager     Consultant                        Multidiciplinary team rounds were held today with , nursing, pharmacist and clinical coordinator. Patient's plan of care was discussed; medications were reviewed and discharge planning was addressed. ________________________________________________________________________  Total NON critical care TIME: 35   Minutes    Total CRITICAL CARE TIME Spent:   Minutes non procedure based      Comments   >50% of visit spent in counseling and coordination of care     ________________________________________________________________________  Rosa Rebolledo MD     Procedures: see electronic medical records for all procedures/Xrays and details which were not copied into this note but were reviewed prior to creation of Plan.       LABS:  I reviewed today's most current labs and imaging studies.   Pertinent labs include:  Recent Labs     01/08/20 2140   WBC 11.0   HGB 10.8*   HCT 33.7*        Recent Labs     01/08/20 2140      K 3.9   *   CO2 28   GLU 86   BUN 28*   CREA 2.14*   CA 9.1   ALB 3.6   TBILI 0.6   SGOT 18   ALT 26   INR 1.1       Signed: Ritesh Mena MD

## 2020-01-09 NOTE — ED NOTES
Patient arrives via EMS for c/o neck pain and L sided blurred vision and dizziness, sudden onset while watching TV about 2000, due to dizziness and blurred vision, MD immediately made aware. CODE S Level 1 initiated. Patient to CT with this tSacie Borjas MD and charge nurse. Tele Neurologist paged and on screen with patient. Tele Neurologist advised against TPA at this time. CTA done as well. 1- Dr. Young Poag to bedside to update patient and family on plan of care at this time.

## 2020-01-09 NOTE — ROUTINE PROCESS
Bedside and Verbal shift change report given to [address] (oncoming nurse) by Anneliese Lynch RN (offgoing nurse).  Report included the following information SBAR, ED Summary and Recent Results.     Zone Phone:   [phone]        Significant changes during shift:  None         Patient Information     Mireille Enciso  66 y.o.  1/8/2020  9:19 PM by Ramandeep Suazo MD. Mireille Enciso was admitted from Home     Problem List          Patient Active Problem List     Diagnosis Date Noted    Symptomatic carotid artery stenosis, bilateral 01/08/2020    Acute CVA (cerebrovascular accident) ([address] 01/08/2020    Pure hypercholesterolemia 08/30/2019    Right sided sciatica 08/30/2019    Stable angina ([address] 07/31/2019    History of kidney stones 07/12/2018    Chest pain 07/07/2017    Coronary artery disease due to lipid rich plaque 04/27/2016    Coronary artery disease involving native coronary artery of native heart without angina pectoris 03/16/2016    S/P coronary artery stent placement 05/12/2015    Benign essential tremor 01/22/2014    Hypertensive kidney disease with chronic kidney disease stage III ([address] 11/22/2013    Iron deficiency 05/23/2013    Obesity 01/30/2013    Gout 01/30/2013    Prediabetes 01/07/2012    Chronic kidney disease, stage III (moderate) ([address] 10/11/2009    Mixed hyperlipidemia 10/11/2009    Obstructive sleep apnea 10/11/2009    RLS (restless legs syndrome) 10/11/2009    Peripheral neuropathy 10/11/2009    DJD (degenerative joint disease), multiple sites 10/11/2009    Essential hypertension 10/11/2009    Allergic rhinitis 10/11/2009    GERD (gastroesophageal reflux disease) 10/11/2009    ED (erectile dysfunction) 10/11/2009    Anxiety associated with depression 10/11/2009           Past Medical History:   Diagnosis Date    Abnormal stress echo 5/12/2015    Anemia NEC 03/2013     Last 2-3 months; finished taking iron supplement    Anxiety      CAD (coronary artery disease) 2009   cath Julisa Trevino) revealed 20%RCA and 50%2nd diagonal    Calculus of kidney      Chronic kidney disease       sees nephrologist every 6 months    Chronic kidney disease, stage III (moderate) ([facility]) 10/11/2009     30% kidney function    Diabetes ([address]       Pre-diabetic    DJD (degenerative joint disease)      GERD (gastroesophageal reflux disease) 10/11/2009     controlled with medication    Gout      Hypertension      Liver disease       fatty liver    Obesity      Obstructive sleep apnea (adult) (pediatric) 10/11/2009     nasal pillows; pressure set at 10-11 - uses cpap    Peripheral neuropathy 10/11/2009     bilateral feet (numbness)    Prediabetes      RLS (restless legs syndrome) 10/11/2009    S/P coronary artery stent placement 5/12/2015     5/11/15 PCI./MALI to LCx            Core Measures:     CVA: Yes Yes    Activity Status:     OOB to Chair Yes  Ambulated this shift Yes   Bed Rest No       DVT prophylaxis:     DVT prophylaxis Med- Yes  DVT prophylaxis SCD or PHONG- No      Wounds: (If Applicable)     Wounds- No     Patient Safety:     Falls Score Total Score: 2  Safety Level_______  Bed Alarm On? Yes  Sitter?  No     Plan for upcoming shift:  safety, neuro checks      Discharge Plan: No TBD     Active Consults:  IP CONSULT TO NEUROLOGY  IP CONSULT TO VASCULAR SURGERY

## 2020-01-09 NOTE — PROGRESS NOTES
PHYSICAL THERAPY EVALUATION WITH DISCHARGE  Patient: Jonathan Baird (1 y.o. male)  Date: 1/9/2020  Primary Diagnosis: Symptomatic carotid artery stenosis, bilateral [I65.23]  Acute CVA (cerebrovascular accident) ([facility] [I63.9]       Precautions:          ASSESSMENT  Based on the objective data described below, the patient presents with L sided neck pain (reports x10 days) resulting in decreased cervical AROM however good strength, intact balance, and overall baseline independent functional mobility. Pt with complete resolution of previous blurred vision, demonstrating intact vision upon assessment. Strength equal and intact throughout. Gait speed decreased however steady overall with no LOB/balance deficits noted. Pt reports feeling at his baseline independent level with this author in agreement. No further skilled therapy needs at discharge. Functional Outcome Measure: The patient scored Total: 45/56 on the Mckee Balance Assessment which is indicative of low fall risk. 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 PLAN :  Recommendation for discharge: (in order for the patient to meet his/her long term goals)  No skilled physical therapy/ follow up rehabilitation needs identified at this time. This discharge recommendation:  Has been made in collaboration with the attending provider and/or case management    IF patient discharges home will need the following DME: none         SUBJECTIVE:   Patient stated My neck started bothering my 10 days ago.     OBJECTIVE DATA SUMMARY:   HISTORY:    Past Medical History:   Diagnosis Date    Abnormal stress echo 5/12/2015    Anemia NEC 03/2013    Last 2-3 months; finished taking iron supplement    Anxiety     CAD (coronary artery disease) 2009    cath Alma Grace) revealed 20%RCA and 50%2nd diagonal    Calculus of kidney     Chronic kidney disease     sees nephrologist every 6 months    Chronic kidney disease, stage III (moderate) ([facility] 10/11/2009    30% kidney function    Diabetes ([facility]     Pre-diabetic    DJD (degenerative joint disease)     GERD (gastroesophageal reflux disease) 10/11/2009    controlled with medication    Gout     Hypertension     Liver disease     fatty liver    Obesity     Obstructive sleep apnea (adult) (pediatric) 10/11/2009    nasal pillows; pressure set at 10-11 - uses cpap    Peripheral neuropathy 10/11/2009    bilateral feet (numbness)    Prediabetes     RLS (restless legs syndrome) 10/11/2009    S/P coronary artery stent placement 5/12/2015    5/11/15 PCI./MALI to LCx     Past Surgical History:   Procedure Laterality Date    HX HEART CATHETERIZATION  2009, 7/17    x1 stent    HX HERNIA REPAIR      [place]aney/umbilical    HX KNEE REPLACEMENT Left 7/23/11     LEFT TOTAL KNEE ARTHROPLASTY    HX ORTHOPAEDIC      left great toe pinning/plate    HX ORTHOPAEDIC      left shoulder manipulation    HX UROLOGICAL      basket extraction of kidney stones    NH COLONOSCOPY FLX DX W/COLLJ SPEC WHEN PFRMD  8/5/2010         NH COLSC FLX W/RMVL OF TUMOR POLYP LESION SNARE TQ  9/24/2014            Prior level of function: Independent w/ ambulation and ADLs. Reports history of 3-4 falls over previous 6 months. Lives at home w/ wife and daughter. 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 Still driving. Retired however stays active, stating he was working on his tractor yesterday.    Personal factors and/or comorbidities impacting plan of care:     Home Situation  Home Environment: Private residence  # Steps to Enter: 4(mostly uses ramp )  Wheelchair Ramp: Yes  One/Two Story Residence: Two story  # of Interior Steps: 13(lift chair available )  Interior Rails: Right  Lift Chair Available: Yes  Living Alone: No  Support Systems: Family member(s), Spouse/Significant Other/Partner  Patient Expects to be Discharged to[de-identified] Private residence  Current DME Used/Available at Home: Bunny Son, straight, Walker, rolling, Grab bars, Shower chair  Tub or Shower Type: Shower    EXAMINATION/PRESENTATION/DECISION MAKING:   Critical Behavior:  Neurologic State: Alert  Orientation Level: Oriented X4  Cognition: Appropriate decision making  Safety/Judgement: Awareness of environment  Hearing: Auditory  Auditory Impairment: None  Skin:  intact  Edema: none noted   Range Of Motion:  AROM: Generally decreased, functional(decreased shoulder AROM BUEs)                       Strength:    Strength:  Within functional limits                    Tone & Sensation:   Tone: Normal              Sensation: Impaired(BLEs 2/2 neuropathy)               Coordination:  Coordination: Within functional limits  Vision:   Acuity: Within Defined Limits  Corrective Lenses: Glasses  Functional Mobility:  Bed Mobility:  Rolling: (received seated in chair)           Transfers:  Sit to Stand: Independent  Stand to Sit: Independent                       Balance:   Sitting: Intact  Standing: Intact  Ambulation/Gait Training:  Distance (ft): 180 Feet (ft)  Assistive Device: Gait belt  Ambulation - Level of Assistance: Independent        Gait Abnormalities: Decreased step clearance        Base of Support: Widened     Speed/Anabel: Slow                         Functional Measure  Mckee Balance Test:    Sitting to Standin  Standing Unsupported: 4  Sitting with Back Unsupported: 4  Standing to Sittin  Transfers: 4  Standing Unsupported with Eyes Closed: 4  Standing Unsupported with Feet Together: 4  Reach Forward with Outstretched Arm: 4   Object: 0  Turn to Look Over Shoulders: 4  Turn 360 Degrees: 4  Alternate Foot on Step/Stool: 2  Standing Unsupported One Foot in Front: 2  Stand on One Le  Total: 45/56         56=Maximum possible score;   0-20=High fall risk  21-40=Moderate fall risk   41-56=Low fall risk        Physical Therapy Evaluation Charge Determination   History Examination Presentation Decision-Making   MEDIUM  Complexity : 1-2 comorbidities / personal factors will impact the outcome/ POC  MEDIUM Complexity : 3 Standardized tests and measures addressing body structure, function, activity limitation and / or participation in recreation  MEDIUM Complexity : Evolving with changing characteristics  MEDIUM Complexity : FOTO score of 26-74      Based on the above components, the patient evaluation is determined to be of the following complexity level: MEDIUM    Pain Rating:  Reported L sided neck pain however did not rate pain    Activity Tolerance:   Good  Please refer to the flowsheet for vital signs taken during this treatment. After treatment patient left in no apparent distress:   Sitting in chair, Call bell within reach, Bed / chair alarm activated and Caregiver / family present    COMMUNICATION/EDUCATION:   The patients plan of care was discussed with: Occupational Therapist, Registered Nurse and . Patient was educated regarding His deficit(s) of blurred vision, neck pain as this relates to His diagnosis of r/o CVA. He demonstrated Good understanding as evidenced by verbalization. Patient and/or family was verbally educated on the BE FAST acronym for signs/symptoms of CVA and TIA. BE FAST was written on patient's communication board  for visual education and reinforcement. All questions answered with patient indicating verbalization understanding. Fall prevention education was provided and the patient/caregiver indicated understanding., Patient/family have participated as able in goal setting and plan of care. and Patient/family agree to work toward stated goals and plan of care.     Thank you for this referral.  Timur Ibarra, PT, DPT   Time Calculation: 21 mins

## 2020-01-10 ENCOUNTER — PATIENT OUTREACH (OUTPATIENT)
Dept: CASE MANAGEMENT | Age: 75
End: 2020-01-10

## 2020-01-10 VITALS
SYSTOLIC BLOOD PRESSURE: 154 MMHG | RESPIRATION RATE: 16 BRPM | OXYGEN SATURATION: 98 % | WEIGHT: 239.6 LBS | BODY MASS INDEX: 29.18 KG/M2 | TEMPERATURE: 98.4 F | HEIGHT: 76 IN | DIASTOLIC BLOOD PRESSURE: 77 MMHG | HEART RATE: 78 BPM

## 2020-01-10 LAB
ANION GAP SERPL CALC-SCNC: 6 MMOL/L (ref 5–15)
BUN SERPL-MCNC: 26 MG/DL (ref 6–20)
BUN/CREAT SERPL: 13 (ref 12–20)
CALCIUM SERPL-MCNC: 8.9 MG/DL (ref 8.5–10.1)
CHLORIDE SERPL-SCNC: 107 MMOL/L (ref 97–108)
CO2 SERPL-SCNC: 28 MMOL/L (ref 21–32)
CREAT SERPL-MCNC: 2.05 MG/DL (ref 0.7–1.3)
ERYTHROCYTE [DISTWIDTH] IN BLOOD BY AUTOMATED COUNT: 14.1 % (ref 11.5–14.5)
GLUCOSE SERPL-MCNC: 96 MG/DL (ref 65–100)
HCT VFR BLD AUTO: 30.7 % (ref 36.6–50.3)
HGB BLD-MCNC: 10 G/DL (ref 12.1–17)
MAGNESIUM SERPL-MCNC: 1.8 MG/DL (ref 1.6–2.4)
MCH RBC QN AUTO: 31.4 PG (ref 26–34)
MCHC RBC AUTO-ENTMCNC: 32.6 G/DL (ref 30–36.5)
MCV RBC AUTO: 96.5 FL (ref 80–99)
NRBC # BLD: 0 K/UL (ref 0–0.01)
NRBC BLD-RTO: 0 PER 100 WBC
PLATELET # BLD AUTO: 184 K/UL (ref 150–400)
PMV BLD AUTO: 9.6 FL (ref 8.9–12.9)
POTASSIUM SERPL-SCNC: 3.9 MMOL/L (ref 3.5–5.1)
RBC # BLD AUTO: 3.18 M/UL (ref 4.1–5.7)
SODIUM SERPL-SCNC: 141 MMOL/L (ref 136–145)
TSH SERPL DL<=0.05 MIU/L-ACNC: 0.97 UIU/ML (ref 0.36–3.74)
WBC # BLD AUTO: 8.9 K/UL (ref 4.1–11.1)

## 2020-01-10 PROCEDURE — 80048 BASIC METABOLIC PNL TOTAL CA: CPT

## 2020-01-10 PROCEDURE — 36415 COLL VENOUS BLD VENIPUNCTURE: CPT

## 2020-01-10 PROCEDURE — 85027 COMPLETE CBC AUTOMATED: CPT

## 2020-01-10 PROCEDURE — 84443 ASSAY THYROID STIM HORMONE: CPT

## 2020-01-10 PROCEDURE — 74011250637 HC RX REV CODE- 250/637: Performed by: INTERNAL MEDICINE

## 2020-01-10 PROCEDURE — 74011250636 HC RX REV CODE- 250/636: Performed by: INTERNAL MEDICINE

## 2020-01-10 PROCEDURE — 83735 ASSAY OF MAGNESIUM: CPT

## 2020-01-10 RX ADMIN — ASPIRIN 81 MG: 81 TABLET ORAL at 09:02

## 2020-01-10 RX ADMIN — AMLODIPINE BESYLATE 5 MG: 5 TABLET ORAL at 09:02

## 2020-01-10 RX ADMIN — METOPROLOL SUCCINATE 25 MG: 25 TABLET, EXTENDED RELEASE ORAL at 09:02

## 2020-01-10 RX ADMIN — BACLOFEN 10 MG: 10 TABLET ORAL at 08:24

## 2020-01-10 RX ADMIN — CITALOPRAM HYDROBROMIDE 40 MG: 20 TABLET ORAL at 08:24

## 2020-01-10 RX ADMIN — ISOSORBIDE MONONITRATE 15 MG: 30 TABLET, EXTENDED RELEASE ORAL at 08:24

## 2020-01-10 RX ADMIN — CHLORTHALIDONE 12.5 MG: 25 TABLET ORAL at 09:02

## 2020-01-10 RX ADMIN — PANTOPRAZOLE SODIUM 40 MG: 40 TABLET, DELAYED RELEASE ORAL at 08:24

## 2020-01-10 RX ADMIN — HEPARIN SODIUM 5000 UNITS: 5000 INJECTION INTRAVENOUS; SUBCUTANEOUS at 01:43

## 2020-01-10 RX ADMIN — HEPARIN SODIUM 5000 UNITS: 5000 INJECTION INTRAVENOUS; SUBCUTANEOUS at 09:03

## 2020-01-10 RX ADMIN — ALLOPURINOL 100 MG: 100 TABLET ORAL at 08:24

## 2020-01-10 RX ADMIN — ACETAMINOPHEN 650 MG: 325 TABLET ORAL at 01:43

## 2020-01-10 RX ADMIN — CLOPIDOGREL BISULFATE 75 MG: 75 TABLET ORAL at 09:02

## 2020-01-10 NOTE — PROGRESS NOTES
Transitional Care Team: ROSETTA Discharge Note    Date of Assessment: 01/10/20  Time of Assessment:  12:03 PM      Kyler Hernandez is a 66 y.o. male inpatient at [facility] with stroke on  1/8. Assessment & Plan   Pt A+O, no neuro deficits. CT has shown carotid disease, and pt is scheduled to return to hospital Wednesday 1/15 for procedure. Aware to be NPO after MN Tuesday evening. Pt to call surgery center to question if/ when to hold his plavix and aspirin. Has follow up appointment with PCP. Current Code Status:  full    Medication Reconciliation:  was performed. Can patient afford medications:  yes    Who manages medications at home self    Emergency Contact  Spouse Dominique Garsia [phone]  Chart reviewed by me and ROSETTA (Healthy Understanding of Goals) program introduced to patient/family. The Transitional Care Team bridges the gaps in care and education surrounding discharge from the acute care facility. The objective is to empower the patient and family in taking a proactive role in the task of preventing readmission within the first thirty days after discharge from the acute care setting, The team is also involved in the efforts to reduce readmission to the acute care setting after stabilization and discharge from the acute care environment either to skilled nursing facilities or community.      Discharge With The Following Arrangements in place:    Future Appointments   Date Time Provider Seth Skelton   1/13/2020 10:30 AM Rosa Starr MD [address]   2/3/2020  3:15 PM Rosa Starr MD [address]   2/25/2020 11:15 AM Alie Nesbitt MD [address] [place] [address]   3/3/2020  9:40 AM Renetta Oquendo  [facility]       [facility] [place] vascular surgery 1/15  [facility] call information given to pt    Patient / Family was instructed on specific signs/symptoms to look for with regards to worsening of their medical conditions. Learning was assessed using teach back. The patient / family have added upcoming appointment dates to their [facility] [place] prior to discharge. Patient education focused on readmission zones as described as: The Red Zone: High risk for readmission, days 1-21   The Yellow Zone: Moderate  risk for readmission, days 22-29   The Green Zone: Lower risk for readmission, days 30 and after    The [facility] Team will follow patient from a distance while inpatient as well as be available for further transition disposition as needed. The JANINA TEAM will continue to offer support during the 30- 90 day discharge from acute care setting. Notified Ambulatory {Blank Single Select Template:[id number][de-identified] ,JANINA RN.       Signed By: Samuel Mathis RN     January 10, 2020

## 2020-01-10 NOTE — PROGRESS NOTES
Message sent to MD, Patient had a 7.34 second burst of V-tach at 0013, and returned to Sinus rhythm. Upon assessment patient alert and oriented no apparent distress noted, complaints of headache unrelieved by tylenol and left sided neck pain continues also has some dizziness. Awaiting return call.

## 2020-01-10 NOTE — PROGRESS NOTES
Call received from Dr. Dante Litten no interventions needed at this time, Lab order for Magnesium entered, continue to monitor.

## 2020-01-10 NOTE — PROGRESS NOTES
NEUROLOGY NOTE     Chief Complaint   Patient presents with    Neck Pain     Pt with sudden onset l-sided neck pain x 2000; BG 83    Dizziness     with neck pain x 2000    Blurred Vision     x 2000       SUBJECTIVE:  No overnight events  Vascular sx plans CEA on wednesday    HPI  Kyler Hernandez is a 66 y.o. male who presents to the hospital because of new onset transient left eye blurry vision. Patient states that yesterday evening he was watching television when all of a sudden he had neck pain and water running sensation in his neck and soon after that he did have blurry vision in his left eye. He came to the hospital.  He woke up today morning and his blurred vision has resolved. Patient did have a CT scan of the head which was normal.  CT angiogram of the head and neck did show 80% left ICA stenosis. Patient's HbA1c is 6 and LDL is less than 70. Patient is already on aspirin 81 mg a day and Plavix 75 mg daily. Patient is also taking Lipitor at home. Patient did have cardiac stenting around 3 months ago and had one around 4 years ago as well.   Neurology has been consulted for further management    ROS  A ten system review of constitutional, cardiovascular, respiratory, musculoskeletal, endocrine, skin, SHEENT, genitourinary, psychiatric and neurologic systems was obtained and is unremarkable except as stated in HPI     PMH  Past Medical History:   Diagnosis Date    Abnormal stress echo 5/12/2015    Anemia NEC 03/2013    Last 2-3 months; finished taking iron supplement    Anxiety     CAD (coronary artery disease) 2009    cath Mya Thomas) revealed 20%RCA and 50%2nd diagonal    Calculus of kidney     Chronic kidney disease     sees nephrologist every 6 months    Chronic kidney disease, stage III (moderate) ([address] 10/11/2009    30% kidney function    Diabetes ([address]     Pre-diabetic    DJD (degenerative joint disease)     GERD (gastroesophageal reflux disease) 10/11/2009    controlled with medication    Gout     Hypertension     Liver disease     fatty liver    Obesity     Obstructive sleep apnea (adult) (pediatric) 10/11/2009    nasal pillows; pressure set at 10- - uses cpap    Peripheral neuropathy 10/11/2009    bilateral feet (numbness)    Prediabetes     RLS (restless legs syndrome) 10/11/2009    S/P coronary artery stent placement 2015    5/11/15 PCI./MALI to LCx       FH  Family History   Problem Relation Age of Onset    Heart Disease Father     Hypertension Father     Other Father         abdominal aneurysm     Dementia Mother     Alzheimer Mother     Heart Disease Brother     Heart Attack Brother     Heart Disease Maternal Grandmother     Heart Disease Paternal Grandmother     Heart Attack Paternal Grandmother     Heart Disease Paternal Grandfather     Heart Attack Paternal Grandfather     Elevated Lipids Son     Elevated Lipids Daughter     Cancer Neg Hx     Diabetes Neg Hx     Stroke Neg Hx        SH  Social History     Socioeconomic History    Marital status:      Spouse name: Craig Hull Number of children: 2    Years of education: graduated then 4 year apprenticship    Highest education level: Associate degree: academic program   Social Needs    Financial resource strain: Not hard at all   [place] insecurity:     Worry: Never true     Inability: Never true    Transportation needs:     Medical: No     Non-medical: No   Tobacco Use    Smoking status: Former Smoker     Packs/day: 1.00     Years: 10.00     Pack years: 10.00     Types: Cigarettes     Last attempt to quit: 1968     Years since quittin.0    Smokeless tobacco: Never Used   Substance and Sexual Activity    Alcohol use: No     Alcohol/week: 0.0 standard drinks    Drug use: No    Sexual activity: Yes     Partners: Female     Birth control/protection: None   Lifestyle    Physical activity:     Days per week: 0 days     Minutes per session: 0 min    Stress: Very much Relationships    Social connections:     Talks on phone: More than three times a week     Gets together: More than three times a week     Attends Synagogue service: Not on file     Active member of club or organization: No     Attends meetings of clubs or organizations: Never     Relationship status:    Other Topics Concern       ALLERGIES  Allergies   Allergen Reactions    Penicillins Hives    Bactrim [Sulfamethoxazole-Trimethoprim] Hives    Codeine Itching    Lisinopril (Bulk) Cough    Neurontin [Gabapentin] Other (comments)     drowsy    Zostavax [Zoster Vaccine Live (Pf)] Rash     See 9/10/13 visit       PHYSICAL EXAMINATION:   Patient Vitals for the past 24 hrs:   Temp Pulse Resp BP SpO2   01/10/20 0816 98.1 °F (36.7 °C) 64 18 153/74 97 %   01/10/20 0415 98.2 °F (36.8 °C) 77 20 163/66 95 %   01/10/20 0400  (!) 59      01/10/20 0031  72      01/10/20 0029    125/50    01/09/20 2349 97.8 °F (36.6 °C) 75 19 142/58 97 %   01/09/20 2000  72      01/09/20 1902 99 °F (37.2 °C) 75 18 128/56 97 %   01/09/20 1455 98.5 °F (36.9 °C) 65 18 126/50 98 %   01/09/20 1205 98 °F (36.7 °C) 62 18 111/56 97 %        General:   General appearance: Pt is in no acute distress   Distal pulses are preserved    Neurological Examination:   Mental Status:  AAO x3. Speech is fluent. Follows commands, has normal fund of knowledge, attention, short term recall, comprehension and insight. Cranial Nerves: Visual fields are full. PERRL, Extraocular movements are full. Facial sensation intact. Facial movement intact. Hearing intact to conversation. Palate elevates symmetrically. Shoulder shrug symmetric. Tongue midline. Motor: Strength is 5/5 in all 4 ext. Normal tone. No atrophy. Sensation: Decreased pinprick sensation distally in the lower extremities    Reflexes: DTRs 2+ in upper extremities, 1+ at knees and absent at ankles. Plantar responses downgoing.      Coordination/Cerebellar: Intact to finger-nose-finger     Gait: Casual gait is normal.     Skin: No significant bruising or lacerations.     LAB DATA REVIEWED:    Recent Results (from the past 24 hour(s))   ECHO ADULT COMPLETE    Collection Time: 01/09/20 12:45 PM   Result Value Ref Range    Right Atrial Area 4C 15.29 cm2    LV E' Lateral Velocity 9.69 cm/s    LV E' Septal Velocity 8.98 cm/s    Ao Root D 3.31 cm    Aortic Valve Systolic Peak Velocity 325.60 cm/s    Aortic Valve Area by Continuity of Peak Velocity 2.0 cm2    AoV PG 12.5 mmHg    LVIDd 4.31 4.2 - 5.9 cm    LVPWd 1.28 (A) 0.6 - 1.0 cm    LVIDs 3.24 cm    IVSd 1.90 (A) 0.6 - 1.0 cm    LVOT d 1.83 cm    LVOT Peak Velocity 131.46 cm/s    LVOT Peak Gradient 6.9 mmHg    LVOT VTI 35.66 cm    MV A Laurent 65.81 cm/s    MV E Laurent 82.05 cm/s    MV E/A 1.25     Left Atrium to Aortic Root Ratio 1.24     LA Vol 4C 42.07 18 - 58 mL    LA Area 4C 17.8 cm2    LV Mass .5 (A) 88 - 224 g    LV Mass AL Index 142.3 (A) 49 - 115 g/m2    LVPWs 1.81 cm    E/E' lateral 8.47     E/E' septal 9.14     RVSP 37.1 mmHg    E/E' ratio (averaged) 8.80     Est. RA Pressure 10.0 mmHg    Mitral Valve E Wave Deceleration Time 248.2 ms    Left Atrium Major Axis 4.09 cm    Triscuspid Valve Regurgitation Peak Gradient 27.1 mmHg    LVOT SV 94.0 ml    TR Max Velocity 260.27 cm/s    PASP 37.1 mmHg    LA Vol Index 17.59 16 - 28 ml/m2    DANETTE/BSA Pk Laurent 0.8 cm2/m2    Left Ventricular Fractional Shortening by 2D 62.572439738 %    Left Ventricular Outflow Tract Mean Gradient 4.6919931214160 mmHg    Left Ventricular Outflow Tract Mean Velocity 8.67334424713701 cm/s    Mitral Valve Deceleration [place] 9.1239860681065     AV Velocity Ratio 0.74     Left Ventricular End Diastolic Volume by Teichholz Method 8.55829942770 mL    Left Ventricular End Systolic Volume by Teichholz Method 4.46822136539 mL    Left Ventricular Stroke Volume by Teichholz Method 67.383121543 mL   TSH 3RD GENERATION    Collection Time: 01/10/20  3:58 AM   Result Value Ref Range    TSH 0.97 0.36 - 3.74 uIU/mL   CBC W/O DIFF    Collection Time: 01/10/20  3:58 AM   Result Value Ref Range    WBC 8.9 4.1 - 11.1 K/uL    RBC 3.18 (L) 4.10 - 5.70 M/uL    HGB 10.0 (L) 12.1 - 17.0 g/dL    HCT 30.7 (L) 36.6 - 50.3 %    MCV 96.5 80.0 - 99.0 FL    MCH 31.4 26.0 - 34.0 PG    MCHC 32.6 30.0 - 36.5 g/dL    RDW 14.1 11.5 - 14.5 %    PLATELET 416 436 - 283 K/uL    MPV 9.6 8.9 - 12.9 FL    NRBC 0.0 0  WBC    ABSOLUTE NRBC 0.00 0.00 - 1.24 K/uL   METABOLIC PANEL, BASIC    Collection Time: 01/10/20  3:58 AM   Result Value Ref Range    Sodium 141 136 - 145 mmol/L    Potassium 3.9 3.5 - 5.1 mmol/L    Chloride 107 97 - 108 mmol/L    CO2 28 21 - 32 mmol/L    Anion gap 6 5 - 15 mmol/L    Glucose 96 65 - 100 mg/dL    BUN 26 (H) 6 - 20 MG/DL    Creatinine 2.05 (H) 0.70 - 1.30 MG/DL    BUN/Creatinine ratio 13 12 - 20      GFR est AA 38 (L) >60 ml/min/1.73m2    GFR est non-AA 32 (L) >60 ml/min/1.73m2    Calcium 8.9 8.5 - 10.1 MG/DL   MAGNESIUM    Collection Time: 01/10/20  3:58 AM   Result Value Ref Range    Magnesium 1.8 1.6 - 2.4 mg/dL        HOME MEDS  Prior to Admission Medications   Prescriptions Last Dose Informant Patient Reported? Taking? GLUCOSAMINE HCL/CHONDR PETERSEN A NA (GLUCOSAMINE-CHONDROITIN) 750-600 mg Tab 1/8/2020 at Unknown time  Yes Yes   Sig: Take 1 Tab by mouth daily. Brand: Move Free   MULTIVITS W-FE,OTHER MIN (CENTRUM PO) 1/8/2020 at Unknown time  Yes Yes   Sig: Take 1 Tab by mouth daily. OXYGEN-AIR DELIVERY SYSTEMS (HORIZON NASAL CPAP SYSTEM) 1/8/2020 at Unknown time  Yes Yes   Sig: by Does Not Apply route. VITAMIN D3 2,000 unit cap capsule 1/8/2020 at Unknown time  Yes Yes   Sig: Take 2,000 Units by mouth daily. albuterol (PROVENTIL HFA, VENTOLIN HFA, PROAIR HFA) 90 mcg/actuation inhaler Not Taking at Unknown time  No No   Sig: Take 1 Puff by inhalation every six (6) hours as needed for Wheezing.    allopurinol (ZYLOPRIM) 100 mg tablet   Yes No   Sig: Take 100 mg by mouth every morning. amLODIPine (NORVASC) 5 mg tablet 1/8/2020 at Unknown time  Yes Yes   Sig: Take 5 mg by mouth daily. aspirin delayed-release 81 mg tablet 1/2/2020 at Unknown time  Yes Yes   Sig: Take 81 mg by mouth daily. atorvastatin (LIPITOR) 40 mg tablet 1/8/2020 at Unknown time  No Yes   Sig: TAKE 1 TABLET BY MOUTH EVERYDAY AT BEDTIME   baclofen (LIORESAL) 10 mg tablet Not Taking at Unknown time  Yes No   Sig: Take  by mouth three (3) times daily. chlorthalidone (HYGROTEN) 25 mg tablet 1/8/2020 at Unknown time  No Yes   Sig: TAKE 1/2 TABLET BY MOUTH EVERY DAY   citalopram (CELEXA) 40 mg tablet 1/8/2020 at Unknown time  No Yes   Sig: TAKE 1 TABLET BY MOUTH EVERY DAY   clopidogrel (PLAVIX) 75 mg tab 1/8/2020 at Unknown time  No Yes   Sig: TAKE 1 TABLET BY MOUTH EVERY DAY   fluocinoNIDE (LIDEX) 0.05 % external solution 1/2/2020 at Unknown time  Yes Yes   Sig: Apply  to affected area. isosorbide mononitrate ER (IMDUR) 30 mg tablet 1/8/2020 at Unknown time  No Yes   Sig: TAKE 1/2 TABLET BY MOUTH EVERY DAY   ketoconazole (NIZORAL) 2 % shampoo 1/8/2020 at Unknown time  Yes Yes   Sig: Apply  to affected area. methylPREDNISolone (MEDROL DOSEPACK) 4 mg tablet Not Taking at Unknown time  No No   Sig: Take 1 Tab by mouth Specific Days and Specific Times. metoprolol succinate (TOPROL-XL) 25 mg XL tablet 1/8/2020 at Unknown time  No Yes   Sig: TAKE 1 TABLET BY MOUTH EVERY DAY   nitroglycerin (NITROSTAT) 0.4 mg SL tablet Not Taking at Unknown time  No No   Sig: TAKE 1 TABLET BY SUBLINGUAL ROUTE EVERY 5 MINUTES AS NEEDED FOR CHEST PAIN. pantoprazole (PROTONIX) 40 mg tablet 1/8/2020 at Unknown time  No Yes   Sig: TAKE 1 TABLET BY MOUTH EVERY DAY   rOPINIRole (REQUIP) 0.5 mg tablet 1/8/2020 at Unknown time  No Yes   Sig: Take 1- 2 tablets daily in the evening   telmisartan (MICARDIS) 40 mg tablet 1/8/2020 at Unknown time  No Yes   Sig: Take 1 Tab by mouth daily.       Facility-Administered Medications: None CURRENT MEDS  Current Facility-Administered Medications   Medication Dose Route Frequency    allopurinol (ZYLOPRIM) tablet 100 mg  100 mg Oral 7am    amLODIPine (NORVASC) tablet 5 mg  5 mg Oral DAILY    aspirin delayed-release tablet 81 mg  81 mg Oral DAILY    atorvastatin (LIPITOR) tablet 80 mg  80 mg Oral QHS    baclofen (LIORESAL) tablet 10 mg  10 mg Oral TID    chlorthalidone (HYGROTEN) tablet 12.5 mg  12.5 mg Oral DAILY    citalopram (CELEXA) tablet 40 mg  40 mg Oral DAILY    clopidogrel (PLAVIX) tablet 75 mg  75 mg Oral DAILY    isosorbide mononitrate ER (IMDUR) tablet 15 mg  15 mg Oral DAILY    metoprolol succinate (TOPROL-XL) XL tablet 25 mg  25 mg Oral DAILY    pantoprazole (PROTONIX) tablet 40 mg  40 mg Oral ACB    rOPINIRole (REQUIP) tablet 0.5 mg  0.5 mg Oral QHS    heparin (porcine) injection 5,000 Units  5,000 Units SubCUTAneous Q8H       Stroke workup  MRI brain  No evidence for acute infarction     Mild periventricular and focal left frontal subcortical increased T2/flair  signal abnormalities are nonspecific but may represent changes of chronic small  vessel ischemic disease. CT head  Normal    CT perfusion  Normal    CT angiogram head and neck  Bilateral carotid bifurcation disease more prominent on the left. 50% right ICA stenosis and approximately 80% left ICA stenosis    Carotid ultrasound  50 to 69% left ICA stenosis  Less than 50% right ICA stenosis    Transthoracic echo  Estimated left ventricular ejection fraction is 50 - 55%. Stroke labs:  HgBA1c    Lab Results   Component Value Date/Time    Hemoglobin A1c 6.0 (H) 01/09/2020 02:45 AM     LDL   Lab Results   Component Value Date/Time    LDL, calculated 56.8 01/09/2020 02:45 AM       IMPRESSION:  Brooke Knight is a 66 y.o. male who presents with new onset transient left eye blurred vision. On evaluation patient did have 80 % left ICA stenosis.   I suspect an embolic phenomena to the retinal artery on the left. We will continue the patient on aspirin, Plavix and statin.       RECOMMENDATIONS:  - CEA on wednesday  - Telemetry  - BP goal is less than 140/90  - Continue ASA 81 mg daily and Plavix 75 mg daily (was taking dual antiplatelet therapy at home)  - Continue atorvastatin 40 mg daily  - ST/OT/PT lila Molina MD  Neurologist

## 2020-01-10 NOTE — PROGRESS NOTES
Vascular Surgery Progress Note  Sujey Gomes ACNP-BC  1/10/2020       Subjective:     Tita Hernandez is a 66 y.o.  male w/ a pmhx significant for ASHD, HTN, HLD, CKD, anemia, DM, fatty liver disease, obesity, MELODY, and GERD. He is admitted to the hospital w/ left eye blurry vision and dizziness which has since resolved. CTA of the head and neck was significant for a right ICA stenosis of 50% and a left ICA stenosis of 80%. MRI is negative for acute infarct. Patient reports a recent hx of left neck pain and the sensation of \"running water\" in the neck. Nursing Data:     Patient Vitals for the past 24 hrs:   BP Temp Pulse Resp SpO2   01/10/20 0816 153/74 98.1 °F (36.7 °C) 64 18 97 %   01/10/20 0415 163/66 98.2 °F (36.8 °C) 77 20 95 %   01/10/20 0400   (!) 59     01/10/20 0031   72     01/10/20 0029 125/50       01/09/20 2349 142/58 97.8 °F (36.6 °C) 75 19 97 %   01/09/20 2000   72     01/09/20 1902 128/56 99 °F (37.2 °C) 75 18 97 %   01/09/20 1455 126/50 98.5 °F (36.9 °C) 65 18 98 %   01/09/20 1205 111/56 98 °F (36.7 °C) 62 18 97 %     ---------------------------------------------------------------------------------------------------------    Intake/Output Summary (Last 24 hours) at 1/10/2020 1046  Last data filed at 1/10/2020 0818  Gross per 24 hour   Intake    Output 225 ml   Net -225 ml       Exam:     Physical Exam  Constitutional:       Appearance: He is obese. HENT:      Head: Normocephalic and atraumatic. Nose: Nose normal.      Mouth/Throat:      Mouth: Mucous membranes are moist.   Eyes:      Extraocular Movements: Extraocular movements intact. Pupils: Pupils are equal, round, and reactive to light. Comments: Blurry left eye vision resolved per patient. Neck:      Musculoskeletal: Normal range of motion. Vascular: Carotid bruit (Bilateral ) present. Comments: Left neck tenderness suspect muscular.    Cardiovascular:      Rate and Rhythm: Normal rate and regular rhythm. Pulmonary:      Effort: Pulmonary effort is normal.      Breath sounds: Normal breath sounds. Abdominal:      General: Abdomen is flat. Palpations: Abdomen is soft. Musculoskeletal: Normal range of motion. Skin:     General: Skin is warm and dry. Neurological:      General: No focal deficit present. Mental Status: He is alert and oriented to person, place, and time. Psychiatric:         Mood and Affect: Mood normal.         Behavior: Behavior normal.      Lab Review:     .   Recent Results (from the past 24 hour(s))   ECHO ADULT COMPLETE    Collection Time: 01/09/20 12:45 PM   Result Value Ref Range    Right Atrial Area 4C 15.29 cm2    LV E' Lateral Velocity 9.69 cm/s    LV E' Septal Velocity 8.98 cm/s    Ao Root D 3.31 cm    Aortic Valve Systolic Peak Velocity 114.91 cm/s    Aortic Valve Area by Continuity of Peak Velocity 2.0 cm2    AoV PG 12.5 mmHg    LVIDd 4.31 4.2 - 5.9 cm    LVPWd 1.28 (A) 0.6 - 1.0 cm    LVIDs 3.24 cm    IVSd 1.90 (A) 0.6 - 1.0 cm    LVOT d 1.83 cm    LVOT Peak Velocity 131.46 cm/s    LVOT Peak Gradient 6.9 mmHg    LVOT VTI 35.66 cm    MV A Laurent 65.81 cm/s    MV E Laurent 82.05 cm/s    MV E/A 1.25     Left Atrium to Aortic Root Ratio 1.24     LA Vol 4C 42.07 18 - 58 mL    LA Area 4C 17.8 cm2    LV Mass .5 (A) 88 - 224 g    LV Mass AL Index 142.3 (A) 49 - 115 g/m2    LVPWs 1.81 cm    E/E' lateral 8.47     E/E' septal 9.14     RVSP 37.1 mmHg    E/E' ratio (averaged) 8.80     Est. RA Pressure 10.0 mmHg    Mitral Valve E Wave Deceleration Time 248.2 ms    Left Atrium Major Axis 4.09 cm    Triscuspid Valve Regurgitation Peak Gradient 27.1 mmHg    LVOT SV 94.0 ml    TR Max Velocity 260.27 cm/s    PASP 37.1 mmHg    LA Vol Index 17.59 16 - 28 ml/m2    DANETTE/BSA Pk Laurent 0.8 cm2/m2    Left Ventricular Fractional Shortening by 2D 44.883821608 %    Left Ventricular Outflow Tract Mean Gradient 4.3141524973410 mmHg    Left Ventricular Outflow Tract Mean Velocity 0.81128550418413 cm/s    Mitral Valve Deceleration [place] 8.9097843138415     AV Velocity Ratio 0.74     Left Ventricular End Diastolic Volume by Teichholz Method 9.05078822872 mL    Left Ventricular End Systolic Volume by Teichholz Method 3.12375313326 mL    Left Ventricular Stroke Volume by Teichholz Method 98.262223463 mL   TSH 3RD GENERATION    Collection Time: 01/10/20  3:58 AM   Result Value Ref Range    TSH 0.97 0.36 - 3.74 uIU/mL   CBC W/O DIFF    Collection Time: 01/10/20  3:58 AM   Result Value Ref Range    WBC 8.9 4.1 - 11.1 K/uL    RBC 3.18 (L) 4.10 - 5.70 M/uL    HGB 10.0 (L) 12.1 - 17.0 g/dL    HCT 30.7 (L) 36.6 - 50.3 %    MCV 96.5 80.0 - 99.0 FL    MCH 31.4 26.0 - 34.0 PG    MCHC 32.6 30.0 - 36.5 g/dL    RDW 14.1 11.5 - 14.5 %    PLATELET 552 410 - 668 K/uL    MPV 9.6 8.9 - 12.9 FL    NRBC 0.0 0  WBC    ABSOLUTE NRBC 0.00 0.00 - 2.16 K/uL   METABOLIC PANEL, BASIC    Collection Time: 01/10/20  3:58 AM   Result Value Ref Range    Sodium 141 136 - 145 mmol/L    Potassium 3.9 3.5 - 5.1 mmol/L    Chloride 107 97 - 108 mmol/L    CO2 28 21 - 32 mmol/L    Anion gap 6 5 - 15 mmol/L    Glucose 96 65 - 100 mg/dL    BUN 26 (H) 6 - 20 MG/DL    Creatinine 2.05 (H) 0.70 - 1.30 MG/DL    BUN/Creatinine ratio 13 12 - 20      GFR est AA 38 (L) >60 ml/min/1.73m2    GFR est non-AA 32 (L) >60 ml/min/1.73m2    Calcium 8.9 8.5 - 10.1 MG/DL   MAGNESIUM    Collection Time: 01/10/20  3:58 AM   Result Value Ref Range    Magnesium 1.8 1.6 - 2.4 mg/dL          Assessment/Plan:      Consult problem  Bilateral carotid stenosis presenting w/ TIA possible CVA  -presenting w/ left eye blurry vision now resolved  -continues to hear \"swishing\" in left ear   -CTA: right ICA stenosis 50% and left ICA stenosis 80%   -carotid duplex: right ICA stenosis < 50% and left ICA stenosis 50-69%     Plan for left CEA on 01/15/2020. Patient may be discharged home in the interim. He should continue his ASA, Plavix, and statin.    Active problems  ASHD  -hx of MALI 8/2019   -on BBlocker, ASA, Plavix, and statin. Hypertension  -permissive hypertension on BBlocker, CCB, & diuretic. Hyperlipidemia   -LDL 56.8 on statin    Chronic hypertensive renal disease stage III  -baseline creatinine 2.11   -@ baseline   Anemia of chronic disease  -H&H stable   Diabetes Mellitus   -w/ peripheral neuropathy  -HA1c 6.0   -currently controlled    Fatty liver disease  Obesity  MELODY  GERD   Management of comorbid conditions by primary team.     VTE Prophylaxis:  [facility]     Disposition:  Home     Vascular surgery signing off. Patient is stable for discharge from a vascular standpoint. We appreciate the opportunity to participate in the care of Mr. Sherlyn Lagunas. Patient should return to the hospital on 01/15/2020 for his procedure w/ Dr. Orellana Showers. Nothing to eat or drink after midnight on 01/14/2020. Please re-consult for any urgent vascular issues.

## 2020-01-10 NOTE — PROGRESS NOTES
Discharge papers reviewed with patient and son. Both verbalize understanding of all information provided and are aware of all follow up appointments that have been made. Pt is being discharged to home , driven by family. No new prescriptions.

## 2020-01-10 NOTE — PROGRESS NOTES
Problem: Falls - Risk of  Goal: *Absence of Falls  Description  Document Tyler Kari Fall Risk and appropriate interventions in the flowsheet.   Outcome: Progressing Towards Goal  Note: Fall Risk Interventions:  Mobility Interventions: Bed/chair exit alarm         Medication Interventions: Bed/chair exit alarm         History of Falls Interventions: Bed/chair exit alarm         Problem: Patient Education: Go to Patient Education Activity  Goal: Patient/Family Education  Outcome: Progressing Towards Goal     Problem: Patient Education: Go to Patient Education Activity  Goal: Patient/Family Education  Outcome: Progressing Towards Goal     Problem: TIA/CVA Stroke: Day 2 Until Discharge  Goal: Off Pathway (Use only if patient is Off Pathway)  Outcome: Progressing Towards Goal  Goal: Activity/Safety  Outcome: Progressing Towards Goal  Goal: Medications  Outcome: Progressing Towards Goal  Goal: Psychosocial  Outcome: Progressing Towards Goal

## 2020-01-10 NOTE — PROGRESS NOTES
JANINA Plan:     * Home with f/u appts    > Pt's son will provide transportation at d/c  > CM confirmed pt's f/u appts are secured; details in AVS    Initial note: CM acknowledged d/c. CM met with pt at bedside to check in and review JANINA plans for d/c. Pt will d/c home with f/u appts; pt agreeable to d/c plan and reported no additional questions or concerns. Medicare pt has received, reviewed, and signed 2nd IM letter informing them of their right to appeal the discharge. Signed copy has been placed on pt bedside chart. No further CM needs identified at this time; CM will remain available for consult if additional needs arise before pt leaves facility. CM notified pt's nurse of d/c. Care Management Interventions  PCP Verified by CM: Yes(Pt last saw PCP 3 months ago)  Mode of Transport at Discharge:  Other (see comment)(Pt's son will provide transportation at d/c)  Transition of Care Consult (CM Consult): Discharge Planning  Discharge Durable Medical Equipment: No  Physical Therapy Consult: Yes  Occupational Therapy Consult: Yes  Speech Therapy Consult: Yes  Current Support Network: Lives with Spouse, Own Home(Pt lives with his wife in 2 level home with 4 stairs leading to the entrance)  Confirm Follow Up Transport: Self  Discharge Location  Discharge Placement: Home(f/u appts)    Sudheer Stroud, [address] [place] [address], [facility]

## 2020-01-13 ENCOUNTER — PATIENT OUTREACH (OUTPATIENT)
Dept: INTERNAL MEDICINE CLINIC | Age: 75
End: 2020-01-13

## 2020-01-13 ENCOUNTER — HOSPITAL ENCOUNTER (OUTPATIENT)
Dept: LAB | Age: 75
Discharge: HOME | End: 2020-01-13
Payer: MEDICARE

## 2020-01-13 ENCOUNTER — OFFICE VISIT (OUTPATIENT)
Dept: INTERNAL MEDICINE CLINIC | Age: 75
End: 2020-01-13

## 2020-01-13 VITALS
RESPIRATION RATE: 16 BRPM | WEIGHT: 238 LBS | HEART RATE: 67 BPM | HEIGHT: 76 IN | BODY MASS INDEX: 28.98 KG/M2 | DIASTOLIC BLOOD PRESSURE: 58 MMHG | SYSTOLIC BLOOD PRESSURE: 95 MMHG | OXYGEN SATURATION: 97 % | TEMPERATURE: 97.6 F

## 2020-01-13 DIAGNOSIS — I65.22 STENOSIS OF LEFT CAROTID ARTERY: Primary | ICD-10-CM

## 2020-01-13 DIAGNOSIS — I25.10 CORONARY ARTERY DISEASE INVOLVING NATIVE CORONARY ARTERY OF NATIVE HEART WITHOUT ANGINA PECTORIS: ICD-10-CM

## 2020-01-13 DIAGNOSIS — E78.00 PURE HYPERCHOLESTEROLEMIA: ICD-10-CM

## 2020-01-13 DIAGNOSIS — I10 ESSENTIAL HYPERTENSION: ICD-10-CM

## 2020-01-13 DIAGNOSIS — N18.30 CHRONIC KIDNEY DISEASE, STAGE III (MODERATE) (HCC): ICD-10-CM

## 2020-01-13 PROBLEM — I20.8 STABLE ANGINA (HCC): Status: RESOLVED | Noted: 2019-07-31 | Resolved: 2020-01-13

## 2020-01-13 PROCEDURE — 85027 COMPLETE CBC AUTOMATED: CPT

## 2020-01-13 PROCEDURE — 80048 BASIC METABOLIC PNL TOTAL CA: CPT

## 2020-01-13 PROCEDURE — 36415 COLL VENOUS BLD VENIPUNCTURE: CPT

## 2020-01-13 RX ORDER — ATORVASTATIN CALCIUM 80 MG/1
80 TABLET, FILM COATED ORAL DAILY
Qty: 90 TAB | Refills: 3 | Status: SHIPPED | OUTPATIENT
Start: 2020-01-13 | End: 2021-01-21

## 2020-01-13 NOTE — PERIOP NOTES
[facility]  Preoperative Instructions        Surgery Date 1/15/20        Time of Arrival *0900  Contact # [phone]    1. On the day of your surgery, please report to the Surgical Services Registration Desk and sign in at your designated time. The Surgery Center is located to the right of the Emergency Room. 2. You must have someone with you to drive you home. You should not drive a car for 24 hours following surgery. Please make arrangements for a friend or family member to stay with you for the first 24 hours after your surgery. 3. Do not have anything to eat or drink (including water, gum, mints, coffee, juice) after midnight ? ?01/14/20      . ? This may not apply to medications prescribed by your physician. ?(Please note below the special instructions with medications to take the morning of your procedure.)    4. We recommend you do not drink any alcoholic beverages for 24 hours before and after your surgery. 5. Contact your surgeons office for instructions on the following medications: non-steroidal anti-inflammatory drugs (i.e. Advil, Aleve), vitamins, and supplements. (Some surgeons will want you to stop these medications prior to surgery and others may allow you to take them)  **If you are currently taking Plavix, Coumadin, Aspirin and/or other blood-thinning agents, contact your surgeon for instructions. ** Your surgeon will partner with the physician prescribing these medications to determine if it is safe to stop or if you need to continue taking. Please do not stop taking these medications without instructions from your surgeon    6. Wear comfortable clothes. Wear glasses instead of contacts. Do not bring any money or jewelry. Please bring picture ID, insurance card, and any prearranged co-payment or hospital payment. Do not wear make-up, particularly mascara the morning of your surgery.   Do not wear nail polish, particularly if you are having foot /hand surgery. Wear your hair loose or down, no ponytails, buns, richie pins or clips. All body piercings must be removed. Please shower with antibacterial soap for three consecutive days before and on the morning of surgery, but do not apply any lotions, powders or deodorants after the shower on the day of surgery. Please use a fresh towels after each shower. Please sleep in clean clothes and change bed linens the night before surgery. Please do not shave for 48 hours prior to surgery. Shaving of the face is acceptable. 7. You should understand that if you do not follow these instructions your surgery may be cancelled. If your physical condition changes (I.e. fever, cold or flu) please contact your surgeon as soon as possible. 8. It is important that you be on time. If a situation occurs where you may be late, please call [phone] (OR Holding Area). 9. If you have any questions and or problems, please call [phone] (Pre-admission Testing). 10. Your surgery time may be subject to change. You will receive a phone call the evening prior if your time changes. 11.  If having outpatient surgery, you must have someone to drive you here, stay with you during the duration of your stay, and to drive you home at time of discharge. 12.   In an effort to improve the efficiency, privacy, and safety for all of our Pre-op patients visitors are not allowed in the Holding area. Once you arrive and are registered your family/visitors will be asked to remain in the waiting room. The Pre-op staff will get you from the Surgical Waiting Area and will explain to you and your family/visitors that the Pre-op phase is beginning. The staff will answer any questions and provide instructions for tracking of the patient, by use of the existing tracking number and color-coded status board in the waiting room.   At this time the staff will also ask for your designated spokesperson information in the event that the physician or staff need to provide an update or obtain any pertinent information. The designated spokesperson will be notified if the physician needs to speak to family during the pre-operative phase. If at any time your family/visitors has questions or concerns they may approach the volunteer desk in the waiting area for assistance. Special Instructions:     TAKE ALL MEDICATIONS DAY OF SURGERY EXCEPT:Micardis       I understand a pre-operative phone call will be made to verify my surgery time. In the event that I am not available, I give permission for a message to be left on my answering service and/or with another person?   yes         ___________________      __________   _________    (Signature of Patient)             (Witness)                (Date and Time)

## 2020-01-13 NOTE — PATIENT INSTRUCTIONS
Office Policies    Phone calls/patient messages:            Please allow up to 24 hours for someone in the office to contact you about your call or message. Be mindful your provider may be out of the office or your message may require further review. We encourage you to use [employer] for your messages as this is a faster, more efficient way to communicate with our office                         Medication Refills:            Prescription medications require 48-72 business hours to process. We encourage you to use [employer] for your refills. For controlled medications: Please allow 72 business hours to process. Certain medications may require you to  a written prescription at our office. NO narcotic/controlled medications will be prescribed after 4pm Monday through Friday or on weekends              Form/Paperwork Completion:            Please note a $25 fee may incur for all paperwork for completed by our providers. We ask that you allow 7-10 business days. Pre-payment is due prior to picking up/faxing the completed form. You may also download your forms to [employer] to have your doctor print off. AS PER HOSPITAL DISCHARGE INFORMATION: INCREASE ATORVASTATIN TO 80MG DAILY. USE 2 OF THE 40MG UNTIL GONE. NEW RX IS FOR 80MG TABLET DAILY. Body Mass Index: Care Instructions  Your Care Instructions    Body mass index (BMI) can help you see if your weight is raising your risk for health problems. It uses a formula to compare how much you weigh with how tall you are. · A BMI lower than 18.5 is considered underweight. · A BMI between 18.5 and 24.9 is considered healthy. · A BMI between 25 and 29.9 is considered overweight. A BMI of 30 or higher is considered obese. If your BMI is in the normal range, it means that you have a lower risk for weight-related health problems.  If your BMI is in the overweight or obese range, you may be at increased risk for weight-related health problems, such as high blood pressure, heart disease, stroke, arthritis or joint pain, and diabetes. If your BMI is in the underweight range, you may be at increased risk for health problems such as fatigue, lower protection (immunity) against illness, muscle loss, bone loss, hair loss, and hormone problems. BMI is just one measure of your risk for weight-related health problems. You may be at higher risk for health problems if you are not active, you eat an unhealthy diet, or you drink too much alcohol or use tobacco products. Follow-up care is a key part of your treatment and safety. Be sure to make and go to all appointments, and call your doctor if you are having problems. It's also a good idea to know your test results and keep a list of the medicines you take. How can you care for yourself at home? · Practice healthy eating habits. This includes eating plenty of fruits, vegetables, whole grains, lean protein, and low-fat dairy. · If your doctor recommends it, get more exercise. Walking is a good choice. Bit by bit, increase the amount you walk every day. Try for at least 30 minutes on most days of the week. · Do not smoke. Smoking can increase your risk for health problems. If you need help quitting, talk to your doctor about stop-smoking programs and medicines. These can increase your chances of quitting for good. · Limit alcohol to 2 drinks a day for men and 1 drink a day for women. Too much alcohol can cause health problems. If you have a BMI higher than 25  · Your doctor may do other tests to check your risk for weight-related health problems. This may include measuring the distance around your waist. A waist measurement of more than 40 inches in men or 35 inches in women can increase the risk of weight-related health problems. · Talk with your doctor about steps you can take to stay healthy or improve your health.  You may need to make lifestyle changes to lose weight and stay healthy, such as changing your diet and getting regular exercise. If you have a BMI lower than 18.5  · Your doctor may do other tests to check your risk for health problems. · Talk with your doctor about steps you can take to stay healthy or improve your health. You may need to make lifestyle changes to gain or maintain weight and stay healthy, such as getting more healthy foods in your diet and doing exercises to build muscle. Where can you learn more? Go to http://antonieta-josie.info/. Enter S176 in the search box to learn more about \"Body Mass Index: Care Instructions. \"  Current as of: October 13, 2016  Content Version: 11.4  © 6061-1108 [employer]. Care instructions adapted under license by [employer] (which disclaims liability or warranty for this information). If you have questions about a medical condition or this instruction, always ask your healthcare professional. [address] any warranty or liability for your use of this information.

## 2020-01-13 NOTE — PERIOP NOTES
Spoke with Les Hall in Dr. Schneider Standard office. Pt is to continue taking ASA and Plavix. Also, Vancomycin is to be ordered as on order sheet. Dr. Esteban Zamudio does not want to change to [facility] after clarification of allergies.

## 2020-01-13 NOTE — PROGRESS NOTES
Aris Anguiano is a 66 y.o. male who presents for follow up after hospitalization on January 8. DX: Acute CVA versus TIA POA-Bilateral carotid artery stenosis (L>R)-symptomatic disease POA    Left blurred vision. CT head= neg acute  CTA Bilateral carotid bifurcation disease more prominent on the left  MRI brain   Echo, carotid Dopplers    Continue home aspirin and Plavix  Continue home statin-increase the dose to 80 mg nightly  Vascular surgery consult  Neurology consult- followed by Dr. Jaja Disla. PT OT and speech consult per protocol    CAD status post stents  Hypertension  Continue home cardiac meds-aspirin, Plavix, Lipitor, chlorthalidone, amlodipine, imdur, metoprolol  Held telmisartan, and resumed.      CKD stage III  Creatinine stable at 2.1, hgb 10.0. Follows Dr. Albertina Harris as outpatient          To have left carotid endarterectomy on 1/15.   Reviewed medications.         Past Medical History:   Diagnosis Date    Abnormal stress echo 5/12/2015    Anemia NEC 03/2013    Last 2-3 months; finished taking iron supplement    Anxiety     CAD (coronary artery disease) 2009    cath [facility] Thelma) revealed 20%RCA and 50%2nd diagonal    Calculus of kidney     Chronic kidney disease     sees nephrologist every 6 months    Chronic kidney disease, stage III (moderate) ([address] 10/11/2009    30% kidney function    Diabetes ([address]     Pre-diabetic    DJD (degenerative joint disease)     GERD (gastroesophageal reflux disease) 10/11/2009    controlled with medication    Gout     Hypertension     Liver disease     fatty liver    Obesity     Obstructive sleep apnea (adult) (pediatric) 10/11/2009    nasal pillows; pressure set at 10-11 - uses cpap    Peripheral neuropathy 10/11/2009    bilateral feet (numbness)    Prediabetes     RLS (restless legs syndrome) 10/11/2009    S/P coronary artery stent placement 5/12/2015    5/11/15 PCI./MALI to LCx       Family History   Problem Relation Age of Onset  Heart Disease Father     Hypertension Father     Other Father         abdominal aneurysm     Dementia Mother     Alzheimer Mother     Heart Disease Brother     Heart Attack Brother     Heart Disease Maternal Grandmother     Heart Disease Paternal Grandmother     Heart Attack Paternal Grandmother     Heart Disease Paternal Grandfather     Heart Attack Paternal Grandfather     Elevated Lipids Son     Elevated Lipids Daughter     Cancer Neg Hx     Diabetes Neg Hx     Stroke Neg Hx        Social History     Socioeconomic History    Marital status:      Spouse name: Yaquelin Pritchard Number of children: 2    Years of education: graduated then 4 year apprenticship    Highest education level: Associate degree: academic program   Occupational History    Not on file   Social Needs    Financial resource strain: Not hard at all   [employer] insecurity:     Worry: Never true     Inability: Never true   JEDI MIND needs:     Medical: No     Non-medical: No   Tobacco Use    Smoking status: Former Smoker     Packs/day: 1.00     Years: 10.00     Pack years: 10.00     Types: Cigarettes     Last attempt to quit: 1968     Years since quittin.0    Smokeless tobacco: Never Used   Substance and Sexual Activity    Alcohol use: No     Alcohol/week: 0.0 standard drinks    Drug use: No    Sexual activity: Yes     Partners: Female     Birth control/protection: None   Lifestyle    Physical activity:     Days per week: 0 days     Minutes per session: 0 min    Stress: Very much   Relationships    Social connections:     Talks on phone: More than three times a week     Gets together: More than three times a week     Attends Gnosticism service: Not on file     Active member of club or organization: No     Attends meetings of clubs or organizations: Never     Relationship status:     Intimate partner violence:     Fear of current or ex partner: No     Emotionally abused: No     Physically abused: No     Forced sexual activity: No   Other Topics Concern     Service Not Asked    Blood Transfusions Not Asked    Caffeine Concern Not Asked    Occupational Exposure Not Asked    Hobby Hazards Not Asked    Sleep Concern Not Asked    Stress Concern Not Asked    Weight Concern Not Asked    Special Diet Not Asked    Back Care Not Asked    Exercise Not Asked    Bike Helmet Not Asked    Seat Belt Not Asked    Self-Exams Not Asked   Social History Narrative    Not on file       Current Outpatient Medications on File Prior to Visit   Medication Sig Dispense Refill    chlorthalidone (HYGROTEN) 25 mg tablet TAKE 1/2 TABLET BY MOUTH EVERY DAY 15 Tab 1    isosorbide mononitrate ER (IMDUR) 30 mg tablet TAKE 1/2 TABLET BY MOUTH EVERY DAY 45 Tab 1    pantoprazole (PROTONIX) 40 mg tablet TAKE 1 TABLET BY MOUTH EVERY DAY 30 Tab 5    clopidogrel (PLAVIX) 75 mg tab TAKE 1 TABLET BY MOUTH EVERY DAY 90 Tab 1    metoprolol succinate (TOPROL-XL) 25 mg XL tablet TAKE 1 TABLET BY MOUTH EVERY DAY 90 Tab 1    citalopram (CELEXA) 40 mg tablet TAKE 1 TABLET BY MOUTH EVERY DAY 90 Tab 3    telmisartan (MICARDIS) 40 mg tablet Take 1 Tab by mouth daily. 90 Tab 3    ketoconazole (NIZORAL) 2 % shampoo Apply  to affected area.  fluocinoNIDE (LIDEX) 0.05 % external solution Apply  to affected area.  rOPINIRole (REQUIP) 0.5 mg tablet Take 1- 2 tablets daily in the evening 60 Tab 5    nitroglycerin (NITROSTAT) 0.4 mg SL tablet TAKE 1 TABLET BY SUBLINGUAL ROUTE EVERY 5 MINUTES AS NEEDED FOR CHEST PAIN. 1 Bottle 0    albuterol (PROVENTIL HFA, VENTOLIN HFA, PROAIR HFA) 90 mcg/actuation inhaler Take 1 Puff by inhalation every six (6) hours as needed for Wheezing. 1 Inhaler 0    VITAMIN D3 2,000 unit cap capsule Take 2,000 Units by mouth daily. 2    OXYGEN-AIR DELIVERY SYSTEMS (HORIZON NASAL CPAP SYSTEM) by Does Not Apply route.  amLODIPine (NORVASC) 5 mg tablet Take 5 mg by mouth daily.       GLUCOSAMINE HCL/CHONDR PETERSEN A NA (GLUCOSAMINE-CHONDROITIN) 750-600 mg Tab Take 1 Tab by mouth daily. Brand: Move Free      aspirin delayed-release 81 mg tablet Take 81 mg by mouth daily.  allopurinol (ZYLOPRIM) 100 mg tablet Take 100 mg by mouth every morning.  MULTIVITS W-FE,OTHER MIN (CENTRUM PO) Take 1 Tab by mouth daily.  [DISCONTINUED] atorvastatin (LIPITOR) 40 mg tablet TAKE 1 TABLET BY MOUTH EVERYDAY AT BEDTIME 90 Tab 0    baclofen (LIORESAL) 10 mg tablet Take  by mouth three (3) times daily. No current facility-administered medications on file prior to visit. Review of Systems  Pertinent items are noted in HPI. Objective:     Visit Vitals  BP 95/58 (BP 1 Location: Left arm, BP Patient Position: Sitting)   Pulse 67   Temp 97.6 °F (36.4 °C) (Oral)   Resp 16   Ht 6' 4\" (1.93 m)   Wt 238 lb (108 kg)   SpO2 97%   BMI 28.97 kg/m²     Gen: well appearing male  HEENT:   PERRL,normal conjunctiva. OP no erythema, no exudates, MMM  Neck:no audible bruits, No masses or LAD  Resp:  No wheezing, no rhonchi, no rales. CV:  RRR, normal S1S2  GI: soft, nontender  Extrem:  no edema    Assessment/Plan:       ICD-10-CM ICD-9-CM    1. Stenosis of left carotid artery I65.22 433.10    2. Coronary artery disease involving native coronary artery of native heart without angina pectoris I25.10 414.01    3. Essential hypertension I10 401.9 CBC W/O DIFF      METABOLIC PANEL, BASIC   4. Pure hypercholesterolemia E78.00 272.0    5. Chronic kidney disease, stage III (moderate) (HCC) N18.3 585.3      SCHEDULED FOR LEFT CAROTID SURGERY ON 1/15. RECHECK ANEMIA AND RENAL FUNCTION. MEDICALLY CLEARED FOR SURGERY. Follow-up and Dispositions    · Return in about 9 weeks (around 3/16/2020) for FOLLOW UP 9 WEEKS. Nitish Puentes MD    Discussed the patient's BMI with him.   The BMI follow up plan is as follows:     dietary management education, guidance, and counseling  encourage exercise  monitor weight  prescribed dietary intake    An After Visit Summary was printed and given to the patient.

## 2020-01-14 ENCOUNTER — ANESTHESIA EVENT (OUTPATIENT)
Dept: SURGERY | Age: 75
DRG: 039 | End: 2020-01-14
Payer: MEDICARE

## 2020-01-14 LAB
BUN SERPL-MCNC: 42 MG/DL (ref 8–27)
BUN/CREAT SERPL: 17 (ref 10–24)
CALCIUM SERPL-MCNC: 9.5 MG/DL (ref 8.6–10.2)
CHLORIDE SERPL-SCNC: 105 MMOL/L (ref 96–106)
CO2 SERPL-SCNC: 23 MMOL/L (ref 20–29)
CREAT SERPL-MCNC: 2.48 MG/DL (ref 0.76–1.27)
ERYTHROCYTE [DISTWIDTH] IN BLOOD BY AUTOMATED COUNT: 14.1 % (ref 11.6–15.4)
GLUCOSE SERPL-MCNC: 97 MG/DL (ref 65–99)
HCT VFR BLD AUTO: 33.1 % (ref 37.5–51)
HGB BLD-MCNC: 11.1 G/DL (ref 13–17.7)
MCH RBC QN AUTO: 31.5 PG (ref 26.6–33)
MCHC RBC AUTO-ENTMCNC: 33.5 G/DL (ref 31.5–35.7)
MCV RBC AUTO: 94 FL (ref 79–97)
PLATELET # BLD AUTO: 244 X10E3/UL (ref 150–450)
POTASSIUM SERPL-SCNC: 4.9 MMOL/L (ref 3.5–5.2)
RBC # BLD AUTO: 3.52 X10E6/UL (ref 4.14–5.8)
SODIUM SERPL-SCNC: 144 MMOL/L (ref 134–144)
WBC # BLD AUTO: 7.8 X10E3/UL (ref 3.4–10.8)

## 2020-01-14 NOTE — CDMP QUERY
Pt admitted with \"Sudden onset of left eye vision loss likely CVA\" Noted documentation of \"MRI is negative for acute infarct\" & \"significant for a right ICA stenosis of 50% and a left ICA stenosis of 80%\" on PN 1/10 by vascular surgery consultant. If possible, please document in progress notes and discharge summary:  Bilateral carotid stenosis presenting w/ TIA (possible CVA ruled out)  Bilateral carotid stenosis presenting w/ TIA possible CVA confirmed  Other, please specify _____________  Clinically unable to determine The medical record reflects the following: 
 
  Risk Factors: HTN CKD, ASHD, DM Clinical Indicators: presenting w/ left eye blurry vision now resolved,CTA: right ICA stenosis 50% and left ICA,  carotid duplex: right ICA stenosis < 50% and left ICA stenosis 50-69%stenosis 80%, MRI is negative for acute infarct Treatment: MRI brain, CT head, CTA, vascular consult, neuro consult, Plan for left CEA on 01/15/2020, ASA, Plavix, and statin Thank You Gene Rivera, [address] [place] [address] 
   [phone]

## 2020-01-14 NOTE — PROGRESS NOTES
Hospital Discharge Follow-Up      Date/Time:  2020 2:43 PM  Opal Marsh, JANEY, RN, [facility], Andrea Knott 124 Transitions Nurse  [phone]    Patient was admitted to [facility] on 2020 and discharged on 1/10/2020 for Carotid Artery Stenosis. The physician discharge summary was not available at the time of outreach. Patient was contacted within 2 business days of discharge. Top Challenges reviewed with the provider   Carotid artery stenosis - TIA? - presented w/ blurry left eye   CT angiogram of the head and neck did show 80% left ICA stenosis  CEA scheduled for 1/15    Advance Care Planning:   Does patient have an Advance Directive:  patient declined education        Method of communication with provider :chart routing    Inpatient RRAT score: 23  Was this a readmission? no   Patient stated reason for the readmission: n/a    Care Transition Nurse (CTN) contacted the patient by telephone to perform post hospital discharge assessment. Verified name and  with patient as identifiers. Provided introduction to self, and explanation of the CTN role. Patient received hospital discharge instructions. CTN reviewed discharge instructions and red flags with patient who verbalized understanding. Patient given an opportunity to ask questions and does not have any further questions or concerns at this time. The patient agrees to contact the PCP office for questions related to their healthcare. CTN provided contact information for future reference.     Disease Specific:   N/A    Patients top risk factors for readmission:  lack of knowledge about disease, utilization of services    Home Health orders at discharge: none      Durable Medical Equipment ordered at discharge:none  has access to DME in the form of a walker, stair left, \"a couple\" canes, wheel chair, and grab bars for the bathroom    Medication(s):   New Medications at Discharge: n/a  Changed Medications at Discharge: n/a  Discontinued Medications at Discharge: methylPREDNISolone 4 mg tablet (MEDROL  DOSEPACK)    Medication reconciliation was performed with patient, who verbalizes understanding of administration of home medications. There were no barriers to obtaining medications identified at this time. Referral to Pharm D needed: no     No current facility-administered medications for this visit. No current outpatient medications on file.      Facility-Administered Medications Ordered in Other Visits   Medication Dose Route Frequency    lactated Ringers infusion  25 mL/hr IntraVENous CONTINUOUS    lactated Ringers infusion  100 mL/hr IntraVENous CONTINUOUS    0.9% sodium chloride infusion  25 mL/hr IntraVENous CONTINUOUS    sodium chloride (NS) flush 5-40 mL  5-40 mL IntraVENous Q8H    sodium chloride (NS) flush 5-40 mL  5-40 mL IntraVENous PRN    lidocaine (PF) (XYLOCAINE) 10 mg/mL (1 %) injection 0.1 mL  0.1 mL SubCUTAneous PRN    fentaNYL citrate (PF) injection 50 mcg  50 mcg IntraVENous PRN    midazolam (VERSED) injection 1 mg  1 mg IntraVENous PRN    midazolam (VERSED) injection 1 mg  1 mg IntraVENous PRN    ropivacaine (PF) (NAROPIN) 5 mg/mL (0.5 %) injection 150 mg  30 mL Intra artICUlar PRN    glycopyrrolate (ROBINUL) injection   IntraVENous PRN    midazolam (VERSED) injection   IntraVENous PRN    ePHEDrine (PF) (MISTOLE) 10 mg/mL in NS syringe   IntraVENous PRN    lidocaine (PF) (XYLOCAINE) 20 mg/mL (2 %) injection   IntraVENous PRN    fentaNYL citrate (PF) injection   IntraVENous PRN    propofol (DIPRIVAN) 10 mg/mL injection   IntraVENous PRN    rocuronium injection   IntraVENous PRN    succinylcholine (ANECTINE) injection   IntraVENous PRN    ondansetron (ZOFRAN) injection   IntraVENous PRN    dexamethasone (DECADRON) 4 mg/mL injection   IntraVENous PRN    PHENYLephrine (NEOSYNEPHRINE) in NS syringe   IntraVENous PRN    bacitracin 50,000 Units in sodium chloride irrigation 0.9 % 1,000 mL Irrigation    PRN    heparin (porcine) 2,000 Units in 0.9% sodium chloride 500 mL Irrigation    PRN    HYDROmorphone (PF) (DILAUDID) injection    PRN    PHENYLephrine (HARDIK-SYNEPHRINE) 20 mg in 0.9% sodium chloride 250 mL infusion   IntraVENous CONTINUOUS    heparin (porcine) 1,000 unit/mL injection    PRN       There are no discontinued medications. [facility] follow up appointment(s):   Future Appointments   Date Time Provider Seth Skelton   2/3/2020  3:15 PM Derral Bence, MD [address]   2/25/2020 11:15 AM Bernard Menchaca  [facility]   3/3/2020  9:40 AM Jaiorn Olivas  [facility]   3/19/2020  1:30 PM Derral Bence, MD [address]      Non-[facility] follow up appointment(s): vascular f/u   [facility]:  information provided as a resource       Goals      Prevent complications post hospitalization. 01/14/20    - spoke to patient briefly today. Patient lives with wife. Has access to multiple forms of DME at home   - patient attended f/u with Dr. Odette Mitchell on 1/13, patient cleared for upcoming surgery  - declined need for home health   - patient has CEA scheduled for 1/15 and report it is planned that he will stay one night in the hospital   - Dr. Odette Mitchell 2/3  - Cardiology 2/25  - patients wife can provide transportation  -  Reviewed FAST with patient and signs of CVA, such as sudden numbness, tingling, loss of movement in your face, arm, leg, especially on one side of the body, vision changes, trouble speaking, confusion, walking or balance problems and sudden headache.  - declined ACP education  - Reviewed red flag s/s with patient, nausea, vomiting, inability to pass urine/stool, SOB, mental status change, chest pain, fever.   - patient reports no questions regarding upcoming procedure tomorrow    CTN to f/u with patient post discharge.  AR

## 2020-01-15 ENCOUNTER — ANESTHESIA (OUTPATIENT)
Dept: SURGERY | Age: 75
DRG: 039 | End: 2020-01-15
Payer: MEDICARE

## 2020-01-15 ENCOUNTER — HOSPITAL ENCOUNTER (INPATIENT)
Age: 75
LOS: 1 days | Discharge: HOME | DRG: 039 | End: 2020-01-16
Attending: SURGERY | Admitting: SURGERY
Payer: MEDICARE

## 2020-01-15 DIAGNOSIS — I65.22 STENOSIS OF LEFT CAROTID ARTERY: Primary | ICD-10-CM

## 2020-01-15 PROBLEM — I65.29 CAROTID STENOSIS: Status: ACTIVE | Noted: 2020-01-15

## 2020-01-15 LAB
ABO + RH BLD: NORMAL
APTT PPP: 25.2 SEC (ref 22.1–32)
BLOOD GROUP ANTIBODIES SERPL: NORMAL
GLUCOSE BLD STRIP.AUTO-MCNC: 160 MG/DL (ref 65–100)
GLUCOSE BLD STRIP.AUTO-MCNC: 92 MG/DL (ref 65–100)
SERVICE CMNT-IMP: ABNORMAL
SERVICE CMNT-IMP: NORMAL
SPECIMEN EXP DATE BLD: NORMAL
THERAPEUTIC RANGE,PTTT: NORMAL SECS (ref 58–77)

## 2020-01-15 PROCEDURE — 74011636637 HC RX REV CODE- 636/637: Performed by: INTERNAL MEDICINE

## 2020-01-15 PROCEDURE — 77030013968 HC SHNT CAR SUNDT INLC -D: Performed by: SURGERY

## 2020-01-15 PROCEDURE — C1768 GRAFT, VASCULAR: HCPCS | Performed by: SURGERY

## 2020-01-15 PROCEDURE — 86900 BLOOD TYPING SEROLOGIC ABO: CPT

## 2020-01-15 PROCEDURE — 03UL0KZ SUPPLEMENT LEFT INTERNAL CAROTID ARTERY WITH NONAUTOLOGOUS TISSUE SUBSTITUTE, OPEN APPROACH: ICD-10-PCS | Performed by: SURGERY

## 2020-01-15 PROCEDURE — 77030002987 HC SUT PROL J&J -B: Performed by: SURGERY

## 2020-01-15 PROCEDURE — 77030013797 HC KT TRNSDUC PRSSR EDWD -A: Performed by: NURSE ANESTHETIST, CERTIFIED REGISTERED

## 2020-01-15 PROCEDURE — 88304 TISSUE EXAM BY PATHOLOGIST: CPT

## 2020-01-15 PROCEDURE — 77030011640 HC PAD GRND REM COVD -A: Performed by: SURGERY

## 2020-01-15 PROCEDURE — 77030010507 HC ADH SKN DERMBND J&J -B: Performed by: SURGERY

## 2020-01-15 PROCEDURE — 74011250636 HC RX REV CODE- 250/636: Performed by: ANESTHESIOLOGY

## 2020-01-15 PROCEDURE — 77030026438 HC STYL ET INTUB CARD -A: Performed by: NURSE ANESTHETIST, CERTIFIED REGISTERED

## 2020-01-15 PROCEDURE — 77030040506 HC DRN WND MDII -A: Performed by: SURGERY

## 2020-01-15 PROCEDURE — 77030020061 HC IV BLD WRMR ADMIN SET 3M -B: Performed by: NURSE ANESTHETIST, CERTIFIED REGISTERED

## 2020-01-15 PROCEDURE — 65610000006 HC RM INTENSIVE CARE

## 2020-01-15 PROCEDURE — 82962 GLUCOSE BLOOD TEST: CPT

## 2020-01-15 PROCEDURE — 77030002996 HC SUT SLK J&J -A: Performed by: SURGERY

## 2020-01-15 PROCEDURE — 74011250636 HC RX REV CODE- 250/636: Performed by: INTERNAL MEDICINE

## 2020-01-15 PROCEDURE — 77030020256 HC SOL INJ NACL 0.9%  500ML: Performed by: SURGERY

## 2020-01-15 PROCEDURE — 77030002986 HC SUT PROL J&J -A: Performed by: SURGERY

## 2020-01-15 PROCEDURE — 76010000175 HC OR TIME 4 TO 4.5 HR INTENSV-TIER 1: Performed by: SURGERY

## 2020-01-15 PROCEDURE — 36620 INSERTION CATHETER ARTERY: CPT

## 2020-01-15 PROCEDURE — 74011000272 HC RX REV CODE- 272: Performed by: SURGERY

## 2020-01-15 PROCEDURE — 74011250637 HC RX REV CODE- 250/637: Performed by: SURGERY

## 2020-01-15 PROCEDURE — 03CL0ZZ EXTIRPATION OF MATTER FROM LEFT INTERNAL CAROTID ARTERY, OPEN APPROACH: ICD-10-PCS | Performed by: SURGERY

## 2020-01-15 PROCEDURE — 85730 THROMBOPLASTIN TIME PARTIAL: CPT

## 2020-01-15 PROCEDURE — 74011000250 HC RX REV CODE- 250: Performed by: SURGERY

## 2020-01-15 PROCEDURE — 74011000250 HC RX REV CODE- 250: Performed by: NURSE ANESTHETIST, CERTIFIED REGISTERED

## 2020-01-15 PROCEDURE — 74011250636 HC RX REV CODE- 250/636: Performed by: NURSE ANESTHETIST, CERTIFIED REGISTERED

## 2020-01-15 PROCEDURE — 77030002933 HC SUT MCRYL J&J -A: Performed by: SURGERY

## 2020-01-15 PROCEDURE — 77030013079 HC BLNKT BAIR HGGR 3M -A: Performed by: NURSE ANESTHETIST, CERTIFIED REGISTERED

## 2020-01-15 PROCEDURE — 77030006689 HC BLD OPHTH BVR BD -A: Performed by: SURGERY

## 2020-01-15 PROCEDURE — 36415 COLL VENOUS BLD VENIPUNCTURE: CPT

## 2020-01-15 PROCEDURE — 77030008684 HC TU ET CUF COVD -B: Performed by: NURSE ANESTHETIST, CERTIFIED REGISTERED

## 2020-01-15 PROCEDURE — 77030014008 HC SPNG HEMSTAT J&J -C: Performed by: SURGERY

## 2020-01-15 PROCEDURE — 76210000016 HC OR PH I REC 1 TO 1.5 HR: Performed by: SURGERY

## 2020-01-15 PROCEDURE — 77030005401 HC CATH RAD ARRO -A: Performed by: NURSE ANESTHETIST, CERTIFIED REGISTERED

## 2020-01-15 PROCEDURE — 76060000039 HC ANESTHESIA 4 TO 4.5 HR: Performed by: SURGERY

## 2020-01-15 PROCEDURE — 74011250637 HC RX REV CODE- 250/637: Performed by: ANESTHESIOLOGY

## 2020-01-15 PROCEDURE — 77030031139 HC SUT VCRL2 J&J -A: Performed by: SURGERY

## 2020-01-15 PROCEDURE — 77030019908 HC STETH ESOPH SIMS -A: Performed by: NURSE ANESTHETIST, CERTIFIED REGISTERED

## 2020-01-15 PROCEDURE — 77030018673: Performed by: SURGERY

## 2020-01-15 PROCEDURE — 03HB33Z INSERTION OF INFUSION DEVICE INTO RIGHT RADIAL ARTERY, PERCUTANEOUS APPROACH: ICD-10-PCS | Performed by: ANESTHESIOLOGY

## 2020-01-15 PROCEDURE — 74011250636 HC RX REV CODE- 250/636: Performed by: SURGERY

## 2020-01-15 DEVICE — PATCH VASC W0.8XL8CM PERIPH BOV PERICARD N PVC N DEHP CRSS: Type: IMPLANTABLE DEVICE | Site: CAROTID | Status: FUNCTIONAL

## 2020-01-15 RX ORDER — ALBUTEROL SULFATE 90 UG/1
1 AEROSOL, METERED RESPIRATORY (INHALATION)
Status: DISCONTINUED | OUTPATIENT
Start: 2020-01-15 | End: 2020-01-15

## 2020-01-15 RX ORDER — DEXTROSE MONOHYDRATE 100 MG/ML
0-250 INJECTION, SOLUTION INTRAVENOUS AS NEEDED
Status: DISCONTINUED | OUTPATIENT
Start: 2020-01-15 | End: 2020-01-16 | Stop reason: HOSPADM

## 2020-01-15 RX ORDER — HYDROMORPHONE HYDROCHLORIDE 1 MG/ML
0.5 INJECTION, SOLUTION INTRAMUSCULAR; INTRAVENOUS; SUBCUTANEOUS
Status: DISCONTINUED | OUTPATIENT
Start: 2020-01-15 | End: 2020-01-16

## 2020-01-15 RX ORDER — INSULIN LISPRO 100 [IU]/ML
INJECTION, SOLUTION INTRAVENOUS; SUBCUTANEOUS
Status: DISCONTINUED | OUTPATIENT
Start: 2020-01-15 | End: 2020-01-16 | Stop reason: HOSPADM

## 2020-01-15 RX ORDER — LIDOCAINE HYDROCHLORIDE 20 MG/ML
INJECTION, SOLUTION EPIDURAL; INFILTRATION; INTRACAUDAL; PERINEURAL AS NEEDED
Status: DISCONTINUED | OUTPATIENT
Start: 2020-01-15 | End: 2020-01-15 | Stop reason: HOSPADM

## 2020-01-15 RX ORDER — FENTANYL CITRATE 50 UG/ML
25 INJECTION, SOLUTION INTRAMUSCULAR; INTRAVENOUS
Status: DISCONTINUED | OUTPATIENT
Start: 2020-01-15 | End: 2020-01-15 | Stop reason: HOSPADM

## 2020-01-15 RX ORDER — HEPARIN SODIUM 5000 [USP'U]/ML
5000 INJECTION, SOLUTION INTRAVENOUS; SUBCUTANEOUS EVERY 8 HOURS
Status: DISCONTINUED | OUTPATIENT
Start: 2020-01-15 | End: 2020-01-16 | Stop reason: HOSPADM

## 2020-01-15 RX ORDER — ROCURONIUM BROMIDE 10 MG/ML
INJECTION, SOLUTION INTRAVENOUS AS NEEDED
Status: DISCONTINUED | OUTPATIENT
Start: 2020-01-15 | End: 2020-01-15 | Stop reason: HOSPADM

## 2020-01-15 RX ORDER — ROPIVACAINE HYDROCHLORIDE 5 MG/ML
30 INJECTION, SOLUTION EPIDURAL; INFILTRATION; PERINEURAL AS NEEDED
Status: DISCONTINUED | OUTPATIENT
Start: 2020-01-15 | End: 2020-01-15 | Stop reason: HOSPADM

## 2020-01-15 RX ORDER — ONDANSETRON 2 MG/ML
INJECTION INTRAMUSCULAR; INTRAVENOUS AS NEEDED
Status: DISCONTINUED | OUTPATIENT
Start: 2020-01-15 | End: 2020-01-15 | Stop reason: HOSPADM

## 2020-01-15 RX ORDER — CLOPIDOGREL BISULFATE 75 MG/1
75 TABLET ORAL DAILY
Status: DISCONTINUED | OUTPATIENT
Start: 2020-01-16 | End: 2020-01-16 | Stop reason: HOSPADM

## 2020-01-15 RX ORDER — HEPARIN SODIUM 1000 [USP'U]/ML
INJECTION, SOLUTION INTRAVENOUS; SUBCUTANEOUS AS NEEDED
Status: DISCONTINUED | OUTPATIENT
Start: 2020-01-15 | End: 2020-01-15 | Stop reason: HOSPADM

## 2020-01-15 RX ORDER — FENTANYL CITRATE 50 UG/ML
50 INJECTION, SOLUTION INTRAMUSCULAR; INTRAVENOUS AS NEEDED
Status: DISCONTINUED | OUTPATIENT
Start: 2020-01-15 | End: 2020-01-15 | Stop reason: HOSPADM

## 2020-01-15 RX ORDER — SODIUM CHLORIDE 0.9 % (FLUSH) 0.9 %
5-40 SYRINGE (ML) INJECTION EVERY 8 HOURS
Status: DISCONTINUED | OUTPATIENT
Start: 2020-01-15 | End: 2020-01-15 | Stop reason: HOSPADM

## 2020-01-15 RX ORDER — DEXAMETHASONE SODIUM PHOSPHATE 4 MG/ML
INJECTION, SOLUTION INTRA-ARTICULAR; INTRALESIONAL; INTRAMUSCULAR; INTRAVENOUS; SOFT TISSUE AS NEEDED
Status: DISCONTINUED | OUTPATIENT
Start: 2020-01-15 | End: 2020-01-15 | Stop reason: HOSPADM

## 2020-01-15 RX ORDER — SODIUM CHLORIDE, SODIUM LACTATE, POTASSIUM CHLORIDE, CALCIUM CHLORIDE 600; 310; 30; 20 MG/100ML; MG/100ML; MG/100ML; MG/100ML
100 INJECTION, SOLUTION INTRAVENOUS CONTINUOUS
Status: DISCONTINUED | OUTPATIENT
Start: 2020-01-15 | End: 2020-01-15 | Stop reason: HOSPADM

## 2020-01-15 RX ORDER — ALLOPURINOL 100 MG/1
100 TABLET ORAL
Status: DISCONTINUED | OUTPATIENT
Start: 2020-01-16 | End: 2020-01-16 | Stop reason: HOSPADM

## 2020-01-15 RX ORDER — AMLODIPINE BESYLATE 5 MG/1
5 TABLET ORAL DAILY
Status: DISCONTINUED | OUTPATIENT
Start: 2020-01-16 | End: 2020-01-16 | Stop reason: HOSPADM

## 2020-01-15 RX ORDER — ONDANSETRON 2 MG/ML
4 INJECTION INTRAMUSCULAR; INTRAVENOUS AS NEEDED
Status: DISCONTINUED | OUTPATIENT
Start: 2020-01-15 | End: 2020-01-15 | Stop reason: HOSPADM

## 2020-01-15 RX ORDER — DIPHENHYDRAMINE HYDROCHLORIDE 50 MG/ML
12.5 INJECTION, SOLUTION INTRAMUSCULAR; INTRAVENOUS AS NEEDED
Status: DISCONTINUED | OUTPATIENT
Start: 2020-01-15 | End: 2020-01-15 | Stop reason: HOSPADM

## 2020-01-15 RX ORDER — ROPINIROLE 1 MG/1
0.5 TABLET, FILM COATED ORAL
Status: DISCONTINUED | OUTPATIENT
Start: 2020-01-15 | End: 2020-01-16 | Stop reason: HOSPADM

## 2020-01-15 RX ORDER — ASPIRIN 81 MG/1
81 TABLET ORAL DAILY
Status: DISCONTINUED | OUTPATIENT
Start: 2020-01-16 | End: 2020-01-16 | Stop reason: HOSPADM

## 2020-01-15 RX ORDER — PANTOPRAZOLE SODIUM 40 MG/1
40 TABLET, DELAYED RELEASE ORAL DAILY
Status: DISCONTINUED | OUTPATIENT
Start: 2020-01-16 | End: 2020-01-16 | Stop reason: HOSPADM

## 2020-01-15 RX ORDER — HYDROMORPHONE HYDROCHLORIDE 2 MG/ML
INJECTION, SOLUTION INTRAMUSCULAR; INTRAVENOUS; SUBCUTANEOUS AS NEEDED
Status: DISCONTINUED | OUTPATIENT
Start: 2020-01-15 | End: 2020-01-15 | Stop reason: HOSPADM

## 2020-01-15 RX ORDER — SODIUM CHLORIDE, SODIUM LACTATE, POTASSIUM CHLORIDE, CALCIUM CHLORIDE 600; 310; 30; 20 MG/100ML; MG/100ML; MG/100ML; MG/100ML
25 INJECTION, SOLUTION INTRAVENOUS CONTINUOUS
Status: DISCONTINUED | OUTPATIENT
Start: 2020-01-15 | End: 2020-01-15 | Stop reason: HOSPADM

## 2020-01-15 RX ORDER — MORPHINE SULFATE 10 MG/ML
2 INJECTION, SOLUTION INTRAMUSCULAR; INTRAVENOUS
Status: DISCONTINUED | OUTPATIENT
Start: 2020-01-15 | End: 2020-01-15 | Stop reason: HOSPADM

## 2020-01-15 RX ORDER — SODIUM CHLORIDE 0.9 % (FLUSH) 0.9 %
5-40 SYRINGE (ML) INJECTION AS NEEDED
Status: DISCONTINUED | OUTPATIENT
Start: 2020-01-15 | End: 2020-01-15 | Stop reason: HOSPADM

## 2020-01-15 RX ORDER — SUCCINYLCHOLINE CHLORIDE 20 MG/ML
INJECTION INTRAMUSCULAR; INTRAVENOUS AS NEEDED
Status: DISCONTINUED | OUTPATIENT
Start: 2020-01-15 | End: 2020-01-15 | Stop reason: HOSPADM

## 2020-01-15 RX ORDER — MAGNESIUM SULFATE 100 %
4 CRYSTALS MISCELLANEOUS AS NEEDED
Status: DISCONTINUED | OUTPATIENT
Start: 2020-01-15 | End: 2020-01-16 | Stop reason: HOSPADM

## 2020-01-15 RX ORDER — MIDAZOLAM HYDROCHLORIDE 1 MG/ML
0.5 INJECTION, SOLUTION INTRAMUSCULAR; INTRAVENOUS
Status: DISCONTINUED | OUTPATIENT
Start: 2020-01-15 | End: 2020-01-15 | Stop reason: HOSPADM

## 2020-01-15 RX ORDER — GLYCOPYRROLATE 0.2 MG/ML
INJECTION INTRAMUSCULAR; INTRAVENOUS AS NEEDED
Status: DISCONTINUED | OUTPATIENT
Start: 2020-01-15 | End: 2020-01-15 | Stop reason: HOSPADM

## 2020-01-15 RX ORDER — ONDANSETRON 2 MG/ML
4 INJECTION INTRAMUSCULAR; INTRAVENOUS
Status: DISCONTINUED | OUTPATIENT
Start: 2020-01-15 | End: 2020-01-16 | Stop reason: HOSPADM

## 2020-01-15 RX ORDER — ACETAMINOPHEN 325 MG/1
650 TABLET ORAL
Status: DISCONTINUED | OUTPATIENT
Start: 2020-01-15 | End: 2020-01-16 | Stop reason: HOSPADM

## 2020-01-15 RX ORDER — FENTANYL CITRATE 50 UG/ML
INJECTION, SOLUTION INTRAMUSCULAR; INTRAVENOUS AS NEEDED
Status: DISCONTINUED | OUTPATIENT
Start: 2020-01-15 | End: 2020-01-15 | Stop reason: HOSPADM

## 2020-01-15 RX ORDER — LIDOCAINE HYDROCHLORIDE 10 MG/ML
0.1 INJECTION, SOLUTION EPIDURAL; INFILTRATION; INTRACAUDAL; PERINEURAL AS NEEDED
Status: DISCONTINUED | OUTPATIENT
Start: 2020-01-15 | End: 2020-01-15 | Stop reason: HOSPADM

## 2020-01-15 RX ORDER — PROTAMINE SULFATE 10 MG/ML
INJECTION, SOLUTION INTRAVENOUS AS NEEDED
Status: DISCONTINUED | OUTPATIENT
Start: 2020-01-15 | End: 2020-01-15 | Stop reason: HOSPADM

## 2020-01-15 RX ORDER — SODIUM CHLORIDE 0.9 % (FLUSH) 0.9 %
5-40 SYRINGE (ML) INJECTION EVERY 8 HOURS
Status: DISCONTINUED | OUTPATIENT
Start: 2020-01-15 | End: 2020-01-16 | Stop reason: HOSPADM

## 2020-01-15 RX ORDER — NEOSTIGMINE METHYLSULFATE 1 MG/ML
INJECTION INTRAVENOUS AS NEEDED
Status: DISCONTINUED | OUTPATIENT
Start: 2020-01-15 | End: 2020-01-15 | Stop reason: HOSPADM

## 2020-01-15 RX ORDER — ACETAMINOPHEN 650 MG/1
650 SUPPOSITORY RECTAL
Status: DISCONTINUED | OUTPATIENT
Start: 2020-01-15 | End: 2020-01-16 | Stop reason: HOSPADM

## 2020-01-15 RX ORDER — OXYCODONE HYDROCHLORIDE 5 MG/1
5 TABLET ORAL
Status: DISCONTINUED | OUTPATIENT
Start: 2020-01-15 | End: 2020-01-16 | Stop reason: HOSPADM

## 2020-01-15 RX ORDER — ATORVASTATIN CALCIUM 40 MG/1
80 TABLET, FILM COATED ORAL DAILY
Status: DISCONTINUED | OUTPATIENT
Start: 2020-01-16 | End: 2020-01-16 | Stop reason: HOSPADM

## 2020-01-15 RX ORDER — NITROGLYCERIN 0.4 MG/1
0.4 TABLET SUBLINGUAL AS NEEDED
Status: DISCONTINUED | OUTPATIENT
Start: 2020-01-15 | End: 2020-01-16 | Stop reason: HOSPADM

## 2020-01-15 RX ORDER — PHENYLEPHRINE HCL IN 0.9% NACL 0.4MG/10ML
SYRINGE (ML) INTRAVENOUS AS NEEDED
Status: DISCONTINUED | OUTPATIENT
Start: 2020-01-15 | End: 2020-01-15 | Stop reason: HOSPADM

## 2020-01-15 RX ORDER — METOPROLOL SUCCINATE 25 MG/1
25 TABLET, EXTENDED RELEASE ORAL DAILY
Status: DISCONTINUED | OUTPATIENT
Start: 2020-01-16 | End: 2020-01-16 | Stop reason: HOSPADM

## 2020-01-15 RX ORDER — MIDAZOLAM HYDROCHLORIDE 1 MG/ML
1 INJECTION, SOLUTION INTRAMUSCULAR; INTRAVENOUS AS NEEDED
Status: DISCONTINUED | OUTPATIENT
Start: 2020-01-15 | End: 2020-01-15 | Stop reason: HOSPADM

## 2020-01-15 RX ORDER — SODIUM CHLORIDE 0.9 % (FLUSH) 0.9 %
5-40 SYRINGE (ML) INJECTION AS NEEDED
Status: DISCONTINUED | OUTPATIENT
Start: 2020-01-15 | End: 2020-01-16 | Stop reason: HOSPADM

## 2020-01-15 RX ORDER — NALOXONE HYDROCHLORIDE 0.4 MG/ML
0.4 INJECTION, SOLUTION INTRAMUSCULAR; INTRAVENOUS; SUBCUTANEOUS AS NEEDED
Status: DISCONTINUED | OUTPATIENT
Start: 2020-01-15 | End: 2020-01-16 | Stop reason: HOSPADM

## 2020-01-15 RX ORDER — ALBUTEROL SULFATE 0.83 MG/ML
2.5 SOLUTION RESPIRATORY (INHALATION)
Status: DISCONTINUED | OUTPATIENT
Start: 2020-01-15 | End: 2020-01-16 | Stop reason: HOSPADM

## 2020-01-15 RX ORDER — SODIUM CHLORIDE 9 MG/ML
50 INJECTION, SOLUTION INTRAVENOUS CONTINUOUS
Status: DISCONTINUED | OUTPATIENT
Start: 2020-01-15 | End: 2020-01-16

## 2020-01-15 RX ORDER — CITALOPRAM 20 MG/1
40 TABLET, FILM COATED ORAL DAILY
Status: DISCONTINUED | OUTPATIENT
Start: 2020-01-16 | End: 2020-01-16 | Stop reason: HOSPADM

## 2020-01-15 RX ORDER — MIDAZOLAM HYDROCHLORIDE 1 MG/ML
INJECTION, SOLUTION INTRAMUSCULAR; INTRAVENOUS AS NEEDED
Status: DISCONTINUED | OUTPATIENT
Start: 2020-01-15 | End: 2020-01-15 | Stop reason: HOSPADM

## 2020-01-15 RX ORDER — ISOSORBIDE MONONITRATE 30 MG/1
30 TABLET, EXTENDED RELEASE ORAL DAILY
Status: DISCONTINUED | OUTPATIENT
Start: 2020-01-16 | End: 2020-01-16 | Stop reason: HOSPADM

## 2020-01-15 RX ORDER — HYDROMORPHONE HYDROCHLORIDE 1 MG/ML
0.5 INJECTION, SOLUTION INTRAMUSCULAR; INTRAVENOUS; SUBCUTANEOUS
Status: DISCONTINUED | OUTPATIENT
Start: 2020-01-15 | End: 2020-01-15 | Stop reason: HOSPADM

## 2020-01-15 RX ORDER — SODIUM CHLORIDE 9 MG/ML
25 INJECTION, SOLUTION INTRAVENOUS CONTINUOUS
Status: DISCONTINUED | OUTPATIENT
Start: 2020-01-15 | End: 2020-01-15 | Stop reason: HOSPADM

## 2020-01-15 RX ORDER — PROPOFOL 10 MG/ML
INJECTION, EMULSION INTRAVENOUS AS NEEDED
Status: DISCONTINUED | OUTPATIENT
Start: 2020-01-15 | End: 2020-01-15 | Stop reason: HOSPADM

## 2020-01-15 RX ORDER — ACETAMINOPHEN 325 MG/1
650 TABLET ORAL ONCE
Status: COMPLETED | OUTPATIENT
Start: 2020-01-15 | End: 2020-01-15

## 2020-01-15 RX ORDER — EPHEDRINE SULFATE/0.9% NACL/PF 50 MG/5 ML
SYRINGE (ML) INTRAVENOUS AS NEEDED
Status: DISCONTINUED | OUTPATIENT
Start: 2020-01-15 | End: 2020-01-15 | Stop reason: HOSPADM

## 2020-01-15 RX ADMIN — Medication 80 MCG: at 09:07

## 2020-01-15 RX ADMIN — ROCURONIUM BROMIDE 10 MG: 10 INJECTION INTRAVENOUS at 09:54

## 2020-01-15 RX ADMIN — Medication 80 MCG: at 07:52

## 2020-01-15 RX ADMIN — Medication 10 MG: at 08:04

## 2020-01-15 RX ADMIN — ROCURONIUM BROMIDE 10 MG: 10 INJECTION INTRAVENOUS at 08:00

## 2020-01-15 RX ADMIN — Medication 10 ML: at 21:10

## 2020-01-15 RX ADMIN — PROTAMINE SULFATE 20 MG: 10 INJECTION, SOLUTION INTRAVENOUS at 10:30

## 2020-01-15 RX ADMIN — FENTANYL CITRATE 50 MCG: 50 INJECTION, SOLUTION INTRAMUSCULAR; INTRAVENOUS at 07:40

## 2020-01-15 RX ADMIN — ONDANSETRON HYDROCHLORIDE 4 MG: 2 INJECTION, SOLUTION INTRAMUSCULAR; INTRAVENOUS at 08:08

## 2020-01-15 RX ADMIN — FENTANYL CITRATE 50 MCG: 50 INJECTION, SOLUTION INTRAMUSCULAR; INTRAVENOUS at 11:18

## 2020-01-15 RX ADMIN — ROCURONIUM BROMIDE 10 MG: 10 INJECTION INTRAVENOUS at 07:43

## 2020-01-15 RX ADMIN — PROPOFOL 10 MG: 10 INJECTION, EMULSION INTRAVENOUS at 09:10

## 2020-01-15 RX ADMIN — Medication 80 MCG: at 10:38

## 2020-01-15 RX ADMIN — DEXAMETHASONE SODIUM PHOSPHATE 8 MG: 4 INJECTION, SOLUTION INTRAMUSCULAR; INTRAVENOUS at 08:08

## 2020-01-15 RX ADMIN — PHENYLEPHRINE HYDROCHLORIDE 40 MCG/MIN: 10 INJECTION INTRAVENOUS at 08:13

## 2020-01-15 RX ADMIN — Medication 80 MCG: at 10:47

## 2020-01-15 RX ADMIN — PROPOFOL 20 MG: 10 INJECTION, EMULSION INTRAVENOUS at 08:50

## 2020-01-15 RX ADMIN — Medication 80 MCG: at 10:40

## 2020-01-15 RX ADMIN — SUCCINYLCHOLINE CHLORIDE 140 MG: 20 INJECTION, SOLUTION INTRAMUSCULAR; INTRAVENOUS at 07:43

## 2020-01-15 RX ADMIN — HEPARIN SODIUM 10000 UNITS: 1000 INJECTION, SOLUTION INTRAVENOUS; SUBCUTANEOUS at 08:42

## 2020-01-15 RX ADMIN — GLYCOPYRROLATE 0.4 MG: 0.2 INJECTION, SOLUTION INTRAMUSCULAR; INTRAVENOUS at 11:08

## 2020-01-15 RX ADMIN — MIDAZOLAM HYDROCHLORIDE 2 MG: 1 INJECTION INTRAMUSCULAR; INTRAVENOUS at 07:34

## 2020-01-15 RX ADMIN — Medication 10 MG: at 07:52

## 2020-01-15 RX ADMIN — NEOSTIGMINE METHYLSULFATE 3 MG: 1 INJECTION INTRAVENOUS at 11:08

## 2020-01-15 RX ADMIN — PROPOFOL 40 MG: 10 INJECTION, EMULSION INTRAVENOUS at 08:00

## 2020-01-15 RX ADMIN — ROPINIROLE HYDROCHLORIDE 0.5 MG: 1 TABLET, FILM COATED ORAL at 21:04

## 2020-01-15 RX ADMIN — PROTAMINE SULFATE 10 MG: 10 INJECTION, SOLUTION INTRAVENOUS at 10:45

## 2020-01-15 RX ADMIN — HEPARIN SODIUM 2000 UNITS: 1000 INJECTION, SOLUTION INTRAVENOUS; SUBCUTANEOUS at 09:33

## 2020-01-15 RX ADMIN — HEPARIN SODIUM 5000 UNITS: 5000 INJECTION INTRAVENOUS; SUBCUTANEOUS at 15:20

## 2020-01-15 RX ADMIN — SODIUM CHLORIDE, SODIUM LACTATE, POTASSIUM CHLORIDE, AND CALCIUM CHLORIDE 25 ML/HR: 600; 310; 30; 20 INJECTION, SOLUTION INTRAVENOUS at 07:23

## 2020-01-15 RX ADMIN — SODIUM CHLORIDE, SODIUM LACTATE, POTASSIUM CHLORIDE, AND CALCIUM CHLORIDE: 600; 310; 30; 20 INJECTION, SOLUTION INTRAVENOUS at 10:40

## 2020-01-15 RX ADMIN — GLYCOPYRROLATE 0.2 MG: 0.2 INJECTION, SOLUTION INTRAMUSCULAR; INTRAVENOUS at 08:03

## 2020-01-15 RX ADMIN — VANCOMYCIN HYDROCHLORIDE 1500 MG: 10 INJECTION, POWDER, LYOPHILIZED, FOR SOLUTION INTRAVENOUS at 07:27

## 2020-01-15 RX ADMIN — Medication 10 MG: at 07:54

## 2020-01-15 RX ADMIN — ROCURONIUM BROMIDE 20 MG: 10 INJECTION INTRAVENOUS at 07:46

## 2020-01-15 RX ADMIN — HEPARIN SODIUM 5000 UNITS: 5000 INJECTION INTRAVENOUS; SUBCUTANEOUS at 21:04

## 2020-01-15 RX ADMIN — ACETAMINOPHEN 650 MG: 325 TABLET ORAL at 23:53

## 2020-01-15 RX ADMIN — FENTANYL CITRATE 50 MCG: 50 INJECTION, SOLUTION INTRAMUSCULAR; INTRAVENOUS at 08:25

## 2020-01-15 RX ADMIN — Medication 10 ML: at 15:20

## 2020-01-15 RX ADMIN — SODIUM CHLORIDE 50 ML/HR: 900 INJECTION, SOLUTION INTRAVENOUS at 12:30

## 2020-01-15 RX ADMIN — PROTAMINE SULFATE 20 MG: 10 INJECTION, SOLUTION INTRAVENOUS at 10:22

## 2020-01-15 RX ADMIN — HYDROMORPHONE HYDROCHLORIDE 0.5 MG: 2 INJECTION, SOLUTION INTRAMUSCULAR; INTRAVENOUS; SUBCUTANEOUS at 08:36

## 2020-01-15 RX ADMIN — PROPOFOL 80 MG: 10 INJECTION, EMULSION INTRAVENOUS at 07:43

## 2020-01-15 RX ADMIN — FENTANYL CITRATE 50 MCG: 50 INJECTION, SOLUTION INTRAMUSCULAR; INTRAVENOUS at 08:17

## 2020-01-15 RX ADMIN — Medication 80 MCG: at 07:43

## 2020-01-15 RX ADMIN — LIDOCAINE HYDROCHLORIDE 80 MG: 20 INJECTION, SOLUTION INTRAVENOUS at 11:18

## 2020-01-15 RX ADMIN — SODIUM CHLORIDE, SODIUM LACTATE, POTASSIUM CHLORIDE, AND CALCIUM CHLORIDE 1000 ML: 600; 310; 30; 20 INJECTION, SOLUTION INTRAVENOUS at 15:20

## 2020-01-15 RX ADMIN — Medication 40 MCG: at 10:52

## 2020-01-15 RX ADMIN — LIDOCAINE HYDROCHLORIDE 100 MG: 20 INJECTION, SOLUTION INTRAVENOUS at 07:40

## 2020-01-15 RX ADMIN — Medication 3 AMPULE: at 07:28

## 2020-01-15 RX ADMIN — ACETAMINOPHEN 650 MG: 325 TABLET ORAL at 07:28

## 2020-01-15 RX ADMIN — INSULIN LISPRO 2 UNITS: 100 INJECTION, SOLUTION INTRAVENOUS; SUBCUTANEOUS at 21:07

## 2020-01-15 NOTE — H&P
Vascular Surgery Consult Note  1/15/2020    Subjective:     Jonathan Baird is a 66 y.o.  male with TIA last week and symptomatic left carotid stenosis.          Past Medical History:   Diagnosis Date    Abnormal stress echo 5/12/2015    Anemia NEC 03/2013    Last 2-3 months; finished taking iron supplement    Anxiety     CAD (coronary artery disease) 2009    cath [facility]) revealed 20%RCA and 50%2nd diagonal    Calculus of kidney     Chronic kidney disease     sees nephrologist every 6 months    Chronic kidney disease, stage III (moderate) ([address] 10/11/2009    30% kidney function    Diabetes ([facility] [address]     Pre-diabetic    DJD (degenerative joint disease)     GERD (gastroesophageal reflux disease) 10/11/2009    controlled with medication    Gout     Hypertension     Liver disease     fatty liver    Obesity     Obstructive sleep apnea (adult) (pediatric) 10/11/2009    nasal pillows; pressure set at 10-11 - uses cpap    Peripheral neuropathy 10/11/2009    bilateral feet (numbness)    Prediabetes     RLS (restless legs syndrome) 10/11/2009    S/P coronary artery stent placement 5/12/2015    5/11/15 PCI./MALI to LCx      Past Surgical History:   Procedure Laterality Date    HX BUNIONECTOMY  2019   [place] HEART CATHETERIZATION  2009, 7/17    x1 stent 1 x (x2 now 1/15/2020)    HX HERNIA REPAIR      [place]/umbilical    HX KNEE REPLACEMENT Left 7/23/11     LEFT TOTAL KNEE ARTHROPLASTY    HX ORTHOPAEDIC      left great toe pinning/plate    HX ORTHOPAEDIC      left shoulder manipulation    HX UROLOGICAL      basket extraction of kidney stones    HI COLONOSCOPY FLX DX W/COLLJ SPEC WHEN PFRMD  8/5/2010         HI COLSC FLX W/RMVL OF TUMOR POLYP LESION SNARE TQ  9/24/2014          Family History   Problem Relation Age of Onset    Heart Disease Father     Hypertension Father     Other Father         abdominal aneurysm     Dementia Mother     Alzheimer Mother     Heart Disease Brother     Heart Attack Brother     Heart Disease Maternal Grandmother     Heart Disease Paternal Grandmother     Heart Attack Paternal Grandmother     Heart Disease Paternal Grandfather     Heart Attack Paternal Grandfather     Elevated Lipids Son     Elevated Lipids Daughter     Cancer Neg Hx     Diabetes Neg Hx     Stroke Neg Hx       Social History     Tobacco Use    Smoking status: Former Smoker     Packs/day: 1.00     Years: 10.00     Pack years: 10.00     Types: Cigarettes     Last attempt to quit: 1968     Years since quittin.0    Smokeless tobacco: Never Used   Substance Use Topics    Alcohol use: No     Alcohol/week: 0.0 standard drinks       Prior to Admission medications    Medication Sig Start Date End Date Taking? Authorizing Provider   atorvastatin (LIPITOR) 80 mg tablet Take 1 Tab by mouth daily. 20  Yes Luz Maria Douglas MD   chlorthalidone (HYGROTEN) 25 mg tablet TAKE 1/2 TABLET BY MOUTH EVERY DAY 19  Yes Cecilio Caceres NP   isosorbide mononitrate ER (IMDUR) 30 mg tablet TAKE 1/2 TABLET BY MOUTH EVERY DAY 19  Yes Rosalee Roberts MD   pantoprazole (PROTONIX) 40 mg tablet TAKE 1 TABLET BY MOUTH EVERY DAY 10/15/19  Yes Luz Maria Douglas MD   clopidogrel (PLAVIX) 75 mg tab TAKE 1 TABLET BY MOUTH EVERY DAY 10/9/19  Yes Alice Conde NP   metoprolol succinate (TOPROL-XL) 25 mg XL tablet TAKE 1 TABLET BY MOUTH EVERY DAY 10/9/19  Yes Alice Conde NP   citalopram (CELEXA) 40 mg tablet TAKE 1 TABLET BY MOUTH EVERY DAY 19  Yes Luz Maria Douglas MD   telmisartan (MICARDIS) 40 mg tablet Take 1 Tab by mouth daily. 19  Yes Luz Maria Douglas MD   baclofen (LIORESAL) 10 mg tablet Take  by mouth three (3) times daily. Yes Provider, Historical   rOPINIRole (REQUIP) 0.5 mg tablet Take 1- 2 tablets daily in the evening 19  Yes Luz Maria Douglas MD   VITAMIN D3 2,000 unit cap capsule Take 2,000 Units by mouth daily.  3/3/15  Yes Provider, Historical   amLODIPine (NORVASC) 5 mg tablet Take 5 mg by mouth daily. 6/16/14  Yes Provider, Historical   GLUCOSAMINE HCL/CHONDR PETERSEN A NA (GLUCOSAMINE-CHONDROITIN) 750-600 mg Tab Take 1 Tab by mouth daily. Brand: Move Free   Yes Provider, Historical   aspirin delayed-release 81 mg tablet Take 81 mg by mouth daily. Yes Provider, Historical   allopurinol (ZYLOPRIM) 100 mg tablet Take 100 mg by mouth every morning. 6/5/12  Yes Provider, Historical   MULTIVITS W-FE,OTHER MIN (CENTRUM PO) Take 1 Tab by mouth daily. Yes Provider, Historical   ketoconazole (NIZORAL) 2 % shampoo Apply  to affected area. 5/7/18   Provider, Historical   nitroglycerin (NITROSTAT) 0.4 mg SL tablet TAKE 1 TABLET BY SUBLINGUAL ROUTE EVERY 5 MINUTES AS NEEDED FOR CHEST PAIN. 1/14/19   Yamel Mena MD   albuterol (PROVENTIL HFA, VENTOLIN HFA, PROAIR HFA) 90 mcg/actuation inhaler Take 1 Puff by inhalation every six (6) hours as needed for Wheezing. 2/28/17   Yamel Mena MD   OXYGEN-AIR DELIVERY SYSTEMS (HORIZON NASAL CPAP SYSTEM) by Does Not Apply route. Provider, Historical     Allergies   Allergen Reactions    Penicillins Hives     Pt states he has taken Keflex before without problems    Bactrim [Sulfamethoxazole-Trimethoprim] Hives    Codeine Itching    Lisinopril (Bulk) Cough    Neurontin [Gabapentin] Other (comments)     drowsy    Zostavax [Zoster Vaccine Live (Pf)] Rash     See 9/10/13 visit        Review of Systems:  Review of Systems   All other systems reviewed and are negative. Objective:       Patient Vitals for the past 24 hrs:   BP Temp Pulse Resp SpO2 Height Weight   01/15/20 0659 133/57 98.6 °F (37 °C) 62 21 96 % 6' (1.829 m) 108.3 kg (238 lb 12.1 oz)     ---------------------------------------------------------------------------------------------------------    Physical Exam:   Physical Exam  Constitutional:       Appearance: Normal appearance. HENT:      Head: Normocephalic and atraumatic.       Nose: Nose normal. Cardiovascular:      Rate and Rhythm: Normal rate. Pulmonary:      Effort: Pulmonary effort is normal.      Breath sounds: Normal breath sounds. Abdominal:      General: Abdomen is flat. Musculoskeletal: Normal range of motion. Skin:     General: Skin is warm. Neurological:      Mental Status: He is alert. Psychiatric:         Mood and Affect: Mood normal.         Pertinent Test Results:   No results found for this or any previous visit (from the past 24 hour(s)).     Assessmen/Plan:       OR for left CEA    Signed By: Nasir Mccann MD     January 15, 2020

## 2020-01-15 NOTE — DISCHARGE INSTRUCTIONS
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 There is no need to use this SmartLink in discharge instructions. Highlight this text and delete it to clear this message}      General drug facts     If you have a very bad allergy, wear an allergy ID at all times. It is important that you take the medication exactly as they are prescribed. Keep your medication in the bottles provided by the pharmacist.  Keep a list of all your drugs (prescription, natural products, vitamins, OTC) with you. Give this list to your doctor. Do not take other medications without consulting your doctor. Do not share your drugs with others and do not take anyone else's drugs. Keep all drugs out of the reach of children and pets. Most drugs may be thrown away in household trash after mixing with coffee grounds or antonietta litter and sealing in a plastic bag. Keep a list Call your doctor for help with any side effects. If in the [place] you may also call the FDA at 2-700-ZKL-2737    Talk with the doctor before starting any new drug, including OTC, natural products, or vitamins. What to do at Home    1. Recommended diet: cardiac    2. Recommended activity: No driving until cleared by Neurologist.    3. If you experience any of the following symptoms then please call your primary care physician or return to the emergency room if you cannot get hold of your doctor:    4. Wound Care: none    5. Lab work: cbc and Bmp in 1 week     8. Bring these papers with you to your follow up appointments. The papers will help your doctors be sure to continue the care plan from the hospital.      Follow-up with:   PCP: Amisha Holm MD  Follow-up Information     Follow up With Specialties Details Why Dottie Breaux MD Internal Medicine Go on 1/13/2020 Please follow up on January 13, 2020 at 10:30 [address] [facility]  [address] [facility]      Sirena Spear MD Neurology Go on 3/3/2020 Please follow up on March 3, 2020 at 9:40 arriving 15 minutes early to register with photo ID, insurance card and current list of medication  [address]  [facility]  [phone]             Please call for your own appointment        Information obtained by :  I understand that if any problems occur once I am at home I am to contact my physician. I understand and acknowledge receipt of the instructions indicated above.                                                                                                                                            Physician's or R.N.'s Signature                                                                  Date/Time                                                                                                                                              Patient or Representative Signature                                                          Date/Time

## 2020-01-15 NOTE — ANESTHESIA PROCEDURE NOTES
Arterial Line Placement    Start time: 1/15/2020 7:26 AM  End time: 1/15/2020 7:36 AM  Performed by: Kristan Shi MD  Authorized by: Kristan Shi MD     Pre-Procedure  Indications:  Arterial pressure monitoring and blood sampling  Preanesthetic Checklist: patient identified, risks and benefits discussed, anesthesia consent, site marked, patient being monitored, timeout performed and patient being monitored      Procedure:   Prep:  Chlorhexidine  Seldinger Technique?: Yes    Orientation:  Right  Location:  Radial artery  Catheter size:  20 G  Number of attempts:  1    Assessment:   Post-procedure:  Line secured and sterile dressing applied  Patient Tolerance:  Patient tolerated the procedure well with no immediate complications

## 2020-01-15 NOTE — ANESTHESIA POSTPROCEDURE EVALUATION
Procedure(s):  LEFT CAROTID ARTERY ENDARTERECTOMY. general    Anesthesia Post Evaluation        Patient location during evaluation: PACU  Note status: Adequate. Level of consciousness: responsive to verbal stimuli and sleepy but conscious  Pain management: satisfactory to patient  Airway patency: patent  Anesthetic complications: no  Cardiovascular status: acceptable  Respiratory status: acceptable  Hydration status: acceptable  Comments: +Post-Anesthesia Evaluation and Assessment    Patient: Tiajuana Leyden MRN: [id number]  SSN: [id number]   YOB: 1945  Age: 66 y.o. Sex: male          Cardiovascular Function/Vital Signs    /52   Pulse 70   Temp 37.2 °C (98.9 °F)   Resp 18   Ht 6' (1.829 m)   Wt 108.3 kg (238 lb 12.1 oz)   SpO2 97%   BMI 32.38 kg/m²     Patient is status post Procedure(s):  LEFT CAROTID ARTERY ENDARTERECTOMY. Nausea/Vomiting: Controlled. Postoperative hydration reviewed and adequate. Pain:  Pain Scale 1: Numeric (0 - 10) (01/15/20 1200)  Pain Intensity 1: 0 (01/15/20 1200)   Managed. Neurological Status:   Neuro (WDL): Exceptions to WDL (01/15/20 1135)   At baseline. Mental Status and Level of Consciousness: Arousable. Pulmonary Status:   O2 Device: Nasal cannula (01/15/20 1155)   Adequate oxygenation and airway patent. Complications related to anesthesia: None    Post-anesthesia assessment completed. No concerns. I have evaluated the patient and the patient is stable and ready to be discharged from PACU . Signed By: Jhon Lara MD    1/15/2020        Vitals Value Taken Time   /49 1/15/2020 12:15 PM   Temp 37.2 °C (98.9 °F) 1/15/2020 11:41 AM   Pulse 70 1/15/2020 12:17 PM   Resp 14 1/15/2020 12:17 PM   SpO2 95 % 1/15/2020 12:17 PM   Vitals shown include unvalidated device data.

## 2020-01-15 NOTE — ANESTHESIA PREPROCEDURE EVALUATION
Relevant Problems   No relevant active problems       Anesthetic History   No history of anesthetic complications            Review of Systems / Medical History  Patient summary reviewed, nursing notes reviewed and pertinent labs reviewed    Pulmonary  Within defined limits      Sleep apnea           Neuro/Psych   Within defined limits    CVA  Psychiatric history     Cardiovascular  Within defined limits  Hypertension          CAD, PAD and cardiac stents    Exercise tolerance: <4 METS  Comments: Normal cavity size. Mild concentric hypertrophy . Low normal systolic function. The estimated ejection fraction is 50 - 55%.    GI/Hepatic/Renal  Within defined limits   GERD    Renal disease: CRI  Liver disease     Endo/Other  Within defined limits  Diabetes    Morbid obesity and arthritis     Other Findings            Physical Exam    Airway  Mallampati: III  TM Distance: > 6 cm  Neck ROM: normal range of motion   Mouth opening: Normal     Cardiovascular  Regular rate and rhythm,  S1 and S2 normal,  no murmur, click, rub, or gallop             Dental  No notable dental hx       Pulmonary  Breath sounds clear to auscultation               Abdominal  GI exam deferred       Other Findings            Anesthetic Plan    ASA: 3  Anesthesia type: general    Monitoring Plan: Arterial line      Induction: Intravenous  Anesthetic plan and risks discussed with: Patient

## 2020-01-15 NOTE — BRIEF OP NOTE
BRIEF OPERATIVE NOTE    Date of Procedure: 1/15/2020   Preoperative Diagnosis: CAROTID STENOSIS  Postoperative Diagnosis: LFT CAROTID STENOSIS    Procedure(s):  LEFT CAROTID ARTERY ENDARTERECTOMY  Surgeon(s) and Role:     * Arlet Oliveira MD - Primary     * Marla Reddy MD - Assisting         Surgical Staff:  Circ-1: Miguel Angel Trotter  Scrub Tech-1: Jared Tirado  Scrub RN-1: Grant Lima RN; Vannessa Muñoz RN  Event Time In Time Out   Incision Start 0012    Incision Close 1118      Anesthesia: General   Estimated Blood Loss: 100ccs  Specimens:   ID Type Source Tests Collected by Time Destination   1 : LEFT carotid plaque Preservative Other                  Arlet Oliveira MD 1/15/2020 1042 Pathology      Findings: ulcerated plaque   Complications: none  Implants:   Implant Name Type Inv. Item Serial No.  Lot No. LRB No. Used Action   PATCH VASC PERIPH BOVNE . 8X8CM -- [employer] - [id number]  PATCH VASC PERIPH Sajitrudy Matthew . 8X8CM [employer] [id number] [place] [employer] [id number] 1 Implanted

## 2020-01-15 NOTE — DISCHARGE SUMMARY
Hospitalist Discharge Summary     Patient ID:  Christie Espino  [id number]  66 y.o.  1945 1/8/2020    PCP on record: Burke Cardozo MD    Admit date: 1/8/2020  Discharge date and time: 1/15/2020    DISCHARGE DIAGNOSIS:    Sudden onset of left eye vision loss likely CVA  Bilateral carotid disease with prominent disease on left with Possible Embolic TIA  CAD status post stents  Hypertension  CKD stage III                   CONSULTATIONS:  IP CONSULT TO NEUROLOGY  IP CONSULT TO VASCULAR SURGERY    Excerpted HPI from H&P of Melissa Gloria MD:  Christie Espino is a 66 y.o.  male who presents with above complaints from home ambulatory.     Patient presents with chief complaint of sudden onset left eye blurry vision that started around 8 PM yesterday while watching television. History of associated left-sided neck pain for past few days which was questionable acutely exacerbated just before the onset of the blurry vision tonight as per the patient. Patient denies any abnormality with his right eye vision. Patient denies any history of headache/migraine  History of coronary artery disease requiring stent placement 2 years ago  Denies any history of lupus, inflammatory bowel disease or any other connective tissue diseases  History of taking aspirin and Plavix along with Lipitor daily for cardiac diagnosis     Patient was found to have negative CT head in the ER, with evidence of bilateral carotid bifurcation disease on CTA head and neck done as per the recommendation of telemetry neurologist.  Patient was having rapid improvement in his left eye blurry vision by by the time I saw him.   Telemetry neurologist had already deemed him not a candidate for any TPA.     We were asked to admit for work up and evaluation of the above problems.        ______________________________________________________________________  DISCHARGE SUMMARY/HOSPITAL COURSE:  for full details see H&P, daily progress notes, labs, consult notes. Sudden onset of left eye vision loss likely embolic CVA  -pt was admitted and had below work up  -MRI of the brain is negative. Echo is normal.  -CTA shows bilateral carotid disease more on the left  -A1c 6.0, LDL is 56.8.  -Continue home aspirin and Plavix. Continue Lipitor 80 mg.  -Neurology recommended to continue aspirin and Plavix as above and have follow-up with vascular  -Vascular was consulted and recommended out pt CEA   -Patient was informed about driving restrictions until cleared by neurology.     Bilateral carotid disease with prominent disease on left with Possible Embolic TIA  -Vascular recommended out pt CEA     CAD status post stents  Hypertension  Continue home cardiac meds-aspirin, Plavix, Lipitor, chlorthalidone, amlodipine, imdur, metoprolol  Cont  telmisartan for now     CKD stage III  Creatinine stable at 2.1  Follows Dr. Castro Courser as outpatient          Patient seen and examined today, vital signs are stable, lab work is at baseline and was cleared by neurology  to be discharged for outpatient follow-up.      _______________________________________________________________________  Patient seen and examined by me on discharge day. Pertinent Findings:  Gen:    Not in distress  Chest: Clear lungs  CVS:   Regular rhythm.   No edema  Abd:  Soft, not distended, not tender  Neuro:  Alert, awake  _______________________________________________________________________  DISCHARGE MEDICATIONS:   Discharge Medication List as of 1/10/2020 11:20 AM      CONTINUE these medications which have NOT CHANGED    Details   chlorthalidone (HYGROTEN) 25 mg tablet TAKE 1/2 TABLET BY MOUTH EVERY DAY, Normal, Disp-15 Tab, R-1      isosorbide mononitrate ER (IMDUR) 30 mg tablet TAKE 1/2 TABLET BY MOUTH EVERY DAY, Normal, Disp-45 Tab, R-1      pantoprazole (PROTONIX) 40 mg tablet TAKE 1 TABLET BY MOUTH EVERY DAY, Normal, Disp-30 Tab, R-5      clopidogrel (PLAVIX) 75 mg tab TAKE 1 TABLET BY MOUTH EVERY DAY, Normal, Disp-90 Tab, R-1      metoprolol succinate (TOPROL-XL) 25 mg XL tablet TAKE 1 TABLET BY MOUTH EVERY DAY, Normal, Disp-90 Tab, R-1      citalopram (CELEXA) 40 mg tablet TAKE 1 TABLET BY MOUTH EVERY DAY, Normal, Disp-90 Tab, R-3      telmisartan (MICARDIS) 40 mg tablet Take 1 Tab by mouth daily. , NormalIn place of irbesartanDisp-90 Tab, R-3      baclofen (LIORESAL) 10 mg tablet Take  by mouth three (3) times daily. , Historical Med      rOPINIRole (REQUIP) 0.5 mg tablet Take 1- 2 tablets daily in the evening, Normal, Disp-60 Tab, R-5      VITAMIN D3 2,000 unit cap capsule Take 2,000 Units by mouth daily. , Historical Med, R-2      amLODIPine (NORVASC) 5 mg tablet Take 5 mg by mouth daily. , Historical Med      GLUCOSAMINE HCL/CHONDR PETERSEN A NA (GLUCOSAMINE-CHONDROITIN) 750-600 mg Tab Take 1 Tab by mouth daily. Brand: Move Free, Historical Med      aspirin delayed-release 81 mg tablet Take 81 mg by mouth daily. , Historical Med      allopurinol (ZYLOPRIM) 100 mg tablet Take 100 mg by mouth every morning., Historical Med      MULTIVITS W-FE,OTHER MIN (CENTRUM PO) Take 1 Tab by mouth daily. , Historical Med      atorvastatin (LIPITOR) 40 mg tablet TAKE 1 TABLET BY MOUTH EVERYDAY AT BEDTIME, Normal, Disp-90 Tab, R-0      ketoconazole (NIZORAL) 2 % shampoo Apply  to affected area., Historical Med      fluocinoNIDE (LIDEX) 0.05 % external solution Apply  to affected area., Historical Med      nitroglycerin (NITROSTAT) 0.4 mg SL tablet TAKE 1 TABLET BY SUBLINGUAL ROUTE EVERY 5 MINUTES AS NEEDED FOR CHEST PAIN., Normal, Disp-1 Bottle, R-0      albuterol (PROVENTIL HFA, VENTOLIN HFA, PROAIR HFA) 90 mcg/actuation inhaler Take 1 Puff by inhalation every six (6) hours as needed for Wheezing., Normal, Disp-1 Inhaler, R-0      OXYGEN-AIR DELIVERY SYSTEMS (HORIZON NASAL CPAP SYSTEM) by Does Not Apply route., Historical Med         STOP taking these medications       methylPREDNISolone (MEDROL DOSEPACK) 4 mg tablet Comments:   Reason for Stopping:                 Patient Follow Up Instructions:       1. Recommended diet: cardiac    2. Recommended activity: No driving until cleared by Neurologist.    3. If you experience any of the following symptoms then please call your primary care physician or return to the emergency room if you cannot get hold of your doctor:    4. Wound Care: none    5.  Lab work: cbc and Bmp in 1 week       Follow-up Information     Follow up With Specialties Details Why Josseline Blandon MD Internal Medicine Go on 1/13/2020 Please follow up on January 13, 2020 at 10:30 Brittany Valles MD Neurology Go on 3/3/2020 Please follow up on March 3, 2020 at 9:40 arriving 15 minutes early to register with photo ID, insurance card and current list of medication  [facility] 83. [phone]      Rick Morle MD Vascular Surgery On 1/15/2020 Please return to the [facility] for your procedure at [address] [place] [address]. Nothing to eat or drink after midnight on 01/14/2020. [address]. [phone]          ________________________________________________________________    Risk of deterioration: High    Condition at Discharge:  Stable  __________________________________________________________________    Disposition  Home with family, no needs    ____________________________________________________________________    Code Status: Full Code  ___________________________________________________________________      Total time in minutes spent coordinating this discharge (includes going over instructions, follow-up, prescriptions, and preparing report for sign off to her PCP) :  35 minutes    Signed:  Bart Bamberger, MD

## 2020-01-15 NOTE — OP NOTES
[facility]  OPERATIVE REPORT     Name: Rakesh Luis  MR#: [id number]  : 1945  DATE OF SERVICE: 1/15/2029        PREOPERATIVE DIAGNOSIS: Symptomatic left carotid artery stenosis     POSTOPERATIVE DIAGNOSIS: Symptomatic left carotid artery stenosis     PROCEDURE PERFORMED: Left carotid endarterectomy with bovine patch angioplasty    SURGEON:  Sarah López MD; Sonal Waite MD    ANESTHESIA:  General.     COMPLICATIONS:  None. SPECIMENS REMOVED:  carotid plaque     IMPLANTS:  bovine patch, EDIE drain     ESTIMATED BLOOD LOSS:  100 ccs     INDICATIONS:  The patient is a 66year old male that had TIA last week with left sided vision loss that has returned and left internal carotid stenosis of > 70%. He was discharged on aspirin and plavix and came back today for left carotid endarterectomy. Risks and benefits of procedure discussed with patient he wished to proceed. PROCEDURE: The patient was taken to the operating room, placed in supine position, prepped and draped in the usual sterile manner. Longitudinal incisions were made along the anterior border of the sternocleidomastoid, carried down through subcutaneous fat and fascia. Hemostasis was obtained with electrocautery. The platysmal muscle was divided. The carotid sheath was identified and opened. The vagus nerve, ansa cervicalis, and hypoglossal nerves were identified and avoided. The common carotid artery was dissected and heparin was administered. Next the internal and external carotids were then freed from the surrounding tissue. The internal, external and common carotids were then clamped. The carotid bulb was opened with a #11 blade and extended with Pereyra scissors through the  lesion into normal internal carotid. There was an ulcerated plaque with debris. A external Sundt shunt was then placed and Johnny clamps used to hold the shunt in the position. The plaque was then sharply excised proximally and an eversion endarterectomy was performed successfully at the external.  Heparinized saline was injected and no evidence of flapping or other debris was noted. The remaining carotid was examined under magnification, which showed no debris of flaps present. At this point, a bovine patch was brought on to the field, cut to appropriate length and size, and anastomosed to the artery using #6-0 Prolene in a running fashion. Prior to closing the patch, The shunt was removed and the internal carotid was back-bled through this. The last stitch was tied. Hemostasis was excellent. The internal was again gently occluded while flow was restored to the common and external carotids for several moments and then flow was restored to the entire system. At this point, protamine was administered and allowed to take effect. A #10 EDIE drain was placed. The wound was irrigated with antibiotic solution and closed in layers using #3-0 Vicryl and #4-0 monocryl. The patient was extubated and noted to be neurologically intact. The patient was then taken to the recovery room in satisfactory condition after tolerating the procedure well. Sponge, needles, and instrument count were correct.       Mike Cardozo MD

## 2020-01-15 NOTE — PROGRESS NOTES
1330 Received patient from PACU via stretcher, s/p left CEA. Left neck incision intact, sealed with skin glue, distal incision has EDIE drain in place and charged. Small amount of dark red drainage noted.  equal, tongue midline, pupils equal and reactive. Right radial kris intact, arm board in place. BP corollates between kris and left cuff pressure. Patient verbalizes no complaints at this time. Will monitor to keep BP between 100-150.      1400 Family at bedside, patient uses CPAP at night at home, family asked to bring in patient's machine. 1445 Cuff pressure 398 systolic, kris pressure reads in the 80's -90's, occasionally kris will be over 927 systolic. Dr. Bret Edmonds paged. Spoke with Herbert Thompson NP, will enter orders. 1520 Dr. Hay Mae in to see patient, also aware of low arterial BP. LR bolus ordered and started. Will wait to start Gildardo until after bolus is complete. 1620 BP better, arterial pressures now 100-120 range. Patient offering no complaints at this time.

## 2020-01-15 NOTE — PROGRESS NOTES
1645 - Received report from [facility] 83, [facility].    7569 - Went to check patient blood sugar, patient eating 2 dinners. Finished lunch at 1500. Will hold blood sugar check. 1900 - Report given to Jr Babb RN.

## 2020-01-15 NOTE — PROGRESS NOTES
PULMONARY/Critical Care/SLEEP MEDICINE  [facility]  Name: Christopher French   : 1945   MRN: [id number]   Date: 1/15/2020 3:12 PM   []I have reviewed the flowsheet and previous days notes. []The patient is unable to give any meaningful history or review of systems because the patient is:  []Intubated []   []Sedated    []Unresponsive      []The patient is critically ill on      []Mechanical ventilation []Pressors   []BiPAP []     Pt admitted to icu s/p cea  dw nursing, family  67 yo with recent tia - vision loss  Feels ok now, some soreness left neck  No sob other than climbing stairs  No cp  sbp 98 by kris    [facility]  Cad with ptca  Dm  ckd  htn  hyperlipidemia            ROS:A comprehensive review of systems was negative except for that written in the HPI. Vital Signs:    Visit Vitals  /48   Pulse 65   Temp 98.1 °F (36.7 °C)   Resp 16   Ht 6' (1.829 m)   Wt 107.9 kg (237 lb 14 oz)   SpO2 95%   BMI 32.26 kg/m²       O2 Device: Nasal cannula   O2 Flow Rate (L/min): 2 l/min   Temp (24hrs), Av.5 °F (36.9 °C), Min:98.1 °F (36.7 °C), Max:99 °F (37.2 °C)       Intake/Output:   Last shift:      01/15 0701 - 01/15 1900  In: 1985.8 [I.V.:1985.8]  Out: 120 [Drains:20]  Last 3 shifts: No intake/output data recorded.     Intake/Output Summary (Last 24 hours) at 1/15/2020 1512  Last data filed at 1/15/2020 1510  Gross per 24 hour   Intake 1985.83 ml   Output 120 ml   Net 1865.83 ml     Hemodynamics:   PAP:     Wedge:     CVP:      CO:     CI:     SVR:     PVR:        Ventilator Settings:                Physical Exam:    General: []Intubated/sedated [x]No acute distress []    []Ill appearing [] []      HEENT: []Icteric []PERRL [x]cea incision cdi, drain - old blood    [x]Anicteric Mucosa:[]moist   [x]dry []      Resp: []Wheeze [x]Clear to ascultation bilaterally []Accessory muscle use    []Rales []Chest tube:  []Air leak [x]nl effort    []Ronchi []Crepitus []   []Coarse bilateral ventilator breath sounds      CV: [x]Regular []Tachycardia [x]no ectopy    []Irregular []Bradycardic []    []Murmur []S3 []    []Edema []S4 []      GI: [x]Soft  []NG Tube Bowel sounds: [x]present []absent    []Firm []PEG []    []Distended  []      : []Quiroz []Hematuria []    []Clear urine []Edema []      MSK:    [x]SCDs []Edema []    [x]No deformities [] []      Skin: [x]Warm [x]Dry  []    []Cool []Moist []    []Hot  []Diaphoretic []    []Rash  []Cyanosis []      N-psych: []Sedated  []Agitated []    [x]Alert  Follows commands:   [x]Yes  []No []      Devices: []ET-Tube []Tracheostomy []Chest tube:  Air leak? []Y/[]N    []Central Line []PA Catheter []    []PICC [] []      DATA:   Current Facility-Administered Medications   Medication Dose Route Frequency    [START ON 1/16/2020] allopurinol (ZYLOPRIM) tablet 100 mg  100 mg Oral 7am    [START ON 1/16/2020] amLODIPine (NORVASC) tablet 5 mg  5 mg Oral DAILY    [START ON 1/16/2020] aspirin delayed-release tablet 81 mg  81 mg Oral DAILY    [START ON 1/16/2020] atorvastatin (LIPITOR) tablet 80 mg  80 mg Oral DAILY    [START ON 1/16/2020] citalopram (CELEXA) tablet 40 mg  40 mg Oral DAILY    [START ON 1/16/2020] clopidogrel (PLAVIX) tablet 75 mg  75 mg Oral DAILY    [START ON 1/16/2020] isosorbide mononitrate ER (IMDUR) tablet 30 mg  30 mg Oral DAILY    [START ON 1/16/2020] metoprolol succinate (TOPROL-XL) XL tablet 25 mg  25 mg Oral DAILY    nitroglycerin (NITROSTAT) tablet 0.4 mg  0.4 mg SubLINGual PRN    [START ON 1/16/2020] pantoprazole (PROTONIX) tablet 40 mg  40 mg Oral DAILY    rOPINIRole (REQUIP) tablet 0.5 mg  0.5 mg Oral QHS    sodium chloride (NS) flush 5-40 mL  5-40 mL IntraVENous Q8H    sodium chloride (NS) flush 5-40 mL  5-40 mL IntraVENous PRN    acetaminophen (TYLENOL) tablet 650 mg  650 mg Oral Q4H PRN    Or    acetaminophen (TYLENOL) suppository 650 mg  650 mg Rectal Q4H PRN    oxyCODONE IR (ROXICODONE) tablet 5 mg  5 mg Oral Q4H PRN    HYDROmorphone (PF) (DILAUDID) injection 0.5 mg  0.5 mg IntraVENous Q4H PRN    naloxone (NARCAN) injection 0.4 mg  0.4 mg IntraVENous PRN    ondansetron (ZOFRAN) injection 4 mg  4 mg IntraVENous Q4H PRN    heparin (porcine) injection 5,000 Units  5,000 Units SubCUTAneous Q8H    0.9% sodium chloride infusion  50 mL/hr IntraVENous CONTINUOUS    albuterol (PROVENTIL VENTOLIN) nebulizer solution 2.5 mg  2.5 mg Nebulization Q6H PRN    PHENYLephrine (HARDIK-SYNEPHRINE) 30 mg in 0.9% sodium chloride 250 mL infusion   mcg/min IntraVENous TITRATE       Telemetry: [x]Sinus []A-flutter []Paced    []A-fib []Multiple PVCs                  Labs:  Recent Labs     01/13/20  1125   WBC 7.8   HGB 11.1*   HCT 33.1*        Recent Labs     01/13/20  1125      K 4.9      CO2 23   GLU 97   BUN 42*   CREA 2.48*   CA 9.5     No results for input(s): PH, PCO2, PO2, HCO3, FIO2 in the last 72 hours.     Imaging:  []I have personally reviewed the patients radiographs  []Radiographs reviewed with radiologist   []No change from prior, tubes and lines in adequate position  []Improved   []Worsening       IMPRESSION:   · S/P CEA  · Recent tia  · Cad  · Dm  · ckd  · Post operative hypotension   PLAN:   · IVF bolus  · Hardik iv peripheral if bp drops further  · Hold antihtn meds for now  · ssi for bs control  ·      The patient is: [] acutely ill Risk of deterioration: [] moderate    [] critically ill  [] high     []See my orders for details     My assessment/plan was discussed with:  [x]nursing []PT/OT    []respiratory therapy []   [x]family []     []Total critical care time exclusive of procedures       minutes  Martir Lockwood MD

## 2020-01-15 NOTE — PERIOP NOTES
TRANSFER - OUT REPORT:    Verbal report given to Leni THOMPSON(name) on [employer]  being transferred to ICU/2526(unit) for routine post - op       Report consisted of patients Situation, Background, Assessment and   Recommendations(SBAR). Information from the following report(s) SBAR, OR Summary, Intake/Output, MAR and Cardiac Rhythm NSR w/ BBB was reviewed with the receiving nurse. Opportunity for questions and clarification was provided.       Patient transported with:   Monitor  O2 @ 2 liters  Registered Nurse  [facility]

## 2020-01-15 NOTE — PROGRESS NOTES
Care Management:    JANINA: Home with DTR and wife. DTR to transport. Follow up appointment with surgeon. Reason for Readmission:   Scheduled surgery, he had a left carotic endarterectomy today and is recovering in the ICU. His DTR Bird Salazar is at bedside. PTA patient independent with activities including driving. RRAT Score/Risk Level:     23    Active with PCP. Uses [facility] [employer]. Is a Care Conference indicated: no      Did you attend your follow up appointment (s): If not, why not:  yes       Resources/supports as identified by patient/family:   Family supportive. DTR lives with patient and helps him care for his ailing wife. Top Challenges facing patient (as identified by patient/family and CM): Finances/Medication cost?    Insurance and patient states no issues with getting his medications. Transportation  DTR or self              Living arrangements? Lives with his wife and his DTR lives with them. Wife has advanced parkinson's disease and DTR and patient care for her. Self-care/ADL's/Cognition? Current Advanced Directive/Advance Care Plan:           Full Code  Plan for utilizing home health:              Not anticipated. Care Management Interventions  PCP Verified by CM: Yes  Mode of Transport at Discharge: Other (see comment)(DTR Bird Salazar)  Current Support Network: Lives with Spouse(Lives with his wife and his dTR. Independent with ADL's PTA. He has DME at home including cane, walker and wheelchair but does not use them.  He drives and is active. )  Confirm Follow Up Transport: Family     Stephani Crawley RN ACM [phone] None known

## 2020-01-15 NOTE — PERIOP NOTES
Handoff Report from Operating Room to PACU    Report received from Karol Godoy RN and Amado Hdz CRNA regarding Awilda Livers. Surgeon(s):  MD Adore Welsh MD  And Procedure(s) (LRB):  LEFT CAROTID ARTERY ENDARTERECTOMY (Left)  confirmed   with allergies, drains and dressings discussed. Anesthesia type, drugs, patient history, complications, estimated blood loss, vital signs, intake and output, and last pain medication, lines, reversal medications and temperature were reviewed.

## 2020-01-16 VITALS
BODY MASS INDEX: 32.22 KG/M2 | RESPIRATION RATE: 16 BRPM | WEIGHT: 237.88 LBS | HEART RATE: 69 BPM | TEMPERATURE: 98.2 F | DIASTOLIC BLOOD PRESSURE: 46 MMHG | HEIGHT: 72 IN | SYSTOLIC BLOOD PRESSURE: 121 MMHG | OXYGEN SATURATION: 99 %

## 2020-01-16 LAB
ANION GAP SERPL CALC-SCNC: 7 MMOL/L (ref 5–15)
BUN SERPL-MCNC: 41 MG/DL (ref 6–20)
BUN/CREAT SERPL: 18 (ref 12–20)
CALCIUM SERPL-MCNC: 8 MG/DL (ref 8.5–10.1)
CHLORIDE SERPL-SCNC: 109 MMOL/L (ref 97–108)
CO2 SERPL-SCNC: 23 MMOL/L (ref 21–32)
CREAT SERPL-MCNC: 2.26 MG/DL (ref 0.7–1.3)
ERYTHROCYTE [DISTWIDTH] IN BLOOD BY AUTOMATED COUNT: 14 % (ref 11.5–14.5)
GLUCOSE BLD STRIP.AUTO-MCNC: 129 MG/DL (ref 65–100)
GLUCOSE BLD STRIP.AUTO-MCNC: 131 MG/DL (ref 65–100)
GLUCOSE SERPL-MCNC: 123 MG/DL (ref 65–100)
HCT VFR BLD AUTO: 27.2 % (ref 36.6–50.3)
HGB BLD-MCNC: 8.8 G/DL (ref 12.1–17)
MCH RBC QN AUTO: 31.3 PG (ref 26–34)
MCHC RBC AUTO-ENTMCNC: 32.4 G/DL (ref 30–36.5)
MCV RBC AUTO: 96.8 FL (ref 80–99)
NRBC # BLD: 0 K/UL (ref 0–0.01)
NRBC BLD-RTO: 0 PER 100 WBC
PLATELET # BLD AUTO: 193 K/UL (ref 150–400)
PMV BLD AUTO: 9.8 FL (ref 8.9–12.9)
POTASSIUM SERPL-SCNC: 4.2 MMOL/L (ref 3.5–5.1)
RBC # BLD AUTO: 2.81 M/UL (ref 4.1–5.7)
SERVICE CMNT-IMP: ABNORMAL
SERVICE CMNT-IMP: ABNORMAL
SODIUM SERPL-SCNC: 139 MMOL/L (ref 136–145)
WBC # BLD AUTO: 11.5 K/UL (ref 4.1–11.1)

## 2020-01-16 PROCEDURE — 85027 COMPLETE CBC AUTOMATED: CPT

## 2020-01-16 PROCEDURE — 82962 GLUCOSE BLOOD TEST: CPT

## 2020-01-16 PROCEDURE — 74011250637 HC RX REV CODE- 250/637: Performed by: SURGERY

## 2020-01-16 PROCEDURE — 36415 COLL VENOUS BLD VENIPUNCTURE: CPT

## 2020-01-16 PROCEDURE — 74011250636 HC RX REV CODE- 250/636: Performed by: SURGERY

## 2020-01-16 PROCEDURE — 80048 BASIC METABOLIC PNL TOTAL CA: CPT

## 2020-01-16 RX ORDER — HYDROCODONE BITARTRATE AND ACETAMINOPHEN 5; 325 MG/1; MG/1
1 TABLET ORAL
Qty: 20 TAB | Refills: 0 | Status: SHIPPED | OUTPATIENT
Start: 2020-01-16 | End: 2020-01-23

## 2020-01-16 RX ADMIN — CITALOPRAM HYDROBROMIDE 40 MG: 20 TABLET ORAL at 09:16

## 2020-01-16 RX ADMIN — HEPARIN SODIUM 5000 UNITS: 5000 INJECTION INTRAVENOUS; SUBCUTANEOUS at 06:09

## 2020-01-16 RX ADMIN — ALLOPURINOL 100 MG: 100 TABLET ORAL at 06:09

## 2020-01-16 RX ADMIN — METOPROLOL SUCCINATE 25 MG: 25 TABLET, EXTENDED RELEASE ORAL at 09:16

## 2020-01-16 RX ADMIN — AMLODIPINE BESYLATE 5 MG: 5 TABLET ORAL at 09:16

## 2020-01-16 RX ADMIN — PANTOPRAZOLE SODIUM 40 MG: 40 TABLET, DELAYED RELEASE ORAL at 09:16

## 2020-01-16 RX ADMIN — ISOSORBIDE MONONITRATE 30 MG: 30 TABLET, EXTENDED RELEASE ORAL at 09:16

## 2020-01-16 RX ADMIN — ATORVASTATIN CALCIUM 80 MG: 40 TABLET, FILM COATED ORAL at 09:15

## 2020-01-16 RX ADMIN — CLOPIDOGREL BISULFATE 75 MG: 75 TABLET ORAL at 09:16

## 2020-01-16 RX ADMIN — ACETAMINOPHEN 650 MG: 325 TABLET ORAL at 07:14

## 2020-01-16 RX ADMIN — SODIUM CHLORIDE 50 ML/HR: 900 INJECTION, SOLUTION INTRAVENOUS at 06:09

## 2020-01-16 RX ADMIN — ASPIRIN 81 MG: 81 TABLET, COATED ORAL at 09:15

## 2020-01-16 RX ADMIN — Medication 10 ML: at 06:10

## 2020-01-16 NOTE — DISCHARGE SUMMARY
Vascular Surgery Discharge Summary     Patient ID:  Tiajuana Leyden  [id number]  66 y.o.  1945    Admitting Provider: Millicent Olivo MD  Discharging Provider: Millicent Olivo MD    Admit Date: 1/15/2020    Discharge Date: 1/16/2020    Discharge Diagnoses:    Symptomatic left ICA stenosis POA yes   TIA POA yes   Right ICA stenosis POA yes  ASHD POA yes   -hx of MALI 8/2019   -on BBlocker, ASA, Plavix, and statin. Hypertension POA yes     Hyperlipidemia POA yes   -LDL 56.8 on statin    Chronic hypertensive renal disease stage III POA yes   -baseline creatinine 2.11   -@ baseline   Anemia of chronic disease POA yes   -H&H stable   Diabetes Mellitus POA yes   -w/ peripheral neuropathy  -HA1c 6.0 01/2020   Fatty liver disease POA yes   Obesity POA yes   MELODY POA yes   -CPAP  GERD POA yes     Procedure(s):  LEFT CAROTID ARTERY ENDARTERECTOMY  01/15/2020    Hospital Course:      Tiajuana Leyden is a 66 y.o.  male w/ a pmhx significant for ASHD, HTN, HLD, CKD, anemia, DM, fatty liver disease, obesity, MELODY, and GERD. He was recently admitted to the hospital w/ left eye blurry vision and dizziness. CTA of the head and neck was significant for a right ICA stenosis of 50% and a left ICA stenosis of 80%. MRI of the head and neck was negative for infarction. Patient is now admitted on this occasion s/p left CEA on 01/15/2020. On day of discharge he has some minor left neck swelling as expected. He tolerated his breakfast.  He denies hemispheric or vertebrobasilar symptoms. He will return to the clinic in 2 weeks for routine post procedure surveillance.       Pertinent Results:   Recent Results (from the past 24 hour(s))   GLUCOSE, POC    Collection Time: 01/15/20  9:03 PM   Result Value Ref Range    Glucose (POC) 160 (H) 65 - 100 mg/dL    Performed by Kee Rueda RN    METABOLIC PANEL, BASIC    Collection Time: 01/16/20  3:23 AM   Result Value Ref Range    Sodium 139 136 - 145 mmol/L    Potassium 4.2 3.5 - 5.1 mmol/L    Chloride 109 (H) 97 - 108 mmol/L    CO2 23 21 - 32 mmol/L    Anion gap 7 5 - 15 mmol/L    Glucose 123 (H) 65 - 100 mg/dL    BUN 41 (H) 6 - 20 MG/DL    Creatinine 2.26 (H) 0.70 - 1.30 MG/DL    BUN/Creatinine ratio 18 12 - 20      GFR est AA 34 (L) >60 ml/min/1.73m2    GFR est non-AA 28 (L) >60 ml/min/1.73m2    Calcium 8.0 (L) 8.5 - 10.1 MG/DL   CBC W/O DIFF    Collection Time: 01/16/20  3:23 AM   Result Value Ref Range    WBC 11.5 (H) 4.1 - 11.1 K/uL    RBC 2.81 (L) 4.10 - 5.70 M/uL    HGB 8.8 (L) 12.1 - 17.0 g/dL    HCT 27.2 (L) 36.6 - 50.3 %    MCV 96.8 80.0 - 99.0 FL    MCH 31.3 26.0 - 34.0 PG    MCHC 32.4 30.0 - 36.5 g/dL    RDW 14.0 11.5 - 14.5 %    PLATELET 089 684 - 051 K/uL    MPV 9.8 8.9 - 12.9 FL    NRBC 0.0 0  WBC    ABSOLUTE NRBC 0.00 0.00 - 0.01 K/uL   GLUCOSE, POC    Collection Time: 01/16/20  7:18 AM   Result Value Ref Range    Glucose (POC) 131 (H) 65 - 100 mg/dL    Performed by Carey Falcon RN        Vital signs:   Patient Vitals for the past 24 hrs:   BP Temp Pulse Resp SpO2 Height Weight   01/16/20 0800 120/55  61 17 97 %     01/16/20 0700 129/45  65 16 94 %     01/16/20 0600 119/50  71 19 97 %     01/16/20 0500 118/50  64 19 97 %     01/16/20 0400 116/43 98.4 °F (36.9 °C) 66 19 98 %     01/16/20 0300 119/46  62 14 97 %     01/16/20 0200 115/48  65 9 98 %     01/16/20 0100 119/51  62 17 95 %     01/16/20 0000 124/54 98.2 °F (36.8 °C) 68 22 99 %     01/15/20 2300 123/54  69 16 100 %     01/15/20 2200 119/52  70 20 99 %     01/15/20 2100 116/50  69 19 98 %     01/15/20 2000 126/55 98 °F (36.7 °C) 68 14 96 %     01/15/20 1900 118/52  67 15 98 %     01/15/20 1800 112/52  72 22 98 %     01/15/20 1700 116/46  67 15 97 %     01/15/20 1600 110/52 97.7 °F (36.5 °C) 68 14 98 %     01/15/20 1500 115/51  71 16 97 %     01/15/20 1445   65 16 95 %     01/15/20 1414   67 16 99 %     01/15/20 1400 108/48  74 21 99 %   01/15/20 1326   68 16 97 %     01/15/20 1313 103/44 98.1 °F (36.7 °C) 77 13 98 %     01/15/20 1300  98.1 °F (36.7 °C)    6' (1.829 m) 107.9 kg (237 lb 14 oz)   01/15/20 1245 114/50  69 14 97 %     01/15/20 1230 111/50 99 °F (37.2 °C) 70 14 97 %     01/15/20 1215 114/49  70 13 96 %     01/15/20 1200 113/52  70 18 97 %     01/15/20 1155 121/51  70 15 94 %     01/15/20 1150 123/51  70 15 96 %     01/15/20 1145 134/55  71 12 98 %     01/15/20 1141 117/47 98.9 °F (37.2 °C) 72 18 98 %     01/15/20 1140 141/62  71 18 99 %     01/15/20 1138   71 10 98 %     01/15/20 1135 155/54  72 20 98 %         Patient Weight:   Last 3 Recorded Weights in this Encounter    01/13/20 1331 01/15/20 0659 01/15/20 1300   Weight: 108 kg (238 lb) 108.3 kg (238 lb 12.1 oz) 107.9 kg (237 lb 14 oz)       Consults: Pulmonary/Critical Care    Patient Condition at Discharge: stable    Disposition: home    Patient Instructions:   Current Discharge Medication List      START taking these medications    Details   HYDROcodone-acetaminophen (NORCO) 5-325 mg per tablet Take 1 Tab by mouth every six (6) hours as needed for Pain for up to 7 days. Max Daily Amount: 4 Tabs. Qty: 20 Tab, Refills: 0    Associated Diagnoses: Stenosis of left carotid artery         CONTINUE these medications which have NOT CHANGED    Details   atorvastatin (LIPITOR) 80 mg tablet Take 1 Tab by mouth daily.   Qty: 90 Tab, Refills: 3    Comments: Dose change      chlorthalidone (HYGROTEN) 25 mg tablet TAKE 1/2 TABLET BY MOUTH EVERY DAY  Qty: 15 Tab, Refills: 1      isosorbide mononitrate ER (IMDUR) 30 mg tablet TAKE 1/2 TABLET BY MOUTH EVERY DAY  Qty: 45 Tab, Refills: 1      pantoprazole (PROTONIX) 40 mg tablet TAKE 1 TABLET BY MOUTH EVERY DAY  Qty: 30 Tab, Refills: 5      clopidogrel (PLAVIX) 75 mg tab TAKE 1 TABLET BY MOUTH EVERY DAY  Qty: 90 Tab, Refills: 1      metoprolol succinate (TOPROL-XL) 25 mg XL tablet TAKE 1 TABLET BY MOUTH EVERY DAY  Qty: 90 Tab, Refills: 1      citalopram (CELEXA) 40 mg tablet TAKE 1 TABLET BY MOUTH EVERY DAY  Qty: 90 Tab, Refills: 3    Associated Diagnoses: Anxiety associated with depression      telmisartan (MICARDIS) 40 mg tablet Take 1 Tab by mouth daily. Qty: 90 Tab, Refills: 3    Comments: In place of irbesartan      baclofen (LIORESAL) 10 mg tablet Take  by mouth three (3) times daily. rOPINIRole (REQUIP) 0.5 mg tablet Take 1- 2 tablets daily in the evening  Qty: 60 Tab, Refills: 5    Associated Diagnoses: Obstructive sleep apnea      VITAMIN D3 2,000 unit cap capsule Take 2,000 Units by mouth daily. Refills: 2      amLODIPine (NORVASC) 5 mg tablet Take 5 mg by mouth daily. Associated Diagnoses: Essential hypertension, benign      GLUCOSAMINE HCL/CHONDR PETERSEN A NA (GLUCOSAMINE-CHONDROITIN) 750-600 mg Tab Take 1 Tab by mouth daily. Brand: Move Free      aspirin delayed-release 81 mg tablet Take 81 mg by mouth daily. allopurinol (ZYLOPRIM) 100 mg tablet Take 100 mg by mouth every morning. Associated Diagnoses: Chronic kidney disease, stage III (moderate) ([facility])      MULTIVITS W-FE,OTHER MIN (CENTRUM PO) Take 1 Tab by mouth daily. ketoconazole (NIZORAL) 2 % shampoo Apply  to affected area. nitroglycerin (NITROSTAT) 0.4 mg SL tablet TAKE 1 TABLET BY SUBLINGUAL ROUTE EVERY 5 MINUTES AS NEEDED FOR CHEST PAIN. Qty: 1 Bottle, Refills: 0      albuterol (PROVENTIL HFA, VENTOLIN HFA, PROAIR HFA) 90 mcg/actuation inhaler Take 1 Puff by inhalation every six (6) hours as needed for Wheezing. Qty: 1 Inhaler, Refills: 0    Associated Diagnoses: Bronchitis      OXYGEN-AIR DELIVERY SYSTEMS (HORIZON NASAL CPAP SYSTEM) by Does Not Apply route. Diet: Cardiac Diet and Diabetic Diet    Activity/Wound Care:     Patient may shower beginning Friday. Dry the wound carefully. The dressing can be removed if there is no drainage, or changed and kept in place for comfort.     Patient should not attempt to drive a car until they are comfortable and NOT taking pain medication. No heavy lifting (7 lbs limit). Mild exercise and light duties around the house are OK. Call the office at [phone] if there are any problems.     Follow-up Information     Follow up With Specialties Details Why Crystal Zacarias MD Internal Medicine Go on 2/3/2020 For scheduled appointment at 3:15PM  [address]  [facility] [address]  [facility]  [phone]      Judy Yoo MD Vascular Surgery In 2 weeks  [facility]  [phone]            Signed:  Humza Bai NP  1/16/2020  9:24 AM

## 2020-01-16 NOTE — DISCHARGE INSTRUCTIONS
Patient may shower beginning Friday. Dry the wound carefully. The dressing can be removed if there is no drainage, or changed and kept in place for comfort. Patient should not attempt to drive a car until they are comfortable and NOT taking pain medication. No heavy lifting (7 lbs limit). Mild exercise and light duties around the house are OK. Call the office at [phone] if there are any problems.

## 2020-01-16 NOTE — PROGRESS NOTES
JANINA PLAN: DC home today with family and outpt f/u svcs. Pt is medically stable for dc home with family. Follow-up appmts as indicated on AVS.  Medicare IM letter received on 1/15/20. Family to transport. No further dc needs identified.     KARI Angulo

## 2020-01-16 NOTE — PROGRESS NOTES
26 - Received report from Mars Mendez RN.    0800 - Assessment completed. Patient alert and oriented x 4. EDIE drain intact and draining, right radial arterial line patent. 0930 - Patient received discharge orders. 1000 - Removed arterial line and EDIE drain per order, awaiting for patient family to arrive at 36 for discharge. 1200 - Reassessment completed. No changes noted. 1330 - Patient discharged from CCU. Patient read and understands discharge instruction. Taken down by PCT.

## 2020-01-16 NOTE — PROGRESS NOTES
1900- Bedside report received from [address] completed, see charting for details. Mouth care offered. Patient's daughter at bedside. Nasal CPAP brought from home. 0000- Assessment completed, see charting for details. 0400- Assessment completed, see charting for details. Mouth care offered. 6907- Some edema noticed around the carotid incision. No hematoma present. Will continue to monitor.  Patient asymptomatic and VSS    0700- Bedside report given to James Christensen

## 2020-01-17 ENCOUNTER — PATIENT OUTREACH (OUTPATIENT)
Dept: INTERNAL MEDICINE CLINIC | Age: 75
End: 2020-01-17

## 2020-01-20 NOTE — PROGRESS NOTES
Hospital Discharge Follow-Up      Date/Time:  2020  3:57 PM  Alondra Emery, MSN, RN, [facility], Andrea [address] Transitions Nurse  [phone]    Patient was (planned) admitted to [facility] on 1/15/2020 and discharged on 2020 for planned CEA. The physician discharge summary was available at the time of outreach. Patient was contacted within 2 business days of discharge. Top Challenges reviewed with the provider   Planned CEA on 1/15/2020  - vascular f/u 2 weeks  - PCP f/u 2/3  Advance Care Planning:   Does patient have an Advance Directive:  patient declined education        Method of communication with provider :chart routing    Inpatient RRAT score:23  Was this a readmission? yes (planned readmission for CEA)  Patient stated reason for the readmission:(planned readmission for CEA)    Care Transition Nurse (CTN) contacted the patient by telephone to perform post hospital discharge assessment. Verified name and  with patient as identifiers. Provided introduction to self, and explanation of the CTN role. Patient received hospital discharge instructions. CTN reviewed discharge instructions and red flags with patient who verbalized understanding. Patient given an opportunity to ask questions and does not have any further questions or concerns at this time. The patient agrees to contact the PCP office for questions related to their healthcare. CTN provided contact information for future reference. Disease Specific:   N/A    Patients top risk factors for readmission:  lack of knowledge about disease    [facility] orders at [place] [id number] ordered at discharge: none     Medication(s):   New Medications at Discharge:   HYDROcodone-acetaminophen (NORCO) 5-325 mg per tablet Take 1 Tab by mouth every six (6) hours as needed for Pain for up to 7 days. Max Daily Amount: 4 Tabs.   Qty: 20 Tab, Refills: 0     Associated Diagnoses: Stenosis of left carotid artery       Changed Medications at Discharge: n/a  Discontinued Medications at Discharge: n/a    Medication reconciliation was performed with patient, who verbalizes understanding of administration of home medications. There were no barriers to obtaining medications identified at this time. Referral to Pharm D needed: no     Current Outpatient Medications   Medication Sig    atorvastatin (LIPITOR) 80 mg tablet Take 1 Tab by mouth daily.  chlorthalidone (HYGROTEN) 25 mg tablet TAKE 1/2 TABLET BY MOUTH EVERY DAY    isosorbide mononitrate ER (IMDUR) 30 mg tablet TAKE 1/2 TABLET BY MOUTH EVERY DAY    pantoprazole (PROTONIX) 40 mg tablet TAKE 1 TABLET BY MOUTH EVERY DAY    clopidogrel (PLAVIX) 75 mg tab TAKE 1 TABLET BY MOUTH EVERY DAY    metoprolol succinate (TOPROL-XL) 25 mg XL tablet TAKE 1 TABLET BY MOUTH EVERY DAY    citalopram (CELEXA) 40 mg tablet TAKE 1 TABLET BY MOUTH EVERY DAY    telmisartan (MICARDIS) 40 mg tablet Take 1 Tab by mouth daily.  ketoconazole (NIZORAL) 2 % shampoo Apply  to affected area.  baclofen (LIORESAL) 10 mg tablet Take  by mouth three (3) times daily.  rOPINIRole (REQUIP) 0.5 mg tablet Take 1- 2 tablets daily in the evening    nitroglycerin (NITROSTAT) 0.4 mg SL tablet TAKE 1 TABLET BY SUBLINGUAL ROUTE EVERY 5 MINUTES AS NEEDED FOR CHEST PAIN.  albuterol (PROVENTIL HFA, VENTOLIN HFA, PROAIR HFA) 90 mcg/actuation inhaler Take 1 Puff by inhalation every six (6) hours as needed for Wheezing.  VITAMIN D3 2,000 unit cap capsule Take 2,000 Units by mouth daily.  OXYGEN-AIR DELIVERY SYSTEMS (HORIZON NASAL CPAP SYSTEM) by Does Not Apply route.  amLODIPine (NORVASC) 5 mg tablet Take 5 mg by mouth daily.  GLUCOSAMINE HCL/CHONDR PETERSEN A NA (GLUCOSAMINE-CHONDROITIN) 750-600 mg Tab Take 1 Tab by mouth daily. Brand: Move Free    aspirin delayed-release 81 mg tablet Take 81 mg by mouth daily.     allopurinol (ZYLOPRIM) 100 mg tablet Take 100 mg by mouth every morning.  MULTIVITS W-FE,OTHER MIN (CENTRUM PO) Take 1 Tab by mouth daily. No current facility-administered medications for this visit. There are no discontinued medications. [facility] follow up appointment(s):   Future Appointments   Date Time Provider Seth Skelton   2/3/2020  3:15 PM Silvia Bethea MD [facility] CANDI HINOJOSA   2/25/2020 11:15 AM Mariluz Martin  [facility]   3/3/2020  9:40 AM Zina Koch  [facility]   3/19/2020  1:30 PM [facility]      Non-[facility] follow up appointment(s): Dr. Francisco Uriostegui 2 weeks - patient unsure of date at time of call   [facility]:  information provided as a resource       Goals      Prevent complications post hospitalization. 01/14/20    - spoke to patient briefly today. Patient lives with wife. Has access to multiple forms of DME at home   - patient attended f/u with Dr. Flory Clemente on 1/13, patient cleared for upcoming surgery  - declined need for home health   - patient has CEA scheduled for 1/15 and report it is planned that he will stay one night in the hospital   - Dr. Flory Clemente 2/3  - Cardiology 2/25  - patients wife can provide transportation  -  Reviewed FAST with patient and signs of CVA, such as sudden numbness, tingling, loss of movement in your face, arm, leg, especially on one side of the body, vision changes, trouble speaking, confusion, walking or balance problems and sudden headache.  - declined ACP education  - Reviewed red flag s/s with patient, nausea, vomiting, inability to pass urine/stool, SOB, mental status change, chest pain, fever.   - patient reports no questions regarding upcoming procedure tomorrow    CTN to f/u with patient post discharge. AR    1/20/2020  - chart review performed, VM left for patient   - patient was admitted 1/15-1/16 for planned CEA. - no home health ordered at discharge   - Dr. Flory Clemente f/u 2/3   - Cardiology 2/25 01/21/20   - Spoke to patient today.  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 Reviewed the following:  - Patient may shower beginning Friday. Dry the wound carefully. The dressing can be removed if there is no drainage, or changed and kept in place for comfort. - Patient should not attempt to drive a car until they are comfortable and NOT taking pain medication.  - No heavy lifting (7 lbs limit). Mild exercise and light duties around the house are OK. - patient reports that pain is ok, he is only taking Tylenol  - PCP f/u 2/3  - Vascular f/u 2 weeks - patient unsure of date/time at time of call   - Reviewed red flag s/s with patient, nausea, vomiting, inability to pass urine/stool, SOB, mental status change, chest pain, fever, changing in color or temperature in BLE, decreased pulse/sensation in BLE. Patient will contact Md/CTN if red flag s/s arise. CTN to f/u ~ 7-10 days.  AR

## 2020-02-06 ENCOUNTER — PATIENT OUTREACH (OUTPATIENT)
Dept: INTERNAL MEDICINE CLINIC | Age: 75
End: 2020-02-06

## 2020-02-07 NOTE — PROGRESS NOTES
Goals      Prevent complications post hospitalization. 01/14/20    - spoke to patient briefly today. Patient lives with wife. Has access to multiple forms of DME at home   - patient attended f/u with Dr. Aneudy Granados on 1/13, patient cleared for upcoming surgery  - declined need for home health   - patient has CEA scheduled for 1/15 and report it is planned that he will stay one night in the hospital   - Dr. Aneudy Granados 2/3  - Cardiology 2/25  - patients wife can provide transportation  -  Reviewed FAST with patient and signs of CVA, such as sudden numbness, tingling, loss of movement in your face, arm, leg, especially on one side of the body, vision changes, trouble speaking, confusion, walking or balance problems and sudden headache.  - declined ACP education  - Reviewed red flag s/s with patient, nausea, vomiting, inability to pass urine/stool, SOB, mental status change, chest pain, fever.   - patient reports no questions regarding upcoming procedure tomorrow    CTN to f/u with patient post discharge. AR    1/20/2020  - chart review performed, VM left for patient   - patient was admitted 1/15-1/16 for planned CEA. - no home health ordered at discharge   - Dr. Aneudy Granados f/u 2/3   - Cardiology 2/25 01/21/20   - Spoke to patient today. 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 Reviewed the following:  - Patient may shower beginning Friday. Dry the wound carefully. The dressing can be removed if there is no drainage, or changed and kept in place for comfort. - Patient should not attempt to drive a car until they are comfortable and NOT taking pain medication.  - No heavy lifting (7 lbs limit). Mild exercise and light duties around the house are OK.   - patient reports that pain is ok, he is only taking Tylenol  - PCP f/u 2/3  - Vascular f/u 2 weeks - patient unsure of date/time at time of call   - Reviewed red flag s/s with patient, nausea, vomiting, inability to pass urine/stool, SOB, mental status change, chest pain, fever, changing in color or temperature in BLE, decreased pulse/sensation in BLE. Patient will contact Md/CTN if red flag s/s arise. CTN to f/u ~ 7-10 days. AR      02/07/20  Left message for f/u JANINA call for patient. CTN to f/u ~10-14 days.  AR

## 2020-02-10 RX ORDER — CHLORTHALIDONE 25 MG/1
TABLET ORAL
Qty: 15 TAB | Refills: 1 | Status: SHIPPED | OUTPATIENT
Start: 2020-02-10 | End: 2020-02-27

## 2020-02-11 RX ORDER — PANTOPRAZOLE SODIUM 40 MG/1
TABLET, DELAYED RELEASE ORAL
Qty: 30 TAB | Refills: 5 | Status: SHIPPED | OUTPATIENT
Start: 2020-02-11 | End: 2020-08-19 | Stop reason: SDUPTHER

## 2020-02-19 ENCOUNTER — OFFICE VISIT (OUTPATIENT)
Dept: INTERNAL MEDICINE CLINIC | Age: 75
End: 2020-02-19

## 2020-02-19 VITALS
HEART RATE: 72 BPM | WEIGHT: 237 LBS | SYSTOLIC BLOOD PRESSURE: 104 MMHG | TEMPERATURE: 98.1 F | RESPIRATION RATE: 18 BRPM | HEIGHT: 72 IN | OXYGEN SATURATION: 99 % | DIASTOLIC BLOOD PRESSURE: 64 MMHG | BODY MASS INDEX: 32.1 KG/M2

## 2020-02-19 DIAGNOSIS — D63.1 ANEMIA DUE TO CHRONIC KIDNEY DISEASE, UNSPECIFIED CKD STAGE: ICD-10-CM

## 2020-02-19 DIAGNOSIS — Z00.00 MEDICARE ANNUAL WELLNESS VISIT, SUBSEQUENT: ICD-10-CM

## 2020-02-19 DIAGNOSIS — J01.00 SUBACUTE MAXILLARY SINUSITIS: Primary | ICD-10-CM

## 2020-02-19 DIAGNOSIS — E78.00 PURE HYPERCHOLESTEROLEMIA: ICD-10-CM

## 2020-02-19 DIAGNOSIS — N18.9 ANEMIA DUE TO CHRONIC KIDNEY DISEASE, UNSPECIFIED CKD STAGE: ICD-10-CM

## 2020-02-19 DIAGNOSIS — I25.10 CORONARY ARTERY DISEASE INVOLVING NATIVE CORONARY ARTERY OF NATIVE HEART WITHOUT ANGINA PECTORIS: ICD-10-CM

## 2020-02-19 DIAGNOSIS — N18.30 CHRONIC KIDNEY DISEASE, STAGE III (MODERATE) (HCC): ICD-10-CM

## 2020-02-19 DIAGNOSIS — I10 ESSENTIAL HYPERTENSION: ICD-10-CM

## 2020-02-19 LAB — HGB BLD-MCNC: 9 G/DL

## 2020-02-19 RX ORDER — AZITHROMYCIN 250 MG/1
250 TABLET, FILM COATED ORAL SEE ADMIN INSTRUCTIONS
Qty: 6 TAB | Refills: 0 | Status: SHIPPED | OUTPATIENT
Start: 2020-02-19 | End: 2020-02-24

## 2020-02-19 NOTE — PROGRESS NOTES
This is the Subsequent Medicare Annual Wellness Exam, performed 12 months or more after the Initial AWV or the last Subsequent AWV    I have reviewed the patient's medical history in detail and updated the computerized patient record.      History     Patient Active Problem List   Diagnosis Code    Chronic kidney disease, stage III (moderate) ([facility]) N18.3    Mixed hyperlipidemia E78.2    Obstructive sleep apnea G47.33    RLS (restless legs syndrome) G25.81    Peripheral neuropathy G62.9    DJD (degenerative joint disease), multiple sites M15.9    Essential hypertension I10    Allergic rhinitis J30.9    GERD (gastroesophageal reflux disease) K21.9    ED (erectile dysfunction) N52.9    Anxiety associated with depression F41.8    Prediabetes R73.03    Obesity E66.9    Gout M10.9    Iron deficiency E61.1    Hypertensive kidney disease with chronic kidney disease stage III (HCC) I12.9, N18.3    Benign essential tremor G25.0    S/P coronary artery stent placement Z95.5    Coronary artery disease involving native coronary artery of native heart without angina pectoris I25.10    Coronary artery disease due to lipid rich plaque I25.10, I25.83    Chest pain R07.9    History of kidney stones Z87.442    Pure hypercholesterolemia E78.00    Right sided sciatica M54.31    Symptomatic carotid artery stenosis, bilateral I65.23    Acute CVA (cerebrovascular accident) ([address] I63.9    Bilateral carotid artery stenosis I65.23    Carotid stenosis I65.29     Past Medical History:   Diagnosis Date    Abnormal stress echo 5/12/2015    Anemia NEC 03/2013    Last 2-3 months; finished taking iron supplement    Anxiety     CAD (coronary artery disease) 2009    cath Aspen Ivey) revealed 20%RCA and 50%2nd diagonal    Calculus of kidney     Chronic kidney disease     sees nephrologist every 6 months    Chronic kidney disease, stage III (moderate) ([address] 10/11/2009    30% kidney function    Diabetes ([address] Pre-diabetic    DJD (degenerative joint disease)     GERD (gastroesophageal reflux disease) 10/11/2009    controlled with medication    Gout     Hypertension     Liver disease     fatty liver    Obesity     Obstructive sleep apnea (adult) (pediatric) 10/11/2009    nasal pillows; pressure set at 10-11 - uses cpap    Peripheral neuropathy 10/11/2009    bilateral feet (numbness)    Prediabetes     RLS (restless legs syndrome) 10/11/2009    S/P coronary artery stent placement 5/12/2015    5/11/15 PCI./MALI to LCx      Past Surgical History:   Procedure Laterality Date    HX BUNIONECTOMY  2019   Isis Asper HEART CATHETERIZATION  2009, 7/17    x1 stent 1 x (x2 now 1/15/2020)    HX HERNIA REPAIR      [place]/umbilical    HX KNEE REPLACEMENT Left 7/23/11     LEFT TOTAL KNEE ARTHROPLASTY    HX ORTHOPAEDIC      left great toe pinning/plate    HX ORTHOPAEDIC      left shoulder manipulation    HX UROLOGICAL      basket extraction of kidney stones    OK COLONOSCOPY FLX DX W/COLLJ SPEC WHEN PFRMD  8/5/2010         OK COLSC FLX W/RMVL OF TUMOR POLYP LESION SNARE TQ  9/24/2014          Current Outpatient Medications   Medication Sig Dispense Refill    azithromycin (ZITHROMAX) 250 mg tablet Take 1 Tab by mouth See Admin Instructions for 5 days. 6 Tab 0    pantoprazole (PROTONIX) 40 mg tablet TAKE 1 TABLET BY MOUTH EVERY DAY 30 Tab 5    chlorthalidone (HYGROTEN) 25 mg tablet TAKE 1/2 TABLET BY MOUTH EVERY DAY 15 Tab 1    atorvastatin (LIPITOR) 80 mg tablet Take 1 Tab by mouth daily. 90 Tab 3    isosorbide mononitrate ER (IMDUR) 30 mg tablet TAKE 1/2 TABLET BY MOUTH EVERY DAY 45 Tab 1    clopidogrel (PLAVIX) 75 mg tab TAKE 1 TABLET BY MOUTH EVERY DAY 90 Tab 1    metoprolol succinate (TOPROL-XL) 25 mg XL tablet TAKE 1 TABLET BY MOUTH EVERY DAY 90 Tab 1    citalopram (CELEXA) 40 mg tablet TAKE 1 TABLET BY MOUTH EVERY DAY 90 Tab 3    telmisartan (MICARDIS) 40 mg tablet Take 1 Tab by mouth daily.  90 Tab 3    baclofen (LIORESAL) 10 mg tablet Take  by mouth three (3) times daily.  rOPINIRole (REQUIP) 0.5 mg tablet Take 1- 2 tablets daily in the evening 60 Tab 5    nitroglycerin (NITROSTAT) 0.4 mg SL tablet TAKE 1 TABLET BY SUBLINGUAL ROUTE EVERY 5 MINUTES AS NEEDED FOR CHEST PAIN. 1 Bottle 0    albuterol (PROVENTIL HFA, VENTOLIN HFA, PROAIR HFA) 90 mcg/actuation inhaler Take 1 Puff by inhalation every six (6) hours as needed for Wheezing. 1 Inhaler 0    VITAMIN D3 2,000 unit cap capsule Take 2,000 Units by mouth daily. 2    amLODIPine (NORVASC) 5 mg tablet Take 5 mg by mouth daily.  aspirin delayed-release 81 mg tablet Take 81 mg by mouth daily.  allopurinol (ZYLOPRIM) 100 mg tablet Take 100 mg by mouth every morning.  ketoconazole (NIZORAL) 2 % shampoo Apply  to affected area.  OXYGEN-AIR DELIVERY SYSTEMS (HORIZON NASAL CPAP SYSTEM) by Does Not Apply route.  GLUCOSAMINE HCL/CHONDR PETERSEN A NA (GLUCOSAMINE-CHONDROITIN) 750-600 mg Tab Take 1 Tab by mouth daily. Brand: Move Free      MULTIVITS W-FE,OTHER MIN (CENTRUM PO) Take 1 Tab by mouth daily.        Allergies   Allergen Reactions    Penicillins Hives     Pt states he has taken Keflex before without problems    Bactrim [Sulfamethoxazole-Trimethoprim] Hives    Codeine Itching    Lisinopril (Bulk) Cough    Neurontin [Gabapentin] Other (comments)     drowsy    Zostavax [Zoster Vaccine Live (Pf)] Rash     See 9/10/13 visit       Family History   Problem Relation Age of Onset    Heart Disease Father     Hypertension Father     Other Father         abdominal aneurysm     Dementia Mother     Alzheimer Mother     Heart Disease Brother     Heart Attack Brother     Heart Disease Maternal Grandmother     Heart Disease Paternal Grandmother     Heart Attack Paternal Grandmother     Heart Disease Paternal Grandfather     Heart Attack Paternal Grandfather     Elevated Lipids Son     Elevated Lipids Daughter     Cancer Neg Hx     Diabetes Neg Hx     Stroke Neg Hx      Social History     Tobacco Use    Smoking status: Former Smoker     Packs/day: 1.00     Years: 10.00     Pack years: 10.00     Types: Cigarettes     Last attempt to quit: 1968     Years since quittin.1    Smokeless tobacco: Never Used   Substance Use Topics    Alcohol use: No     Alcohol/week: 0.0 standard drinks       Depression Risk Factor Screening:     3 most recent PHQ Screens 2020   PHQ Not Done -   Little interest or pleasure in doing things Nearly every day   Feeling down, depressed, irritable, or hopeless Nearly every day   Total Score PHQ 2 6   Trouble falling or staying asleep, or sleeping too much -   Feeling tired or having little energy -   Poor appetite, weight loss, or overeating -   Feeling bad about yourself - or that you are a failure or have let yourself or your family down -   Trouble concentrating on things such as school, work, reading, or watching TV -   Moving or speaking so slowly that other people could have noticed; or the opposite being so fidgety that others notice -   Thoughts of being better off dead, or hurting yourself in some way -   PHQ 9 Score -       Alcohol Risk Factor Screening (MALE > 65): Do you average more 1 drink per night or more than 7 drinks a week: No    In the past three months have you have had more than 4 drinks containing alcohol on one occasion: No      Functional Ability and Level of Safety:   Hearing: The patient needs further evaluation. Activities of Daily Living: The home contains: no safety equipment. Patient needs help with:  preparing meals, laundry, housework, managing medications and managing money    Ambulation: with no difficulty    Fall Risk:  Fall Risk Assessment, last 12 mths 2020   Able to walk? Yes   Fall in past 12 months?  No   Fall with injury? -   Number of falls in past 12 months -   Fall Risk Score -       Abuse Screen:  Patient is not abused    Cognitive Screening   Has your family/caregiver stated any concerns about your memory: no  Cognitive Screening: Normal - Verbal Fluency Test    Patient Care Team   Patient Care Team:  Marko De Los Santos MD as PCP - General (Internal Medicine)  Marko De Los Santos MD as PCP - [facility]  Enzo Leonardo (Inactive) as Physician (Dermatology)  Minal Mendoza MD as Physician (Cardiology)  Cristhian Mcmillan MD as Physician (Nephrology)  Elan Terrazas MD as Physician (Neurology)  Dorie Ndiaye MD as Physician (Orthopedic Surgery)  Maricarmen Stock MD (Gastroenterology)  Leslie Mao MD (Sleep Medicine)  Rossy Quinn DPM as Consulting Provider (Podiatry)  Diana Harper NP as Nurse Practitioner (Nurse Practitioner)  Sarahy Jeong RN as Care Transitions Nurse    Assessment/Plan   Education and counseling provided:  Are appropriate based on today's review and evaluation    Diagnoses and all orders for this visit:    1. Subacute maxillary sinusitis  -     azithromycin (ZITHROMAX) 250 mg tablet; Take 1 Tab by mouth See Admin Instructions for 5 days. 2. Medicare annual wellness visit, subsequent    3. Essential hypertension    4. Coronary artery disease involving native coronary artery of native heart without angina pectoris    5. Pure hypercholesterolemia  -     METABOLIC PANEL, COMPREHENSIVE; Future  -     LIPID PANEL; Future    6. Chronic kidney disease, stage III (moderate) (HCC)    7.  Anemia due to chronic kidney disease, unspecified CKD stage  -     AMB POC HEMOGLOBIN (HGB)        Health Maintenance Due   Topic Date Due    Shingrix Vaccine Age 49> (1 of 2) 08/26/1993    GLAUCOMA SCREENING Q2Y  08/05/2016    Influenza Age 9 to Adult  08/01/2019

## 2020-02-19 NOTE — PATIENT INSTRUCTIONS
Office Policies Phone calls/patient messages: Please allow up to 24 hours for someone in the office to contact you about your call or message. Be mindful your provider may be out of the office or your message may require further review. We encourage you to use [employer] for your messages as this is a faster, more efficient way to communicate with our office Medication Refills: 
         
Prescription medications require 48-72 business hours to process. We encourage you to use [employer] for your refills. For controlled medications: Please allow 72 business hours to process. Certain medications may require you to  a written prescription at our office. NO narcotic/controlled medications will be prescribed after 4pm Monday through Friday or on weekends Form/Paperwork Completion: 
         
Please note a $25 fee may incur for all paperwork for completed by our providers. We ask that you allow 7-10 business days. Pre-payment is due prior to picking up/faxing the completed form. You may also download your forms to [employer] to have your doctor print off. Medicare Wellness Visit, Male The best way to live healthy is to have a lifestyle where you eat a well-balanced diet, exercise regularly, limit alcohol use, and quit all forms of tobacco/nicotine, if applicable. Regular preventive services are another way to keep healthy. Preventive services (vaccines, screening tests, monitoring & exams) can help personalize your care plan, which helps you manage your own care. Screening tests can find health problems at the earliest stages, when they are easiest to treat. [facility] follows the current, evidence-based guidelines published by the [employer] [facility] [place] (USPSTF) when recommending preventive services for our patients.  Because we follow these guidelines, sometimes recommendations change over time as research supports it. (For example, a prostate screening blood test is no longer routinely recommended for men with no symptoms). Of course, you and your doctor may decide to screen more often for some diseases, based on your risk and co-morbidities (chronic disease you are already diagnosed with). Preventive services for you include: - Medicare offers their members a free annual wellness visit, which is time for you and your primary care provider to discuss and plan for your preventive service needs. Take advantage of this benefit every year! 
-All adults over age 72 should receive the recommended pneumonia vaccines. Current USPSTF guidelines recommend a series of two vaccines for the best pneumonia protection.  
-All adults should have a flu vaccine yearly and tetanus vaccine every 10 years. 
-All adults age 48 and older should receive the shingles vaccines (series of two vaccines). -All adults age 38-68 who are overweight should have a diabetes screening test once every three years.  
-Other screening tests & preventive services for persons with diabetes include: an eye exam to screen for diabetic retinopathy, a kidney function test, a foot exam, and stricter control over your cholesterol.  
-Cardiovascular screening for adults with routine risk involves an electrocardiogram (ECG) at intervals determined by the provider.  
-Colorectal cancer screening should be done for adults age 54-65 with no increased risk factors for colorectal cancer. There are a number of acceptable methods of screening for this type of cancer. Each test has its own benefits and drawbacks. Discuss with your provider what is most appropriate for you during your annual wellness visit.  The different tests include: colonoscopy (considered the best screening method), a fecal occult blood test, a fecal DNA test, and sigmoidoscopy. 
-All adults born between [facility] should be screened once for Hepatitis C. 
 -An Abdominal Aortic Aneurysm (AAA) Screening is recommended for men age 73-68 who has ever smoked in their lifetime. FLONASE NASAL SPRAY 2 SPRAYS EACH NOSTRIL ONCE A DAY. INCREASE WATER INTAKE, LESS SWEET TEA. START ANTIBIOTIC TODAY FOR SINUS INFECTION. HOLD CELEXA WHILE ON ZPAK.

## 2020-02-19 NOTE — PROGRESS NOTES
El Mulligan is a 66 y.o. male who presents for followup. Reports cough for a few weeks. Productive green mucous, intermittently for one week. Daughter notes wheezing. No fever. Some nasal congestion. Right sinus pressure. Last seen January 13 after hospitalization for CVA, bilateral carotid stenosis. Had left carotid endarterectomy on January 15. Dr. Jame Reyes. Seen for postop follow up with ultrasound. The lipitor was increased to 80mg daily by vascular surgeon. No myalgias.      Sees Dr. Noni Bowie, cardiology. CAD and carotid disease.  BP on micardis 40mg once a day, metoprolol XL 25mg daily. amlodipine 5mg once a day. and chlorthalidone 25mg daily. no dizziness. On plavix and aspirin. Sees Dr. Martín Barnes, nephrology. Creatinine 2.26. Avoids NSAIDS. Drinking some water. Mostly caffeinated sweet tea.       hgb 10 preoperatively. After surgery hgb 8.8. Prediabetic. HbA1C 6%.         Past Medical History:   Diagnosis Date    Abnormal stress echo 5/12/2015    Anemia NEC 03/2013    Last 2-3 months; finished taking iron supplement    Anxiety     CAD (coronary artery disease) 2009    cath Mariama Seals) revealed 20%RCA and 50%2nd diagonal    Calculus of kidney     Chronic kidney disease     sees nephrologist every 6 months    Chronic kidney disease, stage III (moderate) ([address] 10/11/2009    30% kidney function    Diabetes ([address]     Pre-diabetic    DJD (degenerative joint disease)     GERD (gastroesophageal reflux disease) 10/11/2009    controlled with medication    Gout     Hypertension     Liver disease     fatty liver    Obesity     Obstructive sleep apnea (adult) (pediatric) 10/11/2009    nasal pillows; pressure set at 10-11 - uses cpap    Peripheral neuropathy 10/11/2009    bilateral feet (numbness)    Prediabetes     RLS (restless legs syndrome) 10/11/2009    S/P coronary artery stent placement 5/12/2015    5/11/15 PCI./MALI to LCx       Family History   Problem Relation Age of Onset    Heart Disease Father     Hypertension Father     Other Father         abdominal aneurysm     Dementia Mother     Alzheimer Mother     Heart Disease Brother     Heart Attack Brother     Heart Disease Maternal Grandmother     Heart Disease Paternal Grandmother     Heart Attack Paternal Grandmother     Heart Disease Paternal Grandfather     Heart Attack Paternal Grandfather     Elevated Lipids Son     Elevated Lipids Daughter     Cancer Neg Hx     Diabetes Neg Hx     Stroke Neg Hx        Social History     Socioeconomic History    Marital status:      Spouse name: Neil Fisher Number of children: 2    Years of education: graduated then 4 year apprenticship    Highest education level: Associate degree: academic program   Occupational History    Not on file   Social Needs    Financial resource strain: Not hard at all   [employer] insecurity:     Worry: Never true     Inability: Never true   As It Is needs:     Medical: No     Non-medical: No   Tobacco Use    Smoking status: Former Smoker     Packs/day: 1.00     Years: 10.00     Pack years: 10.00     Types: Cigarettes     Last attempt to quit: 1968     Years since quittin.1    Smokeless tobacco: Never Used   Substance and Sexual Activity    Alcohol use: No     Alcohol/week: 0.0 standard drinks    Drug use: No    Sexual activity: Yes     Partners: Female     Birth control/protection: None   Lifestyle    Physical activity:     Days per week: 0 days     Minutes per session: 0 min    Stress: Very much   Relationships    Social connections:     Talks on phone: More than three times a week     Gets together: More than three times a week     Attends Shinto service: Not on file     Active member of club or organization: No     Attends meetings of clubs or organizations: Never     Relationship status:     Intimate partner violence:     Fear of current or ex partner: No     Emotionally abused:  No Physically abused: No     Forced sexual activity: No   Other Topics Concern     Service Not Asked    Blood Transfusions Not Asked    Caffeine Concern Not Asked    Occupational Exposure Not Asked    Hobby Hazards Not Asked    Sleep Concern Not Asked    Stress Concern Not Asked    Weight Concern Not Asked    Special Diet Not Asked    Back Care Not Asked    Exercise Not Asked    Bike Helmet Not Asked   [address] Not Asked    Self-Exams Not Asked   Social History Narrative    Not on file       Current Outpatient Medications on File Prior to Visit   Medication Sig Dispense Refill    pantoprazole (PROTONIX) 40 mg tablet TAKE 1 TABLET BY MOUTH EVERY DAY 30 Tab 5    chlorthalidone (HYGROTEN) 25 mg tablet TAKE 1/2 TABLET BY MOUTH EVERY DAY 15 Tab 1    atorvastatin (LIPITOR) 80 mg tablet Take 1 Tab by mouth daily. 90 Tab 3    isosorbide mononitrate ER (IMDUR) 30 mg tablet TAKE 1/2 TABLET BY MOUTH EVERY DAY 45 Tab 1    clopidogrel (PLAVIX) 75 mg tab TAKE 1 TABLET BY MOUTH EVERY DAY 90 Tab 1    metoprolol succinate (TOPROL-XL) 25 mg XL tablet TAKE 1 TABLET BY MOUTH EVERY DAY 90 Tab 1    citalopram (CELEXA) 40 mg tablet TAKE 1 TABLET BY MOUTH EVERY DAY 90 Tab 3    telmisartan (MICARDIS) 40 mg tablet Take 1 Tab by mouth daily. 90 Tab 3    baclofen (LIORESAL) 10 mg tablet Take  by mouth three (3) times daily.  rOPINIRole (REQUIP) 0.5 mg tablet Take 1- 2 tablets daily in the evening 60 Tab 5    nitroglycerin (NITROSTAT) 0.4 mg SL tablet TAKE 1 TABLET BY SUBLINGUAL ROUTE EVERY 5 MINUTES AS NEEDED FOR CHEST PAIN. 1 Bottle 0    albuterol (PROVENTIL HFA, VENTOLIN HFA, PROAIR HFA) 90 mcg/actuation inhaler Take 1 Puff by inhalation every six (6) hours as needed for Wheezing. 1 Inhaler 0    VITAMIN D3 2,000 unit cap capsule Take 2,000 Units by mouth daily. 2    amLODIPine (NORVASC) 5 mg tablet Take 5 mg by mouth daily.  aspirin delayed-release 81 mg tablet Take 81 mg by mouth daily.  allopurinol (ZYLOPRIM) 100 mg tablet Take 100 mg by mouth every morning.  ketoconazole (NIZORAL) 2 % shampoo Apply  to affected area.  OXYGEN-AIR DELIVERY SYSTEMS (HORIZON NASAL CPAP SYSTEM) by Does Not Apply route.  GLUCOSAMINE HCL/CHONDR PETERSEN A NA (GLUCOSAMINE-CHONDROITIN) 750-600 mg Tab Take 1 Tab by mouth daily. Brand: Move Free      MULTIVITS W-FE,OTHER MIN (CENTRUM PO) Take 1 Tab by mouth daily. No current facility-administered medications on file prior to visit. Review of Systems  Pertinent items are noted in HPI. Objective:     Visit Vitals  /64 (BP 1 Location: Right arm, BP Patient Position: Sitting)   Pulse 72   Temp 98.1 °F (36.7 °C) (Oral)   Resp 18   Ht 6' (1.829 m)   Wt 237 lb (107.5 kg)   SpO2 99%   BMI 32.14 kg/m²     Gen: well appearing male  HEENT:   PERRL,normal conjunctiva. External ear and canals normal, TMs no opacification or erythema, swollen nasal turbinates maxillary sinus pain of a patient bilaterally OP no erythema, no exudates, MMM  Neck:  No masses or LAD  Resp:  No wheezing, no rhonchi, no rales. CV:  RRR, normal S1S2, no murmur. GI: soft, nontender, without masses. No hepatosplenomegaly. Extrem:  +2 pulses, no edema, warm distally      Assessment/Plan:       ICD-10-CM ICD-9-CM    1. Subacute maxillary sinusitis J01.00 461.0 azithromycin (ZITHROMAX) 250 mg tablet   2. Medicare annual wellness visit, subsequent Z00.00 V70.0    3. Essential hypertension I10 401.9    4. Coronary artery disease involving native coronary artery of native heart without angina pectoris I25.10 414.01    5. Pure hypercholesterolemia Y76.76 448.3 METABOLIC PANEL, COMPREHENSIVE      LIPID PANEL   6. Chronic kidney disease, stage III (moderate) (HCC) N18.3 585.3    7. Anemia due to chronic kidney disease, unspecified CKD stage N18.9 285.21 AMB POC HEMOGLOBIN (HGB)    D63.1     FLONASE NASAL SPRAY 2 SPRAYS EACH NOSTRIL ONCE A DAY.   INCREASE WATER INTAKE, LESS SWEET TEA.    START ANTIBIOTIC TODAY FOR SINUS INFECTION. HOLD CELEXA WHILE ON ZPAK. Follow-up and Dispositions    · Return in about 6 months (around 8/19/2020) for Abhijit Aguayo. FOLLOW UP ON MEDICATIONS.   Lobito Sanderson MD

## 2020-02-19 NOTE — PROGRESS NOTES
Reviewed record in preparation for visit and have obtained necessary documentation. Identified pt with two pt identifiers(name and ). Chief Complaint   Patient presents with    Hypertension       Health Maintenance Due   Topic Date Due    Shingles Vaccine (1 of 2) 1993    Glaucoma Screening   2016    Flu Vaccine  2019    Annual Well Visit  01/15/2020       Mr. Lyla Churchill has a reminder for a \"due or due soon\" health maintenance. I have asked that he discuss this further with his primary care provider for follow-up on this health maintenance. Coordination of Care Questionnaire:  :     1) Have you been to an emergency room, urgent care clinic since your last visit? no   Hospitalized since your last visit? no             2) Have you seen or consulted any other health care providers outside of [address] [place] [address] since your last visit? no  (Include any pap smears or colon screenings in this section.)    3) In the event something were to happen to you and you were unable to speak on your behalf, do you have an Advance Directive/ Living Will in place stating your wishes? NO    Do you have an Advance Directive on file? no    4) Are you interested in receiving information on Advance Directives? NO    Patient is accompanied by daughter I have received verbal consent from Kalie Ponce to discuss any/all medical information while they are present in the room.

## 2020-02-22 ENCOUNTER — APPOINTMENT (OUTPATIENT)
Dept: GENERAL RADIOLOGY | Age: 75
End: 2020-02-22
Attending: EMERGENCY MEDICINE
Payer: MEDICARE

## 2020-02-22 ENCOUNTER — HOSPITAL ENCOUNTER (EMERGENCY)
Age: 75
Discharge: HOME | End: 2020-02-22
Attending: EMERGENCY MEDICINE
Payer: MEDICARE

## 2020-02-22 ENCOUNTER — APPOINTMENT (OUTPATIENT)
Dept: CT IMAGING | Age: 75
End: 2020-02-22
Attending: EMERGENCY MEDICINE
Payer: MEDICARE

## 2020-02-22 VITALS
TEMPERATURE: 100.2 F | RESPIRATION RATE: 18 BRPM | OXYGEN SATURATION: 97 % | SYSTOLIC BLOOD PRESSURE: 108 MMHG | BODY MASS INDEX: 32.43 KG/M2 | WEIGHT: 239.42 LBS | DIASTOLIC BLOOD PRESSURE: 71 MMHG | HEART RATE: 85 BPM | HEIGHT: 72 IN

## 2020-02-22 DIAGNOSIS — M25.551 PAIN OF BOTH HIP JOINTS: ICD-10-CM
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
## 2020-02-22 DIAGNOSIS — M54.9 SEVERE BACK PAIN: ICD-10-CM

## 2020-02-22 DIAGNOSIS — J18.9 PNEUMONIA OF RIGHT UPPER LOBE DUE TO INFECTIOUS ORGANISM: ICD-10-CM

## 2020-02-22 DIAGNOSIS — M25.552 PAIN OF BOTH HIP JOINTS: ICD-10-CM

## 2020-02-22 PROCEDURE — 99283 EMERGENCY DEPT VISIT LOW MDM: CPT

## 2020-02-22 PROCEDURE — 72131 CT LUMBAR SPINE W/O DYE: CPT

## 2020-02-22 PROCEDURE — 74011250637 HC RX REV CODE- 250/637: Performed by: EMERGENCY MEDICINE

## 2020-02-22 PROCEDURE — 72128 CT CHEST SPINE W/O DYE: CPT

## 2020-02-22 PROCEDURE — 73562 X-RAY EXAM OF KNEE 3: CPT

## 2020-02-22 PROCEDURE — 73502 X-RAY EXAM HIP UNI 2-3 VIEWS: CPT

## 2020-02-22 RX ORDER — ACETAMINOPHEN 500 MG
1000 TABLET ORAL ONCE
Status: DISCONTINUED | OUTPATIENT
Start: 2020-02-22 | End: 2020-02-22 | Stop reason: HOSPADM

## 2020-02-22 RX ORDER — OXYCODONE HYDROCHLORIDE 5 MG/1
5 TABLET ORAL
Qty: 12 TAB | Refills: 0 | Status: SHIPPED | OUTPATIENT
Start: 2020-02-22 | End: 2020-02-25

## 2020-02-22 RX ORDER — OXYCODONE HYDROCHLORIDE 5 MG/1
5 TABLET ORAL
Status: COMPLETED | OUTPATIENT
Start: 2020-02-22 | End: 2020-02-22

## 2020-02-22 RX ADMIN — OXYCODONE 5 MG: 5 TABLET ORAL at 11:42

## 2020-02-22 NOTE — ED TRIAGE NOTES
Assumed care of pt from triage. Pt is A&O x 4. Pt reports CC of left sided hip pain following a fall last night at 0300. Pt woke up and stepped out of bed and fell. Think his CPAP machine leaked water causing him to slip and fall. Pt states he has put pressure on his leg since the fall occurred. Pt placed on monitor x 2. Dr. Gualberto Lares at bedside at this time.

## 2020-02-22 NOTE — ED PROVIDER NOTES
EMERGENCY DEPARTMENT HISTORY AND PHYSICAL EXAM      Date: 2/22/2020  Patient Name: Keanu Doran  Patient Age and Sex: 66 y.o. male     History of Presenting Illness     Chief Complaint   Patient presents with   [facility] last night after slipping on water from c-pap machine; patient denies LOC or hitting his head    Hip Pain     L hip pain started after the fall       History Provided By: Patient    HPI: Keanu Doran, 66 y.o. male with past medical history of CKD, hypertension, CAD presenting today after a fall. The patient reports that he slipped on the water from his CPAP machine. He did not hit his head or lose consciousness. He laid on the ground, and was having mid and lower back pain and also was complaining of left hip pain. He stayed on the ground for around 10 minutes, then he was able to reach the bed and hoist himself up on the bed. He called his daughter who checked on him this morning, and presents to the emergency department due to severe pain. He has not tried anything for his pain. No movement exacerbates, nothing relieving the pain. There are no other complaints, changes, or physical findings at this time. PCP: Lisa Clemons MD    No current facility-administered medications on file prior to encounter. Current Outpatient Medications on File Prior to Encounter   Medication Sig Dispense Refill    azithromycin (ZITHROMAX) 250 mg tablet Take 1 Tab by mouth See Admin Instructions for 5 days. 6 Tab 0    pantoprazole (PROTONIX) 40 mg tablet TAKE 1 TABLET BY MOUTH EVERY DAY 30 Tab 5    chlorthalidone (HYGROTEN) 25 mg tablet TAKE 1/2 TABLET BY MOUTH EVERY DAY 15 Tab 1    atorvastatin (LIPITOR) 80 mg tablet Take 1 Tab by mouth daily.  90 Tab 3    isosorbide mononitrate ER (IMDUR) 30 mg tablet TAKE 1/2 TABLET BY MOUTH EVERY DAY 45 Tab 1    clopidogrel (PLAVIX) 75 mg tab TAKE 1 TABLET BY MOUTH EVERY DAY 90 Tab 1    metoprolol succinate (TOPROL-XL) 25 mg XL tablet TAKE 1 TABLET BY MOUTH EVERY DAY 90 Tab 1    citalopram (CELEXA) 40 mg tablet TAKE 1 TABLET BY MOUTH EVERY DAY 90 Tab 3    telmisartan (MICARDIS) 40 mg tablet Take 1 Tab by mouth daily. 90 Tab 3    ketoconazole (NIZORAL) 2 % shampoo Apply  to affected area.  baclofen (LIORESAL) 10 mg tablet Take  by mouth three (3) times daily.  rOPINIRole (REQUIP) 0.5 mg tablet Take 1- 2 tablets daily in the evening 60 Tab 5    nitroglycerin (NITROSTAT) 0.4 mg SL tablet TAKE 1 TABLET BY SUBLINGUAL ROUTE EVERY 5 MINUTES AS NEEDED FOR CHEST PAIN. 1 Bottle 0    albuterol (PROVENTIL HFA, VENTOLIN HFA, PROAIR HFA) 90 mcg/actuation inhaler Take 1 Puff by inhalation every six (6) hours as needed for Wheezing. 1 Inhaler 0    VITAMIN D3 2,000 unit cap capsule Take 2,000 Units by mouth daily. 2    OXYGEN-AIR DELIVERY SYSTEMS (HORIZON NASAL CPAP SYSTEM) by Does Not Apply route.  amLODIPine (NORVASC) 5 mg tablet Take 5 mg by mouth daily.  GLUCOSAMINE HCL/CHONDR PETERSEN A NA (GLUCOSAMINE-CHONDROITIN) 750-600 mg Tab Take 1 Tab by mouth daily. Brand: Move Free      aspirin delayed-release 81 mg tablet Take 81 mg by mouth daily.  allopurinol (ZYLOPRIM) 100 mg tablet Take 100 mg by mouth every morning.  MULTIVITS W-FE,OTHER MIN (CENTRUM PO) Take 1 Tab by mouth daily.          Past History     Past Medical History:  Past Medical History:   Diagnosis Date    Abnormal stress echo 5/12/2015    Anemia NEC 03/2013    Last 2-3 months; finished taking iron supplement    Anxiety     CAD (coronary artery disease) 2009    cath Mariama Seals) revealed 20%RCA and 50%2nd diagonal    Calculus of kidney     Chronic kidney disease     sees nephrologist every 6 months    Chronic kidney disease, stage III (moderate) ([address] 10/11/2009    30% kidney function    Diabetes ([facility]     Pre-diabetic    DJD (degenerative joint disease)     GERD (gastroesophageal reflux disease) 10/11/2009    controlled with medication    Gout     Hypertension     Liver disease     fatty liver    Obesity     Obstructive sleep apnea (adult) (pediatric) 10/11/2009    nasal pillows; pressure set at 10-11 - uses cpap    Peripheral neuropathy 10/11/2009    bilateral feet (numbness)    Prediabetes     RLS (restless legs syndrome) 10/11/2009    S/P coronary artery stent placement 2015    5/11/15 PCI./MALI to LCx       Past Surgical History:  Past Surgical History:   Procedure Laterality Date    HX BUNIONECTOMY  2019   [facility] HEART CATHETERIZATION  , 7/17    x1 stent 1 x (x2 now 1/15/2020)    HX HERNIA REPAIR      [place]/umbilical    HX KNEE REPLACEMENT Left 11     LEFT TOTAL KNEE ARTHROPLASTY    HX ORTHOPAEDIC      left great toe pinning/plate    HX ORTHOPAEDIC      left shoulder manipulation    HX UROLOGICAL      basket extraction of kidney stones    VT COLONOSCOPY FLX DX W/COLLJ SPEC WHEN PFRMD  2010         VT COLSC FLX W/RMVL OF TUMOR POLYP LESION SNARE TQ  2014            Family History:  Family History   Problem Relation Age of Onset    Heart Disease Father     Hypertension Father     Other Father         abdominal aneurysm     Dementia Mother     Alzheimer Mother     Heart Disease Brother     Heart Attack Brother     Heart Disease Maternal Grandmother     Heart Disease Paternal Grandmother     Heart Attack Paternal Grandmother     Heart Disease Paternal Grandfather     Heart Attack Paternal Grandfather     Elevated Lipids Son     Elevated Lipids Daughter     Cancer Neg Hx     Diabetes Neg Hx     Stroke Neg Hx        Social History:  Social History     Tobacco Use    Smoking status: Former Smoker     Packs/day: 1.00     Years: 10.00     Pack years: 10.00     Types: Cigarettes     Last attempt to quit: 1968     Years since quittin.1    Smokeless tobacco: Never Used   Substance Use Topics    Alcohol use: No     Alcohol/week: 0.0 standard drinks    Drug use: No       Allergies:   Allergies Allergen Reactions    Penicillins Hives     Pt states he has taken Keflex before without problems    Bactrim [Sulfamethoxazole-Trimethoprim] Hives    Codeine Itching    Lisinopril (Bulk) Cough    Neurontin [Gabapentin] Other (comments)     drowsy    Zostavax [Zoster Vaccine Live (Pf)] Rash     See 9/10/13 visit         Review of Systems   Constitutional: No  fever,  No  headache  Skin: No  rash, No  jaundice  HEENT: No  nasal congestion, No  eye drainage. Resp: No cough,  No  wheezing  CV: No chest pain, No  palpitations  GI: No vomiting,  No  diarrhea.,  No  constipation  : No dysuria,  No  hematuria  MSK: + joint pain,  +  trauma  Neuro: No numbness, No  tingling  Psych: No suicidal, No  paranoid      Physical Exam     Patient Vitals for the past 12 hrs:   Temp Pulse Resp BP SpO2   02/22/20 1401 100.2 °F (37.9 °C)   108/71    02/22/20 1300    113/41    02/22/20 1145    134/82 97 %   02/22/20 1130    142/65 99 %   02/22/20 1126     98 %   02/22/20 1105 98.6 °F (37 °C) 85 18 168/84 94 %     General: alert, No acute distress  Eyes: EOMI, normal conjunctiva  ENT: moist mucous membranes. Neck: Active, full ROM of neck. Skin: No rashes. no jaundice              Lungs: Equal chest expansion. no respiratory distress. clear to auscultation bilaterally No accessory muscle usage  Heart: regular rate     no peripheral edema   2+ radial pulses and DPs bilaterally  Abd:  non distended soft, nontender. No rebound tenderness. No guarding  Back: Tender palpation in the midline of the T and the L-spine  MSK: Tender palpation at the left hip, bilateral knees, gluteus jacob area on the left side  Neuro: Person, Place, Time and Situation; normal speech;   Psych: Cooperative with exam; Appropriate mood and affect             Diagnostic Study Results     Labs -   No results found for this or any previous visit (from the past 12 hour(s)).     Radiologic Studies -   CT SPINE LUMB WO CONT   Final Result IMPRESSION:      1. No evidence of acute fracture or subluxation. 2.  Multilevel degenerative disc disease with canal stenosis and foraminal   narrowing as described      CT SPINE [facility] WO CONT   Final Result   IMPRESSION:       1. No evidence of thoracic spine fracture or subluxation. 2.  Right upper lobe airspace disease suspicious for pneumonia. XR HIP LT W OR WO PELV 2-3 VWS   Final Result   IMPRESSION: No acute abnormality. XR KNEE LT 3 V   Final Result   IMPRESSION: No acute abnormality. XR KNEE RT 3 V   Final Result   IMPRESSION: No acute fracture. Tricompartmental osteoarthritis. .        CT Results  (Last 48 hours)               02/22/20 1212  CT SPINE LUMB WO CONT Final result    Impression:  IMPRESSION:       1. No evidence of acute fracture or subluxation. 2.  Multilevel degenerative disc disease with canal stenosis and foraminal   narrowing as described       Narrative:  INDICATION: fall       COMPARISON: None. TECHNIQUE:   Noncontrast axial CT imaging of the lumbar spine was performed. Coronal and sagittal reconstructions were obtained. CT dose reduction was achieved through the use of a standardized protocol   tailored for this examination and automatic exposure control for dose   modulation. FINDINGS:       There are 5 lumbar-type vertebral bodies. No evidence of acute fracture. Subtle   retrolisthesis of L2 on L3. Multilevel disc space narrowing is noted most   pronounced at L3-4. T12-L1 is unremarkable. L1-2 demonstrates mild disc bulge without canal stenosis or foraminal narrowing. L2-3 demonstrates concentric disc bulge and facet hypertrophy without canal   stenosis or foraminal narrowing. At L3-4, there is concentric disc bulge with facet ligament flavum hypertrophy   and likely at least moderate canal stenosis. There is severe left and mild right   foraminal narrowing.        At L4-5, there is concentric disc bulge with facet and ligamentum flavum   hypertrophy and at least moderate and possibly severe canal stenosis. There is   severe right and moderate severe left foraminal narrowing. L5-S1, there is mild disc bulge and facet hypertrophy with mild canal stenosis   and mild bilateral foraminal narrowing. Incidental soft tissue imaging demonstrates atherosclerotic disease in the   abdominal aorta. Nor hypodensities in the right kidney likely representing cysts   as noted on previous CT imaging. 02/22/20 1212  [facility] Final result    Impression:  IMPRESSION:        1. No evidence of thoracic spine fracture or subluxation. 2.  Right upper lobe airspace disease suspicious for pneumonia. Narrative:  EXAM:  CT SPINE THORAC WO CONT    INDICATION: Fall. COMPARISON: None. TECHNIQUE:    Multislice helical CT of the thoracic spine was performed. Sagittal and coronal   reformations were generated. CT dose reduction was achieved through use of a   standardized protocol tailored for this examination and automatic exposure   control for dose modulation. FINDINGS:   The alignment of the thoracic spine is normal.    Vertebral body heights preserved. There is multilevel disc space narrowing   endplate osteophyte formation. There is no fracture or other acute abnormality. No significant canal stenosis is identified although evaluation is limited. There is airspace disease in the right upper lobe                 CXR Results  (Last 48 hours)    None            Medical Decision Making     Differential Diagnosis: Fracture, contusion, muscle spasm, herniated disc    I reviewed the vital signs, available nursing notes, past medical history, past surgical history, family history and social history and old medical records.   On my interpretation of the radiology studies no evidence of acute fracture on CT of the lumbar spine, thoracic spine, or plain films of the hips and knees, CT of the T-spine shows pneumonia    Management/ED course: Patient presents today after a fall. He was recently diagnosed with pneumonia as well as a azithromycin for this. He had a mechanical fall. He had multiple areas where he was tender in the back, but no evidence of fracture, no red flag symptoms no weakness. The patient does not have any fractures on the lower extremities where he was having pain. I treated him with oral analgesics which did improve his pain, and he was able to ambulate around the emergency department before discharge. Given return precautions and a plan for p.o. analgesics at home the patient is discharged. I did note the pneumonia on the T-spine film and the patient is already taking azithromycin. He did become nearly febrile here in the emergency department at 100.2, and I treated him with Tylenol. Patient has normal oxygen saturation on room air, and I feel that outpatient treatment is reasonable for his pneumonia. ED Course:   Initial assessment performed. The patients presenting problems have been discussed, and they are in agreement with the care plan formulated and outlined with them. I have encouraged them to ask questions as they arise throughout their visit. Procedures:  0    Dispo: Discharged. The patient has been re-evaluated and is ready for discharge. Reviewed available results with patient. Counseled patient on diagnosis and care plan. Patient has expressed understanding, and all questions have been answered. Patient agrees with plan and agrees to follow up as recommended, or to return to the ED if their symptoms worsen. Discharge instructions have been provided and explained to the patient, along with reasons to return to the ED.        PLAN:  Discharge Medication List as of 2/22/2020  1:52 PM      1.   2.     Follow-up Information     Follow up With Specialties Details Why Rona Esteban MD Internal Medicine   [address]  [phone]          3. Return to ED if worse         Diagnosis     Clinical Impression:   1. Fall, initial encounter    2. Severe back pain    3. Pain of both hip joints    4. Pneumonia of right upper lobe due to infectious organism [facility])        Attestations:  Robert Peres MD        Please note that this dictation was completed with [employer], the computer voice recognition software. Quite often unanticipated grammatical, syntax, homophones, and other interpretive errors are inadvertently transcribed by the computer software. Please disregard these errors. Please excuse any errors that have escaped final proofreading. Thank you.

## 2020-02-22 NOTE — ED NOTES
Pt discharged by Dr. Chetan James. Pt provided with discharge instructions Rx and instructions on follow up care. Pt out of ED via wheelchair accompanied by son. Pt left ED before Tylenol was given.

## 2020-02-25 ENCOUNTER — PATIENT OUTREACH (OUTPATIENT)
Dept: INTERNAL MEDICINE CLINIC | Age: 75
End: 2020-02-25

## 2020-02-25 NOTE — PROGRESS NOTES
Goals Addressed                 This Visit's Progress     COMPLETED: Prevent complications post hospitalization. 01/14/20    - spoke to patient briefly today. Patient lives with wife. Has access to multiple forms of DME at home   - patient attended f/u with Dr. Mariano Neely on 1/13, patient cleared for upcoming surgery  - declined need for home health   - patient has CEA scheduled for 1/15 and report it is planned that he will stay one night in the hospital   - Dr. Mariano Neely 2/3  - Cardiology 2/25  - patients wife can provide transportation  -  Reviewed FAST with patient and signs of CVA, such as sudden numbness, tingling, loss of movement in your face, arm, leg, especially on one side of the body, vision changes, trouble speaking, confusion, walking or balance problems and sudden headache.  - declined ACP education  - Reviewed red flag s/s with patient, nausea, vomiting, inability to pass urine/stool, SOB, mental status change, chest pain, fever.   - patient reports no questions regarding upcoming procedure tomorrow    CTN to f/u with patient post discharge. AR    1/20/2020  - chart review performed, VM left for patient   - patient was admitted 1/15-1/16 for planned CEA. - no home health ordered at discharge   - Dr. Mariano Neely f/u 2/3   - Cardiology 2/25 01/21/20   - Spoke to patient today. 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 Reviewed the following:  - Patient may shower beginning Friday. Dry the wound carefully. The dressing can be removed if there is no drainage, or changed and kept in place for comfort. - Patient should not attempt to drive a car until they are comfortable and NOT taking pain medication.  - No heavy lifting (7 lbs limit). Mild exercise and light duties around the house are OK.   - patient reports that pain is ok, he is only taking Tylenol  - PCP f/u 2/3  - Vascular f/u 2 weeks - patient unsure of date/time at time of call   - Reviewed red flag s/s with patient, nausea, vomiting, inability to pass urine/stool, SOB, mental status change, chest pain, fever, changing in color or temperature in BLE, decreased pulse/sensation in BLE. Patient will contact Md/CTN if red flag s/s arise. CTN to f/u ~ 7-10 days. AR      02/07/20  Left message for f/u JANINA call for patient. CTN to f/u ~10-14 days. AR    02/25/20  Patient was seen in ER on 2/22/2020 after a GLF. Unable to reach patient for final JANINA call. Episode resolved at this time.  AR                 Patients upcoming visits:    Future Appointments   Date Time Provider [facility]   2/27/2020  3:20 PM Alice Conde NP [address] [place] [address]   3/3/2020  9:40 AM MD Keisha Razala Divine   3/19/2020  1:30 PM MD CLINT [address]   8/20/2020 11:00 AM MD [address]

## 2020-02-25 NOTE — PROGRESS NOTES
[facility] @ [facility]    [address] [place], [place]  Subjective/HPI: Pre-chart 2/25    Christie Espino is a 66 y.o. male is here for routine f/u. The patient denies chest pain/  orthopnea, PND, LE edema, palpitations, syncope, presyncope or fatigue. Patient reports he has been having intermittent lightheadedness and dizziness especially with positional change. To recall he is status post left carotid endarterectomy January of this year. Has done well. He also reports some dyspnea on exertion he is accompanied by his daughter today who noticed that is well. He denies exertional chest pain. Recently seen in the emergency room for a fall, incidental finding of right-sided pneumonia on CT scan of thoracic spine. He has completed Z-Otis. Denies fever, denies productive cough. 1/2020  Echo Findings     Left Ventricle Normal cavity size. Mild concentric hypertrophy . Low normal systolic function. The estimated ejection fraction is 50 - 55%. Left Atrium Normal cavity size. Right Ventricle Normal cavity size and global systolic function. Right Atrium Normal cavity size. Aortic Valve Trileaflet valve structure, no stenosis and no regurgitation. Mitral Valve No stenosis. Mitral valve non-specific thickening. Mild to moderate regurgitation. Tricuspid Valve Normal valve structure and no stenosis. Mild regurgitation. Pulmonary arterial systolic pressure is 50.5 mmHg. Pulmonic Valve Pulmonic valve not well visualized. 1/2020  Carotid duplex  Cerebrovascular Findings     Right Carotid     The right CCA is patent. There is mild stenosis in the right ICA (<50%). The right ICA has mild and heterogeneous plaque. The right ECA is patent. The right vertebral is antegrade. The right subclavian is normal.   Left Carotid     The left CCA is patent. There is moderate stenosis in the left ICA (50-69%). The left ICA has moderate and heterogeneous plaque. The left ECA is patent. The left vertebral is antegrade. The left subclavian is normal.     Pt is S/P left carotid surgery    8/2019 cardiac catheterization:  Coronary Findings     Diagnostic   Dominance: Right   Left Main   The vessel was visualized by angiography. The vessel is angiographically normal.   Left Anterior Descending   Prox LAD lesion 50% stenosed. . The pre-interventional distal flow is normal (DIANA 3). Pressure wire/FFR was performed on the lesion. Flow Reserve: 0.72. Resting 0.87   Left Circumflex   The vessel exhibits minimal luminal irregularities. Right Coronary Artery   Ost RCA lesion 50% stenosed. . The pre-interventional distal flow is normal (DIANA 3). Pressure wire/FFR was performed on the lesion. Flow Reserve: 0.83. resting 0.97   Prox RCA lesion 40% stenosed. .   Intervention     Prox LAD lesion   Angioplasty   Angioplasty was performed prior to stent deployment. Balloon inflated using single inflation technique. Supplies used: CATH BLLN RX TREK [id number] --    Stent   A single stent was placed. Drug-eluting stent was successfully placed. Supplies used: STENT SYS COR [id number] -- XIENCE PHAM   Post-Intervention Lesion Assessment   The intervention was successful. The guidewire crossed the lesion. Device was deployed. Post-intervention DIANA flow is 3. There were no complications. There is a 0% residual stenosis post intervention.        PCP Provider  Kary Gasca MD  Past Medical History:   Diagnosis Date    Abnormal stress echo 5/12/2015    Anemia NEC 03/2013    Last 2-3 months; finished taking iron supplement    Anxiety     CAD (coronary artery disease) 2009    cath [facility]) revealed 20%RCA and 50%2nd diagonal    Calculus of kidney     Chronic kidney disease     sees nephrologist every 6 months    Chronic kidney disease, stage III (moderate) ([address] 10/11/2009    30% kidney function    Diabetes ([address]     Pre-diabetic    DJD (degenerative joint disease)     GERD (gastroesophageal reflux disease) 10/11/2009    controlled with medication    Gout     Hypertension     Liver disease     fatty liver    Obesity     Obstructive sleep apnea (adult) (pediatric) 10/11/2009    nasal pillows; pressure set at 10-11 - uses cpap    Peripheral neuropathy 10/11/2009    bilateral feet (numbness)    Prediabetes     RLS (restless legs syndrome) 10/11/2009    S/P coronary artery stent placement 5/12/2015    5/11/15 PCI./MALI to LCx      Past Surgical History:   Procedure Laterality Date    HX BUNIONECTOMY  2019   [facility] HEART CATHETERIZATION  2009, 7/17    x1 stent 1 x (x2 now 1/15/2020)    HX HERNIA REPAIR      [place]aney/umbilical    HX KNEE REPLACEMENT Left 7/23/11     LEFT TOTAL KNEE ARTHROPLASTY    HX ORTHOPAEDIC      left great toe pinning/plate    HX ORTHOPAEDIC      left shoulder manipulation    HX UROLOGICAL      basket extraction of kidney stones    SC COLONOSCOPY FLX DX W/COLLJ SPEC WHEN PFRMD  8/5/2010         SC COLSC FLX W/RMVL OF TUMOR POLYP LESION SNARE TQ  9/24/2014          Allergies   Allergen Reactions    Penicillins Hives     Pt states he has taken Keflex before without problems    Bactrim [Sulfamethoxazole-Trimethoprim] Hives    Codeine Itching    Lisinopril (Bulk) Cough    Neurontin [Gabapentin] Other (comments)     drowsy    Zostavax [Zoster Vaccine Live (Pf)] Rash     See 9/10/13 visit      Family History   Problem Relation Age of Onset    Heart Disease Father     Hypertension Father     Other Father         abdominal aneurysm     Dementia Mother     Alzheimer Mother     Heart Disease Brother     Heart Attack Brother     Heart Disease Maternal Grandmother     Heart Disease Paternal Grandmother     Heart Attack Paternal Grandmother     Heart Disease Paternal Grandfather     Heart Attack Paternal Grandfather     Elevated Lipids Son     Elevated Lipids Daughter     Cancer Neg Hx     Diabetes Neg Hx     Stroke Neg Hx       Current Outpatient Medications Medication Sig    oxyCODONE IR (ROXICODONE) 5 mg immediate release tablet Take 1 Tab by mouth every six (6) hours as needed for Pain for up to 3 days. Max Daily Amount: 20 mg.  pantoprazole (PROTONIX) 40 mg tablet TAKE 1 TABLET BY MOUTH EVERY DAY    chlorthalidone (HYGROTEN) 25 mg tablet TAKE 1/2 TABLET BY MOUTH EVERY DAY    atorvastatin (LIPITOR) 80 mg tablet Take 1 Tab by mouth daily.  isosorbide mononitrate ER (IMDUR) 30 mg tablet TAKE 1/2 TABLET BY MOUTH EVERY DAY    clopidogrel (PLAVIX) 75 mg tab TAKE 1 TABLET BY MOUTH EVERY DAY    metoprolol succinate (TOPROL-XL) 25 mg XL tablet TAKE 1 TABLET BY MOUTH EVERY DAY    citalopram (CELEXA) 40 mg tablet TAKE 1 TABLET BY MOUTH EVERY DAY    telmisartan (MICARDIS) 40 mg tablet Take 1 Tab by mouth daily.  ketoconazole (NIZORAL) 2 % shampoo Apply  to affected area.  baclofen (LIORESAL) 10 mg tablet Take  by mouth three (3) times daily.  rOPINIRole (REQUIP) 0.5 mg tablet Take 1- 2 tablets daily in the evening    nitroglycerin (NITROSTAT) 0.4 mg SL tablet TAKE 1 TABLET BY SUBLINGUAL ROUTE EVERY 5 MINUTES AS NEEDED FOR CHEST PAIN.  albuterol (PROVENTIL HFA, VENTOLIN HFA, PROAIR HFA) 90 mcg/actuation inhaler Take 1 Puff by inhalation every six (6) hours as needed for Wheezing.  VITAMIN D3 2,000 unit cap capsule Take 2,000 Units by mouth daily.  OXYGEN-AIR DELIVERY SYSTEMS (HORIZON NASAL CPAP SYSTEM) by Does Not Apply route.  amLODIPine (NORVASC) 5 mg tablet Take 5 mg by mouth daily.  GLUCOSAMINE HCL/CHONDR PETERSEN A NA (GLUCOSAMINE-CHONDROITIN) 750-600 mg Tab Take 1 Tab by mouth daily. Brand: Move Free    aspirin delayed-release 81 mg tablet Take 81 mg by mouth daily.  allopurinol (ZYLOPRIM) 100 mg tablet Take 100 mg by mouth every morning.  MULTIVITS W-FE,OTHER MIN (CENTRUM PO) Take 1 Tab by mouth daily. No current facility-administered medications for this visit. There were no vitals filed for this visit.   Social History Socioeconomic History    Marital status:      Spouse name: Meliza Perera Number of children: 2    Years of education: graduated then 4 year apprenticship    Highest education level: Associate degree: academic program   Occupational History    Not on file   Social Needs    Financial resource strain: Not hard at all   [place] insecurity:     Worry: Never true     Inability: Never true   DataPad needs:     Medical: No     Non-medical: No   Tobacco Use    Smoking status: Former Smoker     Packs/day: 1.00     Years: 10.00     Pack years: 10.00     Types: Cigarettes     Last attempt to quit: 1968     Years since quittin.1    Smokeless tobacco: Never Used   Substance and Sexual Activity    Alcohol use: No     Alcohol/week: 0.0 standard drinks    Drug use: No    Sexual activity: Yes     Partners: Female     Birth control/protection: None   Lifestyle    Physical activity:     Days per week: 0 days     Minutes per session: 0 min    Stress: Very much   Relationships    Social connections:     Talks on phone: More than three times a week     Gets together: More than three times a week     Attends Faith service: Not on file     Active member of club or organization: No     Attends meetings of clubs or organizations: Never     Relationship status:     Intimate partner violence:     Fear of current or ex partner: No     Emotionally abused: No     Physically abused: No     Forced sexual activity: No   Other Topics Concern     Service Not Asked    Blood Transfusions Not Asked    Caffeine Concern Not Asked    Occupational Exposure Not Asked    Hobby Hazards Not Asked    Sleep Concern Not Asked    Stress Concern Not Asked    Weight Concern Not Asked    Special Diet Not Asked    Back Care Not Asked    Exercise Not Asked    Bike Helmet Not Asked    [address] Not Asked    Self-Exams Not Asked   Social History Narrative    Not on file       I have reviewed the nurses notes, vitals, problem list, allergy list, medical history, family, social history and medications. Review of Symptoms:  11 systems reviewed, negative other than as stated in the HPI      Physical Exam:      General: Well developed, in no acute distress, cooperative and alert  HEENT: No carotid bruits, no JVD, trach is midline. Neck Supple, PERRL, EOM intact. Left carotid endarterectomy surgical site well-healed  Heart:  Normal S1/S2 negative S3 or S4. Regular, no murmur, gallop or rub. Respiratory: Clear bilaterally x 4, no wheezing or rales  Abdomen:   Soft, non-tender, no masses, bowel sounds are active. Extremities:  No edema, normal cap refill, no cyanosis, atraumatic. Neuro: A&Ox3, speech clear, gait slow cautious with limp  Skin: Skin color is normal. No rashes or lesions. Non diaphoretic  Vascular: 2+ pulses symmetric in all extremities    Orthostatic BP readings: Supine 112/68, sitting 100/60 standing 98/50     Cardiographics    ECG: Sinus rhythm        Cardiology Labs:  Lab Results   Component Value Date/Time    Cholesterol, total 117 01/09/2020 02:45 AM    HDL Cholesterol 41 01/09/2020 02:45 AM    LDL, calculated 56.8 01/09/2020 02:45 AM    Triglyceride 96 01/09/2020 02:45 AM    CHOL/HDL Ratio 2.9 01/09/2020 02:45 AM       Lab Results   Component Value Date/Time    Sodium 139 01/16/2020 03:23 AM    Potassium 4.2 01/16/2020 03:23 AM    Chloride 109 (H) 01/16/2020 03:23 AM    CO2 23 01/16/2020 03:23 AM    Anion gap 7 01/16/2020 03:23 AM    Glucose 123 (H) 01/16/2020 03:23 AM    BUN 41 (H) 01/16/2020 03:23 AM    Creatinine 2.26 (H) 01/16/2020 03:23 AM    BUN/Creatinine ratio 18 01/16/2020 03:23 AM    GFR est AA 34 (L) 01/16/2020 03:23 AM    GFR est non-AA 28 (L) 01/16/2020 03:23 AM    Calcium 8.0 (L) 01/16/2020 03:23 AM    Bilirubin, total 0.6 01/08/2020 09:40 PM    AST (SGOT) 18 01/08/2020 09:40 PM    Alk.  phosphatase 112 01/08/2020 09:40 PM    Protein, total 7.2 01/08/2020 09:40 PM Albumin 3.6 01/08/2020 09:40 PM    Globulin 3.6 01/08/2020 09:40 PM    A-G Ratio 1.0 (L) 01/08/2020 09:40 PM    ALT (SGPT) 26 01/08/2020 09:40 PM           Assessment:     Assessment:     Diagnoses and all orders for this visit:    1. Essential hypertension    2. Coronary artery disease involving native coronary artery of native heart without angina pectoris    3. Hypertensive kidney disease with chronic kidney disease stage III ([facility]    4. Mixed hyperlipidemia        ICD-10-CM ICD-9-CM    1. Essential hypertension I10 401.9    2. Coronary artery disease involving native coronary artery of native heart without angina pectoris I25.10 414.01    3. Hypertensive kidney disease with chronic kidney disease stage III (HCC) I12.9 403.90     N18.3 585.3    4. Mixed hyperlipidemia E78.2 272.2      No orders of the defined types were placed in this encounter. Plan:     1. Atherosclerotic heart disease: History of PTCA stenting 2015, recent intervention PTCA stenting 8/2019 proximal LAD PCI/stenting, ostial and mid RCA 40-50% stenosis. Continue dual antiplatelet therapy under directed for 1 year. 2.  Hypertension: Patient presents mildly orthostatic hypotensive and symptomatic, will discontinue chlorthalidone. Advised to increase hydration eliminate caffeinated products. 3.  Hyperlipidemia: On statin therapy recent LDL 57  January 2020  4. Type 2 diabetes: A1c 6.0% January 2020  5. Chronic kidney disease: Recent creatinine 2.48, followed by [facility]. Discontinue chlorthalidone  6. Mild to moderate mitral regurgitation: Blood pressure controlled  7. Carotid stenosis: Followed by vascular surgery status post left carotid endarterectomy right side nonobstructive. 8.  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   Follow-up 6 months Renata Rios Dior Brantley NP      Please note that this dictation was completed with [employer], the computer voice recognition software. Quite often unanticipated grammatical, syntax, homophones, and other interpretive errors are inadvertently transcribed by the computer software. Please disregard these errors. Please excuse any errors that have escaped final proofreading. Thank you.

## 2020-02-27 ENCOUNTER — OFFICE VISIT (OUTPATIENT)
Dept: CARDIOLOGY CLINIC | Age: 75
End: 2020-02-27

## 2020-02-27 VITALS
HEIGHT: 72 IN | SYSTOLIC BLOOD PRESSURE: 110 MMHG | HEART RATE: 72 BPM | RESPIRATION RATE: 18 BRPM | WEIGHT: 236.2 LBS | DIASTOLIC BLOOD PRESSURE: 60 MMHG | OXYGEN SATURATION: 96 % | BODY MASS INDEX: 31.99 KG/M2

## 2020-02-27 DIAGNOSIS — I12.9 HYPERTENSIVE KIDNEY DISEASE WITH CHRONIC KIDNEY DISEASE STAGE III (HCC): Chronic | ICD-10-CM

## 2020-02-27 DIAGNOSIS — R06.09 DOE (DYSPNEA ON EXERTION): ICD-10-CM

## 2020-02-27 DIAGNOSIS — E78.2 MIXED HYPERLIPIDEMIA: Chronic | ICD-10-CM

## 2020-02-27 DIAGNOSIS — I10 ESSENTIAL HYPERTENSION: Primary | Chronic | ICD-10-CM

## 2020-02-27 DIAGNOSIS — I25.10 ASHD (ARTERIOSCLEROTIC HEART DISEASE): ICD-10-CM

## 2020-02-27 DIAGNOSIS — N18.30 HYPERTENSIVE KIDNEY DISEASE WITH CHRONIC KIDNEY DISEASE STAGE III (HCC): Chronic | ICD-10-CM

## 2020-02-27 DIAGNOSIS — I25.10 CORONARY ARTERY DISEASE INVOLVING NATIVE CORONARY ARTERY OF NATIVE HEART WITHOUT ANGINA PECTORIS: ICD-10-CM

## 2020-02-27 NOTE — PROGRESS NOTES
1. Have you been to the ER, urgent care clinic since your last visit? Hospitalized since your last visit? ED [facility] ER 2-, S/P fall, hip pain. 1- s/P carotid artery surgery [facility]. 2. Have you seen or consulted any other health care providers outside of the [address] [place] [address] since your last visit? Include any pap smears or colon screening. No    Chief Complaint   Patient presents with    Coronary Artery Disease     6 mo appt. C/O SOB with exertion.

## 2020-03-03 ENCOUNTER — TELEPHONE (OUTPATIENT)
Dept: CARDIOLOGY CLINIC | Age: 75
End: 2020-03-03

## 2020-03-03 ENCOUNTER — HOSPITAL ENCOUNTER (OUTPATIENT)
Dept: LAB | Age: 75
Discharge: HOME | End: 2020-03-03
Payer: MEDICARE

## 2020-03-03 ENCOUNTER — OFFICE VISIT (OUTPATIENT)
Dept: NEUROLOGY | Age: 75
End: 2020-03-03

## 2020-03-03 VITALS
HEART RATE: 72 BPM | BODY MASS INDEX: 32.1 KG/M2 | DIASTOLIC BLOOD PRESSURE: 52 MMHG | OXYGEN SATURATION: 98 % | RESPIRATION RATE: 12 BRPM | HEIGHT: 72 IN | WEIGHT: 237 LBS | SYSTOLIC BLOOD PRESSURE: 108 MMHG

## 2020-03-03 DIAGNOSIS — Z98.890 HISTORY OF LEFT-SIDED CAROTID ENDARTERECTOMY: ICD-10-CM

## 2020-03-03 DIAGNOSIS — G25.0 BENIGN ESSENTIAL TREMOR: ICD-10-CM

## 2020-03-03 DIAGNOSIS — G60.8 IDIOPATHIC SMALL AND LARGE FIBER SENSORY NEUROPATHY: ICD-10-CM

## 2020-03-03 DIAGNOSIS — G47.33 OBSTRUCTIVE SLEEP APNEA: ICD-10-CM

## 2020-03-03 DIAGNOSIS — I65.23 BILATERAL CAROTID ARTERY STENOSIS: ICD-10-CM

## 2020-03-03 DIAGNOSIS — G25.81 RLS (RESTLESS LEGS SYNDROME): ICD-10-CM

## 2020-03-03 DIAGNOSIS — E53.8 B12 DEFICIENCY: ICD-10-CM

## 2020-03-03 DIAGNOSIS — I65.23 BILATERAL CAROTID ARTERY STENOSIS: Primary | ICD-10-CM

## 2020-03-03 DIAGNOSIS — E11.42 DIABETIC PERIPHERAL NEUROPATHY ASSOCIATED WITH TYPE 2 DIABETES MELLITUS (HCC): ICD-10-CM

## 2020-03-03 DIAGNOSIS — I63.9 ACUTE CVA (CEREBROVASCULAR ACCIDENT) (HCC): ICD-10-CM

## 2020-03-03 PROCEDURE — 80053 COMPREHEN METABOLIC PANEL: CPT

## 2020-03-03 PROCEDURE — 80061 LIPID PANEL: CPT

## 2020-03-03 RX ORDER — ROPINIROLE 0.5 MG/1
1 TABLET, FILM COATED ORAL
Qty: 180 TAB | Refills: 5 | Status: SHIPPED | OUTPATIENT
Start: 2020-03-03 | End: 2020-03-04 | Stop reason: SDUPTHER

## 2020-03-03 NOTE — PATIENT INSTRUCTIONS
A Healthy Lifestyle: Care Instructions Your Care Instructions A healthy lifestyle can help you feel good, stay at a healthy weight, and have plenty of energy for both work and play. A healthy lifestyle is something you can share with your whole family. A healthy lifestyle also can lower your risk for serious health problems, such as high blood pressure, heart disease, and diabetes. You can follow a few steps listed below to improve your health and the health of your family. Follow-up care is a key part of your treatment and safety. Be sure to make and go to all appointments, and call your doctor if you are having problems. It's also a good idea to know your test results and keep a list of the medicines you take. How can you care for yourself at home? · Do not eat too much sugar, fat, or fast foods. You can still have dessert and treats now and then. The goal is moderation. · Start small to improve your eating habits. Pay attention to portion sizes, drink less juice and soda pop, and eat more fruits and vegetables. ? Eat a healthy amount of food. A 3-ounce serving of meat, for example, is about the size of a deck of cards. Fill the rest of your plate with vegetables and whole grains. ? Limit the amount of soda and sports drinks you have every day. Drink more water when you are thirsty. ? Eat at least 5 servings of fruits and vegetables every day. It may seem like a lot, but it is not hard to reach this goal. A serving or helping is 1 piece of fruit, 1 cup of vegetables, or 2 cups of leafy, raw vegetables. Have an apple or some carrot sticks as an afternoon snack instead of a candy bar. Try to have fruits and/or vegetables at every meal. 
· Make exercise part of your daily routine. You may want to start with simple activities, such as walking, bicycling, or slow swimming. Try to be active 30 to 60 minutes every day.  You do not need to do all 30 to 60 minutes all at once. For example, you can exercise 3 times a day for 10 or 20 minutes. Moderate exercise is safe for most people, but it is always a good idea to talk to your doctor before starting an exercise program. 
· Keep moving. Geoffrey Babatunde the lawn, work in the garden, or [employer] Take the stairs instead of the elevator at work. · If you smoke, quit. People who smoke have an increased risk for heart attack, stroke, cancer, and other lung illnesses. Quitting is hard, but there are ways to boost your chance of quitting tobacco for good. ? Use nicotine gum, patches, or lozenges. ? Ask your doctor about stop-smoking programs and medicines. ? Keep trying. In addition to reducing your risk of diseases in the future, you will notice some benefits soon after you stop using tobacco. If you have shortness of breath or asthma symptoms, they will likely get better within a few weeks after you quit. · Limit how much alcohol you drink. Moderate amounts of alcohol (up to 2 drinks a day for men, 1 drink a day for women) are okay. But drinking too much can lead to liver problems, high blood pressure, and other health problems. Family health If you have a family, there are many things you can do together to improve your health. · Eat meals together as a family as often as possible. · Eat healthy foods. This includes fruits, vegetables, lean meats and dairy, and whole grains. · Include your family in your fitness plan. Most people think of activities such as jogging or tennis as the way to fitness, but there are many ways you and your family can be more active. Anything that makes you breathe hard and gets your heart pumping is exercise. Here are some tips: 
? Walk to do errands or to take your child to school or the bus. 
? Go for a family bike ride after dinner instead of watching TV. Where can you learn more? Go to http://antonieta-josie.info/. Enter X309 in the search box to learn more about \"A Healthy Lifestyle: Care Instructions. \" Current as of: May 28, 2019 Content Version: 12.2 © 8421-4567 [employer], [employer] Care instructions adapted under license by [employer] (which disclaims liability or warranty for this information). If you have questions about a medical condition or this instruction, always ask your healthcare professional. [address] any warranty or liability for your use of this information. Office Policies 
 
o Phone calls/patient messages: Please allow up to 24 hours for someone in the office to contact you about your call or message. Be mindful your provider may be out of the office or your message may require further review. We encourage you to use "[employer] for your messages as this is a faster, more efficient way to communicate with our office 
 
o Medication Refills: 
Prescription medications require up to 48 business hours to process. We encourage you to use "[employer] for your refills. For controlled medications: Please allow up to 72 business hours to process. Certain medications may require you to  a written prescription at our office. NO narcotic/controlled medications will be prescribed after 4pm Monday through Friday or on weekends 
 
o Form/Paperwork Completion: We ask that you allow 7-14 business days. You may also download your forms to "[employer] to have your doctor print off.

## 2020-03-03 NOTE — TELEPHONE ENCOUNTER
Spoke with patient. Verified patient with two patient identifiers. Confirmed with pt, he has stopped Chlorthalidone, was d/c on 2-. Patient verbalized understanding.

## 2020-03-03 NOTE — LETTER
3/3/20 Patient: Shannon Lora YOB: 1945 Date of Visit: 3/3/2020 Kal Barbosa MD 
[address] [address] VIA In Basket Dear Kal Barbosa MD, Thank you for referring Mr. Shannon Lora to [facility] for evaluation. My notes for this consultation are attached. Consult REFERRED BY: 
Pritesh Carpenter MD 
 
CHIEF COMPLAINT: Worsening neuropathy and syncopal episode and TIA and carotid stenosis Subjective:  
 
Shannon Lora is a 66 y.o. right-handed  male seen as a new patient to me at the request of Dr. Chula Tinsley for evaluation of new problem of worsening neuropathy in his feet, after have not seen him in over 6 years. The patient was admitted to the hospital in January 2020 for an episode of amaurosis in his left eye, and ended up with 80% stenosis in his left internal carotid artery that was then treated surgically by vascular surgeon with a left carotid endarterectomy, and he did well, has had no recurrent symptoms, had a recent carotid Doppler study that shows his endarterectomy is stable. Patient has a known history of idiopathic neuropathy in the past, that seems to be worsening as he is more numbness in his feet and more tight sensation in his ankles. He is wondering why it may be getting worse. He did not respond well to gabapentin or Lyrica or Cymbalta for treatment of the neuropathy in the past.  He does have some minor discomfort but no major pain. Patient does not have much weakness in his feet. He also has restless leg syndrome probably from the neuropathy, has to take Requip 2 of the .5 mg at night for his restless leg syndrome that seems to help him significantly. He has a borderline prediabetic state on the chart, and that may be the cause of his neuropathy.   We will repeat his metabolic studies again today looking for any other treatable cause of his symptoms and to evaluate his diabetes or prediabetic state. Patient has had no other recurrent strokelike symptoms and otherwise is doing well. He had a recent fall when he slipped, but no other neurological sequelae. He denies any recent headache, fever, meningismus, new focal weakness, new sensory loss, no gait issues, or any other active neurologic problem. Past Medical History:  
Diagnosis Date  Abnormal stress echo 5/12/2015  Anemia NEC 03/2013 Last 2-3 months; finished taking iron supplement  Anxiety  CAD (coronary artery disease) 2009  
 cath [facility]) revealed 20%RCA and 50%2nd diagonal  
 Calculus of kidney  Chronic kidney disease   
 sees nephrologist every 6 months  Chronic kidney disease, stage III (moderate) ([address] 10/11/2009  
 30% kidney function  Diabetes ([facility] [address] Pre-diabetic  DJD (degenerative joint disease)  GERD (gastroesophageal reflux disease) 10/11/2009  
 controlled with medication  Gout  Hypertension  Liver disease   
 fatty liver  Obesity  Obstructive sleep apnea (adult) (pediatric) 10/11/2009  
 nasal pillows; pressure set at 10-11 - uses cpap  Peripheral neuropathy 10/11/2009  
 bilateral feet (numbness)  Prediabetes  RLS (restless legs syndrome) 10/11/2009  S/P coronary artery stent placement 05/12/2015 5/11/15 PCI./MALI to LCx, 8/2019 PCI/MALI Prox LAD (RCA 40-50% lesions) Past Surgical History:  
Procedure Laterality Date  HX BUNIONECTOMY  2019  HX HEART CATHETERIZATION  2009, 7/17  
 x1 stent 1 x (x2 now 1/15/2020)  HX HERNIA REPAIR [place]/umbilical  
 HX KNEE REPLACEMENT Left 7/23/11 LEFT TOTAL KNEE ARTHROPLASTY  HX ORTHOPAEDIC    
 left great toe pinning/plate  HX ORTHOPAEDIC    
 left shoulder manipulation  HX UROLOGICAL    
 basket extraction of kidney stones  [place] COLONOSCOPY FLX DX W/COLLJ SPEC WHEN PFRMD  8/5/2010  [place] COLSC FLX W/RMVL OF TUMOR POLYP LESION SNARE TQ  2014 Family History Problem Relation Age of Onset  Heart Disease Father  Hypertension Father  Other Father   
     abdominal aneurysm  Dementia Mother  Alzheimer Mother  Heart Disease Brother  Heart Attack Brother  Heart Disease Maternal Grandmother  Heart Disease Paternal Grandmother  Heart Attack Paternal Grandmother  Heart Disease Paternal Grandfather  Heart Attack Paternal Grandfather  Elevated Lipids Son  Elevated Lipids Daughter  Cancer Neg Hx  Diabetes Neg Hx  Stroke Neg Hx Social History Tobacco Use  Smoking status: Former Smoker Packs/day: 1.00 Years: 10.00 Pack years: 10.00 Types: Cigarettes Last attempt to quit: 1968 Years since quittin.2  Smokeless tobacco: Never Used Substance Use Topics  Alcohol use: No  
  Alcohol/week: 0.0 standard drinks Current Outpatient Medications:  
  rOPINIRole (REQUIP) 0.5 mg tablet, Take 2 Tabs by mouth nightly. Take 1- 2 tablets daily in the evening, Disp: 180 Tab, Rfl: 5 
  pantoprazole (PROTONIX) 40 mg tablet, TAKE 1 TABLET BY MOUTH EVERY DAY, Disp: 30 Tab, Rfl: 5 
  atorvastatin (LIPITOR) 80 mg tablet, Take 1 Tab by mouth daily. , Disp: 90 Tab, Rfl: 3 
  isosorbide mononitrate ER (IMDUR) 30 mg tablet, TAKE 1/2 TABLET BY MOUTH EVERY DAY, Disp: 45 Tab, Rfl: 1   clopidogrel (PLAVIX) 75 mg tab, TAKE 1 TABLET BY MOUTH EVERY DAY, Disp: 90 Tab, Rfl: 1 
  metoprolol succinate (TOPROL-XL) 25 mg XL tablet, TAKE 1 TABLET BY MOUTH EVERY DAY, Disp: 90 Tab, Rfl: 1   citalopram (CELEXA) 40 mg tablet, TAKE 1 TABLET BY MOUTH EVERY DAY, Disp: 90 Tab, Rfl: 3 
  telmisartan (MICARDIS) 40 mg tablet, Take 1 Tab by mouth daily. , Disp: 90 Tab, Rfl: 3 
  ketoconazole (NIZORAL) 2 % shampoo, Apply  to affected area., Disp: , Rfl:  
   baclofen (LIORESAL) 10 mg tablet, Take  by mouth three (3) times daily. , Disp: , Rfl:  
  nitroglycerin (NITROSTAT) 0.4 mg SL tablet, TAKE 1 TABLET BY SUBLINGUAL ROUTE EVERY 5 MINUTES AS NEEDED FOR CHEST PAIN., Disp: 1 Bottle, Rfl: 0 
  albuterol (PROVENTIL HFA, VENTOLIN HFA, PROAIR HFA) 90 mcg/actuation inhaler, Take 1 Puff by inhalation every six (6) hours as needed for Wheezing., Disp: 1 Inhaler, Rfl: 0 
  VITAMIN D3 2,000 unit cap capsule, Take 2,000 Units by mouth daily. , Disp: , Rfl: 2 
  OXYGEN-AIR DELIVERY SYSTEMS (HORIZON NASAL CPAP SYSTEM), by Does Not Apply route., Disp: , Rfl:  
  amLODIPine (NORVASC) 5 mg tablet, Take 5 mg by mouth daily. , Disp: , Rfl:  
  GLUCOSAMINE HCL/CHONDR PETERSEN A NA (GLUCOSAMINE-CHONDROITIN) 750-600 mg Tab, Take 1 Tab by mouth daily. Brand: Move Free, Disp: , Rfl:  
  aspirin delayed-release 81 mg tablet, Take 81 mg by mouth daily. , Disp: , Rfl:  
  allopurinol (ZYLOPRIM) 100 mg tablet, Take 100 mg by mouth every morning., Disp: , Rfl:  
  MULTIVITS W-FE,OTHER MIN (CENTRUM PO), Take 1 Tab by mouth daily. , Disp: , Rfl:  
 
 
 
Allergies Allergen Reactions  Penicillins Hives Pt states he has taken Keflex before without problems  Bactrim [Sulfamethoxazole-Trimethoprim] Hives  Codeine Itching  Lisinopril (Bulk) Cough  Neurontin [Gabapentin] Other (comments) drowsy  Zostavax [Zoster Vaccine Live (Pf)] Rash See 9/10/13 visit MRI Results (most recent): 
Results from Hospital Encounter encounter on 01/08/20 MRI BRAIN WO CONT Narrative INDICATION:  Visual disturbance COMPARISON:  CT head 1/8/2020 TECHNIQUE:  MR imaging of the brain was performed with sagittal T1, axial T1, 
T2, FLAIR, GRE, DWI/ADC;  
 
FINDINGS:   
 
The ventricles are midline without hydrocephalus. There is no acute intra or extra-axial fluid collection.  Mild periventricular 
and focal left frontal subcortical increased T2/flair signal abnormalities are 
 nonspecific but may represent changes of chronic small vessel ischemic disease. No evidence for acute infarction. The major intracranial vascular flow-voids are patent. No abnormal signal in the mastoid air cells or visualized paranasal sinuses. Visualized soft tissues unremarkable. Impression IMPRESSION: 
 
No evidence for acute infarction Mild periventricular and focal left frontal subcortical increased T2/flair 
signal abnormalities are nonspecific but may represent changes of chronic small 
vessel ischemic disease. Results from [facility] Encounter encounter on 01/08/20 MRI BRAIN WO CONT Narrative INDICATION:  Visual disturbance COMPARISON:  CT head 1/8/2020 TECHNIQUE:  MR imaging of the brain was performed with sagittal T1, axial T1, 
T2, FLAIR, GRE, DWI/ADC;  
 
FINDINGS:   
 
The ventricles are midline without hydrocephalus. There is no acute intra or extra-axial fluid collection. Mild periventricular 
and focal left frontal subcortical increased T2/flair signal abnormalities are 
nonspecific but may represent changes of chronic small vessel ischemic disease. No evidence for acute infarction. The major intracranial vascular flow-voids are patent. No abnormal signal in the mastoid air cells or visualized paranasal sinuses. Visualized soft tissues unremarkable. Impression IMPRESSION: 
 
No evidence for acute infarction Mild periventricular and focal left frontal subcortical increased T2/flair 
signal abnormalities are nonspecific but may represent changes of chronic small 
vessel ischemic disease. Review of Systems: A comprehensive review of systems was negative except for: Constitutional: positive for fatigue and malaise Ears, nose, mouth, throat, and face: positive for hearing loss and tinnitus Musculoskeletal: positive for myalgias, arthralgias, stiff joints and back pain Neurological: positive for paresthesia and Visual loss and endarterectomy Behvioral/Psych: positive for depression Vitals:  
 03/03/20 1024 BP: 108/52 Pulse: 72 Resp: 12 SpO2: 98% Weight: 237 lb (107.5 kg) Height: 6' (1.829 m) Objective: I 
 
 
NEUROLOGICAL EXAM: 
 
Appearance: The patient is well developed, well nourished, provides a coherent history and is in no acute distress. Mental Status: Oriented to time, place and person, and the president, cognitive function is normal and speech is fluent and no aphasia or dysarthria. Mood and affect appropriate. Cranial Nerves:   Intact visual fields. Fundi are benign, disc are flat, no lesions seen on funduscopy. CLAUDIA, EOM's full, no nystagmus, no ptosis. Facial sensation is normal. Corneal reflexes are not tested. Facial movement is symmetric. Hearing is normal bilaterally. Palate is midline with normal sternocleidomastoid and trapezius muscles are normal. Tongue is midline. Neck without meningismus or bruits Temporal arteries are not tender or enlarged TMJ areas are not tender on palpation Motor:  5/5 strength in upper and lower proximal and distal muscles. Normal bulk and tone. No fasciculations. Rapid alternating movement is symmetric and intact bilaterally Reflexes:   Deep tendon reflexes 1+/4 and symmetrical. 
No babinski or clonus present Sensory:   Abnormal to touch, pinprick and vibration and temperature in both feet to midcalf level. DSS is intact Gait:  Normal gait for patient's age, though patient does move a little slowly due to his arthritis. Tremor:   No tremor noted. Cerebellar:   Mildly abnormal cerebellar signs on Romberg and tandem testing and finger-nose-finger exam.  
Neurovascular:  Normal heart sounds and regular rhythm, peripheral pulses decreased, and no carotid bruits. Assessment: ICD-10-CM ICD-9-CM 1. Bilateral carotid artery stenosis I65.23 433.10 rOPINIRole (REQUIP) 0.5 mg tablet 433.30 FRANCIS COMPREHENSIVE PLUS PANEL  
   GM1 IGG AUTOANTIBODY HEMOGLOBIN A1C WITH EAG  
   IMMUNOELECTROPHORESIS (IMMUNOFIX.) VITAMIN B12 & FOLATE  
   GLIADIN ABS, IGA AND IGG  
   MISC. LAB TEST 2. Acute CVA (cerebrovascular accident) ([facility] [address] I63.9 434.91 rOPINIRole (REQUIP) 0.5 mg tablet FRANCIS COMPREHENSIVE PLUS PANEL  
   GM1 IGG AUTOANTIBODY HEMOGLOBIN A1C WITH EAG  
   IMMUNOELECTROPHORESIS (IMMUNOFIX.) VITAMIN B12 & FOLATE  
   GLIADIN ABS, IGA AND IGG  
   MISC. LAB TEST 3. Benign essential tremor G25.0 333.1 rOPINIRole (REQUIP) 0.5 mg tablet FRANCIS COMPREHENSIVE PLUS PANEL  
   GM1 IGG AUTOANTIBODY HEMOGLOBIN A1C WITH EAG  
   IMMUNOELECTROPHORESIS (IMMUNOFIX.) VITAMIN B12 & FOLATE  
   GLIADIN ABS, IGA AND IGG  
   MISC. LAB TEST 4. RLS (restless legs syndrome) G25.81 333.94 rOPINIRole (REQUIP) 0.5 mg tablet FRANCIS COMPREHENSIVE PLUS PANEL  
   GM1 IGG AUTOANTIBODY HEMOGLOBIN A1C WITH EAG  
   IMMUNOELECTROPHORESIS (IMMUNOFIX.) VITAMIN B12 & FOLATE  
   GLIADIN ABS, IGA AND IGG  
   MISC. LAB TEST 5. Diabetic peripheral neuropathy associated with type 2 diabetes mellitus (HCC) E11.42 250.60 rOPINIRole (REQUIP) 0.5 mg tablet  
  357.2 FRANCIS COMPREHENSIVE PLUS PANEL  
   GM1 IGG AUTOANTIBODY HEMOGLOBIN A1C WITH EAG  
   IMMUNOELECTROPHORESIS (IMMUNOFIX.) VITAMIN B12 & FOLATE  
   GLIADIN ABS, IGA AND IGG  
   MISC. LAB TEST 6. B12 deficiency E53.8 266.2 rOPINIRole (REQUIP) 0.5 mg tablet FRANCIS COMPREHENSIVE PLUS PANEL  
   GM1 IGG AUTOANTIBODY HEMOGLOBIN A1C WITH EAG  
   IMMUNOELECTROPHORESIS (IMMUNOFIX.) VITAMIN B12 & FOLATE  
   GLIADIN ABS, IGA AND IGG  
   MISC. LAB TEST 7. Idiopathic small and large fiber sensory neuropathy G60.8 356.4 rOPINIRole (REQUIP) 0.5 mg tablet FRANCIS COMPREHENSIVE PLUS PANEL  
   GM1 IGG AUTOANTIBODY HEMOGLOBIN A1C WITH EAG  
   IMMUNOELECTROPHORESIS (IMMUNOFIX.) VITAMIN B12 & FOLATE  
   GLIADIN ABS, IGA AND IGG  
   MISC. LAB TEST 8. History of left-sided carotid endarterectomy Z98.890 V45.89 rOPINIRole (REQUIP) 0.5 mg tablet FRANCIS COMPREHENSIVE PLUS PANEL  
   GM1 IGG AUTOANTIBODY HEMOGLOBIN A1C WITH EAG  
   IMMUNOELECTROPHORESIS (IMMUNOFIX.) VITAMIN B12 & FOLATE  
   GLIADIN ABS, IGA AND IGG  
   MISC. LAB TEST 9. Obstructive sleep apnea G47.33 327.23 rOPINIRole (REQUIP) 0.5 mg tablet FRANCIS COMPREHENSIVE PLUS PANEL  
   GM1 IGG AUTOANTIBODY HEMOGLOBIN A1C WITH EAG  
   IMMUNOELECTROPHORESIS (IMMUNOFIX.) VITAMIN B12 & FOLATE  
   GLIADIN ABS, IGA AND IGG  
   MISC. LAB TEST Active Problems: * No active hospital problems. * 
 
 
Plan:  
 
Patient with worsening neuropathy, we will recheck his metabolic parameters, but most likely is probably related to his prediabetic state. We will recheck all his metabolic parameters looking for any other cause, he is to continue his vitamins and vitamin D related basis. With his restless leg syndrome he seems stable on his current medications, we reviewed those with the patient today. Patient with recent left carotid endarterectomy, he looks well postop, no recurrent symptoms, and continues his medication of aspirin and Plavix yesterday as antiplatelet therapy. Patient did not appear to have any other active neurologic problem at this time. We will check him again in 6 months time or earlier if need be. 45-minute spent with patient, going over his records, reviewing his inpatient records, reviewing his MRI scan, reviewing his CTA, reviewing his duplex scan, I do agree that he has symptomatic left carotid stenosis as now corrected with endarterectomy and is doing well antiplatelet therapy without recurrent neurologic symptoms except for his neuropathy worse. Complete metabolic evaluation ordered.  
He will call back if any problem, we will see him again in 6 months time or earlier as needed. Medications renewed for the patient. Signed By: Caryn Perkins MD   
 March 3, 2020 CC: Abdulaziz Medina MD 
FAX: [phone] This note will not be viewable in [address] If you have questions, please do not hesitate to call me. I look forward to following your patient along with you. Sincerely, Caryn Perkins MD

## 2020-03-03 NOTE — PROGRESS NOTES
Consult  REFERRED BY:  Carlean Mcburney, MD    CHIEF COMPLAINT: Worsening neuropathy and syncopal episode and TIA and carotid stenosis      Subjective:     Emir Marie is a 66 y.o. right-handed  male seen as a new patient to me at the request of Dr. Winston Zaidi for evaluation of new problem of worsening neuropathy in his feet, after have not seen him in over 6 years. The patient was admitted to the hospital in January 2020 for an episode of amaurosis in his left eye, and ended up with 80% stenosis in his left internal carotid artery that was then treated surgically by vascular surgeon with a left carotid endarterectomy, and he did well, has had no recurrent symptoms, had a recent carotid Doppler study that shows his endarterectomy is stable. Patient has a known history of idiopathic neuropathy in the past, that seems to be worsening as he is more numbness in his feet and more tight sensation in his ankles. He is wondering why it may be getting worse. He did not respond well to gabapentin or Lyrica or Cymbalta for treatment of the neuropathy in the past.  He does have some minor discomfort but no major pain. Patient does not have much weakness in his feet. He also has restless leg syndrome probably from the neuropathy, has to take Requip 2 of the .5 mg at night for his restless leg syndrome that seems to help him significantly. He has a borderline prediabetic state on the chart, and that may be the cause of his neuropathy. We will repeat his metabolic studies again today looking for any other treatable cause of his symptoms and to evaluate his diabetes or prediabetic state. Patient has had no other recurrent strokelike symptoms and otherwise is doing well. He had a recent fall when he slipped, but no other neurological sequelae. He denies any recent headache, fever, meningismus, new focal weakness, new sensory loss, no gait issues, or any other active neurologic problem.     Past Medical History:   Diagnosis Date    Abnormal stress echo 5/12/2015    Anemia NEC 03/2013    Last 2-3 months; finished taking iron supplement    Anxiety     CAD (coronary artery disease) 2009    cath Courtney Pate) revealed 20%RCA and 50%2nd diagonal    Calculus of kidney     Chronic kidney disease     sees nephrologist every 6 months    Chronic kidney disease, stage III (moderate) ([facility] [address] 10/11/2009    30% kidney function    Diabetes ([facility] [address]     Pre-diabetic    DJD (degenerative joint disease)     GERD (gastroesophageal reflux disease) 10/11/2009    controlled with medication    Gout     Hypertension     Liver disease     fatty liver    Obesity     Obstructive sleep apnea (adult) (pediatric) 10/11/2009    nasal pillows; pressure set at 10-11 - uses cpap    Peripheral neuropathy 10/11/2009    bilateral feet (numbness)    Prediabetes     RLS (restless legs syndrome) 10/11/2009    S/P coronary artery stent placement 05/12/2015    5/11/15 PCI./MALI to LCx, 8/2019 PCI/MALI Prox LAD (RCA 40-50% lesions)      Past Surgical History:   Procedure Laterality Date    HX BUNIONECTOMY  2019   [facility] HEART CATHETERIZATION  2009, 7/17    x1 stent 1 x (x2 now 1/15/2020)    HX HERNIA REPAIR      [place]aney/umbilical    HX KNEE REPLACEMENT Left 7/23/11     LEFT TOTAL KNEE ARTHROPLASTY    HX ORTHOPAEDIC      left great toe pinning/plate    HX ORTHOPAEDIC      left shoulder manipulation    HX UROLOGICAL      basket extraction of kidney stones    NE COLONOSCOPY FLX DX W/COLLJ SPEC WHEN PFRMD  8/5/2010         NE COLSC FLX W/RMVL OF TUMOR POLYP LESION SNARE TQ  9/24/2014          Family History   Problem Relation Age of Onset    Heart Disease Father     Hypertension Father     Other Father         abdominal aneurysm     Dementia Mother     Alzheimer Mother     Heart Disease Brother     Heart Attack Brother     Heart Disease Maternal Grandmother     Heart Disease Paternal Grandmother     Heart Attack Paternal Grandmother  Heart Disease Paternal Grandfather     Heart Attack Paternal Grandfather     Elevated Lipids Son     Elevated Lipids Daughter     Cancer Neg Hx     Diabetes Neg Hx     Stroke Neg Hx       Social History     Tobacco Use    Smoking status: Former Smoker     Packs/day: 1.00     Years: 10.00     Pack years: 10.00     Types: Cigarettes     Last attempt to quit: 1968     Years since quittin.2    Smokeless tobacco: Never Used   Substance Use Topics    Alcohol use: No     Alcohol/week: 0.0 standard drinks         Current Outpatient Medications:     rOPINIRole (REQUIP) 0.5 mg tablet, Take 2 Tabs by mouth nightly. Take 1- 2 tablets daily in the evening, Disp: 180 Tab, Rfl: 5    pantoprazole (PROTONIX) 40 mg tablet, TAKE 1 TABLET BY MOUTH EVERY DAY, Disp: 30 Tab, Rfl: 5    atorvastatin (LIPITOR) 80 mg tablet, Take 1 Tab by mouth daily. , Disp: 90 Tab, Rfl: 3    isosorbide mononitrate ER (IMDUR) 30 mg tablet, TAKE 1/2 TABLET BY MOUTH EVERY DAY, Disp: 45 Tab, Rfl: 1    clopidogrel (PLAVIX) 75 mg tab, TAKE 1 TABLET BY MOUTH EVERY DAY, Disp: 90 Tab, Rfl: 1    metoprolol succinate (TOPROL-XL) 25 mg XL tablet, TAKE 1 TABLET BY MOUTH EVERY DAY, Disp: 90 Tab, Rfl: 1    citalopram (CELEXA) 40 mg tablet, TAKE 1 TABLET BY MOUTH EVERY DAY, Disp: 90 Tab, Rfl: 3    telmisartan (MICARDIS) 40 mg tablet, Take 1 Tab by mouth daily. , Disp: 90 Tab, Rfl: 3    ketoconazole (NIZORAL) 2 % shampoo, Apply  to affected area., Disp: , Rfl:     baclofen (LIORESAL) 10 mg tablet, Take  by mouth three (3) times daily. , Disp: , Rfl:     nitroglycerin (NITROSTAT) 0.4 mg SL tablet, TAKE 1 TABLET BY SUBLINGUAL ROUTE EVERY 5 MINUTES AS NEEDED FOR CHEST PAIN., Disp: 1 Bottle, Rfl: 0    albuterol (PROVENTIL HFA, VENTOLIN HFA, PROAIR HFA) 90 mcg/actuation inhaler, Take 1 Puff by inhalation every six (6) hours as needed for Wheezing., Disp: 1 Inhaler, Rfl: 0    VITAMIN D3 2,000 unit cap capsule, Take 2,000 Units by mouth daily. , Disp: , Rfl: 2    OXYGEN-AIR DELIVERY SYSTEMS (HORIZON NASAL CPAP SYSTEM), by Does Not Apply route., Disp: , Rfl:     amLODIPine (NORVASC) 5 mg tablet, Take 5 mg by mouth daily. , Disp: , Rfl:     GLUCOSAMINE HCL/CHONDR PETERSEN A NA (GLUCOSAMINE-CHONDROITIN) 750-600 mg Tab, Take 1 Tab by mouth daily. Brand: Move Free, Disp: , Rfl:     aspirin delayed-release 81 mg tablet, Take 81 mg by mouth daily. , Disp: , Rfl:     allopurinol (ZYLOPRIM) 100 mg tablet, Take 100 mg by mouth every morning., Disp: , Rfl:     MULTIVITS W-FE,OTHER MIN (CENTRUM PO), Take 1 Tab by mouth daily. , Disp: , Rfl:         Allergies   Allergen Reactions    Penicillins Hives     Pt states he has taken Keflex before without problems    Bactrim [Sulfamethoxazole-Trimethoprim] Hives    Codeine Itching    Lisinopril (Bulk) Cough    Neurontin [Gabapentin] Other (comments)     drowsy    Zostavax [Zoster Vaccine Live (Pf)] Rash     See 9/10/13 visit      MRI Results (most recent):  Results from [place] encounter on 01/08/20   MRI BRAIN WO CONT    Narrative INDICATION:  Visual disturbance     COMPARISON:  CT head 1/8/2020    TECHNIQUE:  MR imaging of the brain was performed with sagittal T1, axial T1,  T2, FLAIR, GRE, DWI/ADC;     FINDINGS:      The ventricles are midline without hydrocephalus. There is no acute intra or extra-axial fluid collection. Mild periventricular  and focal left frontal subcortical increased T2/flair signal abnormalities are  nonspecific but may represent changes of chronic small vessel ischemic disease. No evidence for acute infarction. The major intracranial vascular flow-voids are patent. No abnormal signal in the mastoid air cells or visualized paranasal sinuses. Visualized soft tissues unremarkable.       Impression IMPRESSION:    No evidence for acute infarction    Mild periventricular and focal left frontal subcortical increased T2/flair  signal abnormalities are nonspecific but may represent changes of chronic small  vessel ischemic disease. Results from Hospital Encounter encounter on 01/08/20   MRI BRAIN WO CONT    Narrative INDICATION:  Visual disturbance     COMPARISON:  CT head 1/8/2020    TECHNIQUE:  MR imaging of the brain was performed with sagittal T1, axial T1,  T2, FLAIR, GRE, DWI/ADC;     FINDINGS:      The ventricles are midline without hydrocephalus. There is no acute intra or extra-axial fluid collection. Mild periventricular  and focal left frontal subcortical increased T2/flair signal abnormalities are  nonspecific but may represent changes of chronic small vessel ischemic disease. No evidence for acute infarction. The major intracranial vascular flow-voids are patent. No abnormal signal in the mastoid air cells or visualized paranasal sinuses. Visualized soft tissues unremarkable. Impression IMPRESSION:    No evidence for acute infarction    Mild periventricular and focal left frontal subcortical increased T2/flair  signal abnormalities are nonspecific but may represent changes of chronic small  vessel ischemic disease. Review of Systems:  A comprehensive review of systems was negative except for: Constitutional: positive for fatigue and malaise  Ears, nose, mouth, throat, and face: positive for hearing loss and tinnitus  Musculoskeletal: positive for myalgias, arthralgias, stiff joints and back pain  Neurological: positive for paresthesia and Visual loss and endarterectomy  Behvioral/Psych: positive for depression   Vitals:    03/03/20 1024   BP: 108/52   Pulse: 72   Resp: 12   SpO2: 98%   Weight: 237 lb (107.5 kg)   Height: 6' (1.829 m)     Objective:     I      NEUROLOGICAL EXAM:    Appearance: The patient is well developed, well nourished, provides a coherent history and is in no acute distress.    Mental Status: Oriented to time, place and person, and the president, cognitive function is normal and speech is fluent and no aphasia or dysarthria. Mood and affect appropriate. Cranial Nerves:   Intact visual fields. Fundi are benign, disc are flat, no lesions seen on funduscopy. CLAUDIA, EOM's full, no nystagmus, no ptosis. Facial sensation is normal. Corneal reflexes are not tested. Facial movement is symmetric. Hearing is normal bilaterally. Palate is midline with normal sternocleidomastoid and trapezius muscles are normal. Tongue is midline. Neck without meningismus or bruits  Temporal arteries are not tender or enlarged  TMJ areas are not tender on palpation   Motor:  5/5 strength in upper and lower proximal and distal muscles. Normal bulk and tone. No fasciculations. Rapid alternating movement is symmetric and intact bilaterally   Reflexes:   Deep tendon reflexes 1+/4 and symmetrical.  No babinski or clonus present   Sensory:   Abnormal to touch, pinprick and vibration and temperature in both feet to midcalf level. DSS is intact   Gait:  Normal gait for patient's age, though patient does move a little slowly due to his arthritis. Tremor:   No tremor noted. Cerebellar:   Mildly abnormal cerebellar signs on Romberg and tandem testing and finger-nose-finger exam.   Neurovascular:  Normal heart sounds and regular rhythm, peripheral pulses decreased, and no carotid bruits. Assessment:       ICD-10-CM ICD-9-CM    1. Bilateral carotid artery stenosis I65.23 433.10 rOPINIRole (REQUIP) 0.5 mg tablet     433.30 FARNCIS COMPREHENSIVE PLUS PANEL      GM1 IGG AUTOANTIBODY      HEMOGLOBIN A1C WITH EAG      IMMUNOELECTROPHORESIS (IMMUNOFIX.)      VITAMIN B12 & FOLATE      GLIADIN ABS, IGA AND IGG      MISC. LAB TEST   2. [facility] I63.9 434.91 rOPINIRole (REQUIP) 0.5 mg tablet      FRANCIS COMPREHENSIVE PLUS PANEL      GM1 IGG AUTOANTIBODY      HEMOGLOBIN A1C WITH EAG      IMMUNOELECTROPHORESIS (IMMUNOFIX.)      VITAMIN B12 & FOLATE      GLIADIN ABS, IGA AND IGG      MISC. LAB TEST   3.  Benign essential tremor G25.0 333.1 rOPINIRole (REQUIP) 0.5 mg tablet      FRANCIS COMPREHENSIVE PLUS PANEL      GM1 IGG AUTOANTIBODY      HEMOGLOBIN A1C WITH EAG      IMMUNOELECTROPHORESIS (IMMUNOFIX.)      VITAMIN B12 & FOLATE      GLIADIN ABS, IGA AND IGG      MISC. LAB TEST   4. RLS (restless legs syndrome) G25.81 333.94 rOPINIRole (REQUIP) 0.5 mg tablet      RFANCIS COMPREHENSIVE PLUS PANEL      GM1 IGG AUTOANTIBODY      HEMOGLOBIN A1C WITH EAG      IMMUNOELECTROPHORESIS (IMMUNOFIX.)      VITAMIN B12 & FOLATE      GLIADIN ABS, IGA AND IGG      MISC. LAB TEST   5. Diabetic peripheral neuropathy associated with type 2 diabetes mellitus (HCC) E11.42 250.60 rOPINIRole (REQUIP) 0.5 mg tablet     357.2 FRANCIS COMPREHENSIVE PLUS PANEL      GM1 IGG AUTOANTIBODY      HEMOGLOBIN A1C WITH EAG      IMMUNOELECTROPHORESIS (IMMUNOFIX.)      VITAMIN B12 & FOLATE      GLIADIN ABS, IGA AND IGG      MISC. LAB TEST   6. B12 deficiency E53.8 266.2 rOPINIRole (REQUIP) 0.5 mg tablet      FRANCIS COMPREHENSIVE PLUS PANEL      GM1 IGG AUTOANTIBODY      HEMOGLOBIN A1C WITH EAG      IMMUNOELECTROPHORESIS (IMMUNOFIX.)      VITAMIN B12 & FOLATE      GLIADIN ABS, IGA AND IGG      MISC. LAB TEST   7. Idiopathic small and large fiber sensory neuropathy G60.8 356.4 rOPINIRole (REQUIP) 0.5 mg tablet      FRANCIS COMPREHENSIVE PLUS PANEL      GM1 IGG AUTOANTIBODY      HEMOGLOBIN A1C WITH EAG      IMMUNOELECTROPHORESIS (IMMUNOFIX.)      VITAMIN B12 & FOLATE      GLIADIN ABS, IGA AND IGG      MISC. LAB TEST   8. History of left-sided carotid endarterectomy Z98.890 V45.89 rOPINIRole (REQUIP) 0.5 mg tablet      FRANCIS COMPREHENSIVE PLUS PANEL      GM1 IGG AUTOANTIBODY      HEMOGLOBIN A1C WITH EAG      IMMUNOELECTROPHORESIS (IMMUNOFIX.)      VITAMIN B12 & FOLATE      GLIADIN ABS, IGA AND IGG      MISC. LAB TEST   9.  Obstructive sleep apnea G47.33 327.23 rOPINIRole (REQUIP) 0.5 mg tablet      FRANCIS COMPREHENSIVE PLUS PANEL      GM1 IGG AUTOANTIBODY      HEMOGLOBIN A1C WITH EAG IMMUNOELECTROPHORESIS (IMMUNOFIX.)      VITAMIN B12 & FOLATE      GLIADIN ABS, IGA AND IGG      MISC. LAB TEST     Active Problems:    * No active hospital problems. *      Plan:     Patient with worsening neuropathy, we will recheck his metabolic parameters, but most likely is probably related to his prediabetic state. We will recheck all his metabolic parameters looking for any other cause, he is to continue his vitamins and vitamin D related basis. With his restless leg syndrome he seems stable on his current medications, we reviewed those with the patient today. Patient with recent left carotid endarterectomy, he looks well postop, no recurrent symptoms, and continues his medication of aspirin and Plavix yesterday as antiplatelet therapy. Patient did not appear to have any other active neurologic problem at this time. We will check him again in 6 months time or earlier if need be. 45-minute spent with patient, going over his records, reviewing his inpatient records, reviewing his MRI scan, reviewing his CTA, reviewing his duplex scan, I do agree that he has symptomatic left carotid stenosis as now corrected with endarterectomy and is doing well antiplatelet therapy without recurrent neurologic symptoms except for his neuropathy worse. Complete metabolic evaluation ordered. He will call back if any problem, we will see him again in 6 months time or earlier as needed. Medications renewed for the patient. Signed By: Lynette Fletcher MD     March 3, 2020       CC: Derral Bence, MD  FAX: [phone]    This note will not be viewable in [address]

## 2020-03-04 DIAGNOSIS — G47.33 OBSTRUCTIVE SLEEP APNEA: ICD-10-CM

## 2020-03-04 DIAGNOSIS — G25.0 BENIGN ESSENTIAL TREMOR: ICD-10-CM

## 2020-03-04 DIAGNOSIS — E11.42 DIABETIC PERIPHERAL NEUROPATHY ASSOCIATED WITH TYPE 2 DIABETES MELLITUS (HCC): ICD-10-CM

## 2020-03-04 DIAGNOSIS — I63.9 ACUTE CVA (CEREBROVASCULAR ACCIDENT) (HCC): ICD-10-CM

## 2020-03-04 DIAGNOSIS — E53.8 B12 DEFICIENCY: ICD-10-CM

## 2020-03-04 DIAGNOSIS — G25.81 RLS (RESTLESS LEGS SYNDROME): ICD-10-CM

## 2020-03-04 DIAGNOSIS — G60.8 IDIOPATHIC SMALL AND LARGE FIBER SENSORY NEUROPATHY: ICD-10-CM

## 2020-03-04 DIAGNOSIS — Z98.890 HISTORY OF LEFT-SIDED CAROTID ENDARTERECTOMY: ICD-10-CM

## 2020-03-04 DIAGNOSIS — I65.23 BILATERAL CAROTID ARTERY STENOSIS: ICD-10-CM

## 2020-03-04 LAB
ALBUMIN SERPL-MCNC: 4.1 G/DL (ref 3.7–4.7)
ALBUMIN/GLOB SERPL: 1.5 {RATIO} (ref 1.2–2.2)
ALP SERPL-CCNC: 110 IU/L (ref 39–117)
ALT SERPL-CCNC: 33 IU/L (ref 0–44)
AST SERPL-CCNC: 27 IU/L (ref 0–40)
BILIRUB SERPL-MCNC: 0.3 MG/DL (ref 0–1.2)
BUN SERPL-MCNC: 33 MG/DL (ref 8–27)
BUN/CREAT SERPL: 13 (ref 10–24)
CALCIUM SERPL-MCNC: 9.4 MG/DL (ref 8.6–10.2)
CHLORIDE SERPL-SCNC: 113 MMOL/L (ref 96–106)
CHOLEST SERPL-MCNC: 112 MG/DL (ref 100–199)
CO2 SERPL-SCNC: 21 MMOL/L (ref 20–29)
CREAT SERPL-MCNC: 2.5 MG/DL (ref 0.76–1.27)
GLOBULIN SER CALC-MCNC: 2.7 G/DL (ref 1.5–4.5)
GLUCOSE SERPL-MCNC: 95 MG/DL (ref 65–99)
HDLC SERPL-MCNC: 36 MG/DL
LDLC SERPL CALC-MCNC: 49 MG/DL (ref 0–99)
POTASSIUM SERPL-SCNC: 4.9 MMOL/L (ref 3.5–5.2)
PROT SERPL-MCNC: 6.8 G/DL (ref 6–8.5)
SODIUM SERPL-SCNC: 148 MMOL/L (ref 134–144)
TRIGL SERPL-MCNC: 137 MG/DL (ref 0–149)
VLDLC SERPL CALC-MCNC: 27 MG/DL (ref 5–40)

## 2020-03-04 RX ORDER — NITROGLYCERIN 0.4 MG/1
TABLET SUBLINGUAL
Qty: 1 BOTTLE | Refills: 0 | Status: SHIPPED | OUTPATIENT
Start: 2020-03-04

## 2020-03-04 RX ORDER — NITROGLYCERIN 0.4 MG/1
TABLET SUBLINGUAL
Qty: 1 BOTTLE | Refills: 0 | Status: SHIPPED | OUTPATIENT
Start: 2020-03-04 | End: 2020-10-13

## 2020-03-04 RX ORDER — ROPINIROLE 0.5 MG/1
1 TABLET, FILM COATED ORAL
Qty: 180 TAB | Refills: 5 | Status: SHIPPED | OUTPATIENT
Start: 2020-03-04 | End: 2021-03-15 | Stop reason: DRUGHIGH

## 2020-03-04 NOTE — TELEPHONE ENCOUNTER
PCP: John Flores MD    Last appt: 2/19/2020  Future Appointments   Date Time Provider Seth Skelton   3/19/2020  1:30 PM John Flores MD [address]   8/20/2020 11:00 AM John Flores MD [address]   9/1/2020 10:30 AM Raul Lomas  [facility]   9/29/2020  9:30 AM Alondra Klein  [facility]       Requested Prescriptions     Pending Prescriptions Disp Refills    nitroglycerin (NITROSTAT) 0.4 mg SL tablet 1 Bottle 0     Sig: TAKE 1 TABLET BY SUBLINGUAL ROUTE EVERY 5 MINUTES AS NEEDED FOR CHEST PAIN.

## 2020-03-04 NOTE — TELEPHONE ENCOUNTER
PCP: Patricia Mccullough MD    Last appt: 2/19/2020  Future Appointments   Date Time Provider Seth Skelton   3/19/2020  1:30 PM Patricia Mccullough MD [address]   8/20/2020 11:00 AM MD [address]   9/1/2020 10:30 AM TODD Díaz/ Maddy Calixto   9/29/2020  9:30 AM Hortencia Duron  [facility]       Requested Prescriptions     Pending Prescriptions Disp Refills    nitroglycerin (NITROSTAT) 0.4 mg SL tablet 1 Bottle 0     Sig: TAKE 1 TABLET BY SUBLINGUAL ROUTE EVERY 5 MINUTES AS NEEDED FOR CHEST PAIN.

## 2020-03-12 LAB
CENTROMERE B AB SER-ACNC: <0.2 AI (ref 0–0.9)
CHROMATIN AB SERPL-ACNC: <0.2 AI (ref 0–0.9)
DSDNA AB SER-ACNC: <1 IU/ML (ref 0–9)
ENA JO1 AB SER-ACNC: <0.2 AI (ref 0–0.9)
ENA RNP AB SER-ACNC: <0.2 AI (ref 0–0.9)
ENA SCL70 AB SER-ACNC: <0.2 AI (ref 0–0.9)
ENA SM AB SER-ACNC: <0.2 AI (ref 0–0.9)
ENA SM+RNP AB SER-ACNC: <0.2 AI (ref 0–0.9)
ENA SS-A AB SER-ACNC: <0.2 AI (ref 0–0.9)
ENA SS-B AB SER-ACNC: <0.2 AI (ref 0–0.9)
EST. AVERAGE GLUCOSE BLD GHB EST-MCNC: 126 MG/DL
FOLATE SERPL-MCNC: 15.6 NG/ML
GLIADIN PEPTIDE IGA SER-ACNC: 5 UNITS (ref 0–19)
GLIADIN PEPTIDE IGG SER-ACNC: 2 UNITS (ref 0–19)
GM1 GANGL IGG TITR SER IA: 30 % (ref 0–30)
HBA1C MFR BLD: 6 % (ref 4.8–5.6)
IGA SERPL-MCNC: 363 MG/DL (ref 61–437)
IGG SERPL-MCNC: 978 MG/DL (ref 700–1600)
IGM SERPL-MCNC: 70 MG/DL (ref 15–143)
INTERPRETATION,OLIG1: NORMAL
PROT PATTERN SERPL IFE-IMP: NORMAL
RIBOSOMAL P AB SER-ACNC: <0.2 AI (ref 0–0.9)
SEE BELOW:, 164879: NORMAL
VIT B12 SERPL-MCNC: 560 PG/ML (ref 232–1245)

## 2020-03-16 LAB
Lab: NORMAL
REFERRAL TEST CODE OR MNEMONIC: NORMAL

## 2020-04-02 RX ORDER — METOPROLOL SUCCINATE 25 MG/1
TABLET, EXTENDED RELEASE ORAL
Qty: 90 TAB | Refills: 1 | Status: SHIPPED | OUTPATIENT
Start: 2020-04-02 | End: 2020-10-07

## 2020-04-02 RX ORDER — CLOPIDOGREL BISULFATE 75 MG/1
TABLET ORAL
Qty: 90 TAB | Refills: 1 | Status: SHIPPED | OUTPATIENT
Start: 2020-04-02 | End: 2020-10-07

## 2020-05-13 RX ORDER — ISOSORBIDE MONONITRATE 30 MG/1
TABLET, EXTENDED RELEASE ORAL
Qty: 45 TAB | Refills: 1 | Status: SHIPPED | OUTPATIENT
Start: 2020-05-13 | End: 2020-10-09 | Stop reason: SDUPTHER

## 2020-08-12 RX ORDER — TELMISARTAN 40 MG/1
TABLET ORAL
Qty: 90 TAB | Refills: 3 | Status: SHIPPED | OUTPATIENT
Start: 2020-08-12 | End: 2021-03-15

## 2020-08-19 RX ORDER — PANTOPRAZOLE SODIUM 40 MG/1
TABLET, DELAYED RELEASE ORAL
Qty: 30 TAB | Refills: 5 | Status: SHIPPED | OUTPATIENT
Start: 2020-08-19 | End: 2020-12-17

## 2020-08-20 ENCOUNTER — VIRTUAL VISIT (OUTPATIENT)
Dept: INTERNAL MEDICINE CLINIC | Age: 75
End: 2020-08-20
Payer: MEDICARE

## 2020-08-20 ENCOUNTER — TELEPHONE (OUTPATIENT)
Dept: INTERNAL MEDICINE CLINIC | Age: 75
End: 2020-08-20

## 2020-08-20 DIAGNOSIS — I25.10 CORONARY ARTERY DISEASE DUE TO LIPID RICH PLAQUE: ICD-10-CM

## 2020-08-20 DIAGNOSIS — I10 ESSENTIAL HYPERTENSION: Primary | ICD-10-CM

## 2020-08-20 DIAGNOSIS — D63.1 ANEMIA DUE TO CHRONIC KIDNEY DISEASE, UNSPECIFIED CKD STAGE: ICD-10-CM

## 2020-08-20 DIAGNOSIS — N18.9 ANEMIA DUE TO CHRONIC KIDNEY DISEASE, UNSPECIFIED CKD STAGE: ICD-10-CM

## 2020-08-20 DIAGNOSIS — E78.00 PURE HYPERCHOLESTEROLEMIA: ICD-10-CM

## 2020-08-20 DIAGNOSIS — I25.10 CORONARY ARTERY DISEASE INVOLVING NATIVE CORONARY ARTERY OF NATIVE HEART WITHOUT ANGINA PECTORIS: ICD-10-CM

## 2020-08-20 DIAGNOSIS — R73.09 ELEVATED GLUCOSE: ICD-10-CM

## 2020-08-20 DIAGNOSIS — I25.83 CORONARY ARTERY DISEASE DUE TO LIPID RICH PLAQUE: ICD-10-CM

## 2020-08-20 DIAGNOSIS — N18.30 CHRONIC KIDNEY DISEASE, STAGE III (MODERATE) (HCC): ICD-10-CM

## 2020-08-20 PROCEDURE — G0463 HOSPITAL OUTPT CLINIC VISIT: HCPCS | Performed by: FAMILY MEDICINE

## 2020-08-20 PROCEDURE — G8756 NO BP MEASURE DOC: HCPCS | Performed by: FAMILY MEDICINE

## 2020-08-20 PROCEDURE — G8427 DOCREV CUR MEDS BY ELIG CLIN: HCPCS | Performed by: FAMILY MEDICINE

## 2020-08-20 PROCEDURE — G8417 CALC BMI ABV UP PARAM F/U: HCPCS | Performed by: FAMILY MEDICINE

## 2020-08-20 PROCEDURE — G9717 DOC PT DX DEP/BP F/U NT REQ: HCPCS | Performed by: FAMILY MEDICINE

## 2020-08-20 PROCEDURE — 99214 OFFICE O/P EST MOD 30 MIN: CPT | Performed by: FAMILY MEDICINE

## 2020-08-20 PROCEDURE — 1100F PTFALLS ASSESS-DOCD GE2>/YR: CPT | Performed by: FAMILY MEDICINE

## 2020-08-20 PROCEDURE — 3288F FALL RISK ASSESSMENT DOCD: CPT | Performed by: FAMILY MEDICINE

## 2020-08-20 PROCEDURE — G8536 NO DOC ELDER MAL SCRN: HCPCS | Performed by: FAMILY MEDICINE

## 2020-08-20 NOTE — PROGRESS NOTES
Ryan Jackson is a 66 y.o. male who presents for follow up. Had CVA, bilateral carotid stenosis. Had left carotid endarterectomy on January 15. Dr. Chantel Corrales. Recent follow up. Followed by Dr. Carolann Phelps,neurology. appt on Sept 1 with Jay Guillen NP. Reports was told to stop plavix.       Taking lipitor 80mg daily daily, increased in February 2020  No myalgias.      Sees Dr. Roberta Spear, cardiology. follow up in September 29. Has CAD and carotid disease.  Not checking BP routinely, this morning 155/73. Recheck 141/71. HR 61. Taking  micardis 40mg once a day, metoprolol XL 25mg daily, amlodipine 5mg once a day. and chlorthalidone 25mg daily.  No dizziness.       Sees Dr. Padmaja Nolasco, nephrology. Has a follow up scheduled. Creatinine 2.26. Avoids NSAIDS. Drinking some water. Mostly caffeinated sweet tea. Has MELODY on CPAP. Prediabetic. HbA1C 6%. Active in yard, gardening. Feels ok with exertion. Daughter reports SMITH. This is an established visit conducted via telemedicine with video. The patient has been instructed that this meets HIPAA criteria and acknowledges and agrees to this method of visitation. Pursuant to the emergency declaration under the [facility] waiver authority and the [employer] Act, this Virtual Visit was conducted, with patient's consent, to reduce the patient's risk of exposure to COVID-19 and provide continuity of care for an established patient. Services were provided through a video synchronous discussion virtually to substitute for in-person clinic visit.         Past Medical History:   Diagnosis Date    Abnormal stress echo 5/12/2015    Anemia NEC 03/2013    Last 2-3 months; finished taking iron supplement    Anxiety     CAD (coronary artery disease) 2009    cath [facility]) revealed 20%RCA and 50%2nd diagonal    Calculus of kidney     Chronic kidney disease     sees nephrologist every 6 months    Chronic kidney disease, stage III (moderate) ([facility] [address] 10/11/2009    30% kidney function    Diabetes ([facility] [address]     Pre-diabetic    DJD (degenerative joint disease)     GERD (gastroesophageal reflux disease) 10/11/2009    controlled with medication    Gout     Hypertension     Liver disease     fatty liver    Obesity     Obstructive sleep apnea (adult) (pediatric) 10/11/2009    nasal pillows; pressure set at 10-11 - uses cpap    Peripheral neuropathy 10/11/2009    bilateral feet (numbness)    Prediabetes     RLS (restless legs syndrome) 10/11/2009    S/P coronary artery stent placement 05/12/2015    5/11/15 PCI./MALI to LCx, 8/2019 PCI/MALI Prox LAD (RCA 40-50% lesions)       Family History   Problem Relation Age of Onset    Heart Disease Father     Hypertension Father     Other Father         abdominal aneurysm     Dementia Mother     Alzheimer Mother     Heart Disease Brother     Heart Attack Brother     Heart Disease Maternal Grandmother     Heart Disease Paternal Grandmother     Heart Attack Paternal Grandmother     Heart Disease Paternal Grandfather     Heart Attack Paternal Grandfather     Elevated Lipids Son     Elevated Lipids Daughter     Cancer Neg Hx     Diabetes Neg Hx     Stroke Neg Hx        Social History     Socioeconomic History    Marital status:      Spouse name: Renetta Chung Number of children: 2    Years of education: graduated then 4 year apprenticship    Highest education level: Associate degree: academic program   Occupational History    Not on file   Social Needs    Financial resource strain: Not hard at all   [place] insecurity     Worry: Never true     Inability: Never true    Transportation needs     Medical: No     Non-medical: No   Tobacco Use    Smoking status: Former Smoker     Packs/day: 1.00     Years: 10.00     Pack years: 10.00     Types: Cigarettes     Last attempt to quit: 1/1/1968     Years since quittin.6    Smokeless tobacco: Never Used   Substance and Sexual Activity    Alcohol use: No     Alcohol/week: 0.0 standard drinks    Drug use: No    Sexual activity: Yes     Partners: Female     Birth control/protection: None   Lifestyle    Physical activity     Days per week: 0 days     Minutes per session: 0 min    Stress: Very much   Relationships    Social connections     Talks on phone: More than three times a week     Gets together: More than three times a week     Attends Rastafari service: Not on file     Active member of club or organization: No     Attends meetings of clubs or organizations: Never     Relationship status:     Intimate partner violence     Fear of current or ex partner: No     Emotionally abused: No     Physically abused: No     Forced sexual activity: No   Other Topics Concern     Service Not Asked    Blood Transfusions Not Asked    Caffeine Concern Not Asked    Occupational Exposure Not Asked    Hobby Hazards Not Asked    Sleep Concern Not Asked    Stress Concern Not Asked    Weight Concern Not Asked    Special Diet Not Asked    Back Care Not Asked    Exercise Not Asked    Bike Helmet Not Asked    Seat Belt Not Asked    Self-Exams Not Asked   Social History Narrative    Not on file       Current Outpatient Medications on File Prior to Visit   Medication Sig Dispense Refill    pantoprazole (PROTONIX) 40 mg tablet TAKE 1 TABLET BY MOUTH EVERY DAY 30 Tab 5    telmisartan (MICARDIS) 40 mg tablet TAKE 1 TABLET BY MOUTH EVERY DAY (REPLACES IRBESARTAN) 90 Tab 3    isosorbide mononitrate ER (IMDUR) 30 mg tablet TAKE 1/2 TABLET BY MOUTH EVERY DAY 45 Tab 1    metoprolol succinate (TOPROL-XL) 25 mg XL tablet TAKE 1 TABLET BY MOUTH EVERY DAY 90 Tab 1    clopidogreL (PLAVIX) 75 mg tab TAKE 1 TABLET BY MOUTH EVERY DAY 90 Tab 1    rOPINIRole (REQUIP) 0.5 mg tablet Take 2 Tabs by mouth nightly.  Take 1- 2 tablets daily in the evening 180 Tab 5    atorvastatin (LIPITOR) 80 mg tablet Take 1 Tab by mouth daily. 90 Tab 3    citalopram (CELEXA) 40 mg tablet TAKE 1 TABLET BY MOUTH EVERY DAY 90 Tab 3    ketoconazole (NIZORAL) 2 % shampoo Apply  to affected area.  baclofen (LIORESAL) 10 mg tablet Take  by mouth three (3) times daily.  albuterol (PROVENTIL HFA, VENTOLIN HFA, PROAIR HFA) 90 mcg/actuation inhaler Take 1 Puff by inhalation every six (6) hours as needed for Wheezing. 1 Inhaler 0    OXYGEN-AIR DELIVERY SYSTEMS (HORIZON NASAL CPAP SYSTEM) by Does Not Apply route.  amLODIPine (NORVASC) 5 mg tablet Take 5 mg by mouth daily.  GLUCOSAMINE HCL/CHONDR PETERSEN A NA (GLUCOSAMINE-CHONDROITIN) 750-600 mg Tab Take 1 Tab by mouth daily. Brand: Move Free      aspirin delayed-release 81 mg tablet Take 81 mg by mouth daily.  allopurinol (ZYLOPRIM) 100 mg tablet Take 100 mg by mouth every morning.  MULTIVITS W-FE,OTHER MIN (CENTRUM PO) Take 1 Tab by mouth daily.  nitroglycerin (NITROSTAT) 0.4 mg SL tablet TAKE 1 TABLET BY SUBLINGUAL ROUTE EVERY 5 MINUTES AS NEEDED FOR CHEST PAIN. 1 Bottle 0    nitroglycerin (NITROSTAT) 0.4 mg SL tablet TAKE 1 TABLET BY SUBLINGUAL ROUTE EVERY 5 MINUTES AS NEEDED FOR CHEST PAIN. 1 Bottle 0    VITAMIN D3 2,000 unit cap capsule Take 2,000 Units by mouth daily. 2     No current facility-administered medications on file prior to visit. Review of Systems  Pertinent items are noted in HPI. Objective:     Gen: well appearing male  HEENT: normal conjunctiva, no audible congestion, patient does not see oral erythema, has MMM  Neck: patient does not feel enlarged or tender LAD or masses  Resp: normal respiratory effort, no audible wheezing. CV: patient does not feel palpitations or heart irregularity  Abd: patient does not feel abdominal tenderness or mass, patient does not notice distension  Extrem: patient does not see swelling in ankles or joints.    Neuro: Alert and oriented, able to answer questions without difficulty      Assessment/Plan:       ICD-10-CM ICD-9-CM    1. Essential hypertension  M30 426.8 METABOLIC PANEL, COMPREHENSIVE      CBC W/O DIFF   2. Coronary artery disease involving native coronary artery of native heart without angina pectoris  I25.10 414.01    3. Pure hypercholesterolemia  E78.00 272.0 LIPID PANEL   4. Chronic kidney disease, stage III (moderate) (HCC)  N18.3 585.3    5. Coronary artery disease due to lipid rich plaque  I25.10 414.00     I25.83 414.3    6. Elevated glucose  R73.09 790.29 HEMOGLOBIN A1C W/O EAG   7. Anemia due to chronic kidney disease, unspecified CKD stage  N18.9 285.21     D63.1         This was a telemedicine visit with video. Akbar Steele MD    Follow-up and Dispositions    · Return in about 6 months (around 2/20/2021) for follow up on medication. Rony Kuo

## 2020-08-20 NOTE — TELEPHONE ENCOUNTER
----- Message from Carrie Cornelius MD sent at 8/20/2020 10:55 AM EDT -----  Please request office notes from Dr. Morillo Victor Hugo, nephrology.

## 2020-08-21 DIAGNOSIS — E78.00 PURE HYPERCHOLESTEROLEMIA: ICD-10-CM

## 2020-08-21 DIAGNOSIS — I10 ESSENTIAL HYPERTENSION: ICD-10-CM

## 2020-08-21 DIAGNOSIS — R73.09 ELEVATED GLUCOSE: ICD-10-CM

## 2020-09-14 DIAGNOSIS — F41.8 ANXIETY ASSOCIATED WITH DEPRESSION: ICD-10-CM

## 2020-09-14 RX ORDER — CITALOPRAM 40 MG/1
TABLET, FILM COATED ORAL
Qty: 90 TAB | Refills: 3 | Status: SHIPPED | OUTPATIENT
Start: 2020-09-14 | End: 2021-03-15

## 2020-10-07 ENCOUNTER — HOSPITAL ENCOUNTER (OUTPATIENT)
Dept: LAB | Age: 75
Discharge: HOME | End: 2020-10-07
Payer: MEDICARE

## 2020-10-07 ENCOUNTER — CLINICAL SUPPORT (OUTPATIENT)
Dept: INTERNAL MEDICINE CLINIC | Age: 75
End: 2020-10-07
Payer: MEDICARE

## 2020-10-07 DIAGNOSIS — Z23 NEEDS FLU SHOT: Primary | ICD-10-CM

## 2020-10-07 PROCEDURE — 83036 HEMOGLOBIN GLYCOSYLATED A1C: CPT

## 2020-10-07 PROCEDURE — 36415 COLL VENOUS BLD VENIPUNCTURE: CPT

## 2020-10-07 PROCEDURE — 90694 VACC AIIV4 NO PRSRV 0.5ML IM: CPT

## 2020-10-07 PROCEDURE — 80061 LIPID PANEL: CPT

## 2020-10-07 PROCEDURE — 85027 COMPLETE CBC AUTOMATED: CPT

## 2020-10-07 PROCEDURE — G0008 ADMIN INFLUENZA VIRUS VAC: HCPCS | Performed by: FAMILY MEDICINE

## 2020-10-07 PROCEDURE — 80053 COMPREHEN METABOLIC PANEL: CPT

## 2020-10-07 RX ORDER — METOPROLOL SUCCINATE 25 MG/1
TABLET, EXTENDED RELEASE ORAL
Qty: 90 TAB | Refills: 1 | Status: SHIPPED | OUTPATIENT
Start: 2020-10-07 | End: 2021-03-15

## 2020-10-07 RX ORDER — CLOPIDOGREL BISULFATE 75 MG/1
TABLET ORAL
Qty: 90 TAB | Refills: 1 | Status: SHIPPED | OUTPATIENT
Start: 2020-10-07 | End: 2020-11-23 | Stop reason: ALTCHOICE

## 2020-10-07 NOTE — PROGRESS NOTES
Chief Complaint   Patient presents with    Immunization/Injection     65+Flu shot     After obtaining consent, and per verbal order from Dr. Rc Solis, patient received influenza vaccine given by Sky Mccauley LPN in Right Deltoid. Influenza Vaccine 0.5 mL IM now. Patient was observed for 10 minutes post injection. Patient tolerated injection well.

## 2020-10-08 LAB
ALBUMIN SERPL-MCNC: 4.1 G/DL (ref 3.7–4.7)
ALBUMIN/GLOB SERPL: 1.6 {RATIO} (ref 1.2–2.2)
ALP SERPL-CCNC: 124 IU/L (ref 39–117)
ALT SERPL-CCNC: 17 IU/L (ref 0–44)
AST SERPL-CCNC: 16 IU/L (ref 0–40)
BILIRUB SERPL-MCNC: 0.7 MG/DL (ref 0–1.2)
BUN SERPL-MCNC: 24 MG/DL (ref 8–27)
BUN/CREAT SERPL: 11 (ref 10–24)
CALCIUM SERPL-MCNC: 9.6 MG/DL (ref 8.6–10.2)
CHLORIDE SERPL-SCNC: 106 MMOL/L (ref 96–106)
CHOLEST SERPL-MCNC: 129 MG/DL (ref 100–199)
CO2 SERPL-SCNC: 24 MMOL/L (ref 20–29)
CREAT SERPL-MCNC: 2.13 MG/DL (ref 0.76–1.27)
ERYTHROCYTE [DISTWIDTH] IN BLOOD BY AUTOMATED COUNT: 13.7 % (ref 11.6–15.4)
GLOBULIN SER CALC-MCNC: 2.6 G/DL (ref 1.5–4.5)
GLUCOSE SERPL-MCNC: 93 MG/DL (ref 65–99)
HBA1C MFR BLD: 5.8 % (ref 4.8–5.6)
HCT VFR BLD AUTO: 29.6 % (ref 37.5–51)
HDLC SERPL-MCNC: 42 MG/DL
HGB BLD-MCNC: 10.2 G/DL (ref 13–17.7)
LDLC SERPL CALC-MCNC: 68 MG/DL (ref 0–99)
MCH RBC QN AUTO: 31.1 PG (ref 26.6–33)
MCHC RBC AUTO-ENTMCNC: 34.5 G/DL (ref 31.5–35.7)
MCV RBC AUTO: 90 FL (ref 79–97)
PLATELET # BLD AUTO: 251 X10E3/UL (ref 150–450)
POTASSIUM SERPL-SCNC: 4.9 MMOL/L (ref 3.5–5.2)
PROT SERPL-MCNC: 6.7 G/DL (ref 6–8.5)
RBC # BLD AUTO: 3.28 X10E6/UL (ref 4.14–5.8)
SODIUM SERPL-SCNC: 145 MMOL/L (ref 134–144)
TRIGL SERPL-MCNC: 105 MG/DL (ref 0–149)
VLDLC SERPL CALC-MCNC: 19 MG/DL (ref 5–40)
WBC # BLD AUTO: 7.5 X10E3/UL (ref 3.4–10.8)

## 2020-10-12 NOTE — PROGRESS NOTES
[facility] @ [facility]      Subjective/HPI:    Janet Varghese is a 66 y.o. male is here for routine f/u. He reports that in the past few weeks he has been doing some clean up around the house, rented a dumpster and has filled it up. He had some SMITH with this, but as time has gone on he has felt better. He has been doing this after the loss of his wife in April. He denies CP with this exertion. The patient denies orthopnea, PND, LE edema, palpitations, syncope, presyncope or fatigue.       PCP Provider  Justine Pappas MD  Past Medical History:   Diagnosis Date    Abnormal stress echo 5/12/2015    Anemia NEC 03/2013    Last 2-3 months; finished taking iron supplement    Anxiety     CAD (coronary artery disease) 2009    cath [facility]) revealed 20%RCA and 50%2nd diagonal    Calculus of kidney     Chronic kidney disease     sees nephrologist every 6 months    Chronic kidney disease, stage III (moderate) 10/11/2009    30% kidney function    Diabetes ([facility] [address]     Pre-diabetic    DJD (degenerative joint disease)     GERD (gastroesophageal reflux disease) 10/11/2009    controlled with medication    Gout     Hypertension     Liver disease     fatty liver    Obesity     Obstructive sleep apnea (adult) (pediatric) 10/11/2009    nasal pillows; pressure set at 10-11 - uses cpap    Peripheral neuropathy 10/11/2009    bilateral feet (numbness)    Prediabetes     RLS (restless legs syndrome) 10/11/2009    S/P coronary artery stent placement 05/12/2015    5/11/15 PCI./MALI to LCx, 8/2019 PCI/MALI Prox LAD (RCA 40-50% lesions)      Past Surgical History:   Procedure Laterality Date   Marrosana Trejo BUNIONECTOMY  2019   Aron Trejo HEART CATHETERIZATION  2009, 7/17    x1 stent 1 x (x2 now 1/15/2020)    HX HERNIA REPAIR      [place]/umbilical    HX KNEE REPLACEMENT Left 7/23/11     LEFT TOTAL KNEE ARTHROPLASTY    HX ORTHOPAEDIC      left great toe pinning/plate    HX ORTHOPAEDIC      left shoulder manipulation    HX UROLOGICAL      basket extraction of kidney stones    WI COLONOSCOPY FLX DX W/COLLJ SPEC WHEN PFRMD  8/5/2010         WI COLSC FLX W/RMVL OF TUMOR POLYP LESION SNARE TQ  9/24/2014          Allergies   Allergen Reactions    Penicillins Hives     Pt states he has taken Keflex before without problems    Bactrim [Sulfamethoxazole-Trimethoprim] Hives    Codeine Itching    Lisinopril (Bulk) Cough    Neurontin [Gabapentin] Other (comments)     drowsy    Zostavax [Zoster Vaccine Live (Pf)] Rash     See 9/10/13 visit      Family History   Problem Relation Age of Onset    Heart Disease Father     Hypertension Father     Other Father         abdominal aneurysm     Dementia Mother     Alzheimer Mother     Heart Disease Brother     Heart Attack Brother     Heart Disease Maternal Grandmother     Heart Disease Paternal Grandmother     Heart Attack Paternal Grandmother     Heart Disease Paternal Grandfather     Heart Attack Paternal Grandfather     Elevated Lipids Son     Elevated Lipids Daughter     Cancer Neg Hx     Diabetes Neg Hx     Stroke Neg Hx       Current Outpatient Medications   Medication Sig    clopidogreL (PLAVIX) 75 mg tab TAKE 1 TABLET BY MOUTH EVERY DAY    metoprolol succinate (TOPROL-XL) 25 mg XL tablet TAKE 1 TABLET BY MOUTH EVERY DAY    citalopram (CELEXA) 40 mg tablet TAKE 1 TABLET BY MOUTH EVERY DAY    pantoprazole (PROTONIX) 40 mg tablet TAKE 1 TABLET BY MOUTH EVERY DAY    telmisartan (MICARDIS) 40 mg tablet TAKE 1 TABLET BY MOUTH EVERY DAY (REPLACES IRBESARTAN)    isosorbide mononitrate ER (IMDUR) 30 mg tablet TAKE 1/2 TABLET BY MOUTH EVERY DAY    rOPINIRole (REQUIP) 0.5 mg tablet Take 2 Tabs by mouth nightly. Take 1- 2 tablets daily in the evening    atorvastatin (LIPITOR) 80 mg tablet Take 1 Tab by mouth daily.     albuterol (PROVENTIL HFA, VENTOLIN HFA, PROAIR HFA) 90 mcg/actuation inhaler Take 1 Puff by inhalation every six (6) hours as needed for Wheezing.  VITAMIN D3 2,000 unit cap capsule Take 2,000 Units by mouth daily.  amLODIPine (NORVASC) 5 mg tablet Take 5 mg by mouth daily.  GLUCOSAMINE HCL/CHONDR PETERSEN A NA (GLUCOSAMINE-CHONDROITIN) 750-600 mg Tab Take 1 Tab by mouth daily. Brand: Move Free    aspirin delayed-release 81 mg tablet Take 81 mg by mouth daily.  allopurinol (ZYLOPRIM) 100 mg tablet Take 100 mg by mouth every morning.  MULTIVITS W-FE,OTHER MIN (CENTRUM PO) Take 1 Tab by mouth daily.  nitroglycerin (NITROSTAT) 0.4 mg SL tablet TAKE 1 TABLET BY SUBLINGUAL ROUTE EVERY 5 MINUTES AS NEEDED FOR CHEST PAIN.  ketoconazole (NIZORAL) 2 % shampoo Apply  to affected area.  OXYGEN-AIR DELIVERY SYSTEMS (HORIZON NASAL CPAP SYSTEM) by Does Not Apply route. No current facility-administered medications for this visit.        Vitals:    10/13/20 1022   BP: (!) 110/56   Pulse: 74   Resp: 18   SpO2: 96%   Weight: 248 lb 3.2 oz (112.6 kg)   Height: 6' (1.829 m)     Social History     Socioeconomic History    Marital status:      Spouse name: Derek Hester Number of children: 2    Years of education: graduated then 4 year apprenticship    Highest education level: Associate degree: academic program   Occupational History    Not on file   Social Needs    Financial resource strain: Not hard at all   Workspace insecurity     Worry: Never true     Inability: Never true   [employer] needs     Medical: No     Non-medical: No   Tobacco Use    Smoking status: Former Smoker     Packs/day: 1.00     Years: 10.00     Pack years: 10.00     Types: Cigarettes     Last attempt to quit: 1968     Years since quittin.8    Smokeless tobacco: Never Used   Substance and Sexual Activity    Alcohol use: No     Alcohol/week: 0.0 standard drinks    Drug use: No    Sexual activity: Yes     Partners: Female     Birth control/protection: None   Lifestyle    Physical activity     Days per week: 0 days     Minutes per session: 0 min  Stress: Very much   Relationships    Social connections     Talks on phone: More than three times a week     Gets together: More than three times a week     Attends Gnosticism service: Not on file     Active member of club or organization: No     Attends meetings of clubs or organizations: Never     Relationship status:     Intimate partner violence     Fear of current or ex partner: No     Emotionally abused: No     Physically abused: No     Forced sexual activity: No   Other Topics Concern     Service Not Asked    Blood Transfusions Not Asked    Caffeine Concern Not Asked    Occupational Exposure Not Asked    Hobby Hazards Not Asked    Sleep Concern Not Asked    Stress Concern Not Asked    Weight Concern Not Asked    Special Diet Not Asked    Back Care Not Asked    Exercise Not Asked    Bike Helmet Not Asked   [address] Not Asked    Self-Exams Not Asked   Social History Narrative    Not on file       I have reviewed the nurses notes, vitals, problem list, allergy list, medical history, family, social history and medications. Review of Symptoms:  11 systems reviewed, negative other than as stated in the HPI      Physical Exam:      General: Well developed, in no acute distress, cooperative and alert  HEENT: No carotid bruits, no JVD, trach is midline. Neck Supple, PERRL, EOM intact. Heart:  Normal S1/S2 negative S3 or S4. Regular, no murmur, gallop or rub. Respiratory: Clear bilaterally x 4, no wheezing or rales  Abdomen:   Soft, non-tender, no masses, bowel sounds are active. Extremities:  No edema, normal cap refill, no cyanosis, atraumatic. Neuro: A&Ox3, speech clear, gait slow cautious with limp  Skin: Skin color is normal. No rashes or lesions.  Non diaphoretic  Vascular: 2+ pulses symmetric in all extremities        Cardiographics    ECG: Sinus rhythm, RBBB      Cardiology Labs:  Lab Results   Component Value Date/Time    Cholesterol, total 129 10/07/2020 02:23 PM HDL Cholesterol 42 10/07/2020 02:23 PM    LDL, calculated 49 03/03/2020 12:07 PM    LDL Chol Calc (NIH) 68 10/07/2020 02:23 PM    Triglyceride 105 10/07/2020 02:23 PM    CHOL/HDL Ratio 2.9 01/09/2020 02:45 AM       Lab Results   Component Value Date/Time    Sodium 145 (H) 10/07/2020 02:23 PM    Potassium 4.9 10/07/2020 02:23 PM    Chloride 106 10/07/2020 02:23 PM    CO2 24 10/07/2020 02:23 PM    Anion gap 7 01/16/2020 03:23 AM    Glucose 93 10/07/2020 02:23 PM    BUN 24 10/07/2020 02:23 PM    Creatinine 2.13 (H) 10/07/2020 02:23 PM    BUN/Creatinine ratio 11 10/07/2020 02:23 PM    GFR est AA 33 (L) 10/07/2020 02:23 PM    GFR est non-AA 29 (L) 10/07/2020 02:23 PM    Calcium 9.6 10/07/2020 02:23 PM    Bilirubin, total 0.7 10/07/2020 02:23 PM    Alk. phosphatase 124 (H) 10/07/2020 02:23 PM    Protein, total 6.7 10/07/2020 02:23 PM    Albumin 4.1 10/07/2020 02:23 PM    Globulin 3.6 01/08/2020 09:40 PM    A-G Ratio 1.6 10/07/2020 02:23 PM    ALT (SGPT) 17 10/07/2020 02:23 PM           Assessment:     Assessment:     Diagnoses and all orders for this visit:    1. Coronary artery disease involving native coronary artery of native heart without angina pectoris  -     AMB POC EKG ROUTINE W/ 12 LEADS, INTER & REP    2. Essential hypertension  -     AMB POC EKG ROUTINE W/ 12 LEADS, INTER & REP    3. Mixed hyperlipidemia  -     AMB POC EKG ROUTINE W/ 12 LEADS, INTER & REP        ICD-10-CM ICD-9-CM    1. Coronary artery disease involving native coronary artery of native heart without angina pectoris  I25.10 414.01 AMB POC EKG ROUTINE W/ 12 LEADS, INTER & REP   2. Essential hypertension  I10 401.9 AMB POC EKG ROUTINE W/ 12 LEADS, INTER & REP   3. Mixed hyperlipidemia  E78.2 272.2 AMB POC EKG ROUTINE W/ 12 LEADS, INTER & REP     Orders Placed This Encounter    AMB POC EKG ROUTINE W/ 12 LEADS, INTER & REP     Order Specific Question:   Reason for Exam:     Answer:   routine        Plan:     1.   Atherosclerotic heart disease: History of PTCA stenting 2015, recent intervention PTCA stenting 8/2019 proximal LAD PCI/stenting, ostial and mid RCA 40-50% stenosis. Denies angina or anginal equivalent symptoms. EKG SR, RBBB. He is over a year post stent. Can stop Plavix. Continue Toprol, Imdur, statin, ASA. 2.  Hypertension: was previously orthostatic. Today normotensive. Continue current regimen. 3.  Hyperlipidemia: On statin therapy recent LDL 49  March 2020  4. Type 2 diabetes: A1c 6.0% January 2020  5. Chronic kidney disease: 3/20 creatinine 2.50, followed by Dr. Emile Deal. 6.  Mild to moderate mitral regurgitation: Blood pressure controlled  7. Carotid stenosis: Followed by vascular surgery status post left carotid endarterectomy right side nonobstructive.         Follow-up 6 months Shirin Overton

## 2020-10-13 ENCOUNTER — OFFICE VISIT (OUTPATIENT)
Dept: CARDIOLOGY CLINIC | Age: 75
End: 2020-10-13
Payer: MEDICARE

## 2020-10-13 VITALS
OXYGEN SATURATION: 96 % | RESPIRATION RATE: 18 BRPM | SYSTOLIC BLOOD PRESSURE: 110 MMHG | HEART RATE: 74 BPM | BODY MASS INDEX: 33.62 KG/M2 | HEIGHT: 72 IN | WEIGHT: 248.2 LBS | DIASTOLIC BLOOD PRESSURE: 56 MMHG

## 2020-10-13 DIAGNOSIS — I10 ESSENTIAL HYPERTENSION: ICD-10-CM

## 2020-10-13 DIAGNOSIS — I25.10 CORONARY ARTERY DISEASE INVOLVING NATIVE CORONARY ARTERY OF NATIVE HEART WITHOUT ANGINA PECTORIS: Primary | ICD-10-CM

## 2020-10-13 DIAGNOSIS — E78.2 MIXED HYPERLIPIDEMIA: ICD-10-CM

## 2020-10-13 PROCEDURE — G8754 DIAS BP LESS 90: HCPCS | Performed by: INTERNAL MEDICINE

## 2020-10-13 PROCEDURE — G8536 NO DOC ELDER MAL SCRN: HCPCS | Performed by: INTERNAL MEDICINE

## 2020-10-13 PROCEDURE — 3288F FALL RISK ASSESSMENT DOCD: CPT | Performed by: INTERNAL MEDICINE

## 2020-10-13 PROCEDURE — G8427 DOCREV CUR MEDS BY ELIG CLIN: HCPCS | Performed by: INTERNAL MEDICINE

## 2020-10-13 PROCEDURE — 1100F PTFALLS ASSESS-DOCD GE2>/YR: CPT | Performed by: INTERNAL MEDICINE

## 2020-10-13 PROCEDURE — 93000 ELECTROCARDIOGRAM COMPLETE: CPT | Performed by: INTERNAL MEDICINE

## 2020-10-13 PROCEDURE — 99214 OFFICE O/P EST MOD 30 MIN: CPT | Performed by: INTERNAL MEDICINE

## 2020-10-13 PROCEDURE — G8752 SYS BP LESS 140: HCPCS | Performed by: INTERNAL MEDICINE

## 2020-10-13 PROCEDURE — G9717 DOC PT DX DEP/BP F/U NT REQ: HCPCS | Performed by: INTERNAL MEDICINE

## 2020-10-13 PROCEDURE — G8417 CALC BMI ABV UP PARAM F/U: HCPCS | Performed by: INTERNAL MEDICINE

## 2020-10-13 NOTE — PROGRESS NOTES
1. Have you been to the ER, urgent care clinic since your last visit? Hospitalized since your last visit? No.    2. Have you seen or consulted any other health care providers outside of the [address] [place] [address] since your last visit? Include any pap smears or colon screening. No.      Chief Complaint   Patient presents with    Follow-up     6 month- sobkris     DOES NOT KNOW MEDICATIONS, DAUGHTER NEO KNOWS MEDICATION. SHE WILL CALL TO GO OVER.

## 2020-10-13 NOTE — LETTER
10/16/20 Patient: Angie Ford YOB: 1945 Date of Visit: 10/13/2020 Alex Keller MD 
[address] [facility] [address] VIA In Basket Dear Alex Keller MD, Thank you for referring Mr. Angie Ford to [address] [place] [address] for evaluation. My notes for this consultation are attached. If you have questions, please do not hesitate to call me. I look forward to following your patient along with you.  
 
 
Sincerely, 
 
Lynn Arroyo MD

## 2020-11-13 ENCOUNTER — TELEPHONE (OUTPATIENT)
Dept: INTERNAL MEDICINE CLINIC | Age: 75
End: 2020-11-13

## 2020-11-13 ENCOUNTER — TELEPHONE (OUTPATIENT)
Dept: CARDIOLOGY CLINIC | Age: 75
End: 2020-11-13

## 2020-11-13 NOTE — TELEPHONE ENCOUNTER
Patient having pain in lower right side and swelling in legs    [phone]    Thanks  Efrain Miller

## 2020-11-13 NOTE — TELEPHONE ENCOUNTER
#[phone]  Pt states he has a kidney infection or a kidney stone. Pt states he is in such pain he can barely move and it hurts to even breath. Please call to advise as soon as possible.

## 2020-11-13 NOTE — TELEPHONE ENCOUNTER
Pt called. Verified patient with two patient identifiers. C/O lower right sided pain, some SOB. C/O mild edema but has not gained any weight . /72, pulse 72. Already has a call into his PCP. Per Horace Hicks NP, advised to call PCP back if he does not hear from anyone and/or call nephrologist for advice. Patient verbalized understanding.

## 2020-11-23 ENCOUNTER — PATIENT MESSAGE (OUTPATIENT)
Dept: INTERNAL MEDICINE CLINIC | Age: 75
End: 2020-11-23

## 2020-11-23 ENCOUNTER — OFFICE VISIT (OUTPATIENT)
Dept: INTERNAL MEDICINE CLINIC | Age: 75
End: 2020-11-23
Payer: MEDICARE

## 2020-11-23 VITALS
DIASTOLIC BLOOD PRESSURE: 75 MMHG | HEART RATE: 69 BPM | WEIGHT: 253.6 LBS | TEMPERATURE: 97.5 F | RESPIRATION RATE: 18 BRPM | BODY MASS INDEX: 34.35 KG/M2 | SYSTOLIC BLOOD PRESSURE: 126 MMHG | OXYGEN SATURATION: 97 % | HEIGHT: 72 IN

## 2020-11-23 DIAGNOSIS — R06.09 DOE (DYSPNEA ON EXERTION): ICD-10-CM

## 2020-11-23 DIAGNOSIS — N18.4 CKD (CHRONIC KIDNEY DISEASE) STAGE 4, GFR 15-29 ML/MIN (HCC): ICD-10-CM

## 2020-11-23 DIAGNOSIS — F41.8 ANXIETY ASSOCIATED WITH DEPRESSION: ICD-10-CM

## 2020-11-23 DIAGNOSIS — I25.10 CORONARY ARTERY DISEASE INVOLVING NATIVE CORONARY ARTERY OF NATIVE HEART WITHOUT ANGINA PECTORIS: ICD-10-CM

## 2020-11-23 DIAGNOSIS — E78.00 PURE HYPERCHOLESTEROLEMIA: ICD-10-CM

## 2020-11-23 DIAGNOSIS — I10 ESSENTIAL HYPERTENSION: Primary | ICD-10-CM

## 2020-11-23 PROCEDURE — 1100F PTFALLS ASSESS-DOCD GE2>/YR: CPT | Performed by: FAMILY MEDICINE

## 2020-11-23 PROCEDURE — G8536 NO DOC ELDER MAL SCRN: HCPCS | Performed by: FAMILY MEDICINE

## 2020-11-23 PROCEDURE — G9717 DOC PT DX DEP/BP F/U NT REQ: HCPCS | Performed by: FAMILY MEDICINE

## 2020-11-23 PROCEDURE — G8754 DIAS BP LESS 90: HCPCS | Performed by: FAMILY MEDICINE

## 2020-11-23 PROCEDURE — G0463 HOSPITAL OUTPT CLINIC VISIT: HCPCS | Performed by: FAMILY MEDICINE

## 2020-11-23 PROCEDURE — 99214 OFFICE O/P EST MOD 30 MIN: CPT | Performed by: FAMILY MEDICINE

## 2020-11-23 PROCEDURE — 3288F FALL RISK ASSESSMENT DOCD: CPT | Performed by: FAMILY MEDICINE

## 2020-11-23 PROCEDURE — G8752 SYS BP LESS 140: HCPCS | Performed by: FAMILY MEDICINE

## 2020-11-23 PROCEDURE — G8417 CALC BMI ABV UP PARAM F/U: HCPCS | Performed by: FAMILY MEDICINE

## 2020-11-23 PROCEDURE — G8427 DOCREV CUR MEDS BY ELIG CLIN: HCPCS | Performed by: FAMILY MEDICINE

## 2020-11-23 NOTE — PROGRESS NOTES
Felecia Molina is a 66 y.o. male who presents for follow up. In with daughter. Seen at [facility] 11/13. Had right lower back pain. CT scan with found to have 2.5cm right kidney lesion, atrophic kidneys, enlarged prostate. Reports no urinary pain, no blood in urine. Some slow flow. To have ultrasound with urology. Reports back pain has resolved. Took tylenol for back pain and resolved. Normal BM. Prediabetic. HbA1C 5.8%. healthy diet. LDL 68. On statin. No myalgias. Treated for HTN. BP controlled. Trying to watch salt. CKD, stage 4. Followed by Dr Nidia Guthrie, nephrology. creatinine 2.13. Hgb 10.2. some LE edema, no worsening. Followed by cardiology, Dr Janes Lee, for CAD. EF 50-55%. Last visit 10/13. Patient is not active. Using cart in stores. Some SMITH, per daughter, \"very SOB\". Has not yet seen pulmonary. Sees vascular surgery, Dr Sukhdeep Beckford, for carotid stenosis. S/p left CEA. Followed by Dr Sampson Martinez, neurology, Prior CVA. MELODY on CPAP.      Reports flakey  rash on face    Past Medical History:   Diagnosis Date    Abnormal stress echo 5/12/2015    Anemia NEC 03/2013    Last 2-3 months; finished taking iron supplement    Anxiety     CAD (coronary artery disease) 2009    cath Juan Smith) revealed 20%RCA and 50%2nd diagonal    Calculus of kidney     Chronic kidney disease     sees nephrologist every 6 months    Chronic kidney disease, stage III (moderate) 10/11/2009    30% kidney function    Diabetes ([address]     Pre-diabetic    DJD (degenerative joint disease)     GERD (gastroesophageal reflux disease) 10/11/2009    controlled with medication    Gout     Hypertension     Liver disease     fatty liver    Obesity     Obstructive sleep apnea (adult) (pediatric) 10/11/2009    nasal pillows; pressure set at 10-11 - uses cpap    Peripheral neuropathy 10/11/2009    bilateral feet (numbness)    Prediabetes     RLS (restless legs syndrome) 10/11/2009    S/P coronary artery stent placement 2015    5/11/15 PCI./MALI to LCx, 2019 PCI/MALI Prox LAD (RCA 40-50% lesions)       Family History   Problem Relation Age of Onset    Heart Disease Father     Hypertension Father     Other Father         abdominal aneurysm     Dementia Mother     Alzheimer Mother     Heart Disease Brother     Heart Attack Brother     Heart Disease Maternal Grandmother     Heart Disease Paternal Grandmother     Heart Attack Paternal Grandmother     Heart Disease Paternal Grandfather     Heart Attack Paternal Grandfather     Elevated Lipids Son     Elevated Lipids Daughter     Cancer Neg Hx     Diabetes Neg Hx     Stroke Neg Hx        Social History     Socioeconomic History    Marital status:      Spouse name: Kathrin Ko Number of children: 2    Years of education: graduated then 4 year apprenticship    Highest education level: Associate degree: academic program   Occupational History    Not on file   Social Needs    Financial resource strain: Not hard at all   [place] insecurity     Worry: Never true     Inability: Never true   [employer] needs     Medical: No     Non-medical: No   Tobacco Use    Smoking status: Former Smoker     Packs/day: 1.00     Years: 10.00     Pack years: 10.00     Types: Cigarettes     Last attempt to quit: 1968     Years since quittin.9    Smokeless tobacco: Never Used   Substance and Sexual Activity    Alcohol use: No     Alcohol/week: 0.0 standard drinks    Drug use: No    Sexual activity: Yes     Partners: Female     Birth control/protection: None   Lifestyle    Physical activity     Days per week: 0 days     Minutes per session: 0 min    Stress: Very much   Relationships    Social connections     Talks on phone: More than three times a week     Gets together: More than three times a week     Attends Lutheran service: Not on file     Active member of club or organization: No     Attends meetings of clubs or organizations: Never Relationship status:     Intimate partner violence     Fear of current or ex partner: No     Emotionally abused: No     Physically abused: No     Forced sexual activity: No   Other Topics Concern     Service Not Asked    Blood Transfusions Not Asked    Caffeine Concern Not Asked    Occupational Exposure Not Asked    Hobby Hazards Not Asked    Sleep Concern Not Asked    Stress Concern Not Asked    Weight Concern Not Asked    Special Diet Not Asked    Back Care Not Asked    Exercise Not Asked    Bike Helmet Not Asked   [address] Not Asked    Self-Exams Not Asked   Social History Narrative    Not on file       Current Outpatient Medications on File Prior to Visit   Medication Sig Dispense Refill    isosorbide mononitrate ER (IMDUR) 30 mg tablet Take 0.5 Tabs by mouth daily. 45 Tab 3    metoprolol succinate (TOPROL-XL) 25 mg XL tablet TAKE 1 TABLET BY MOUTH EVERY DAY 90 Tab 1    citalopram (CELEXA) 40 mg tablet TAKE 1 TABLET BY MOUTH EVERY DAY 90 Tab 3    pantoprazole (PROTONIX) 40 mg tablet TAKE 1 TABLET BY MOUTH EVERY DAY 30 Tab 5    telmisartan (MICARDIS) 40 mg tablet TAKE 1 TABLET BY MOUTH EVERY DAY (REPLACES IRBESARTAN) 90 Tab 3    rOPINIRole (REQUIP) 0.5 mg tablet Take 2 Tabs by mouth nightly. Take 1- 2 tablets daily in the evening (Patient taking differently: Take 1 mg by mouth nightly. Take 1 tablet daily in the evening) 180 Tab 5    nitroglycerin (NITROSTAT) 0.4 mg SL tablet TAKE 1 TABLET BY SUBLINGUAL ROUTE EVERY 5 MINUTES AS NEEDED FOR CHEST PAIN. 1 Bottle 0    atorvastatin (LIPITOR) 80 mg tablet Take 1 Tab by mouth daily. 90 Tab 3    ketoconazole (NIZORAL) 2 % shampoo Apply  to affected area.  VITAMIN D3 2,000 unit cap capsule Take 2,000 Units by mouth daily. 2    OXYGEN-AIR DELIVERY SYSTEMS (HORIZON NASAL CPAP SYSTEM) by Does Not Apply route.  amLODIPine (NORVASC) 5 mg tablet Take 5 mg by mouth daily.       GLUCOSAMINE HCL/CHONDR PETERSEN A NA (GLUCOSAMINE-CHONDROITIN) 750-600 mg Tab Take 1 Tab by mouth daily. Brand: Move Free      aspirin delayed-release 81 mg tablet Take 81 mg by mouth daily.  allopurinol (ZYLOPRIM) 100 mg tablet Take 100 mg by mouth every morning.  MULTIVITS W-FE,OTHER MIN (CENTRUM PO) Take 1 Tab by mouth daily.  [DISCONTINUED] clopidogreL (PLAVIX) 75 mg tab TAKE 1 TABLET BY MOUTH EVERY DAY 90 Tab 1    albuterol (PROVENTIL HFA, VENTOLIN HFA, PROAIR HFA) 90 mcg/actuation inhaler Take 1 Puff by inhalation every six (6) hours as needed for Wheezing. 1 Inhaler 0     No current facility-administered medications on file prior to visit. Review of Systems  Pertinent items are noted in HPI. Objective:     Visit Vitals  /75 (BP 1 Location: Left arm, BP Patient Position: Sitting)   Pulse 69   Temp 97.5 °F (36.4 °C) (Temporal)   Resp 18   Ht 6' (1.829 m)   Wt 253 lb 9.6 oz (115 kg)   SpO2 97%   BMI 34.39 kg/m²     Gen: well appearing male  HEENT:   PERRL,normal conjunctiva. TMs no opacification or erythema,  OP no erythema, no exudates, MMM  Neck:  No masses or LAD  Resp:  No wheezing, no rhonchi, no rales. CV:  RRR, normal S1S2, no murmur. GI: soft, nontender, limited by habitus  Extrem: +1 pretibial edema, warm distally      Assessment/Plan:       ICD-10-CM ICD-9-CM    1. Essential hypertension  I10 401.9    2. Coronary artery disease involving native coronary artery of native heart without angina pectoris  I25.10 414.01    3. Pure hypercholesterolemia  E78.00 272.0    4. Anxiety associated with depression  F41.8 300.4    5. CKD (chronic kidney disease) stage 4, GFR 15-29 ml/min (HCC)  N18.4 585.4    6. SMITH (dyspnea on exertion)  R06.00 786.09 REFERRAL TO PULMONARY DISEASE      XR CHEST PA LAT       Follow-up and Dispositions    · Return in about 6 months (around 5/23/2021).          Shiva Messer MD

## 2020-12-10 ENCOUNTER — TELEPHONE (OUTPATIENT)
Dept: CARDIOLOGY CLINIC | Age: 75
End: 2020-12-10

## 2020-12-10 NOTE — TELEPHONE ENCOUNTER
Michelle from [facility] ([phone]) calling to speak to a nurse. Please give michelle a call back.     Thanks

## 2020-12-17 RX ORDER — PANTOPRAZOLE SODIUM 40 MG/1
TABLET, DELAYED RELEASE ORAL
Qty: 90 TAB | Refills: 1 | Status: SHIPPED | OUTPATIENT
Start: 2020-12-17 | End: 2021-06-27

## 2021-01-21 RX ORDER — ATORVASTATIN CALCIUM 80 MG/1
TABLET, FILM COATED ORAL
Qty: 90 TAB | Refills: 3 | Status: SHIPPED | OUTPATIENT
Start: 2021-01-21 | End: 2021-03-15

## 2021-01-25 ENCOUNTER — TRANSCRIBE ORDER (OUTPATIENT)
Dept: SCHEDULING | Age: 76
End: 2021-01-25

## 2021-01-25 DIAGNOSIS — N28.9 KIDNEY LESION: Primary | ICD-10-CM

## 2021-01-28 ENCOUNTER — TRANSCRIBE ORDER (OUTPATIENT)
Dept: SCHEDULING | Age: 76
End: 2021-01-28

## 2021-01-28 DIAGNOSIS — N28.9 RENAL LESION: Primary | ICD-10-CM

## 2021-02-02 ENCOUNTER — HOSPITAL ENCOUNTER (OUTPATIENT)
Dept: CT IMAGING | Age: 76
Discharge: HOME | End: 2021-02-02
Attending: STUDENT IN AN ORGANIZED HEALTH CARE EDUCATION/TRAINING PROGRAM
Payer: MEDICARE

## 2021-02-02 VITALS
BODY MASS INDEX: 33.46 KG/M2 | TEMPERATURE: 98.3 F | SYSTOLIC BLOOD PRESSURE: 128 MMHG | HEIGHT: 72 IN | WEIGHT: 247 LBS | OXYGEN SATURATION: 99 % | DIASTOLIC BLOOD PRESSURE: 72 MMHG | RESPIRATION RATE: 16 BRPM | HEART RATE: 64 BPM

## 2021-02-02 DIAGNOSIS — N28.9 KIDNEY LESION: ICD-10-CM

## 2021-02-02 PROCEDURE — 88305 TISSUE EXAM BY PATHOLOGIST: CPT

## 2021-02-02 PROCEDURE — 2709999900 HC NON-CHARGEABLE SUPPLY

## 2021-02-02 PROCEDURE — 99152 MOD SED SAME PHYS/QHP 5/>YRS: CPT

## 2021-02-02 PROCEDURE — 77030040395 HC NDL BIOP COAX INTRO MRTM -B

## 2021-02-02 PROCEDURE — 77030014115

## 2021-02-02 PROCEDURE — 77030014006 HC SPNG HEMSTAT J&J -A

## 2021-02-02 PROCEDURE — 88333 PATH CONSLTJ SURG CYTO XM 1: CPT

## 2021-02-02 PROCEDURE — 77030019698 HC SYR ANGI MDLON MRTM -A

## 2021-02-02 PROCEDURE — 74011250636 HC RX REV CODE- 250/636: Performed by: STUDENT IN AN ORGANIZED HEALTH CARE EDUCATION/TRAINING PROGRAM

## 2021-02-02 RX ORDER — SODIUM CHLORIDE 9 MG/ML
25 INJECTION, SOLUTION INTRAVENOUS CONTINUOUS
Status: DISCONTINUED | OUTPATIENT
Start: 2021-02-02 | End: 2021-02-06 | Stop reason: HOSPADM

## 2021-02-02 RX ORDER — MIDAZOLAM HYDROCHLORIDE 1 MG/ML
5 INJECTION, SOLUTION INTRAMUSCULAR; INTRAVENOUS
Status: DISCONTINUED | OUTPATIENT
Start: 2021-02-02 | End: 2021-02-06 | Stop reason: HOSPADM

## 2021-02-02 RX ORDER — FENTANYL CITRATE 50 UG/ML
100 INJECTION, SOLUTION INTRAMUSCULAR; INTRAVENOUS
Status: DISCONTINUED | OUTPATIENT
Start: 2021-02-02 | End: 2021-02-06 | Stop reason: HOSPADM

## 2021-02-02 RX ADMIN — FENTANYL CITRATE 50 MCG: 50 INJECTION, SOLUTION INTRAMUSCULAR; INTRAVENOUS at 09:53

## 2021-02-02 RX ADMIN — MIDAZOLAM HYDROCHLORIDE 2 MG: 1 INJECTION, SOLUTION INTRAMUSCULAR; INTRAVENOUS at 09:50

## 2021-02-02 RX ADMIN — SODIUM CHLORIDE 25 ML/HR: 9 INJECTION, SOLUTION INTRAVENOUS at 09:30

## 2021-02-02 RX ADMIN — FENTANYL CITRATE 25 MCG: 50 INJECTION, SOLUTION INTRAMUSCULAR; INTRAVENOUS at 09:56

## 2021-02-02 NOTE — PROGRESS NOTES
Patient ambulatory to restroom with steady gait. Patient able to pass good amount of clear yellow urine. Pt denies any pain to flank or abdominal area. Patient has been cleared by Dr. Mable Berg for discharge.

## 2021-02-02 NOTE — PROGRESS NOTES
Name of procedure: Right Renal Biopsy    Sedation medications given: 2 mg Versed, 75 mcg Fentanyl    Sedation tolerated: well    Vital Signs: Stable    Fluids removed: None    Samples sent to lab: Yes    Any complications related to procedure: None    Post Procedure Care Needed/order sets placed in [facility].

## 2021-02-02 NOTE — PROGRESS NOTES
Patient continues to rest in bed pain free with daughter at bedside. VS stable. Dressing remains clean and dry with no signs of bleeding.

## 2021-02-02 NOTE — PROGRESS NOTES
Patient sitting up in bed eating crackers and drinking ginger ale. Pt is AxOx3 with daughter at bedside. Pt denies any SOB or nausea.

## 2021-02-02 NOTE — H&P
Radiology History and Physical    Patient: Pattie Pearson 66 y.o. male       Chief Complaint: Right renal mass    History of Present Illness: 66year old male with a right renal mass, presents for percutaneous biopsy. Denies chest pain, abdominal pain.     History:    Past Medical History:   Diagnosis Date    Abnormal stress echo 5/12/2015    Anemia NEC 03/2013    Last 2-3 months; finished taking iron supplement    Anxiety     CAD (coronary artery disease) 2009    cath [facility]) revealed 20%RCA and 50%2nd diagonal    Calculus of kidney     Chronic kidney disease     sees nephrologist every 6 months    Chronic kidney disease, stage III (moderate) 10/11/2009    30% kidney function    Diabetes ([address]     Pre-diabetic    DJD (degenerative joint disease)     GERD (gastroesophageal reflux disease) 10/11/2009    controlled with medication    Gout     Hypertension     Liver disease     fatty liver    Obesity     Obstructive sleep apnea (adult) (pediatric) 10/11/2009    nasal pillows; pressure set at 10-11 - uses cpap    Peripheral neuropathy 10/11/2009    bilateral feet (numbness)    Prediabetes     RLS (restless legs syndrome) 10/11/2009    S/P coronary artery stent placement 05/12/2015    5/11/15 PCI./MALI to LCx, 8/2019 PCI/MALI Prox LAD (RCA 40-50% lesions)     Family History   Problem Relation Age of Onset    Heart Disease Father     Hypertension Father     Other Father         abdominal aneurysm     Dementia Mother     Alzheimer Mother     Heart Disease Brother     Heart Attack Brother     Heart Disease Maternal Grandmother     Heart Disease Paternal Grandmother     Heart Attack Paternal Grandmother     Heart Disease Paternal Grandfather     Heart Attack Paternal Grandfather     Elevated Lipids Son     Elevated Lipids Daughter     Cancer Neg Hx     Diabetes Neg Hx     Stroke Neg Hx      Social History     Socioeconomic History    Marital status:      Spouse name: Paulette Gibbons  Number of children: 2    Years of education: graduated then 4 year apprenticship    Highest education level: Associate degree: academic program   Occupational History    Not on file   Social Needs    Financial resource strain: Not hard at all   [employer] insecurity     Worry: Never true     Inability: Never true   [employer] needs     Medical: No     Non-medical: No   Tobacco Use    Smoking status: Former Smoker     Packs/day: 1.00     Years: 10.00     Pack years: 10.00     Types: Cigarettes     Quit date: 1968     Years since quittin.1    Smokeless tobacco: Never Used   Substance and Sexual Activity    Alcohol use: No     Alcohol/week: 0.0 standard drinks    Drug use: No    Sexual activity: Yes     Partners: Female     Birth control/protection: None   Lifestyle    Physical activity     Days per week: 0 days     Minutes per session: 0 min    Stress: Very much   Relationships    Social connections     Talks on phone: More than three times a week     Gets together: More than three times a week     Attends [employer] service: Not on file     Active member of club or organization: No     Attends meetings of clubs or organizations: Never     Relationship status:     Intimate partner violence     Fear of current or ex partner: No     Emotionally abused: No     Physically abused: No     Forced sexual activity: No   Other Topics Concern     Service Not Asked    Blood Transfusions Not Asked    Caffeine Concern Not Asked    Occupational Exposure Not Asked    Hobby Hazards Not Asked    Sleep Concern Not Asked    Stress Concern Not Asked    Weight Concern Not Asked    Special Diet Not Asked    Back Care Not Asked    Exercise Not Asked    Bike Helmet Not Asked    [address] Not Asked    Self-Exams Not Asked   Social History Narrative    Not on file       Allergies:    Allergies   Allergen Reactions    Penicillins Hives     Pt states he has taken Keflex before without problems    Bactrim [Sulfamethoxazole-Trimethoprim] Hives    Codeine Itching    Lisinopril (Bulk) Cough    Neurontin [Gabapentin] Other (comments)     drowsy    Zostavax [Zoster Vaccine Live (Pf)] Rash     See 9/10/13 visit       Current Medications:  Current Outpatient Medications   Medication Sig    atorvastatin (LIPITOR) 80 mg tablet TAKE 1 TABLET BY MOUTH EVERY DAY    pantoprazole (PROTONIX) 40 mg tablet TAKE 1 TABLET BY MOUTH EVERY DAY    isosorbide mononitrate ER (IMDUR) 30 mg tablet Take 0.5 Tabs by mouth daily.  metoprolol succinate (TOPROL-XL) 25 mg XL tablet TAKE 1 TABLET BY MOUTH EVERY DAY    citalopram (CELEXA) 40 mg tablet TAKE 1 TABLET BY MOUTH EVERY DAY    telmisartan (MICARDIS) 40 mg tablet TAKE 1 TABLET BY MOUTH EVERY DAY (REPLACES IRBESARTAN)    rOPINIRole (REQUIP) 0.5 mg tablet Take 2 Tabs by mouth nightly. Take 1- 2 tablets daily in the evening (Patient taking differently: Take 1 mg by mouth nightly. Take 1 tablet daily in the evening)    nitroglycerin (NITROSTAT) 0.4 mg SL tablet TAKE 1 TABLET BY SUBLINGUAL ROUTE EVERY 5 MINUTES AS NEEDED FOR CHEST PAIN.  ketoconazole (NIZORAL) 2 % shampoo Apply  to affected area.  albuterol (PROVENTIL HFA, VENTOLIN HFA, PROAIR HFA) 90 mcg/actuation inhaler Take 1 Puff by inhalation every six (6) hours as needed for Wheezing.  VITAMIN D3 2,000 unit cap capsule Take 2,000 Units by mouth daily.  OXYGEN-AIR DELIVERY SYSTEMS (HORIZON NASAL CPAP SYSTEM) by Does Not Apply route.  amLODIPine (NORVASC) 5 mg tablet Take 5 mg by mouth daily.  GLUCOSAMINE HCL/CHONDR PETERSEN A NA (GLUCOSAMINE-CHONDROITIN) 750-600 mg Tab Take 1 Tab by mouth daily. Brand: Move Free    aspirin delayed-release 81 mg tablet Take 81 mg by mouth daily.  allopurinol (ZYLOPRIM) 100 mg tablet Take 100 mg by mouth every morning.  MULTIVITS W-FE,OTHER MIN (CENTRUM PO) Take 1 Tab by mouth daily.      Current Facility-Administered Medications   Medication Dose Route Frequency    fentaNYL citrate (PF) injection 100 mcg  100 mcg IntraVENous Multiple    0.9% sodium chloride infusion  25 mL/hr IntraVENous CONTINUOUS    midazolam (PF) (VERSED) injection 5 mg  5 mg IntraVENous Rad Multiple        Physical Exam:  Blood pressure 138/78, pulse 60, temperature 98.3 °F (36.8 °C), resp. rate 16, height 6' (1.829 m), weight 112 kg (247 lb), SpO2 99 %. GENERAL: alert, cooperative, no distress, appears stated age,   LUNG: Nonlabored respiration on room air  HEART: regular rate and rhythm    Alerts:    Hospital Problems  Date Reviewed: 11/23/2020    None          Laboratory:    No results for input(s): HGB, HCT, WBC, PLT, INR, BUN, CREA, K, CRCLT, HGBEXT, HCTEXT, PLTEXT, INREXT in the last 72 hours. No lab exists for component: PTT, PT      Plan of Care/Planned Procedure:  Risks, benefits, and alternatives reviewed with patient and he agrees to proceed with the procedure. CT guided biopsy of right renal mass    Deemed appropriate for moderate sedation with versed and fentanyl.     Allene Aschoff, MD  Interventional Radiology  [facility], [facility].  9:34 AM, 2/2/2021

## 2021-02-02 NOTE — PROCEDURES
Interventional Radiology  Procedure Note        2/2/2021 10:14 AM    Patient: Marv Bojorquez     Informed consent obtained    Diagnosis: Right renal mass    Procedure(s): CT guided biopsy of right renal mass    Specimens removed:  3x 18ga core samples of right renal mass    Complications: None    Primary Physician: Gerry Holman MD    Recommendations: N/A    Discharge Disposition: stable; discharge to home after observation    Full dictated report to follow    Gerry Holman MD  Interventional Radiology  [facility]  10:14 AM, 2/2/2021

## 2021-02-02 NOTE — DISCHARGE INSTRUCTIONS
[facility]  Special Procedures/Radiology Department    Radiologist: Nasir Catrachitaandrew    Date: 02/02/2021    Kidney Biopsy Discharge Instructions    Rest for the next 24 to 48 hours. You may have a small amount of blood in your urine which will make your urine a pink color. If you have any helena red blood or blood clots in your urine, go to the nearest Emergency Room. Other symptoms that require immediate medical attention are severe pain in your kidney area, sweatiness, clammy skin, vomiting of fever. Because you received sedation, you are not to drive or sign any legal documents for the next 24 hours. Resume your previous diet and follow the medication reconciliation form. You may take Tylenol, as directed on the label, for pain or discomfort. Avoid ibuprofen (Advil, Motrin) and aspirin as they may cause you to bleed. You may return to work without restrictions in 48 hours. Follow up with your physician for the test results. It may take up to 5 days for the test results to become available to your physician. If you have not heard anything after 5 business days, call your physician. If you have any questions or concerns, please call [phone] and ask to speak to the nurse on-call.

## 2021-02-02 NOTE — PROGRESS NOTES
Dr. Ramos Mantle at bedside to obtain consent for right renal biopsy. Procedure explained with opportunity to ask questions. Patient states understanding of procedure prior to signing consent. Discharge instructions and expectations reviewed with patient. Written copy given to patient. RN to review again post procedure before discharge.

## 2021-03-04 ENCOUNTER — TELEPHONE (OUTPATIENT)
Dept: CARDIOLOGY CLINIC | Age: 76
End: 2021-03-04

## 2021-03-15 ENCOUNTER — HOSPITAL ENCOUNTER (OUTPATIENT)
Dept: PREADMISSION TESTING | Age: 76
Discharge: HOME | End: 2021-03-15
Payer: MEDICARE

## 2021-03-15 VITALS
TEMPERATURE: 98 F | BODY MASS INDEX: 34.61 KG/M2 | WEIGHT: 255.51 LBS | OXYGEN SATURATION: 97 % | DIASTOLIC BLOOD PRESSURE: 68 MMHG | HEIGHT: 72 IN | SYSTOLIC BLOOD PRESSURE: 146 MMHG | RESPIRATION RATE: 20 BRPM | HEART RATE: 61 BPM

## 2021-03-15 LAB
ABO + RH BLD: NORMAL
ALBUMIN SERPL-MCNC: 3.7 G/DL (ref 3.5–5)
ALBUMIN/GLOB SERPL: 1.2 {RATIO} (ref 1.1–2.2)
ALP SERPL-CCNC: 116 U/L (ref 45–117)
ALT SERPL-CCNC: 23 U/L (ref 12–78)
ANION GAP SERPL CALC-SCNC: 5 MMOL/L (ref 5–15)
APPEARANCE UR: CLEAR
AST SERPL-CCNC: 15 U/L (ref 15–37)
BACTERIA URNS QL MICRO: NEGATIVE /HPF
BASOPHILS # BLD: 0.1 K/UL (ref 0–0.1)
BASOPHILS NFR BLD: 1 % (ref 0–1)
BILIRUB SERPL-MCNC: 0.7 MG/DL (ref 0.2–1)
BILIRUB UR QL: NEGATIVE
BLOOD GROUP ANTIBODIES SERPL: NORMAL
BUN SERPL-MCNC: 32 MG/DL (ref 6–20)
BUN/CREAT SERPL: 13 (ref 12–20)
CALCIUM SERPL-MCNC: 9.2 MG/DL (ref 8.5–10.1)
CHLORIDE SERPL-SCNC: 109 MMOL/L (ref 97–108)
CO2 SERPL-SCNC: 26 MMOL/L (ref 21–32)
COLOR UR: ABNORMAL
CREAT SERPL-MCNC: 2.43 MG/DL (ref 0.7–1.3)
DIFFERENTIAL METHOD BLD: ABNORMAL
EOSINOPHIL # BLD: 0.4 K/UL (ref 0–0.4)
EOSINOPHIL NFR BLD: 4 % (ref 0–7)
EPITH CASTS URNS QL MICRO: ABNORMAL /LPF
ERYTHROCYTE [DISTWIDTH] IN BLOOD BY AUTOMATED COUNT: 14.6 % (ref 11.5–14.5)
GLOBULIN SER CALC-MCNC: 3.2 G/DL (ref 2–4)
GLUCOSE SERPL-MCNC: 91 MG/DL (ref 65–100)
GLUCOSE UR STRIP.AUTO-MCNC: NEGATIVE MG/DL
HCT VFR BLD AUTO: 31.2 % (ref 36.6–50.3)
HGB BLD-MCNC: 10 G/DL (ref 12.1–17)
HGB UR QL STRIP: NEGATIVE
HYALINE CASTS URNS QL MICRO: ABNORMAL /LPF (ref 0–5)
IMM GRANULOCYTES # BLD AUTO: 0 K/UL (ref 0–0.04)
IMM GRANULOCYTES NFR BLD AUTO: 1 % (ref 0–0.5)
KETONES UR QL STRIP.AUTO: NEGATIVE MG/DL
LEUKOCYTE ESTERASE UR QL STRIP.AUTO: NEGATIVE
LYMPHOCYTES # BLD: 2.7 K/UL (ref 0.8–3.5)
LYMPHOCYTES NFR BLD: 32 % (ref 12–49)
MCH RBC QN AUTO: 30.2 PG (ref 26–34)
MCHC RBC AUTO-ENTMCNC: 32.1 G/DL (ref 30–36.5)
MCV RBC AUTO: 94.3 FL (ref 80–99)
MONOCYTES # BLD: 1.1 K/UL (ref 0–1)
MONOCYTES NFR BLD: 13 % (ref 5–13)
NEUTS SEG # BLD: 4.1 K/UL (ref 1.8–8)
NEUTS SEG NFR BLD: 49 % (ref 32–75)
NITRITE UR QL STRIP.AUTO: NEGATIVE
NRBC # BLD: 0 K/UL (ref 0–0.01)
NRBC BLD-RTO: 0 PER 100 WBC
PH UR STRIP: 5.5 [PH] (ref 5–8)
PLATELET # BLD AUTO: 221 K/UL (ref 150–400)
PMV BLD AUTO: 10.1 FL (ref 8.9–12.9)
POTASSIUM SERPL-SCNC: 4.4 MMOL/L (ref 3.5–5.1)
PROT SERPL-MCNC: 6.9 G/DL (ref 6.4–8.2)
PROT UR STRIP-MCNC: 30 MG/DL
RBC # BLD AUTO: 3.31 M/UL (ref 4.1–5.7)
RBC #/AREA URNS HPF: ABNORMAL /HPF (ref 0–5)
SODIUM SERPL-SCNC: 140 MMOL/L (ref 136–145)
SP GR UR REFRACTOMETRY: 1.02 (ref 1–1.03)
SPECIMEN EXP DATE BLD: NORMAL
UROBILINOGEN UR QL STRIP.AUTO: 0.2 EU/DL (ref 0.2–1)
WBC # BLD AUTO: 8.3 K/UL (ref 4.1–11.1)
WBC URNS QL MICRO: ABNORMAL /HPF (ref 0–4)

## 2021-03-15 PROCEDURE — 85025 COMPLETE CBC W/AUTO DIFF WBC: CPT

## 2021-03-15 PROCEDURE — 81001 URINALYSIS AUTO W/SCOPE: CPT

## 2021-03-15 PROCEDURE — 87086 URINE CULTURE/COLONY COUNT: CPT

## 2021-03-15 PROCEDURE — 86901 BLOOD TYPING SEROLOGIC RH(D): CPT

## 2021-03-15 PROCEDURE — 80053 COMPREHEN METABOLIC PANEL: CPT

## 2021-03-15 PROCEDURE — 36415 COLL VENOUS BLD VENIPUNCTURE: CPT

## 2021-03-15 RX ORDER — ROPINIROLE 0.5 MG/1
0.5 TABLET, FILM COATED ORAL
COMMUNITY
End: 2021-11-09 | Stop reason: SDUPTHER

## 2021-03-15 RX ORDER — CITALOPRAM 40 MG/1
40 TABLET, FILM COATED ORAL EVERY EVENING
COMMUNITY
End: 2021-07-26

## 2021-03-15 RX ORDER — METOPROLOL SUCCINATE 25 MG/1
25 TABLET, EXTENDED RELEASE ORAL
COMMUNITY
End: 2021-05-04

## 2021-03-15 RX ORDER — KETOCONAZOLE 20 MG/ML
1 SHAMPOO TOPICAL DAILY PRN
COMMUNITY

## 2021-03-15 RX ORDER — ALBUTEROL SULFATE 90 UG/1
1 AEROSOL, METERED RESPIRATORY (INHALATION) EVERY 4 HOURS
COMMUNITY

## 2021-03-15 RX ORDER — UMECLIDINIUM BROMIDE AND VILANTEROL TRIFENATATE 62.5; 25 UG/1; UG/1
1 POWDER RESPIRATORY (INHALATION) DAILY
COMMUNITY
End: 2022-04-14

## 2021-03-15 RX ORDER — ATORVASTATIN CALCIUM 80 MG/1
80 TABLET, FILM COATED ORAL
COMMUNITY
End: 2022-02-04

## 2021-03-15 RX ORDER — TELMISARTAN 40 MG/1
40 TABLET ORAL
COMMUNITY
End: 2021-08-05

## 2021-03-15 NOTE — H&P (VIEW-ONLY)
History and Physical 
 
Patient: Roel Campbell MRN: [id number]  SSN: [id number] YOB: 1945  Age: 66 y.o. Sex: male Subjective:  
  
Roel Campbell is a 66 y.o. male who went to [address] [place] [address] on 11/13/2020 with flank pain and during testing it was discovered he has a renal lesion on his right kidney. He denies pain or hematuria. He is followed by vascular with Dr. Adam Gonzalez for carotid stenosis and under went a right carotid enterectomy. He is also followed by Dr. Betina Burton with neurology for his peripheral neuropathy. He is also followed by Cardiology, Dr. Maria Sewell who has given clearance in CC. He may stop his ASA 7 days prior. He is also seeing pulmonary who has also given clearance. Past Medical History:  
Diagnosis Date  Abnormal stress echo 5/12/2015  Anemia NEC 03/2013  Anxiety  Borderline diabetes  CAD (coronary artery disease) 2009  
 cath [facility]) revealed 20%RCA and 50%2nd diagonal  
 Calculus of kidney  Chronic kidney disease, stage III (moderate) 10/11/2009  
 30% kidney function  COPD, mild ([address] Followed by pulmonary associates  DJD (degenerative joint disease)  Fatty liver  GERD (gastroesophageal reflux disease) 10/11/2009  Gout  Hypertension  MELODY on CPAP 10/11/2009  
 nasal pillows; pressure set at 10-11 -   
 Peripheral neuropathy 10/11/2009  
 bilateral feet (numbness)  Renal cell cancer, right ([address] 01/2021  RLS (restless legs syndrome) 10/11/2009  S/P coronary artery stent placement 05/12/2015 PCI./MALI to LCx, 8/2019 PCI/MALI Prox LAD (RCA 40-50% lesions) Past Surgical History:  
Procedure Laterality Date  HX BUNIONECTOMY  2019  HX CAROTID ENDARTERECTOMY Left 01/2020 Followed by Dr. Adam Gonzalez  HX HEART CATHETERIZATION  2009, 7/17  
 x2  HX HERNIA REPAIR McTamaney/umbilical  
 HX KNEE REPLACEMENT Left 07/23/2011  HX ORTHOPAEDIC    
 left shoulder manipulation  HX UROLOGICAL basket extraction of kidney stones  [place] COLONOSCOPY FLX DX W/COLLJ SPEC WHEN PFRMD  2010  [place] COLSC FLX W/RMVL OF TUMOR POLYP LESION SNARE TQ  2014 Family History Problem Relation Age of Onset  Heart Disease Father  Hypertension Father  Other Father   
     abdominal aneurysm  Dementia Mother  Alzheimer Mother  Heart Disease Brother  Heart Attack Brother  Heart Disease Maternal Grandmother  Heart Disease Paternal Grandmother  Heart Attack Paternal Grandmother  Heart Disease Paternal Grandfather  Heart Attack Paternal Grandfather  Elevated Lipids Son  Elevated Lipids Daughter  Cancer Neg Hx  Diabetes Neg Hx  Stroke Neg Hx Social History Tobacco Use  Smoking status: Former Smoker Packs/day: 1.00 Years: 10.00 Pack years: 10.00 Types: Cigarettes Quit date: 1968 Years since quittin.2  Smokeless tobacco: Never Used Substance Use Topics  Alcohol use: No  
  Alcohol/week: 0.0 standard drinks Prior to Admission medications Medication Sig Start Date End Date Taking? Authorizing Provider  
albuterol (ProAir HFA) 90 mcg/actuation inhaler Take 1 Puff by inhalation every four (4) hours. Yes Provider, Historical  
umeclidinium-vilanteroL (Anoro Ellipta) 62.5-25 mcg/actuation inhaler Take 1 Puff by inhalation daily. Yes Provider, Historical  
rOPINIRole (Requip) 0.5 mg tablet Take 0.5 mg by mouth nightly. Yes Provider, Historical  
ketoconazole (NIZORAL) 2 % shampoo Apply 1 mL to affected area daily as needed for Itching. Yes Provider, Historical  
atorvastatin (Lipitor) 80 mg tablet Take 80 mg by mouth nightly. Yes Provider, Historical  
citalopram (CELEXA) 40 mg tablet Take 40 mg by mouth every evening. Yes Provider, Historical  
metoprolol succinate (TOPROL-XL) 25 mg XL tablet Take 25 mg by mouth nightly.    Yes Provider, Historical  
telmisartan (MICARDIS) 40 mg tablet Take 40 mg by mouth nightly. Yes Provider, Historical  
pantoprazole (PROTONIX) 40 mg tablet TAKE 1 TABLET BY MOUTH EVERY DAY 12/17/20  Yes Saint Farrier, MD  
isosorbide mononitrate ER (IMDUR) 30 mg tablet Take 0.5 Tabs by mouth daily. 10/21/20  Yes Rudy Alvarado NP  
nitroglycerin (NITROSTAT) 0.4 mg SL tablet TAKE 1 TABLET BY SUBLINGUAL ROUTE EVERY 5 MINUTES AS NEEDED FOR CHEST PAIN. 3/4/20  Yes Saint Farrier, MD  
VITAMIN D3 2,000 unit cap capsule Take 2,000 Units by mouth daily. 3/3/15  Yes Provider, Historical  
amLODIPine (NORVASC) 5 mg tablet Take 5 mg by mouth daily. 6/16/14  Yes Provider, Historical  
GLUCOSAMINE HCL/CHONDR PETERSEN A NA (GLUCOSAMINE-CHONDROITIN) 750-600 mg Tab Take 1 Tab by mouth daily. Brand: Move Free   Yes Provider, Historical  
aspirin delayed-release 81 mg tablet Take 81 mg by mouth nightly. Yes Provider, Historical  
allopurinol (ZYLOPRIM) 100 mg tablet Take 100 mg by mouth every morning. 6/5/12  Yes Provider, Historical  
MULTIVITS W-FE,OTHER MIN (CENTRUM PO) Take 1 Tab by mouth daily. Yes Provider, Historical  
OXYGEN-AIR DELIVERY SYSTEMS (HORIZON NASAL CPAP SYSTEM) by Does Not Apply route. Provider, Historical  
  
 
Allergies Allergen Reactions  Bactrim [Sulfamethoxazole-Trimethoprim] Hives  Codeine Itching  Lisinopril (Bulk) Cough  Neurontin [Gabapentin] Other (comments) drowsy  Zostavax [Zoster Vaccine Live (Pf)] Rash See 9/10/13 visit  Penicillins Hives Pt states he has taken Keflex before without problems Review of Systems: 
Constitutional: Negative for chills and fever HENT: Negative for congestion and sore throat Eyes: negative for blurred vision and double vision Respiratory: Negative for cough and wheezing. SOB with exertion Mouth: Negative for loose, broken or chipped teeth. Cardiovascular: Negative for chest pain and palpitations Gastrointestinal: Negative for abdominal pain, constipation, diarrhea and nausea Genitourinary: Negative for dysuria and hematuria Musculoskeletal: Mild joint pain Skin: Negative for rash, open wounds. Negative for bruises easily Neurological: Negative for dizziness, tremors and headaches Psychiatric: Negative for depression. The patient is not nervous/anxious. Objective:  
 
Vitals:  
 03/15/21 1129 BP: (!) 146/68 Pulse: 61 Resp: 20 Temp: 98 °F (36.7 °C) SpO2: 97% Weight: 115.9 kg (255 lb 8.2 oz) Height: 6' (1.829 m) Physical Exam: 
Constitutional:  Appears well,  No Acute Distress, Vitals noted Psychiatric:   Affect normal, Alert and Oriented to person/place/time Eyes:   Pupils equally round and reactive, EOMI, conjunctiva clear, eyelids normal 
ENT:   External ears and nose normal, teeth normal, gums normal, TMs and Orophyarynx normal 
Neck:   General inspection and Thyroid normal.  No abnormal cervical or supraclavicular nodes Lungs:   Clear to auscultation, good respiratory effort Heart: Ausculation normal.  Regular rhythm. No cardiac murmurs. No carotid bruits or palpable thrills Chest wall normal 
Musculoskeletal: Gait antalgic Extremities:   Without edema, good peripheral pulses Skin:   Warm to palpation, without rashes, bruising, or suspicious lesions Recent Results (from the past 72 hour(s)) TYPE & SCREEN Collection Time: 03/15/21 12:11 PM  
Result Value Ref Range Crossmatch Expiration 03/29/2021,2359 ABO/Rh(D) O POSITIVE Antibody screen NEG   
CBC WITH AUTOMATED DIFF Collection Time: 03/15/21 12:11 PM  
Result Value Ref Range WBC 8.3 4.1 - 11.1 K/uL  
 RBC 3.31 (L) 4.10 - 5.70 M/uL  
 HGB 10.0 (L) 12.1 - 17.0 g/dL HCT 31.2 (L) 36.6 - 50.3 % MCV 94.3 80.0 - 99.0 FL  
 MCH 30.2 26.0 - 34.0 PG  
 MCHC 32.1 30.0 - 36.5 g/dL  
 RDW 14.6 (H) 11.5 - 14.5 % PLATELET 491 453 - 471 K/uL MPV 10.1 8.9 - 12.9 FL  
 NRBC 0.0 0  WBC ABSOLUTE NRBC 0.00 0.00 - 0.01 K/uL NEUTROPHILS 49 32 - 75 % LYMPHOCYTES 32 12 - 49 % MONOCYTES 13 5 - 13 % EOSINOPHILS 4 0 - 7 % BASOPHILS 1 0 - 1 % IMMATURE GRANULOCYTES 1 (H) 0.0 - 0.5 % ABS. NEUTROPHILS 4.1 1.8 - 8.0 K/UL  
 ABS. LYMPHOCYTES 2.7 0.8 - 3.5 K/UL  
 ABS. MONOCYTES 1.1 (H) 0.0 - 1.0 K/UL  
 ABS. EOSINOPHILS 0.4 0.0 - 0.4 K/UL  
 ABS. BASOPHILS 0.1 0.0 - 0.1 K/UL  
 ABS. IMM. GRANS. 0.0 0.00 - 0.04 K/UL  
 DF AUTOMATED METABOLIC PANEL, COMPREHENSIVE Collection Time: 03/15/21 12:11 PM  
Result Value Ref Range Sodium 140 136 - 145 mmol/L Potassium 4.4 3.5 - 5.1 mmol/L Chloride 109 (H) 97 - 108 mmol/L  
 CO2 26 21 - 32 mmol/L Anion gap 5 5 - 15 mmol/L Glucose 91 65 - 100 mg/dL BUN 32 (H) 6 - 20 MG/DL Creatinine 2.43 (H) 0.70 - 1.30 MG/DL  
 BUN/Creatinine ratio 13 12 - 20 GFR est AA 32 (L) >60 ml/min/1.73m2 GFR est non-AA 26 (L) >60 ml/min/1.73m2 Calcium 9.2 8.5 - 10.1 MG/DL Bilirubin, total 0.7 0.2 - 1.0 MG/DL  
 ALT (SGPT) 23 12 - 78 U/L  
 AST (SGOT) 15 15 - 37 U/L Alk. phosphatase 116 45 - 117 U/L Protein, total 6.9 6.4 - 8.2 g/dL Albumin 3.7 3.5 - 5.0 g/dL Globulin 3.2 2.0 - 4.0 g/dL A-G Ratio 1.2 1.1 - 2.2 URINALYSIS W/MICROSCOPIC Collection Time: 03/15/21 12:11 PM  
Result Value Ref Range Color YELLOW/STRAW Appearance CLEAR CLEAR Specific gravity 1.019 1.003 - 1.030    
 pH (UA) 5.5 5.0 - 8.0 Protein 30 (A) NEG mg/dL Glucose Negative NEG mg/dL Ketone Negative NEG mg/dL Bilirubin Negative NEG Blood Negative NEG Urobilinogen 0.2 0.2 - 1.0 EU/dL Nitrites Negative NEG Leukocyte Esterase Negative NEG    
 WBC 0-4 0 - 4 /hpf  
 RBC 0-5 0 - 5 /hpf Epithelial cells FEW FEW /lpf Bacteria Negative NEG /hpf Hyaline cast 0-2 0 - 5 /lpf CULTURE, URINE Collection Time: 03/15/21 12:11 PM  
 Specimen: Voided Urine Result Value Ref Range Special Requests: NO SPECIAL REQUESTS Culture result: No growth (<1,000 CFU/ML) Assessment:  
 
Renal lesion Plan:  
 
Right partial nephrectomy Labs reviewed. EKG reviewed from cardiology Signed By: Olvin Ta NP March 16, 2021

## 2021-03-15 NOTE — H&P
History and Physical    Patient: Collette Baston MRN: [id number]  SSN: [id number]    YOB: 1945  Age: 66 y.o. Sex: male      Subjective:      Collette Baston is a 66 y.o. male who went to [facility] on 11/13/2020 with flank pain and during testing it was discovered he has a renal lesion on his right kidney. He denies pain or hematuria. He is followed by vascular with Dr. Karlo Fuller for carotid stenosis and under went a right carotid enterectomy. He is also followed by Dr. Reyes Quan with neurology for his peripheral neuropathy. He is also followed by Cardiology, Dr. Carlos Stanton who has given clearance in CC. He may stop his ASA 7 days prior. He is also seeing pulmonary who has also given clearance.       Past Medical History:   Diagnosis Date    Abnormal stress echo 5/12/2015    Anemia NEC 03/2013    Anxiety     Borderline diabetes     CAD (coronary artery disease) 2009    cath [facility]) revealed 20%RCA and 50%2nd diagonal    Calculus of kidney     Chronic kidney disease, stage III (moderate) 10/11/2009    30% kidney function    COPD, mild (HCC)     Followed by pulmonary associates    DJD (degenerative joint disease)     Fatty liver     GERD (gastroesophageal reflux disease) 10/11/2009    Gout     Hypertension     MELODY on CPAP 10/11/2009    nasal pillows; pressure set at 10-11 -     Peripheral neuropathy 10/11/2009    bilateral feet (numbness)    Renal cell cancer, right ([address] 01/2021    RLS (restless legs syndrome) 10/11/2009    S/P coronary artery stent placement 05/12/2015    PCI./MALI to LCx, 8/2019 PCI/MALI Prox LAD (RCA 40-50% lesions)     Past Surgical History:   Procedure Laterality Date    HX BUNIONECTOMY  2019    HX CAROTID ENDARTERECTOMY Left 01/2020    Followed by Dr. Flor Leary  2009, 7/17    x2    HX HERNIA REPAIR      [facility]amaney/umbilical    HX KNEE REPLACEMENT Left 07/23/2011    HX ORTHOPAEDIC      left shoulder manipulation    HX UROLOGICAL basket extraction of kidney stones    [place] COLONOSCOPY FLX DX W/COLLJ SPEC WHEN PFRMD  2010         [place] COLSC FLX W/RMVL OF TUMOR POLYP LESION SNARE TQ  2014           Family History   Problem Relation Age of Onset    Heart Disease Father     Hypertension Father     Other Father         abdominal aneurysm     Dementia Mother     Alzheimer Mother     Heart Disease Brother     Heart Attack Brother     Heart Disease Maternal Grandmother     Heart Disease Paternal Grandmother     Heart Attack Paternal Grandmother     Heart Disease Paternal Grandfather     Heart Attack Paternal Grandfather     Elevated Lipids Son     Elevated Lipids Daughter     Cancer Neg Hx     Diabetes Neg Hx     Stroke Neg Hx      Social History     Tobacco Use    Smoking status: Former Smoker     Packs/day: 1.00     Years: 10.00     Pack years: 10.00     Types: Cigarettes     Quit date: 1968     Years since quittin.2    Smokeless tobacco: Never Used   Substance Use Topics    Alcohol use: No     Alcohol/week: 0.0 standard drinks      Prior to Admission medications    Medication Sig Start Date End Date Taking? Authorizing Provider   albuterol (ProAir HFA) 90 mcg/actuation inhaler Take 1 Puff by inhalation every four (4) hours. Yes Provider, Historical   umeclidinium-vilanteroL (Anoro Ellipta) 62.5-25 mcg/actuation inhaler Take 1 Puff by inhalation daily. Yes Provider, Historical   rOPINIRole (Requip) 0.5 mg tablet Take 0.5 mg by mouth nightly. Yes Provider, Historical   ketoconazole (NIZORAL) 2 % shampoo Apply 1 mL to affected area daily as needed for Itching. Yes Provider, Historical   atorvastatin (Lipitor) 80 mg tablet Take 80 mg by mouth nightly. Yes Provider, Historical   citalopram (CELEXA) 40 mg tablet Take 40 mg by mouth every evening. Yes Provider, Historical   metoprolol succinate (TOPROL-XL) 25 mg XL tablet Take 25 mg by mouth nightly.    Yes Provider, Historical   telmisartan (MICARDIS) 40 mg tablet Take 40 mg by mouth nightly. Yes Provider, Historical   pantoprazole (PROTONIX) 40 mg tablet TAKE 1 TABLET BY MOUTH EVERY DAY 12/17/20  Yes Winter Cheung MD   isosorbide mononitrate ER (IMDUR) 30 mg tablet Take 0.5 Tabs by mouth daily. 10/21/20  Yes Theo Nguyen NP   nitroglycerin (NITROSTAT) 0.4 mg SL tablet TAKE 1 TABLET BY SUBLINGUAL ROUTE EVERY 5 MINUTES AS NEEDED FOR CHEST PAIN. 3/4/20  Yes Winter Cheung MD   VITAMIN D3 2,000 unit cap capsule Take 2,000 Units by mouth daily. 3/3/15  Yes Provider, Historical   amLODIPine (NORVASC) 5 mg tablet Take 5 mg by mouth daily. 6/16/14  Yes Provider, Historical   GLUCOSAMINE HCL/CHONDR PETERSEN A NA (GLUCOSAMINE-CHONDROITIN) 750-600 mg Tab Take 1 Tab by mouth daily. Brand: Move Free   Yes Provider, Historical   aspirin delayed-release 81 mg tablet Take 81 mg by mouth nightly. Yes Provider, Historical   allopurinol (ZYLOPRIM) 100 mg tablet Take 100 mg by mouth every morning. 6/5/12  Yes Provider, Historical   MULTIVITS W-FE,OTHER MIN (CENTRUM PO) Take 1 Tab by mouth daily. Yes Provider, Historical   OXYGEN-AIR DELIVERY SYSTEMS (HORIZON NASAL CPAP SYSTEM) by Does Not Apply route. Provider, Historical        Allergies   Allergen Reactions    Bactrim [Sulfamethoxazole-Trimethoprim] Hives    Codeine Itching    Lisinopril (Bulk) Cough    Neurontin [Gabapentin] Other (comments)     drowsy    Zostavax [Zoster Vaccine Live (Pf)] Rash     See 9/10/13 visit    Penicillins Hives     Pt states he has taken Keflex before without problems       Review of Systems:  Constitutional: Negative for chills and fever  HENT: Negative for congestion and sore throat  Eyes: negative for blurred vision and double vision  Respiratory: Negative for cough and wheezing. SOB with exertion  Mouth: Negative for loose, broken or chipped teeth.    Cardiovascular: Negative for chest pain and palpitations  Gastrointestinal: Negative for abdominal pain, constipation, diarrhea and nausea  Genitourinary: Negative for dysuria and hematuria  Musculoskeletal: Mild joint pain  Skin: Negative for rash, open wounds. Negative for bruises easily  Neurological: Negative for dizziness, tremors and headaches  Psychiatric: Negative for depression. The patient is not nervous/anxious. Objective:     Vitals:    03/15/21 1129   BP: (!) 146/68   Pulse: 61   Resp: 20   Temp: 98 °F (36.7 °C)   SpO2: 97%   Weight: 115.9 kg (255 lb 8.2 oz)   Height: 6' (1.829 m)        Physical Exam:  Constitutional:  Appears well,  No Acute Distress, Vitals noted  Psychiatric:   Affect normal, Alert and Oriented to person/place/time    Eyes:   Pupils equally round and reactive, EOMI, conjunctiva clear, eyelids normal  ENT:   External ears and nose normal, teeth normal, gums normal, TMs and Orophyarynx normal  Neck:   General inspection and Thyroid normal.  No abnormal cervical or supraclavicular nodes    Lungs:   Clear to auscultation, good respiratory effort  Heart: Ausculation normal.  Regular rhythm. No cardiac murmurs.   No carotid bruits or palpable thrills  Chest wall normal  Musculoskeletal: Gait antalgic  Extremities:   Without edema, good peripheral pulses  Skin:   Warm to palpation, without rashes, bruising, or suspicious lesions     Recent Results (from the past 72 hour(s))   TYPE & SCREEN    Collection Time: 03/15/21 12:11 PM   Result Value Ref Range    Crossmatch Expiration 03/29/2021,2359     ABO/Rh(D) Jarrell Kristinaet POSITIVE     Antibody screen NEG    CBC WITH AUTOMATED DIFF    Collection Time: 03/15/21 12:11 PM   Result Value Ref Range    WBC 8.3 4.1 - 11.1 K/uL    RBC 3.31 (L) 4.10 - 5.70 M/uL    HGB 10.0 (L) 12.1 - 17.0 g/dL    HCT 31.2 (L) 36.6 - 50.3 %    MCV 94.3 80.0 - 99.0 FL    MCH 30.2 26.0 - 34.0 PG    MCHC 32.1 30.0 - 36.5 g/dL    RDW 14.6 (H) 11.5 - 14.5 %    PLATELET 671 222 - 093 K/uL    MPV 10.1 8.9 - 12.9 FL    NRBC 0.0 0  WBC    ABSOLUTE NRBC 0.00 0.00 - 0.01 K/uL    NEUTROPHILS 49 32 - 75 % LYMPHOCYTES 32 12 - 49 %    MONOCYTES 13 5 - 13 %    EOSINOPHILS 4 0 - 7 %    BASOPHILS 1 0 - 1 %    IMMATURE GRANULOCYTES 1 (H) 0.0 - 0.5 %    ABS. NEUTROPHILS 4.1 1.8 - 8.0 K/UL    ABS. LYMPHOCYTES 2.7 0.8 - 3.5 K/UL    ABS. MONOCYTES 1.1 (H) 0.0 - 1.0 K/UL    ABS. EOSINOPHILS 0.4 0.0 - 0.4 K/UL    ABS. BASOPHILS 0.1 0.0 - 0.1 K/UL    ABS. IMM. GRANS. 0.0 0.00 - 0.04 K/UL    DF AUTOMATED     METABOLIC PANEL, COMPREHENSIVE    Collection Time: 03/15/21 12:11 PM   Result Value Ref Range    Sodium 140 136 - 145 mmol/L    Potassium 4.4 3.5 - 5.1 mmol/L    Chloride 109 (H) 97 - 108 mmol/L    CO2 26 21 - 32 mmol/L    Anion gap 5 5 - 15 mmol/L    Glucose 91 65 - 100 mg/dL    BUN 32 (H) 6 - 20 MG/DL    Creatinine 2.43 (H) 0.70 - 1.30 MG/DL    BUN/Creatinine ratio 13 12 - 20      GFR est AA 32 (L) >60 ml/min/1.73m2    GFR est non-AA 26 (L) >60 ml/min/1.73m2    Calcium 9.2 8.5 - 10.1 MG/DL    Bilirubin, total 0.7 0.2 - 1.0 MG/DL    ALT (SGPT) 23 12 - 78 U/L    AST (SGOT) 15 15 - 37 U/L    Alk.  phosphatase 116 45 - 117 U/L    Protein, total 6.9 6.4 - 8.2 g/dL    Albumin 3.7 3.5 - 5.0 g/dL    Globulin 3.2 2.0 - 4.0 g/dL    A-G Ratio 1.2 1.1 - 2.2     URINALYSIS W/MICROSCOPIC    Collection Time: 03/15/21 12:11 PM   Result Value Ref Range    Color YELLOW/STRAW      Appearance CLEAR CLEAR      Specific gravity 1.019 1.003 - 1.030      pH (UA) 5.5 5.0 - 8.0      Protein 30 (A) NEG mg/dL    Glucose Negative NEG mg/dL    Ketone Negative NEG mg/dL    Bilirubin Negative NEG      Blood Negative NEG      Urobilinogen 0.2 0.2 - 1.0 EU/dL    Nitrites Negative NEG      Leukocyte Esterase Negative NEG      WBC 0-4 0 - 4 /hpf    RBC 0-5 0 - 5 /hpf    Epithelial cells FEW FEW /lpf    Bacteria Negative NEG /hpf    Hyaline cast 0-2 0 - 5 /lpf   CULTURE, URINE    Collection Time: 03/15/21 12:11 PM    Specimen: Voided Urine   Result Value Ref Range    Special Requests: NO SPECIAL REQUESTS      Culture result: No growth (<1,000 CFU/ML) Assessment:     Renal lesion    Plan:     Right partial nephrectomy  Labs reviewed.  EKG reviewed from cardiology    Signed By: Latanya Saldaña NP     March 16, 2021

## 2021-03-15 NOTE — PERIOP NOTES
It is now mandated that all surgical patients be tested for COVID-19 prior to surgery. Testing has to be exactly 4 days prior to surgery. Your COVID test date is Thursday, April 1st   between 8:00 am and 11:00 am.       COVID testing will be performed curbside at the [address] [place] [address] michele. There will be signs leading you to the testing site. You will need to bring a photo ID with you to be swabbed. Patients are advised to self-quarantine at home after testing and prior to your surgery date. You will be notified if your results are positive. What to watch for:   Coronavirus (COVID-19) affects different people in different ways   It also appears with a wide range of symptoms from mild to severe   Signs usually appear 2-14 days after exposure     If you develop any of the following, notify your doctor immediately:  o Fever  o Chills, with or without a shiver  o Muscle pain  o Headache  o Sore throat  o Dry cough  o New loss of taste or smell  o Tiredness      If you develop any of the following, call 911:  o Shortness of breath  o Difficulty breathing  o Chest pain  o New confusion  Blueness of fingers and/or lips  [address]                                  [phone]   MAIN PRE OP                          [phone]                                                                                AMBULATORY PRE OP          [phone]  PRE-ADMISSION TESTING    [phone]   Surgery Date: Monday, April 5th        Is surgery arrival time given by surgeon? NO  If NO, [phone] staff will call you between 3 and 7pm the day before your surgery with your arrival time. (If your surgery is on a Monday, we will call you the Friday before.)    Call [phone] after 7pm Monday-Friday if you did not receive this call.     INSTRUCTIONS BEFORE YOUR SURGERY   When You  Arrive Arrive at the 2nd Floor Admitting Desk on the day of your surgery  Have your insurance card, photo ID, and any copayment (if needed)   Food   and   Drink NO food or drink after midnight the night before surgery    This means NO water, gum, mints, coffee, juice, etc.  No alcohol (beer, wine, liquor) 24 hours before and after surgery   Medications to   TAKE   Morning of Surgery MEDICATIONS TO TAKE THE MORNING OF SURGERY WITH A SIP OF WATER:   Albuterol Inhaler and please bring with you  Amlodipine  Isosorbide  Protonix  Anoro Inhaler   Medications  To  STOP      7 days before surgery Non-Steroidal anti-inflammatory Drugs (NSAID's): for example, Ibuprofen (Advil, Motrin), Naproxen (Aleve)  Aspirin, if taking for pain   Herbal supplements, vitamins, and fish oil  Other:  (Pain medications not listed above, including Tylenol may be taken)   Blood  Thinners If you take  Aspirin, Plavix, Coumadin, or any blood-thinning or anti-blood clot medicine, talk to the doctor who prescribed the medications for pre-operative instructions. (Done/stop 7 days preop)   Bathing Clothing  Jewelry  Valuables     If you shower the morning of surgery, please do not apply anything to your skin (lotions, powders, deodorant, or makeup, especially mascara)  Follow Chlorhexidine Care [employer] body wash instructions provided to you during PAT appointment. Begin 3 days prior to surgery. Do not shave or trim anywhere 24 hours before surgery  Wear your hair loose or down; no pony-tails, buns, or metal hair clips  Wear loose, comfortable, clean clothes  Wear glasses instead of contacts  Leave money, valuables, and jewelry, including body piercings, at home    Spending the Night ADMITS: If your doctor is keeping you in the hospital after surgery, leave personal belongings/luggage in your car until you have a hospital room number.     Hospital discharge time is 12 noon  Drivers must be here before 12 noon unless you are told differently   Special Instructions Bring CPAP to Preop  DO NOT TAKE: Telmisartan the evening before surgery     Follow all instructions so your surgery wont be cancelled. Please, be on time. If a situation occurs and you are delayed the day of surgery, call [phone] or [phone]. If your physical condition changes (like a fever, cold, flu, etc.) call your surgeon. Home medication(s) reviewed and verified via      LIST   VERBAL   during PAT appointment. The patient was contacted by      IN-PERSON  The patient verbalizes understanding of all instructions and     DOES NOT   need reinforcement.   o

## 2021-03-16 LAB
BACTERIA SPEC CULT: NORMAL
SERVICE CMNT-IMP: NORMAL

## 2021-03-30 ENCOUNTER — TELEPHONE (OUTPATIENT)
Dept: INTERNAL MEDICINE CLINIC | Age: 76
End: 2021-03-30

## 2021-03-30 NOTE — TELEPHONE ENCOUNTER
Left message for patients daughter Aida Blas that I have placed the handicap form in Dr. Nuzhat Hightower office for her to complete for the patient.   Advised we will contact her when the form is complete and ready to be picked up

## 2021-03-30 NOTE — TELEPHONE ENCOUNTER
Patient's daughter, Lissett Brown, states she needs a call back to get a Form/Order Done for Patient to receive a Handicap Parking Permit for himself. Bonita Sarabia states wife had Parking permit but she passed Away & patient now needs one in his name. Please call to discuss.  Thank you

## 2021-04-02 ENCOUNTER — ANESTHESIA EVENT (OUTPATIENT)
Dept: SURGERY | Age: 76
DRG: 656 | End: 2021-04-02
Payer: MEDICARE

## 2021-04-02 ENCOUNTER — TRANSCRIBE ORDER (OUTPATIENT)
Dept: REGISTRATION | Age: 76
End: 2021-04-02

## 2021-04-02 ENCOUNTER — HOSPITAL ENCOUNTER (OUTPATIENT)
Dept: LAB | Age: 76
Discharge: HOME | End: 2021-04-02
Payer: MEDICARE

## 2021-04-02 DIAGNOSIS — Z01.812 PRE-PROCEDURAL LABORATORY EXAMINATIONS: ICD-10-CM

## 2021-04-02 DIAGNOSIS — Z01.812 PRE-PROCEDURAL LABORATORY EXAMINATIONS: Primary | ICD-10-CM

## 2021-04-02 PROCEDURE — U0003 INFECTIOUS AGENT DETECTION BY NUCLEIC ACID (DNA OR RNA); SEVERE ACUTE RESPIRATORY SYNDROME CORONAVIRUS 2 (SARS-COV-2) (CORONAVIRUS DISEASE [COVID-19]), AMPLIFIED PROBE TECHNIQUE, MAKING USE OF HIGH THROUGHPUT TECHNOLOGIES AS DESCRIBED BY CMS-2020-01-R: HCPCS

## 2021-04-02 PROCEDURE — 36415 COLL VENOUS BLD VENIPUNCTURE: CPT

## 2021-04-03 LAB — SARS-COV-2, COV2NT: NOT DETECTED

## 2021-04-05 ENCOUNTER — HOSPITAL ENCOUNTER (INPATIENT)
Age: 76
LOS: 2 days | Discharge: HOME | DRG: 656 | End: 2021-04-09
Attending: UROLOGY | Admitting: UROLOGY
Payer: MEDICARE

## 2021-04-05 ENCOUNTER — ANESTHESIA (OUTPATIENT)
Dept: SURGERY | Age: 76
DRG: 656 | End: 2021-04-05
Payer: MEDICARE

## 2021-04-05 DIAGNOSIS — C64.1 RENAL CELL CARCINOMA OF RIGHT KIDNEY (HCC): Primary | ICD-10-CM

## 2021-04-05 PROBLEM — N28.89 RIGHT KIDNEY MASS: Status: ACTIVE | Noted: 2021-04-05

## 2021-04-05 LAB
ERYTHROCYTE [DISTWIDTH] IN BLOOD BY AUTOMATED COUNT: 14.6 % (ref 11.5–14.5)
GLUCOSE BLD STRIP.AUTO-MCNC: 147 MG/DL (ref 65–100)
GLUCOSE BLD STRIP.AUTO-MCNC: 149 MG/DL (ref 65–100)
GLUCOSE BLD STRIP.AUTO-MCNC: 99 MG/DL (ref 65–100)
HCT VFR BLD AUTO: 28.4 % (ref 36.6–50.3)
HGB BLD-MCNC: 8.8 G/DL (ref 12.1–17)
MCH RBC QN AUTO: 29.9 PG (ref 26–34)
MCHC RBC AUTO-ENTMCNC: 31 G/DL (ref 30–36.5)
MCV RBC AUTO: 96.6 FL (ref 80–99)
NRBC # BLD: 0 K/UL (ref 0–0.01)
NRBC BLD-RTO: 0 PER 100 WBC
PLATELET # BLD AUTO: 203 K/UL (ref 150–400)
PMV BLD AUTO: 9.9 FL (ref 8.9–12.9)
RBC # BLD AUTO: 2.94 M/UL (ref 4.1–5.7)
SERVICE CMNT-IMP: ABNORMAL
SERVICE CMNT-IMP: ABNORMAL
SERVICE CMNT-IMP: NORMAL
WBC # BLD AUTO: 17.3 K/UL (ref 4.1–11.1)

## 2021-04-05 PROCEDURE — 77030013079 HC BLNKT BAIR HGGR 3M -A: Performed by: ANESTHESIOLOGY

## 2021-04-05 PROCEDURE — 77030020703 HC SEAL CANN DISP INTU -B: Performed by: UROLOGY

## 2021-04-05 PROCEDURE — 74011250637 HC RX REV CODE- 250/637: Performed by: UROLOGY

## 2021-04-05 PROCEDURE — 74011000250 HC RX REV CODE- 250: Performed by: UROLOGY

## 2021-04-05 PROCEDURE — 77030008756 HC TU IRR SUC STRY -B: Performed by: UROLOGY

## 2021-04-05 PROCEDURE — 74011250636 HC RX REV CODE- 250/636: Performed by: NURSE ANESTHETIST, CERTIFIED REGISTERED

## 2021-04-05 PROCEDURE — 77030029684 HC NEB SM VOL KT MONA -A

## 2021-04-05 PROCEDURE — 94664 DEMO&/EVAL PT USE INHALER: CPT

## 2021-04-05 PROCEDURE — 77030036732 HC RELD STPLR VASC J&J -F: Performed by: UROLOGY

## 2021-04-05 PROCEDURE — 77030028271 HC SRGFL HEMSTAT MTRX KT J&J -C: Performed by: UROLOGY

## 2021-04-05 PROCEDURE — 77030019908 HC STETH ESOPH SIMS -A: Performed by: ANESTHESIOLOGY

## 2021-04-05 PROCEDURE — 77030008684 HC TU ET CUF COVD -B: Performed by: ANESTHESIOLOGY

## 2021-04-05 PROCEDURE — 77030010935 HC CLP LIG ABSRB TELE -B: Performed by: UROLOGY

## 2021-04-05 PROCEDURE — 77030018673: Performed by: UROLOGY

## 2021-04-05 PROCEDURE — 86923 COMPATIBILITY TEST ELECTRIC: CPT

## 2021-04-05 PROCEDURE — 77030016151 HC PROTCTR LNS DFOG COVD -B: Performed by: UROLOGY

## 2021-04-05 PROCEDURE — 76010000880 HC OR TIME 4 TO 4.5HR INTENSV - TIER 2: Performed by: UROLOGY

## 2021-04-05 PROCEDURE — 77030002996 HC SUT SLK J&J -A: Performed by: UROLOGY

## 2021-04-05 PROCEDURE — 77030009851 HC PCH RTVR ENDOSC AMR -B: Performed by: UROLOGY

## 2021-04-05 PROCEDURE — 77030035029 HC NDL INSUF VERES DISP COVD -B: Performed by: UROLOGY

## 2021-04-05 PROCEDURE — 76060000039 HC ANESTHESIA 4 TO 4.5 HR: Performed by: UROLOGY

## 2021-04-05 PROCEDURE — 77030026438 HC STYL ET INTUB CARD -A: Performed by: ANESTHESIOLOGY

## 2021-04-05 PROCEDURE — 77030020263 HC SOL INJ SOD CL0.9% LFCR 1000ML: Performed by: UROLOGY

## 2021-04-05 PROCEDURE — 0TB04ZZ EXCISION OF RIGHT KIDNEY, PERCUTANEOUS ENDOSCOPIC APPROACH: ICD-10-PCS | Performed by: UROLOGY

## 2021-04-05 PROCEDURE — 85027 COMPLETE CBC AUTOMATED: CPT

## 2021-04-05 PROCEDURE — 74011250636 HC RX REV CODE- 250/636: Performed by: STUDENT IN AN ORGANIZED HEALTH CARE EDUCATION/TRAINING PROGRAM

## 2021-04-05 PROCEDURE — 77030002986 HC SUT PROL J&J -A: Performed by: UROLOGY

## 2021-04-05 PROCEDURE — 99218 HC RM OBSERVATION: CPT

## 2021-04-05 PROCEDURE — 77030037032 HC INSRT SCIS CLICKLLINE DISP STOR -B: Performed by: UROLOGY

## 2021-04-05 PROCEDURE — 76210000000 HC OR PH I REC 2 TO 2.5 HR: Performed by: UROLOGY

## 2021-04-05 PROCEDURE — 77030015933 HC FIBRIJET DUPLO BAXT -B: Performed by: UROLOGY

## 2021-04-05 PROCEDURE — 94640 AIRWAY INHALATION TREATMENT: CPT

## 2021-04-05 PROCEDURE — 77030014007 HC SPNG HEMSTAT J&J -B: Performed by: UROLOGY

## 2021-04-05 PROCEDURE — 8E0W4CZ ROBOTIC ASSISTED PROCEDURE OF TRUNK REGION, PERCUTANEOUS ENDOSCOPIC APPROACH: ICD-10-PCS | Performed by: UROLOGY

## 2021-04-05 PROCEDURE — 74011250636 HC RX REV CODE- 250/636: Performed by: UROLOGY

## 2021-04-05 PROCEDURE — 77030002933 HC SUT MCRYL J&J -A: Performed by: UROLOGY

## 2021-04-05 PROCEDURE — 86901 BLOOD TYPING SEROLOGIC RH(D): CPT

## 2021-04-05 PROCEDURE — 77030008771 HC TU NG SALEM SUMP -A: Performed by: ANESTHESIOLOGY

## 2021-04-05 PROCEDURE — 77030018390 HC SPNG HEMSTAT2 J&J -B: Performed by: UROLOGY

## 2021-04-05 PROCEDURE — 74011000254 HC RX REV CODE- 254: Performed by: NURSE ANESTHETIST, CERTIFIED REGISTERED

## 2021-04-05 PROCEDURE — 77030002916 HC SUT ETHLN J&J -A: Performed by: UROLOGY

## 2021-04-05 PROCEDURE — 77030014008 HC SPNG HEMSTAT J&J -C: Performed by: UROLOGY

## 2021-04-05 PROCEDURE — 77030010507 HC ADH SKN DERMBND J&J -B: Performed by: UROLOGY

## 2021-04-05 PROCEDURE — 77030022704 HC SUT VLOC COVD -B: Performed by: UROLOGY

## 2021-04-05 PROCEDURE — 82962 GLUCOSE BLOOD TEST: CPT

## 2021-04-05 PROCEDURE — 77030027876 HC STPLR ENDOSC FLX PWR J&J -G1: Performed by: UROLOGY

## 2021-04-05 PROCEDURE — 77030035277 HC OBTRTR BLDELSS DISP INTU -B: Performed by: UROLOGY

## 2021-04-05 PROCEDURE — 77030031139 HC SUT VCRL2 J&J -A: Performed by: UROLOGY

## 2021-04-05 PROCEDURE — 77030040922 HC BLNKT HYPOTHRM STRY -A

## 2021-04-05 PROCEDURE — 36415 COLL VENOUS BLD VENIPUNCTURE: CPT

## 2021-04-05 PROCEDURE — 77030031492 HC PRT ACC BLNT AIRSEAL CNMD -B: Performed by: UROLOGY

## 2021-04-05 PROCEDURE — 88307 TISSUE EXAM BY PATHOLOGIST: CPT

## 2021-04-05 PROCEDURE — 2709999900 HC NON-CHARGEABLE SUPPLY: Performed by: UROLOGY

## 2021-04-05 PROCEDURE — 77030040361 HC SLV COMPR DVT MDII -B

## 2021-04-05 PROCEDURE — 77030038552 HC DRN WND MDII -A: Performed by: UROLOGY

## 2021-04-05 PROCEDURE — 77030018831 HC SOL IRR H20 BAXT -A

## 2021-04-05 PROCEDURE — 74011000250 HC RX REV CODE- 250: Performed by: NURSE ANESTHETIST, CERTIFIED REGISTERED

## 2021-04-05 PROCEDURE — 77030009963 HC RELD STPLR ECR J&J -C: Performed by: UROLOGY

## 2021-04-05 PROCEDURE — 77030005513 HC CATH URETH FOL11 MDII -B: Performed by: UROLOGY

## 2021-04-05 RX ORDER — PHENYLEPHRINE HCL IN 0.9% NACL 0.4MG/10ML
SYRINGE (ML) INTRAVENOUS AS NEEDED
Status: DISCONTINUED | OUTPATIENT
Start: 2021-04-05 | End: 2021-04-05 | Stop reason: HOSPADM

## 2021-04-05 RX ORDER — SENNOSIDES 8.6 MG/1
2 TABLET ORAL
Status: DISCONTINUED | OUTPATIENT
Start: 2021-04-05 | End: 2021-04-09 | Stop reason: HOSPADM

## 2021-04-05 RX ORDER — OXYCODONE HYDROCHLORIDE 5 MG/1
5 TABLET ORAL
Qty: 20 TAB | Refills: 0 | Status: SHIPPED | OUTPATIENT
Start: 2021-04-05 | End: 2021-04-12

## 2021-04-05 RX ORDER — MAGNESIUM SULFATE 100 %
4 CRYSTALS MISCELLANEOUS AS NEEDED
Status: DISCONTINUED | OUTPATIENT
Start: 2021-04-05 | End: 2021-04-09 | Stop reason: HOSPADM

## 2021-04-05 RX ORDER — BUPIVACAINE HYDROCHLORIDE 2.5 MG/ML
INJECTION, SOLUTION EPIDURAL; INFILTRATION; INTRACAUDAL AS NEEDED
Status: DISCONTINUED | OUTPATIENT
Start: 2021-04-05 | End: 2021-04-05 | Stop reason: HOSPADM

## 2021-04-05 RX ORDER — HYDROMORPHONE HYDROCHLORIDE 1 MG/ML
.5-1 INJECTION, SOLUTION INTRAMUSCULAR; INTRAVENOUS; SUBCUTANEOUS
Status: DISCONTINUED | OUTPATIENT
Start: 2021-04-05 | End: 2021-04-05 | Stop reason: HOSPADM

## 2021-04-05 RX ORDER — MORPHINE SULFATE 2 MG/ML
2 INJECTION, SOLUTION INTRAMUSCULAR; INTRAVENOUS
Status: DISCONTINUED | OUTPATIENT
Start: 2021-04-05 | End: 2021-04-09 | Stop reason: HOSPADM

## 2021-04-05 RX ORDER — ISOSORBIDE MONONITRATE 30 MG/1
15 TABLET, EXTENDED RELEASE ORAL DAILY
Status: DISCONTINUED | OUTPATIENT
Start: 2021-04-06 | End: 2021-04-09 | Stop reason: HOSPADM

## 2021-04-05 RX ORDER — ONDANSETRON 2 MG/ML
INJECTION INTRAMUSCULAR; INTRAVENOUS AS NEEDED
Status: DISCONTINUED | OUTPATIENT
Start: 2021-04-05 | End: 2021-04-05 | Stop reason: HOSPADM

## 2021-04-05 RX ORDER — IPRATROPIUM BROMIDE 0.5 MG/2.5ML
0.5 SOLUTION RESPIRATORY (INHALATION)
Status: DISCONTINUED | OUTPATIENT
Start: 2021-04-06 | End: 2021-04-06 | Stop reason: SDUPTHER

## 2021-04-05 RX ORDER — DEXTROSE 50 % IN WATER (D50W) INTRAVENOUS SYRINGE
12.5-25 AS NEEDED
Status: DISCONTINUED | OUTPATIENT
Start: 2021-04-05 | End: 2021-04-09 | Stop reason: HOSPADM

## 2021-04-05 RX ORDER — SODIUM CHLORIDE, SODIUM LACTATE, POTASSIUM CHLORIDE, CALCIUM CHLORIDE 600; 310; 30; 20 MG/100ML; MG/100ML; MG/100ML; MG/100ML
125 INJECTION, SOLUTION INTRAVENOUS CONTINUOUS
Status: DISCONTINUED | OUTPATIENT
Start: 2021-04-05 | End: 2021-04-06

## 2021-04-05 RX ORDER — INSULIN LISPRO 100 [IU]/ML
INJECTION, SOLUTION INTRAVENOUS; SUBCUTANEOUS
Status: DISCONTINUED | OUTPATIENT
Start: 2021-04-05 | End: 2021-04-09 | Stop reason: HOSPADM

## 2021-04-05 RX ORDER — GLYCOPYRROLATE 0.2 MG/ML
INJECTION INTRAMUSCULAR; INTRAVENOUS AS NEEDED
Status: DISCONTINUED | OUTPATIENT
Start: 2021-04-05 | End: 2021-04-05 | Stop reason: HOSPADM

## 2021-04-05 RX ORDER — ACETAMINOPHEN 500 MG
1000 TABLET ORAL EVERY 6 HOURS
Status: DISCONTINUED | OUTPATIENT
Start: 2021-04-06 | End: 2021-04-09 | Stop reason: HOSPADM

## 2021-04-05 RX ORDER — LIDOCAINE 4 G/100G
1 PATCH TOPICAL EVERY 24 HOURS
Status: DISCONTINUED | OUTPATIENT
Start: 2021-04-05 | End: 2021-04-09 | Stop reason: HOSPADM

## 2021-04-05 RX ORDER — METOPROLOL SUCCINATE 25 MG/1
25 TABLET, EXTENDED RELEASE ORAL
Status: DISCONTINUED | OUTPATIENT
Start: 2021-04-05 | End: 2021-04-09 | Stop reason: HOSPADM

## 2021-04-05 RX ORDER — SODIUM CHLORIDE, SODIUM LACTATE, POTASSIUM CHLORIDE, CALCIUM CHLORIDE 600; 310; 30; 20 MG/100ML; MG/100ML; MG/100ML; MG/100ML
125 INJECTION, SOLUTION INTRAVENOUS CONTINUOUS
Status: DISCONTINUED | OUTPATIENT
Start: 2021-04-05 | End: 2021-04-05 | Stop reason: HOSPADM

## 2021-04-05 RX ORDER — LIDOCAINE HYDROCHLORIDE 10 MG/ML
0.1 INJECTION, SOLUTION EPIDURAL; INFILTRATION; INTRACAUDAL; PERINEURAL AS NEEDED
Status: DISCONTINUED | OUTPATIENT
Start: 2021-04-05 | End: 2021-04-05 | Stop reason: HOSPADM

## 2021-04-05 RX ORDER — ATORVASTATIN CALCIUM 20 MG/1
80 TABLET, FILM COATED ORAL
Status: DISCONTINUED | OUTPATIENT
Start: 2021-04-05 | End: 2021-04-09 | Stop reason: HOSPADM

## 2021-04-05 RX ORDER — OXYCODONE HYDROCHLORIDE 5 MG/1
5 TABLET ORAL
Status: DISCONTINUED | OUTPATIENT
Start: 2021-04-05 | End: 2021-04-09 | Stop reason: HOSPADM

## 2021-04-05 RX ORDER — MIDAZOLAM HYDROCHLORIDE 1 MG/ML
INJECTION, SOLUTION INTRAMUSCULAR; INTRAVENOUS AS NEEDED
Status: DISCONTINUED | OUTPATIENT
Start: 2021-04-05 | End: 2021-04-05 | Stop reason: HOSPADM

## 2021-04-05 RX ORDER — AMLODIPINE BESYLATE 5 MG/1
5 TABLET ORAL DAILY
Status: DISCONTINUED | OUTPATIENT
Start: 2021-04-06 | End: 2021-04-07

## 2021-04-05 RX ORDER — ONDANSETRON 2 MG/ML
4 INJECTION INTRAMUSCULAR; INTRAVENOUS AS NEEDED
Status: DISCONTINUED | OUTPATIENT
Start: 2021-04-05 | End: 2021-04-05 | Stop reason: HOSPADM

## 2021-04-05 RX ORDER — DIPHENHYDRAMINE HCL 25 MG
25 CAPSULE ORAL
Status: DISCONTINUED | OUTPATIENT
Start: 2021-04-05 | End: 2021-04-09 | Stop reason: HOSPADM

## 2021-04-05 RX ORDER — NEOSTIGMINE METHYLSULFATE 1 MG/ML
INJECTION, SOLUTION INTRAVENOUS AS NEEDED
Status: DISCONTINUED | OUTPATIENT
Start: 2021-04-05 | End: 2021-04-05 | Stop reason: HOSPADM

## 2021-04-05 RX ORDER — FENTANYL CITRATE 50 UG/ML
INJECTION, SOLUTION INTRAMUSCULAR; INTRAVENOUS AS NEEDED
Status: DISCONTINUED | OUTPATIENT
Start: 2021-04-05 | End: 2021-04-05 | Stop reason: HOSPADM

## 2021-04-05 RX ORDER — SODIUM CHLORIDE 0.9 % (FLUSH) 0.9 %
5-40 SYRINGE (ML) INJECTION EVERY 8 HOURS
Status: DISCONTINUED | OUTPATIENT
Start: 2021-04-05 | End: 2021-04-08

## 2021-04-05 RX ORDER — CITALOPRAM 20 MG/1
40 TABLET, FILM COATED ORAL EVERY EVENING
Status: DISCONTINUED | OUTPATIENT
Start: 2021-04-05 | End: 2021-04-09 | Stop reason: HOSPADM

## 2021-04-05 RX ORDER — PANTOPRAZOLE SODIUM 40 MG/1
40 TABLET, DELAYED RELEASE ORAL
Status: DISCONTINUED | OUTPATIENT
Start: 2021-04-06 | End: 2021-04-09 | Stop reason: HOSPADM

## 2021-04-05 RX ORDER — DEXAMETHASONE SODIUM PHOSPHATE 4 MG/ML
INJECTION, SOLUTION INTRA-ARTICULAR; INTRALESIONAL; INTRAMUSCULAR; INTRAVENOUS; SOFT TISSUE AS NEEDED
Status: DISCONTINUED | OUTPATIENT
Start: 2021-04-05 | End: 2021-04-05 | Stop reason: HOSPADM

## 2021-04-05 RX ORDER — INDOCYANINE GREEN AND WATER 25 MG
KIT INJECTION AS NEEDED
Status: DISCONTINUED | OUTPATIENT
Start: 2021-04-05 | End: 2021-04-05 | Stop reason: HOSPADM

## 2021-04-05 RX ORDER — ROCURONIUM BROMIDE 10 MG/ML
INJECTION, SOLUTION INTRAVENOUS AS NEEDED
Status: DISCONTINUED | OUTPATIENT
Start: 2021-04-05 | End: 2021-04-05 | Stop reason: HOSPADM

## 2021-04-05 RX ORDER — ALLOPURINOL 100 MG/1
100 TABLET ORAL DAILY
Status: DISCONTINUED | OUTPATIENT
Start: 2021-04-06 | End: 2021-04-09 | Stop reason: HOSPADM

## 2021-04-05 RX ORDER — AMOXICILLIN 250 MG
1 CAPSULE ORAL 2 TIMES DAILY
Qty: 60 TAB | Refills: 1 | Status: SHIPPED | OUTPATIENT
Start: 2021-04-05 | End: 2021-07-26

## 2021-04-05 RX ORDER — ONDANSETRON 4 MG/1
4 TABLET, ORALLY DISINTEGRATING ORAL
Status: DISCONTINUED | OUTPATIENT
Start: 2021-04-05 | End: 2021-04-09 | Stop reason: HOSPADM

## 2021-04-05 RX ORDER — ARFORMOTEROL TARTRATE 15 UG/2ML
15 SOLUTION RESPIRATORY (INHALATION)
Status: DISCONTINUED | OUTPATIENT
Start: 2021-04-06 | End: 2021-04-09 | Stop reason: HOSPADM

## 2021-04-05 RX ORDER — HYDROMORPHONE HYDROCHLORIDE 2 MG/ML
INJECTION, SOLUTION INTRAMUSCULAR; INTRAVENOUS; SUBCUTANEOUS AS NEEDED
Status: DISCONTINUED | OUTPATIENT
Start: 2021-04-05 | End: 2021-04-05 | Stop reason: HOSPADM

## 2021-04-05 RX ORDER — DOCUSATE SODIUM 100 MG/1
100 CAPSULE, LIQUID FILLED ORAL 2 TIMES DAILY
Status: DISCONTINUED | OUTPATIENT
Start: 2021-04-05 | End: 2021-04-09 | Stop reason: HOSPADM

## 2021-04-05 RX ORDER — NITROGLYCERIN 0.4 MG/1
0.4 TABLET SUBLINGUAL AS NEEDED
Status: DISCONTINUED | OUTPATIENT
Start: 2021-04-05 | End: 2021-04-09 | Stop reason: HOSPADM

## 2021-04-05 RX ORDER — SODIUM CHLORIDE 0.9 % (FLUSH) 0.9 %
5-40 SYRINGE (ML) INJECTION AS NEEDED
Status: DISCONTINUED | OUTPATIENT
Start: 2021-04-05 | End: 2021-04-09 | Stop reason: HOSPADM

## 2021-04-05 RX ORDER — PROPOFOL 10 MG/ML
INJECTION, EMULSION INTRAVENOUS AS NEEDED
Status: DISCONTINUED | OUTPATIENT
Start: 2021-04-05 | End: 2021-04-05 | Stop reason: HOSPADM

## 2021-04-05 RX ORDER — ROPINIROLE 0.25 MG/1
0.5 TABLET, FILM COATED ORAL
Status: DISCONTINUED | OUTPATIENT
Start: 2021-04-05 | End: 2021-04-09 | Stop reason: HOSPADM

## 2021-04-05 RX ORDER — IPRATROPIUM BROMIDE AND ALBUTEROL SULFATE 2.5; .5 MG/3ML; MG/3ML
3 SOLUTION RESPIRATORY (INHALATION)
Status: DISCONTINUED | OUTPATIENT
Start: 2021-04-05 | End: 2021-04-09 | Stop reason: HOSPADM

## 2021-04-05 RX ORDER — OXYCODONE HYDROCHLORIDE 5 MG/1
10 TABLET ORAL
Status: DISCONTINUED | OUTPATIENT
Start: 2021-04-05 | End: 2021-04-09 | Stop reason: HOSPADM

## 2021-04-05 RX ADMIN — FENTANYL CITRATE 50 MCG: 50 INJECTION, SOLUTION INTRAMUSCULAR; INTRAVENOUS at 16:41

## 2021-04-05 RX ADMIN — ROCURONIUM BROMIDE 10 MG: 10 INJECTION INTRAVENOUS at 16:00

## 2021-04-05 RX ADMIN — SODIUM CHLORIDE, POTASSIUM CHLORIDE, SODIUM LACTATE AND CALCIUM CHLORIDE 125 ML/HR: 600; 310; 30; 20 INJECTION, SOLUTION INTRAVENOUS at 19:00

## 2021-04-05 RX ADMIN — PROPOFOL 200 MG: 10 INJECTION, EMULSION INTRAVENOUS at 13:11

## 2021-04-05 RX ADMIN — ONDANSETRON HYDROCHLORIDE 4 MG: 2 SOLUTION INTRAMUSCULAR; INTRAVENOUS at 16:53

## 2021-04-05 RX ADMIN — FENTANYL CITRATE 50 MCG: 50 INJECTION, SOLUTION INTRAMUSCULAR; INTRAVENOUS at 13:04

## 2021-04-05 RX ADMIN — ROCURONIUM BROMIDE 10 MG: 10 INJECTION INTRAVENOUS at 14:29

## 2021-04-05 RX ADMIN — CEFAZOLIN SODIUM 2 G: 1 POWDER, FOR SOLUTION INTRAMUSCULAR; INTRAVENOUS at 13:04

## 2021-04-05 RX ADMIN — FENTANYL CITRATE 50 MCG: 50 INJECTION, SOLUTION INTRAMUSCULAR; INTRAVENOUS at 17:13

## 2021-04-05 RX ADMIN — HYDROMORPHONE HYDROCHLORIDE 0.5 MG: 2 INJECTION INTRAMUSCULAR; INTRAVENOUS; SUBCUTANEOUS at 17:14

## 2021-04-05 RX ADMIN — STANDARDIZED SENNA CONCENTRATE 17.2 MG: 8.6 TABLET ORAL at 21:17

## 2021-04-05 RX ADMIN — ROCURONIUM BROMIDE 80 MG: 10 INJECTION INTRAVENOUS at 13:11

## 2021-04-05 RX ADMIN — ATORVASTATIN CALCIUM 80 MG: 20 TABLET, FILM COATED ORAL at 22:58

## 2021-04-05 RX ADMIN — FENTANYL CITRATE 50 MCG: 50 INJECTION, SOLUTION INTRAMUSCULAR; INTRAVENOUS at 13:27

## 2021-04-05 RX ADMIN — ROPINIROLE HYDROCHLORIDE 0.5 MG: 0.25 TABLET, FILM COATED ORAL at 22:58

## 2021-04-05 RX ADMIN — ROCURONIUM BROMIDE 10 MG: 10 INJECTION INTRAVENOUS at 15:31

## 2021-04-05 RX ADMIN — FENTANYL CITRATE 50 MCG: 50 INJECTION, SOLUTION INTRAMUSCULAR; INTRAVENOUS at 15:31

## 2021-04-05 RX ADMIN — FENTANYL CITRATE 50 MCG: 50 INJECTION, SOLUTION INTRAMUSCULAR; INTRAVENOUS at 14:29

## 2021-04-05 RX ADMIN — HYDROMORPHONE HYDROCHLORIDE 0.5 MG: 1 INJECTION, SOLUTION INTRAMUSCULAR; INTRAVENOUS; SUBCUTANEOUS at 18:36

## 2021-04-05 RX ADMIN — IPRATROPIUM BROMIDE AND ALBUTEROL SULFATE 3 ML: .5; 2.5 SOLUTION RESPIRATORY (INHALATION) at 21:02

## 2021-04-05 RX ADMIN — HYDROMORPHONE HYDROCHLORIDE 0.5 MG: 2 INJECTION INTRAMUSCULAR; INTRAVENOUS; SUBCUTANEOUS at 17:25

## 2021-04-05 RX ADMIN — SODIUM CHLORIDE, POTASSIUM CHLORIDE, SODIUM LACTATE AND CALCIUM CHLORIDE: 600; 310; 30; 20 INJECTION, SOLUTION INTRAVENOUS at 12:27

## 2021-04-05 RX ADMIN — HYDROMORPHONE HYDROCHLORIDE 0.5 MG: 1 INJECTION, SOLUTION INTRAMUSCULAR; INTRAVENOUS; SUBCUTANEOUS at 18:07

## 2021-04-05 RX ADMIN — SODIUM CHLORIDE, POTASSIUM CHLORIDE, SODIUM LACTATE AND CALCIUM CHLORIDE: 600; 310; 30; 20 INJECTION, SOLUTION INTRAVENOUS at 17:24

## 2021-04-05 RX ADMIN — Medication 3 MG: at 17:10

## 2021-04-05 RX ADMIN — CITALOPRAM HYDROBROMIDE 40 MG: 20 TABLET ORAL at 21:17

## 2021-04-05 RX ADMIN — FENTANYL CITRATE 50 MCG: 50 INJECTION, SOLUTION INTRAMUSCULAR; INTRAVENOUS at 13:43

## 2021-04-05 RX ADMIN — FENTANYL CITRATE 50 MCG: 50 INJECTION, SOLUTION INTRAMUSCULAR; INTRAVENOUS at 17:09

## 2021-04-05 RX ADMIN — MIDAZOLAM HYDROCHLORIDE 2 MG: 2 INJECTION, SOLUTION INTRAMUSCULAR; INTRAVENOUS at 13:04

## 2021-04-05 RX ADMIN — Medication 40 MCG: at 16:07

## 2021-04-05 RX ADMIN — SODIUM CHLORIDE, POTASSIUM CHLORIDE, SODIUM LACTATE AND CALCIUM CHLORIDE: 600; 310; 30; 20 INJECTION, SOLUTION INTRAVENOUS at 14:31

## 2021-04-05 RX ADMIN — INDOCYANINE GREEN AND WATER 7.5 MG: KIT at 16:09

## 2021-04-05 RX ADMIN — GLYCOPYRROLATE 0.4 MG: 0.2 INJECTION INTRAMUSCULAR; INTRAVENOUS at 17:10

## 2021-04-05 RX ADMIN — DOCUSATE SODIUM 100 MG: 100 CAPSULE, LIQUID FILLED ORAL at 21:17

## 2021-04-05 RX ADMIN — DEXAMETHASONE SODIUM PHOSPHATE 8 MG: 4 INJECTION, SOLUTION INTRAMUSCULAR; INTRAVENOUS at 13:04

## 2021-04-05 RX ADMIN — FENTANYL CITRATE 100 MCG: 50 INJECTION, SOLUTION INTRAMUSCULAR; INTRAVENOUS at 13:10

## 2021-04-05 RX ADMIN — HYDROMORPHONE HYDROCHLORIDE 0.5 MG: 1 INJECTION, SOLUTION INTRAMUSCULAR; INTRAVENOUS; SUBCUTANEOUS at 17:53

## 2021-04-05 RX ADMIN — METOPROLOL SUCCINATE 25 MG: 25 TABLET, EXTENDED RELEASE ORAL at 21:17

## 2021-04-05 NOTE — INTERVAL H&P NOTE
Update History & Physical 
 
The Patient's History and Physical of March 15,  
2021 was reviewed with the patient and I examined the patient. There was no change. The surgical site was confirmed by the patient and me. Plan:  The risk, benefits, expected outcome, and alternative to the recommended procedure have been discussed with the patient. Patient understands and wants to proceed with the procedure.  
 
Electronically signed by Ivelisse Griffiths MD on 4/5/2021 at 12:37 PM

## 2021-04-05 NOTE — BRIEF OP NOTE
Brief Postoperative Note    Patient: Emilia Martinez  YOB: 1945  MRN: [id number]    Date of Procedure: 4/5/2021     Pre-Op Diagnosis: Right renal mass    Post-Op Diagnosis: Same as preoperative diagnosis. Procedure(s):  RIGHT ROBOTIC ASSISTED LAPAROSCOPIC PARTIAL NEPHRECTOMY    Surgeon(s):  MD Juan Koo MD    Surgical Assistant: Surg Asst-1: Leata Angle I    Anesthesia: General     Estimated Blood Loss (mL): 0018    Complications: None    Specimens:   ID Type Source Tests Collected by Time Destination   1 : Right renal mass Preservative Kidney, Right  Melvin Catalan MD 4/5/2021 1704 Pathology        Implants: None    Drains: 7FR EDIE drain. 16Fr Quiroz catheter. Findings: Right posterior renal mass. Significant perirenal fat inflammation. Despite no major bleeding event throughout case, steady ooze during dissection of fat and extraction tumor - 1L EBL. Floseal, tisseal. Excellent hemostasis at conclusion of case. 1A/1V hilar system. ICG/Firefly used. 1 clamp on artery. 21m clamp time. 2 layer closure. Gena Quiroz. Adrenal sparing. Gonadal vessel sparing.     Electronically Signed by Melvin Chisholm MD on 4/5/2021 at 5:25 PM

## 2021-04-05 NOTE — DISCHARGE SUMMARY
Discharge Summary     Patient: Darian Jaffe MRN: [id number]  SSN: [id number]    YOB: 1945  Age: 66 y.o.   Sex: male      Allergies: Bactrim [sulfamethoxazole-trimethoprim], Codeine, Lisinopril (bulk), Neurontin [gabapentin], Zostavax [zoster vaccine live (pf)], and Penicillins    Admit Date: 4/5/2021    Discharge Date: 4/9/2021     * Admission Diagnoses:  Right kidney mass [N28.89]     * Discharge Diagnoses:   Hospital Problems as of 4/9/2021 Date Reviewed: 4/8/2021          Codes Class Noted - Resolved POA    Acute hypoxemic respiratory failure ([facility] ICD-10-CM: J96.01  ICD-9-CM: 518.81  4/8/2021 - Present Yes        Anemia ICD-10-CM: D64.9  ICD-9-CM: 285.9  4/8/2021 - Present Yes        Hx of completed stroke ICD-10-CM: Z86.73  ICD-9-CM: V12.54  4/8/2021 - Present Yes        * (Principal) Right kidney mass ICD-10-CM: N28.89  ICD-9-CM: 593.9  4/5/2021 - Present Yes        CKD (chronic kidney disease) stage 4, GFR 15-29 ml/min ([facility]) ICD-10-CM: N18.4  ICD-9-CM: 585.4  11/23/2020 - Present Yes        Diabetic peripheral neuropathy associated with type 2 diabetes mellitus ([facility] ICD-10-CM: E11.42  ICD-9-CM: 250.60, 357.2  3/3/2020 - Present Yes        History of left-sided carotid endarterectomy ICD-10-CM: Z98.890  ICD-9-CM: V45.89  3/3/2020 - Present Yes        Bilateral carotid artery stenosis ICD-10-CM: I65.23  ICD-9-CM: 433.10, 433.30  1/9/2020 - Present Yes        Pure hypercholesterolemia ICD-10-CM: E78.00  ICD-9-CM: 272.0  8/30/2019 - Present Yes        Coronary artery disease due to lipid rich plaque ICD-10-CM: I25.10, I25.83  ICD-9-CM: 414.00, 414.3  4/27/2016 - Present Yes        Coronary artery disease involving native coronary artery of native heart without angina pectoris ICD-10-CM: I25.10  ICD-9-CM: 414.01  3/16/2016 - Present Yes        S/P coronary artery stent placement ICD-10-CM: Z95.5  ICD-9-CM: V45.82  5/12/2015 - Present Yes    Overview Addendum 8/5/2019  2:28 PM by Alyssa Hunter, Clarissa HAHN NP     5/11/15 PCI./MALI to LCx    8/5/19:  MALI LAD             Benign essential tremor ICD-10-CM: G25.0  ICD-9-CM: 333.1  1/22/2014 - Present Yes        Hypertensive kidney disease with chronic kidney disease stage III (HCC) (Chronic) ICD-10-CM: I12.9, N18.30  ICD-9-CM: 403.90, 585.3  11/22/2013 - Present Yes        Obesity ICD-10-CM: E66.9  ICD-9-CM: 278.00  1/30/2013 - Present Yes        Gout ICD-10-CM: M10.9  ICD-9-CM: 274.9  1/30/2013 - Present Yes        Chronic kidney disease, stage III (moderate) (HCC) ICD-10-CM: N18.30  ICD-9-CM: 585.3  10/11/2009 - Present Yes        Mixed hyperlipidemia (Chronic) ICD-10-CM: E78.2  ICD-9-CM: 272.2  10/11/2009 - Present Yes        Obstructive sleep apnea ICD-10-CM: G47.33  ICD-9-CM: 327.23  10/11/2009 - Present Yes        RLS (restless legs syndrome) ICD-10-CM: G25.81  ICD-9-CM: 333.94  10/11/2009 - Present Yes        Peripheral neuropathy ICD-10-CM: G62.9  ICD-9-CM: 356.9  10/11/2009 - Present Yes        DJD (degenerative joint disease), multiple sites ICD-10-CM: M15.9  ICD-9-CM: 715.89  10/11/2009 - Present Yes        Essential hypertension (Chronic) ICD-10-CM: I10  ICD-9-CM: 401.9  10/11/2009 - Present Yes        GERD (gastroesophageal reflux disease) ICD-10-CM: K21.9  ICD-9-CM: 530.81  10/11/2009 - Present Yes        Anxiety associated with depression ICD-10-CM: F41.8  ICD-9-CM: 300.4  10/11/2009 - Present Yes               * Procedures for this admission:   Procedure(s):  RIGHT ROBOTIC ASSISTED LAPAROSCOPIC PARTIAL NEPHRECTOMY      * Disposition: Home    * Discharged Condition: good    * Hospital Course: The patient underwent right RAL partial nephrectomy on 4/5/21 in the setting of right renal mass which was biopsied and proven to represent RCC. He tolerated the procedure well, was extubated in the OR and recovered on the floor for routine postoperative care.  He had issues with pulmonary congestion, oxygen requirement postoperatively and slowly was weaned off of oxygen. His Cr peaked postoperatively on POD2 to 3.5, on day of discharge was 2.9. He was followed by the hospitalist, nephrology & pulmonology teams while an inpatient and has follow up with nephrology & pulmonology postoperatively. By Cherelle Hernández, he had met all of the usual goals for discharge including ambulating at a preoperative capacity, having pain controlled with PO pain medications, voiding spontaneously. The patient had return of bowel function prior to discharge. They were discharged with prescriptions noted below and will follow up in the near future for postoperative check and pathology review on 4/15/21 with Dr. Beatrice Benitez. Discharge Medications:   Current Discharge Medication List      START taking these medications    Details   oxyCODONE IR (ROXICODONE) 5 mg immediate release tablet Take 1 Tab by mouth every four (4) hours as needed for Pain for up to 7 days. Max Daily Amount: 30 mg.  Qty: 20 Tab, Refills: 0    Associated Diagnoses: Renal cell carcinoma of right kidney (HCC)      senna-docusate (Senna with Docusate Sodium) 8.6-50 mg per tablet Take 1 Tab by mouth two (2) times a day. Qty: 60 Tab, Refills: 1         CONTINUE these medications which have NOT CHANGED    Details   albuterol (ProAir HFA) 90 mcg/actuation inhaler Take 1 Puff by inhalation every four (4) hours. umeclidinium-vilanteroL (Anoro Ellipta) 62.5-25 mcg/actuation inhaler Take 1 Puff by inhalation daily. rOPINIRole (Requip) 0.5 mg tablet Take 0.5 mg by mouth nightly. atorvastatin (Lipitor) 80 mg tablet Take 80 mg by mouth nightly. citalopram (CELEXA) 40 mg tablet Take 40 mg by mouth every evening. metoprolol succinate (TOPROL-XL) 25 mg XL tablet Take 25 mg by mouth nightly. pantoprazole (PROTONIX) 40 mg tablet TAKE 1 TABLET BY MOUTH EVERY DAY  Qty: 90 Tab, Refills: 1      isosorbide mononitrate ER (IMDUR) 30 mg tablet Take 0.5 Tabs by mouth daily.   Qty: 45 Tab, Refills: 3      OXYGEN-AIR DELIVERY SYSTEMS (HORIZON NASAL CPAP SYSTEM) by Does Not Apply route. amLODIPine (NORVASC) 5 mg tablet Take 5 mg by mouth daily. Associated Diagnoses: Essential hypertension, benign      allopurinol (ZYLOPRIM) 100 mg tablet Take 100 mg by mouth every morning. Associated Diagnoses: Chronic kidney disease, stage III (moderate) ([facility])      ketoconazole (NIZORAL) 2 % shampoo Apply 1 mL to affected area daily as needed for Itching. telmisartan (MICARDIS) 40 mg tablet Take 40 mg by mouth nightly. nitroglycerin (NITROSTAT) 0.4 mg SL tablet TAKE 1 TABLET BY SUBLINGUAL ROUTE EVERY 5 MINUTES AS NEEDED FOR CHEST PAIN. Qty: 1 Bottle, Refills: 0      VITAMIN D3 2,000 unit cap capsule Take 2,000 Units by mouth daily. Refills: 2      GLUCOSAMINE HCL/CHONDR PETERSEN A NA (GLUCOSAMINE-CHONDROITIN) 750-600 mg Tab Take 1 Tab by mouth daily. Brand: Move Free      aspirin delayed-release 81 mg tablet Take 81 mg by mouth nightly. MULTIVITS W-FE,OTHER MIN (CENTRUM PO) Take 1 Tab by mouth daily. * Follow-up Care/Patient Instructions:   Activity: No lifting > 10 pounds for 6 weeks postoperatively  Diet: Regular Diet  Wound Care: Keep wound clean and dry    Follow-up Information     Follow up With Specialties Details Why Contact Info    Nathanael Larson MD Internal Medicine   [address] [facility] [address]  [phone]

## 2021-04-05 NOTE — OP NOTES
Preoperative Diagnosis: Right renal mass    Postoperative Diagnosis: Same    Procedure(s) Performed:  1. Robot-assisted right laparoscopic partial nephrectomy  2. Intraoperative localization of tumor with ultrasound    Surgeon: Ling Nesbitt    Assistant Surgeon: Dr. Brad Lozada whose presence was critical for bedside assisting, port placement, identification of normal anatomic structures, tumor extraction, renorrhaphy    Anesthesia: General endotracheal anesthesia    Specimens: Right renal mass    Estimated Blood Loss: [id number]    Complications: None    Drains:   1. 7Fr EDIE drain  2. 16-Marshallese urethral Quiroz catheter    Implants: None    Warm Ischemic Time: 21 minutes    Indications for Surgery: The patient is a 66 y.o. male with a right renal mass. We discussed different treatment options with the patient, and recommended a minimally invasive partial nephrectomy. After discussion of the risks and benefits of surgery as well as alternatives, the patient wished to proceed with surgery. Significant comorbidities increase complication risks and he was counseled about that preoperatively - COPD, DM, HTN, CKD with baseline preop Cr 2.4, CAD s/p PCI x 2. Operative Findings: Tumor removed intact with grossly negative margins. Right posterior renal mass. Significant perirenal fat inflammation. Despite no major bleeding event throughout case, steady ooze during dissection of fat and extraction tumor - 1L EBL. Floseal, tisseal. Excellent hemostasis at conclusion of case. 1A/1V hilar system. ICG/Firefly used. 1 clamp on artery. 21m clamp time. 2 layer closure. Harsha Quiroz. Adrenal sparing. Gonadal vessel sparing. Procedure: After informed consent had been obtained, the patient was brought to the operating room, placed on the table in supine position and administered general endotracheal anesthesia. Perioperative antibiotics and a Quiroz catheter were placed.  The patient was then repositioned into a modified lateral position with right side up with all pressure points padded and secured to the bed. A test airplane roll was performed which confirmed that the patient was well secured. The bed was then returned to the level, and the patient was prepped and draped in the standard fashion. We made an 8mm incision superior & lateral to the umbilicus at the level of the 11th rib. Veress insufflation was used at the site insufflating the abdomen to 15 mmHg. A 5mm laparoscope was then placed into the abdomen with a 5mm visiport. The peritoneum was inspected and was free of injury. There were no adhesions that affected port placement. We then placed four total 8mm robotic ports about 7-8cm away from each other in a relatively straight diagonal line running from the xyphoid process towards the right ASIS. A 12mm assistant port was then placed just superior to the umbilicus. A separate 5mm assistant port was placed in the midline triangulating the two most superior robotic ports to be used as a liver retractor. We mobilized the colon by incising the white line of Toldt and reflecting the bowel medially, working all the way up to the superior aspect of the kidney. Then working off the lower pole of the kidney, we were able to identify the ureter and gonadal vessels. The ureter was elevated off the psoas fascia. We began working cephalad towards the renal hilum. The gonadal vein was traced all the way to the IVC. The renal vein was carefully dissected. The renal artery was identified and carefully dissected. With both the renal vein and artery completely mobilized, we turned our attention to the kidney. The fat from the lower pole was mobilized down to the surface of the kidney and we were able to the skin the fat off from around the normal kidney. This part of the procedure was laborious and there was a significant fibrotic rind surrounding the kidney that easily oozed.  We brought in the laparoscopic drop-in ultrasound probe to evaluate the tumor. Using this to darlene the borders between our tumor and normal kidney, we then were prepared for clamping. When we were ready to excise the tumor, we clamped the artery. ICG was given IV and firefly was used. The kidney parenchyma did not brighten up when compared to surrounding structures (liver). We began cold excision of the renal mass. We found we had excellent hemostasis at this point and we were able to resect the tumor under direct visualization with grossly negative margins. We then switched the needle drivers and began a two-layer repair using a 2-0 V-nagi suture for the deep layer, followed by a 0 Vicryl on a CT-2 for sliding-clip renorrhaphy. At this point, the reconstruction came together well so we decided to go off clamp at 21 minutes time. Floseal & tisseal was then placed over the defect and another 0 Vicryl was used to reapproximate Gerota's fascia. The specimen was placed in an Endocatch bag and a drain was placed through the 4th robotic arm port and the robot was then undocked. The fascia from the 12 mm infraumbilical assistant port was widened to allow extraction of the specimen. We closed the fascia with a 0 Vicryl suture on a UR-6 needle. Skin incisions were all closed with a 4-0 monocryl subcuticular stitches followed by Exofin surgical glue. The EDIE drain was secured in place with 2-0 nonabsorbable suture. All surgical counts were correct x2. The patient was woken up from anesthesia and taken to the recovery unit. Plan: Admit to urology for routine postoperative care.

## 2021-04-05 NOTE — PERIOP NOTES
TRANSFER - OUT REPORT:    Verbal report given to RN Sherlene Dancer( on [employer]  being transferred to [phone] for routine post - op       Report consisted of patients Situation, Background, Assessment and   Recommendations(SBAR). Information from the following report(s) SBAR, OR Summary, Procedure Summary, Intake/Output, MAR and Cardiac Rhythm nsr bbb was reviewed with the receiving nurse. Lines:   Peripheral IV 04/05/21 Right Hand (Active)   Site Assessment Clean, dry, & intact 04/05/21 1915   Phlebitis Assessment 1 04/05/21 1915   Infiltration Assessment 0 04/05/21 1915   Dressing Status Clean, dry, & intact 04/05/21 1915   Dressing Type Tape;Transparent 04/05/21 1915   Hub Color/Line Status Pink; Infusing 04/05/21 1915   Alcohol Cap Used Yes 04/05/21 1153       Peripheral IV 04/05/21 Left Antecubital (Active)   Site Assessment Clean, dry, & intact 04/05/21 1915   Phlebitis Assessment 0 04/05/21 1915   Infiltration Assessment 0 04/05/21 1915   Dressing Status Clean, dry, & intact; Occlusive 04/05/21 1915   Dressing Type Tape;Transparent 04/05/21 1915   Hub Color/Line Status Green;Capped 04/05/21 1915   Alcohol Cap Used Yes 04/05/21 1154        Opportunity for questions and clarification was provided.       Patient transported with:   O2 @ 3 liters  Registered Nurse

## 2021-04-05 NOTE — ANESTHESIA PREPROCEDURE EVALUATION
Relevant Problems   RESPIRATORY SYSTEM   (+) Obstructive sleep apnea      NEUROLOGY   (+) Acute CVA (cerebrovascular accident) ([facility]   (+) Anxiety associated with depression      CARDIOVASCULAR   (+) Coronary artery disease due to lipid rich plaque   (+) Coronary artery disease involving native coronary artery of native heart without angina pectoris   (+) Essential hypertension      GASTROINTESTINAL   (+) GERD (gastroesophageal reflux disease)      RENAL FAILURE   (+) CKD (chronic kidney disease) stage 4, GFR 15-29 ml/min (HCC)   (+) Chronic kidney disease, stage III (moderate) (HCC)   (+) Hypertensive kidney disease with chronic kidney disease stage III (HCC)      ENDOCRINE   (+) Obesity       Anesthetic History   No history of anesthetic complications            Review of Systems / Medical History  Patient summary reviewed, nursing notes reviewed and pertinent labs reviewed    Pulmonary    COPD: mild    Sleep apnea: CPAP           Neuro/Psych       CVA (patient denies)       Cardiovascular    Hypertension          CAD, PAD (s/p CEA), cardiac stents (x2 in 2009 and 2017) and hyperlipidemia    Exercise tolerance: <4 METS  Comments: Normal cavity size. Mild concentric hypertrophy . Low normal systolic function. The estimated ejection fraction is 50 - 55%.  2020   GI/Hepatic/Renal  Within defined limits   GERD    Renal disease: CRI and stones  Liver disease     Endo/Other    Diabetes (pre-diabetic)    Morbid obesity, arthritis, cancer (right renal cell cancer) and anemia     Other Findings   Comments: Restless leg syndrome           Physical Exam    Airway  Mallampati: III  TM Distance: > 6 cm  Neck ROM: normal range of motion   Mouth opening: Normal     Cardiovascular  Regular rate and rhythm,  S1 and S2 normal,  no murmur, click, rub, or gallop             Dental  No notable dental hx       Pulmonary  Breath sounds clear to auscultation               Abdominal  GI exam deferred       Other Findings Anesthetic Plan    ASA: 3  Anesthesia type: general          Induction: Intravenous  Anesthetic plan and risks discussed with: Patient

## 2021-04-05 NOTE — ANESTHESIA POSTPROCEDURE EVALUATION
Procedure(s):  RIGHT ROBOTIC ASSISTED LAPAROSCOPIC PARTIAL NEPHRECTOMY. general    Anesthesia Post Evaluation      Multimodal analgesia: multimodal analgesia not used between 6 hours prior to anesthesia start to PACU discharge  Patient location during evaluation: PACU  Patient participation: complete - patient participated  Level of consciousness: sleepy but conscious, awake and responsive to verbal stimuli  Pain score: 3  Pain management: satisfactory to patient  Airway patency: patent  Anesthetic complications: no  Cardiovascular status: hemodynamically stable and acceptable  Respiratory status: acceptable  Hydration status: acceptable  Comments: Patient seen and evaluated; no concerns. Post anesthesia nausea and vomiting:  none      INITIAL Post-op Vital signs:   Vitals Value Taken Time   /61 04/05/21 1815   Temp 36.7 °C (98 °F) 04/05/21 1741   Pulse 67 04/05/21 1818   Resp 13 04/05/21 1818   SpO2 96 % 04/05/21 1818   Vitals shown include unvalidated device data.

## 2021-04-05 NOTE — DISCHARGE INSTRUCTIONS
Patient Education        Laparoscopic Nephrectomy: What to Expect at Home  Your Recovery  A nephrectomy is surgery to take out part or all of the kidney. One or both kidneys may be taken out. Sometimes other tissue near the kidney is taken out at the same time. Your belly will feel sore after the surgery. This usually lasts about 1 to 2 weeks. Your doctor will give you pain medicine for this. You may also have other symptoms such as nausea, diarrhea, constipation, gas, or a headache. At first, you may have low energy and get tired quickly. It may take 3 to 6 months for your energy to fully return. Your body can work fine with one healthy kidney. If both kidneys are removed or your remaining kidney is not healthy, your doctor will talk to you about the kind of treatment you will need after surgery. This care sheet gives you a general idea about how long it will take for you to recover. But each person recovers at a different pace. Follow the steps below to get better as quickly as possible. How can you care for yourself at home? Activity    · Rest when you feel tired. Getting enough sleep will help you recover.     · Try to walk each day. Start by walking a little more than you did the day before. Bit by bit, increase the amount you walk. Walking boosts blood flow and helps prevent pneumonia and constipation.     · Avoid exercises that use your belly muscles and strenuous activities such as bicycle riding, jogging, weight lifting, or aerobic exercise until your doctor says it is okay.     · For at least 4 weeks, avoid lifting anything that would make you strain. This may include a child, heavy grocery bags and milk containers, a heavy briefcase or backpack, cat litter or dog food bags, or a vacuum .     · Hold a pillow over the cuts the doctor made (incisions) when you cough or take deep breaths.  This will support your belly and decrease your pain.     · Do breathing exercises at home as instructed by your doctor. This will help prevent pneumonia.     · Ask your doctor when you can drive again.     · You will probably need to take 4 to 6 weeks off from work. It depends on the type of work you do and how you feel.     · You may be able to take showers (unless you have a drainage tube near your incisions). If you have a drainage tube, follow your doctor's instructions to empty and care for it. Do not take a bath for the first 2 weeks, or until your doctor tells you it is okay.     · Ask your doctor when it is okay for you to have sex. Diet    · You can eat your normal diet. If you were on a special diet for your kidneys before surgery, follow that diet until your doctor tells you to stop.     · If your stomach is upset, try bland, low-fat foods like plain rice, broiled chicken, toast, and yogurt.     · Drink plenty of fluids (unless your doctor tells you not to).     · You may notice that your bowel movements are not regular right after your surgery. This is common. Try to avoid constipation and straining with bowel movements. You may want to take a fiber supplement every day. If you have not had a bowel movement after a couple of days, ask your doctor about taking a mild laxative. Medicines    · Your doctor will tell you if and when you can restart your medicines. He or she will also give you instructions about taking any new medicines.     · If you take aspirin or some other blood thinner, ask your doctor if and when to start taking it again. Make sure that you understand exactly what your doctor wants you to do.     · Take pain medicines exactly as directed. ? If the doctor gave you a prescription medicine for pain, take it as prescribed. ? If you are not taking a prescription pain medicine, take an over-the-counter medicine that your doctor recommends. Read and follow all instructions on the label.   ? Do not take aspirin, ibuprofen (Advil, Motrin), or naproxen (Aleve), or other nonsteroidal anti-inflammatory drugs (NSAIDs) unless your doctor says it is okay.     · If you think your pain medicine is making you sick to your stomach:  ? Take your medicine after meals (unless your doctor has told you not to). ? Ask your doctor for a different pain medicine.     · If your doctor prescribed antibiotics, take them as directed. Do not stop taking them just because you feel better. You need to take the full course of antibiotics. Incision care    · If you have strips of tape on the incisions, leave the tape on for a week or until it falls off.     · Wash the area around the incisions daily with warm, soapy water and pat it dry. Don't use hydrogen peroxide or alcohol, which can slow healing. You may cover the incisions with gauze bandages if they weep or rub against clothing. Change the bandages every day.     · Keep the area around the incisions clean and dry. Follow-up care is a key part of your treatment and safety. Be sure to make and go to all appointments, and call your doctor if you are having problems. It's also a good idea to know your test results and keep a list of the medicines you take. When should you call for help? Call 911 anytime you think you may need emergency care. For example, call if:    · You passed out (lost consciousness).     · You have chest pain, are short of breath, or cough up blood. Call your doctor now or seek immediate medical care if:    · You have pain that does not get better after you take your pain medicine.     · You have symptoms of a urinary tract infection. These may include:  ? Pain or burning when you urinate. ? A frequent need to urinate without being able to pass much urine. ? Pain in the flank, which is just below the rib cage and above the waist on either side of the back. ? Blood in the urine. ? A fever.     · You have signs of infection, such as:  ? Increased pain, swelling, warmth, or redness. ? Red streaks leading from the incisions.   ? Pus draining from the incisions. ? A fever.     · You have loose stitches, or your incisions come open.     · You are bleeding from the incisions.     · You cannot urinate.     · You are sick to your stomach or cannot drink fluids.     · You have signs of a blood clot in your leg (called a deep vein thrombosis), such as:  ? Pain in the calf, back of the knee, thigh, or groin. ? Redness and swelling in your leg. Watch closely for changes in your health, and be sure to contact your doctor if you are having any problems. Where can you learn more? Go to http://www.gray.com/  Enter L270 in the search box to learn more about \"Laparoscopic Nephrectomy: What to Expect at Home. \"  Current as of: December 17, 2020               Content Version: 12.8  © 2006-2021 [employer]. Care instructions adapted under license by [employer] (which disclaims liability or warranty for this information). If you have questions about a medical condition or this instruction, always ask your healthcare professional. [place] [address] any warranty or liability for your use of this information.

## 2021-04-06 LAB
ANION GAP SERPL CALC-SCNC: 8 MMOL/L (ref 5–15)
BUN SERPL-MCNC: 33 MG/DL (ref 6–20)
BUN/CREAT SERPL: 10 (ref 12–20)
CALCIUM SERPL-MCNC: 8.3 MG/DL (ref 8.5–10.1)
CHLORIDE SERPL-SCNC: 109 MMOL/L (ref 97–108)
CO2 SERPL-SCNC: 23 MMOL/L (ref 21–32)
CREAT SERPL-MCNC: 3.19 MG/DL (ref 0.7–1.3)
ERYTHROCYTE [DISTWIDTH] IN BLOOD BY AUTOMATED COUNT: 14.6 % (ref 11.5–14.5)
GLUCOSE BLD STRIP.AUTO-MCNC: 132 MG/DL (ref 65–100)
GLUCOSE BLD STRIP.AUTO-MCNC: 133 MG/DL (ref 65–100)
GLUCOSE BLD STRIP.AUTO-MCNC: 136 MG/DL (ref 65–100)
GLUCOSE BLD STRIP.AUTO-MCNC: 139 MG/DL (ref 65–100)
GLUCOSE SERPL-MCNC: 122 MG/DL (ref 65–100)
HCT VFR BLD AUTO: 27.3 % (ref 36.6–50.3)
HGB BLD-MCNC: 8.5 G/DL (ref 12.1–17)
MCH RBC QN AUTO: 30 PG (ref 26–34)
MCHC RBC AUTO-ENTMCNC: 31.1 G/DL (ref 30–36.5)
MCV RBC AUTO: 96.5 FL (ref 80–99)
NRBC # BLD: 0 K/UL (ref 0–0.01)
NRBC BLD-RTO: 0 PER 100 WBC
PLATELET # BLD AUTO: 216 K/UL (ref 150–400)
PMV BLD AUTO: 10.2 FL (ref 8.9–12.9)
POTASSIUM SERPL-SCNC: 4.7 MMOL/L (ref 3.5–5.1)
RBC # BLD AUTO: 2.83 M/UL (ref 4.1–5.7)
SERVICE CMNT-IMP: ABNORMAL
SODIUM SERPL-SCNC: 140 MMOL/L (ref 136–145)
WBC # BLD AUTO: 12.8 K/UL (ref 4.1–11.1)

## 2021-04-06 PROCEDURE — 74011250636 HC RX REV CODE- 250/636: Performed by: UROLOGY

## 2021-04-06 PROCEDURE — 99218 HC RM OBSERVATION: CPT

## 2021-04-06 PROCEDURE — 74011250637 HC RX REV CODE- 250/637: Performed by: UROLOGY

## 2021-04-06 PROCEDURE — 74011250636 HC RX REV CODE- 250/636: Performed by: NURSE PRACTITIONER

## 2021-04-06 PROCEDURE — 51798 US URINE CAPACITY MEASURE: CPT

## 2021-04-06 PROCEDURE — 85027 COMPLETE CBC AUTOMATED: CPT

## 2021-04-06 PROCEDURE — 36415 COLL VENOUS BLD VENIPUNCTURE: CPT

## 2021-04-06 PROCEDURE — 80048 BASIC METABOLIC PNL TOTAL CA: CPT

## 2021-04-06 PROCEDURE — 74011000250 HC RX REV CODE- 250: Performed by: UROLOGY

## 2021-04-06 PROCEDURE — 94640 AIRWAY INHALATION TREATMENT: CPT

## 2021-04-06 PROCEDURE — 82962 GLUCOSE BLOOD TEST: CPT

## 2021-04-06 RX ORDER — SODIUM CHLORIDE 0.9 % (FLUSH) 0.9 %
5-40 SYRINGE (ML) INJECTION AS NEEDED
Status: DISCONTINUED | OUTPATIENT
Start: 2021-04-06 | End: 2021-04-08

## 2021-04-06 RX ORDER — HYDRALAZINE HYDROCHLORIDE 20 MG/ML
10 INJECTION INTRAMUSCULAR; INTRAVENOUS
Status: DISCONTINUED | OUTPATIENT
Start: 2021-04-06 | End: 2021-04-09 | Stop reason: HOSPADM

## 2021-04-06 RX ORDER — SODIUM CHLORIDE 0.9 % (FLUSH) 0.9 %
5-40 SYRINGE (ML) INJECTION EVERY 8 HOURS
Status: DISCONTINUED | OUTPATIENT
Start: 2021-04-06 | End: 2021-04-08

## 2021-04-06 RX ADMIN — Medication 10 ML: at 08:39

## 2021-04-06 RX ADMIN — IPRATROPIUM BROMIDE AND ALBUTEROL SULFATE 3 ML: .5; 2.5 SOLUTION RESPIRATORY (INHALATION) at 19:54

## 2021-04-06 RX ADMIN — AMLODIPINE BESYLATE 5 MG: 5 TABLET ORAL at 08:38

## 2021-04-06 RX ADMIN — STANDARDIZED SENNA CONCENTRATE 17.2 MG: 8.6 TABLET ORAL at 21:57

## 2021-04-06 RX ADMIN — PANTOPRAZOLE SODIUM 40 MG: 40 TABLET, DELAYED RELEASE ORAL at 06:18

## 2021-04-06 RX ADMIN — OXYCODONE 10 MG: 5 TABLET ORAL at 08:39

## 2021-04-06 RX ADMIN — Medication 10 ML: at 23:17

## 2021-04-06 RX ADMIN — ISOSORBIDE MONONITRATE 15 MG: 30 TABLET, EXTENDED RELEASE ORAL at 08:38

## 2021-04-06 RX ADMIN — Medication 10 ML: at 21:57

## 2021-04-06 RX ADMIN — CITALOPRAM HYDROBROMIDE 40 MG: 20 TABLET ORAL at 17:43

## 2021-04-06 RX ADMIN — ACETAMINOPHEN 1000 MG: 500 TABLET, FILM COATED ORAL at 12:13

## 2021-04-06 RX ADMIN — ARFORMOTEROL TARTRATE 15 MCG: 15 SOLUTION RESPIRATORY (INHALATION) at 19:56

## 2021-04-06 RX ADMIN — SODIUM CHLORIDE, POTASSIUM CHLORIDE, SODIUM LACTATE AND CALCIUM CHLORIDE 1000 ML: 600; 310; 30; 20 INJECTION, SOLUTION INTRAVENOUS at 07:00

## 2021-04-06 RX ADMIN — ACETAMINOPHEN 1000 MG: 500 TABLET, FILM COATED ORAL at 08:38

## 2021-04-06 RX ADMIN — ACETAMINOPHEN 1000 MG: 500 TABLET, FILM COATED ORAL at 01:10

## 2021-04-06 RX ADMIN — ARFORMOTEROL TARTRATE 15 MCG: 15 SOLUTION RESPIRATORY (INHALATION) at 07:27

## 2021-04-06 RX ADMIN — METOPROLOL SUCCINATE 25 MG: 25 TABLET, EXTENDED RELEASE ORAL at 21:57

## 2021-04-06 RX ADMIN — OXYCODONE 5 MG: 5 TABLET ORAL at 02:37

## 2021-04-06 RX ADMIN — ALLOPURINOL 100 MG: 100 TABLET ORAL at 08:38

## 2021-04-06 RX ADMIN — ACETAMINOPHEN 1000 MG: 500 TABLET, FILM COATED ORAL at 23:11

## 2021-04-06 RX ADMIN — DOCUSATE SODIUM 100 MG: 100 CAPSULE, LIQUID FILLED ORAL at 08:38

## 2021-04-06 RX ADMIN — IPRATROPIUM BROMIDE AND ALBUTEROL SULFATE 3 ML: .5; 2.5 SOLUTION RESPIRATORY (INHALATION) at 07:27

## 2021-04-06 RX ADMIN — SODIUM CHLORIDE, POTASSIUM CHLORIDE, SODIUM LACTATE AND CALCIUM CHLORIDE 1000 ML: 600; 310; 30; 20 INJECTION, SOLUTION INTRAVENOUS at 12:38

## 2021-04-06 RX ADMIN — IPRATROPIUM BROMIDE AND ALBUTEROL SULFATE 3 ML: .5; 2.5 SOLUTION RESPIRATORY (INHALATION) at 15:48

## 2021-04-06 RX ADMIN — ACETAMINOPHEN 1000 MG: 500 TABLET, FILM COATED ORAL at 17:43

## 2021-04-06 RX ADMIN — MORPHINE SULFATE 2 MG: 2 INJECTION, SOLUTION INTRAMUSCULAR; INTRAVENOUS at 06:16

## 2021-04-06 RX ADMIN — ATORVASTATIN CALCIUM 80 MG: 20 TABLET, FILM COATED ORAL at 21:57

## 2021-04-06 RX ADMIN — Medication 10 ML: at 06:16

## 2021-04-06 RX ADMIN — DOCUSATE SODIUM 100 MG: 100 CAPSULE, LIQUID FILLED ORAL at 17:42

## 2021-04-06 RX ADMIN — ROPINIROLE HYDROCHLORIDE 0.5 MG: 0.25 TABLET, FILM COATED ORAL at 21:57

## 2021-04-06 NOTE — PROGRESS NOTES
CM Note:  Met with pt for d/c planning. PTA pt stated he was independent with ADl's, walked unaided and was driving. He does have cpap at home. Medications [facility] [address]. Reason for Admission:  rneal cell cancer                     RUR Score:       N/A-obs              Plan for utilizing home health:   none       PCP: First and Last name:  Naa Mccartney MD     Name of Practice:    Are you a current patient: Yes/No: yes   Approximate date of last visit: 3 months ago   Can you participate in a virtual visit with your PCP: yes                    Current Advanced Directive/Advance Care Plan: Prior   Not on file but does have one      Healthcare Decision Maker:   Click here to complete [address] including selection of the Healthcare Decision Maker Relationship (ie \"Primary\")                             Transition of Care Plan:     1. Urology following--pt has drain in place  2. S/p partial R nephrectomy on 4/5/21  3. Daughter to transport home at d/c  4. CM following for any needs  ZAHEER Lopez RN      Care Management Interventions  PCP Verified by CM: Yes  Mode of Transport at Discharge:  Other (see comment)(daughter)  Current Support Network: Own Home, Other(Pt's dtr lives with him in a 2 story house with an entry ramp and 12 interior stairs.)  Confirm Follow Up Transport: Family  Discharge Location  Discharge Placement: Home with family assistance

## 2021-04-06 NOTE — PROGRESS NOTES
Assessment/Plan:    Nellie Frank is a 66 y.o. male with CAD, nephrolithiasis, CKD, COPD, HTN, MELODY on CPAP s/p right partial RAL nephrectomy on 4/5/21.    - 1L LR bolus  - Emily IVF  - Advance diet  - OOB  - IS  - Discontinue Quiroz, void trial today with PVRs  - Pass flatus prior to discharge  - Need to work on pain control throughout day, hopeful that ambulation will help  - Follow up AM labs, currently pending. He is aware that he may require pRBC transfusion if anemic      Subjective:  Sharp pains in the RLQ. No nausea, emesis. Not OOB yet. Using CPAP. No fevers, chills. Pain can get up to 9-10/10 when sharp but at baseline around a 6. No SOB, lightheadedness, dizziness, CP.      Objective:  Visit Vitals  /72 (BP 1 Location: Left upper arm, BP Patient Position: At rest)   Pulse 84   Temp 98.4 °F (36.9 °C)   Resp 18   Ht 6' (1.829 m)   Wt 115.5 kg (254 lb 10.1 oz)   SpO2 92%   BMI 34.53 kg/m²         Intake/Output Summary (Last 24 hours) at 4/6/2021 3579  Last data filed at 4/6/2021 8064  Gross per 24 hour   Intake 4645 ml   Output 1715 ml   Net 2930 ml       Physical Exam  General: NAD, A&O, resting, appropriate  HEENT: NC/AT, EOMI, MMM  Pulmonary: Normal work of breathing on RA  Cardiovascular: Regular rate & rhythm, HDS, adequate peripheral perfusion  Abdomen: soft, NTTP, nondistended, no suprapubic fullness or tenderness, surgical incisions c/d/i  : Quiroz catheter draining concentrated urine  Extremities: warm and well perfused, no edema  Neuro: Appropriate, no focal neurological deficits    Recent Results (from the past 24 hour(s))   GLUCOSE, POC    Collection Time: 04/05/21 11:35 AM   Result Value Ref Range    Glucose (POC) 99 65 - 100 mg/dL    Performed by [facility]    TYPE & SCREEN    Collection Time: 04/05/21 11:40 AM   Result Value Ref Range    Crossmatch Expiration 04/08/2021,2359     ABO/Rh(D) Shannan Lewis POSITIVE     Antibody screen NEG    CBC W/O DIFF    Collection Time: 04/05/21  5:48 PM Result Value Ref Range    WBC 17.3 (H) 4.1 - 11.1 K/uL    RBC 2.94 (L) 4.10 - 5.70 M/uL    HGB 8.8 (L) 12.1 - 17.0 g/dL    HCT 28.4 (L) 36.6 - 50.3 %    MCV 96.6 80.0 - 99.0 FL    MCH 29.9 26.0 - 34.0 PG    MCHC 31.0 30.0 - 36.5 g/dL    RDW 14.6 (H) 11.5 - 14.5 %    PLATELET 350 861 - 396 K/uL    MPV 9.9 8.9 - 12.9 FL    NRBC 0.0 0  WBC    ABSOLUTE NRBC 0.00 0.00 - 0.01 K/uL   GLUCOSE, POC    Collection Time: 04/05/21  6:34 PM   Result Value Ref Range    Glucose (POC) 147 (H) 65 - 100 mg/dL    Performed by Gurjit Mcqueen, POC    Collection Time: 04/05/21  9:23 PM   Result Value Ref Range    Glucose (POC) 149 (H) 65 - 100 mg/dL    Performed by Shannan Bunn (PCT)        Imaging:  None new

## 2021-04-06 NOTE — H&P
[address]  [phone]    Admission History and Physical      NAME:  Josh Hernandez   :   1945   MRN:  [id number]     PCP:  Douglas Islas MD     Date/Time:  2021         Subjective:     CHIEF COMPLAINT: pain     HISTORY OF PRESENT ILLNESS:     Mr. Jillian Nino is a 66 y.o. with h/o cad, ckd, preDM, htn who presents for nephrectomy. Currently he is doing well. He does have pain in his abdomen, feels it is not adequately controlled at the moment. No sob. No flatus. No bm.        Past Medical History:   Diagnosis Date    Abnormal stress echo 2015    Anemia NEC 2013    Anxiety     Borderline diabetes     CAD (coronary artery disease)     cath [facility]) revealed 20%RCA and 50%2nd diagonal    Calculus of kidney     Chronic kidney disease, stage III (moderate) (HCC) 10/11/2009    30% kidney function    COPD, mild (HCC)     Followed by pulmonary associates    DJD (degenerative joint disease)     Fatty liver     GERD (gastroesophageal reflux disease) 10/11/2009    Gout     Hypertension     MELODY on CPAP 10/11/2009    nasal pillows; pressure set at 10-11 -     Peripheral neuropathy 10/11/2009    bilateral feet (numbness)    Renal cell cancer, right ([address] 2021    RLS (restless legs syndrome) 10/11/2009    S/P coronary artery stent placement 2015    PCI./MALI to LCx, 2019 PCI/MALI Prox LAD (RCA 40-50% lesions)        Past Surgical History:   Procedure Laterality Date    HX BUNIONECTOMY      HX CAROTID ENDARTERECTOMY Left 2020    Followed by Dr. Zach Stewart  , 7/17    x2    HX HERNIA REPAIR      McTamaney/umbilical    HX KNEE REPLACEMENT Left 2011    HX ORTHOPAEDIC      left shoulder manipulation    HX UROLOGICAL      basket extraction of kidney stones    NV COLONOSCOPY FLX DX W/COLLJ SPEC WHEN PFRMD  2010         NV COLSC FLX W/RMVL OF TUMOR POLYP LESION SNARE TQ  2014            Social History     Tobacco Use    Smoking status: Former Smoker     Packs/day: 1.00     Years: 10.00     Pack years: 10.00     Types: Cigarettes     Quit date: 1968     Years since quittin.2    Smokeless tobacco: Never Used   Substance Use Topics    Alcohol use: No     Alcohol/week: 0.0 standard drinks        Family History   Problem Relation Age of Onset    Heart Disease Father     Hypertension Father     Other Father         abdominal aneurysm     Dementia Mother     Alzheimer Mother     Heart Disease Brother     Heart Attack Brother     Heart Disease Maternal Grandmother     Heart Disease Paternal Grandmother     Heart Attack Paternal Grandmother     Heart Disease Paternal Grandfather     Heart Attack Paternal Grandfather     Elevated Lipids Son     Elevated Lipids Daughter     Cancer Neg Hx     Diabetes Neg Hx     Stroke Neg Hx         Allergies   Allergen Reactions    Bactrim [Sulfamethoxazole-Trimethoprim] Hives    Codeine Itching    Lisinopril (Bulk) Cough    Neurontin [Gabapentin] Other (comments)     drowsy    Zostavax [Zoster Vaccine Live (Pf)] Rash     See 9/10/13 visit    Penicillins Hives     Pt states he has taken Keflex before without problems        Prior to Admission medications    Medication Sig Start Date End Date Taking? Authorizing Provider   oxyCODONE IR (ROXICODONE) 5 mg immediate release tablet Take 1 Tab by mouth every four (4) hours as needed for Pain for up to 7 days. Max Daily Amount: 30 mg. 21 Yes Melvin Catalan MD   senna-docusate (Senna with Docusate Sodium) 8.6-50 mg per tablet Take 1 Tab by mouth two (2) times a day. 21  Yes Melvin Catalan MD   albuterol (ProAir HFA) 90 mcg/actuation inhaler Take 1 Puff by inhalation every four (4) hours. Yes Provider, Historical   umeclidinium-vilanteroL (Anoro Ellipta) 62.5-25 mcg/actuation inhaler Take 1 Puff by inhalation daily.    Yes Provider, Historical   rOPINIRole (Requip) 0.5 mg tablet Take 0.5 mg by mouth nightly. Yes Provider, Historical   atorvastatin (Lipitor) 80 mg tablet Take 80 mg by mouth nightly. Yes Provider, Historical   citalopram (CELEXA) 40 mg tablet Take 40 mg by mouth every evening. Yes Provider, Historical   metoprolol succinate (TOPROL-XL) 25 mg XL tablet Take 25 mg by mouth nightly. Yes Provider, Historical   pantoprazole (PROTONIX) 40 mg tablet TAKE 1 TABLET BY MOUTH EVERY DAY 12/17/20  Yes Bernadette Nazario MD   isosorbide mononitrate ER (IMDUR) 30 mg tablet Take 0.5 Tabs by mouth daily. 10/21/20  Yes Linda Dai NP   OXYGEN-AIR DELIVERY SYSTEMS (HORIZON NASAL CPAP SYSTEM) by Does Not Apply route. Yes Provider, Historical   amLODIPine (NORVASC) 5 mg tablet Take 5 mg by mouth daily. 6/16/14  Yes Provider, Historical   allopurinol (ZYLOPRIM) 100 mg tablet Take 100 mg by mouth every morning. 6/5/12  Yes Provider, Historical   ketoconazole (NIZORAL) 2 % shampoo Apply 1 mL to affected area daily as needed for Itching. Provider, Historical   telmisartan (MICARDIS) 40 mg tablet Take 40 mg by mouth nightly. Provider, Historical   nitroglycerin (NITROSTAT) 0.4 mg SL tablet TAKE 1 TABLET BY SUBLINGUAL ROUTE EVERY 5 MINUTES AS NEEDED FOR CHEST PAIN. 3/4/20   Bernadette Nazario MD   VITAMIN D3 2,000 unit cap capsule Take 2,000 Units by mouth daily. 3/3/15   Provider, Historical   GLUCOSAMINE HCL/CHONDR PETERSEN A NA (GLUCOSAMINE-CHONDROITIN) 750-600 mg Tab Take 1 Tab by mouth daily. Brand: Move Free    Provider, Historical   aspirin delayed-release 81 mg tablet Take 81 mg by mouth nightly. Provider, Historical   MULTIVITS W-FE,OTHER MIN (CENTRUM PO) Take 1 Tab by mouth daily.     Provider, Historical         Review of Systems:  (bold if positive, if negative)    Gen:  Eyes:  ENT:  CVS:  Pulm:  GI:    :    MS:  Skin:  Psych:  Endo:    Hem:  Renal:    Neuro:            Objective:      VITALS:    Vital signs reviewed; most recent are:    Visit Vitals  /67 (BP 1 Location: Right arm)   Pulse 87   Temp 98.2 °F (36.8 °C)   Resp 20   Ht 6' (1.829 m)   Wt 115.5 kg (254 lb 10.1 oz)   SpO2 90%   BMI 34.53 kg/m²     SpO2 Readings from Last 6 Encounters:   04/06/21 90%   03/15/21 97%   02/02/21 99%   11/23/20 97%   10/13/20 96%   03/03/20 98%    O2 Flow Rate (L/min): 2 l/min       Intake/Output Summary (Last 24 hours) at 4/6/2021 1342  Last data filed at 4/6/2021 1216  Gross per 24 hour   Intake 4325 ml   Output 1925 ml   Net 2400 ml            Exam:     Physical Exam:    Gen:  Well-developed, well-nourished, in no acute distress  HEENT:  Pink conjunctivae, PERRL, hearing intact to voice, moist mucous membranes  Neck:  Supple, without masses, thyroid non-tender  Resp:  No accessory muscle use, clear breath sounds without wheezes rales or rhonchi  Card:  No murmurs, normal S1, S2 without thrills, bruits or peripheral edema  Abd:  Soft, non-tender, non-distended, normoactive bowel sounds are present, no palpable organomegaly. Incisions c/d/i  Lymph:  No cervical adenopathy  Musc:  No cyanosis or clubbing  Skin:  No rashes or ulcers, skin turgor is good  Neuro:  Cranial nerves 3-12 are grossly intact,  strength is 5/5 bilaterally, dorsi / plantarflexion strength is 5/5 bilaterally, follows commands appropriately  Psych:  Alert with good insight. Oriented to person, place, and time       Labs:    Recent Labs     04/06/21  0538   WBC 12.8*   HGB 8.5*   HCT 27.3*        Recent Labs     04/06/21  0538      K 4.7   *   CO2 23   *   BUN 33*   CREA 3.19*   CA 8.3*     No components found for: GLPOC  No results for input(s): PH, PCO2, PO2, HCO3, FIO2 in the last 72 hours. No results for input(s): INR, INREXT in the last 72 hours. Assessment/Plan:       Principal Problem:    Right kidney mass: sp partial nephrectomy.  Urology is primary team and will manage pt/ot, pain management, dispo    Active Problems: Chronic kidney disease, stage III (moderate): monitor sp nephrectomy      Mixed hyperlipidemia: continue statin      Hypertensive kidney disease with chronic kidney disease stage III: uncontrolled yesterday but improving. Will need tight control sp nephrectomy. Continue norvasc, imdur, metoprolol. Order hydralazine prn    CAD: stable.  Continue imdur, metoprolol, lipitor      Diabetic peripheral neuropathy associated with type 2 diabetes mellitus:      RLS: continue requip       Total time spent with patient: 60 minutes                  Care Plan discussed with: Patient    Discussed:  Care Plan    Prophylaxis:  SCD's    Probable Disposition:  Home w/Family           ___________________________________________________    Attending Physician: Declan Seymour MD

## 2021-04-06 NOTE — PROGRESS NOTES
Progress Note    Patient: Megan Seth MRN: [id number]  SSN: [id number]    YOB: 1945  Age: 66 y.o. Sex: male        ADMITTED:  2021 to Herlinda French MD  for Right kidney mass [N28.89]         Megan Seth is 1 Day Post-Op Procedure(s):  RIGHT ROBOTIC ASSISTED LAPAROSCOPIC PARTIAL NEPHRECTOMY. Hx of CAD, nephrolithiasis, CKD, COPD, HTN, MELODY on CPAP. Hispitalist consulted this AM regarding medical comanagement of significant comorbidities    s/p right partial RAL nephrectomy on 21. Seen and examined by Dr. Ramon Cloud this AM.    Documented UOP: 150cc. Quiroz removed this AM. Has no voided since. BS showed 32cc. Denies sensation to void. 1L LR bolus administered this AM. Additional bolus ordered  EDIE drain with SS drainage: output 60cc  RLQ pain controlled with tylenol  Denies nausea, emesis, fevers/chills, SOB, or CP  OOB, no distress or dizziness. Reports use of IS with no issues    afvss  WBC: 12.8 from 17.3  Hgb: 8.5 from 8.8  Cr: 3.19 from 2.43    Vitals:  Temp (24hrs), Av.2 °F (36.8 °C), Min:97.7 °F (36.5 °C), Max:98.8 °F (37.1 °C)     Blood pressure 138/61, pulse 83, temperature 98.8 °F (37.1 °C), resp. rate 20, height 6' (1.829 m), weight 115.5 kg (254 lb 10.1 oz), SpO2 95 %. I&O's:  1901 -  07  In: 4645 [P.O.:200;  I.V.:4445]  Out: 1715 [Urine:490; Drains:225]    07 -  190  In: -   Out: 150 [Urine:150]           Labs:   Recent Labs     21  0538 21  1748   WBC 12.8* 17.3*   HGB 8.5* 8.8*   HCT 27.3* 28.4*    203     Recent Labs     21  0538      K 4.7   *   CO2 23   *   BUN 33*   CREA 3.19*   CA 8.3*        Cultures:      Imaging:       Assessment:     - 1 Day Post-Op Procedure(s):  RIGHT ROBOTIC ASSISTED LAPAROSCOPIC PARTIAL NEPHRECTOMY    Active Problems:    Right kidney mass (2021)        Plan:     Megan Seth is a 66 y.o. male with CAD, nephrolithiasis, CKD, COPD, HTN, MELODY on CPAP s/p right partial RAL nephrectomy on 4/5/21.    - Emily IVF  - Advance diet  - OOB, ambulation  - IS  - Pass flatus prior to discharge  - Need to work on pain control throughout day, hopeful that ambulation will help  - Keep EDIE through AM, will reassess. If output remains high, consider EDIE creat  - Monitor UOP. Additional 1L LR bolus  - Kilgore removed this AM. Has not voided since. BS showed 32cc. Strict I&Os  - Monitor H&H. Hgb stable, no need for transfusion at this time  - Hospitalist consulted this AM regarding medical comanagement of significant comorbidities  - outpatient f/u scheduled 04/15/21 at 1pm with Dr. Rekha Rodriguez at the [facility] location    =====UPDATE: 15:45  Received message from RN that patient has not voided and still has no urge. Bladder scan 251cc. MD aware. Advised to repeat bladder scan q2h and place kilgore if BS >450cc with the inability to void.     Supervising Dr. Rekha PATEL    Signed By: Flavio Mane NP - April 6, 2021

## 2021-04-06 NOTE — PROGRESS NOTES
Shift Change:    Bedside and Verbal shift change report given to JAY Fuentes (oncoming nurse) by Omari Fabian RN (offgoing nurse). Report included the following information SBAR, Kardex, Intake/Output, MAR and Recent Results. 1240: Updated NP on patient status. 150 mL out from kilgore this am. Removed kilgore at 0845 and has not voided since. Bladder scan showing 32 mL. LR bolus started. 1500: Patient has had 2 L IV fluid and 1380 mL PO fluid today. + 3,170 fluid balance. Bladder scan 251. Still not urge to void. Will update urology. 1610: Orders to bladder scan patient every 2 hours and place kilgore if scan > 450 mL. Continue to encourage PO intake. 1620: Patient voided roughly 50 mL. 1740: Patient voided 300 mL.     1830: Patient ambulated in hallway x2 and in room multiple times throughout day. Per patient pain is well controlled now and only minimal discomfort with ambulation. Shift Change:    Bedside and Verbal shift change report given to Blas Kaur RN (oncoming nurse) by Americo Mcconnell RN (offgoing nurse). Report included the following information SBAR, Kardex, Intake/Output, MAR and Recent Results.

## 2021-04-07 ENCOUNTER — APPOINTMENT (OUTPATIENT)
Dept: GENERAL RADIOLOGY | Age: 76
DRG: 656 | End: 2021-04-07
Attending: FAMILY MEDICINE
Payer: MEDICARE

## 2021-04-07 LAB
ANION GAP SERPL CALC-SCNC: 7 MMOL/L (ref 5–15)
BUN SERPL-MCNC: 46 MG/DL (ref 6–20)
BUN/CREAT SERPL: 13 (ref 12–20)
CALCIUM SERPL-MCNC: 7.6 MG/DL (ref 8.5–10.1)
CHLORIDE SERPL-SCNC: 103 MMOL/L (ref 97–108)
CO2 SERPL-SCNC: 26 MMOL/L (ref 21–32)
CREAT FLD-MCNC: 3.7 MG/DL
CREAT SERPL-MCNC: 3.58 MG/DL (ref 0.7–1.3)
D DIMER PPP FEU-MCNC: 2.78 MG/L FEU (ref 0–0.65)
ERYTHROCYTE [DISTWIDTH] IN BLOOD BY AUTOMATED COUNT: 14.6 % (ref 11.5–14.5)
GLUCOSE BLD STRIP.AUTO-MCNC: 116 MG/DL (ref 65–100)
GLUCOSE BLD STRIP.AUTO-MCNC: 132 MG/DL (ref 65–100)
GLUCOSE BLD STRIP.AUTO-MCNC: 137 MG/DL (ref 65–100)
GLUCOSE BLD STRIP.AUTO-MCNC: 140 MG/DL (ref 65–100)
GLUCOSE SERPL-MCNC: 103 MG/DL (ref 65–100)
HCT VFR BLD AUTO: 23.5 % (ref 36.6–50.3)
HGB BLD-MCNC: 7.4 G/DL (ref 12.1–17)
MCH RBC QN AUTO: 29.7 PG (ref 26–34)
MCHC RBC AUTO-ENTMCNC: 31.5 G/DL (ref 30–36.5)
MCV RBC AUTO: 94.4 FL (ref 80–99)
NRBC # BLD: 0 K/UL (ref 0–0.01)
NRBC BLD-RTO: 0 PER 100 WBC
PLATELET # BLD AUTO: 181 K/UL (ref 150–400)
PMV BLD AUTO: 9.8 FL (ref 8.9–12.9)
POTASSIUM SERPL-SCNC: 4 MMOL/L (ref 3.5–5.1)
RBC # BLD AUTO: 2.49 M/UL (ref 4.1–5.7)
SERVICE CMNT-IMP: ABNORMAL
SODIUM SERPL-SCNC: 136 MMOL/L (ref 136–145)
SPECIMEN SOURCE FLD: NORMAL
WBC # BLD AUTO: 15.6 K/UL (ref 4.1–11.1)

## 2021-04-07 PROCEDURE — 94640 AIRWAY INHALATION TREATMENT: CPT

## 2021-04-07 PROCEDURE — 82962 GLUCOSE BLOOD TEST: CPT

## 2021-04-07 PROCEDURE — 65270000029 HC RM PRIVATE

## 2021-04-07 PROCEDURE — 74011250636 HC RX REV CODE- 250/636: Performed by: FAMILY MEDICINE

## 2021-04-07 PROCEDURE — 74011250637 HC RX REV CODE- 250/637: Performed by: UROLOGY

## 2021-04-07 PROCEDURE — 82570 ASSAY OF URINE CREATININE: CPT

## 2021-04-07 PROCEDURE — 85379 FIBRIN DEGRADATION QUANT: CPT

## 2021-04-07 PROCEDURE — 36415 COLL VENOUS BLD VENIPUNCTURE: CPT

## 2021-04-07 PROCEDURE — 80048 BASIC METABOLIC PNL TOTAL CA: CPT

## 2021-04-07 PROCEDURE — 74011000250 HC RX REV CODE- 250: Performed by: UROLOGY

## 2021-04-07 PROCEDURE — 74011250637 HC RX REV CODE- 250/637: Performed by: FAMILY MEDICINE

## 2021-04-07 PROCEDURE — 99218 HC RM OBSERVATION: CPT

## 2021-04-07 PROCEDURE — 85027 COMPLETE CBC AUTOMATED: CPT

## 2021-04-07 PROCEDURE — 51798 US URINE CAPACITY MEASURE: CPT

## 2021-04-07 PROCEDURE — 71045 X-RAY EXAM CHEST 1 VIEW: CPT

## 2021-04-07 RX ORDER — AMLODIPINE BESYLATE 5 MG/1
10 TABLET ORAL DAILY
Status: DISCONTINUED | OUTPATIENT
Start: 2021-04-08 | End: 2021-04-09 | Stop reason: HOSPADM

## 2021-04-07 RX ORDER — SODIUM CHLORIDE 9 MG/ML
75 INJECTION, SOLUTION INTRAVENOUS CONTINUOUS
Status: DISCONTINUED | OUTPATIENT
Start: 2021-04-07 | End: 2021-04-08

## 2021-04-07 RX ORDER — CALCIUM CARBONATE 200(500)MG
200 TABLET,CHEWABLE ORAL
Status: DISCONTINUED | OUTPATIENT
Start: 2021-04-07 | End: 2021-04-09 | Stop reason: HOSPADM

## 2021-04-07 RX ADMIN — IPRATROPIUM BROMIDE AND ALBUTEROL SULFATE 3 ML: .5; 2.5 SOLUTION RESPIRATORY (INHALATION) at 14:04

## 2021-04-07 RX ADMIN — Medication 10 ML: at 15:33

## 2021-04-07 RX ADMIN — STANDARDIZED SENNA CONCENTRATE 17.2 MG: 8.6 TABLET ORAL at 21:40

## 2021-04-07 RX ADMIN — Medication 10 ML: at 21:42

## 2021-04-07 RX ADMIN — DOCUSATE SODIUM 100 MG: 100 CAPSULE, LIQUID FILLED ORAL at 17:06

## 2021-04-07 RX ADMIN — ACETAMINOPHEN 1000 MG: 500 TABLET, FILM COATED ORAL at 06:56

## 2021-04-07 RX ADMIN — ACETAMINOPHEN 1000 MG: 500 TABLET, FILM COATED ORAL at 17:06

## 2021-04-07 RX ADMIN — AMLODIPINE BESYLATE 5 MG: 5 TABLET ORAL at 08:32

## 2021-04-07 RX ADMIN — IPRATROPIUM BROMIDE AND ALBUTEROL SULFATE 3 ML: .5; 2.5 SOLUTION RESPIRATORY (INHALATION) at 20:30

## 2021-04-07 RX ADMIN — DOCUSATE SODIUM 100 MG: 100 CAPSULE, LIQUID FILLED ORAL at 08:32

## 2021-04-07 RX ADMIN — ARFORMOTEROL TARTRATE 15 MCG: 15 SOLUTION RESPIRATORY (INHALATION) at 20:35

## 2021-04-07 RX ADMIN — ANTACID TABLETS 200 MG: 500 TABLET, CHEWABLE ORAL at 13:35

## 2021-04-07 RX ADMIN — METOPROLOL SUCCINATE 25 MG: 25 TABLET, EXTENDED RELEASE ORAL at 21:40

## 2021-04-07 RX ADMIN — ALLOPURINOL 100 MG: 100 TABLET ORAL at 08:32

## 2021-04-07 RX ADMIN — ARFORMOTEROL TARTRATE 15 MCG: 15 SOLUTION RESPIRATORY (INHALATION) at 07:28

## 2021-04-07 RX ADMIN — SODIUM CHLORIDE 75 ML/HR: 9 INJECTION, SOLUTION INTRAVENOUS at 09:27

## 2021-04-07 RX ADMIN — SODIUM CHLORIDE 75 ML/HR: 9 INJECTION, SOLUTION INTRAVENOUS at 21:40

## 2021-04-07 RX ADMIN — Medication 10 ML: at 15:32

## 2021-04-07 RX ADMIN — ROPINIROLE HYDROCHLORIDE 0.5 MG: 0.25 TABLET, FILM COATED ORAL at 21:41

## 2021-04-07 RX ADMIN — CITALOPRAM HYDROBROMIDE 40 MG: 20 TABLET ORAL at 17:06

## 2021-04-07 RX ADMIN — Medication 10 ML: at 06:56

## 2021-04-07 RX ADMIN — IPRATROPIUM BROMIDE AND ALBUTEROL SULFATE 3 ML: .5; 2.5 SOLUTION RESPIRATORY (INHALATION) at 07:26

## 2021-04-07 RX ADMIN — Medication 10 ML: at 06:55

## 2021-04-07 RX ADMIN — ATORVASTATIN CALCIUM 80 MG: 20 TABLET, FILM COATED ORAL at 21:41

## 2021-04-07 RX ADMIN — ACETAMINOPHEN 1000 MG: 500 TABLET, FILM COATED ORAL at 12:04

## 2021-04-07 RX ADMIN — PANTOPRAZOLE SODIUM 40 MG: 40 TABLET, DELAYED RELEASE ORAL at 06:56

## 2021-04-07 RX ADMIN — ISOSORBIDE MONONITRATE 15 MG: 30 TABLET, EXTENDED RELEASE ORAL at 08:32

## 2021-04-07 NOTE — PROGRESS NOTES
Bedside and Verbal shift change report given to JAY Villegas (oncoming nurse) by Rachele Eric RN (offgoing nurse). Report included the following information SBAR, Kardex, Intake/Output, MAR, Accordion, Recent Results and Med Rec Status.

## 2021-04-07 NOTE — PROGRESS NOTES
UROLOGY CLINICAL NOTE      Pt in NAD, sats dipping below 90% on 2 liters of 02.   Reports dyspnea with minimal activity (ambulating to bathroom, moving himself up in bed)   Nurse increased 02 to 3 liters     No discharge today   *Chest xray ordered by hospitalist   *Encourage and monitor use of incentive spirometer; I discussed this with the nurse and pt   *EDIE Cr 3.7 (Scr 3.58) okay to remove EDIE drain   *hgb 7.4   *VSS     Discussed findings and plan with Dr. Hema Ortega

## 2021-04-07 NOTE — PROGRESS NOTES
8058 - patient transferred from chair to bed, dyspnea on exertion, O2 sats went from 93% to 89%. Currently on O2 via nasal cannula @ 2 L/min, once in bed, O2 sats increased to 92%. Notified MD.    1208 - O2 sats down to 89%, increased O2 to 3 L/min, but still stayed 89%, increased to 4 L/min, O2 sats steady at 92%. Watched patient demonstrate incentive spirometer. MD notified. 1308 - O2 sats steady at 94% on 4 L/min oxygen via nasal cannula. Voided 300 mL, PVR 53 mL    1533 - O2 sats at 93% on 4 L/min oxygen via nasal cannula.     1602 - EDIE drain removed.  Voided 200 mL, PVR 65 mL    1843 - Voided 300 mL,  mL

## 2021-04-07 NOTE — PROGRESS NOTES
[address] [facility]  [facility]ist [facility]                                                                                          Hospitalist Progress Note  Radha Márquez MD        Date of Service:  2021  NAME:  Brandyn Lama  :  1945  MRN:  [id number]      Admission Summary:   Mr. Juvencio Bangura is a 66 y.o. with h/o cad, ckd, preDM, htn who presents for nephrectomy. Hospitalist service is consulted for medical management. Interval history / Subjective:    doing well, still c/o 5-7/10 pain     Assessment & Plan:     Right kidney mass: sp partial nephrectomy. Urology is primary team and will manage pt/ot, pain management, dispo    Chronic kidney disease, stage III (moderate): slightly worse today post nephrectomy. Gently hydrate and monitor. If worsening then will consult nephrology. Leukocytosis: likely post op, reactive. No fever. Continue to monitor.      Mixed hyperlipidemia: continue statin     Hypertensive kidney disease with chronic kidney disease stage III: slightly better controlled today. Will need tight control sp nephrectomy. increase norvasc to 10mg daily, cont imdur, metoprolol. Order hydralazine prn     CAD: stable. Continue imdur, metoprolol, lipitor    Type 2 diabetes mellitus: not on home meds.  Check A1c.      RLS: continue requip    Code status: full  DVT prophylaxis: SCDs       Hospital Problems  Date Reviewed: 2020          Codes Class Noted POA    * (Principal) Right kidney mass ICD-10-CM: N28.89  ICD-9-CM: 593.9  2021 Unknown        Diabetic peripheral neuropathy associated with type 2 diabetes mellitus ([facility] [address] ICD-10-CM: E11.42  ICD-9-CM: 250.60, 357.2  3/3/2020 Yes        Coronary artery disease involving native coronary artery of native heart without angina pectoris ICD-10-CM: I25.10  ICD-9-CM: 414.01  3/16/2016 Yes        Hypertensive kidney disease with chronic kidney disease stage III (HCC) (Chronic) ICD-10-CM: I12.9, N18.30  ICD-9-CM: 403.90, 585.3 11/22/2013 Yes        Chronic kidney disease, stage III (moderate) (HCC) ICD-10-CM: N18.30  ICD-9-CM: 585.3  10/11/2009 Yes        Mixed hyperlipidemia (Chronic) ICD-10-CM: A30.9  ICD-9-CM: 272.2  10/11/2009 Yes        Essential hypertension (Chronic) ICD-10-CM: I10  ICD-9-CM: 401.9  10/11/2009 Yes                Review of Systems:   A comprehensive review of systems was negative except for that written in the HPI. Vital Signs:    Last 24hrs VS reviewed since prior progress note. Most recent are:  Visit Vitals  BP (!) 148/65 (BP 1 Location: Right upper arm, BP Patient Position: At rest)   Pulse 98   Temp 99.2 °F (37.3 °C)   Resp 18   Ht 6' (1.829 m)   Wt 115.5 kg (254 lb 10.1 oz)   SpO2 92%   BMI 34.53 kg/m²         Intake/Output Summary (Last 24 hours) at 4/7/2021 0858  Last data filed at 4/7/2021 0700  Gross per 24 hour   Intake 4690 ml   Output 1560 ml   Net 3130 ml        Physical Examination:             Constitutional:  No acute distress, cooperative, pleasant    ENT:  Oral mucosa moist, oropharynx benign. Resp:  CTA bilaterally. No wheezing/rhonchi/rales. No accessory muscle use   CV:  Regular rhythm, normal rate, no murmurs, gallops, rubs    GI:  Soft, non distended, non tender. normoactive bowel sounds, no hepatosplenomegaly     Musculoskeletal:  No edema, warm, 2+ pulses throughout    Neurologic:  Moves all extremities. AAOx3, CN II-XII reviewed     Psych:  Good insight, Not anxious nor agitated. Data Review:    Review and/or order of clinical lab test      Labs:     Recent Labs     04/07/21  0457 04/06/21  0538   WBC 15.6* 12.8*   HGB 7.4* 8.5*   HCT 23.5* 27.3*    216     Recent Labs     04/07/21  0457 04/06/21  0538    140   K 4.0 4.7    109*   CO2 26 23   BUN 46* 33*   CREA 3.58* 3.19*   * 122*   CA 7.6* 8.3*     No results for input(s): ALT, AP, TBIL, TBILI, TP, ALB, GLOB, GGT, AML, LPSE in the last 72 hours.     No lab exists for component: SGOT, GPT, AMYP, HLPSE  No results for input(s): INR, PTP, APTT, INREXT in the last 72 hours. No results for input(s): FE, TIBC, PSAT, FERR in the last 72 hours. Lab Results   Component Value Date/Time    Folate 15.6 03/03/2020 12:06 PM      No results for input(s): PH, PCO2, PO2 in the last 72 hours. No results for input(s): CPK, CKNDX, TROIQ in the last 72 hours.     No lab exists for component: CPKMB  Lab Results   Component Value Date/Time    Cholesterol, total 129 10/07/2020 02:23 PM    HDL Cholesterol 42 10/07/2020 02:23 PM    LDL, calculated 68 10/07/2020 02:23 PM    LDL, calculated 49 03/03/2020 12:07 PM    Triglyceride 105 10/07/2020 02:23 PM    CHOL/HDL Ratio 2.9 01/09/2020 02:45 AM     Lab Results   Component Value Date/Time    Glucose (POC) 116 (H) 04/07/2021 06:39 AM    Glucose (POC) 136 (H) 04/06/2021 09:24 PM    Glucose (POC) 132 (H) 04/06/2021 04:36 PM    Glucose (POC) 139 (H) 04/06/2021 11:46 AM    Glucose (POC) 133 (H) 04/06/2021 06:41 AM     Lab Results   Component Value Date/Time    Color YELLOW/STRAW 03/15/2021 12:11 PM    Appearance CLEAR 03/15/2021 12:11 PM    Specific gravity 1.019 03/15/2021 12:11 PM    pH (UA) 5.5 03/15/2021 12:11 PM    Protein 30 (A) 03/15/2021 12:11 PM    Glucose Negative 03/15/2021 12:11 PM    Ketone Negative 03/15/2021 12:11 PM    Bilirubin Negative 03/15/2021 12:11 PM    Urobilinogen 0.2 03/15/2021 12:11 PM    Nitrites Negative 03/15/2021 12:11 PM    Leukocyte Esterase Negative 03/15/2021 12:11 PM    Epithelial cells FEW 03/15/2021 12:11 PM    Bacteria Negative 03/15/2021 12:11 PM    WBC 0-4 03/15/2021 12:11 PM    RBC 0-5 03/15/2021 12:11 PM         Medications Reviewed:     Current Facility-Administered Medications   Medication Dose Route Frequency    [START ON 4/8/2021] amLODIPine (NORVASC) tablet 10 mg  10 mg Oral DAILY    0.9% sodium chloride infusion  75 mL/hr IntraVENous CONTINUOUS    sodium chloride (NS) flush 5-40 mL  5-40 mL IntraVENous Q8H    sodium chloride (NS) flush 5-40 mL  5-40 mL IntraVENous PRN    hydrALAZINE (APRESOLINE) 20 mg/mL injection 10 mg  10 mg IntraVENous Q6H PRN    albuterol-ipratropium (DUO-NEB) 2.5 MG-0.5 MG/3 ML  3 mL Nebulization Q6H RT    allopurinoL (ZYLOPRIM) tablet 100 mg  100 mg Oral DAILY    atorvastatin (LIPITOR) tablet 80 mg  80 mg Oral QHS    citalopram (CELEXA) tablet 40 mg  40 mg Oral QPM    isosorbide mononitrate ER (IMDUR) tablet 15 mg  15 mg Oral DAILY    metoprolol succinate (TOPROL-XL) XL tablet 25 mg  25 mg Oral QHS    nitroglycerin (NITROSTAT) tablet 0.4 mg  0.4 mg SubLINGual PRN    pantoprazole (PROTONIX) tablet 40 mg  40 mg Oral ACB    rOPINIRole (REQUIP) tablet 0.5 mg  0.5 mg Oral QHS    sodium chloride (NS) flush 5-40 mL  5-40 mL IntraVENous Q8H    sodium chloride (NS) flush 5-40 mL  5-40 mL IntraVENous PRN    acetaminophen (TYLENOL) tablet 1,000 mg  1,000 mg Oral Q6H    oxyCODONE IR (ROXICODONE) tablet 5 mg  5 mg Oral Q4H PRN    morphine injection 2 mg  2 mg IntraVENous Q4H PRN    ondansetron (ZOFRAN ODT) tablet 4 mg  4 mg Oral Q6H PRN    diphenhydrAMINE (BENADRYL) capsule 25 mg  25 mg Oral Q4H PRN    docusate sodium (COLACE) capsule 100 mg  100 mg Oral BID    lidocaine 4 % patch 1 Patch  1 Patch TransDERmal Q24H    senna (SENOKOT) tablet 17.2 mg  2 Tab Oral QHS    insulin lispro (HUMALOG) injection   SubCUTAneous AC&HS    glucose chewable tablet 16 g  4 Tab Oral PRN    dextrose (D50W) injection syrg 12.5-25 g  12.5-25 g IntraVENous PRN    glucagon (GLUCAGEN) injection 1 mg  1 mg IntraMUSCular PRN    oxyCODONE IR (ROXICODONE) tablet 10 mg  10 mg Oral Q4H PRN    arformoteroL (BROVANA) neb solution 15 mcg  15 mcg Nebulization BID RT     ______________________________________________________________________  EXPECTED LENGTH OF STAY: - - -  ACTUAL LENGTH OF STAY:          Jacquelin Montalvo MD

## 2021-04-07 NOTE — PROGRESS NOTES
Problem: Falls - Risk of  Goal: *Absence of Falls  Description: Document Rex Cruz Fall Risk and appropriate interventions in the flowsheet.   Outcome: Progressing Towards Goal  Note: Fall Risk Interventions:  Mobility Interventions: Strengthening exercises (ROM-active/passive)         Medication Interventions: Assess postural VS orthostatic hypotension, Teach patient to arise slowly    Elimination Interventions: Patient to call for help with toileting needs, Call light in reach, Urinal in reach    History of Falls Interventions: Bed/chair exit alarm

## 2021-04-07 NOTE — PROGRESS NOTES
Progress Note    Patient: Emilia Martinez MRN: [id number]  SSN: [id number]    YOB: 1945  Age: 66 y.o. Sex: male        ADMITTED:  2021 to Melvin Catalan MD  for Right kidney mass [N28.89]         Emilia Martinez is 2 Days Post-Op Procedure(s):  RIGHT ROBOTIC ASSISTED LAPAROSCOPIC PARTIAL NEPHRECTOMY. He is doing well. He has mild pain. He has no nausea. He is tolerating a solid diet. Patient is voiding without difficulty.  hgb a little lower. Cr up, feels well though        Vitals:  Temp (24hrs), Av.7 °F (37.1 °C), Min:98.2 °F (36.8 °C), Max:99.2 °F (37.3 °C)     Blood pressure (!) 148/65, pulse 98, temperature 99.2 °F (37.3 °C), resp. rate 18, height 6' (1.829 m), weight 115.5 kg (254 lb 10.1 oz), SpO2 92 %. I&O's:   1901 -  0700  In: 4039 [P.O.:2890; I.V.:3445]  Out: 2190 [Urine:1850; Drains:340]   No intake/output data recorded. Exam:   abdomen soft, appropriately TTP at surgical sites. All incisions clean/dry/intact without evidence of infection. EDIE with serosanguinous drainage.      Labs:   Recent Labs     21  0457 21  0538 21  1748   WBC 15.6* 12.8* 17.3*   HGB 7.4* 8.5* 8.8*   HCT 23.5* 27.3* 28.4*    216 203     Recent Labs     21  0457 21  0538    140   K 4.0 4.7    109*   CO2 26 23   * 122*   BUN 46* 33*   CREA 3.58* 3.19*   CA 7.6* 8.3*        Cultures:      Imaging:       Assessment:     - 2 Days Post-Op Procedure(s):  RIGHT ROBOTIC ASSISTED LAPAROSCOPIC PARTIAL NEPHRECTOMY    Principal Problem:    Right kidney mass (2021)    Active Problems:    Chronic kidney disease, stage III (moderate) (HCC) (10/11/2009)      Mixed hyperlipidemia (10/11/2009)      Essential hypertension (10/11/2009)      Hypertensive kidney disease with chronic kidney disease stage III ([facility] [address] (2013)      Coronary artery disease involving native coronary artery of native heart without angina pectoris (3/16/2016)      Diabetic peripheral neuropathy associated with type 2 diabetes mellitus ([facility] [address] (3/3/2020)        Plan:     - bety fpr cr.  - hold off on transfusion unless medicine feels he meets criteria, ?  Dilutional  - hopefilly home later today    Signed By: Stephani Sheridan MD - April 7, 2021

## 2021-04-07 NOTE — PROGRESS NOTES
Transition of Care Plan - RUR 20%:     1. S/p partial R nephrectomy on 4/5/21  2. Urology following  3. Daughter to transport home at d/c  4. CM following for any needs  5.  Outpatient follow-up    Skip Lawson LCSW

## 2021-04-08 ENCOUNTER — APPOINTMENT (OUTPATIENT)
Dept: GENERAL RADIOLOGY | Age: 76
DRG: 656 | End: 2021-04-08
Attending: INTERNAL MEDICINE
Payer: MEDICARE

## 2021-04-08 PROBLEM — Z86.73 HX OF COMPLETED STROKE: Status: ACTIVE | Noted: 2021-04-08

## 2021-04-08 PROBLEM — E53.8 B12 DEFICIENCY: Status: RESOLVED | Noted: 2020-03-03 | Resolved: 2021-04-08

## 2021-04-08 PROBLEM — I65.23: Status: RESOLVED | Noted: 2020-01-08 | Resolved: 2021-04-08

## 2021-04-08 PROBLEM — Z87.442 HISTORY OF KIDNEY STONES: Status: RESOLVED | Noted: 2018-07-12 | Resolved: 2021-04-08

## 2021-04-08 PROBLEM — M54.31 RIGHT SIDED SCIATICA: Status: RESOLVED | Noted: 2019-08-30 | Resolved: 2021-04-08

## 2021-04-08 PROBLEM — R07.9 CHEST PAIN: Status: RESOLVED | Noted: 2017-07-07 | Resolved: 2021-04-08

## 2021-04-08 PROBLEM — G60.8 IDIOPATHIC SMALL AND LARGE FIBER SENSORY NEUROPATHY: Status: RESOLVED | Noted: 2020-03-03 | Resolved: 2021-04-08

## 2021-04-08 PROBLEM — J96.01 ACUTE HYPOXEMIC RESPIRATORY FAILURE (HCC): Status: ACTIVE | Noted: 2021-04-08

## 2021-04-08 PROBLEM — I63.9 ACUTE CVA (CEREBROVASCULAR ACCIDENT) (HCC): Status: RESOLVED | Noted: 2020-01-08 | Resolved: 2021-04-08

## 2021-04-08 PROBLEM — D64.9 ANEMIA: Status: ACTIVE | Noted: 2021-04-08

## 2021-04-08 PROBLEM — I65.29 CAROTID STENOSIS: Status: RESOLVED | Noted: 2020-01-15 | Resolved: 2021-04-08

## 2021-04-08 LAB
ANION GAP SERPL CALC-SCNC: 6 MMOL/L (ref 5–15)
APPEARANCE UR: CLEAR
BACTERIA URNS QL MICRO: NEGATIVE /HPF
BASOPHILS # BLD: 0 K/UL (ref 0–0.1)
BASOPHILS NFR BLD: 0 % (ref 0–1)
BILIRUB UR QL: NEGATIVE
BUN SERPL-MCNC: 45 MG/DL (ref 6–20)
BUN/CREAT SERPL: 13 (ref 12–20)
CALCIUM SERPL-MCNC: 7.8 MG/DL (ref 8.5–10.1)
CHLORIDE SERPL-SCNC: 109 MMOL/L (ref 97–108)
CO2 SERPL-SCNC: 24 MMOL/L (ref 21–32)
COLOR UR: ABNORMAL
COMMENT, HOLDF: NORMAL
CREAT SERPL-MCNC: 3.45 MG/DL (ref 0.7–1.3)
CREAT UR-MCNC: 89 MG/DL
DIFFERENTIAL METHOD BLD: ABNORMAL
EOSINOPHIL # BLD: 0.1 K/UL (ref 0–0.4)
EOSINOPHIL #/AREA URNS HPF: NEGATIVE /[HPF]
EOSINOPHIL NFR BLD: 1 % (ref 0–7)
EPITH CASTS URNS QL MICRO: ABNORMAL /LPF
ERYTHROCYTE [DISTWIDTH] IN BLOOD BY AUTOMATED COUNT: 14.7 % (ref 11.5–14.5)
FERRITIN SERPL-MCNC: 53 NG/ML (ref 26–388)
FOLATE SERPL-MCNC: 18 NG/ML (ref 5–21)
GLUCOSE BLD STRIP.AUTO-MCNC: 116 MG/DL (ref 65–100)
GLUCOSE BLD STRIP.AUTO-MCNC: 121 MG/DL (ref 65–100)
GLUCOSE BLD STRIP.AUTO-MCNC: 122 MG/DL (ref 65–100)
GLUCOSE BLD STRIP.AUTO-MCNC: 126 MG/DL (ref 65–100)
GLUCOSE BLD STRIP.AUTO-MCNC: 128 MG/DL (ref 65–100)
GLUCOSE SERPL-MCNC: 103 MG/DL (ref 65–100)
GLUCOSE UR STRIP.AUTO-MCNC: NEGATIVE MG/DL
HAPTOGLOB SERPL-MCNC: 213 MG/DL (ref 30–200)
HCT VFR BLD AUTO: 21.4 % (ref 36.6–50.3)
HCT VFR BLD AUTO: 23.4 % (ref 36.6–50.3)
HGB BLD-MCNC: 6.7 G/DL (ref 12.1–17)
HGB BLD-MCNC: 7.4 G/DL (ref 12.1–17)
HGB UR QL STRIP: ABNORMAL
HYALINE CASTS URNS QL MICRO: ABNORMAL /LPF (ref 0–5)
IMM GRANULOCYTES # BLD AUTO: 0.1 K/UL (ref 0–0.04)
IMM GRANULOCYTES NFR BLD AUTO: 1 % (ref 0–0.5)
IRON SATN MFR SERPL: 4 % (ref 20–50)
IRON SERPL-MCNC: 10 UG/DL (ref 35–150)
KETONES UR QL STRIP.AUTO: NEGATIVE MG/DL
LDH SERPL L TO P-CCNC: 315 U/L (ref 85–241)
LEUKOCYTE ESTERASE UR QL STRIP.AUTO: NEGATIVE
LYMPHOCYTES # BLD: 1.7 K/UL (ref 0.8–3.5)
LYMPHOCYTES NFR BLD: 12 % (ref 12–49)
MCH RBC QN AUTO: 29.6 PG (ref 26–34)
MCHC RBC AUTO-ENTMCNC: 31.3 G/DL (ref 30–36.5)
MCV RBC AUTO: 94.7 FL (ref 80–99)
MONOCYTES # BLD: 1.8 K/UL (ref 0–1)
MONOCYTES NFR BLD: 13 % (ref 5–13)
NEUTS SEG # BLD: 10.2 K/UL (ref 1.8–8)
NEUTS SEG NFR BLD: 73 % (ref 32–75)
NITRITE UR QL STRIP.AUTO: NEGATIVE
NRBC # BLD: 0 K/UL (ref 0–0.01)
NRBC BLD-RTO: 0 PER 100 WBC
OSMOLALITY UR: 346 MOSM/KG H2O
PH UR STRIP: 6 [PH] (ref 5–8)
PLATELET # BLD AUTO: 167 K/UL (ref 150–400)
PMV BLD AUTO: 9.6 FL (ref 8.9–12.9)
POTASSIUM SERPL-SCNC: 4 MMOL/L (ref 3.5–5.1)
PROCALCITONIN SERPL-MCNC: 0.6 NG/ML
PROT UR STRIP-MCNC: 30 MG/DL
PROT UR-MCNC: 62 MG/DL (ref 0–11.9)
RBC # BLD AUTO: 2.26 M/UL (ref 4.1–5.7)
RBC #/AREA URNS HPF: ABNORMAL /HPF (ref 0–5)
RBC MORPH BLD: ABNORMAL
RETICS # AUTO: 0.04 M/UL (ref 0.03–0.1)
RETICS/RBC NFR AUTO: 1.7 % (ref 0.7–2.1)
SAMPLES BEING HELD,HOLD: NORMAL
SERVICE CMNT-IMP: ABNORMAL
SODIUM SERPL-SCNC: 139 MMOL/L (ref 136–145)
SODIUM UR-SCNC: 48 MMOL/L
SP GR UR REFRACTOMETRY: 1.01 (ref 1–1.03)
TIBC SERPL-MCNC: 271 UG/DL (ref 250–450)
UA: UC IF INDICATED,UAUC: ABNORMAL
UROBILINOGEN UR QL STRIP.AUTO: 0.2 EU/DL (ref 0.2–1)
VIT B12 SERPL-MCNC: 249 PG/ML (ref 193–986)
WBC # BLD AUTO: 13.9 K/UL (ref 4.1–11.1)
WBC URNS QL MICRO: ABNORMAL /HPF (ref 0–4)

## 2021-04-08 PROCEDURE — 87086 URINE CULTURE/COLONY COUNT: CPT

## 2021-04-08 PROCEDURE — 2709999900 HC NON-CHARGEABLE SUPPLY

## 2021-04-08 PROCEDURE — 83540 ASSAY OF IRON: CPT

## 2021-04-08 PROCEDURE — 94640 AIRWAY INHALATION TREATMENT: CPT

## 2021-04-08 PROCEDURE — 36415 COLL VENOUS BLD VENIPUNCTURE: CPT

## 2021-04-08 PROCEDURE — 83935 ASSAY OF URINE OSMOLALITY: CPT

## 2021-04-08 PROCEDURE — 74011250637 HC RX REV CODE- 250/637: Performed by: FAMILY MEDICINE

## 2021-04-08 PROCEDURE — 87205 SMEAR GRAM STAIN: CPT

## 2021-04-08 PROCEDURE — 65270000029 HC RM PRIVATE

## 2021-04-08 PROCEDURE — 83615 LACTATE (LD) (LDH) ENZYME: CPT

## 2021-04-08 PROCEDURE — 74011250637 HC RX REV CODE- 250/637: Performed by: UROLOGY

## 2021-04-08 PROCEDURE — 81001 URINALYSIS AUTO W/SCOPE: CPT

## 2021-04-08 PROCEDURE — 82570 ASSAY OF URINE CREATININE: CPT

## 2021-04-08 PROCEDURE — 82607 VITAMIN B-12: CPT

## 2021-04-08 PROCEDURE — 71045 X-RAY EXAM CHEST 1 VIEW: CPT

## 2021-04-08 PROCEDURE — 85045 AUTOMATED RETICULOCYTE COUNT: CPT

## 2021-04-08 PROCEDURE — 80048 BASIC METABOLIC PNL TOTAL CA: CPT

## 2021-04-08 PROCEDURE — 51798 US URINE CAPACITY MEASURE: CPT

## 2021-04-08 PROCEDURE — 82746 ASSAY OF FOLIC ACID SERUM: CPT

## 2021-04-08 PROCEDURE — 77010033678 HC OXYGEN DAILY

## 2021-04-08 PROCEDURE — P9016 RBC LEUKOCYTES REDUCED: HCPCS

## 2021-04-08 PROCEDURE — 85018 HEMOGLOBIN: CPT

## 2021-04-08 PROCEDURE — 74011250637 HC RX REV CODE- 250/637: Performed by: INTERNAL MEDICINE

## 2021-04-08 PROCEDURE — 84300 ASSAY OF URINE SODIUM: CPT

## 2021-04-08 PROCEDURE — 83010 ASSAY OF HAPTOGLOBIN QUANT: CPT

## 2021-04-08 PROCEDURE — 82962 GLUCOSE BLOOD TEST: CPT

## 2021-04-08 PROCEDURE — 36430 TRANSFUSION BLD/BLD COMPNT: CPT

## 2021-04-08 PROCEDURE — 82728 ASSAY OF FERRITIN: CPT

## 2021-04-08 PROCEDURE — 84156 ASSAY OF PROTEIN URINE: CPT

## 2021-04-08 PROCEDURE — 84145 PROCALCITONIN (PCT): CPT

## 2021-04-08 PROCEDURE — 85025 COMPLETE CBC W/AUTO DIFF WBC: CPT

## 2021-04-08 PROCEDURE — 74011000250 HC RX REV CODE- 250: Performed by: UROLOGY

## 2021-04-08 RX ORDER — SODIUM CHLORIDE 9 MG/ML
250 INJECTION, SOLUTION INTRAVENOUS AS NEEDED
Status: DISCONTINUED | OUTPATIENT
Start: 2021-04-08 | End: 2021-04-08 | Stop reason: SDUPTHER

## 2021-04-08 RX ORDER — GUAIFENESIN 600 MG/1
600 TABLET, EXTENDED RELEASE ORAL EVERY 12 HOURS
Status: DISCONTINUED | OUTPATIENT
Start: 2021-04-08 | End: 2021-04-09 | Stop reason: HOSPADM

## 2021-04-08 RX ORDER — SODIUM CHLORIDE 9 MG/ML
75 INJECTION, SOLUTION INTRAVENOUS ONCE
Status: DISCONTINUED | OUTPATIENT
Start: 2021-04-08 | End: 2021-04-08

## 2021-04-08 RX ORDER — SODIUM CHLORIDE 9 MG/ML
250 INJECTION, SOLUTION INTRAVENOUS AS NEEDED
Status: DISCONTINUED | OUTPATIENT
Start: 2021-04-08 | End: 2021-04-08

## 2021-04-08 RX ADMIN — Medication 10 ML: at 06:51

## 2021-04-08 RX ADMIN — GUAIFENESIN 600 MG: 600 TABLET ORAL at 21:55

## 2021-04-08 RX ADMIN — ATORVASTATIN CALCIUM 80 MG: 20 TABLET, FILM COATED ORAL at 21:54

## 2021-04-08 RX ADMIN — Medication 10 ML: at 21:55

## 2021-04-08 RX ADMIN — PANTOPRAZOLE SODIUM 40 MG: 40 TABLET, DELAYED RELEASE ORAL at 06:50

## 2021-04-08 RX ADMIN — ALLOPURINOL 100 MG: 100 TABLET ORAL at 09:09

## 2021-04-08 RX ADMIN — ARFORMOTEROL TARTRATE 15 MCG: 15 SOLUTION RESPIRATORY (INHALATION) at 07:33

## 2021-04-08 RX ADMIN — ROPINIROLE HYDROCHLORIDE 0.5 MG: 0.25 TABLET, FILM COATED ORAL at 21:55

## 2021-04-08 RX ADMIN — ACETAMINOPHEN 1000 MG: 500 TABLET, FILM COATED ORAL at 17:49

## 2021-04-08 RX ADMIN — IPRATROPIUM BROMIDE AND ALBUTEROL SULFATE 3 ML: .5; 2.5 SOLUTION RESPIRATORY (INHALATION) at 13:50

## 2021-04-08 RX ADMIN — IPRATROPIUM BROMIDE AND ALBUTEROL SULFATE 3 ML: .5; 2.5 SOLUTION RESPIRATORY (INHALATION) at 07:29

## 2021-04-08 RX ADMIN — ARFORMOTEROL TARTRATE 15 MCG: 15 SOLUTION RESPIRATORY (INHALATION) at 20:16

## 2021-04-08 RX ADMIN — IPRATROPIUM BROMIDE AND ALBUTEROL SULFATE 3 ML: .5; 2.5 SOLUTION RESPIRATORY (INHALATION) at 20:08

## 2021-04-08 RX ADMIN — ACETAMINOPHEN 1000 MG: 500 TABLET, FILM COATED ORAL at 00:16

## 2021-04-08 RX ADMIN — ACETAMINOPHEN 1000 MG: 500 TABLET, FILM COATED ORAL at 12:01

## 2021-04-08 RX ADMIN — DOCUSATE SODIUM 100 MG: 100 CAPSULE, LIQUID FILLED ORAL at 17:49

## 2021-04-08 RX ADMIN — IPRATROPIUM BROMIDE AND ALBUTEROL SULFATE 3 ML: .5; 2.5 SOLUTION RESPIRATORY (INHALATION) at 02:49

## 2021-04-08 RX ADMIN — METOPROLOL SUCCINATE 25 MG: 25 TABLET, EXTENDED RELEASE ORAL at 21:55

## 2021-04-08 RX ADMIN — ISOSORBIDE MONONITRATE 15 MG: 30 TABLET, EXTENDED RELEASE ORAL at 09:10

## 2021-04-08 RX ADMIN — STANDARDIZED SENNA CONCENTRATE 17.2 MG: 8.6 TABLET ORAL at 21:54

## 2021-04-08 RX ADMIN — AMLODIPINE BESYLATE 10 MG: 5 TABLET ORAL at 09:09

## 2021-04-08 RX ADMIN — CITALOPRAM HYDROBROMIDE 40 MG: 20 TABLET ORAL at 17:49

## 2021-04-08 RX ADMIN — DOCUSATE SODIUM 100 MG: 100 CAPSULE, LIQUID FILLED ORAL at 09:09

## 2021-04-08 NOTE — PROGRESS NOTES
Bedside shift change report given to William Dodson and David Zelaya (oncoming nurse) by Reinaldo Guthrie (offgoing nurse). Report included the following information SBAR, Kardex, Intake/Output, MAR and Recent Results.

## 2021-04-08 NOTE — PROGRESS NOTES
Physician Progress Note      PATIENT:               Estefanía Almendarez #:                  [id number]  :                       1945  ADMIT DATE:       2021 10:43 AM  DISCH DATE:  RESPONDING  PROVIDER #:        Mo Haider MD          QUERY TEXT:    Pt admitted with renal mass and had right partial nephrectomy. Pt noted to have low hemoglobin of 7.4. If possible, please document in the progress notes and discharge summary if you are evaluating and/or treating any of the following: The medical record reflects the following:  Risk Factors: 66 yr old male admitted with renal mass for right partial nephrectomy. Clinical Indicators: Right partial nephrectomy on  and notes 1000ml EBL. Treatment: Lab work, IV fluids, Monitoring and evaluation. Options provided:  -- Acute blood loss anemia  -- Chronic blood loss anemia  -- Acute on chronic blood loss anemia  -- Iron deficiency anemia  -- Postoperative acute blood loss anemia  -- Anemia of chronic disease due to ***(CKD, neoplasm, specific disease?if known)  -- Dilutional anemia  -- Precipitous drop in Hemoglobin and Hematocrit  -- Other - I will add my own diagnosis  -- Disagree - Not applicable / Not valid  -- Disagree - Clinically unable to determine / Unknown  -- Refer to Clinical Documentation Reviewer    PROVIDER RESPONSE TEXT:    This patient has acute on chronic blood loss anemia.     Query created by: Dorcas Burrows on 2021 11:43 AM      Electronically signed by:  Mo Haider MD 2021 8:47 AM

## 2021-04-08 NOTE — CONSULTS
NEPHROLOGY CONSULT NOTE           Patient: Natalya Doty MRN: [id number]  PCP: Dru Dennison MD   :     1945  Age:   66 y.o. Sex:  male      Referring physician: Savana Quintero MD      REASON FOR CONSULT:  MICHI on CKD    HPI:  Natalya Doty is a 66 y.o. male with PMH significant for CKD stage 4 with B/l cr of 2-2.4 follows up with Dr Jorge Salgado with RNA, h/o recently diagnosed right renal mass, HTN, CAD and MELODY presented to [facility] on  for elective Robot-assisted right laparoscopic partial nephrectomy. Today he is post OP day # 3. His Cr prior to surgrey was at 2.4 and post op it peaked at 3.5 now improved to 3.45. He is now hypoxemic and recent CXR shows possible development of pneumonia. Renal consult was requested for eval of MICHI on CKD.     Review of Systems  All systems reviewed and were negative except for above    Past Medical History:   Diagnosis Date    Abnormal stress echo 2015    Anemia NEC 2013    Anxiety     Borderline diabetes     CAD (coronary artery disease)     cath Utopialata Abdi) revealed 20%RCA and 50%2nd diagonal    Calculus of kidney     Chronic kidney disease, stage III (moderate) ([facility] [address] 10/11/2009    30% kidney function    COPD, mild (HCC)     Followed by pulmonary associates    DJD (degenerative joint disease)     Fatty liver     GERD (gastroesophageal reflux disease) 10/11/2009    Gout     Hypertension     MELODY on CPAP 10/11/2009    nasal pillows; pressure set at 10-11 -     Peripheral neuropathy 10/11/2009    bilateral feet (numbness)    Renal cell cancer, right ([address] 2021    RLS (restless legs syndrome) 10/11/2009    S/P coronary artery stent placement 2015    PCI./MALI to LCx, 2019 PCI/MALI Prox LAD (RCA 40-50% lesions)       Past Surgical History:   Procedure Laterality Date    HX BUNIONECTOMY  2019    HX CAROTID ENDARTERECTOMY Left 01/2020    Followed by Dr. Mak Gaitan  2009, 7/17    x2    HX HERNIA REPAIR      McTamaney/umbilical    HX KNEE REPLACEMENT Left 07/23/2011    HX ORTHOPAEDIC      left shoulder manipulation    HX UROLOGICAL      basket extraction of kidney stones    [place] COLONOSCOPY FLX DX W/COLLJ SPEC WHEN PFRMD  8/5/2010         [place] COLSC FLX W/RMVL OF TUMOR POLYP LESION SNARE TQ  9/24/2014            Allergies   Allergen Reactions    Bactrim [Sulfamethoxazole-Trimethoprim] Hives    Codeine Itching    Lisinopril (Bulk) Cough    Neurontin [Gabapentin] Other (comments)     drowsy    Zostavax [Zoster Vaccine Live (Pf)] Rash     See 9/10/13 visit    Penicillins Hives     Pt states he has taken Keflex before without problems       Current Facility-Administered Medications   Medication Dose Route Frequency    amLODIPine (NORVASC) tablet 10 mg  10 mg Oral DAILY    calcium carbonate (TUMS) chewable tablet 200 mg [elemental]  200 mg Oral TID PRN    hydrALAZINE (APRESOLINE) 20 mg/mL injection 10 mg  10 mg IntraVENous Q6H PRN    albuterol-ipratropium (DUO-NEB) 2.5 MG-0.5 MG/3 ML  3 mL Nebulization Q6H RT    allopurinoL (ZYLOPRIM) tablet 100 mg  100 mg Oral DAILY    atorvastatin (LIPITOR) tablet 80 mg  80 mg Oral QHS    citalopram (CELEXA) tablet 40 mg  40 mg Oral QPM    isosorbide mononitrate ER (IMDUR) tablet 15 mg  15 mg Oral DAILY    metoprolol succinate (TOPROL-XL) XL tablet 25 mg  25 mg Oral QHS    nitroglycerin (NITROSTAT) tablet 0.4 mg  0.4 mg SubLINGual PRN    pantoprazole (PROTONIX) tablet 40 mg  40 mg Oral ACB    rOPINIRole (REQUIP) tablet 0.5 mg  0.5 mg Oral QHS    sodium chloride (NS) flush 5-40 mL  5-40 mL IntraVENous PRN    acetaminophen (TYLENOL) tablet 1,000 mg  1,000 mg Oral Q6H    oxyCODONE IR (ROXICODONE) tablet 5 mg  5 mg Oral Q4H PRN    morphine injection 2 mg  2 mg IntraVENous Q4H PRN    ondansetron (ZOFRAN ODT) tablet 4 mg  4 mg Oral Q6H PRN  diphenhydrAMINE (BENADRYL) capsule 25 mg  25 mg Oral Q4H PRN    docusate sodium (COLACE) capsule 100 mg  100 mg Oral BID    lidocaine 4 % patch 1 Patch  1 Patch TransDERmal Q24H    senna (SENOKOT) tablet 17.2 mg  2 Tab Oral QHS    insulin lispro (HUMALOG) injection   SubCUTAneous AC&HS    glucose chewable tablet 16 g  4 Tab Oral PRN    dextrose (D50W) injection syrg 12.5-25 g  12.5-25 g IntraVENous PRN    glucagon (GLUCAGEN) injection 1 mg  1 mg IntraMUSCular PRN    oxyCODONE IR (ROXICODONE) tablet 10 mg  10 mg Oral Q4H PRN    arformoteroL (BROVANA) neb solution 15 mcg  15 mcg Nebulization BID RT       Family History   Problem Relation Age of Onset    Heart Disease Father     Hypertension Father     Other Father         abdominal aneurysm     Dementia Mother     Alzheimer Mother     Heart Disease Brother     Heart Attack Brother     Heart Disease Maternal Grandmother     Heart Disease Paternal Grandmother     Heart Attack Paternal Grandmother     Heart Disease Paternal Grandfather     Heart Attack Paternal Grandfather     Elevated Lipids Son     Elevated Lipids Daughter     Cancer Neg Hx     Diabetes Neg Hx     Stroke Neg Hx        Social History     Socioeconomic History    Marital status:      Spouse name: Christian Caballero Number of children: 2    Years of education: graduated then 4 year apprenticship    Highest education level: Associate degree: academic program   Occupational History    Not on file   Social Needs    Financial resource strain: Not hard at all   Desean-Shiva insecurity     Worry: Never true     Inability: Never true    Transportation needs     Medical: No     Non-medical: No   Tobacco Use    Smoking status: Former Smoker     Packs/day: 1.00     Years: 10.00     Pack years: 10.00     Types: Cigarettes     Quit date: 1968     Years since quittin.3    Smokeless tobacco: Never Used   Substance and Sexual Activity    Alcohol use: No     Alcohol/week: 0.0 standard drinks    Drug use: No    Sexual activity: Yes     Partners: Female     Birth control/protection: None   Lifestyle    Physical activity     Days per week: 0 days     Minutes per session: 0 min    Stress: Very much   Relationships    Social connections     Talks on phone: More than three times a week     Gets together: More than three times a week     Attends Denominational service: Not on file     Active member of club or organization: No     Attends meetings of clubs or organizations: Never     Relationship status:     Intimate partner violence     Fear of current or ex partner: No     Emotionally abused: No     Physically abused: No     Forced sexual activity: No   Other Topics Concern     Service Not Asked    Blood Transfusions Not Asked    Caffeine Concern Not Asked    Occupational Exposure Not Asked    Hobby Hazards Not Asked    Sleep Concern Not Asked    Stress Concern Not Asked    Weight Concern Not Asked    Special Diet Not Asked    Back Care Not Asked    Exercise Not Asked    Bike Helmet Not Asked    Seat Belt Not Asked    Self-Exams Not Asked   Social History Narrative    Not on file       Vitals:    04/08/21 0013 04/08/21 0416 04/08/21 0729 04/08/21 0812   BP: 124/66 127/61  (!) 154/61   Pulse: 96 94  100   Resp: 20 20  22   Temp: 99.1 °F (37.3 °C) 98 °F (36.7 °C)  98.8 °F (37.1 °C)   SpO2: 92% 94% 99% 95%   Weight:       Height:             Intake/Output Summary (Last 24 hours) at 4/8/2021 1018  Last data filed at 4/8/2021 0816  Gross per 24 hour   Intake 2005 ml   Output 2300 ml   Net -295 ml       PHYSICAL EXAM:  GENERAL : Lying down in bed with no acute distress  HEENT: AT NC PEERLA   NECK: Supple no JVP  CVS: S1 S2 RRR, no murmur or gallops heard  RS: CTABL, no rhonchi,or wheezing heard  ABDOMEN: soft NT ND positive BS  EXTREMITY: No edema clubbing or cyanosis, pedal pulse +  NEUROLOGY: AAA X3, no focal deficit or asterixis  VASCULAR ACCESS: LABS/STUDIES:  BMP:   Lab Results   Component Value Date/Time     04/08/2021 05:14 AM    K 4.0 04/08/2021 05:14 AM     (H) 04/08/2021 05:14 AM    CO2 24 04/08/2021 05:14 AM    AGAP 6 04/08/2021 05:14 AM     (H) 04/08/2021 05:14 AM    BUN 45 (H) 04/08/2021 05:14 AM    CREA 3.45 (H) 04/08/2021 05:14 AM    GFRAA 21 (L) 04/08/2021 05:14 AM    GFRNA 17 (L) 04/08/2021 05:14 AM        CBC:    Lab Results   Component Value Date/Time    WBC 13.9 (H) 04/08/2021 05:14 AM    HGB 6.7 (L) 04/08/2021 05:14 AM    HCT 21.4 (L) 04/08/2021 05:14 AM     04/08/2021 05:14 AM       No results found for: MCACR, MCA1, MCA2, MCA3, MCAU, MCAU2, MCALPOCT    Lab Results   Component Value Date/Time    Color YELLOW/STRAW 03/15/2021 12:11 PM    Appearance CLEAR 03/15/2021 12:11 PM    Specific gravity 1.019 03/15/2021 12:11 PM    pH (UA) 5.5 03/15/2021 12:11 PM    Protein 30 (A) 03/15/2021 12:11 PM    Glucose Negative 03/15/2021 12:11 PM    Ketone Negative 03/15/2021 12:11 PM    Bilirubin Negative 03/15/2021 12:11 PM    Urobilinogen 0.2 03/15/2021 12:11 PM    Nitrites Negative 03/15/2021 12:11 PM    Leukocyte Esterase Negative 03/15/2021 12:11 PM    Epithelial cells FEW 03/15/2021 12:11 PM    Bacteria Negative 03/15/2021 12:11 PM    WBC 0-4 03/15/2021 12:11 PM    RBC 0-5 03/15/2021 12:11 PM         ASSESSMENT/PLAN:  No new Assessment & Plan notes have been filed under this hospital service since the last note was generated. Service: Internal Medicine    MICHI stage 1 on CKD stage 4:  -suspect MICHI sec to loss of partial kidney and CKD sec to HTN. -Post Cr peaked at 3.5 now improved to 3.4  -B/l Cr at 2-2.4  -Will order a UA today  -He is currently euvolemic and non oliguric.  -Will start on NS at 75 ml/hour times 1 liter.  -Daily labs. -Avoid ACE/ARB for now. -Will have to establish new Baseline Cr after partial right nephrectomy.     Right renal mass:  - s/p  Robot-assisted right laparoscopic partial nephrectomy POD # 3    -Pneumonia:  -on IV antibiotics. Anemia:  -Sec to ABLA  -Will need PRBC transfusion today.  -Per IM team.    Thank you letting us participate in care of this patient. Jagruti Tripp MD  4/8/2021    [facility]:  [email or web address]  [place] office:  [address], [address] [place] [address],[address]  [place], [address] [place] [address]  Phone: [phone]  Fax :     [phone]     [place] office:  [address] [facility],  [address]  Phone - [phone]  Fax - [phone]

## 2021-04-08 NOTE — MANAGEMENT PLAN
Hb 6.7 this morning. Creatinine plateau with 3.4, seemed to peak postop at 3.5. Great UOP yesterday. WBC down to 13. Transfuse 1U pRBC this morning. Ambulate. Incentive spirometry. Wean oxygen requirement. Could consider lasix but would defer overall to hospitalist & nephrology teams. I plan to see the patient in person around midday. Addendum: Looks like patient was restarted on IVF yesterday with 1.6L in after being initially medlocked. Please discontinue IVF. This may have led to ongoing pulmonary congestion. Elle Mauricio today.

## 2021-04-08 NOTE — PROGRESS NOTES
S/p RIGHT ROBOTIC ASSISTED LAPAROSCOPIC PARTIAL NEPHRECTOMY    Visit Vitals  BP (!) 152/71 (BP 1 Location: Left upper arm, BP Patient Position: At rest)   Pulse 83   Temp 98.6 °F (37 °C)   Resp 20   Ht 6' (1.829 m)   Wt 115.5 kg (254 lb 10.1 oz)   SpO2 91%   BMI 34.53 kg/m²     Recent Results (from the past 24 hour(s))   GLUCOSE, POC    Collection Time: 04/07/21  4:07 PM   Result Value Ref Range    Glucose (POC) 137 (H) 65 - 100 mg/dL    Performed by Thomas Cali (PCT)    D DIMER    Collection Time: 04/07/21  4:44 PM   Result Value Ref Range    D-dimer 2.78 (H) 0.00 - 0.65 mg/L FEU   GLUCOSE, POC    Collection Time: 04/07/21  8:46 PM   Result Value Ref Range    Glucose (POC) 140 (H) 65 - 100 mg/dL    Performed by [facility] ([facility])    CBC WITH AUTOMATED DIFF    Collection Time: 04/08/21  5:14 AM   Result Value Ref Range    WBC 13.9 (H) 4.1 - 11.1 K/uL    RBC 2.26 (L) 4.10 - 5.70 M/uL    HGB 6.7 (L) 12.1 - 17.0 g/dL    HCT 21.4 (L) 36.6 - 50.3 %    MCV 94.7 80.0 - 99.0 FL    MCH 29.6 26.0 - 34.0 PG    MCHC 31.3 30.0 - 36.5 g/dL    RDW 14.7 (H) 11.5 - 14.5 %    PLATELET 146 057 - 156 K/uL    MPV 9.6 8.9 - 12.9 FL    NRBC 0.0 0  WBC    ABSOLUTE NRBC 0.00 0.00 - 0.01 K/uL    NEUTROPHILS 73 32 - 75 %    LYMPHOCYTES 12 12 - 49 %    MONOCYTES 13 5 - 13 %    EOSINOPHILS 1 0 - 7 %    BASOPHILS 0 0 - 1 %    IMMATURE GRANULOCYTES 1 (H) 0.0 - 0.5 %    ABS. NEUTROPHILS 10.2 (H) 1.8 - 8.0 K/UL    ABS. LYMPHOCYTES 1.7 0.8 - 3.5 K/UL    ABS. MONOCYTES 1.8 (H) 0.0 - 1.0 K/UL    ABS. EOSINOPHILS 0.1 0.0 - 0.4 K/UL    ABS. BASOPHILS 0.0 0.0 - 0.1 K/UL    ABS. IMM.  GRANS. 0.1 (H) 0.00 - 0.04 K/UL    DF SMEAR SCANNED      RBC COMMENTS MACROCYTOSIS  PRESENT        RBC COMMENTS MICROCYTOSIS  PRESENT        RBC COMMENTS ANISOCYTOSIS  1+        RBC COMMENTS POIKILOCYTOSIS  1+        RBC COMMENTS POLYCHROMASIA  PRESENT        RBC COMMENTS OVALOCYTES  PRESENT        RBC COMMENTS SCHISTOCYTES  PRESENT       METABOLIC PANEL, BASIC Collection Time: 04/08/21  5:14 AM   Result Value Ref Range    Sodium 139 136 - 145 mmol/L    Potassium 4.0 3.5 - 5.1 mmol/L    Chloride 109 (H) 97 - 108 mmol/L    CO2 24 21 - 32 mmol/L    Anion gap 6 5 - 15 mmol/L    Glucose 103 (H) 65 - 100 mg/dL    BUN 45 (H) 6 - 20 MG/DL    Creatinine 3.45 (H) 0.70 - 1.30 MG/DL    BUN/Creatinine ratio 13 12 - 20      GFR est AA 21 (L) >60 ml/min/1.73m2    GFR est non-AA 17 (L) >60 ml/min/1.73m2    Calcium 7.8 (L) 8.5 - 10.1 MG/DL   FERRITIN    Collection Time: 04/08/21  5:14 AM   Result Value Ref Range    Ferritin 53 26 - 388 NG/ML   FOLATE    Collection Time: 04/08/21  5:14 AM   Result Value Ref Range    Folate 18.0 5.0 - 21.0 ng/mL   HAPTOGLOBIN    Collection Time: 04/08/21  5:14 AM   Result Value Ref Range    Haptoglobin 213 (H) 30 - 200 mg/dL   IRON PROFILE    Collection Time: 04/08/21  5:14 AM   Result Value Ref Range    Iron 10 (L) 35 - 150 ug/dL    TIBC 271 250 - 450 ug/dL    Iron % saturation 4 (L) 20 - 50 %   VITAMIN B12    Collection Time: 04/08/21  5:14 AM   Result Value Ref Range    Vitamin B12 249 193 - 986 pg/mL   RETICULOCYTE COUNT    Collection Time: 04/08/21  5:14 AM   Result Value Ref Range    Reticulocyte count 1.7 0.7 - 2.1 %    Absolute Retic Cnt. 0.0385 0.0260 - 0.0950 M/ul   LD    Collection Time: 04/08/21  5:14 AM   Result Value Ref Range     (H) 85 - 241 U/L   GLUCOSE, POC    Collection Time: 04/08/21  7:27 AM   Result Value Ref Range    Glucose (POC) 126 (H) 65 - 100 mg/dL    Performed by Elena Escobedo    SODIUM, UR, RANDOM    Collection Time: 04/08/21 10:52 AM   Result Value Ref Range    Sodium,urine random 48 MMOL/L   OSMOLALITY, UR    Collection Time: 04/08/21 10:52 AM   Result Value Ref Range    Osmolality,urine 346 MOSM/kg H2O   CREATININE, UR, RANDOM    Collection Time: 04/08/21 10:52 AM   Result Value Ref Range    Creatinine, urine 89.00 mg/dL   PROTEIN URINE, RANDOM    Collection Time: 04/08/21 10:52 AM   Result Value Ref Range Protein, urine random 62 (H) 0.0 - 11.9 mg/dL   URINALYSIS W/ REFLEX CULTURE    Collection Time: 04/08/21 10:52 AM    Specimen: Urine   Result Value Ref Range    Color YELLOW/STRAW      Appearance CLEAR CLEAR      Specific gravity 1.011 1.003 - 1.030      pH (UA) 6.0 5.0 - 8.0      Protein 30 (A) NEG mg/dL    Glucose Negative NEG mg/dL    Ketone Negative NEG mg/dL    Bilirubin Negative NEG      Blood MODERATE (A) NEG      Urobilinogen 0.2 0.2 - 1.0 EU/dL    Nitrites Negative NEG      Leukocyte Esterase Negative NEG      WBC 0-4 0 - 4 /hpf    RBC 5-10 0 - 5 /hpf    Epithelial cells FEW FEW /lpf    Bacteria Negative NEG /hpf    UA:UC IF INDICATED CULTURE NOT INDICATED BY UA RESULT CNI      Hyaline cast 0-2 0 - 5 /lpf   SAMPLES BEING HELD    Collection Time: 04/08/21 10:52 AM   Result Value Ref Range    SAMPLES BEING HELD 1UA,1UC     COMMENT        Add-on orders for these samples will be processed based on acceptable specimen integrity and analyte stability, which may vary by analyte. GLUCOSE, POC    Collection Time: 04/08/21 10:53 AM   Result Value Ref Range    Glucose (POC) 116 (H) 65 - 100 mg/dL    Performed by Maria Teresa Burnette      Denies pain. O:  Alert and oriented x3, respirations even and unlabored. Afebrile. A: POD3  P: Following with Dr. Ramon Cloud outpatient.

## 2021-04-08 NOTE — PROGRESS NOTES
Bedside shift change report given to 535Marcellus Parry (oncoming nurse) by Gena Nicole (offgoing nurse). Report included the following information SBAR, Kardex, MAR and Recent Results.

## 2021-04-08 NOTE — PROGRESS NOTES
Problem: Falls - Risk of  Goal: *Absence of Falls  Description: Document Obi Mcnally Fall Risk and appropriate interventions in the flowsheet.   Outcome: Progressing Towards Goal  Note: Fall Risk Interventions:  Mobility Interventions: Patient to call before getting OOB, Bed/chair exit alarm         Medication Interventions: Bed/chair exit alarm, Teach patient to arise slowly, Patient to call before getting OOB    Elimination Interventions: Call light in reach, Bed/chair exit alarm, Urinal in reach, Patient to call for help with toileting needs    History of Falls Interventions: Bed/chair exit alarm

## 2021-04-08 NOTE — CONSULTS
Pulmonary, Critical Care, and Sleep Medicine    Initial Patient Consult    Name: Sandy Gordon MRN: [id number]   : 1945 Hospital: [facility]   Date: 2021        IMPRESSION:   · Acute hypoxic resp failure: Suspect that this is mostly due to atx. He has had difficulty taking a full insp effort and has had abd distention post op. No clinical signs of pneumonia  · COPD: appears mild in severity  · Obesity  · MELODY  · Renal cell ca:  S/p nephrectomy  · CAD  · anemia      RECOMMENDATIONS:   · Encourage inc [place], deep breathing, increasing activity, and OOB  · Cont w cpap qhs  · Wean oxygen as tolerated. · Will add mucinex  · Cont w scheduled nebs     Subjective: This patient has been seen and evaluated at the request of Dr. Sanford Hammans for hypoxemia. Patient is a 66 y.o. male who is followed by Dr Magda Frey in our office for copd and melody. Pt has chronic SMITH and is on anoro and albuterol at home. Since surgery, pt has required oxygen. Occasional cough w yellow phlegm. Has noted himself wheezing intermittently. Initially had a hard time taking deep breath due to abd distention and pain, but this is resolving. CXR w shallow depth of inspiration and r medial base density    SHx: smoked for only 12 yrs, stopped at 29 yo but worked as .       Past Medical History:   Diagnosis Date    Abnormal stress echo 2015    Anemia NEC 2013    Anxiety     Borderline diabetes     CAD (coronary artery disease)     cath [facility]) revealed 20%RCA and 50%2nd diagonal    Calculus of kidney     Chronic kidney disease, stage III (moderate) (HCC) 10/11/2009    30% kidney function    COPD, mild (HCC)     Followed by pulmonary associates    DJD (degenerative joint disease)     Fatty liver     GERD (gastroesophageal reflux disease) 10/11/2009    Gout     Hypertension     MELODY on CPAP 10/11/2009    nasal pillows; pressure set at 10-11 -     Peripheral neuropathy 10/11/2009 bilateral feet (numbness)    Renal cell cancer, right ([address] 01/2021    RLS (restless legs syndrome) 10/11/2009    S/P coronary artery stent placement 05/12/2015    PCI./MALI to LCx, 8/2019 PCI/MALI Prox LAD (RCA 40-50% lesions)      Past Surgical History:   Procedure Laterality Date    HX BUNIONECTOMY  2019    HX CAROTID ENDARTERECTOMY Left 01/2020    Followed by Dr. Du Goel  2009, 7/17    x2    HX HERNIA REPAIR      [place]aney/umbilical    HX KNEE REPLACEMENT Left 07/23/2011    HX ORTHOPAEDIC      left shoulder manipulation    HX UROLOGICAL      basket extraction of kidney stones    ME COLONOSCOPY FLX DX W/COLLJ SPEC WHEN PFRMD  8/5/2010         ME COLSC FLX W/RMVL OF TUMOR POLYP LESION SNARE TQ  9/24/2014           Prior to Admission medications    Medication Sig Start Date End Date Taking? Authorizing Provider   oxyCODONE IR (ROXICODONE) 5 mg immediate release tablet Take 1 Tab by mouth every four (4) hours as needed for Pain for up to 7 days. Max Daily Amount: 30 mg. 4/5/21 4/12/21 Yes Christina Yu MD   senna-docusate (Senna with Docusate Sodium) 8.6-50 mg per tablet Take 1 Tab by mouth two (2) times a day. 4/5/21  Yes Christina Yu MD   albuterol (ProAir HFA) 90 mcg/actuation inhaler Take 1 Puff by inhalation every four (4) hours. Yes Provider, Historical   umeclidinium-vilanteroL (Anoro Ellipta) 62.5-25 mcg/actuation inhaler Take 1 Puff by inhalation daily. Yes Provider, Historical   rOPINIRole (Requip) 0.5 mg tablet Take 0.5 mg by mouth nightly. Yes Provider, Historical   atorvastatin (Lipitor) 80 mg tablet Take 80 mg by mouth nightly. Yes Provider, Historical   citalopram (CELEXA) 40 mg tablet Take 40 mg by mouth every evening. Yes Provider, Historical   metoprolol succinate (TOPROL-XL) 25 mg XL tablet Take 25 mg by mouth nightly.    Yes Provider, Historical   pantoprazole (PROTONIX) 40 mg tablet TAKE 1 TABLET BY MOUTH EVERY DAY 12/17/20  Yes Niecy Carol Ann Graves MD   isosorbide mononitrate ER (IMDUR) 30 mg tablet Take 0.5 Tabs by mouth daily. 10/21/20  Yes Billy Frost NP   OXYGEN-AIR DELIVERY SYSTEMS (HORIZON NASAL CPAP SYSTEM) by Does Not Apply route. Yes Provider, Historical   amLODIPine (NORVASC) 5 mg tablet Take 5 mg by mouth daily. 14  Yes Provider, Historical   allopurinol (ZYLOPRIM) 100 mg tablet Take 100 mg by mouth every morning. 12  Yes Provider, Historical   ketoconazole (NIZORAL) 2 % shampoo Apply 1 mL to affected area daily as needed for Itching. Provider, Historical   telmisartan (MICARDIS) 40 mg tablet Take 40 mg by mouth nightly. Provider, Historical   nitroglycerin (NITROSTAT) 0.4 mg SL tablet TAKE 1 TABLET BY SUBLINGUAL ROUTE EVERY 5 MINUTES AS NEEDED FOR CHEST PAIN. 3/4/20   Portia Rendon MD   VITAMIN D3 2,000 unit cap capsule Take 2,000 Units by mouth daily. 3/3/15   Provider, Historical   GLUCOSAMINE HCL/CHONDR PETERSEN A NA (GLUCOSAMINE-CHONDROITIN) 750-600 mg Tab Take 1 Tab by mouth daily. Brand: Move Free    Provider, Historical   aspirin delayed-release 81 mg tablet Take 81 mg by mouth nightly. Provider, Historical   MULTIVITS W-FE,OTHER MIN (CENTRUM PO) Take 1 Tab by mouth daily.     Provider, Historical     Allergies   Allergen Reactions    Bactrim [Sulfamethoxazole-Trimethoprim] Hives    Codeine Itching    Lisinopril (Bulk) Cough    Neurontin [Gabapentin] Other (comments)     drowsy    Zostavax [Zoster Vaccine Live (Pf)] Rash     See 9/10/13 visit    Penicillins Hives     Pt states he has taken Keflex before without problems      Social History     Tobacco Use    Smoking status: Former Smoker     Packs/day: 1.00     Years: 10.00     Pack years: 10.00     Types: Cigarettes     Quit date: 1968     Years since quittin.3    Smokeless tobacco: Never Used   Substance Use Topics    Alcohol use: No     Alcohol/week: 0.0 standard drinks      Family History   Problem Relation Age of Onset    Heart Disease Father     Hypertension Father     Other Father         abdominal aneurysm     Dementia Mother     Alzheimer Mother     Heart Disease Brother     Heart Attack Brother     Heart Disease Maternal Grandmother     Heart Disease Paternal Grandmother     Heart Attack Paternal Grandmother     Heart Disease Paternal Grandfather     Heart Attack Paternal Grandfather     Elevated Lipids Son     Elevated Lipids Daughter     Cancer Neg Hx     Diabetes Neg Hx     Stroke Neg Hx         Current Facility-Administered Medications   Medication Dose Route Frequency    amLODIPine (NORVASC) tablet 10 mg  10 mg Oral DAILY    albuterol-ipratropium (DUO-NEB) 2.5 MG-0.5 MG/3 ML  3 mL Nebulization Q6H RT    allopurinoL (ZYLOPRIM) tablet 100 mg  100 mg Oral DAILY    atorvastatin (LIPITOR) tablet 80 mg  80 mg Oral QHS    citalopram (CELEXA) tablet 40 mg  40 mg Oral QPM    isosorbide mononitrate ER (IMDUR) tablet 15 mg  15 mg Oral DAILY    metoprolol succinate (TOPROL-XL) XL tablet 25 mg  25 mg Oral QHS    pantoprazole (PROTONIX) tablet 40 mg  40 mg Oral ACB    rOPINIRole (REQUIP) tablet 0.5 mg  0.5 mg Oral QHS    acetaminophen (TYLENOL) tablet 1,000 mg  1,000 mg Oral Q6H    docusate sodium (COLACE) capsule 100 mg  100 mg Oral BID    lidocaine 4 % patch 1 Patch  1 Patch TransDERmal Q24H    senna (SENOKOT) tablet 17.2 mg  2 Tab Oral QHS    insulin lispro (HUMALOG) injection   SubCUTAneous AC&HS    arformoteroL (BROVANA) neb solution 15 mcg  15 mcg Nebulization BID RT       Review of Systems:  A comprehensive review of systems was negative except for that written in the HPI.     Objective:   Vital Signs:    Visit Vitals  BP (!) 145/69 (BP 1 Location: Left arm, BP Patient Position: At rest)   Pulse 86   Temp 98.5 °F (36.9 °C)   Resp 20   Ht 6' (1.829 m)   Wt 115.5 kg (254 lb 10.1 oz)   SpO2 91%   BMI 34.53 kg/m²       O2 Device: Nasal cannula   O2 Flow Rate (L/min): 1 l/min   Temp (24hrs), Av.7 °F (37.1 °C), Min:98 °F (36.7 °C), Max:99.2 °F (37.3 °C)       Intake/Output:   Last shift:      04/08 0701 - 04/08 1900  In: 562.5 [P.O.:100]  Out: 500 [Urine:500]  Last 3 shifts: 04/06 1901 - 04/08 0700  In: 3060 [P.O.:650; I.V.:1605]  Out: 8299 [Urine:3275; Drains:185]    Intake/Output Summary (Last 24 hours) at 4/8/2021 1629  Last data filed at 4/8/2021 1430  Gross per 24 hour   Intake 2467.5 ml   Output 2225 ml   Net 242.5 ml      Physical Exam:   General:  Alert, cooperative, no distress, obese   Head:  Normocephalic, without obvious abnormality, atraumatic. Eyes:  Conjunctivae/corneas clear. PERRL, EOMs intact. Nose: Nares normal. Septum midline. Mucosa normal. No drainage or sinus tenderness. Throat: Lips, mucosa, and tongue normal. Teeth and gums normal.   Neck: Supple, symmetrical, trachea midline, no adenopathy, thyroid: no enlargment/tenderness/nodules, no carotid bruit and no JVD. Back:   Symmetric, no curvature. ROM normal.   Lungs:   Dist bs, mild wheeze w forced expiration. Some wheeze from upper airway   Chest wall:  No tenderness or deformity. Heart:  Regular rate and rhythm, S1, S2 normal, no murmur, click, rub or gallop. Abdomen:   Protuberant and tympanic, surgical sites are clear   Extremities: Extremities normal, atraumatic, no cyanosis or edema. Pulses: 2+ and symmetric all extremities.    Skin: Skin color, texture, turgor normal. No rashes or lesions   Lymph nodes: Cervical, supraclavicular, and axillary nodes normal.   Neurologic: Grossly nonfocal     Data review:     Recent Results (from the past 24 hour(s))   D DIMER    Collection Time: 04/07/21  4:44 PM   Result Value Ref Range    D-dimer 2.78 (H) 0.00 - 0.65 mg/L FEU   GLUCOSE, POC    Collection Time: 04/07/21  8:46 PM   Result Value Ref Range    Glucose (POC) 140 (H) 65 - 100 mg/dL    Performed by Susanne Douglas (PCT)    CBC WITH AUTOMATED DIFF    Collection Time: 04/08/21  5:14 AM   Result Value Ref Range    WBC 13.9 (H) 4.1 - 11.1 K/uL RBC 2.26 (L) 4.10 - 5.70 M/uL    HGB 6.7 (L) 12.1 - 17.0 g/dL    HCT 21.4 (L) 36.6 - 50.3 %    MCV 94.7 80.0 - 99.0 FL    MCH 29.6 26.0 - 34.0 PG    MCHC 31.3 30.0 - 36.5 g/dL    RDW 14.7 (H) 11.5 - 14.5 %    PLATELET 485 849 - 513 K/uL    MPV 9.6 8.9 - 12.9 FL    NRBC 0.0 0  WBC    ABSOLUTE NRBC 0.00 0.00 - 0.01 K/uL    NEUTROPHILS 73 32 - 75 %    LYMPHOCYTES 12 12 - 49 %    MONOCYTES 13 5 - 13 %    EOSINOPHILS 1 0 - 7 %    BASOPHILS 0 0 - 1 %    IMMATURE GRANULOCYTES 1 (H) 0.0 - 0.5 %    ABS. NEUTROPHILS 10.2 (H) 1.8 - 8.0 K/UL    ABS. LYMPHOCYTES 1.7 0.8 - 3.5 K/UL    ABS. MONOCYTES 1.8 (H) 0.0 - 1.0 K/UL    ABS. EOSINOPHILS 0.1 0.0 - 0.4 K/UL    ABS. BASOPHILS 0.0 0.0 - 0.1 K/UL    ABS. IMM.  GRANS. 0.1 (H) 0.00 - 0.04 K/UL    DF SMEAR SCANNED      RBC COMMENTS MACROCYTOSIS  PRESENT        RBC COMMENTS MICROCYTOSIS  PRESENT        RBC COMMENTS ANISOCYTOSIS  1+        RBC COMMENTS POIKILOCYTOSIS  1+        RBC COMMENTS POLYCHROMASIA  PRESENT        RBC COMMENTS OVALOCYTES  PRESENT        RBC COMMENTS SCHISTOCYTES  PRESENT       METABOLIC PANEL, BASIC    Collection Time: 04/08/21  5:14 AM   Result Value Ref Range    Sodium 139 136 - 145 mmol/L    Potassium 4.0 3.5 - 5.1 mmol/L    Chloride 109 (H) 97 - 108 mmol/L    CO2 24 21 - 32 mmol/L    Anion gap 6 5 - 15 mmol/L    Glucose 103 (H) 65 - 100 mg/dL    BUN 45 (H) 6 - 20 MG/DL    Creatinine 3.45 (H) 0.70 - 1.30 MG/DL    BUN/Creatinine ratio 13 12 - 20      GFR est AA 21 (L) >60 ml/min/1.73m2    GFR est non-AA 17 (L) >60 ml/min/1.73m2    Calcium 7.8 (L) 8.5 - 10.1 MG/DL   FERRITIN    Collection Time: 04/08/21  5:14 AM   Result Value Ref Range    Ferritin 53 26 - 388 NG/ML   FOLATE    Collection Time: 04/08/21  5:14 AM   Result Value Ref Range    Folate 18.0 5.0 - 21.0 ng/mL   HAPTOGLOBIN    Collection Time: 04/08/21  5:14 AM   Result Value Ref Range    Haptoglobin 213 (H) 30 - 200 mg/dL   IRON PROFILE    Collection Time: 04/08/21  5:14 AM   Result Value Ref Range    Iron 10 (L) 35 - 150 ug/dL    TIBC 271 250 - 450 ug/dL    Iron % saturation 4 (L) 20 - 50 %   VITAMIN B12    Collection Time: 04/08/21  5:14 AM   Result Value Ref Range    Vitamin B12 249 193 - 986 pg/mL   RETICULOCYTE COUNT    Collection Time: 04/08/21  5:14 AM   Result Value Ref Range    Reticulocyte count 1.7 0.7 - 2.1 %    Absolute Retic Cnt. 0.0385 0.0260 - 0.0950 M/ul   LD    Collection Time: 04/08/21  5:14 AM   Result Value Ref Range     (H) 85 - 241 U/L   GLUCOSE, POC    Collection Time: 04/08/21  7:27 AM   Result Value Ref Range    Glucose (POC) 126 (H) 65 - 100 mg/dL    Performed by Sean Jacobson    SODIUM, UR, RANDOM    Collection Time: 04/08/21 10:52 AM   Result Value Ref Range    Sodium,urine random 48 MMOL/L   OSMOLALITY, UR    Collection Time: 04/08/21 10:52 AM   Result Value Ref Range    Osmolality,urine 346 MOSM/kg H2O   CREATININE, UR, RANDOM    Collection Time: 04/08/21 10:52 AM   Result Value Ref Range    Creatinine, urine 89.00 mg/dL   PROTEIN URINE, RANDOM    Collection Time: 04/08/21 10:52 AM   Result Value Ref Range    Protein, urine random 62 (H) 0.0 - 11.9 mg/dL   EOSINOPHILS, URINE    Collection Time: 04/08/21 10:52 AM   Result Value Ref Range    Eosinophils,urine Negative     URINALYSIS W/ REFLEX CULTURE    Collection Time: 04/08/21 10:52 AM    Specimen: Urine   Result Value Ref Range    Color YELLOW/STRAW      Appearance CLEAR CLEAR      Specific gravity 1.011 1.003 - 1.030      pH (UA) 6.0 5.0 - 8.0      Protein 30 (A) NEG mg/dL    Glucose Negative NEG mg/dL    Ketone Negative NEG mg/dL    Bilirubin Negative NEG      Blood MODERATE (A) NEG      Urobilinogen 0.2 0.2 - 1.0 EU/dL    Nitrites Negative NEG      Leukocyte Esterase Negative NEG      WBC 0-4 0 - 4 /hpf    RBC 5-10 0 - 5 /hpf    Epithelial cells FEW FEW /lpf    Bacteria Negative NEG /hpf    UA:UC IF INDICATED CULTURE NOT INDICATED BY UA RESULT CNI      Hyaline cast 0-2 0 - 5 /lpf   SAMPLES BEING HELD Collection Time: 04/08/21 10:52 AM   Result Value Ref Range    SAMPLES BEING HELD 1UA,1UC     COMMENT        Add-on orders for these samples will be processed based on acceptable specimen integrity and analyte stability, which may vary by analyte.    GLUCOSE, POC    Collection Time: 04/08/21 10:53 AM   Result Value Ref Range    Glucose (POC) 116 (H) 65 - 100 mg/dL    Performed by Haley Guo, POC    Collection Time: 04/08/21  3:55 PM   Result Value Ref Range    Glucose (POC) 128 (H) 65 - 100 mg/dL    Performed by Esther Ring        Imaging:  I have personally reviewed the patients radiographs and have reviewed the reports:  Shallow insp effort and density involving the medial R lower lung base        Sigifredo Sheehan MD

## 2021-04-08 NOTE — PROGRESS NOTES
Urology Progress Note    Subjective:     Daily Progress Note: 2021 12:00 PM    Dayna Little is 3 Days Post-Op RIGHT ROBOTIC ASSISTED LAPAROSCOPIC PARTIAL NEPHRECTOMY. and doing good. He reports pain is well controlled. He has no complaints. He is tolerating a solid diet and ambulating without assistance. Indwelling catheter is draining well. No acute events overnight  Afebrile, VSS  WBC trending down since surgery   Chest xray reviewed from  &  - no acute findings   Weaning off of O2   Denies dyspnea   Blood infusing         Objective:     Visit Vitals  BP (!) 167/78 (BP 1 Location: Left arm, BP Patient Position: At rest)   Pulse 86   Temp 98.6 °F (37 °C)   Resp 22   Ht 6' (1.829 m)   Wt 115.5 kg (254 lb 10.1 oz)   SpO2 95%   BMI 34.53 kg/m²        Temp (24hrs), Av.6 °F (37 °C), Min:97.7 °F (36.5 °C), Max:99.2 °F (37.3 °C)      Intake and Output:  1901 - 700  In: 6541 [P.O.:650;  I.V.:1605]  Out: 8967 [Urine:3275; Drains:185]  701 - 1900  In: 100 [P.O.:100]  Out: 500 [Urine:500]    Physical Exam:   General appearance: alert, cooperative, no distress, appears stated age  Heart: heart rate normal, no distress   Lungs: no distress, on 3L O2   Abdomen: soft, non-tender, passing flatus   Extremities: no edema     Incision: clean and dry    Data Review:    Recent Results (from the past 24 hour(s))   GLUCOSE, POC    Collection Time: 21  4:07 PM   Result Value Ref Range    Glucose (POC) 137 (H) 65 - 100 mg/dL    Performed by Preston Arroyo (PCT)    D DIMER    Collection Time: 21  4:44 PM   Result Value Ref Range    D-dimer 2.78 (H) 0.00 - 0.65 mg/L FEU   GLUCOSE, POC    Collection Time: 21  8:46 PM   Result Value Ref Range    Glucose (POC) 140 (H) 65 - 100 mg/dL    Performed by Ronal Navarro (PCT)    CBC WITH AUTOMATED DIFF    Collection Time: 21  5:14 AM   Result Value Ref Range    WBC 13.9 (H) 4.1 - 11.1 K/uL    RBC 2.26 (L) 4.10 - 5.70 M/uL    HGB 6.7 (L) 12.1 - 17.0 g/dL    HCT 21.4 (L) 36.6 - 50.3 %    MCV 94.7 80.0 - 99.0 FL    MCH 29.6 26.0 - 34.0 PG    MCHC 31.3 30.0 - 36.5 g/dL    RDW 14.7 (H) 11.5 - 14.5 %    PLATELET 221 548 - 605 K/uL    MPV 9.6 8.9 - 12.9 FL    NRBC 0.0 0  WBC    ABSOLUTE NRBC 0.00 0.00 - 0.01 K/uL    NEUTROPHILS 73 32 - 75 %    LYMPHOCYTES 12 12 - 49 %    MONOCYTES 13 5 - 13 %    EOSINOPHILS 1 0 - 7 %    BASOPHILS 0 0 - 1 %    IMMATURE GRANULOCYTES 1 (H) 0.0 - 0.5 %    ABS. NEUTROPHILS 10.2 (H) 1.8 - 8.0 K/UL    ABS. LYMPHOCYTES 1.7 0.8 - 3.5 K/UL    ABS. MONOCYTES 1.8 (H) 0.0 - 1.0 K/UL    ABS. EOSINOPHILS 0.1 0.0 - 0.4 K/UL    ABS. BASOPHILS 0.0 0.0 - 0.1 K/UL    ABS. IMM.  GRANS. 0.1 (H) 0.00 - 0.04 K/UL    DF SMEAR SCANNED      RBC COMMENTS MACROCYTOSIS  PRESENT        RBC COMMENTS MICROCYTOSIS  PRESENT        RBC COMMENTS ANISOCYTOSIS  1+        RBC COMMENTS POIKILOCYTOSIS  1+        RBC COMMENTS POLYCHROMASIA  PRESENT        RBC COMMENTS OVALOCYTES  PRESENT        RBC COMMENTS SCHISTOCYTES  PRESENT       METABOLIC PANEL, BASIC    Collection Time: 04/08/21  5:14 AM   Result Value Ref Range    Sodium 139 136 - 145 mmol/L    Potassium 4.0 3.5 - 5.1 mmol/L    Chloride 109 (H) 97 - 108 mmol/L    CO2 24 21 - 32 mmol/L    Anion gap 6 5 - 15 mmol/L    Glucose 103 (H) 65 - 100 mg/dL    BUN 45 (H) 6 - 20 MG/DL    Creatinine 3.45 (H) 0.70 - 1.30 MG/DL    BUN/Creatinine ratio 13 12 - 20      GFR est AA 21 (L) >60 ml/min/1.73m2    GFR est non-AA 17 (L) >60 ml/min/1.73m2    Calcium 7.8 (L) 8.5 - 10.1 MG/DL   RETICULOCYTE COUNT    Collection Time: 04/08/21  5:14 AM   Result Value Ref Range    Reticulocyte count 1.7 0.7 - 2.1 %    Absolute Retic Cnt. 0.0385 0.0260 - 0.0950 M/ul   LD    Collection Time: 04/08/21  5:14 AM   Result Value Ref Range     (H) 85 - 241 U/L   GLUCOSE, POC    Collection Time: 04/08/21  7:27 AM   Result Value Ref Range    Glucose (POC) 126 (H) 65 - 100 mg/dL    Performed by Ramiro Pablo, UR, RANDOM    Collection Time: 04/08/21 10:52 AM   Result Value Ref Range    Creatinine, urine 89.00 mg/dL   PROTEIN URINE, RANDOM    Collection Time: 04/08/21 10:52 AM   Result Value Ref Range    Protein, urine random 62 (H) 0.0 - 11.9 mg/dL   URINALYSIS W/ REFLEX CULTURE    Collection Time: 04/08/21 10:52 AM    Specimen: Urine   Result Value Ref Range    Color YELLOW/STRAW      Appearance CLEAR CLEAR      Specific gravity 1.011 1.003 - 1.030      pH (UA) 6.0 5.0 - 8.0      Protein 30 (A) NEG mg/dL    Glucose Negative NEG mg/dL    Ketone Negative NEG mg/dL    Bilirubin Negative NEG      Blood MODERATE (A) NEG      Urobilinogen 0.2 0.2 - 1.0 EU/dL    Nitrites Negative NEG      Leukocyte Esterase Negative NEG      WBC 0-4 0 - 4 /hpf    RBC 5-10 0 - 5 /hpf    Epithelial cells FEW FEW /lpf    Bacteria Negative NEG /hpf    UA:UC IF INDICATED CULTURE NOT INDICATED BY UA RESULT CNI      Hyaline cast 0-2 0 - 5 /lpf   SAMPLES BEING HELD    Collection Time: 04/08/21 10:52 AM   Result Value Ref Range    SAMPLES BEING HELD 1UA,1UC     COMMENT        Add-on orders for these samples will be processed based on acceptable specimen integrity and analyte stability, which may vary by analyte.    GLUCOSE, POC    Collection Time: 04/08/21 10:53 AM   Result Value Ref Range    Glucose (POC) 116 (H) 65 - 100 mg/dL    Performed by Myah Loyd        Assessment/Plan:     Principal Problem:    Right kidney mass (4/5/2021)    Active Problems:    Chronic kidney disease, stage III (moderate) ([address] (10/11/2009)      Mixed hyperlipidemia (10/11/2009)      Essential hypertension (10/11/2009)      Hypertensive kidney disease with chronic kidney disease stage III ([address] (11/22/2013)      Coronary artery disease involving native coronary artery of native heart without angina pectoris (3/16/2016)      Diabetic peripheral neuropathy associated with type 2 diabetes mellitus ([address] (3/3/2020)        Status Post:  Procedure(s):  RIGHT ROBOTIC ASSISTED LAPAROSCOPIC PARTIAL NEPHRECTOMY     Plan:  Doing well and continue with treatment  Increase ambulation   Check H&H 2 hrs after transfusion   Stop IV fluids, he has adequate PO intake and I suspect volume could be contributing to his symptoms.    Aggressive use of IS - pt and nursing aware     Pt seen with Dr. Marielos Hidalgo By: Gerardo Cohen NP                         April 8, 2021

## 2021-04-08 NOTE — PROGRESS NOTES
Problem: Falls - Risk of  Goal: *Absence of Falls  Description: Document Lala Broad Fall Risk and appropriate interventions in the flowsheet.   Outcome: Progressing Towards Goal  Note: Fall Risk Interventions:  Mobility Interventions: Patient to call before getting OOB         Medication Interventions: Evaluate medications/consider consulting pharmacy    Elimination Interventions: Patient to call for help with toileting needs    History of Falls Interventions: Bed/chair exit alarm, Door open when patient unattended

## 2021-04-08 NOTE — PROGRESS NOTES
[facility]    Medical Progress Note      NAME: Bre Mejia   :  1945  MRM:  [id number]    Date/Time of service 2021  2:44 PM          Assessment and Plan:     Right kidney mass - Tolerated nephrectomy. Urology will be attending to pain control, DVT prophylaxis and rehab decisions as usual.    Acute kidney injury / Chronic kidney disease, stage III (moderate) - POA and worse after nephrectomy. Consulted nephrology to consider other treatment. May potentially requiring HD in near future. Acute hypoxemic respiratory failure - POA, unclear etiology. Xray yesterday clear, today minimally abnormal, DDx PNA, atelectasis, edema, hypoventilation. I will check procalcitonin, but not start ABx as I dod not this a bacterial PNA. Continue IS and check 6 minute walk. I would not give diuresis for now due to MICHI. Consult pulmonary. Oxygen as needed. Prn duonebs, but I do not think he has COPD/asthma. Anemia - POA and worse over days. Likely due to CKD and expected surgicla blood loss. Monitor. Transfuse 1 unit. Unremarkable serologies. Coronary artery disease S/P coronary artery stent placement / hypertension - Appears stable. Continue amlodipine, IMDUR, metoprolol    DM with renal and vascular complications - POA per chart, but BG has bene fine. A1c unreliable in setting of anemia. Stop testing and treatment for now. Mixed hyperlipidemia - continue atorvastatin    Obstructive sleep apnea / Obesity - Advise weight loss. Resume home CPAP    Hx of completed stroke / Bilateral carotid artery stenosis Hx L carotid endarterectomy - Stable. Continue BB and statin. RLS (restless legs syndrome) / Diabetic peripheral neuropathy / Benign essential tremor - Stable. Continue requip, lidoderm    DJD (degenerative joint disease), multiple sites / Gout - Stable. Continue allopurinol.     GERD (gastroesophageal reflux disease) - Continue PPI    Anxiety and depression - Continue celexa       Subjective:     Chief Complaint:  Not much pain, has fatigue and SMITH, still requiring oxygen    ROS:  (bold if positive, if negative)    SOB/DOETolerating some PT  Tolerating Diet        Objective:     Last 24hrs VS reviewed since prior progress note. Most recent are:    Visit Vitals  BP (!) 152/71 (BP 1 Location: Left upper arm, BP Patient Position: At rest)   Pulse 83   Temp 98.6 °F (37 °C)   Resp 20   Ht 6' (1.829 m)   Wt 115.5 kg (254 lb 10.1 oz)   SpO2 91%   BMI 34.53 kg/m²     SpO2 Readings from Last 6 Encounters:   04/08/21 91%   03/15/21 97%   02/02/21 99%   11/23/20 97%   10/13/20 96%   03/03/20 98%    O2 Flow Rate (L/min): 1 l/min       Intake/Output Summary (Last 24 hours) at 4/8/2021 1444  Last data filed at 4/8/2021 1430  Gross per 24 hour   Intake 2467.5 ml   Output 2500 ml   Net -32.5 ml        Physical Exam:    Gen:  Obese, in no acute distress  HEENT:  Pink conjunctivae, PERRL, hearing intact to voice, moist mucous membranes  Neck:  Supple, without masses, thyroid non-tender  Resp:  No accessory muscle use, bilateral breath sounds without wheezes rales or rhonchi  Card:  No murmurs, normal S1, S2 without thrills, bruits or peripheral edema  Abd:  Soft, non-tender, non-distended, normoactive bowel sounds are present, no mass  Lymph:  No cervical or inguinal adenopathy  Musc:  No cyanosis or clubbing  Skin:  No rashes or ulcers, skin turgor is good  Neuro:  Cranial nerves are grossly intact, general motor weakness, follows commands   Psych:   Moderate insight, oriented to person, place and time, alert    Telemetry reviewed:   normal sinus rhythm  __________________________________________________________________  Medications Reviewed: (see below)  Medications:     Current Facility-Administered Medications   Medication Dose Route Frequency    amLODIPine (NORVASC) tablet 10 mg  10 mg Oral DAILY    calcium carbonate (TUMS) chewable tablet 200 mg [elemental]  200 mg Oral TID PRN    hydrALAZINE (APRESOLINE) 20 mg/mL injection 10 mg  10 mg IntraVENous Q6H PRN    albuterol-ipratropium (DUO-NEB) 2.5 MG-0.5 MG/3 ML  3 mL Nebulization Q6H RT    allopurinoL (ZYLOPRIM) tablet 100 mg  100 mg Oral DAILY    atorvastatin (LIPITOR) tablet 80 mg  80 mg Oral QHS    citalopram (CELEXA) tablet 40 mg  40 mg Oral QPM    isosorbide mononitrate ER (IMDUR) tablet 15 mg  15 mg Oral DAILY    metoprolol succinate (TOPROL-XL) XL tablet 25 mg  25 mg Oral QHS    nitroglycerin (NITROSTAT) tablet 0.4 mg  0.4 mg SubLINGual PRN    pantoprazole (PROTONIX) tablet 40 mg  40 mg Oral ACB    rOPINIRole (REQUIP) tablet 0.5 mg  0.5 mg Oral QHS    sodium chloride (NS) flush 5-40 mL  5-40 mL IntraVENous PRN    acetaminophen (TYLENOL) tablet 1,000 mg  1,000 mg Oral Q6H    oxyCODONE IR (ROXICODONE) tablet 5 mg  5 mg Oral Q4H PRN    morphine injection 2 mg  2 mg IntraVENous Q4H PRN    ondansetron (ZOFRAN ODT) tablet 4 mg  4 mg Oral Q6H PRN    diphenhydrAMINE (BENADRYL) capsule 25 mg  25 mg Oral Q4H PRN    docusate sodium (COLACE) capsule 100 mg  100 mg Oral BID    lidocaine 4 % patch 1 Patch  1 Patch TransDERmal Q24H    senna (SENOKOT) tablet 17.2 mg  2 Tab Oral QHS    insulin lispro (HUMALOG) injection   SubCUTAneous AC&HS    glucose chewable tablet 16 g  4 Tab Oral PRN    dextrose (D50W) injection syrg 12.5-25 g  12.5-25 g IntraVENous PRN    glucagon (GLUCAGEN) injection 1 mg  1 mg IntraMUSCular PRN    oxyCODONE IR (ROXICODONE) tablet 10 mg  10 mg Oral Q4H PRN    arformoteroL (BROVANA) neb solution 15 mcg  15 mcg Nebulization BID RT        Lab Data Reviewed: (see below)  Lab Review:     Recent Labs     04/08/21  0514 04/07/21  0457 04/06/21  0538   WBC 13.9* 15.6* 12.8*   HGB 6.7* 7.4* 8.5*   HCT 21.4* 23.5* 27.3*    181 216     Recent Labs     04/08/21  0514 04/07/21  0457 04/06/21  0538    136 140   K 4.0 4.0 4.7   * 103 109*   CO2 24 26 23   * 103* 122*   BUN 45* 46* 33*   CREA 3.45* 3.58* 3.19*   CA 7.8* 7.6* 8.3*     Lab Results   Component Value Date/Time    Glucose (POC) 116 (H) 04/08/2021 10:53 AM    Glucose (POC) 126 (H) 04/08/2021 07:27 AM    Glucose (POC) 140 (H) 04/07/2021 08:46 PM    Glucose (POC) 137 (H) 04/07/2021 04:07 PM    Glucose (POC) 132 (H) 04/07/2021 11:20 AM     No results for input(s): PH, PCO2, PO2, HCO3, FIO2 in the last 72 hours. No results for input(s): INR, INREXT in the last 72 hours. All Micro Results     Procedure Component Value Units Date/Time    CULTURE, URINE [[id number]] Collected: 04/08/21 1052    Order Status: Completed Specimen: Urine from Clean catch Updated: 04/08/21 1335          Other pertinent lab: none    Total time spent with patient: 32 Minutes I personally reviewed chart, notes, data and current medications in the medical record. I have personally examined and treated the patient at bedside during this period.                  Care Plan discussed with: Patient, Care Manager, Nursing Staff, Consultant/Specialist and >50% of time spent in counseling and coordination of care    Discussed:  Care Plan and D/C Planning    Prophylaxis:  H2B/PPI    Disposition:  Home w/Family and HH PT, OT, RN           ___________________________________________________    Attending Physician: Susy Isaacs MD

## 2021-04-09 VITALS
RESPIRATION RATE: 20 BRPM | DIASTOLIC BLOOD PRESSURE: 81 MMHG | SYSTOLIC BLOOD PRESSURE: 153 MMHG | TEMPERATURE: 98.2 F | HEART RATE: 89 BPM | WEIGHT: 254.63 LBS | OXYGEN SATURATION: 95 % | BODY MASS INDEX: 34.49 KG/M2 | HEIGHT: 72 IN

## 2021-04-09 LAB
ABO + RH BLD: NORMAL
ALBUMIN SERPL-MCNC: 2.9 G/DL (ref 3.5–5)
ANION GAP SERPL CALC-SCNC: 4 MMOL/L (ref 5–15)
BACTERIA SPEC CULT: NORMAL
BASOPHILS # BLD: 0 K/UL (ref 0–0.1)
BASOPHILS NFR BLD: 0 % (ref 0–1)
BLD PROD TYP BPU: NORMAL
BLOOD GROUP ANTIBODIES SERPL: NORMAL
BPU ID: NORMAL
BUN SERPL-MCNC: 37 MG/DL (ref 6–20)
BUN/CREAT SERPL: 13 (ref 12–20)
CALCIUM SERPL-MCNC: 8.1 MG/DL (ref 8.5–10.1)
CHLORIDE SERPL-SCNC: 111 MMOL/L (ref 97–108)
CO2 SERPL-SCNC: 26 MMOL/L (ref 21–32)
COMMENT, HOLDF: NORMAL
CREAT SERPL-MCNC: 2.96 MG/DL (ref 0.7–1.3)
CROSSMATCH RESULT,%XM: NORMAL
DIFFERENTIAL METHOD BLD: ABNORMAL
EOSINOPHIL # BLD: 0.4 K/UL (ref 0–0.4)
EOSINOPHIL NFR BLD: 3 % (ref 0–7)
ERYTHROCYTE [DISTWIDTH] IN BLOOD BY AUTOMATED COUNT: 14.9 % (ref 11.5–14.5)
GLUCOSE BLD STRIP.AUTO-MCNC: 108 MG/DL (ref 65–100)
GLUCOSE BLD STRIP.AUTO-MCNC: 122 MG/DL (ref 65–100)
GLUCOSE SERPL-MCNC: 99 MG/DL (ref 65–100)
HCT VFR BLD AUTO: 25.4 % (ref 36.6–50.3)
HGB BLD-MCNC: 8 G/DL (ref 12.1–17)
IMM GRANULOCYTES # BLD AUTO: 0.1 K/UL (ref 0–0.04)
IMM GRANULOCYTES NFR BLD AUTO: 1 % (ref 0–0.5)
LYMPHOCYTES # BLD: 1.8 K/UL (ref 0.8–3.5)
LYMPHOCYTES NFR BLD: 15 % (ref 12–49)
MCH RBC QN AUTO: 29.9 PG (ref 26–34)
MCHC RBC AUTO-ENTMCNC: 31.5 G/DL (ref 30–36.5)
MCV RBC AUTO: 94.8 FL (ref 80–99)
MONOCYTES # BLD: 1.3 K/UL (ref 0–1)
MONOCYTES NFR BLD: 11 % (ref 5–13)
NEUTS SEG # BLD: 8.5 K/UL (ref 1.8–8)
NEUTS SEG NFR BLD: 70 % (ref 32–75)
NRBC # BLD: 0 K/UL (ref 0–0.01)
NRBC BLD-RTO: 0 PER 100 WBC
PHOSPHATE SERPL-MCNC: 2.6 MG/DL (ref 2.6–4.7)
PLATELET # BLD AUTO: 174 K/UL (ref 150–400)
PMV BLD AUTO: 9.8 FL (ref 8.9–12.9)
POTASSIUM SERPL-SCNC: 4.2 MMOL/L (ref 3.5–5.1)
RBC # BLD AUTO: 2.68 M/UL (ref 4.1–5.7)
SAMPLES BEING HELD,HOLD: NORMAL
SERVICE CMNT-IMP: ABNORMAL
SERVICE CMNT-IMP: ABNORMAL
SERVICE CMNT-IMP: NORMAL
SODIUM SERPL-SCNC: 141 MMOL/L (ref 136–145)
SPECIMEN EXP DATE BLD: NORMAL
STATUS OF UNIT,%ST: NORMAL
UNIT DIVISION, %UDIV: 0
WBC # BLD AUTO: 12 K/UL (ref 4.1–11.1)

## 2021-04-09 PROCEDURE — 85025 COMPLETE CBC W/AUTO DIFF WBC: CPT

## 2021-04-09 PROCEDURE — 74011250637 HC RX REV CODE- 250/637: Performed by: INTERNAL MEDICINE

## 2021-04-09 PROCEDURE — 74011250637 HC RX REV CODE- 250/637: Performed by: UROLOGY

## 2021-04-09 PROCEDURE — 74011000250 HC RX REV CODE- 250: Performed by: UROLOGY

## 2021-04-09 PROCEDURE — 94640 AIRWAY INHALATION TREATMENT: CPT

## 2021-04-09 PROCEDURE — 80069 RENAL FUNCTION PANEL: CPT

## 2021-04-09 PROCEDURE — 36415 COLL VENOUS BLD VENIPUNCTURE: CPT

## 2021-04-09 PROCEDURE — 82962 GLUCOSE BLOOD TEST: CPT

## 2021-04-09 PROCEDURE — 74011250637 HC RX REV CODE- 250/637: Performed by: FAMILY MEDICINE

## 2021-04-09 RX ADMIN — ACETAMINOPHEN 1000 MG: 500 TABLET, FILM COATED ORAL at 00:24

## 2021-04-09 RX ADMIN — DOCUSATE SODIUM 100 MG: 100 CAPSULE, LIQUID FILLED ORAL at 08:41

## 2021-04-09 RX ADMIN — ALLOPURINOL 100 MG: 100 TABLET ORAL at 08:42

## 2021-04-09 RX ADMIN — AMLODIPINE BESYLATE 10 MG: 5 TABLET ORAL at 08:41

## 2021-04-09 RX ADMIN — GUAIFENESIN 600 MG: 600 TABLET ORAL at 08:41

## 2021-04-09 RX ADMIN — ACETAMINOPHEN 1000 MG: 500 TABLET, FILM COATED ORAL at 06:29

## 2021-04-09 RX ADMIN — Medication 10 ML: at 06:30

## 2021-04-09 RX ADMIN — IPRATROPIUM BROMIDE AND ALBUTEROL SULFATE 3 ML: .5; 2.5 SOLUTION RESPIRATORY (INHALATION) at 08:13

## 2021-04-09 RX ADMIN — ARFORMOTEROL TARTRATE 15 MCG: 15 SOLUTION RESPIRATORY (INHALATION) at 08:18

## 2021-04-09 RX ADMIN — PANTOPRAZOLE SODIUM 40 MG: 40 TABLET, DELAYED RELEASE ORAL at 07:30

## 2021-04-09 RX ADMIN — ISOSORBIDE MONONITRATE 15 MG: 30 TABLET, EXTENDED RELEASE ORAL at 08:41

## 2021-04-09 NOTE — PROGRESS NOTES
I have reviewed discharge instructions with the patient. The patient verbalized understanding. Peripheral IV removed. Made aware of follow up appointments and meds available at preferred pharmacy. Family to transport home.

## 2021-04-09 NOTE — PROGRESS NOTES
Nephrology Progress Note  [address]     www. [email or web address]  Phone - [phone]   Patient: Libertad Pair    YOB: 1945        Date- 4/9/2021   Admit Date: 4/5/2021  CC: Follow up for  MICHI on CKD stage 4          IMPRESSION & PLAN:   MICHI stage 1 on CKD stage 4:  -suspect MICHI sec to loss of partial kidney and CKD sec to HTN. -Post  Op Cr peaked at 3.5-->3.4-->2.96  -B/l Cr at 2-2.4  -UA shows moderate hematuria,which is expected in setting of partial nephrectomy.  -He is currently euvolemic and non oliguric.  -Off all IVf  -Daily labs. -Avoid ACE/ARB for now. -Will have to establish new Baseline Cr after partial right nephrectomy.  -Can be D/C from renal standpoint with follow up in office in 2 weeks with Dr. Tony Carter.     Right renal mass:  - s/p  Robot-assisted right laparoscopic partial nephrectomy POD # 4     Anemia:  -Sec to ABLA  -Better.     Thank you letting us participate in care of this patient.         Subjective: Interval History:   -  Feels better  -  Getting PT. Objective:   Vitals:    04/09/21 0020 04/09/21 0338 04/09/21 0806 04/09/21 0813   BP: (!) 189/84 (!) 173/81 (!) 151/71    Pulse: 87 86 88    Resp: 20 20 20    Temp: 98.1 °F (36.7 °C) 98.8 °F (37.1 °C) 97.9 °F (36.6 °C)    SpO2: 90% 92% 94% 95%   Weight:       Height:          04/08 0701 - 04/09 0700  In: 762.5 [P.O.:300]  Out: 2525 [Urine:2525]  Last 3 Recorded Weights in this Encounter    04/05/21 1120   Weight: 115.5 kg (254 lb 10.1 oz)      Physical exam:   GEN: NAD  NECK- Supple, no mass  RESP: No wheezing, Clear b/l  CVS: S1,S2  RRR  NEURO: Normal speech, Non focal  EXT: No Edema   PSYCH: Normal Mood    Chart reviewed. Pertinent Notes reviewed.      Data Review :  Recent Labs     04/09/21  0729 04/08/21  0514 04/07/21  0457    139 136   K 4.2 4.0 4.0   * 109* 103   CO2 26 24 26   BUN 37* 45* 46*   CREA 2.96* 3.45* 3.58*   GLU 99 103* 103*   CA 8.1* 7.8* 7.6*   PHOS 2.6  --   --      Recent Labs     04/09/21  0729 04/08/21  1613 04/08/21  0514 04/07/21  0457   WBC 12.0*  --  13.9* 15.6*   HGB 8.0* 7.4* 6.7* 7.4*   HCT 25.4* 23.4* 21.4* 23.5*     --  167 181     Recent Labs     04/08/21  0514   TIBC 271   PSAT 4*   FERR 53      Medication list  reviewed  Current Facility-Administered Medications   Medication Dose Route Frequency    guaiFENesin ER (MUCINEX) tablet 600 mg  600 mg Oral Q12H    amLODIPine (NORVASC) tablet 10 mg  10 mg Oral DAILY    calcium carbonate (TUMS) chewable tablet 200 mg [elemental]  200 mg Oral TID PRN    hydrALAZINE (APRESOLINE) 20 mg/mL injection 10 mg  10 mg IntraVENous Q6H PRN    albuterol-ipratropium (DUO-NEB) 2.5 MG-0.5 MG/3 ML  3 mL Nebulization Q6H RT    allopurinoL (ZYLOPRIM) tablet 100 mg  100 mg Oral DAILY    atorvastatin (LIPITOR) tablet 80 mg  80 mg Oral QHS    citalopram (CELEXA) tablet 40 mg  40 mg Oral QPM    isosorbide mononitrate ER (IMDUR) tablet 15 mg  15 mg Oral DAILY    metoprolol succinate (TOPROL-XL) XL tablet 25 mg  25 mg Oral QHS    nitroglycerin (NITROSTAT) tablet 0.4 mg  0.4 mg SubLINGual PRN    pantoprazole (PROTONIX) tablet 40 mg  40 mg Oral ACB    rOPINIRole (REQUIP) tablet 0.5 mg  0.5 mg Oral QHS    sodium chloride (NS) flush 5-40 mL  5-40 mL IntraVENous PRN    acetaminophen (TYLENOL) tablet 1,000 mg  1,000 mg Oral Q6H    oxyCODONE IR (ROXICODONE) tablet 5 mg  5 mg Oral Q4H PRN    morphine injection 2 mg  2 mg IntraVENous Q4H PRN    ondansetron (ZOFRAN ODT) tablet 4 mg  4 mg Oral Q6H PRN    diphenhydrAMINE (BENADRYL) capsule 25 mg  25 mg Oral Q4H PRN    docusate sodium (COLACE) capsule 100 mg  100 mg Oral BID    lidocaine 4 % patch 1 Patch  1 Patch TransDERmal Q24H    senna (SENOKOT) tablet 17.2 mg  2 Tab Oral QHS    insulin lispro (HUMALOG) injection   SubCUTAneous AC&HS    glucose chewable tablet 16 g  4 Tab Oral PRN    dextrose (D50W) injection syrg 12.5-25 g  12.5-25 g IntraVENous PRN    glucagon (GLUCAGEN) injection 1 mg  1 mg IntraMUSCular PRN    oxyCODONE IR (ROXICODONE) tablet 10 mg  10 mg Oral Q4H PRN    arformoteroL (BROVANA) neb solution 15 mcg  15 mcg Nebulization BID RT          Benny Law MD              [facility]  [facility] / [employer]  [address], [address], [address]  Phone - [phone]               Fax - [phone]

## 2021-04-09 NOTE — PROGRESS NOTES
Bedside shift change report given to [address] [place] [address] (oncoming nurse) by Judith Hines (offgoing nurse). Report included the following information SBAR, Kardex, Intake/Output, MAR and Recent Results.

## 2021-04-09 NOTE — PROGRESS NOTES
[facility]    Medical Progress Note      NAME: Gabe Casanova   :  1945  MRM:  [id number]    Date/Time of service 2021  12:11         Assessment and Plan:     Right kidney mass - Tolerated nephrectomy. Urology will be attending to pain control, DVT prophylaxis and rehab decisions as usual.      Patient is medically stable to discharge. I will sign off. Please call with any questions. Acute kidney injury / Chronic kidney disease, stage III (moderate) - POA and worse after nephrectomy. Better today. Consulted nephrology to consider other treatment. May potentially requiring HD in near future. Acute hypoxemic respiratory failure - POA, unclear etiology. Xray yesterday clear, today minimally abnormal, DDx PNA, atelectasis, edema, hypoventilation. I will check procalcitonin, but not start ABx as I dod not this a bacterial PNA. Continue IS and check 6 minute walk. I would not give diuresis for now due to MICHI. Consult pulmonary, who saw patient and have now signed off. Oxygen as needed. Stop Prn duonebs, they make him feel worse and I do not think he has COPD/asthma. Anemia - POA and worse over days. Likely due to CKD and expected surgicla blood loss. Monitor. Transfused 1 unit. Unremarkable serologies. Coronary artery disease S/P coronary artery stent placement / hypertension - Appears stable. Continue amlodipine, IMDUR, metoprolol    DM with renal and vascular complications - POA per chart, but BG has bene fine. A1c unreliable in setting of anemia. Stop testing and treatment for now. Mixed hyperlipidemia - continue atorvastatin    Obstructive sleep apnea / Obesity - Advise weight loss. Resume home CPAP    Hx of completed stroke / Bilateral carotid artery stenosis Hx L carotid endarterectomy - Stable. Continue BB and statin. RLS (restless legs syndrome) / Diabetic peripheral neuropathy / Benign essential tremor - Stable.  Continue requip, lidoderm    DJD (degenerative joint disease), multiple sites / Gout - Stable. Continue allopurinol. GERD (gastroesophageal reflux disease) - Continue PPI    Anxiety and depression - Continue celexa       Subjective:     Chief Complaint:  Not much pain, wants to go home, no longer requiring oxygen    ROS:  (bold if positive, if negative)    SOB/DOETolerating some PT  Tolerating Diet        Objective:     Last 24hrs VS reviewed since prior progress note. Most recent are:    Visit Vitals  BP (!) 153/81 (BP 1 Location: Left upper arm, BP Patient Position: At rest)   Pulse 89   Temp 98.2 °F (36.8 °C)   Resp 20   Ht 6' (1.829 m)   Wt 115.5 kg (254 lb 10.1 oz)   SpO2 95%   BMI 34.53 kg/m²     SpO2 Readings from Last 6 Encounters:   04/09/21 95%   03/15/21 97%   02/02/21 99%   11/23/20 97%   10/13/20 96%   03/03/20 98%    O2 Flow Rate (L/min): 1 l/min       Intake/Output Summary (Last 24 hours) at 4/9/2021 1211  Last data filed at 4/9/2021 1134  Gross per 24 hour   Intake 842.5 ml   Output 2625 ml   Net -1782.5 ml        Physical Exam:    Gen:  Obese, in no acute distress  HEENT:  Pink conjunctivae, PERRL, hearing intact to voice, moist mucous membranes  Neck:  Supple, without masses, thyroid non-tender  Resp:  No accessory muscle use, bilateral breath sounds without wheezes rales or rhonchi  Card:  No murmurs, normal S1, S2 without thrills, bruits or peripheral edema  Abd:  Soft, non-tender, non-distended, normoactive bowel sounds are present, no mass  Lymph:  No cervical or inguinal adenopathy  Musc:  No cyanosis or clubbing  Skin:  No rashes or ulcers, skin turgor is good  Neuro:  Cranial nerves are grossly intact, general motor weakness, follows commands   Psych:   Moderate insight, oriented to person, place and time, alert    Telemetry reviewed:   normal sinus rhythm  __________________________________________________________________  Medications Reviewed: (see below)  Medications:     Current Facility-Administered Medications   Medication Dose Route Frequency    guaiFENesin ER (MUCINEX) tablet 600 mg  600 mg Oral Q12H    amLODIPine (NORVASC) tablet 10 mg  10 mg Oral DAILY    calcium carbonate (TUMS) chewable tablet 200 mg [elemental]  200 mg Oral TID PRN    hydrALAZINE (APRESOLINE) 20 mg/mL injection 10 mg  10 mg IntraVENous Q6H PRN    albuterol-ipratropium (DUO-NEB) 2.5 MG-0.5 MG/3 ML  3 mL Nebulization Q6H RT    allopurinoL (ZYLOPRIM) tablet 100 mg  100 mg Oral DAILY    atorvastatin (LIPITOR) tablet 80 mg  80 mg Oral QHS    citalopram (CELEXA) tablet 40 mg  40 mg Oral QPM    isosorbide mononitrate ER (IMDUR) tablet 15 mg  15 mg Oral DAILY    metoprolol succinate (TOPROL-XL) XL tablet 25 mg  25 mg Oral QHS    nitroglycerin (NITROSTAT) tablet 0.4 mg  0.4 mg SubLINGual PRN    pantoprazole (PROTONIX) tablet 40 mg  40 mg Oral ACB    rOPINIRole (REQUIP) tablet 0.5 mg  0.5 mg Oral QHS    sodium chloride (NS) flush 5-40 mL  5-40 mL IntraVENous PRN    acetaminophen (TYLENOL) tablet 1,000 mg  1,000 mg Oral Q6H    oxyCODONE IR (ROXICODONE) tablet 5 mg  5 mg Oral Q4H PRN    morphine injection 2 mg  2 mg IntraVENous Q4H PRN    ondansetron (ZOFRAN ODT) tablet 4 mg  4 mg Oral Q6H PRN    diphenhydrAMINE (BENADRYL) capsule 25 mg  25 mg Oral Q4H PRN    docusate sodium (COLACE) capsule 100 mg  100 mg Oral BID    lidocaine 4 % patch 1 Patch  1 Patch TransDERmal Q24H    senna (SENOKOT) tablet 17.2 mg  2 Tab Oral QHS    insulin lispro (HUMALOG) injection   SubCUTAneous AC&HS    glucose chewable tablet 16 g  4 Tab Oral PRN    dextrose (D50W) injection syrg 12.5-25 g  12.5-25 g IntraVENous PRN    glucagon (GLUCAGEN) injection 1 mg  1 mg IntraMUSCular PRN    oxyCODONE IR (ROXICODONE) tablet 10 mg  10 mg Oral Q4H PRN    arformoteroL (BROVANA) neb solution 15 mcg  15 mcg Nebulization BID RT        Lab Data Reviewed: (see below)  Lab Review:     Recent Labs     04/09/21  0729 04/08/21  1613 04/08/21  0514 04/07/21  0457 WBC 12.0*  --  13.9* 15.6*   HGB 8.0* 7.4* 6.7* 7.4*   HCT 25.4* 23.4* 21.4* 23.5*     --  167 181     Recent Labs     04/09/21  0729 04/08/21  0514 04/07/21  0457    139 136   K 4.2 4.0 4.0   * 109* 103   CO2 26 24 26   GLU 99 103* 103*   BUN 37* 45* 46*   CREA 2.96* 3.45* 3.58*   CA 8.1* 7.8* 7.6*   PHOS 2.6  --   --    ALB 2.9*  --   --      Lab Results   Component Value Date/Time    Glucose (POC) 122 (H) 04/09/2021 11:19 AM    Glucose (POC) 108 (H) 04/09/2021 06:57 AM    Glucose (POC) 121 (H) 04/08/2021 10:03 PM    Glucose (POC) 122 (H) 04/08/2021 09:31 PM    Glucose (POC) 128 (H) 04/08/2021 03:55 PM     No results for input(s): PH, PCO2, PO2, HCO3, FIO2 in the last 72 hours. No results for input(s): INR, INREXT, INREXT in the last 72 hours. All Micro Results     Procedure Component Value Units Date/Time    CULTURE, URINE [[id number]] Collected: 04/08/21 1052    Order Status: Completed Specimen: Urine from Clean catch Updated: 04/09/21 0813     Special Requests: NO SPECIAL REQUESTS        Culture result: No growth (<1,000 CFU/ML)             Other pertinent lab: none    Total time spent with patient: 32 Minutes I personally reviewed chart, notes, data and current medications in the medical record. I have personally examined and treated the patient at bedside during this period.                  Care Plan discussed with: Patient, Care Manager, Nursing Staff, Consultant/Specialist and >50% of time spent in counseling and coordination of care    Discussed:  Care Plan and D/C Planning    Prophylaxis:  H2B/PPI    Disposition:  Home w/Family and HH PT, OT, RN           ___________________________________________________    Attending Physician: Xiomara Alfonso MD

## 2021-04-09 NOTE — PROGRESS NOTES
CM Note:  Pt to d/c to home today transported by his daughter. No CM needs. He is to f/u with providers VIKKI Palma RN

## 2021-04-09 NOTE — PROGRESS NOTES
Assessment/Plan:    Catina Sanchez is a 66 y.o. male with CAD, nephrolithiasis, CKD, COPD, HTN, MELODY on CPAP s/p right partial RAL nephrectomy on 4/5/21. Interval: Now on room air. Afebrile. Normal HR. Hypertensive. Adequate UOP over 2.5. Net negative yesterday. Asleep and comfortable on my arrival. Labs have not been collected this morning.    - Off oxygen. Continue OOB, incentive spirometry  - AM labs pending. Assuming Hb and Cr are relatively stable he would be suitable for discharge from surgical perspective today  - Not concerned for active bleeding. Acute on chronic anemia  - Appreciate multidisciplinary approach to care, input from Dr. Rosario Mckenzie, nephrology, pulmonology    [facility] [place]      Subjective:  Feels like breathing back to normal. Pain controlled. Passing flatus. Tolerating PO diet. Voiding well. Ambulated yesterday x1, didn't get help last night to go around unit.      Objective:  Visit Vitals  BP (!) 173/81 (BP 1 Location: Left upper arm, BP Patient Position: At rest)   Pulse 86   Temp 98.8 °F (37.1 °C)   Resp 20   Ht 6' (1.829 m)   Wt 115.5 kg (254 lb 10.1 oz)   SpO2 92%   BMI 34.53 kg/m²         Intake/Output Summary (Last 24 hours) at 4/9/2021 0716  Last data filed at 4/9/2021 6695  Gross per 24 hour   Intake 762.5 ml   Output 2525 ml   Net -1762.5 ml       Physical Exam  General: NAD, A&O, resting, appropriate  HEENT: NC/AT, EOMI, MMM  Pulmonary: Normal work of breathing on RA  Cardiovascular: Regular rate & rhythm, HDS, adequate peripheral perfusion  Abdomen: soft, NTTP, nondistended, no suprapubic fullness or tenderness, surgical incisions c/d/i  : voiding spontaneously  Extremities: warm and well perfused, no edema  Neuro: Appropriate, no focal neurological deficits    Recent Results (from the past 24 hour(s))   GLUCOSE, POC    Collection Time: 04/08/21  7:27 AM   Result Value Ref Range    Glucose (POC) 126 (H) 65 - 100 mg/dL    Performed by Gunnar Parry, UR, RANDOM    Collection Time: 04/08/21 10:52 AM   Result Value Ref Range    Sodium,urine random 48 MMOL/L   OSMOLALITY, UR    Collection Time: 04/08/21 10:52 AM   Result Value Ref Range    Osmolality,urine 346 MOSM/kg H2O   CREATININE, UR, RANDOM    Collection Time: 04/08/21 10:52 AM   Result Value Ref Range    Creatinine, urine 89.00 mg/dL   PROTEIN URINE, RANDOM    Collection Time: 04/08/21 10:52 AM   Result Value Ref Range    Protein, urine random 62 (H) 0.0 - 11.9 mg/dL   EOSINOPHILS, URINE    Collection Time: 04/08/21 10:52 AM   Result Value Ref Range    Eosinophils,urine Negative     URINALYSIS W/ REFLEX CULTURE    Collection Time: 04/08/21 10:52 AM    Specimen: Urine   Result Value Ref Range    Color YELLOW/STRAW      Appearance CLEAR CLEAR      Specific gravity 1.011 1.003 - 1.030      pH (UA) 6.0 5.0 - 8.0      Protein 30 (A) NEG mg/dL    Glucose Negative NEG mg/dL    Ketone Negative NEG mg/dL    Bilirubin Negative NEG      Blood MODERATE (A) NEG      Urobilinogen 0.2 0.2 - 1.0 EU/dL    Nitrites Negative NEG      Leukocyte Esterase Negative NEG      WBC 0-4 0 - 4 /hpf    RBC 5-10 0 - 5 /hpf    Epithelial cells FEW FEW /lpf    Bacteria Negative NEG /hpf    UA:UC IF INDICATED CULTURE NOT INDICATED BY UA RESULT CNI      Hyaline cast 0-2 0 - 5 /lpf   SAMPLES BEING HELD    Collection Time: 04/08/21 10:52 AM   Result Value Ref Range    SAMPLES BEING HELD 1UA,1UC     COMMENT        Add-on orders for these samples will be processed based on acceptable specimen integrity and analyte stability, which may vary by analyte.    GLUCOSE, POC    Collection Time: 04/08/21 10:53 AM   Result Value Ref Range    Glucose (POC) 116 (H) 65 - 100 mg/dL    Performed by Alexis Petersen, POC    Collection Time: 04/08/21  3:55 PM   Result Value Ref Range    Glucose (POC) 128 (H) 65 - 100 mg/dL    Performed by Paradise Snell    HGB & HCT    Collection Time: 04/08/21  4:13 PM   Result Value Ref Range    HGB 7.4 (L) 12.1 - 17.0 g/dL    HCT 23.4 (L) 36.6 - 50.3 %   PROCALCITONIN    Collection Time: 04/08/21  4:13 PM   Result Value Ref Range    Procalcitonin 0.60 ng/mL   GLUCOSE, POC    Collection Time: 04/08/21  9:31 PM   Result Value Ref Range    Glucose (POC) 122 (H) 65 - 100 mg/dL    Performed by [id number], POC    Collection Time: 04/08/21 10:03 PM   Result Value Ref Range    Glucose (POC) 121 (H) 65 - 100 mg/dL    Performed by Marysue Bernheim (PCT)    GLUCOSE, POC    Collection Time: 04/09/21  6:57 AM   Result Value Ref Range    Glucose (POC) 108 (H) 65 - 100 mg/dL    Performed by Marysue Bernheim (PCT)        Imaging:  CXRs have normal

## 2021-04-09 NOTE — PROGRESS NOTES
Pulmonary, Critical Care, and Sleep Medicine    Progress note    Name: Hudson Mendoza MRN: [id number]   : 1945 Hospital: [address] [place] [address]   Date: 2021        IMPRESSION:   · Acute hypoxic resp failure: Suspect that this is mostly due to atx. On RA w acceptable sat  · COPD: appears mild in severity  · Obesity  · MELODY  · Renal cell ca:  S/p nephrectomy  · CAD  · anemia      RECOMMENDATIONS:   · Encourage inc [place], deep breathing, increasing activity, and OOB  · Cont w cpap qhs  · OK for d/c from my standpoint. He will follow up w Dr. Kei Forde routinely     Subjective: This patient has been seen and evaluated at the request of Dr. Eliana Segura for hypoxemia. Patient is a 66 y.o. male who is followed by Dr Kei Forde in our office for copd and melody. Pt has chronic SMITH and is on anoro and albuterol at home. Since surgery, pt has required oxygen. Occasional cough w yellow phlegm. Has noted himself wheezing intermittently. Initially had a hard time taking deep breath due to abd distention and pain, but this is resolving. CXR w shallow depth of inspiration and r medial base density    SHx: smoked for only 12 yrs, stopped at 29 yo but worked as . Interval Hx:  Feels much better today. No sob. Abd is less full.        Past Medical History:   Diagnosis Date    Abnormal stress echo 2015    Anemia NEC 2013    Anxiety     Borderline diabetes     CAD (coronary artery disease)     cath [facility]) revealed 20%RCA and 50%2nd diagonal    Calculus of kidney     Chronic kidney disease, stage III (moderate) (HCC) 10/11/2009    30% kidney function    COPD, mild (HCC)     Followed by pulmonary associates    DJD (degenerative joint disease)     Fatty liver     GERD (gastroesophageal reflux disease) 10/11/2009    Gout     Hypertension     MELODY on CPAP 10/11/2009    nasal pillows; pressure set at 10- -     Peripheral neuropathy 10/11/2009    bilateral feet (numbness)    Renal cell cancer, right ([facility] [address] 01/2021    RLS (restless legs syndrome) 10/11/2009    S/P coronary artery stent placement 05/12/2015    PCI./MALI to LCx, 8/2019 PCI/MALI Prox LAD (RCA 40-50% lesions)      Past Surgical History:   Procedure Laterality Date    HX BUNIONECTOMY  2019    HX CAROTID ENDARTERECTOMY Left 01/2020    Followed by Dr. Ting Lora  2009, 7/17    x2    HX HERNIA REPAIR      [place]/umbilical    HX KNEE REPLACEMENT Left 07/23/2011    HX ORTHOPAEDIC      left shoulder manipulation    HX UROLOGICAL      basket extraction of kidney stones    NM COLONOSCOPY FLX DX W/COLLJ SPEC WHEN PFRMD  8/5/2010         NM COLSC FLX W/RMVL OF TUMOR POLYP LESION SNARE TQ  9/24/2014           Prior to Admission medications    Medication Sig Start Date End Date Taking? Authorizing Provider   oxyCODONE IR (ROXICODONE) 5 mg immediate release tablet Take 1 Tab by mouth every four (4) hours as needed for Pain for up to 7 days. Max Daily Amount: 30 mg. 4/5/21 4/12/21 Yes Melvin Catalan MD   senna-docusate (Senna with Docusate Sodium) 8.6-50 mg per tablet Take 1 Tab by mouth two (2) times a day. 4/5/21  Yes Melvin Catalan MD   albuterol (ProAir HFA) 90 mcg/actuation inhaler Take 1 Puff by inhalation every four (4) hours. Yes Provider, Historical   umeclidinium-vilanteroL (Anoro Ellipta) 62.5-25 mcg/actuation inhaler Take 1 Puff by inhalation daily. Yes Provider, Historical   rOPINIRole (Requip) 0.5 mg tablet Take 0.5 mg by mouth nightly. Yes Provider, Historical   atorvastatin (Lipitor) 80 mg tablet Take 80 mg by mouth nightly. Yes Provider, Historical   citalopram (CELEXA) 40 mg tablet Take 40 mg by mouth every evening. Yes Provider, Historical   metoprolol succinate (TOPROL-XL) 25 mg XL tablet Take 25 mg by mouth nightly.    Yes Provider, Historical   pantoprazole (PROTONIX) 40 mg tablet TAKE 1 TABLET BY MOUTH EVERY DAY 12/17/20  Yes Armond Trevizo MD   isosorbide mononitrate ER (IMDUR) 30 mg tablet Take 0.5 Tabs by mouth daily. 10/21/20  Yes Dinorah Monson NP   OXYGEN-AIR DELIVERY SYSTEMS (HORIZON NASAL CPAP SYSTEM) by Does Not Apply route. Yes Provider, Historical   amLODIPine (NORVASC) 5 mg tablet Take 5 mg by mouth daily. 14  Yes Provider, Historical   allopurinol (ZYLOPRIM) 100 mg tablet Take 100 mg by mouth every morning. 12  Yes Provider, Historical   ketoconazole (NIZORAL) 2 % shampoo Apply 1 mL to affected area daily as needed for Itching. Provider, Historical   telmisartan (MICARDIS) 40 mg tablet Take 40 mg by mouth nightly. Provider, Historical   nitroglycerin (NITROSTAT) 0.4 mg SL tablet TAKE 1 TABLET BY SUBLINGUAL ROUTE EVERY 5 MINUTES AS NEEDED FOR CHEST PAIN. 3/4/20   Anais Morgan MD   VITAMIN D3 2,000 unit cap capsule Take 2,000 Units by mouth daily. 3/3/15   Provider, Historical   GLUCOSAMINE HCL/CHONDR PETERSEN A NA (GLUCOSAMINE-CHONDROITIN) 750-600 mg Tab Take 1 Tab by mouth daily. Brand: Move Free    Provider, Historical   aspirin delayed-release 81 mg tablet Take 81 mg by mouth nightly. Provider, Historical   MULTIVITS W-FE,OTHER MIN (CENTRUM PO) Take 1 Tab by mouth daily.     Provider, Historical     Allergies   Allergen Reactions    Bactrim [Sulfamethoxazole-Trimethoprim] Hives    Codeine Itching    Lisinopril (Bulk) Cough    Neurontin [Gabapentin] Other (comments)     drowsy    Zostavax [Zoster Vaccine Live (Pf)] Rash     See 9/10/13 visit    Penicillins Hives     Pt states he has taken Keflex before without problems      Social History     Tobacco Use    Smoking status: Former Smoker     Packs/day: 1.00     Years: 10.00     Pack years: 10.00     Types: Cigarettes     Quit date: 1968     Years since quittin.3    Smokeless tobacco: Never Used   Substance Use Topics    Alcohol use: No     Alcohol/week: 0.0 standard drinks      Family History   Problem Relation Age of Onset    Heart Disease Father     Hypertension Father     Other Father         abdominal aneurysm     Dementia Mother     Alzheimer Mother     Heart Disease Brother     Heart Attack Brother     Heart Disease Maternal Grandmother     Heart Disease Paternal Grandmother     Heart Attack Paternal Grandmother     Heart Disease Paternal Grandfather     Heart Attack Paternal Grandfather     Elevated Lipids Son     Elevated Lipids Daughter     Cancer Neg Hx     Diabetes Neg Hx     Stroke Neg Hx         Current Facility-Administered Medications   Medication Dose Route Frequency    guaiFENesin ER (MUCINEX) tablet 600 mg  600 mg Oral Q12H    amLODIPine (NORVASC) tablet 10 mg  10 mg Oral DAILY    albuterol-ipratropium (DUO-NEB) 2.5 MG-0.5 MG/3 ML  3 mL Nebulization Q6H RT    allopurinoL (ZYLOPRIM) tablet 100 mg  100 mg Oral DAILY    atorvastatin (LIPITOR) tablet 80 mg  80 mg Oral QHS    citalopram (CELEXA) tablet 40 mg  40 mg Oral QPM    isosorbide mononitrate ER (IMDUR) tablet 15 mg  15 mg Oral DAILY    metoprolol succinate (TOPROL-XL) XL tablet 25 mg  25 mg Oral QHS    pantoprazole (PROTONIX) tablet 40 mg  40 mg Oral ACB    rOPINIRole (REQUIP) tablet 0.5 mg  0.5 mg Oral QHS    acetaminophen (TYLENOL) tablet 1,000 mg  1,000 mg Oral Q6H    docusate sodium (COLACE) capsule 100 mg  100 mg Oral BID    lidocaine 4 % patch 1 Patch  1 Patch TransDERmal Q24H    senna (SENOKOT) tablet 17.2 mg  2 Tab Oral QHS    insulin lispro (HUMALOG) injection   SubCUTAneous AC&HS    arformoteroL (BROVANA) neb solution 15 mcg  15 mcg Nebulization BID RT       Review of Systems:  A comprehensive review of systems was negative except for that written in the HPI.     Objective:   Vital Signs:    Visit Vitals  BP (!) 151/71 (BP 1 Location: Left upper arm, BP Patient Position: At rest)   Pulse 88   Temp 97.9 °F (36.6 °C)   Resp 20   Ht 6' (1.829 m)   Wt 115.5 kg (254 lb 10.1 oz)   SpO2 95%   BMI 34.53 kg/m²       O2 Device: Room air   O2 Flow Rate (L/min): 1 l/min   Temp (24hrs), Av.4 °F (36.9 °C), Min:97.9 °F (36.6 °C), Max:98.8 °F (37.1 °C)       Intake/Output:   Last shift:      701 - 1900  In: 180 [P.O.:180]  Out: 600 [Urine:600]  Last 3 shifts: 1901 -  07  In: 2667.5 [P.O.:600; I.V.:1605]  Out: 3950 [Urine:3950]    Intake/Output Summary (Last 24 hours) at 2021 1145  Last data filed at 2021 1134  Gross per 24 hour   Intake 842.5 ml   Output 2625 ml   Net -1782.5 ml      Physical Exam:   General:  Alert, cooperative, no distress, obese   Head:  Normocephalic, without obvious abnormality, atraumatic. Eyes:  Conjunctivae/corneas clear. PERRL, EOMs intact. Nose: Nares normal. Septum midline. Mucosa normal. No drainage or sinus tenderness. Throat: Lips, mucosa, and tongue normal. Teeth and gums normal.   Neck: Supple, symmetrical, trachea midline, no adenopathy, thyroid: no enlargment/tenderness/nodules, no carotid bruit and no JVD. Back:   Symmetric, no curvature. ROM normal.   Lungs:   Dist bs, mild wheeze w forced expiration. Some wheeze from upper airway   Chest wall:  No tenderness or deformity. Heart:  Regular rate and rhythm, S1, S2 normal, no murmur, click, rub or gallop. Abdomen:   Soft, surgical sites are clear   Extremities: Extremities normal, atraumatic, no cyanosis or edema. Pulses: 2+ and symmetric all extremities.    Skin: Skin color, texture, turgor normal. No rashes or lesions   Lymph nodes: Cervical, supraclavicular, and axillary nodes normal.   Neurologic: Grossly nonfocal     Data review:     Recent Results (from the past 24 hour(s))   GLUCOSE, POC    Collection Time: 21  3:55 PM   Result Value Ref Range    Glucose (POC) 128 (H) 65 - 100 mg/dL    Performed by Francheska Munoz    HGB & HCT    Collection Time: 21  4:13 PM   Result Value Ref Range    HGB 7.4 (L) 12.1 - 17.0 g/dL    HCT 23.4 (L) 36.6 - 50.3 %   PROCALCITONIN    Collection Time: 21  4:13 PM   Result Value Ref Range    Procalcitonin 0.60 ng/mL   GLUCOSE, POC    Collection Time: 04/08/21  9:31 PM   Result Value Ref Range    Glucose (POC) 122 (H) 65 - 100 mg/dL    Performed by Nia Blackwell    GLUCOSE, POC    Collection Time: 04/08/21 10:03 PM   Result Value Ref Range    Glucose (POC) 121 (H) 65 - 100 mg/dL    Performed by Chun Aranda (PCT)    GLUCOSE, POC    Collection Time: 04/09/21  6:57 AM   Result Value Ref Range    Glucose (POC) 108 (H) 65 - 100 mg/dL    Performed by Chun Aranda (PCT)    RENAL FUNCTION PANEL    Collection Time: 04/09/21  7:29 AM   Result Value Ref Range    Sodium 141 136 - 145 mmol/L    Potassium 4.2 3.5 - 5.1 mmol/L    Chloride 111 (H) 97 - 108 mmol/L    CO2 26 21 - 32 mmol/L    Anion gap 4 (L) 5 - 15 mmol/L    Glucose 99 65 - 100 mg/dL    BUN 37 (H) 6 - 20 MG/DL    Creatinine 2.96 (H) 0.70 - 1.30 MG/DL    BUN/Creatinine ratio 13 12 - 20      GFR est AA 25 (L) >60 ml/min/1.73m2    GFR est non-AA 21 (L) >60 ml/min/1.73m2    Calcium 8.1 (L) 8.5 - 10.1 MG/DL    Phosphorus 2.6 2.6 - 4.7 MG/DL    Albumin 2.9 (L) 3.5 - 5.0 g/dL   CBC WITH AUTOMATED DIFF    Collection Time: 04/09/21  7:29 AM   Result Value Ref Range    WBC 12.0 (H) 4.1 - 11.1 K/uL    RBC 2.68 (L) 4.10 - 5.70 M/uL    HGB 8.0 (L) 12.1 - 17.0 g/dL    HCT 25.4 (L) 36.6 - 50.3 %    MCV 94.8 80.0 - 99.0 FL    MCH 29.9 26.0 - 34.0 PG    MCHC 31.5 30.0 - 36.5 g/dL    RDW 14.9 (H) 11.5 - 14.5 %    PLATELET 244 952 - 169 K/uL    MPV 9.8 8.9 - 12.9 FL    NRBC 0.0 0  WBC    ABSOLUTE NRBC 0.00 0.00 - 0.01 K/uL    NEUTROPHILS 70 32 - 75 %    LYMPHOCYTES 15 12 - 49 %    MONOCYTES 11 5 - 13 %    EOSINOPHILS 3 0 - 7 %    BASOPHILS 0 0 - 1 %    IMMATURE GRANULOCYTES 1 (H) 0.0 - 0.5 %    ABS. NEUTROPHILS 8.5 (H) 1.8 - 8.0 K/UL    ABS. LYMPHOCYTES 1.8 0.8 - 3.5 K/UL    ABS. MONOCYTES 1.3 (H) 0.0 - 1.0 K/UL    ABS. EOSINOPHILS 0.4 0.0 - 0.4 K/UL    ABS. BASOPHILS 0.0 0.0 - 0.1 K/UL    ABS. IMM.  GRANS. 0.1 (H) 0.00 - 0.04 K/UL    DF AUTOMATED     SAMPLES BEING HELD    Collection Time: 04/09/21  7:29 AM   Result Value Ref Range    SAMPLES BEING HELD 1SST     COMMENT        Add-on orders for these samples will be processed based on acceptable specimen integrity and analyte stability, which may vary by analyte.    GLUCOSE, POC    Collection Time: 04/09/21 11:19 AM   Result Value Ref Range    Glucose (POC) 122 (H) 65 - 100 mg/dL    Performed by Michel Billings        Imaging:  I have personally reviewed the patients radiographs and have reviewed the reports:  Shallow insp effort and density involving the medial R lower lung base        Sigifredo Gracia MD

## 2021-04-12 ENCOUNTER — TELEPHONE (OUTPATIENT)
Dept: INTERNAL MEDICINE CLINIC | Age: 76
End: 2021-04-12

## 2021-04-12 ENCOUNTER — PATIENT OUTREACH (OUTPATIENT)
Dept: CASE MANAGEMENT | Age: 76
End: 2021-04-12

## 2021-04-12 RX ORDER — ACETAMINOPHEN 500 MG
1000 TABLET ORAL
COMMUNITY
End: 2021-07-26

## 2021-04-12 NOTE — PROGRESS NOTES
Care Transitions Initial Follow Up Call    Call within 2 business days of discharge: Yes     Patient: Catina Sanchez Patient : 1945 MRN: [id number]    Last Discharge [address]       Complaint Diagnosis Description Type Department Provider    21  Renal cell carcinoma of right kidney [facility]) Admission (Discharged) Gentry Oliveira MD          Was this an external facility discharge? No     Challenges to be reviewed by the provider          Method of communication with provider : chart routing    Discussed COVID-19 related testing which was available at this time. Test results were negative. Patient informed of results, if available? yes    Advance Care Planning:   Does patient have an Advance Directive: not on file; education provided; however, daughter reports patient has one. CTN requested copy to be scanned into chart    Inpatient Readmission Risk score: Unplanned Readmit Risk Score: 24    Was this a readmission? no   Patient stated reason for the admission: Planned partial nephrectomy    Patients top risk factors for readmission: medical condition COPD, chronic kidney disease and HTN  Interventions to address risk factors: Scheduled appointment with PCP-office will contact patient, Scheduled appointment with Specialist-urology, nephrology, Obtained and reviewed discharge summary and/or continuity of care documents, Communication with specialists who will assume or re-assume care of the patient's system-specific problems-nephrology and Education of patient/family/caregiver/guardian to support self-management-monitor for worsening symptoms    Care Transition Nurse (CTN) contacted the patient and daughter Sravan Zhu (HIPAA verified dated 2020) by telephone to perform post hospital discharge assessment. Verified name and  with patient as identifiers. Provided introduction to self, and explanation of the CTN role.      CTN reviewed discharge instructions, medical action plan and red flags with patient/daughter who verbalized understanding. Were discharge instructions available to patient? yes. Reviewed appropriate site of care based on symptoms and resources available to patient including: PCP, Specialist, Urgent Care Clinics and [address]. Family given an opportunity to ask questions and does not have any further questions or concerns at this time. The family agrees to contact the PCP office for questions related to their healthcare. Medication reconciliation was performed with family, who verbalizes understanding of administration of home medications. Referral to Pharm D needed: no     Home Health/Outpatient orders at discharge: none    Durable Medical Equipment ordered at discharge: None      Covid Risk Education    Patient has following risk factors of: COPD, chronic kidney disease and HTN. Education provided regarding infection prevention, and signs and symptoms of COVID-19 and when to seek medical attention with family who verbalized understanding. Discussed exposure protocols and quarantine From CDC: Are you at higher risk for severe illness?  and given an opportunity for questions and concerns. The family agrees to contact PCP office for questions related to healthcare     For more information on steps you can take to protect yourself, see CDC's How to Protect Yourself     Was patient discharged with a pulse oximeter? no Discussed and confirmed pulse oximeter discharge instructions and when to notify provider or seek emergency care. Discussed follow-up appointments. If no appointment was previously scheduled, appointment scheduling offered: yes Is follow up appointment scheduled within 7 days of discharge?  yes   [facility] follow up appointment(s):   Future Appointments   Date Time Provider Seth Skelton   5/11/2021  9:45 AM MD JEFF Oliveira BS AMB   5/24/2021 10:00 AM Reid Diane MD [facility] BS AMB     Non-[facility] follow up appointment(s): 4/15 Dr. Marcin Falcon, urology [facility]); 5/14 Dr. Nithya Ceron, nephrology; 4/23 Dr. Ayo Gottlieb for follow-up call in 7-10 days based on severity of symptoms and risk factors. Plan for next call: symptom management-post nephrectomy, self management-pain management and follow up appointment-PCP/Specialist  CTN provided contact information for future needs. Goals Addressed                 This Visit's Progress     Attend follow up appointments on schedule          04/12/21   Will attend follow up appts with urology, nephrology, and cardiology   PCP office will contact patient to schedule JANINA appt       Understands red flags post discharge.           04/12/21   Reports pain managed with Tylenol 500 mg   Denies s/sx infection, chest pain, fever   No lifting > 10 pounds for 6 weeks   Tolerating PO   No bowel/bladder concerns   Daughter reports patient seems \"wobbly\"   Fall risk   Endorses LE edema-reviewed the importance of elevation, mobility   Reviewed I.S. to improve lung function   Reviewed DVT/PE i.e calf pain/swelling, increased SOB   Daughter will purchase pulse oximetry

## 2021-04-12 NOTE — ACP (ADVANCE CARE PLANNING)
Advance Care Planning:   Does patient have an Advance Directive: not on file; education provided; however, daughter reports patient has one.  CTN requested copy to be scanned into chart

## 2021-04-12 NOTE — TELEPHONE ENCOUNTER
[phone]  Caller: Supa Prince  Verified on hipaa dated 11/23/20 and scanned 12/17/20        Hospital follow up: pt had kidney surgery and was released on friday 4/9    She is requesting in office, not virtual bc wants oxygen checked.

## 2021-04-12 NOTE — TELEPHONE ENCOUNTER
--- Message -----  From: Maritza White  Sent: 4/12/2021  10:06 AM EDT  To: [facility] Office [facility]  Subject: Dr. Leti Eng                           Appointment not available    Caller's first and last name and relationship to patient (if not the patient): Constanza(Transition Care Nurse)      Best contact number:[phone]      Preferred date and time:within a wk      Scheduled appointment date and time:      Reason for appointment: hosp f/up      Details to clarify the request:Constanza requested the office to contact the pt to schedule a f/up to [facility] for removal of kidney d/c on 04/09/21, to be seen within a wk.       Message from [facility] [place]

## 2021-04-16 ENCOUNTER — VIRTUAL VISIT (OUTPATIENT)
Dept: INTERNAL MEDICINE CLINIC | Age: 76
End: 2021-04-16
Payer: MEDICARE

## 2021-04-16 ENCOUNTER — TELEPHONE (OUTPATIENT)
Dept: INTERNAL MEDICINE CLINIC | Age: 76
End: 2021-04-16

## 2021-04-16 DIAGNOSIS — C64.1 RENAL CELL CARCINOMA OF RIGHT KIDNEY (HCC): Primary | ICD-10-CM

## 2021-04-16 PROCEDURE — 99496 TRANSJ CARE MGMT HIGH F2F 7D: CPT | Performed by: FAMILY MEDICINE

## 2021-04-16 PROCEDURE — G8427 DOCREV CUR MEDS BY ELIG CLIN: HCPCS | Performed by: FAMILY MEDICINE

## 2021-04-16 RX ORDER — FLUOCINONIDE TOPICAL SOLUTION USP, 0.05% 0.5 MG/ML
SOLUTION TOPICAL
COMMUNITY
Start: 2021-03-07

## 2021-04-16 NOTE — PROGRESS NOTES
Marcelle Severs is a 66 y.o. male who presents for follow-up after hospitalization. Patient was admitted April 5 and discharged April 9 with a right kidney mass. He underwent a laparoscopic right partial nephrectomy on April 5. Pathology shows renal cell cancer. The patient has CKD. At discharge his creatinine had improved to 2.9. He had a follow-up appointment with Dr. Berry Maria, urology on April 15. Has follow up in 6 months. He is not needing the pain medication, taking tylenol. Eating ok. Normal urination, normal BM. Has follow up with Dr Francisco Chan, nephrology. On Anoro and albuterol. Seen by pulmonary. SpO2 94-95%. Has follow up up with pulmonary. Using incentive spirometer. Using albuterol TID and consistent with Anoro. Is coughing up mucous, less now. This is an established visit conducted via telemedicine with video. The patient has been instructed that this meets HIPAA criteria and acknowledges and agrees to this method of visitation. Pursuant to the emergency declaration under the [facility], [facility] waiver authority and the [employer] Act, this Virtual Visit was conducted, with patient's consent, to reduce the patient's risk of exposure to COVID-19 and provide continuity of care for an established patient. Services were provided through a video synchronous discussion virtually to substitute for in-person clinic visit.         Past Medical History:   Diagnosis Date    Abnormal stress echo 5/12/2015    Anemia NEC 03/2013    Anxiety     Borderline diabetes     CAD (coronary artery disease) 2009    cath Aida Galarza) revealed 20%RCA and 50%2nd diagonal    Calculus of kidney     Chronic kidney disease, stage III (moderate) (HCC) 10/11/2009    30% kidney function    COPD, mild (HCC)     Followed by pulmonary associates    DJD (degenerative joint disease)     Fatty liver     GERD (gastroesophageal reflux disease) 10/11/2009    Gout     Hypertension     MELODY on CPAP 10/11/2009    nasal pillows; pressure set at 10-11 -     Peripheral neuropathy 10/11/2009    bilateral feet (numbness)    Renal cell cancer, right ([address] 2021    RLS (restless legs syndrome) 10/11/2009    S/P coronary artery stent placement 2015    PCI./MALI to LCx, 2019 PCI/MALI Prox LAD (RCA 40-50% lesions)       Family History   Problem Relation Age of Onset    Heart Disease Father     Hypertension Father     Other Father         abdominal aneurysm     Dementia Mother     Alzheimer Mother     Heart Disease Brother     Heart Attack Brother     Heart Disease Maternal Grandmother     Heart Disease Paternal Grandmother     Heart Attack Paternal Grandmother     Heart Disease Paternal Grandfather     Heart Attack Paternal Grandfather     Elevated Lipids Son     Elevated Lipids Daughter     Cancer Neg Hx     Diabetes Neg Hx     Stroke Neg Hx        Social History     Socioeconomic History    Marital status:      Spouse name: Jaiden oFnseca Number of children: 2    Years of education: graduated then 4 year apprenticship    Highest education level: Associate degree: academic program   Occupational History    Not on file   Social Needs    Financial resource strain: Not hard at all   [employer] insecurity     Worry: Never true     Inability: Never true   [employer] needs     Medical: No     Non-medical: No   Tobacco Use    Smoking status: Former Smoker     Packs/day: 1.00     Years: 10.00     Pack years: 10.00     Types: Cigarettes     Quit date: 1968     Years since quittin.3    Smokeless tobacco: Never Used   Substance and Sexual Activity    Alcohol use: No     Alcohol/week: 0.0 standard drinks    Drug use: No    Sexual activity: Yes     Partners: Female     Birth control/protection: None   Lifestyle    Physical activity     Days per week: 0 days     Minutes per session: 0 min  Stress: Very much   Relationships    Social connections     Talks on phone: More than three times a week     Gets together: More than three times a week     Attends Pentec[employer]ism service: Not on file     Active member of club or organization: No     Attends meetings of clubs or organizations: Never     Relationship status:     Intimate partner violence     Fear of current or ex partner: No     Emotionally abused: No     Physically abused: No     Forced sexual activity: No   Other Topics Concern     Service Not Asked    Blood Transfusions Not Asked    Caffeine Concern Not Asked    Occupational Exposure Not Asked    Hobby Hazards Not Asked    Sleep Concern Not Asked    Stress Concern Not Asked    Weight Concern Not Asked    Special Diet Not Asked    Back Care Not Asked    Exercise Not Asked    Bike Helmet Not Asked   [address] [facility] [facility] Not Asked    Self-Exams Not Asked   Social History Narrative    Not on file       Current Outpatient Medications on File Prior to Visit   Medication Sig Dispense Refill    fluocinoNIDE (LIDEX) 0.05 % external solution APPLY TO ITCHY SPOTS ON SCALP AS NEEDED FOR FLARE      acetaminophen (Tylenol Extra Strength) 500 mg tablet Take 1,000 mg by mouth every six (6) hours as needed for Pain.  senna-docusate (Senna with Docusate Sodium) 8.6-50 mg per tablet Take 1 Tab by mouth two (2) times a day. 60 Tab 1    albuterol (ProAir HFA) 90 mcg/actuation inhaler Take 1 Puff by inhalation every four (4) hours.  umeclidinium-vilanteroL (Anoro Ellipta) 62.5-25 mcg/actuation inhaler Take 1 Puff by inhalation daily.  rOPINIRole (Requip) 0.5 mg tablet Take 0.5 mg by mouth nightly.  ketoconazole (NIZORAL) 2 % shampoo Apply 1 mL to affected area daily as needed for Itching.  atorvastatin (Lipitor) 80 mg tablet Take 80 mg by mouth nightly.  citalopram (CELEXA) 40 mg tablet Take 40 mg by mouth every evening.       metoprolol succinate (TOPROL-XL) 25 mg XL tablet Take 25 mg by mouth nightly.  telmisartan (MICARDIS) 40 mg tablet Take 40 mg by mouth nightly.  pantoprazole (PROTONIX) 40 mg tablet TAKE 1 TABLET BY MOUTH EVERY DAY 90 Tab 1    isosorbide mononitrate ER (IMDUR) 30 mg tablet Take 0.5 Tabs by mouth daily. 45 Tab 3    nitroglycerin (NITROSTAT) 0.4 mg SL tablet TAKE 1 TABLET BY SUBLINGUAL ROUTE EVERY 5 MINUTES AS NEEDED FOR CHEST PAIN. 1 Bottle 0    VITAMIN D3 2,000 unit cap capsule Take 2,000 Units by mouth daily. 2    OXYGEN-AIR DELIVERY SYSTEMS (HORIZON NASAL CPAP SYSTEM) by Does Not Apply route.  amLODIPine (NORVASC) 5 mg tablet Take 5 mg by mouth daily.  GLUCOSAMINE HCL/CHONDR PETERSEN A NA (GLUCOSAMINE-CHONDROITIN) 750-600 mg Tab Take 1 Tab by mouth daily. Brand: Move Free      aspirin delayed-release 81 mg tablet Take 81 mg by mouth nightly.  allopurinol (ZYLOPRIM) 100 mg tablet Take 100 mg by mouth every morning.  MULTIVITS W-FE,OTHER MIN (CENTRUM PO) Take 1 Tab by mouth daily. No current facility-administered medications on file prior to visit. Review of Systems  Pertinent items are noted in HPI. Objective:     Gen: well appearing male  HEENT: normal conjunctiva, no audible congestion, patient does not see oral erythema, has MMM  Neck: patient does not feel enlarged or tender LAD or masses  Resp: normal respiratory effort, no audible wheezing. CV: patient does not feel palpitations or heart irregularity  Abd: patient does not feel abdominal tenderness or mass, patient does not notice distension, healing surgical wounds without signs of infection  Extrem: patient does not see swelling in ankles or joints. Neuro: Alert and oriented, able to answer questions without difficulty, able to move all extremities and walk normally        Assessment/Plan:       ICD-10-CM ICD-9-CM    1. Renal cell carcinoma of right kidney (HCC)  C64.1 189.0        This was a telemedicine visit with video.         Juan Larson MD [place]    Follow-up and Dispositions    · Return if symptoms worsen or fail to improve.

## 2021-04-16 NOTE — PATIENT INSTRUCTIONS
Office Policies Phone calls/patient messages: Please allow up to 24 hours for someone in the office to contact you about your call or message. Be mindful your provider may be out of the office or your message may require further review. We encourage you to use "[employer] for your messages as this is a faster, more efficient way to communicate with our office Medication Refills: 
         
Prescription medications require 48-72 business hours to process. We encourage you to use "[employer] for your refills. For controlled medications: Please allow 72 business hours to process. Certain medications may require you to  a written prescription at our office. NO narcotic/controlled medications will be prescribed after 4pm Monday through Friday or on weekends Form/Paperwork Completion: 
         
Please note a $25 fee may incur for all paperwork for completed by our providers. We ask that you allow 7-10 business days. Pre-payment is due prior to picking up/faxing the completed form. You may also download your forms to "[employer] to have your doctor print off.

## 2021-04-20 NOTE — TELEPHONE ENCOUNTER
Called, spoke to pt. Two pt identifiers confirmed. Pt informed DMV disabled parking placard  form is ready for  and at the . Pt verbalized understanding of information discussed w/ no further questions at this time. Rx placed in blue folder.

## 2021-04-21 ENCOUNTER — PATIENT OUTREACH (OUTPATIENT)
Dept: CASE MANAGEMENT | Age: 76
End: 2021-04-21

## 2021-04-21 NOTE — PROGRESS NOTES
Care Transitions Subsequent Call            Method of communication with provider : none    Discussed COVID-19 related testing which was not done at this time. Test results were not done. Patient informed of results, if available? NA     Care Transition Nursecontacted the patient by telephone to follow up after admission on 2021. Verified name and  with patient as identifiers. Addressed changes since last contact: self management-no pain and follow up appointment-attended JANINA appt with PCP  Discharge needs reviewed: none None  Follow up appointment completed? yes yes Was follow up appointment scheduled within 7 days of discharge? no     Advance Care Planning:   Does patient have an Advance Directive:  not on file     Care Transition Nurse reviewed discharge instructions, medical action plan and red flags with patient and discussed any barriers to care and/or understanding of plan of care after discharge. Discussed appropriate site of care based on symptoms and resources available to patient including: Specialist. The patient agrees to contact the PCP office for questions related to their healthcare. Patients top risk factors for readmission: none identified at this time COPD and chronic kidney disease  Interventions to address risk factors: Scheduled appointment with Specialist-Nephrology/pulm    [facility] follow up appointment(s):   Future Appointments   Date Time Provider Seth Skelton   2021  9:45 AM MD JEFF Osei BS AMB   2021 10:00 AM Douglas Islas MD [facility] BS AMB     Non-[facility] follow up appointment(s): NA    Plan for follow-up call in 10-14 days based on severity of symptoms and risk factors. Plan for next call: follow up appointment-upcoming appts with nephrology/pulm  Care Transition Nurse provided contact information for future needs.     Goals Addressed                 This Visit's Progress     Attend follow up appointments on schedule   On track 04/12/21   Will attend follow up appts with urology, nephrology, and cardiology   PCP office will contact patient to schedule JANINA appt    04/21/21   Attended follow up appt with PCP/urology   Has upcoming with cards/nephrology         Understands red flags post discharge.    On track       04/12/21   Reports pain managed with Tylenol 500 mg   Denies s/sx infection, chest pain, fever   No lifting > 10 pounds for 6 weeks   Tolerating PO   No bowel/bladder concerns   Daughter reports patient seems \"wobbly\"   Fall risk   Endorses LE edema-reviewed the importance of elevation, mobility   Reviewed I.S. to improve lung function   Reviewed DVT/PE i.e calf pain/swelling, increased SOB   Daughter will purchase pulse oximetry    04/21/21   Reports feeling well   Oxygen 94-95%   Continued compliance with I.S.   Able to pull 1000 ml   Denies pain   Tolerating PO   No lifting > 10 pounds x 6 weeks   Denies falls

## 2021-05-04 RX ORDER — METOPROLOL SUCCINATE 25 MG/1
TABLET, EXTENDED RELEASE ORAL
Qty: 90 TAB | Refills: 1 | Status: SHIPPED | OUTPATIENT
Start: 2021-05-04 | End: 2021-11-08

## 2021-05-10 ENCOUNTER — PATIENT OUTREACH (OUTPATIENT)
Dept: CASE MANAGEMENT | Age: 76
End: 2021-05-10

## 2021-05-10 NOTE — PROGRESS NOTES
Patient has graduated from the Transitions of Care Coordination  program on 5/10/2021. Patient/family has the ability to self-manage at this time Care management goals have been completed. Patient was not referred to the [facility] team for further management. Goals Addressed                 This Visit's Progress     COMPLETED: Attend follow up appointments on schedule          04/12/21   Will attend follow up appts with urology, nephrology, and cardiology   PCP office will contact patient to schedule JANINA appt    04/21/21   Attended follow up appt with PCP/urology   Has upcoming with cards/nephrology         COMPLETED: Understands red flags post discharge. 04/12/21   Reports pain managed with Tylenol 500 mg   Denies s/sx infection, chest pain, fever   No lifting > 10 pounds for 6 weeks   Tolerating PO   No bowel/bladder concerns   Daughter reports patient seems \"wobbly\"   Fall risk   Endorses LE edema-reviewed the importance of elevation, mobility   Reviewed I.S. to improve lung function   Reviewed DVT/PE i.e calf pain/swelling, increased SOB   Daughter will purchase pulse oximetry    04/21/21   Reports feeling well   Oxygen 94-95%   Continued compliance with I.S.   Able to pull 1000 ml   Denies pain   Tolerating PO   No lifting > 10 pounds x 6 weeks   Denies falls            Patient has Care Transition Nurse's contact information for any further questions, concerns, or needs.   Patients upcoming visits:    Future Appointments   Date Time Provider Seth Skelton   5/11/2021  9:45 AM Pieter Eisenmenger, MD [facility] BS AMB   5/24/2021 10:00 AM Alfredo Vincent MD [facility] BS AMB

## 2021-05-11 ENCOUNTER — OFFICE VISIT (OUTPATIENT)
Dept: CARDIOLOGY CLINIC | Age: 76
End: 2021-05-11
Payer: MEDICARE

## 2021-05-11 VITALS
HEART RATE: 69 BPM | SYSTOLIC BLOOD PRESSURE: 128 MMHG | BODY MASS INDEX: 33.48 KG/M2 | HEIGHT: 72 IN | WEIGHT: 247.2 LBS | RESPIRATION RATE: 18 BRPM | OXYGEN SATURATION: 96 % | DIASTOLIC BLOOD PRESSURE: 66 MMHG

## 2021-05-11 DIAGNOSIS — I25.10 CORONARY ARTERY DISEASE INVOLVING NATIVE CORONARY ARTERY OF NATIVE HEART WITHOUT ANGINA PECTORIS: ICD-10-CM

## 2021-05-11 DIAGNOSIS — I10 ESSENTIAL HYPERTENSION: Primary | Chronic | ICD-10-CM

## 2021-05-11 DIAGNOSIS — E78.2 MIXED HYPERLIPIDEMIA: Chronic | ICD-10-CM

## 2021-05-11 PROCEDURE — 3288F FALL RISK ASSESSMENT DOCD: CPT | Performed by: INTERNAL MEDICINE

## 2021-05-11 PROCEDURE — 93000 ELECTROCARDIOGRAM COMPLETE: CPT | Performed by: INTERNAL MEDICINE

## 2021-05-11 PROCEDURE — 1100F PTFALLS ASSESS-DOCD GE2>/YR: CPT | Performed by: INTERNAL MEDICINE

## 2021-05-11 PROCEDURE — 99214 OFFICE O/P EST MOD 30 MIN: CPT | Performed by: INTERNAL MEDICINE

## 2021-05-11 PROCEDURE — G8536 NO DOC ELDER MAL SCRN: HCPCS | Performed by: INTERNAL MEDICINE

## 2021-05-11 PROCEDURE — G8427 DOCREV CUR MEDS BY ELIG CLIN: HCPCS | Performed by: INTERNAL MEDICINE

## 2021-05-11 PROCEDURE — G9717 DOC PT DX DEP/BP F/U NT REQ: HCPCS | Performed by: INTERNAL MEDICINE

## 2021-05-11 PROCEDURE — G8752 SYS BP LESS 140: HCPCS | Performed by: INTERNAL MEDICINE

## 2021-05-11 PROCEDURE — G8754 DIAS BP LESS 90: HCPCS | Performed by: INTERNAL MEDICINE

## 2021-05-11 PROCEDURE — G8417 CALC BMI ABV UP PARAM F/U: HCPCS | Performed by: INTERNAL MEDICINE

## 2021-05-11 NOTE — PROGRESS NOTES
1. Have you been to the ER, urgent care clinic since your last visit? Hospitalized since your last visit? No.    2. Have you seen or consulted any other health care providers outside of the [address] [place] [address] since your last visit? Include any pap smears or colon screening. Seen by [facility] for urology for kidney cancer.         Chief Complaint   Patient presents with    Coronary Artery Disease     6 month-  some swelling in ankles and legs    Hypertension

## 2021-05-11 NOTE — PROGRESS NOTES
Figueroa Rivas MD          NAME:  Adiel Morgan   :   1945   MRN:   [id number]   PCP:  Annamarie Peoples MD           Subjective: The patient is a 66y.o. year old male  who returns for a routine follow-up. Since the last visit, patient reports no change in exercise tolerance, chest pain, edema, medication intolerance, palpitations, shortness of breath, PND/orthopnea wheezing, sputum, syncope, dizziness or light headedness. Doing well. Past Medical History:   Diagnosis Date    Abnormal stress echo 2015    Anemia NEC 2013    Anxiety     Borderline diabetes     CAD (coronary artery disease)     cath [facility]) revealed 20%RCA and 50%2nd diagonal    Calculus of kidney     Chronic kidney disease, stage III (moderate) (HCC) 10/11/2009    30% kidney function    COPD, mild (HCC)     Followed by pulmonary associates    DJD (degenerative joint disease)     Fatty liver     GERD (gastroesophageal reflux disease) 10/11/2009    Gout     Hypertension     MELODY on CPAP 10/11/2009    nasal pillows; pressure set at 10-11 -     Peripheral neuropathy 10/11/2009    bilateral feet (numbness)    Renal cell cancer, right ([address] 2021    RLS (restless legs syndrome) 10/11/2009    S/P coronary artery stent placement 2015    PCI./MALI to LCx, 2019 PCI/MALI Prox LAD (RCA 40-50% lesions)        ICD-10-CM ICD-9-CM    1. Essential hypertension  I10 401.9 AMB POC EKG ROUTINE W/ 12 LEADS, INTER & REP   2. Coronary artery disease involving native coronary artery of native heart without angina pectoris  I25.10 414.01 AMB POC EKG ROUTINE W/ 12 LEADS, INTER & REP   3.  Mixed hyperlipidemia  E78.2 272.2 AMB POC EKG ROUTINE W/ 12 LEADS, INTER & REP      Social History     Tobacco Use    Smoking status: Former Smoker     Packs/day: 1.00     Years: 10.00     Pack years: 10.00     Types: Cigarettes     Quit date: 1968     Years since quittin.3    Smokeless tobacco: Never Used   Substance Use Topics    Alcohol use: No     Alcohol/week: 0.0 standard drinks      Family History   Problem Relation Age of Onset    Heart Disease Father     Hypertension Father     Other Father         abdominal aneurysm     Dementia Mother     Alzheimer Mother     Heart Disease Brother     Heart Attack Brother     Heart Disease Maternal Grandmother     Heart Disease Paternal Grandmother     Heart Attack Paternal Grandmother     Heart Disease Paternal Grandfather     Heart Attack Paternal Grandfather     Elevated Lipids Son     Elevated Lipids Daughter     Cancer Neg Hx     Diabetes Neg Hx     Stroke Neg Hx         Review of Systems  Cardiovascular: Negative except as noted in HPI      Objective:       Vitals:    05/11/21 0941   BP: 128/66   Pulse: 69   Resp: 18   SpO2: 96%   Weight: 247 lb 3.2 oz (112.1 kg)   Height: 6' (1.829 m)    Body mass index is 33.53 kg/m². General PE  Mental Status - Alert. General Appearance - Not in acute distress. Chest and Lung Exam   Inspection: Accessory muscles - No use of accessory muscles in breathing. Auscultation:   Breath sounds: - Normal.    Cardiovascular   Inspection: Jugular vein - Bilateral - Inspection Normal.  Palpation/Percussion:   Apical Impulse: - Normal.  Auscultation: Rhythm - Regular. Heart Sounds - S1 WNL and S2 WNL. No S3 or S4. Murmurs & Other Heart Sounds: Auscultation of the heart reveals - No Murmurs. Peripheral Vascular   Upper Extremity: Inspection - Bilateral - No Cyanotic nailbeds or Digital clubbing. Lower Extremity:   Palpation: Edema - Bilateral - No edema. Data Review:     EKG -  EKG: unchanged from previous tracings, normal sinus rhythm, RBBB.     LABS- @brieflabs@      Allergies reviewed  Allergies   Allergen Reactions    Bactrim [Sulfamethoxazole-Trimethoprim] Hives    Codeine Itching    Lisinopril (Bulk) Cough    Neurontin [Gabapentin] Other (comments)     drowsy    Zostavax [Zoster Vaccine Live (Pf)] Rash     See 9/10/13 visit    Penicillins Hives     Pt states he has taken Keflex before without problems       Medications reviewed  Current Outpatient Medications   Medication Sig    metoprolol succinate (TOPROL-XL) 25 mg XL tablet TAKE 1 TABLET BY MOUTH EVERY DAY    fluocinoNIDE (LIDEX) 0.05 % external solution APPLY TO ITCHY SPOTS ON SCALP AS NEEDED FOR FLARE    acetaminophen (Tylenol Extra Strength) 500 mg tablet Take 1,000 mg by mouth every six (6) hours as needed for Pain.  senna-docusate (Senna with Docusate Sodium) 8.6-50 mg per tablet Take 1 Tab by mouth two (2) times a day.  albuterol (ProAir HFA) 90 mcg/actuation inhaler Take 1 Puff by inhalation every four (4) hours.  umeclidinium-vilanteroL (Anoro Ellipta) 62.5-25 mcg/actuation inhaler Take 1 Puff by inhalation daily.  rOPINIRole (Requip) 0.5 mg tablet Take 0.5 mg by mouth nightly.  ketoconazole (NIZORAL) 2 % shampoo Apply 1 mL to affected area daily as needed for Itching.  atorvastatin (Lipitor) 80 mg tablet Take 80 mg by mouth nightly.  citalopram (CELEXA) 40 mg tablet Take 40 mg by mouth every evening.  telmisartan (MICARDIS) 40 mg tablet Take 40 mg by mouth nightly.  pantoprazole (PROTONIX) 40 mg tablet TAKE 1 TABLET BY MOUTH EVERY DAY    isosorbide mononitrate ER (IMDUR) 30 mg tablet Take 0.5 Tabs by mouth daily.  nitroglycerin (NITROSTAT) 0.4 mg SL tablet TAKE 1 TABLET BY SUBLINGUAL ROUTE EVERY 5 MINUTES AS NEEDED FOR CHEST PAIN.  VITAMIN D3 2,000 unit cap capsule Take 2,000 Units by mouth daily.  OXYGEN-AIR DELIVERY SYSTEMS (HORIZON NASAL CPAP SYSTEM) by Does Not Apply route.  amLODIPine (NORVASC) 5 mg tablet Take 5 mg by mouth daily.  GLUCOSAMINE HCL/CHONDR PETERSEN A NA (GLUCOSAMINE-CHONDROITIN) 750-600 mg Tab Take 1 Tab by mouth daily. Brand: Move Free    aspirin delayed-release 81 mg tablet Take 81 mg by mouth nightly.  allopurinol (ZYLOPRIM) 100 mg tablet Take 100 mg by mouth every morning.     MULTIVITS W-FE,OTHER MIN (CENTRUM PO) Take 1 Tab by mouth daily. No current facility-administered medications for this visit. Assessment:       ICD-10-CM ICD-9-CM    1. Essential hypertension  I10 401.9 AMB POC EKG ROUTINE W/ 12 LEADS, INTER & REP   2. Coronary artery disease involving native coronary artery of native heart without angina pectoris  I25.10 414.01 AMB POC EKG ROUTINE W/ 12 LEADS, INTER & REP   3. Mixed hyperlipidemia  E78.2 272.2 AMB POC EKG ROUTINE W/ 12 LEADS, INTER & REP        Orders Placed This Encounter    AMB POC EKG ROUTINE W/ 12 LEADS, INTER & REP     Order Specific Question:   Reason for Exam:     Answer:   routine       Plan:     No angina. BP at target. Lipids per PCP. EKG unchanged.   F/U 1 yr    Kelsey Cruz MD

## 2021-05-11 NOTE — LETTER
5/11/2021 Patient: Marcelle Severs YOB: 1945 Date of Visit: 5/11/2021 Derrell Guardado MD 
[address] [facility] [address] [address] [address] [employer] Dear Derrell Guardado MD, Thank you for referring Mr. Marcelle Severs to [address] [place] [address] for evaluation. My notes for this consultation are attached. If you have questions, please do not hesitate to call me. I look forward to following your patient along with you.  
 
 
Sincerely, 
 
Noelle Javed MD

## 2021-05-24 ENCOUNTER — OFFICE VISIT (OUTPATIENT)
Dept: INTERNAL MEDICINE CLINIC | Age: 76
End: 2021-05-24

## 2021-05-24 DIAGNOSIS — Z13.31 SCREENING FOR DEPRESSION: ICD-10-CM

## 2021-05-24 DIAGNOSIS — Z86.73 HX OF COMPLETED STROKE: ICD-10-CM

## 2021-05-24 DIAGNOSIS — G47.33 OBSTRUCTIVE SLEEP APNEA: ICD-10-CM

## 2021-05-24 DIAGNOSIS — E11.42 DIABETIC PERIPHERAL NEUROPATHY ASSOCIATED WITH TYPE 2 DIABETES MELLITUS (HCC): ICD-10-CM

## 2021-05-24 DIAGNOSIS — I25.10 CORONARY ARTERY DISEASE INVOLVING NATIVE CORONARY ARTERY OF NATIVE HEART WITHOUT ANGINA PECTORIS: ICD-10-CM

## 2021-05-24 DIAGNOSIS — E78.2 MIXED HYPERLIPIDEMIA: ICD-10-CM

## 2021-05-24 DIAGNOSIS — N18.4 CKD (CHRONIC KIDNEY DISEASE) STAGE 4, GFR 15-29 ML/MIN (HCC): ICD-10-CM

## 2021-05-24 DIAGNOSIS — I10 ESSENTIAL HYPERTENSION: Primary | ICD-10-CM

## 2021-05-24 DIAGNOSIS — Z00.00 MEDICARE ANNUAL WELLNESS VISIT, SUBSEQUENT: ICD-10-CM

## 2021-06-27 RX ORDER — PANTOPRAZOLE SODIUM 40 MG/1
TABLET, DELAYED RELEASE ORAL
Qty: 90 TABLET | Refills: 1 | Status: SHIPPED | OUTPATIENT
Start: 2021-06-27 | End: 2021-12-20

## 2021-07-22 ENCOUNTER — APPOINTMENT (OUTPATIENT)
Dept: CT IMAGING | Age: 76
End: 2021-07-22
Attending: EMERGENCY MEDICINE
Payer: MEDICARE

## 2021-07-22 ENCOUNTER — HOSPITAL ENCOUNTER (EMERGENCY)
Age: 76
Discharge: HOME | End: 2021-07-22
Attending: EMERGENCY MEDICINE
Payer: MEDICARE

## 2021-07-22 VITALS
RESPIRATION RATE: 16 BRPM | HEIGHT: 72 IN | DIASTOLIC BLOOD PRESSURE: 60 MMHG | SYSTOLIC BLOOD PRESSURE: 127 MMHG | WEIGHT: 249.78 LBS | BODY MASS INDEX: 33.83 KG/M2 | OXYGEN SATURATION: 97 % | HEART RATE: 66 BPM | TEMPERATURE: 98.6 F

## 2021-07-22 DIAGNOSIS — R10.9 FLANK PAIN: Primary | ICD-10-CM

## 2021-07-22 LAB
ALBUMIN SERPL-MCNC: 3.5 G/DL (ref 3.5–5)
ALBUMIN/GLOB SERPL: 0.9 {RATIO} (ref 1.1–2.2)
ALP SERPL-CCNC: 112 U/L (ref 45–117)
ALT SERPL-CCNC: 25 U/L (ref 12–78)
ANION GAP SERPL CALC-SCNC: 7 MMOL/L (ref 5–15)
APPEARANCE UR: CLEAR
AST SERPL-CCNC: 14 U/L (ref 15–37)
BACTERIA URNS QL MICRO: NEGATIVE /HPF
BILIRUB SERPL-MCNC: 0.6 MG/DL (ref 0.2–1)
BILIRUB UR QL: NEGATIVE
BUN SERPL-MCNC: 26 MG/DL (ref 6–20)
BUN/CREAT SERPL: 10 (ref 12–20)
CALCIUM SERPL-MCNC: 8.9 MG/DL (ref 8.5–10.1)
CHLORIDE SERPL-SCNC: 110 MMOL/L (ref 97–108)
CO2 SERPL-SCNC: 27 MMOL/L (ref 21–32)
COLOR UR: ABNORMAL
CREAT SERPL-MCNC: 2.56 MG/DL (ref 0.7–1.3)
EPITH CASTS URNS QL MICRO: ABNORMAL /LPF
ERYTHROCYTE [DISTWIDTH] IN BLOOD BY AUTOMATED COUNT: 15.7 % (ref 11.5–14.5)
GLOBULIN SER CALC-MCNC: 3.7 G/DL (ref 2–4)
GLUCOSE SERPL-MCNC: 101 MG/DL (ref 65–100)
GLUCOSE UR STRIP.AUTO-MCNC: NEGATIVE MG/DL
HCT VFR BLD AUTO: 32.6 % (ref 36.6–50.3)
HGB BLD-MCNC: 10 G/DL (ref 12.1–17)
HGB UR QL STRIP: NEGATIVE
HYALINE CASTS URNS QL MICRO: ABNORMAL /LPF (ref 0–5)
KETONES UR QL STRIP.AUTO: NEGATIVE MG/DL
LEUKOCYTE ESTERASE UR QL STRIP.AUTO: NEGATIVE
LIPASE SERPL-CCNC: 236 U/L (ref 73–393)
MCH RBC QN AUTO: 30.1 PG (ref 26–34)
MCHC RBC AUTO-ENTMCNC: 30.7 G/DL (ref 30–36.5)
MCV RBC AUTO: 98.2 FL (ref 80–99)
NITRITE UR QL STRIP.AUTO: NEGATIVE
NRBC # BLD: 0 K/UL (ref 0–0.01)
NRBC BLD-RTO: 0 PER 100 WBC
PH UR STRIP: 5.5 [PH] (ref 5–8)
PLATELET # BLD AUTO: 214 K/UL (ref 150–400)
PMV BLD AUTO: 9.6 FL (ref 8.9–12.9)
POTASSIUM SERPL-SCNC: 4.5 MMOL/L (ref 3.5–5.1)
PROT SERPL-MCNC: 7.2 G/DL (ref 6.4–8.2)
PROT UR STRIP-MCNC: 100 MG/DL
RBC # BLD AUTO: 3.32 M/UL (ref 4.1–5.7)
RBC #/AREA URNS HPF: ABNORMAL /HPF (ref 0–5)
SODIUM SERPL-SCNC: 144 MMOL/L (ref 136–145)
SP GR UR REFRACTOMETRY: 1.02 (ref 1–1.03)
UROBILINOGEN UR QL STRIP.AUTO: 0.2 EU/DL (ref 0.2–1)
WBC # BLD AUTO: 8.3 K/UL (ref 4.1–11.1)
WBC URNS QL MICRO: ABNORMAL /HPF (ref 0–4)

## 2021-07-22 PROCEDURE — 74011250636 HC RX REV CODE- 250/636: Performed by: EMERGENCY MEDICINE

## 2021-07-22 PROCEDURE — 99283 EMERGENCY DEPT VISIT LOW MDM: CPT

## 2021-07-22 PROCEDURE — 85027 COMPLETE CBC AUTOMATED: CPT

## 2021-07-22 PROCEDURE — 96374 THER/PROPH/DIAG INJ IV PUSH: CPT

## 2021-07-22 PROCEDURE — 81001 URINALYSIS AUTO W/SCOPE: CPT

## 2021-07-22 PROCEDURE — 74011000250 HC RX REV CODE- 250: Performed by: EMERGENCY MEDICINE

## 2021-07-22 PROCEDURE — 83690 ASSAY OF LIPASE: CPT

## 2021-07-22 PROCEDURE — 36415 COLL VENOUS BLD VENIPUNCTURE: CPT

## 2021-07-22 PROCEDURE — 80053 COMPREHEN METABOLIC PANEL: CPT

## 2021-07-22 PROCEDURE — 74176 CT ABD & PELVIS W/O CONTRAST: CPT

## 2021-07-22 RX ORDER — METHOCARBAMOL 750 MG/1
750 TABLET, FILM COATED ORAL
Qty: 21 TABLET | Refills: 0 | Status: SHIPPED | OUTPATIENT
Start: 2021-07-22 | End: 2021-09-16

## 2021-07-22 RX ORDER — LIDOCAINE 4 G/100G
1 PATCH TOPICAL
Status: DISCONTINUED | OUTPATIENT
Start: 2021-07-22 | End: 2021-07-22 | Stop reason: HOSPADM

## 2021-07-22 RX ORDER — KETOROLAC TROMETHAMINE 30 MG/ML
15 INJECTION, SOLUTION INTRAMUSCULAR; INTRAVENOUS
Status: COMPLETED | OUTPATIENT
Start: 2021-07-22 | End: 2021-07-22

## 2021-07-22 RX ORDER — OXYCODONE HYDROCHLORIDE 5 MG/1
2.5 TABLET ORAL
Qty: 6 TABLET | Refills: 0 | Status: SHIPPED | OUTPATIENT
Start: 2021-07-22 | End: 2021-07-25

## 2021-07-22 RX ADMIN — KETOROLAC TROMETHAMINE 15 MG: 30 INJECTION, SOLUTION INTRAMUSCULAR; INTRAVENOUS at 17:06

## 2021-07-22 NOTE — ED PROVIDER NOTES
EMERGENCY DEPARTMENT HISTORY AND PHYSICAL EXAM      Date: 7/22/2021  Patient Name: Mariama Smith    History of Presenting Illness     Chief Complaint   Patient presents with    Flank Pain     right side x 3 days. Pt states 'I think its a kidney stone\"  pt reports having 1/2 kidney removed in April on same side. Denies any urinary sx. History Provided By: Patient    HPI: Mariama Smith, 66 y.o. male presents to the ED with cc of flank pain. 24-year-old male with a past medical history of previous partial nephrectomy for renal cancer presents emergency department with a chief complaint of right flank pain. Patient reports sharp right flank pain that began 3 ys ago. It is intermittent, and worse with movement. Does not radiate. Is located in his right lower flank. No rash. No trauma. Denies dysuria or hematuria. Denies fever or chills. Denies chest pain, shortness of breath or abdominal pain. No bowel or bladder incontinence, no leg weakness. There are no other complaints, changes, or physical findings at this time. PCP: Shakir Mayberry MD    No current facility-administered medications on file prior to encounter. Current Outpatient Medications on File Prior to Encounter   Medication Sig Dispense Refill    pantoprazole (PROTONIX) 40 mg tablet TAKE 1 TABLET BY MOUTH EVERY DAY 90 Tablet 1    metoprolol succinate (TOPROL-XL) 25 mg XL tablet TAKE 1 TABLET BY MOUTH EVERY DAY 90 Tab 1    fluocinoNIDE (LIDEX) 0.05 % external solution APPLY TO ITCHY SPOTS ON SCALP AS NEEDED FOR FLARE      acetaminophen (Tylenol Extra Strength) 500 mg tablet Take 1,000 mg by mouth every six (6) hours as needed for Pain.  senna-docusate (Senna with Docusate Sodium) 8.6-50 mg per tablet Take 1 Tab by mouth two (2) times a day. 60 Tab 1    albuterol (ProAir HFA) 90 mcg/actuation inhaler Take 1 Puff by inhalation every four (4) hours.       umeclidinium-vilanteroL (Anoro Ellipta) 62.5-25 mcg/actuation inhaler Take 1 Puff by inhalation daily.  rOPINIRole (Requip) 0.5 mg tablet Take 0.5 mg by mouth nightly.  ketoconazole (NIZORAL) 2 % shampoo Apply 1 mL to affected area daily as needed for Itching.  atorvastatin (Lipitor) 80 mg tablet Take 80 mg by mouth nightly.  citalopram (CELEXA) 40 mg tablet Take 40 mg by mouth every evening.  telmisartan (MICARDIS) 40 mg tablet Take 40 mg by mouth nightly.  isosorbide mononitrate ER (IMDUR) 30 mg tablet Take 0.5 Tabs by mouth daily. 45 Tab 3    nitroglycerin (NITROSTAT) 0.4 mg SL tablet TAKE 1 TABLET BY SUBLINGUAL ROUTE EVERY 5 MINUTES AS NEEDED FOR CHEST PAIN. 1 Bottle 0    VITAMIN D3 2,000 unit cap capsule Take 2,000 Units by mouth daily. 2    OXYGEN-AIR DELIVERY SYSTEMS (HORIZON NASAL CPAP SYSTEM) by Does Not Apply route.  amLODIPine (NORVASC) 5 mg tablet Take 5 mg by mouth daily.  GLUCOSAMINE HCL/CHONDR PETERSEN A NA (GLUCOSAMINE-CHONDROITIN) 750-600 mg Tab Take 1 Tab by mouth daily. Brand: Move Free      aspirin delayed-release 81 mg tablet Take 81 mg by mouth nightly.  allopurinol (ZYLOPRIM) 100 mg tablet Take 100 mg by mouth every morning.  MULTIVITS W-FE,OTHER MIN (CENTRUM PO) Take 1 Tab by mouth daily.          Past History     Past Medical History:  Past Medical History:   Diagnosis Date    Abnormal stress echo 5/12/2015    Anemia NEC 03/2013    Anxiety     Borderline diabetes     CAD (coronary artery disease) 2009    c[facility]) revealed 20%RCA and 50%2nd diagonal    Calculus of kidney     Chronic kidney disease, stage III (moderate) (HCC) 10/11/2009    30% kidney function    COPD, mild (HCC)     Followed by pulmonary associates    DJD (degenerative joint disease)     Fatty liver     GERD (gastroesophageal reflux disease) 10/11/2009    Gout     Hypertension     MELODY on CPAP 10/11/2009    nasal pillows; pressure set at 10-11 -     Peripheral neuropathy 10/11/2009    bilateral feet (numbness)    Renal cell cancer, right ([address] 2021    RLS (restless legs syndrome) 10/11/2009    S/P coronary artery stent placement 2015    PCI./MALI to LCx, 2019 PCI/MALI Prox LAD (RCA 40-50% lesions)       Past Surgical History:  Past Surgical History:   Procedure Laterality Date    HX BUNIONECTOMY      HX CAROTID ENDARTERECTOMY Left 2020    Followed by Dr. Amor Marina Do [facility] [id number]  , 7/17    x2    HX HERNIA REPAIR      [place]aney/umbilical    HX KNEE REPLACEMENT Left 2011    HX ORTHOPAEDIC      left shoulder manipulation    HX UROLOGICAL      basket extraction of kidney stones    TN COLONOSCOPY FLX DX W/COLLJ SPEC WHEN PFRMD  2010         TN COLSC FLX W/RMVL OF TUMOR POLYP LESION SNARE TQ  2014            Family History:  Family History   Problem Relation Age of Onset    Heart Disease Father     Hypertension Father     Other Father         abdominal aneurysm     Dementia Mother     Alzheimer Mother     Heart Disease Brother     Heart Attack Brother     Heart Disease Maternal Grandmother     Heart Disease Paternal Grandmother     Heart Attack Paternal Grandmother     Heart Disease Paternal Grandfather     Heart Attack Paternal Grandfather     Elevated Lipids Son     Elevated Lipids Daughter     Cancer Neg Hx     Diabetes Neg Hx     Stroke Neg Hx        Social History:  Social History     Tobacco Use    Smoking status: Former Smoker     Packs/day: 1.00     Years: 10.00     Pack years: 10.00     Types: Cigarettes     Quit date: 1968     Years since quittin.5    Smokeless tobacco: Never Used   Vaping Use    Vaping Use: Never used   Substance Use Topics    Alcohol use: No     Alcohol/week: 0.0 standard drinks    Drug use: No       Allergies:   Allergies   Allergen Reactions    Bactrim [Sulfamethoxazole-Trimethoprim] Hives    Codeine Itching    Lisinopril (Bulk) Cough    Neurontin [Gabapentin] Other (comments)     drowsy    Zostavax [Zoster Vaccine Live (Pf)] Rash     See 9/10/13 visit    Penicillins Hives     Pt states he has taken Keflex before without problems         Review of Systems   Review of Systems   Constitutional: Negative for fever. HENT: Negative for voice change. Eyes: Negative for pain and redness. Respiratory: Negative for cough and chest tightness. Cardiovascular: Negative for chest pain and leg swelling. Gastrointestinal: Negative for abdominal pain, diarrhea, nausea and vomiting. Genitourinary: Positive for flank pain. Negative for hematuria. Musculoskeletal: Negative for gait problem. Skin: Negative for color change, pallor and rash. Neurological: Negative for facial asymmetry, weakness and headaches. Hematological: Does not bruise/bleed easily. Psychiatric/Behavioral: Negative for behavioral problems. All other systems reviewed and are negative. Physical Exam   Physical Exam  Vitals and nursing note reviewed. Constitutional:       Comments: 18-year-old male, resting in bed, no distress   HENT:      Head: Normocephalic. Right Ear: External ear normal.      Left Ear: External ear normal.      Nose: Nose normal.   Eyes:      Conjunctiva/sclera: Conjunctivae normal.   Cardiovascular:      Rate and Rhythm: Normal rate and regular rhythm. Heart sounds: No murmur heard. No friction rub. No gallop. Pulmonary:      Effort: Pulmonary effort is normal.      Breath sounds: Normal breath sounds. No wheezing, rhonchi or rales. Abdominal:      Palpations: Abdomen is soft. Tenderness: There is no abdominal tenderness. Musculoskeletal:         General: Normal range of motion. Comments: Tenderness palpation along right SI joint   Skin:     General: Skin is warm. Capillary Refill: Capillary refill takes less than 2 seconds. Neurological:      Mental Status: He is alert. Mental status is at baseline.    Psychiatric:         Mood and Affect: Mood normal. Behavior: Behavior normal.         Diagnostic Study Results     Labs -     Recent Results (from the past 12 hour(s))   CBC W/O DIFF    Collection Time: 07/22/21  3:12 PM   Result Value Ref Range    WBC 8.3 4.1 - 11.1 K/uL    RBC 3.32 (L) 4.10 - 5.70 M/uL    HGB 10.0 (L) 12.1 - 17.0 g/dL    HCT 32.6 (L) 36.6 - 50.3 %    MCV 98.2 80.0 - 99.0 FL    MCH 30.1 26.0 - 34.0 PG    MCHC 30.7 30.0 - 36.5 g/dL    RDW 15.7 (H) 11.5 - 14.5 %    PLATELET 600 693 - 137 K/uL    MPV 9.6 8.9 - 12.9 FL    NRBC 0.0 0  WBC    ABSOLUTE NRBC 0.00 0.00 - 8.03 K/uL   METABOLIC PANEL, COMPREHENSIVE    Collection Time: 07/22/21  3:12 PM   Result Value Ref Range    Sodium 144 136 - 145 mmol/L    Potassium 4.5 3.5 - 5.1 mmol/L    Chloride 110 (H) 97 - 108 mmol/L    CO2 27 21 - 32 mmol/L    Anion gap 7 5 - 15 mmol/L    Glucose 101 (H) 65 - 100 mg/dL    BUN 26 (H) 6 - 20 MG/DL    Creatinine 2.56 (H) 0.70 - 1.30 MG/DL    BUN/Creatinine ratio 10 (L) 12 - 20      GFR est AA 30 (L) >60 ml/min/1.73m2    GFR est non-AA 24 (L) >60 ml/min/1.73m2    Calcium 8.9 8.5 - 10.1 MG/DL    Bilirubin, total 0.6 0.2 - 1.0 MG/DL    ALT (SGPT) 25 12 - 78 U/L    AST (SGOT) 14 (L) 15 - 37 U/L    Alk.  phosphatase 112 45 - 117 U/L    Protein, total 7.2 6.4 - 8.2 g/dL    Albumin 3.5 3.5 - 5.0 g/dL    Globulin 3.7 2.0 - 4.0 g/dL    A-G Ratio 0.9 (L) 1.1 - 2.2     LIPASE    Collection Time: 07/22/21  3:12 PM   Result Value Ref Range    Lipase 236 73 - 393 U/L   URINALYSIS W/ RFLX MICROSCOPIC    Collection Time: 07/22/21  3:41 PM   Result Value Ref Range    Color YELLOW/STRAW      Appearance CLEAR CLEAR      Specific gravity 1.021 1.003 - 1.030      pH (UA) 5.5 5.0 - 8.0      Protein 100 (A) NEG mg/dL    Glucose Negative NEG mg/dL    Ketone Negative NEG mg/dL    Bilirubin Negative NEG      Blood Negative NEG      Urobilinogen 0.2 0.2 - 1.0 EU/dL    Nitrites Negative NEG      Leukocyte Esterase Negative NEG      WBC 0-4 0 - 4 /hpf    RBC 0-5 0 - 5 /hpf    Epithelial cells FEW FEW /lpf    Bacteria Negative NEG /hpf    Hyaline cast 2-5 0 - 5 /lpf       Radiologic Studies -   CT ABD PELV WO CONT   Final Result   Postprocedural changes related to partial nephrectomy on the right. Dystrophic   calcifications and fat necrosis in the right renal fossa. No definite recurrent   mass lesion. There are bilateral renal cysts. CT Results  (Last 48 hours)               07/22/21 1623  CT ABD PELV WO CONT Final result    Impression:  Postprocedural changes related to partial nephrectomy on the right. Dystrophic   calcifications and fat necrosis in the right renal fossa. No definite recurrent   mass lesion. There are bilateral renal cysts. Narrative:  CLINICAL HISTORY: R side flank pain, h/o partial nephrectomy, r/o   nephrolithiasis    INDICATION: R side flank pain, h/o partial nephrectomy, r/o nephrolithiasis   COMPARISON: 12/18/2020   CONTRAST:  None. TECHNIQUE:    Thin axial images were obtained through the abdomen and pelvis. Coronal and   sagittal reformats were generated. Oral contrast was not administered. CT dose   reduction was achieved through use of a standardized protocol tailored for this   examination and automatic exposure control for dose modulation. The absence of intravenous contrast material reduces the sensitivity for   evaluation of visceral organs and vasculature including presence of small mass   lesions, hemodynamically significant stenoses, dissections, mucosal   abnormalities etc.       FINDINGS:    LUNG BASES: Coronary vascular calcifications. INCIDENTALLY IMAGED HEART AND MEDIASTINUM: Normal heart size. Coronary   calcification. No pericardial effusion. LIVER: Mildly nodular liver contour. GALLBLADDER: Unremarkable. SPLEEN: Unremarkable   PANCREAS: No mass or ductal dilatation. ADRENALS: Unremarkable. KIDNEYS/URETERS: Bilateral renal cysts. Partial right lower pole nephrectomy.    Renal cortical thinning on the right and on the left. Dystrophic renal   calcifications on the right. Perinephric stranding on the right. No hydroureter. Area of fatty necrosis in the right paracolic gutter is new compared to prior   study. STOMACH: Unremarkable. SMALL BOWEL: No dilatation or wall thickening. COLON: Diverticulosis without diverticulitis. APPENDIX: Unremarkable. PERITONEUM: Minimal irregular fatty necrosis in the right lower quadrant. RETROPERITONEUM: No lymphadenopathy or aortic aneurysm. REPRODUCTIVE ORGANS: Unremarkable. URINARY BLADDER: No acute lesion. The bladder is not significantly distended. BONES: Degenerative changes. CXR Results  (Last 48 hours)    None          Medical Decision Making   I am the first provider for this patient. I reviewed the vital signs, available nursing notes, past medical history, past surgical history, family history and social history. Vital Signs-Reviewed the patient's vital signs. Patient Vitals for the past 12 hrs:   Temp Pulse Resp BP SpO2   07/22/21 1545    127/60 97 %   07/22/21 1543     96 %   07/22/21 1542  77      07/22/21 1447 98.6 °F (37 °C)  16 139/62 97 %       Records Reviewed: Nursing Notes, Old Medical Records and Previous Laboratory Studies    Provider Notes (Medical Decision Making):     64-year-old male presents emergency department with achievement of left flank pain. Vitals are reassuring. Differentials broad, includes musculoskeletal causes, doubt pyelonephritis, nephrolithiasis, postoperative hematoma, mass. Patient's labs are baseline urine without infection. Discussed with patient his wife his creatinine is improving. Will give 1 dose of Toradol and lidocaine patch. CT without contrast shows no evidence of nephrolithiasis. Given his CKD will discharge with short course of oxycodone and Robaxin. Recommend PCP follow-up and return precautions. ED Course:   Initial assessment performed.  The patients presenting problems have been discussed, and they are in agreement with the care plan formulated and outlined with them. I have encouraged them to ask questions as they arise throughout their visit. ED Course as of 1817   Thu  CT shows postop changes. [MB]      ED Course User Index  [MB] Mayo Apgar, MD Marzetta Neve, MD      Disposition:    Discharged    DISCHARGE PLAN:  1. Discharge Medication List as of 2021  5:48 PM      START taking these medications    Details   oxyCODONE IR (Roxicodone) 5 mg immediate release tablet Take 0.5 Tablets by mouth every six (6) hours as needed for Pain for up to 3 days. Max Daily Amount: 10 mg., Normal, Disp-6 Tablet, R-0      methocarbamoL (ROBAXIN) 750 mg tablet Take 1 Tablet by mouth three (3) times daily as needed for Muscle Spasm(s). , Normal, Disp-21 Tablet, R-0         CONTINUE these medications which have NOT CHANGED    Details   pantoprazole (PROTONIX) 40 mg tablet TAKE 1 TABLET BY MOUTH EVERY DAY, Normal, Disp-90 Tablet, R-1      metoprolol succinate (TOPROL-XL) 25 mg XL tablet TAKE 1 TABLET BY MOUTH EVERY DAY, Normal, Disp-90 Tab, R-1      fluocinoNIDE (LIDEX) 0.05 % external solution APPLY TO ITCHY SPOTS ON SCALP AS NEEDED FOR FLARE, Historical Med      acetaminophen (Tylenol Extra Strength) 500 mg tablet Take 1,000 mg by mouth every six (6) hours as needed for Pain., Historical Med      senna-docusate (Senna with Docusate Sodium) 8.6-50 mg per tablet Take 1 Tab by mouth two (2) times a day., Normal, Disp-60 Tab, R-1      albuterol (ProAir HFA) 90 mcg/actuation inhaler Take 1 Puff by inhalation every four (4) hours. , Historical Med      umeclidinium-vilanteroL (Anoro Ellipta) 62.5-25 mcg/actuation inhaler Take 1 Puff by inhalation daily. , Historical Med      rOPINIRole (Requip) 0.5 mg tablet Take 0.5 mg by mouth nightly., Historical Med      ketoconazole (NIZORAL) 2 % shampoo Apply 1 mL to affected area daily as needed for Itching., Historical Med      atorvastatin (Lipitor) 80 mg tablet Take 80 mg by mouth nightly., Historical Med      citalopram (CELEXA) 40 mg tablet Take 40 mg by mouth every evening., Historical Med      telmisartan (MICARDIS) 40 mg tablet Take 40 mg by mouth nightly., Historical Med      isosorbide mononitrate ER (IMDUR) 30 mg tablet Take 0.5 Tabs by mouth daily. , Normal, Disp-45 Tab,R-3      nitroglycerin (NITROSTAT) 0.4 mg SL tablet TAKE 1 TABLET BY SUBLINGUAL ROUTE EVERY 5 MINUTES AS NEEDED FOR CHEST PAIN., Normal, Disp-1 Bottle, R-0      VITAMIN D3 2,000 unit cap capsule Take 2,000 Units by mouth daily. , Historical Med, R-2      OXYGEN-AIR DELIVERY SYSTEMS (HORIZON NASAL CPAP SYSTEM) by Does Not Apply route., Historical Med      amLODIPine (NORVASC) 5 mg tablet Take 5 mg by mouth daily. , Historical Med      GLUCOSAMINE HCL/CHONDR PETERSEN A NA (GLUCOSAMINE-CHONDROITIN) 750-600 mg Tab Take 1 Tab by mouth daily. Brand: Move Free, Historical Med      aspirin delayed-release 81 mg tablet Take 81 mg by mouth nightly., Historical Med      allopurinol (ZYLOPRIM) 100 mg tablet Take 100 mg by mouth every morning., Historical Med      MULTIVITS W-FE,OTHER MIN (CENTRUM PO) Take 1 Tab by mouth daily. , Historical Med           2. Follow-up Information     Follow up With Specialties Details Why Sandoval Espinosa MD Internal Medicine In 3 days  [address] [facility]  [phone]          3. Return to ED if worse     Diagnosis     Clinical Impression:   1. Flank pain        Attestations:    Christopher Puentes MD    Please note that this dictation was completed with [employer], the [employer] voice recognition software. Quite often unanticipated grammatical, syntax, homophones, and other interpretive errors are inadvertently transcribed by the computer software. Please disregard these errors. Please excuse any errors that have escaped final proofreading. Thank you.

## 2021-07-22 NOTE — DISCHARGE INSTRUCTIONS
Please use tylenol and the muscle relaxer for pain. Please use the oxycodone for severe pain. Return for worsening symptoms at any time and follow-up with your primary care doctor.

## 2021-07-22 NOTE — ED NOTES
JAY Samuels reviewed discharge instructions with the patient. The patient verbalized understanding. All questions and concerns were addressed. The patient declined a wheelchair and is discharged ambulatory in the care of family members with instructions and prescriptions in hand. Pt is alert and oriented x 4. Respirations are clear and unlabored.

## 2021-07-23 ENCOUNTER — PATIENT OUTREACH (OUTPATIENT)
Dept: CASE MANAGEMENT | Age: 76
End: 2021-07-23

## 2021-07-23 NOTE — PROGRESS NOTES
Ambulatory Care Management Note    Date/Time:  7/23/2021 1:28 PM    This patient was received as a referral from Daily assignment. Ambulatory Care Manager outreached to patient today to offer care management services. Introduction to self and role of care manager provided. Patient accepted care management services at this time. Follow up call scheduled at this time. Patient has Ambulatory Care Manager's contact number for for any questions or concerns. Patient says he had a portion of his right kidney removed a couple months ago, thought he had a kidney stone, and the pain was a bit scarry. Say his reports came bake good. Would like a call back by this ACM. ACM will follow in 2 weeks. Patient has follow up scheduled with PCP on 7/26/21 and with cardiology on 5/17/22. Will call to inform his Nephrologist  (Dr. Marcos Aguilar) of the ED visit.

## 2021-07-25 NOTE — PROGRESS NOTES
John Ledesma is a 66 y.o. male who presents for follow up. In with daughter. Sees derm, using topicals for psoriasis, on scalp, ears, neck. Rash still bothersome. Per daughter, patient is anxious. On celexa 40mg, still anxious. Prediabetic. HbA1C 5.8%. healthy diet.       Treated for HLP. LDL 68. On statin. No myalgias.      Treated for HTN. BP controlled. Trying to watch salt. Not active.       History of CKD. Followed by Dr Linda Gant, nephrology. S/p right partial nephrectomy in April 2021. Followed by Dr Carlita Burgess, urology. Treated for COPD. On anoro and albuterol. Followed by pulmonary. MELODY on CPAP. Followed by cardiology, Dr Keke Lynch, for CAD. EF 50-55%. Patient is not active. Has SMITH. No palpitations or CP.       Sees vascular surgery, Dr Radha Cast, for carotid stenosis. S/p left CEA.  Followed by Dr Alejandro Velazquez, neurology, Prior CVA.          Past Medical History:   Diagnosis Date    Abnormal stress echo 5/12/2015    Anemia NEC 03/2013    Anxiety     Borderline diabetes     CAD (coronary artery disease) 2009    cath [facility]) revealed 20%RCA and 50%2nd diagonal    Calculus of kidney     Chronic kidney disease, stage III (moderate) ([address] 10/11/2009    30% kidney function    COPD, mild ([address]     Followed by pulmonary associates    DJD (degenerative joint disease)     Fatty liver     GERD (gastroesophageal reflux disease) 10/11/2009    Gout     Hypertension     MELODY on CPAP 10/11/2009    nasal pillows; pressure set at 10-11 -     Peripheral neuropathy 10/11/2009    bilateral feet (numbness)    Renal cell cancer, right ([address] 01/2021    RLS (restless legs syndrome) 10/11/2009    S/P coronary artery stent placement 05/12/2015    PCI./MALI to LCx, 8/2019 PCI/MALI Prox LAD (RCA 40-50% lesions)       Family History   Problem Relation Age of Onset    Heart Disease Father     Hypertension Father     Other Father         abdominal aneurysm     Dementia Mother     Alzheimer Mother  Heart Disease Brother     Heart Attack Brother     Heart Disease Maternal Grandmother     Heart Disease Paternal Grandmother     Heart Attack Paternal Grandmother     Heart Disease Paternal Grandfather     Heart Attack Paternal Grandfather     Elevated Lipids Son     Elevated Lipids Daughter     Cancer Neg Hx     Diabetes Neg Hx     Stroke Neg Hx        Social History     Socioeconomic History    Marital status:      Spouse name: Steven Henderson Number of children: 2    Years of education: graduated then 4 year apprenticship    Highest education level: Associate degree: academic program   Occupational History    Not on file   Tobacco Use    Smoking status: Former Smoker     Packs/day: 1.00     Years: 10.00     Pack years: 10.00     Types: Cigarettes     Quit date: 1968     Years since quittin.6    Smokeless tobacco: Never Used   Vaping Use    Vaping Use: Never used   Substance and Sexual Activity    Alcohol use: No     Alcohol/week: 0.0 standard drinks    Drug use: No    Sexual activity: Yes     Partners: Female     Birth control/protection: None   Other Topics Concern     Service Not Asked    Blood Transfusions Not Asked    Caffeine Concern Not Asked    Occupational Exposure Not Asked    Hobby Hazards Not Asked    Sleep Concern Not Asked    Stress Concern Not Asked    Weight Concern Not Asked    Special Diet Not Asked    Back Care Not Asked    Exercise Not Asked    Bike Helmet Not Asked    [place] Not Asked    Self-Exams Not Asked   Social History Narrative    Not on file     Social Determinants of Health     Financial Resource Strain:     Difficulty of Paying Living Expenses:    Food Insecurity:     Worried About Running Out of Food in the Last Year:     Ran Out of Food in the Last Year:    Transportation Needs:     Lack of Transportation (Medical):      Lack of Transportation (Non-Medical):    Physical Activity:     Days of Exercise per Week:  Minutes of Exercise per Session:    Stress:     Feeling of Stress :    Social Connections:     Frequency of Communication with Friends and Family:     Frequency of Social Gatherings with Friends and Family:     Attends Judaism Services:     Active Member of Clubs or Organizations:     Attends Club or Organization Meetings:     Marital Status:    Intimate Partner Violence:     Fear of Current or Ex-Partner:     Emotionally Abused:     Physically Abused:     Sexually Abused:        Current Outpatient Medications on File Prior to Visit   Medication Sig Dispense Refill    methocarbamoL (ROBAXIN) 750 mg tablet Take 1 Tablet by mouth three (3) times daily as needed for Muscle Spasm(s). 21 Tablet 0    pantoprazole (PROTONIX) 40 mg tablet TAKE 1 TABLET BY MOUTH EVERY DAY 90 Tablet 1    metoprolol succinate (TOPROL-XL) 25 mg XL tablet TAKE 1 TABLET BY MOUTH EVERY DAY 90 Tab 1    fluocinoNIDE (LIDEX) 0.05 % external solution APPLY TO ITCHY SPOTS ON SCALP AS NEEDED FOR FLARE      albuterol (ProAir HFA) 90 mcg/actuation inhaler Take 1 Puff by inhalation every four (4) hours.  umeclidinium-vilanteroL (Anoro Ellipta) 62.5-25 mcg/actuation inhaler Take 1 Puff by inhalation daily.  rOPINIRole (Requip) 0.5 mg tablet Take 0.5 mg by mouth nightly.  ketoconazole (NIZORAL) 2 % shampoo Apply 1 mL to affected area daily as needed for Itching.  atorvastatin (Lipitor) 80 mg tablet Take 80 mg by mouth nightly.  telmisartan (MICARDIS) 40 mg tablet Take 40 mg by mouth nightly.  isosorbide mononitrate ER (IMDUR) 30 mg tablet Take 0.5 Tabs by mouth daily. 45 Tab 3    nitroglycerin (NITROSTAT) 0.4 mg SL tablet TAKE 1 TABLET BY SUBLINGUAL ROUTE EVERY 5 MINUTES AS NEEDED FOR CHEST PAIN. 1 Bottle 0    VITAMIN D3 2,000 unit cap capsule Take 2,000 Units by mouth daily. 2    GLUCOSAMINE HCL/CHONDR PETERSEN A NA (GLUCOSAMINE-CHONDROITIN) 750-600 mg Tab Take 1 Tab by mouth daily. Brand:   Move Free     Larissa Rush aspirin delayed-release 81 mg tablet Take 81 mg by mouth nightly.  allopurinol (ZYLOPRIM) 100 mg tablet Take 100 mg by mouth every morning.  MULTIVITS W-FE,OTHER MIN (CENTRUM PO) Take 1 Tab by mouth daily.  [] oxyCODONE IR (Roxicodone) 5 mg immediate release tablet Take 0.5 Tablets by mouth every six (6) hours as needed for Pain for up to 3 days. Max Daily Amount: 10 mg. 6 Tablet 0    [DISCONTINUED] acetaminophen (Tylenol Extra Strength) 500 mg tablet Take 1,000 mg by mouth every six (6) hours as needed for Pain. (Patient not taking: Reported on 2021)      [DISCONTINUED] senna-docusate (Senna with Docusate Sodium) 8.6-50 mg per tablet Take 1 Tab by mouth two (2) times a day. (Patient not taking: Reported on 2021) 60 Tab 1    [DISCONTINUED] citalopram (CELEXA) 40 mg tablet Take 40 mg by mouth every evening.  [DISCONTINUED] OXYGEN-AIR DELIVERY SYSTEMS (HORIZON NASAL CPAP SYSTEM) by Does Not Apply route. (Patient not taking: Reported on 2021)      amLODIPine (NORVASC) 5 mg tablet Take 5 mg by mouth daily. (Patient not taking: Reported on 2021)       No current facility-administered medications on file prior to visit. Review of Systems  Pertinent items are noted in HPI. Objective:     Visit Vitals  /62 (BP 1 Location: Left upper arm, BP Patient Position: Sitting, BP Cuff Size: Adult)   Pulse 61   Temp 97.5 °F (36.4 °C) (Temporal)   Resp 18   Ht 6' (1.829 m)   Wt 251 lb 9.6 oz (114.1 kg)   SpO2 94%   BMI 34.12 kg/m²     Gen: well appearing male  HEENT:   PERRL,normal conjunctiva. External ear and canals normal, TMs no opacification or erythema,  OP no erythema, no exudates, MMM  Neck:  No masses or LAD  Resp:  No wheezing, no rhonchi, no rales. CV:  RRR, normal S1S2  GI: soft, nontender, without masses. Extrem:  +2 pulses, no edema, warm distally      Assessment/Plan:       ICD-10-CM ICD-9-CM    1. Essential hypertension  I10 401.9    2.  Medicare annual wellness visit, subsequent  Z00.00 V70.0    3. Stage 3 chronic kidney disease, unspecified whether stage 3a or 3b CKD (HCC)  N18.30 585.3    4. Coronary artery disease due to lipid rich plaque  I25.10 414.00     I25.83 414.3    5. Obstructive sleep apnea  G47.33 327.23    6. Idiopathic progressive neuropathy  G60.3 356.4 REFERRAL TO PHYSICAL THERAPY   7. Mixed hyperlipidemia  E78.2 272.2 LIPID PANEL      LIPID PANEL   8. Class 1 obesity due to excess calories with body mass index (BMI) of 34.0 to 34.9 in adult, unspecified whether serious comorbidity present  E66.09 278.00     Z68.34 V85.34    9. Elevated glucose  R73.09 790.29 HEMOGLOBIN A1C WITH EAG      HEMOGLOBIN A1C WITH EAG   10. Need for hepatitis C screening test  Z11.59 V73.89 HEPATITIS C AB      HEPATITIS C AB   11. Anxiety  F41.9 300.00    12. Psoriasis of scalp  L40.9 696.1    13. Bilateral low back pain without sciatica, unspecified chronicity  M54.5 724.2 REFERRAL TO PHYSICAL THERAPY   14. Chronic pain of both shoulders  M25.511 719.41 REFERRAL TO PHYSICAL THERAPY    G89.29 338.29     M25.512         Follow-up and Dispositions    · Return for follow up pending labs and 6 months. Gian Spencer MD    This is the Subsequent Medicare Annual Wellness Exam, performed 12 months or more after the Initial AWV or the last Subsequent AWV    I have reviewed the patient's medical history in detail and updated the computerized patient record. Assessment/Plan   Education and counseling provided:  Are appropriate based on today's review and evaluation    1. Essential hypertension  2. Medicare annual wellness visit, subsequent  3. Stage 3 chronic kidney disease, unspecified whether stage 3a or 3b CKD ([address]  4. Coronary artery disease due to lipid rich plaque  5. Obstructive sleep apnea  6. Idiopathic progressive neuropathy  -     REFERRAL TO PHYSICAL THERAPY  7. Mixed hyperlipidemia  -     LIPID PANEL; Future  8.  Class 1 obesity due to excess calories with body mass index (BMI) of 34.0 to 34.9 in adult, unspecified whether serious comorbidity present  9. Elevated glucose  -     HEMOGLOBIN A1C WITH EAG; Future  10. Need for hepatitis C screening test  -     HEPATITIS C AB; Future  11. Anxiety  12. Psoriasis of scalp  13. Bilateral low back pain without sciatica, unspecified chronicity  -     REFERRAL TO PHYSICAL THERAPY  14. Chronic pain of both shoulders  -     REFERRAL TO PHYSICAL THERAPY       Depression Risk Factor Screening     3 most recent PHQ Screens 7/26/2021   PHQ Not Done -   Little interest or pleasure in doing things Nearly every day   Feeling down, depressed, irritable, or hopeless Nearly every day   Total Score PHQ 2 6   Trouble falling or staying asleep, or sleeping too much -   Feeling tired or having little energy -   Poor appetite, weight loss, or overeating -   Feeling bad about yourself - or that you are a failure or have let yourself or your family down -   Trouble concentrating on things such as school, work, reading, or watching TV -   Moving or speaking so slowly that other people could have noticed; or the opposite being so fidgety that others notice -   Thoughts of being better off dead, or hurting yourself in some way -   PHQ 9 Score -       Alcohol Risk Screen    Do you average more than 1 drink per night or more than 7 drinks a week: No    In the past three months have you have had more than 4 drinks containing alcohol on one occasion: No        Functional Ability and Level of Safety    Hearing: Hearing is good. Activities of Daily Living: The home contains: no safety equipment. Patient does total self care      Ambulation: with no difficulty     Fall Risk:  Fall Risk Assessment, last 12 mths 7/26/2021   Able to walk? Yes   Fall in past 12 months? 0   Do you feel unsteady? 1   Are you worried about falling 1   Number of falls in past 12 months -   Fall with injury?  -      Abuse Screen:  Patient is not abused Cognitive Screening    Has your family/caregiver stated any concerns about your memory: no     Cognitive Screening: Normal - Verbal Fluency Test    Health Maintenance Due     Health Maintenance Due   Topic Date Due    Hepatitis C Screening  Never done    Eye Exam Retinal or Dilated  Never done    Foot Exam Q1  12/06/2018       Patient Care Team   Patient Care Team:  Senthil Madrigal MD as PCP - General (Internal Medicine)  Senthil Madrigal MD as PCP - REHABILITATION [facility]  Zaida Go (Inactive) as Physician (Dermatology)  Salvador Clarke MD as Physician (Cardiology)  Jessica Trinh MD as Physician (Nephrology)  Alethea Lr MD as Physician (Neurology)  Charo Neely MD as Physician (Orthopedic Surgery)  Diandra Santana MD (Gastroenterology)  Dwaine Balderas MD (Sleep Medicine)  Lor Caceres DPM as Consulting Provider (Podiatry)  Destin Wick NP as Nurse Practitioner (Nurse Practitioner)  Cameron Navarro MD as Consulting Provider (Urology)  Kristina Knox RN as Ambulatory Care Manager (Family Medicine)    History     Patient Active Problem List   Diagnosis Code    Chronic kidney disease, stage III (moderate) ([facility] N18.30    Mixed hyperlipidemia E78.2    Obstructive sleep apnea G47.33    RLS (restless legs syndrome) G25.81    Peripheral neuropathy G62.9    DJD (degenerative joint disease), multiple sites M15.9    Essential hypertension I10    GERD (gastroesophageal reflux disease) K21.9    Anxiety associated with depression F41.8    Obesity E66.9    Gout M10.9    Hypertensive kidney disease with chronic kidney disease stage III ([facility] I12.9, N18.30    Benign essential tremor G25.0    S/P coronary artery stent placement Z95.5    Coronary artery disease involving native coronary artery of native heart without angina pectoris I25.10    Coronary artery disease due to lipid rich plaque I25.10, I25.83    Pure hypercholesterolemia E78.00    Bilateral carotid artery stenosis I65.23    Diabetic peripheral neuropathy associated with type 2 diabetes mellitus (HCC) E11.42    History of left-sided carotid endarterectomy Z98.890    CKD (chronic kidney disease) stage 4, GFR 15-29 ml/min ([facility]) N18.4    Right kidney mass N28.89    Acute hypoxemic respiratory failure (HCC) J96.01    Anemia D64.9    Hx of completed stroke Z86.73     Past Medical History:   Diagnosis Date    Abnormal stress echo 5/12/2015    Anemia NEC 03/2013    Anxiety     Borderline diabetes     CAD (coronary artery disease) 2009    cath [facility]) revealed 20%RCA and 50%2nd diagonal    Calculus of kidney     Chronic kidney disease, stage III (moderate) ([facility] 10/11/2009    30% kidney function    COPD, mild ([facility])     Followed by pulmonary associates    DJD (degenerative joint disease)     Fatty liver     GERD (gastroesophageal reflux disease) 10/11/2009    Gout     Hypertension     MELODY on CPAP 10/11/2009    nasal pillows; pressure set at 10-11 -     Peripheral neuropathy 10/11/2009    bilateral feet (numbness)    Renal cell cancer, right ([facility] 01/2021    RLS (restless legs syndrome) 10/11/2009    S/P coronary artery stent placement 05/12/2015    PCI./MALI to LCx, 8/2019 PCI/MALI Prox LAD (RCA 40-50% lesions)      Past Surgical History:   Procedure Laterality Date    HX BUNIONECTOMY  2019    HX CAROTID ENDARTERECTOMY Left 01/2020    Followed by Dr. Elmo Lemus  2009, 7/17    x2    HX HERNIA REPAIR      McTamaney/umbilical    HX KNEE REPLACEMENT Left 07/23/2011    HX ORTHOPAEDIC      left shoulder manipulation    HX UROLOGICAL      basket extraction of kidney stones    WI COLONOSCOPY FLX DX W/COLLJ SPEC WHEN PFRMD  8/5/2010         WI COLSC FLX W/RMVL OF TUMOR POLYP LESION SNARE TQ  9/24/2014          Current Outpatient Medications   Medication Sig Dispense Refill    hydrOXYzine HCL (ATARAX) 10 mg tablet Take 1-2 Tablets by mouth three (3) times daily as needed for Itching or Anxiety. 30 Tablet 0    methocarbamoL (ROBAXIN) 750 mg tablet Take 1 Tablet by mouth three (3) times daily as needed for Muscle Spasm(s). 21 Tablet 0    pantoprazole (PROTONIX) 40 mg tablet TAKE 1 TABLET BY MOUTH EVERY DAY 90 Tablet 1    metoprolol succinate (TOPROL-XL) 25 mg XL tablet TAKE 1 TABLET BY MOUTH EVERY DAY 90 Tab 1    fluocinoNIDE (LIDEX) 0.05 % external solution APPLY TO ITCHY SPOTS ON SCALP AS NEEDED FOR FLARE      albuterol (ProAir HFA) 90 mcg/actuation inhaler Take 1 Puff by inhalation every four (4) hours.  umeclidinium-vilanteroL (Anoro Ellipta) 62.5-25 mcg/actuation inhaler Take 1 Puff by inhalation daily.  rOPINIRole (Requip) 0.5 mg tablet Take 0.5 mg by mouth nightly.  ketoconazole (NIZORAL) 2 % shampoo Apply 1 mL to affected area daily as needed for Itching.  atorvastatin (Lipitor) 80 mg tablet Take 80 mg by mouth nightly.  telmisartan (MICARDIS) 40 mg tablet Take 40 mg by mouth nightly.  isosorbide mononitrate ER (IMDUR) 30 mg tablet Take 0.5 Tabs by mouth daily. 45 Tab 3    nitroglycerin (NITROSTAT) 0.4 mg SL tablet TAKE 1 TABLET BY SUBLINGUAL ROUTE EVERY 5 MINUTES AS NEEDED FOR CHEST PAIN. 1 Bottle 0    VITAMIN D3 2,000 unit cap capsule Take 2,000 Units by mouth daily. 2    GLUCOSAMINE HCL/CHONDR PETERSEN A NA (GLUCOSAMINE-CHONDROITIN) 750-600 mg Tab Take 1 Tab by mouth daily. Brand: Move Free      aspirin delayed-release 81 mg tablet Take 81 mg by mouth nightly.  allopurinol (ZYLOPRIM) 100 mg tablet Take 100 mg by mouth every morning.  MULTIVITS W-FE,OTHER MIN (CENTRUM PO) Take 1 Tab by mouth daily.  amLODIPine (NORVASC) 5 mg tablet Take 5 mg by mouth daily.  (Patient not taking: Reported on 7/26/2021)       Allergies   Allergen Reactions    Bactrim [Sulfamethoxazole-Trimethoprim] Hives    Codeine Itching    Lisinopril (Bulk) Cough    Neurontin [Gabapentin] Other (comments)     drowsy    Zostavax [Zoster Vaccine Live (Pf)] Rash     See 9/10/13 visit    Penicillins Hives     Pt states he has taken Keflex before without problems       Family History   Problem Relation Age of Onset    Heart Disease Father     Hypertension Father     Other Father         abdominal aneurysm     Dementia Mother     Alzheimer Mother     Heart Disease Brother     Heart Attack Brother     Heart Disease Maternal Grandmother     Heart Disease Paternal Grandmother     Heart Attack Paternal Grandmother     Heart Disease Paternal Grandfather     Heart Attack Paternal Grandfather     Elevated Lipids Son     Elevated Lipids Daughter     Cancer Neg Hx     Diabetes Neg Hx     Stroke Neg Hx      Social History     Tobacco Use    Smoking status: Former Smoker     Packs/day: 1.00     Years: 10.00     Pack years: 10.00     Types: Cigarettes     Quit date: 1968     Years since quittin.6    Smokeless tobacco: Never Used   Substance Use Topics    Alcohol use: No     Alcohol/week: 0.0 standard drinks         Tim Zapata MD

## 2021-07-26 ENCOUNTER — OFFICE VISIT (OUTPATIENT)
Dept: INTERNAL MEDICINE CLINIC | Age: 76
End: 2021-07-26
Payer: MEDICARE

## 2021-07-26 VITALS
DIASTOLIC BLOOD PRESSURE: 62 MMHG | OXYGEN SATURATION: 94 % | HEART RATE: 61 BPM | RESPIRATION RATE: 18 BRPM | BODY MASS INDEX: 34.08 KG/M2 | SYSTOLIC BLOOD PRESSURE: 114 MMHG | HEIGHT: 72 IN | WEIGHT: 251.6 LBS | TEMPERATURE: 97.5 F

## 2021-07-26 DIAGNOSIS — G89.29 CHRONIC PAIN OF BOTH SHOULDERS: ICD-10-CM

## 2021-07-26 DIAGNOSIS — L40.9 PSORIASIS OF SCALP: ICD-10-CM

## 2021-07-26 DIAGNOSIS — G60.3 IDIOPATHIC PROGRESSIVE NEUROPATHY: ICD-10-CM

## 2021-07-26 DIAGNOSIS — N18.30 STAGE 3 CHRONIC KIDNEY DISEASE, UNSPECIFIED WHETHER STAGE 3A OR 3B CKD (HCC): ICD-10-CM

## 2021-07-26 DIAGNOSIS — M25.511 CHRONIC PAIN OF BOTH SHOULDERS: ICD-10-CM

## 2021-07-26 DIAGNOSIS — Z00.00 MEDICARE ANNUAL WELLNESS VISIT, SUBSEQUENT: ICD-10-CM

## 2021-07-26 DIAGNOSIS — F41.9 ANXIETY: ICD-10-CM

## 2021-07-26 DIAGNOSIS — M54.50 BILATERAL LOW BACK PAIN WITHOUT SCIATICA, UNSPECIFIED CHRONICITY: ICD-10-CM

## 2021-07-26 DIAGNOSIS — I25.10 CORONARY ARTERY DISEASE DUE TO LIPID RICH PLAQUE: ICD-10-CM

## 2021-07-26 DIAGNOSIS — E66.09 CLASS 1 OBESITY DUE TO EXCESS CALORIES WITH BODY MASS INDEX (BMI) OF 34.0 TO 34.9 IN ADULT, UNSPECIFIED WHETHER SERIOUS COMORBIDITY PRESENT: ICD-10-CM

## 2021-07-26 DIAGNOSIS — G47.33 OBSTRUCTIVE SLEEP APNEA: ICD-10-CM

## 2021-07-26 DIAGNOSIS — M25.512 CHRONIC PAIN OF BOTH SHOULDERS: ICD-10-CM

## 2021-07-26 DIAGNOSIS — R73.09 ELEVATED GLUCOSE: ICD-10-CM

## 2021-07-26 DIAGNOSIS — I10 ESSENTIAL HYPERTENSION: Primary | ICD-10-CM

## 2021-07-26 DIAGNOSIS — Z11.59 NEED FOR HEPATITIS C SCREENING TEST: ICD-10-CM

## 2021-07-26 DIAGNOSIS — E78.2 MIXED HYPERLIPIDEMIA: ICD-10-CM

## 2021-07-26 DIAGNOSIS — I25.83 CORONARY ARTERY DISEASE DUE TO LIPID RICH PLAQUE: ICD-10-CM

## 2021-07-26 PROCEDURE — 1100F PTFALLS ASSESS-DOCD GE2>/YR: CPT | Performed by: FAMILY MEDICINE

## 2021-07-26 PROCEDURE — 99214 OFFICE O/P EST MOD 30 MIN: CPT | Performed by: FAMILY MEDICINE

## 2021-07-26 PROCEDURE — G9717 DOC PT DX DEP/BP F/U NT REQ: HCPCS | Performed by: FAMILY MEDICINE

## 2021-07-26 PROCEDURE — G0439 PPPS, SUBSEQ VISIT: HCPCS | Performed by: FAMILY MEDICINE

## 2021-07-26 PROCEDURE — G8427 DOCREV CUR MEDS BY ELIG CLIN: HCPCS | Performed by: FAMILY MEDICINE

## 2021-07-26 PROCEDURE — G8536 NO DOC ELDER MAL SCRN: HCPCS | Performed by: FAMILY MEDICINE

## 2021-07-26 PROCEDURE — G8752 SYS BP LESS 140: HCPCS | Performed by: FAMILY MEDICINE

## 2021-07-26 PROCEDURE — G8754 DIAS BP LESS 90: HCPCS | Performed by: FAMILY MEDICINE

## 2021-07-26 PROCEDURE — 3288F FALL RISK ASSESSMENT DOCD: CPT | Performed by: FAMILY MEDICINE

## 2021-07-26 PROCEDURE — G8417 CALC BMI ABV UP PARAM F/U: HCPCS | Performed by: FAMILY MEDICINE

## 2021-07-26 PROCEDURE — G0463 HOSPITAL OUTPT CLINIC VISIT: HCPCS | Performed by: FAMILY MEDICINE

## 2021-07-26 RX ORDER — HYDROXYZINE HYDROCHLORIDE 10 MG/1
10-20 TABLET, FILM COATED ORAL
Qty: 30 TABLET | Refills: 0 | Status: SHIPPED | OUTPATIENT
Start: 2021-07-26 | End: 2021-08-23 | Stop reason: SDUPTHER

## 2021-07-26 NOTE — PATIENT INSTRUCTIONS
Reduce citalopram (celexa) to 1/2 tablet once a day for one week, then stop. Then can try the hydroxyzine for itching and anxiety, 1-2 three times a day. As needed.

## 2021-07-27 ENCOUNTER — PATIENT MESSAGE (OUTPATIENT)
Dept: INTERNAL MEDICINE CLINIC | Age: 76
End: 2021-07-27

## 2021-07-27 LAB
CHOLEST SERPL-MCNC: 119 MG/DL (ref 100–199)
EST. AVERAGE GLUCOSE BLD GHB EST-MCNC: 123 MG/DL
HBA1C MFR BLD: 5.9 % (ref 4.8–5.6)
HCV AB S/CO SERPL IA: <0.1 S/CO RATIO (ref 0–0.9)
HDLC SERPL-MCNC: 37 MG/DL
LDLC SERPL CALC-MCNC: 55 MG/DL (ref 0–99)
TRIGL SERPL-MCNC: 161 MG/DL (ref 0–149)
VLDLC SERPL CALC-MCNC: 27 MG/DL (ref 5–40)

## 2021-08-03 ENCOUNTER — TELEPHONE (OUTPATIENT)
Dept: INTERNAL MEDICINE CLINIC | Age: 76
End: 2021-08-03

## 2021-08-03 NOTE — TELEPHONE ENCOUNTER
PA submitted through cover my med for hydroxyzine       Approvedtoday  PA Case: [id number], Status: Approved, Coverage Starts on: 5/4/2021 12:00:00 AM, Coverage Ends on: 8/3/2022 12:00:00 AM.          Em lpn

## 2021-08-05 RX ORDER — TELMISARTAN 40 MG/1
TABLET ORAL
Qty: 90 TABLET | Refills: 3 | Status: SHIPPED | OUTPATIENT
Start: 2021-08-05 | End: 2021-09-10

## 2021-08-06 ENCOUNTER — PATIENT OUTREACH (OUTPATIENT)
Dept: CASE MANAGEMENT | Age: 76
End: 2021-08-06

## 2021-08-06 NOTE — PROGRESS NOTES
Ambulatory Care Management Note    Date/Time:  8/6/2021 1:29 PM    This Ambulatory Care Manager (ACM) reviewed and updated the following screenings during this call; general assessment, disease specific assessment, self management assessment, SDOH assessments, ACP assessment and note, medication reconciliation. Patient's challenges to self management identified:   functional physical ability      Medication Management:  good understanding    Advance Care Planning:   Does patient have an Advance Directive:  not on file; will address during future outreach    [employer], Referrals, and Durable Medical Equipment:       Health Maintenance Due   Topic Date Due    Eye Exam Retinal or Dilated  Never done    Foot Exam Q1  12/06/2018     Health Maintenance reviewed - yes. Patient was asked to consider health care goals that they would like to focus on with this ACM. ACM will follow up with patient to discuss goals and establish care plan in the next 7-14 days. Patient's BP today was 173/73 initially, after 15 minutes of resting it was 157/75, HR=55. Admits to taking medications as ordered.     PCP/Specialist follow up:   Future Appointments   Date Time Provider Seth Skelton   1/26/2022  2:00 PM Doris Daniel MD [facility] BS AMB   5/17/2022 10:30 AM MD JEFF Lynn BS AMB

## 2021-08-20 ENCOUNTER — TELEPHONE (OUTPATIENT)
Dept: SLEEP MEDICINE | Age: 76
End: 2021-08-20

## 2021-08-20 NOTE — TELEPHONE ENCOUNTER
Confirmed PHI talked with Tito Almeida patients daughter. Patient is fully aware of current recall of his [facility] device DreamStation Auto CPAP. He has registered  with [facility] in which he was told to cease using the device . Patient would like his previous device Res Med S9 autoset set to current pressure settings. Download from both devices have been added to patients chart for physicians review.    Patient last seen 10/3/2019

## 2021-08-20 NOTE — TELEPHONE ENCOUNTER
Patient's Daughter Reggie Escalona called into the office on 08/20/2021 at 11:14am stating that patient has a device on the [employer] recall list and wants to know if his old device can be set with his current pressure settings. She can be reached at [phone]. Please advise.

## 2021-08-23 RX ORDER — HYDROXYZINE HYDROCHLORIDE 10 MG/1
10-20 TABLET, FILM COATED ORAL
Qty: 30 TABLET | Refills: 0 | Status: SHIPPED | OUTPATIENT
Start: 2021-08-23 | End: 2021-10-20

## 2021-08-23 NOTE — TELEPHONE ENCOUNTER
PCP: Bertrand Oviedo MD    Last appt: 7/26/2021    Future Appointments   Date Time Provider Seth Skelton   8/26/2021 10:30 AM Renard Churchill, PT [facility]   1/26/2022  2:00 PM Bertrand Oviedo MD [facility] BS AMB   5/17/2022 10:30 AM MD JEFF Jones Cea CASTRO BS AMB       Requested Prescriptions     Pending Prescriptions Disp Refills    hydrOXYzine HCL (ATARAX) 10 mg tablet 30 Tablet 0     Sig: Take 1-2 Tablets by mouth three (3) times daily as needed for Itching or Anxiety.        Prior labs and Blood pressures:  BP Readings from Last 3 Encounters:   07/26/21 114/62   07/22/21 127/60   05/11/21 128/66     Lab Results   Component Value Date/Time    Sodium 144 07/22/2021 03:12 PM    Potassium 4.5 07/22/2021 03:12 PM    Chloride 110 (H) 07/22/2021 03:12 PM    CO2 27 07/22/2021 03:12 PM    Anion gap 7 07/22/2021 03:12 PM    Glucose 101 (H) 07/22/2021 03:12 PM    BUN 26 (H) 07/22/2021 03:12 PM    Creatinine 2.56 (H) 07/22/2021 03:12 PM    BUN/Creatinine ratio 10 (L) 07/22/2021 03:12 PM    GFR est AA 30 (L) 07/22/2021 03:12 PM    GFR est non-AA 24 (L) 07/22/2021 03:12 PM    Calcium 8.9 07/22/2021 03:12 PM     Lab Results   Component Value Date/Time    Hemoglobin A1c 5.9 (H) 07/26/2021 11:20 AM    Hemoglobin A1c (POC) 5.9% 01/30/2013 02:51 PM     Lab Results   Component Value Date/Time    Cholesterol, total 119 07/26/2021 11:20 AM    HDL Cholesterol 37 (L) 07/26/2021 11:20 AM    LDL, calculated 55 07/26/2021 11:20 AM    LDL, calculated 49 03/03/2020 12:07 PM    VLDL, calculated 27 07/26/2021 11:20 AM    VLDL, calculated 27 03/03/2020 12:07 PM    Triglyceride 161 (H) 07/26/2021 11:20 AM    CHOL/HDL Ratio 2.9 01/09/2020 02:45 AM     No results found for: FRACISCO Mcneill    Lab Results   Component Value Date/Time    TSH 0.97 01/10/2020 03:58 AM

## 2021-08-24 NOTE — TELEPHONE ENCOUNTER
Downloads reviewed. Tech can adjust setting to 12-14 cm on his [employer] device for his use until [place] addresses the recalled device  Overdue for follow-up.  Staff to schedule

## 2021-08-25 ENCOUNTER — OFFICE VISIT (OUTPATIENT)
Dept: SLEEP MEDICINE | Age: 76
End: 2021-08-25

## 2021-08-25 DIAGNOSIS — G47.33 OSA (OBSTRUCTIVE SLEEP APNEA): Primary | ICD-10-CM

## 2021-08-26 ENCOUNTER — HOSPITAL ENCOUNTER (OUTPATIENT)
Dept: PHYSICAL THERAPY | Age: 76
Discharge: HOME | End: 2021-08-26
Payer: MEDICARE

## 2021-08-26 PROCEDURE — 97140 MANUAL THERAPY 1/> REGIONS: CPT

## 2021-08-26 PROCEDURE — 97162 PT EVAL MOD COMPLEX 30 MIN: CPT

## 2021-08-26 PROCEDURE — 97110 THERAPEUTIC EXERCISES: CPT

## 2021-08-26 NOTE — PROGRESS NOTES
[facility] Physical Therapy  [address] ([address]), [address] [place]  Phone: [phone] Fax: [phone]     Plan of Care/Statement of Necessity for Physical Therapy Services  2-15    Patient name: Paul Johnson  : 1945  Provider#: [id number]  Referral source: Anmol Barker MD      Medical/Treatment Diagnosis: Low back pain [M54.5]  Bilateral shoulder pain [M25.511, M25.512]     Prior Hospitalization: see medical history     Comorbidities: Kidney disease, hyperlipidemia, peripheral neuropathy, DJD, hypertension, GERD, obesity, gout, cardiovascular disease (h/o stent placement)  Prior Level of Function: Independent, largely sedentary  Medications: Verified on Patient Summary List  Start of Care: 21      Onset Date: Chronic   The Plan of Care and following information is based on the information from the initial evaluation. Assessment/ key information:     The patient is a 64year old male who presents to physical therapy services due to chronic gait and balance deficits related to peripheral neuropathy, low back pain, and bilateral shoulder pain. He demonstrates signs and symptoms consistent with shoulder internal impingement, as well as decreased multiplanar lumbar AROM and bilateral hip A/PROM, decreased multiplanar LE strength (heel raise test: R = 6 repetitions; L = 13 repetitions), decreased single limb postural control (R = 3 seconds; L = 2 seconds), decreased functional endurance (30 Second Sit to Stand Test = 8.5 repetitions without use of UEs, and aberrant movement patterns with squatting and ambulation. The patient would benefit from continued skilled physical therapy services to improve symptom management and safety/endurance/independence with functional tasks such as transfers and reaching activities.     A/PROM Shoulder:                KSHJK                           UDUZ              LVHMMBY                         311 (p!)/83 (p!)            122 (p!)/90 (p!)              BDRLWGENI                    55 (p!)/71 (p!)              100 (p!)/85 (p!)              IR                                 Hand to occiput            Hand to posterior external auditory meatus              ER                               Hand to PSIS               Hand to L greater trochanter    Evaluation Complexity History HIGH Complexity :3+ comorbidities / personal factors will impact the outcome/ POC ; Examination HIGH Complexity : 4+ Standardized tests and measures addressing body structure, function, activity limitation and / or participation in recreation  ;Presentation MEDIUM Complexity : Evolving with changing characteristics  ; Clinical Decision Making Other outcome measures Single limb postural control: R = 3 seconds; L = 2 seconds  MEDIUM  Overall Complexity Rating: MEDIUM    Problem List: pain affecting function, decrease ROM, decrease strength, edema affecting function, impaired gait/ balance, decrease ADL/ functional abilitiies, decrease activity tolerance, decrease flexibility/ joint mobility and decrease transfer abilities   Treatment Plan may include any combination of the following: Therapeutic exercise, Therapeutic activities, Neuromuscular re-education, Physical agent/modality, Gait/balance training, Manual therapy, Patient education, Self Care training, Functional mobility training, Home safety training, Stair training and Other: Dry Needling  Patient / Family readiness to learn indicated by: asking questions, trying to perform skills and interest  Persons(s) to be included in education: patient (P)  Barriers to Learning/Limitations: None  Patient Goal (s): Build endurance, lose weight  Patient Self Reported Health Status: fair  Rehabilitation Potential: good    Short Term Goals: To be accomplished in 6-8 treatments:    The patient will demonstrate understanding of and compliance with updated and progressive HEP toward improved participation in physical therapy plan of care. The patient will demonstrate bilateral hip IR PROM >= 20 degrees toward improved postural control with dynamic tasks. The patient will demonstrate bilateral shoulder abduction AROM >= 110 degrees toward improved ability to reach objects. Long Term Goals: To be accomplished in 12-16 treatments: The patient will demonstrate multiplanar bilateral LE strength increased by >= 1/2 MMT grade toward improved endurance with functional activities such as transfers and walking. The patient will demonstrate single limb postural control >= 10 seconds bilaterally toward improved safety with home and community mobility. The patient will score >= MCID on FOTO to demonstrate significantly improved subjective report of function. Frequency / Duration: Patient to be seen 2 times per week for 12-16 treatments. Patient/ Caregiver education and instruction: self care, activity modification and exercises    [x]  Plan of care has been reviewed with PTA    Certification Period: 08/26/21-11/24/21    Lulu Lombardi PT, DPT 8/26/2021     ________________________________________________________________________    I certify that the above Therapy Services are being furnished while the patient is under my care. I agree with the treatment plan and certify that this therapy is necessary.     Physician's Signature:____________________  Date:____________Time: _________         Elvis Lennox, MD

## 2021-08-26 NOTE — PROGRESS NOTES
PT INITIAL EVALUATION NOTE - [facility] 2-15    Patient Name: Ewelina Merchant  Date:2021  : 1945  [x]  Patient  Verified  Payor: Musa Whalen / Plan: [place] & [place] / Product Type: Medicare /    In time: 28  Out time:   Total Treatment Time (min): 63  Total Timed Codes (min): 63  1:1 Treatment Time ( only): 30   Visit #: 1    Treatment Area: Low back pain [M54.5]  Bilateral shoulder pain [M25.511, M25.512]    SUBJECTIVE  Pain Level (0-10 scale): 5 currently in lower legs; 10 when moving L > R shoulder. The patient describes symptoms as \"burning, stinging\" in legs; \"sharp\" in shoulders. Any medication changes, allergies to medications, adverse drug reactions, diagnosis change, or new procedure performed?: [] No    [x] Yes (see summary sheet for update)  Subjective:      *The patient is R hand dominant*    The patient reports chronic and insidious onset of bilateral peripheral neuropathy, which started in his feet and has since moved into his lower legs to his knees; he notes this limits him with balance particularly when walking, turning, or transferring. He reports no recent falls, however has fallen previously when \"messing with the tractor\". He reports his PCP recently prescribed gabapentin for his neuropathy, which has helped him sleep better. He additionally notes longstanding history of L > R shoulder pain and decreased ROM, for which he underwent previous bout of therapy years ago at [facility], which helped at that time; he intermittently keeps up with previous home exercises. He had kidney surgery earlier this year, which has abolished previous R flank pain he was having; he does endorse continued low back pain. Current functional limitations include: rising too quickly; reaching to the side. Goals: \"Build endurance, lose weight\".         OBJECTIVE/EXAMINATION    Posture: Bilateral LE ER L > R, R shoulder lower than L, bilateral hip/knee flexion, sway back posture, forward head posture, bilateral shoulder internal rotation, bilateral scapular winging   Other Observations:   Squat: increased trunk flexion, narrow base of support, bilateral LE ER  Gait and Functional Mobility: The patient ambulates with decreased gisele, lateral trunk sway, decreased bilateral LE clearance during swing phase of gait, forward trunk lean, decreased arm swing  Palpation: No tenderness to palpation along bilateral posterior/anterior shoulder structures    A/PROM Shoulder:                Right                           Left              Flexion                         131 (p!)/83 (p!)            122 (p!)/90 (p!)              Abduction                    90 (p!)/71 (p!)              100 (p!)/85 (p!)              IR                                 Hand to occiput            Hand to posterior external auditory meatus              ER                               Hand to PSIS               Hand to L greater trochanter    Lumbar AROM:                                                                                               R                      L                        Flexion                                    Fingertips to mid-tibia                      Extension                                50%                                           Side Bending                          Fingertips to mid-femur bilaterally            Rotation                                  50%                  25%    Hip ROM:   AROM   PROM   Flexion   99/86   106/98   Extension  Neutral   Neutral   IR                  19/12   ER              15/43    Joint Mobility Assessment: Noted significant hypomobility to bilateral glenohumeral inferior >> AP joint mobilizations; noted significant hypomobility to thoracic PA joint mobilizations (T4-10; assessed in sidelying/seated positions due to patient unable to assume prone position)    Flexibility: Noted grossly decreased hip flexor/hamstring muscle length bilaterally during standing/seated/mat table assessments    LOWER QUARTER   MUSCLE STRENGTH  KEY       R  L  0 - No Contraction  Knee ext  4+  5 (posterior trunk lean)  1 - Trace            flex  5  5  2 - Poor   Hip ext   >= 3*  >= 3*  3 - Fair          flex   4+ (calf p!) 5 (posterior trunk lean)  4 - Good         abd  4+  4+  5 - Normal         add  4  4 (R hip p!)      Ankle DF  4+  4+                PF  See heel raise test below                INV  4+  4+                EV  4+  4+       *Patient unable to achieve prone positioning for testing; tested in gravity-eliminated positions and grossly with functional movements*    Heel Raise Test: R = 6 repetitions (rapid loss of heel height); L = 13 repetitions (rapid loss of heel height)    Neurological: Reflexes / Sensations: Dermatomes WNL to light touch throughout bilateral LEs except for decreased sensation at R L4-5 levels    Special Tests: Trendelenberg: (+) bilaterally              Slump: (-) bilaterally    Functional Tests:  Single Limb Balance: eyes open R = 3 seconds; L = 2.0 seconds  30 Second Sit to Stand Test: 8.5 repetitions (without use of UEs)    15 min Therapeutic Exercise:  [x] See flow sheet :   Rationale: increase ROM, increase strength, improve coordination, improve balance and increase proprioception to improve the patients ability to transfer and reach for items    15 min Manual Therapy: Bilateral hip PROM, all planes with overpressure to tolerance; bilateral shoulder PROM, all planes to tolerance (scapular stabilization); bilateral glenohumeral inferior/AP joint mobilizations (Grade III-IV); STM/TPR bilateral upper trapezius/levator scapula/scalenes/pectoralis major/minor/supraspinatus/infraspinatus/teres minor/subscapularis   Rationale: decrease pain, increase ROM, increase tissue extensibility, decrease trigger points and increase postural awareness to improve the patients ability to transfer and reach for items          With   [x] TE   [] TA   [] Neuro   [] SC   [] other: Patient Education: [x] Review HEP    [] Progressed/Changed HEP based on:   [] positioning   [] body mechanics   [] transfers   [] heat/ice application    [] other:      Other Objective/Functional Measures: Patient did not complete FOTO at this date; plan to complete at first follow-up visit         Pain Level (0-10 scale) post treatment: 5-10 in bilateral LEs and shoulders. Patient noting improved bilateral shoulder abduction AROM with test-retest post-session today.     ASSESSMENT/Changes in Function:     [x]  See Plan of [address], PT, DPT 8/26/2021

## 2021-09-01 ENCOUNTER — HOSPITAL ENCOUNTER (OUTPATIENT)
Dept: PHYSICAL THERAPY | Age: 76
Discharge: HOME | End: 2021-09-01
Payer: MEDICARE

## 2021-09-01 ENCOUNTER — PATIENT OUTREACH (OUTPATIENT)
Dept: CASE MANAGEMENT | Age: 76
End: 2021-09-01

## 2021-09-01 PROCEDURE — 97140 MANUAL THERAPY 1/> REGIONS: CPT

## 2021-09-01 PROCEDURE — 97110 THERAPEUTIC EXERCISES: CPT

## 2021-09-01 NOTE — PROGRESS NOTES
PT DAILY TREATMENT NOTE - [facility] 2-15    Patient Name: Jaimee Pink  Date:2021  : 1945  [x]  Patient  Verified  Payor: [place] MED[place]ARE / Plan: [place] MEDICARE PART A & B / Product Type: Medicare /    In time:217  Out time:311  Total Treatment Time (min): 54  Total Timed Codes (min): 39  1:1 Treatment Time ( only): 39   Visit #:  2    Treatment Area: Low back pain [M54.5]  Bilateral shoulder pain [M25.511, M25.512]    SUBJECTIVE  Pain Level (0-10 scale): 10  Any medication changes, allergies to medications, adverse drug reactions, diagnosis change, or new procedure performed?: [x] No    [] Yes (see summary sheet for update)  Subjective functional status/changes:   [] No changes reported  Patient reports his shoulders are his greatest issue at the moment, but his back increases in pain when he is standing.       OBJECTIVE    39 min Therapeutic Exercise:  [] See flow sheet :   Rationale: increase ROM and increase strength to improve the patients ability to perform ADLs and reduce pain levels    15 min Manual Therapy: Bilateral hip PROM, all planes with overpressure to tolerance; bilateral shoulder PROM, all planes to tolerance (scapular stabilization); bilateral glenohumeral inferior/AP joint mobilizations (Grade III-IV); STM/TPR bilateral upper trapezius/levator scapula/scalenes/pectoralis major/minor/supraspinatus/infraspinatus/teres minor/subscapularis    Rationale: decrease pain, increase ROM, increase tissue extensibility and decrease trigger points to improve the patients ability to perform ADLs and reduce pain levels    With   [] TE   [] TA   [] Neuro   [] SC   [] other: Patient Education: [x] Review HEP    [] Progressed/Changed HEP based on:   [] positioning   [] body mechanics   [] transfers   [] heat/ice application    [] other:      Other Objective/Functional Measures: none noted     Pain Level (0-10 scale) post treatment: 5/10    ASSESSMENT/Changes in Function:   Patient is limited in all shoulder ROM, however ER is the will have the greatest limitations L>R. Tolerated all therex, will continue to progress as tolerated. Patient will continue to benefit from skilled PT services to modify and progress therapeutic interventions, address functional mobility deficits, address ROM deficits, address strength deficits, analyze and address soft tissue restrictions, analyze and cue movement patterns, analyze and modify body mechanics/ergonomics and assess and modify postural abnormalities to attain remaining goals. [x]  See Plan of Care  []  See progress note/recertification  []  See Discharge Summary         Progress towards goals / Updated goals:  Patient is progressing towards goals.      PLAN  [x]  Upgrade activities as tolerated     [x]  Continue plan of care  [x]  Update interventions per flow sheet       []  Discharge due to:_  []  Other:_      [place]oney [place], PTA 9/1/2021

## 2021-09-02 ENCOUNTER — VIRTUAL VISIT (OUTPATIENT)
Dept: SLEEP MEDICINE | Age: 76
End: 2021-09-02
Payer: MEDICARE

## 2021-09-02 VITALS
HEART RATE: 73 BPM | HEIGHT: 72 IN | WEIGHT: 250 LBS | SYSTOLIC BLOOD PRESSURE: 165 MMHG | DIASTOLIC BLOOD PRESSURE: 94 MMHG | BODY MASS INDEX: 33.86 KG/M2

## 2021-09-02 DIAGNOSIS — I10 ESSENTIAL HYPERTENSION: ICD-10-CM

## 2021-09-02 DIAGNOSIS — G47.33 OSA (OBSTRUCTIVE SLEEP APNEA): Primary | ICD-10-CM

## 2021-09-02 PROCEDURE — 3288F FALL RISK ASSESSMENT DOCD: CPT | Performed by: NURSE PRACTITIONER

## 2021-09-02 PROCEDURE — G8427 DOCREV CUR MEDS BY ELIG CLIN: HCPCS | Performed by: NURSE PRACTITIONER

## 2021-09-02 PROCEDURE — G8417 CALC BMI ABV UP PARAM F/U: HCPCS | Performed by: NURSE PRACTITIONER

## 2021-09-02 PROCEDURE — G8536 NO DOC ELDER MAL SCRN: HCPCS | Performed by: NURSE PRACTITIONER

## 2021-09-02 PROCEDURE — G8753 SYS BP > OR = 140: HCPCS | Performed by: NURSE PRACTITIONER

## 2021-09-02 PROCEDURE — 1100F PTFALLS ASSESS-DOCD GE2>/YR: CPT | Performed by: NURSE PRACTITIONER

## 2021-09-02 PROCEDURE — G9717 DOC PT DX DEP/BP F/U NT REQ: HCPCS | Performed by: NURSE PRACTITIONER

## 2021-09-02 PROCEDURE — 99213 OFFICE O/P EST LOW 20 MIN: CPT | Performed by: NURSE PRACTITIONER

## 2021-09-02 PROCEDURE — G8755 DIAS BP > OR = 90: HCPCS | Performed by: NURSE PRACTITIONER

## 2021-09-02 RX ORDER — GABAPENTIN 300 MG/1
CAPSULE ORAL
COMMUNITY
Start: 2021-08-11 | End: 2022-01-05

## 2021-09-02 NOTE — PATIENT INSTRUCTIONS
[address] [facility] [place]. [place], [address]  Tel.  [phone]  Fax. [address] [facility]  [place], [address]  Tel.  [phone]  Fax. [phone] [address]  Tel.  [phone]  Fax. [phone]     Learning About CPAP for Sleep Apnea  What is CPAP? CPAP is a small machine that you use at home every night while you sleep. It increases air pressure in your throat to keep your airway open. When you have sleep apnea, this can help you sleep better so you feel much better. CPAP stands for \"continuous positive airway pressure. \"  The CPAP machine will have one of the following:  · A mask that covers your nose and mouth  · Prongs that fit into your nose  · A mask that covers your nose only, the most common type. This type is called NCPAP. The N stands for \"nasal.\"  Why is it done? CPAP is usually the best treatment for obstructive sleep apnea. It is the first treatment choice and the most widely used. Your doctor may suggest CPAP if you have:  · Moderate to severe sleep apnea. · Sleep apnea and coronary artery disease (CAD) or heart failure. How does it help? · CPAP can help you have more normal sleep, so you feel less sleepy and more alert during the daytime. · CPAP may help keep heart failure or other heart problems from getting worse. · NCPAP may help lower your blood pressure. · If you use CPAP, your bed partner may also sleep better because you are not snoring or restless. What are the side effects? Some people who use CPAP have:  · A dry or stuffy nose and a sore throat. · Irritated skin on the face. · Sore eyes. · Bloating. If you have any of these problems, work with your doctor to fix them. Here are some things you can try:  · Be sure the mask or nasal prongs fit well. · See if your doctor can adjust the pressure of your CPAP. · If your nose is dry, try a humidifier.   · If your nose is runny or stuffy, try decongestant medicine or a steroid nasal spray. If these things do not help, you might try a different type of machine. Some machines have air pressure that adjusts on its own. Others have air pressures that are different when you breathe in than when you breathe out. This may reduce discomfort caused by too much pressure in your nose. Where can you learn more? Go to [employer]Jam.be  Enter chastity Maldonado in the search box to learn more about \"Learning About CPAP for Sleep Apnea. \"   © 9659-7143 Healthwise, Incorporated. Care instructions adapted under license by [facility] [email or web address] (which disclaims liability or warranty for this information). This care instruction is for use with your licensed healthcare professional. If you have questions about a medical condition or this instruction, always ask your healthcare professional. [address] any warranty or liability for your use of this information. Content Version: 8.2.78104; Last Revised: January 11, 2010  PROPER SLEEP HYGIENE    What to avoid  · Do not have drinks with caffeine, such as coffee or black tea, for 8 hours before bed. · Do not smoke or use other types of tobacco near bedtime. Nicotine is a stimulant and can keep you awake. · Avoid drinking alcohol late in the evening, because it can cause you to wake in the middle of the night. · Do not eat a big meal close to bedtime. If you are hungry, eat a light snack. · Do not drink a lot of water close to bedtime, because the need to urinate may wake you up during the night. · Do not read or watch TV in bed. Use the bed only for sleeping and sexual activity. What to try  · Go to bed at the same time every night, and wake up at the same time every morning. Do not take naps during the day. · Keep your bedroom quiet, dark, and cool. · Get regular exercise, but not within 3 to 4 hours of your bedtime. .  · Sleep on a comfortable pillow and mattress.   · If watching the clock makes you anxious, turn it facing away from you so you cannot see the time. · If you worry when you lie down, start a worry book. Well before bedtime, write down your worries, and then set the book and your concerns aside. · Try meditation or other relaxation techniques before you go to bed. · If you cannot fall asleep, get up and go to another room until you feel sleepy. Do something relaxing. Repeat your bedtime routine before you go to bed again. · Make your house quiet and calm about an hour before bedtime. Turn down the lights, turn off the TV, log off the computer, and turn down the volume on music. This can help you relax after a busy day. Drowsy Driving: The [employer] cites drowsiness as a causing factor in more than 501,561 police reported crashes annually, resulting in 76,000 injuries and 1,500 deaths. Other surveys suggest 55% of people polled have driven while drowsy in the past year, 23% had fallen asleep but not crashed, 3% crashed, and 2% had and accident due to drowsy driving. Who is at risk? Young Drivers: One study of drowsy driving accidents states that 55% of the drivers were under 25 years. Of those, 75% were male. Shift Workers and Travelers: People who work overnight or travel across time zones frequently are at higher risk of experiencing Circadian Rhythm Disorders. They are trying to work and function when their body is programed to sleep. Sleep Deprived: Lack of sleep has a serious impact on your ability to pay attention or focus on a task. Consistently getting less than the average of 8 hours your body needs creates partial or cumulative sleep deprivation. Untreated Sleep Disorders: Sleep Apnea, Narcolepsy, R.L.S., and other sleep disorders (untreated) prevent a person from getting enough restful sleep. This leads to excessive daytime sleepiness and increases the risk for drowsy driving accidents by up to 7 times.   Medications / Alcohol: Even over the counter medications can cause drowsiness. Medications that impair a drivers attention should have a warning label. Alcohol naturally makes you sleepy and on its own can cause accidents. Combined with excessive drowsiness its effects are amplified. Signs of Drowsy Driving:   * You don't remember driving the last few miles   * You may drift out of your ella   * You are unable to focus and your thoughts wander   * You may yawn more often than normal   * You have difficulty keeping your eyes open / nodding off   * Missing traffic signs, speeding, or tailgating  Prevention-   Good sleep hygiene, lifestyle and behavioral choices have the most impact on drowsy driving. There is no substitute for sleep and the average person requires 8 hours nightly. If you find yourself driving drowsy, stop and sleep. Consider the sleep hygiene tips provided during your visit as well. Medication Refill Policy: Refills for all medications require 1 week advance notice. Please have your pharmacy fax a refill request. We are unable to fax, or call in \"controled substance\" medications and you will need to pick these prescriptions up from our office. [employer] Activation    Thank you for requesting access to [employer]. Please follow the instructions below to securely access and download your online medical record. [employer] allows you to send messages to your doctor, view your test results, renew your prescriptions, schedule appointments, and more. How Do I Sign Up? 1. In your internet browser, go to [email or web address]. 2. Click on the First Time User? Click Here link in the Sign In box. You will see the New Member Sign Up page. 3. Enter your [employer] Access Code exactly as it appears below. You will not need to use this code after youve completed the sign-up process. If you do not sign up before the expiration date, you must request a new code. [employer] Access Code:  Activation code not generated  Current [employer] Status: Active (This is the date your [employer] access code will )    4. Enter the last four digits of your Social Security Number (xxxx) and Date of Birth (mm/dd/yyyy) as indicated and click Submit. You will be taken to the next sign-up page. 5. Create a [employer] ID. This will be your [employer] login ID and cannot be changed, so think of one that is secure and easy to remember. 6. Create a [employer] password. You can change your password at any time. 7. Enter your Password Reset Question and Answer. This can be used at a later time if you forget your password. 8. Enter your e-mail address. You will receive e-mail notification when new information is available in [address] 9. Click Sign Up. You can now view and download portions of your medical record. 10. Click the Download Summary menu link to download a portable copy of your medical information. Additional Information    If you have questions, please call [phone]. Remember, [employer] is NOT to be used for urgent needs. For medical emergencies, dial 911.

## 2021-09-02 NOTE — PROGRESS NOTES
[address] [facility] [place]. [place], [address]   Tel.  [phone]   Fax. 100 [facility] 60   [place], [address] [facility]   Tel.  [phone]   Fax. [phone] [address]    Tel.  [phone]   Fax. [phone]     Blake Nova (: 1945) is a 64 y.o. male, established patient, seen for positive airway pressure follow-up, he was last seen by Dr. Razia Morgan on 10/3/2019, prior notes reviewed in detail. Initial sleep apnea diagnosis 25+ years ago. In lab split sleep test 2014 showed AHI of 49.5/hr with a lowest SpO2 of 82%, adequate CPAP titration. ASSESSMENT/PLAN:    ICD-10-CM ICD-9-CM    1. MELODY (obstructive sleep apnea)  G47.33 327.23    2. Essential hypertension  I10 401.9    3. Adult BMI 33.0-33.9 kg/sq m  Z68.33 V85.33        AHI = 49.5(2014). On ResMed S9 APAP :  12-14 cmH2O. Resumed prior device use due to Irving device recall. He is adherent with PAP therapy and PAP continues to benefit patient and remains necessary for control of his sleep apnea. His prior device was a Respironics APAP 12-14, set up 2019    Follow-up and Dispositions    · Return in about 3 months (around 2021). 1. Sleep Apnea - Continue on current pressures for old Resmed device while he awaits repair/replacement of recalled Irving deice. He has registered the Irving device. * Download needed from ResMed S9 device to assess efficacy of current pressure settings and check humidifier - his daughter will bring device in for download     * Re-enforced proper and regular cleaning for the device. We discussed the risk associated with use of cleaning devices and he will continue with use of dish soap and water as the appropriate cleaning method. * He was asked to contact our office for any problems regarding PAP therapy.     2. Hypertension -  continue on his current regimen, he will continue to monitor his BP and follow up with his PMD for reevaluation/adjustment of medications if warranted. I have reviewed the relationship between hypertension as it relates to sleep-disordered breathing. 3. Recommended a dedicated weight loss program through appropriate diet and exercise regimen as significant weight reduction has been shown to reduce severity of obstructive sleep apnea. SUBJECTIVE/OBJECTIVE:    He  is seen today for follow up on PAP device and reports no problems using the device. The following concerns reviewed:    Drowsiness no Problems exhaling no   Snoring no Forget to put on no   Mask Comfortable yes Can't fall asleep no   Dry Mouth yes Mask falls off no   Air Leaking no Frequent awakenings no       He admits that his sleep has improved on PAP therapy using nasal mask and heated tubing. He is joined by his daughter Antoinette Aguero. He has been using the ResMed S9 for the past week, and notes that he feels he could use some more pressure and that there is not enough pressure during the ramp period. He will bring the Resmed device in for download and adjustment. He was diagnosed with Kidney cancer 11/2020 with partial nephrectomy. He was recently diagnosis with COPD and has been having a harder time falling asleep at night. He has been treating RLS with ropinirole (managed previously by neurology)  but reports he was recently started on gabapentin by foot doctor. We discussed that he should review need for ropinirole with ordering provider, they will check medication bottles. Review of device download from recalled Irving device shows  Auto pressure: 12-14 cmH2O; Max Pressure: 14.0 cmH2O;  95th percentile Pressure: 14.0 cmH2O   95th Percentile Leak: 2.2 L/Min     % Used Days >= 4 hours: 100. Avg hours used:  8:55. Therapy Apnea Index averaged over PAP use: 8.2 /hr which reflects improved sleep breathing condition. [place] Sleepiness Score: 15 and Modified F.O.S.Q. Score Total / 2: 14 .     Sleep Review of Systems: notable for Negative difficulty falling asleep; Occasional awakenings at night; negative early morning headaches; Negative memory problems; Negative concentration issues; Negative chest pain; Negative shortness of breath; Negative significant joint pain at night; Negative significant muscle pain at night; Negative rashes or itching; Negative heartburn; Negative significant mood issues; 0 afternoon naps per week    Vitals reported by patient   Patient-Reported Vitals 9/2/2021   Patient-Reported Weight 250 lbs   Patient-Reported Pulse -   Patient-Reported Temperature -   Patient-Reported SpO2 -   Patient-Reported Systolic  519   Patient-Reported Diastolic 94      Calculated BMI 33    Physical Exam completed by visual and auditory observation of patient with verbal input from patient. General:   Alert, oriented, not in acute distress   Eyes:  Anicteric Sclerae; no obvious strabismus   Nose:  No obvious nasal septum deviation    Neck:   Midline trachea, no visible mass   Chest/Lungs:  Respiratory effort normal, no visualized signs of difficulty breathing or respiratory distress   CVS:  No JVD   Extremities:  No obvious rashes noted on face, neck, or hands   Neuro:  No facial asymmetry, no focal deficits; no obvious tremor    Psych:  Normal affect,  normal countenance     [place] How is being evaluated by a Virtual Visit (video visit) encounter to address concerns as mentioned above. A caregiver was present when appropriate. Due to this being a TeleHealth encounter (During BTP-14 public health emergency), evaluation of the following organ systems was limited: Vitals/Constitutional/EENT/Resp/CV/GI//MS/Neuro/Skin/Heme-Lymph-Imm.   Pursuant to the emergency declaration under the [facility], [facility]5 waiver authority and the [employer] Act, this Virtual Visit was conducted with patient's (and/or legal guardian's) consent, to reduce the patient's risk of exposure to COVID-19 and provide necessary medical care. Patient identification was verified at the start of the visit: YES using name and date of birth. Patient's phone number [phone] (home)  was confirmed for accuracy. He gives permission for messages regarding results and appointments to be left at that number. Services were provided through a video synchronous discussion virtually to substitute for in-person clinic visit. I was in the office while conducting this encounter, patient located at their home or alternate location of their choice. An electronic signature was used to authenticate this note.     -- Mor Alexander NP, [facility]  09/02/21

## 2021-09-02 NOTE — PROGRESS NOTES
Ambulatory Care Management Note      Date/Time:  9/1/2021 10:25 PM    Top Challenges reviewed with the provider   Challenges to be reviewed by the provider   Additional needs identified to be addressed with provider: yes  · Complain of low back and shoulder pain  · Shortness of breath with exertion          Ambulatory  contacted patient for discussion and case management of self care  Summary of patients top problems:   1. Chronic Pain-Complain of low back and bilateral shoulder pain. 2.   COPD-On anoro and albuterol. Followed by pulmonary. MELODY on CPAP.  Some SOB on exertion     PCP/Specialist follow up:   Future Appointments   Date Time Provider Seth Skelton   9/2/2021  1:30 PM Arelis Washubrn, NP [address] BS [facility]   9/3/2021  8:00 AM Usman Ralph, PT [facility]   9/8/2021 10:00 AM Shay Turk, PTA [facility]   9/10/2021 11:30 AM Usman Ralph, PT [facility]   9/13/2021 10:30 AM Shay Turk, PTA [facility]   9/15/2021 10:30 AM Usman Ralph, PT [facility]   9/20/2021 11:30 AM Usman Ralph, PT [facility]   9/22/2021 10:30 AM Usman Ralph, PT [facility]   9/27/2021 10:30 AM Usman Ralph, PT [facility]   9/29/2021 10:30 AM Usman Ralph, PT [facility]   1/26/2022  2:00 PM Baron Jalil MD [facility] BS AM[facility]   5/17/2022 10:30 AM MD JEFF Cao BS AMB        Goals Addressed                 This Visit's Progress     Attends follow up appointments on schedule        09/01/21    Will attend follow up appts with urology, nephrology, and cardiology   PCP office will contact patient to schedule JANINA appt   Will attend all scheduled appointments       Independent self-management skills        09/01/21  [facility] Outreach completed, continue to have back and shoulder pain- receiving PT by     Usman Ralph PT  [facility]rm Op Pt    Encouraged to take Robaxin  and tylenol as ordered   Educated on safety precautions   Instructed patient on how to access supportive resources   Will attend all follow up appointments as ordered. pmk          Patient verbalized understanding of all information discussed. Patient has this Nurse Navigators contact information for any further questions, concerns, or needs.

## 2021-09-03 ENCOUNTER — HOSPITAL ENCOUNTER (OUTPATIENT)
Dept: PHYSICAL THERAPY | Age: 76
Discharge: HOME | End: 2021-09-03
Payer: MEDICARE

## 2021-09-03 PROCEDURE — 97112 NEUROMUSCULAR REEDUCATION: CPT

## 2021-09-03 PROCEDURE — 97140 MANUAL THERAPY 1/> REGIONS: CPT

## 2021-09-03 PROCEDURE — 97110 THERAPEUTIC EXERCISES: CPT

## 2021-09-03 NOTE — PROGRESS NOTES
PT DAILY TREATMENT NOTE - [facility] 2-15    Patient Name: Rissa Florence  Date:9/3/2021  : 1945  [x]  Patient  Verified  [place] / Plan: [place] MED[place]ARE PART A & B / Product Type: Medicare /    In time:   Out time: 904  Total Treatment Time (min): 53  Total Timed Codes (min): 53  1:1 Treatment Time ( only): 42  Visit #:  3    Treatment Area: Low back pain [M54.5]  Bilateral shoulder pain [M25.511, M25.512]    SUBJECTIVE  Pain Level (0-10 scale): 6 in low back  Any medication changes, allergies to medications, adverse drug reactions, diagnosis change, or new procedure performed?: [x] No    [] Yes (see summary sheet for update)  Subjective functional status/changes:   [] No changes reported    The patient reports his back is still bothering him in certain positions; he has been working on home exercises since last visit. He was very sore after last visit, and this has continued into today. OBJECTIVE    26 min Therapeutic Exercise:  [x] See flow sheet : Includes time spent educating patient and his daughter on appropriate footwear with increased toe box width; recommended trialing Altras as patient is looking for tennis shoe. Patient and his daughter verbalizing and demonstrating understanding of provided education with 100% accuracy using teach back method.    Rationale: increase ROM, increase strength, improve coordination, improve balance and increase proprioception to improve the patients ability to transfer and reach for items    10 min Neuromuscular Re-education:  [x]  See flow sheet :   Rationale: increase ROM, increase strength, improve coordination, improve balance and increase proprioception  to improve the patients ability to turn, transfer, and reach for items    17 min Manual Therapy: Bilateral hip PROM, all planes with overpressure to tolerance; bilateral belted hip mobilization (hip flexion/IR/ER with distraction);  bilateral shoulder PROM, all planes to tolerance (scapular stabilization); bilateral glenohumeral inferior/AP joint mobilizations (Grade III-IV); STM/TPR bilateral upper trapezius/levator scapula/scalenes/pectoralis major/minor/supraspinatus/infraspinatus/teres minor/subscapularis    Rationale: decrease pain, increase ROM, increase tissue extensibility and decrease trigger points to improve the patients ability to transfer and reach for items    With   [x] TE   [] TA   [x] Neuro   [] SC   [] other: Patient Education: [x] Review HEP    [] Progressed/Changed HEP based on:   [] positioning   [] body mechanics   [] transfers   [] heat/ice application    [] other:      Other Objective/Functional Measures: None noted     Pain Level (0-10 scale) post treatment: 6/10 in low back in certain positions    ASSESSMENT/Changes in Function:   The patient tolerated therapy visit well at this date. He demonstrates continued ROM limitations into hip IR and multiplanar L > R shoulder abduction > IR > flexion > ER. Patient demonstrates continued decreased L > R inferior glenohumeral joint mobility. Patient demonstrates mild improvement in R > L shoulder flexion > abduction with test-retest post-manual intervention. Patient requiring intermittent tactile cues for scapular retraction/depression throughout resisted rows > extension progression. Patient demonstrates difficulty navigating cone weave during first pass due to inability to clear LEs from cone base; improved with changes in technique to emphasize lateral > forward movement during subsequent passes. Patient very slow and deliberate with all dynamic postural control training. Continue to progress as tolerated.   Patient will continue to benefit from skilled PT services to modify and progress therapeutic interventions, address functional mobility deficits, address ROM deficits, address strength deficits, analyze and address soft tissue restrictions, analyze and cue movement patterns, analyze and modify body mechanics/ergonomics and assess and modify postural abnormalities to attain remaining goals. [x]  See Plan of Care  []  See progress note/recertification  []  See Discharge Summary         Progress towards goals / Updated goals:    Short Term Goals: To be accomplished in 6-8 treatments: The patient will demonstrate understanding of and compliance with updated and progressive HEP toward improved participation in physical therapy plan of care. - Progressing              The patient will demonstrate bilateral hip IR PROM >= 20 degrees toward improved postural control with dynamic tasks. - Progressing              The patient will demonstrate bilateral shoulder abduction AROM >= 110 degrees toward improved ability to reach objects. - Progressing  Long Term Goals: To be accomplished in 12-16 treatments: The patient will demonstrate multiplanar bilateral LE strength increased by >= 1/2 MMT grade toward improved endurance with functional activities such as transfers and walking. - Progressing              The patient will demonstrate single limb postural control >= 10 seconds bilaterally toward improved safety with home and community mobility. - Progressing              The patient will score >= MCID on FOTO to demonstrate significantly improved subjective report of function. - Progressing  Frequency / Duration: Patient to be seen 2 times per week for 12-16 treatments.     PLAN  [x]  Upgrade activities as tolerated     [x]  Continue plan of care  [x]  Update interventions per flow sheet       []  Discharge due to:_  []  Other:_      Chasity Marquez, PT, DPT 9/3/2021

## 2021-09-04 ENCOUNTER — HOSPITAL ENCOUNTER (INPATIENT)
Age: 76
LOS: 6 days | Discharge: HOME HEALTH | DRG: 690 | End: 2021-09-10
Attending: INTERNAL MEDICINE | Admitting: INTERNAL MEDICINE
Payer: MEDICARE

## 2021-09-04 PROBLEM — N12 PYELONEPHRITIS: Status: ACTIVE | Noted: 2021-09-04

## 2021-09-04 PROCEDURE — 74011250636 HC RX REV CODE- 250/636: Performed by: INTERNAL MEDICINE

## 2021-09-04 PROCEDURE — 74011000258 HC RX REV CODE- 258: Performed by: INTERNAL MEDICINE

## 2021-09-04 PROCEDURE — 74011250637 HC RX REV CODE- 250/637: Performed by: INTERNAL MEDICINE

## 2021-09-04 PROCEDURE — 36415 COLL VENOUS BLD VENIPUNCTURE: CPT

## 2021-09-04 PROCEDURE — 81001 URINALYSIS AUTO W/SCOPE: CPT

## 2021-09-04 PROCEDURE — 2709999900 HC NON-CHARGEABLE SUPPLY

## 2021-09-04 PROCEDURE — 87040 BLOOD CULTURE FOR BACTERIA: CPT

## 2021-09-04 PROCEDURE — 65270000029 HC RM PRIVATE

## 2021-09-04 RX ORDER — ATORVASTATIN CALCIUM 40 MG/1
80 TABLET, FILM COATED ORAL
Status: DISCONTINUED | OUTPATIENT
Start: 2021-09-04 | End: 2021-09-10 | Stop reason: HOSPADM

## 2021-09-04 RX ORDER — LOSARTAN POTASSIUM 25 MG/1
50 TABLET ORAL DAILY
Status: DISCONTINUED | OUTPATIENT
Start: 2021-09-05 | End: 2021-09-05

## 2021-09-04 RX ORDER — TELMISARTAN 40 MG/1
40 TABLET ORAL DAILY
Status: DISCONTINUED | OUTPATIENT
Start: 2021-09-05 | End: 2021-09-04 | Stop reason: CLARIF

## 2021-09-04 RX ORDER — SODIUM CHLORIDE 9 MG/ML
75 INJECTION, SOLUTION INTRAVENOUS CONTINUOUS
Status: DISCONTINUED | OUTPATIENT
Start: 2021-09-04 | End: 2021-09-10

## 2021-09-04 RX ORDER — ALLOPURINOL 100 MG/1
100 TABLET ORAL
Status: DISCONTINUED | OUTPATIENT
Start: 2021-09-05 | End: 2021-09-10 | Stop reason: HOSPADM

## 2021-09-04 RX ORDER — ACETAMINOPHEN 650 MG/1
650 SUPPOSITORY RECTAL
Status: DISCONTINUED | OUTPATIENT
Start: 2021-09-04 | End: 2021-09-10 | Stop reason: HOSPADM

## 2021-09-04 RX ORDER — ENOXAPARIN SODIUM 100 MG/ML
30 INJECTION SUBCUTANEOUS DAILY
Status: DISCONTINUED | OUTPATIENT
Start: 2021-09-05 | End: 2021-09-04

## 2021-09-04 RX ORDER — SODIUM CHLORIDE 0.9 % (FLUSH) 0.9 %
5-40 SYRINGE (ML) INJECTION EVERY 8 HOURS
Status: DISCONTINUED | OUTPATIENT
Start: 2021-09-04 | End: 2021-09-10 | Stop reason: HOSPADM

## 2021-09-04 RX ORDER — HYDROXYZINE HYDROCHLORIDE 10 MG/1
10-20 TABLET, FILM COATED ORAL
Status: DISCONTINUED | OUTPATIENT
Start: 2021-09-04 | End: 2021-09-10 | Stop reason: HOSPADM

## 2021-09-04 RX ORDER — SODIUM CHLORIDE 0.9 % (FLUSH) 0.9 %
5-40 SYRINGE (ML) INJECTION AS NEEDED
Status: DISCONTINUED | OUTPATIENT
Start: 2021-09-04 | End: 2021-09-10 | Stop reason: HOSPADM

## 2021-09-04 RX ORDER — PANTOPRAZOLE SODIUM 40 MG/1
40 TABLET, DELAYED RELEASE ORAL DAILY
Status: DISCONTINUED | OUTPATIENT
Start: 2021-09-05 | End: 2021-09-10 | Stop reason: HOSPADM

## 2021-09-04 RX ORDER — MORPHINE SULFATE 2 MG/ML
3 INJECTION, SOLUTION INTRAMUSCULAR; INTRAVENOUS ONCE
Status: COMPLETED | OUTPATIENT
Start: 2021-09-04 | End: 2021-09-04

## 2021-09-04 RX ORDER — ONDANSETRON 2 MG/ML
4 INJECTION INTRAMUSCULAR; INTRAVENOUS
Status: DISCONTINUED | OUTPATIENT
Start: 2021-09-04 | End: 2021-09-10 | Stop reason: HOSPADM

## 2021-09-04 RX ORDER — METOPROLOL SUCCINATE 25 MG/1
25 TABLET, EXTENDED RELEASE ORAL DAILY
Status: DISCONTINUED | OUTPATIENT
Start: 2021-09-05 | End: 2021-09-10 | Stop reason: HOSPADM

## 2021-09-04 RX ORDER — ACETAMINOPHEN 325 MG/1
650 TABLET ORAL
Status: DISCONTINUED | OUTPATIENT
Start: 2021-09-04 | End: 2021-09-10 | Stop reason: HOSPADM

## 2021-09-04 RX ORDER — ROPINIROLE 1 MG/1
0.5 TABLET, FILM COATED ORAL
Status: DISCONTINUED | OUTPATIENT
Start: 2021-09-04 | End: 2021-09-10 | Stop reason: HOSPADM

## 2021-09-04 RX ORDER — ONDANSETRON 4 MG/1
4 TABLET, ORALLY DISINTEGRATING ORAL
Status: DISCONTINUED | OUTPATIENT
Start: 2021-09-04 | End: 2021-09-10 | Stop reason: HOSPADM

## 2021-09-04 RX ORDER — ASPIRIN 81 MG/1
81 TABLET ORAL
Status: DISCONTINUED | OUTPATIENT
Start: 2021-09-04 | End: 2021-09-10 | Stop reason: HOSPADM

## 2021-09-04 RX ORDER — AMLODIPINE BESYLATE 5 MG/1
5 TABLET ORAL DAILY
Status: DISCONTINUED | OUTPATIENT
Start: 2021-09-05 | End: 2021-09-05

## 2021-09-04 RX ORDER — ISOSORBIDE MONONITRATE 30 MG/1
15 TABLET, EXTENDED RELEASE ORAL DAILY
Status: DISCONTINUED | OUTPATIENT
Start: 2021-09-05 | End: 2021-09-10 | Stop reason: HOSPADM

## 2021-09-04 RX ORDER — METHOCARBAMOL 500 MG/1
750 TABLET, FILM COATED ORAL
Status: DISCONTINUED | OUTPATIENT
Start: 2021-09-04 | End: 2021-09-10 | Stop reason: HOSPADM

## 2021-09-04 RX ORDER — MORPHINE SULFATE 2 MG/ML
1 INJECTION, SOLUTION INTRAMUSCULAR; INTRAVENOUS
Status: DISCONTINUED | OUTPATIENT
Start: 2021-09-04 | End: 2021-09-10 | Stop reason: HOSPADM

## 2021-09-04 RX ORDER — HYDRALAZINE HYDROCHLORIDE 20 MG/ML
10 INJECTION INTRAMUSCULAR; INTRAVENOUS
Status: DISCONTINUED | OUTPATIENT
Start: 2021-09-04 | End: 2021-09-10 | Stop reason: HOSPADM

## 2021-09-04 RX ORDER — ALBUTEROL SULFATE 90 UG/1
1 AEROSOL, METERED RESPIRATORY (INHALATION)
Status: DISCONTINUED | OUTPATIENT
Start: 2021-09-04 | End: 2021-09-10 | Stop reason: HOSPADM

## 2021-09-04 RX ORDER — POLYETHYLENE GLYCOL 3350 17 G/17G
17 POWDER, FOR SOLUTION ORAL DAILY PRN
Status: DISCONTINUED | OUTPATIENT
Start: 2021-09-04 | End: 2021-09-10 | Stop reason: HOSPADM

## 2021-09-04 RX ADMIN — ROPINIROLE HYDROCHLORIDE 0.5 MG: 1 TABLET, FILM COATED ORAL at 22:53

## 2021-09-04 RX ADMIN — CEFTRIAXONE SODIUM 2 G: 2 INJECTION, POWDER, FOR SOLUTION INTRAMUSCULAR; INTRAVENOUS at 23:48

## 2021-09-04 RX ADMIN — ATORVASTATIN CALCIUM 80 MG: 40 TABLET, FILM COATED ORAL at 22:53

## 2021-09-04 RX ADMIN — ASPIRIN 81 MG: 81 TABLET, COATED ORAL at 22:53

## 2021-09-04 RX ADMIN — MORPHINE SULFATE 3 MG: 2 INJECTION, SOLUTION INTRAMUSCULAR; INTRAVENOUS at 22:50

## 2021-09-04 RX ADMIN — SODIUM CHLORIDE 100 ML/HR: 9 INJECTION, SOLUTION INTRAVENOUS at 23:01

## 2021-09-04 RX ADMIN — Medication 10 ML: at 23:49

## 2021-09-05 ENCOUNTER — APPOINTMENT (OUTPATIENT)
Dept: ULTRASOUND IMAGING | Age: 76
DRG: 690 | End: 2021-09-05
Attending: INTERNAL MEDICINE
Payer: MEDICARE

## 2021-09-05 LAB
ANION GAP SERPL CALC-SCNC: 8 MMOL/L (ref 5–15)
APPEARANCE UR: CLEAR
BACTERIA URNS QL MICRO: NEGATIVE /HPF
BILIRUB UR QL: NEGATIVE
BUN SERPL-MCNC: 28 MG/DL (ref 6–20)
BUN/CREAT SERPL: 9 (ref 12–20)
CALCIUM SERPL-MCNC: 8.7 MG/DL (ref 8.5–10.1)
CHLORIDE SERPL-SCNC: 106 MMOL/L (ref 97–108)
CO2 SERPL-SCNC: 27 MMOL/L (ref 21–32)
COLOR UR: ABNORMAL
CREAT SERPL-MCNC: 3.23 MG/DL (ref 0.7–1.3)
EPITH CASTS URNS QL MICRO: ABNORMAL /LPF
ERYTHROCYTE [DISTWIDTH] IN BLOOD BY AUTOMATED COUNT: 14.7 % (ref 11.5–14.5)
GLUCOSE BLD STRIP.AUTO-MCNC: 114 MG/DL (ref 65–117)
GLUCOSE BLD STRIP.AUTO-MCNC: 131 MG/DL (ref 65–117)
GLUCOSE BLD STRIP.AUTO-MCNC: 132 MG/DL (ref 65–117)
GLUCOSE SERPL-MCNC: 137 MG/DL (ref 65–100)
GLUCOSE UR STRIP.AUTO-MCNC: NEGATIVE MG/DL
HCT VFR BLD AUTO: 36.2 % (ref 36.6–50.3)
HGB BLD-MCNC: 11.6 G/DL (ref 12.1–17)
HGB UR QL STRIP: ABNORMAL
HYALINE CASTS URNS QL MICRO: ABNORMAL /LPF (ref 0–5)
KETONES UR QL STRIP.AUTO: NEGATIVE MG/DL
LEUKOCYTE ESTERASE UR QL STRIP.AUTO: NEGATIVE
MCH RBC QN AUTO: 31 PG (ref 26–34)
MCHC RBC AUTO-ENTMCNC: 32 G/DL (ref 30–36.5)
MCV RBC AUTO: 96.8 FL (ref 80–99)
NITRITE UR QL STRIP.AUTO: NEGATIVE
NRBC # BLD: 0 K/UL (ref 0–0.01)
NRBC BLD-RTO: 0 PER 100 WBC
PH UR STRIP: 7 [PH] (ref 5–8)
PLATELET # BLD AUTO: 217 K/UL (ref 150–400)
PMV BLD AUTO: 10 FL (ref 8.9–12.9)
POTASSIUM SERPL-SCNC: 4.2 MMOL/L (ref 3.5–5.1)
PROT UR STRIP-MCNC: 300 MG/DL
RBC # BLD AUTO: 3.74 M/UL (ref 4.1–5.7)
RBC #/AREA URNS HPF: ABNORMAL /HPF (ref 0–5)
SERVICE CMNT-IMP: ABNORMAL
SERVICE CMNT-IMP: ABNORMAL
SERVICE CMNT-IMP: NORMAL
SODIUM SERPL-SCNC: 141 MMOL/L (ref 136–145)
SP GR UR REFRACTOMETRY: 1.01 (ref 1–1.03)
UA: UC IF INDICATED,UAUC: ABNORMAL
UROBILINOGEN UR QL STRIP.AUTO: 0.2 EU/DL (ref 0.2–1)
WBC # BLD AUTO: 14.3 K/UL (ref 4.1–11.1)
WBC URNS QL MICRO: ABNORMAL /HPF (ref 0–4)

## 2021-09-05 PROCEDURE — 80048 BASIC METABOLIC PNL TOTAL CA: CPT

## 2021-09-05 PROCEDURE — 74011250636 HC RX REV CODE- 250/636: Performed by: INTERNAL MEDICINE

## 2021-09-05 PROCEDURE — 85027 COMPLETE CBC AUTOMATED: CPT

## 2021-09-05 PROCEDURE — 65270000029 HC RM PRIVATE

## 2021-09-05 PROCEDURE — 87086 URINE CULTURE/COLONY COUNT: CPT

## 2021-09-05 PROCEDURE — 74011250637 HC RX REV CODE- 250/637: Performed by: INTERNAL MEDICINE

## 2021-09-05 PROCEDURE — 74011250636 HC RX REV CODE- 250/636: Performed by: NURSE PRACTITIONER

## 2021-09-05 PROCEDURE — 82962 GLUCOSE BLOOD TEST: CPT

## 2021-09-05 PROCEDURE — 74011000258 HC RX REV CODE- 258: Performed by: INTERNAL MEDICINE

## 2021-09-05 PROCEDURE — 76770 US EXAM ABDO BACK WALL COMP: CPT

## 2021-09-05 PROCEDURE — 74011250637 HC RX REV CODE- 250/637: Performed by: NURSE PRACTITIONER

## 2021-09-05 RX ORDER — MAGNESIUM SULFATE 100 %
4 CRYSTALS MISCELLANEOUS AS NEEDED
Status: DISCONTINUED | OUTPATIENT
Start: 2021-09-05 | End: 2021-09-10 | Stop reason: HOSPADM

## 2021-09-05 RX ORDER — AMLODIPINE BESYLATE 5 MG/1
10 TABLET ORAL DAILY
Status: DISCONTINUED | OUTPATIENT
Start: 2021-09-06 | End: 2021-09-10 | Stop reason: HOSPADM

## 2021-09-05 RX ORDER — OXYCODONE HYDROCHLORIDE 5 MG/1
10 TABLET ORAL ONCE
Status: COMPLETED | OUTPATIENT
Start: 2021-09-06 | End: 2021-09-05

## 2021-09-05 RX ORDER — HYDROMORPHONE HYDROCHLORIDE 1 MG/ML
0.5 INJECTION, SOLUTION INTRAMUSCULAR; INTRAVENOUS; SUBCUTANEOUS ONCE
Status: COMPLETED | OUTPATIENT
Start: 2021-09-05 | End: 2021-09-05

## 2021-09-05 RX ORDER — INSULIN LISPRO 100 [IU]/ML
INJECTION, SOLUTION INTRAVENOUS; SUBCUTANEOUS
Status: DISCONTINUED | OUTPATIENT
Start: 2021-09-05 | End: 2021-09-10 | Stop reason: HOSPADM

## 2021-09-05 RX ORDER — DEXTROSE 50 % IN WATER (D50W) INTRAVENOUS SYRINGE
12.5-25 AS NEEDED
Status: DISCONTINUED | OUTPATIENT
Start: 2021-09-05 | End: 2021-09-10 | Stop reason: HOSPADM

## 2021-09-05 RX ADMIN — TIOTROPIUM BROMIDE AND OLODATEROL 2 PUFF: 3.124; 2.736 SPRAY, METERED RESPIRATORY (INHALATION) at 17:38

## 2021-09-05 RX ADMIN — OXYCODONE 10 MG: 5 TABLET ORAL at 23:45

## 2021-09-05 RX ADMIN — ATORVASTATIN CALCIUM 80 MG: 40 TABLET, FILM COATED ORAL at 21:59

## 2021-09-05 RX ADMIN — METOPROLOL SUCCINATE 25 MG: 25 TABLET, EXTENDED RELEASE ORAL at 09:15

## 2021-09-05 RX ADMIN — SODIUM CHLORIDE 100 ML/HR: 9 INJECTION, SOLUTION INTRAVENOUS at 11:35

## 2021-09-05 RX ADMIN — HYDRALAZINE HYDROCHLORIDE 10 MG: 20 INJECTION INTRAMUSCULAR; INTRAVENOUS at 22:47

## 2021-09-05 RX ADMIN — ASPIRIN 81 MG: 81 TABLET, COATED ORAL at 21:58

## 2021-09-05 RX ADMIN — ROPINIROLE HYDROCHLORIDE 0.5 MG: 1 TABLET, FILM COATED ORAL at 21:58

## 2021-09-05 RX ADMIN — SODIUM CHLORIDE 100 ML/HR: 9 INJECTION, SOLUTION INTRAVENOUS at 19:47

## 2021-09-05 RX ADMIN — MORPHINE SULFATE 1 MG: 2 INJECTION, SOLUTION INTRAMUSCULAR; INTRAVENOUS at 17:49

## 2021-09-05 RX ADMIN — Medication 10 ML: at 21:59

## 2021-09-05 RX ADMIN — MORPHINE SULFATE 1 MG: 2 INJECTION, SOLUTION INTRAMUSCULAR; INTRAVENOUS at 05:31

## 2021-09-05 RX ADMIN — Medication 10 ML: at 05:32

## 2021-09-05 RX ADMIN — HYDROXYZINE HYDROCHLORIDE 10 MG: 10 TABLET ORAL at 01:53

## 2021-09-05 RX ADMIN — HYDROMORPHONE HYDROCHLORIDE 0.5 MG: 1 INJECTION, SOLUTION INTRAMUSCULAR; INTRAVENOUS; SUBCUTANEOUS at 21:57

## 2021-09-05 RX ADMIN — HYDRALAZINE HYDROCHLORIDE 10 MG: 20 INJECTION INTRAMUSCULAR; INTRAVENOUS at 03:44

## 2021-09-05 RX ADMIN — MORPHINE SULFATE 1 MG: 2 INJECTION, SOLUTION INTRAMUSCULAR; INTRAVENOUS at 11:34

## 2021-09-05 RX ADMIN — CEFTRIAXONE 1 G: 1 INJECTION, POWDER, FOR SOLUTION INTRAMUSCULAR; INTRAVENOUS at 21:58

## 2021-09-05 RX ADMIN — MORPHINE SULFATE 1 MG: 2 INJECTION, SOLUTION INTRAMUSCULAR; INTRAVENOUS at 01:52

## 2021-09-05 RX ADMIN — PANTOPRAZOLE SODIUM 40 MG: 40 TABLET, DELAYED RELEASE ORAL at 09:15

## 2021-09-05 RX ADMIN — AMLODIPINE BESYLATE 5 MG: 5 TABLET ORAL at 09:15

## 2021-09-05 RX ADMIN — HYDROXYZINE HYDROCHLORIDE 20 MG: 10 TABLET ORAL at 21:58

## 2021-09-05 RX ADMIN — Medication 10 ML: at 16:20

## 2021-09-05 RX ADMIN — ACETAMINOPHEN 650 MG: 325 TABLET ORAL at 04:06

## 2021-09-05 RX ADMIN — ISOSORBIDE MONONITRATE 15 MG: 30 TABLET, EXTENDED RELEASE ORAL at 09:15

## 2021-09-05 RX ADMIN — LOSARTAN POTASSIUM 50 MG: 25 TABLET, FILM COATED ORAL at 09:15

## 2021-09-05 RX ADMIN — ALLOPURINOL 100 MG: 100 TABLET ORAL at 09:15

## 2021-09-05 RX ADMIN — MORPHINE SULFATE 1 MG: 2 INJECTION, SOLUTION INTRAMUSCULAR; INTRAVENOUS at 20:55

## 2021-09-05 NOTE — PROGRESS NOTES
End of Shift Note    Bedside shift change report given to [address] [place] [address] [place] RN(oncoming nurse) by Lashae Mendez (offgoing nurse).   Report included the following information SBAR, Kardex, MAR and Recent Results    Shift worked:  night   Shift summary and any significant changes:     admission       Concerns for physician to address:  pain control   Zone phone for oncoming shift:   [phone]     Patient Information  Del Macias  64 y.o.  9/4/2021  8:31 PM by Magnus Schilder, MD. Del Macias was admitted from Home    Problem List  Patient Active Problem List    Diagnosis Date Noted    Pyelonephritis 09/04/2021    Acute hypoxemic respiratory failure ([address] 04/08/2021    Anemia 04/08/2021    Hx of completed stroke 04/08/2021    Right kidney mass 04/05/2021    CKD (chronic kidney disease) stage 4, GFR 15-29 ml/min ([address] 11/23/2020    Diabetic peripheral neuropathy associated with type 2 diabetes mellitus ([address] 03/03/2020    History of left-sided carotid endarterectomy 03/03/2020    Bilateral carotid artery stenosis 01/09/2020    Pure hypercholesterolemia 08/30/2019    Coronary artery disease due to lipid rich plaque 04/27/2016    Coronary artery disease involving native coronary artery of native heart without angina pectoris 03/16/2016    S/P coronary artery stent placement 05/12/2015    Benign essential tremor 01/22/2014    Hypertensive kidney disease with chronic kidney disease stage III ([address] 11/22/2013    Obesity 01/30/2013    Gout 01/30/2013    Chronic kidney disease, stage III (moderate) ([address] 10/11/2009    Mixed hyperlipidemia 10/11/2009    Obstructive sleep apnea 10/11/2009    RLS (restless legs syndrome) 10/11/2009    Peripheral neuropathy 10/11/2009    DJD (degenerative joint disease), multiple sites 10/11/2009    Essential hypertension 10/11/2009    GERD (gastroesophageal reflux disease) 10/11/2009    Anxiety associated with depression 10/11/2009     Past Medical History:   Diagnosis Date  Abnormal stress echo 5/12/2015    Anemia NEC 03/2013    Anxiety     Borderline diabetes     CAD (coronary artery disease) 2009    cath [facility]) revealed 20%RCA and 50%2nd diagonal    Calculus of kidney     Chronic kidney disease, stage III (moderate) (HCC) 10/11/2009    30% kidney function    COPD, mild (HCC)     Followed by pulmonary associates    DJD (degenerative joint disease)     Fatty liver     GERD (gastroesophageal reflux disease) 10/11/2009    Gout     Hypertension     MELODY on CPAP 10/11/2009    nasal pillows; pressure set at 10-11 -     Peripheral neuropathy 10/11/2009    bilateral feet (numbness)    Renal cell cancer, right ([address] 01/2021    RLS (restless legs syndrome) 10/11/2009    S/P coronary artery stent placement 05/12/2015    PCI./MALI to LCx, 8/2019 PCI/MALI Prox LAD (RCA 40-50% lesions)       Core Measures:  CVA: No Not applicable  CHF:No Not applicable  PNA:No Not applicable    Activity:  Activity Level: Up with Assistance  Number times ambulated in hallways past shift  : 0  Number of times OOB to chair past shift: 0    Cardiac:   Cardiac Monitoring: No           Access:   Current line(s): PIV     Genitourinary:   Urinary status: voiding  Urinary Catheter? No     Respiratory:      Chronic home O2 use?: NO uses CPAP at home, put on 2L NC to sleep for comfort  Incentive spirometer at bedside: N/A       GI:     Current diet:  ADULT DIET Regular; 4 carb choices (60 gm/meal)  Passing flatus: YES  Tolerating current diet: YES       Pain Management:   Patient states pain is manageable on current regimen: NO    Skin:  Josh Score: 21  Interventions: increase time out of bed and PT/OT consult    Patient Safety:  Fall Score:  Total Score: 3  Interventions: bed/chair alarm, gripper socks and pt to call before getting OOB  High Fall Risk: Yes  @Rollbelt  @dexterity to release roll belt  Yes/No ( must document dexterity  here by stating Yes or No here, otherwise this is a restraint and must follow restraint documentation policy.)    DVT prophylaxis:  DVT prophylaxis Med- No  DVT prophylaxis SCD or PHONG- Yes     Wounds: (If Applicable)  Wounds- Yes  Location left 2nd & 3rd toes- pt says from shoes.       Active Consults:  None    Length of Stay:  Expected LOS: - - -  Actual LOS: 1  Discharge Plan: No just admitted      Lemuel Wang

## 2021-09-05 NOTE — PROGRESS NOTES
Hospitalist Progress Note    NAME: Gely Youssef   :  1945   MRN:  [id number]       Assessment / Plan:    Acute left-sided pyelonephritis POA  History of renal cancer with right partial nephrectomy  Suspected left renal hematoma POA  -Admit to the surgical floor  -Reduce ceftriaxone to 1 g IV  -Bcx NGTD  -Ucx ordered, UA unimpressive  -Morphine for pain control  -Patient had a CT of the [facility] with the following findings  Postoperative interval partial right nephrectomy with resection of midpole hypodensities seen previously, now with prominent right retroperitoneal postsurgical changes. Fat-containing lesion contiguous with the inferior right perinephric space likely postoperative fat necrosis. Multiple left renal hypodensities, the dominant 1 is in the lower pole of the left kidney is slightly larger with slightly higher internal density since 2020, suggesting mild bleeding or infection. Inferior left perinephric fat stranding has also increased. Enlarging cystic tumor less likely but not entirely excluded. Depending on renal function, ultrasound with contrast or CT/MRI without and with contrast could further evaluate.     -Patient has leukocytosis of 14k  -No fevers  -Monitor CBC  -Consult urology for abnormal CT scan, patient follows with Dr. Halie Barrett   -Check BOLA     Hypertension  -Resume home blood pressure medications including metoprolol, ARB (on hold), Imdur and amlodipine (increase to 10 mg)  -Use as needed IV hydralazine for systolic blood pressure greater than 160     Coronary artery disease  -Continue beta-blocker, statin, aspirin(ARB on hold)        MICHI on CKD stage IV  - baseline is 2.5  -Cr worsened today 3.23  -Hold ARB   -Avoid nephrotoxins  -Trend Cr        Code Status: Full code  Surrogate Decision Maker:     DVT Prophylaxis: SCDs as there is concern for left renal hematoma  GI Prophylaxis: not indicated     Baseline:  Independent    30.0 - 39.9 Obese / There is no height or weight on file to calculate BMI. Estimated discharge date: 9/7  Barriers: Diagnosis, Pain control, MICHI         Subjective:     Chief Complaint / Reason for Physician Visit  Patient seen and examined with daughter at bedside. Patient states he is feeling a lot better today though he still has some pain on the left side of his abdomen especially with palpation. He denies any dysuria, increased frequency or urinary urgency. Denies any blood in his urine. He states he has never had anything like this in the past.  Discussed with RN events overnight. Review of Systems:  Symptom Y/N Comments  Symptom Y/N Comments   Fever/Chills    Chest Pain     Poor Appetite    Edema     Cough    Abdominal Pain     Sputum    Joint Pain     SOB/SMITH    Pruritis/Rash     Nausea/vomit    Tolerating PT/OT     Diarrhea    Tolerating Diet     Constipation    Other       Could NOT obtain due to:      Objective:     VITALS:   Last 24hrs VS reviewed since prior progress note. Most recent are:  Patient Vitals for the past 24 hrs:   Temp Pulse Resp BP SpO2   09/05/21 1223 98.6 °F (37 °C) 86 20 (!) 141/63 96 %   09/05/21 0824 98.9 °F (37.2 °C) 86 20 (!) 162/82 96 %   09/05/21 0340 98.9 °F (37.2 °C) 95 20 (!) 210/96 97 %   09/04/21 2321 98.6 °F (37 °C) 93  (!) 133/110    09/04/21 2045 99.2 °F (37.3 °C) 93 21 (!) 210/94        Intake/Output Summary (Last 24 hours) at 9/5/2021 1310  Last data filed at 9/5/2021 1202  Gross per 24 hour   Intake    Output 900 ml   Net -900 ml        I had a face to face encounter and independently examined this patient on 9/5/2021, as outlined below:  PHYSICAL EXAM:  General: WD, WN. Alert, cooperative, no acute distress    EENT:  EOMI. Anicteric sclerae. MMM  Resp:  CTA bilaterally, no wheezing or rales. No accessory muscle use  CV:  Regular  rhythm,  No edema  GI:  Soft, Non distended, Non tender.   +Bowel sounds  Neurologic:  Alert and oriented X 3, normal speech,   Psych: Good insight. Not anxious nor agitated  Skin:  No rashes. No jaundice    Reviewed most current lab test results and cultures  YES  Reviewed most current radiology test results   YES  Review and summation of old records today    NO  Reviewed patient's current orders and MAR    YES  PMH/SH reviewed - no change compared to H&P  ________________________________________________________________________  Care Plan discussed with:    Comments   Patient     Family      RN     Care Manager     Consultant                        Multidiciplinary team rounds were held today with , nursing, pharmacist and clinical coordinator. Patient's plan of care was discussed; medications were reviewed and discharge planning was addressed. ________________________________________________________________________  Total NON critical care TIME:  31   Minutes    Total CRITICAL CARE TIME Spent:   Minutes non procedure based      Comments   >50% of visit spent in counseling and coordination of care     ________________________________________________________________________  Halley Liz MD     Procedures: see electronic medical records for all procedures/Xrays and details which were not copied into this note but were reviewed prior to creation of Plan. LABS:  I reviewed today's most current labs and imaging studies.   Pertinent labs include:  Recent Labs     09/05/21 0019   WBC 14.3*   HGB 11.6*   HCT 36.2*        Recent Labs     09/05/21 0019      K 4.2      CO2 27   *   BUN 28*   CREA 3.23*   CA 8.7       Signed: Halley Liz MD

## 2021-09-05 NOTE — H&P
Hospitalist Admission Note    NAME: Luis Alberto Muñoz   :  1945   MRN:  [id number]     Date/Time:  2021 9:31 PM    Patient PCP: Shireen Blum MD  ______________________________________________________________________  Given the patient's current clinical presentation, I have a high level of concern for decompensation if discharged from the emergency department. Complex decision making was performed, which includes reviewing the patient's available past medical records, laboratory results, and x-ray films. My assessment of this patient's clinical condition and my plan of care is as follows. Assessment / Plan:  Acute left-sided pyelonephritis POA  History of renal cancer with right partial nephrectomy  Suspected left renal hematoma POA  -Admit to the surgical floor  -Start ceftriaxone 2 g IV daily  -Obtain blood and urine cultures  -Morphine for pain control  -Patient had a CT of the abdomen done at [facility] with the following findings  Postoperative interval partial right nephrectomy with resection of midpole hypodensities seen previously, now with prominent right retroperitoneal postsurgical changes. Fat-containing lesion contiguous with the inferior right perinephric space likely postoperative fat necrosis. Multiple left renal hypodensities, the dominant 1 is in the lower pole of the left kidney is slightly larger with slightly higher internal density since 2020, suggesting mild bleeding or infection. Inferior left perinephric fat stranding has also increased. Enlarging cystic tumor less likely but not entirely excluded.   Depending on renal function, ultrasound with contrast or CT/MRI without and with contrast could further evaluate.    -Patient has no fever or leukocytosis  -Monitor CBC      Hypertension  -Resume home blood pressure medications including metoprolol, ARB, Imdur and amlodipine  -Use as needed IV hydralazine for systolic blood pressure greater than 160    Coronary artery disease  -Continue beta-blocker, statin, aspirin and angiotensin receptor blocker      CKD stage IV  -It is stable, patient presents with creatinine of 2.6, baseline is 2.5        Code Status: Full code  Surrogate Decision Maker:    DVT Prophylaxis: SCDs as there is concern for left renal hematoma  GI Prophylaxis: not indicated    Baseline: Independent      Subjective:   CHIEF COMPLAINT: Left-sided flank pain    HISTORY OF PRESENT ILLNESS:     Kareem Zimmerman is a 64 y.o.  male with past medical history significant for renal cancer status post right partial nephrectomy in April of this year, coronary artery disease, hypertension, dyslipidemia, hyperuricemia and CKD stage IV who presented to the [facility] emergency department today with a complains of sudden onset left-sided abdominal pain. Patient reports his abdominal pain is 10 out of 10 intensity, located in the left flank region, radiating to the left groin. He also reports subjective fever chills but denies any vomiting but reports nausea. He denies any dysuria, urgency or frequency. Patient denies any diarrhea. He had a CT abdomen done at [facility] with the following findings. Postoperative interval partial right nephrectomy with resection of midpole hypodensities seen previously, now with prominent right retroperitoneal postsurgical changes. Fat-containing lesion contiguous with the inferior right perinephric space likely postoperative fat necrosis. Multiple left renal hypodensities, the dominant 1 is in the lower pole of the left kidney is slightly larger with slightly higher internal density since November 13, 2020, suggesting mild bleeding or infection. Inferior left perinephric fat stranding has also increased. Enlarging cystic tumor less likely but not entirely excluded. Depending on renal function, ultrasound with contrast or CT/MRI without and with contrast could further evaluate.     Labs reviewed showing sodium of 144, potassium 4.7, chloride 109, bicarb 25, BUN 26, creatinine 2.62, glucose 110, magnesium 1.5, WBC 10.3, hematocrit 31.9, hemoglobin 10.8, platelet 903    We were asked to admit for work up and evaluation of the above problems.      Past Medical History:   Diagnosis Date    Abnormal stress echo 2015    Anemia NEC 2013    Anxiety     Borderline diabetes     CAD (coronary artery disease)     cath Neo Zenaida) revealed 20%RCA and 50%2nd diagonal    Calculus of kidney     Chronic kidney disease, stage III (moderate) (HCC) 10/11/2009    30% kidney function    COPD, mild (HCC)     Followed by pulmonary associates    DJD (degenerative joint disease)     Fatty liver     GERD (gastroesophageal reflux disease) 10/11/2009    Gout     Hypertension     MELODY on CPAP 10/11/2009    nasal pillows; pressure set at 10- -     Peripheral neuropathy 10/11/2009    bilateral feet (numbness)    Renal cell cancer, right ([address] 2021    RLS (restless legs syndrome) 10/11/2009    S/P coronary artery stent placement 2015    PCI./MALI to LCx, 2019 PCI/MALI Prox LAD (RCA 40-50% lesions)        Past Surgical History:   Procedure Laterality Date    HX BUNIONECTOMY      HX CAROTID ENDARTERECTOMY Left 2020    Followed by Dr. Masoud Mcduffie  , 7/17    x2    HX HERNIA REPAIR      McTamaney/umbilical    HX KNEE REPLACEMENT Left 2011    HX ORTHOPAEDIC      left shoulder manipulation    HX UROLOGICAL      basket extraction of kidney stones    NV COLONOSCOPY FLX DX W/COLLJ SPEC WHEN PFRMD  2010         NV COLSC FLX W/RMVL OF TUMOR POLYP LESION SNARE TQ  2014            Social History     Tobacco Use    Smoking status: Former Smoker     Packs/day: 1.00     Years: 10.00     Pack years: 10.00     Types: Cigarettes     Quit date: 1968     Years since quittin.7    Smokeless tobacco: Never Used   Substance Use Topics    Alcohol use: No     Alcohol/week: 0.0 standard drinks        Family History   Problem Relation Age of Onset    Heart Disease Father     Hypertension Father     Other Father         abdominal aneurysm     Dementia Mother     Alzheimer Mother     Heart Disease Brother     Heart Attack Brother     Heart Disease Maternal Grandmother     Heart Disease Paternal Grandmother     Heart Attack Paternal Grandmother     Heart Disease Paternal Grandfather     Heart Attack Paternal Grandfather     Elevated Lipids Son     Elevated Lipids Daughter     Cancer Neg Hx     Diabetes Neg Hx     Stroke Neg Hx    Reviewed  Allergies   Allergen Reactions    Bactrim [Sulfamethoxazole-Trimethoprim] Hives    Codeine Itching    Lisinopril (Bulk) Cough    Neurontin [Gabapentin] Other (comments)     drowsy    Zostavax [Zoster Vaccine Live (Pf)] Rash     See 9/10/13 visit    Penicillins Hives     Pt states he has taken Keflex before without problems        Prior to Admission medications    Medication Sig Start Date End Date Taking? Authorizing Provider   gabapentin (NEURONTIN) 300 mg capsule TAKE 1 CAPSULE BY MOUTH EVERY DAY AT BEDTIME FOR 30 DAYS 8/11/21   Provider, Historical   hydrOXYzine HCL (ATARAX) 10 mg tablet Take 1-2 Tablets by mouth three (3) times daily as needed for Itching or Anxiety.  8/23/21   Kate Antonio MD   telmisartan (MICARDIS) 40 mg tablet TAKE 1 TABLET BY MOUTH EVERY DAY (REPLACES IRBESARTAN) 8/5/21   Kate Antonio MD   methocarbamoL (ROBAXIN) 750 mg tablet Take 1 Tablet by mouth three (3) times daily as needed for Muscle Spasm(s). 7/22/21   Mayco Dewey MD   pantoprazole (PROTONIX) 40 mg tablet TAKE 1 TABLET BY MOUTH EVERY DAY 6/27/21   Kate Antonio MD   metoprolol succinate (TOPROL-XL) 25 mg XL tablet TAKE 1 TABLET BY MOUTH EVERY DAY 5/4/21   Crystal JOYCE NP   fluocinoNIDE (LIDEX) 0.05 % external solution APPLY TO ITCHY SPOTS ON SCALP AS NEEDED FOR FLARE 3/7/21   Provider, Historical albuterol (ProAir HFA) 90 mcg/actuation inhaler Take 1 Puff by inhalation every four (4) hours. Provider, Historical   umeclidinium-vilanteroL (Anoro Ellipta) 62.5-25 mcg/actuation inhaler Take 1 Puff by inhalation daily. Provider, Historical   rOPINIRole (Requip) 0.5 mg tablet Take 0.5 mg by mouth nightly. Provider, Historical   ketoconazole (NIZORAL) 2 % shampoo Apply 1 mL to affected area daily as needed for Itching. Provider, Historical   atorvastatin (Lipitor) 80 mg tablet Take 80 mg by mouth nightly. Provider, Historical   isosorbide mononitrate ER (IMDUR) 30 mg tablet Take 0.5 Tabs by mouth daily. 10/21/20   Efraín JOYCE NP   nitroglycerin (NITROSTAT) 0.4 mg SL tablet TAKE 1 TABLET BY SUBLINGUAL ROUTE EVERY 5 MINUTES AS NEEDED FOR CHEST PAIN. 3/4/20   Yamel Molina MD   VITAMIN D3 2,000 unit cap capsule Take 2,000 Units by mouth daily. 3/3/15   Provider, Historical   amLODIPine (NORVASC) 5 mg tablet Take 5 mg by mouth daily. 6/16/14   Provider, Historical   GLUCOSAMINE HCL/CHONDR PETERSEN A NA (GLUCOSAMINE-CHONDROITIN) 750-600 mg Tab Take 1 Tab by mouth daily. Brand: Move Free    Provider, Historical   aspirin delayed-release 81 mg tablet Take 81 mg by mouth nightly. Provider, Historical   allopurinol (ZYLOPRIM) 100 mg tablet Take 100 mg by mouth every morning. 6/5/12   Provider, Historical   MULTIVITS W-FE,OTHER MIN (CENTRUM PO) Take 1 Tab by mouth daily. Provider, Historical       REVIEW OF SYSTEMS:     I am not able to complete the review of systems because:    The patient is intubated and sedated    The patient has altered mental status due to his acute medical problems    The patient has baseline aphasia from prior stroke(s)    The patient has baseline dementia and is not reliable historian    The patient is in acute medical distress and unable to provide information           Total of 12 systems reviewed as follows:       POSITIVE= underlined text  Negative = text not underlined  General:  fever, chills, sweats, generalized weakness, weight loss/gain,      loss of appetite   Eyes:    blurred vision, eye pain, loss of vision, double vision  ENT:    rhinorrhea, pharyngitis   Respiratory:   cough, sputum production, SOB, SMITH, wheezing, pleuritic pain   Cardiology:   chest pain, palpitations, orthopnea, PND, edema, syncope   Gastrointestinal:  abdominal pain , N/V, diarrhea, dysphagia, constipation, bleeding   Genitourinary:  frequency, urgency, dysuria, hematuria, incontinence   Muskuloskeletal :  arthralgia, myalgia, back pain  Hematology:  easy bruising, nose or gum bleeding, lymphadenopathy   Dermatological: rash, ulceration, pruritis, color change / jaundice  Endocrine:   hot flashes or polydipsia   Neurological:  headache, dizziness, confusion, focal weakness, paresthesia,     Speech difficulties, memory loss, gait difficulty  Psychological: Feelings of anxiety, depression, agitation    Objective:   VITALS:    There were no vitals taken for this visit. PHYSICAL EXAM:    General:    Alert, cooperative, no distress, appears stated age. Patient seems to be in moderate distress due to left-sided abdominal pain  HEENT: Atraumatic, anicteric sclerae, pink conjunctivae     No oral ulcers, mucosa moist, throat clear, dentition fair  Neck:  Supple, symmetrical,  thyroid: non tender  Lungs:   Clear to auscultation bilaterally. No Wheezing or Rhonchi. No rales. Chest wall:  No tenderness  No Accessory muscle use. Heart:   Regular  rhythm,  No  murmur   No edema  Abdomen:   Soft, left flank tenderness,. Not distended. Bowel sounds normal  Extremities: No cyanosis. No clubbing,      Skin turgor normal, Capillary refill normal, Radial dial pulse 2+  Skin:     Not pale. Not Jaundiced  No rashes   Psych:  Good insight. Not depressed. Not anxious or agitated. Neurologic: EOMs intact. No facial asymmetry. No aphasia or slurred speech. Symmetrical strength, Sensation grossly intact. Alert and oriented X 4.     _______________________________________________________________________  Care Plan discussed with:    Comments   Patient x    Family  x  wife present at bedside   RN x    Care Manager                    Consultant:      _______________________________________________________________________  Expected  Disposition:   Home with Family x   HH/PT/OT/RN    SNF/LTC    DEMIAN    ________________________________________________________________________  TOTAL TIME:  36 Minutes    Critical Care Provided     Minutes non procedure based      Comments    x Reviewed previous records   >50% of visit spent in counseling and coordination of care x Discussion with patient and/or family and questions answered       ________________________________________________________________________  Signed: Zoë December, MD    Procedures: see electronic medical records for all procedures/Xrays and details which were not copied into this note but were reviewed prior to creation of Plan. LAB DATA REVIEWED:    No results found for this or any previous visit (from the past 24 hour(s)).

## 2021-09-05 NOTE — PROGRESS NOTES
Problem: Falls - Risk of  Goal: *Absence of Falls  Description: Document [place] Fall Risk and appropriate interventions in the flowsheet.   Outcome: Progressing Towards Goal  Note: Fall Risk Interventions:  Mobility Interventions: Bed/chair exit alarm         Medication Interventions: Bed/chair exit alarm    Elimination Interventions: Bed/chair exit alarm              Problem: Patient Education: Go to Patient Education Activity  Goal: Patient/Family Education  Outcome: Progressing Towards Goal

## 2021-09-06 LAB
ANION GAP SERPL CALC-SCNC: 9 MMOL/L (ref 5–15)
BASOPHILS # BLD: 0.1 K/UL (ref 0–0.1)
BASOPHILS NFR BLD: 0 % (ref 0–1)
BUN SERPL-MCNC: 39 MG/DL (ref 6–20)
BUN/CREAT SERPL: 10 (ref 12–20)
CALCIUM SERPL-MCNC: 7.8 MG/DL (ref 8.5–10.1)
CHLORIDE SERPL-SCNC: 106 MMOL/L (ref 97–108)
CHLORIDE UR-SCNC: 64 MMOL/L
CO2 SERPL-SCNC: 25 MMOL/L (ref 21–32)
CREAT SERPL-MCNC: 4.06 MG/DL (ref 0.7–1.3)
CREAT UR-MCNC: 167 MG/DL
DIFFERENTIAL METHOD BLD: ABNORMAL
EOSINOPHIL # BLD: 0.2 K/UL (ref 0–0.4)
EOSINOPHIL NFR BLD: 2 % (ref 0–7)
ERYTHROCYTE [DISTWIDTH] IN BLOOD BY AUTOMATED COUNT: 14.8 % (ref 11.5–14.5)
GLUCOSE BLD STRIP.AUTO-MCNC: 111 MG/DL (ref 65–117)
GLUCOSE BLD STRIP.AUTO-MCNC: 119 MG/DL (ref 65–117)
GLUCOSE BLD STRIP.AUTO-MCNC: 126 MG/DL (ref 65–117)
GLUCOSE BLD STRIP.AUTO-MCNC: 98 MG/DL (ref 65–117)
GLUCOSE SERPL-MCNC: 126 MG/DL (ref 65–100)
HCT VFR BLD AUTO: 31.5 % (ref 36.6–50.3)
HGB BLD-MCNC: 10.1 G/DL (ref 12.1–17)
IMM GRANULOCYTES # BLD AUTO: 0.1 K/UL (ref 0–0.04)
IMM GRANULOCYTES NFR BLD AUTO: 1 % (ref 0–0.5)
LYMPHOCYTES # BLD: 2.1 K/UL (ref 0.8–3.5)
LYMPHOCYTES NFR BLD: 14 % (ref 12–49)
MAGNESIUM SERPL-MCNC: 1.4 MG/DL (ref 1.6–2.4)
MCH RBC QN AUTO: 31.1 PG (ref 26–34)
MCHC RBC AUTO-ENTMCNC: 32.1 G/DL (ref 30–36.5)
MCV RBC AUTO: 96.9 FL (ref 80–99)
MONOCYTES # BLD: 1.9 K/UL (ref 0–1)
MONOCYTES NFR BLD: 13 % (ref 5–13)
NEUTS SEG # BLD: 10.7 K/UL (ref 1.8–8)
NEUTS SEG NFR BLD: 70 % (ref 32–75)
NRBC # BLD: 0 K/UL (ref 0–0.01)
NRBC BLD-RTO: 0 PER 100 WBC
PHOSPHATE SERPL-MCNC: 3.5 MG/DL (ref 2.6–4.7)
PLATELET # BLD AUTO: 190 K/UL (ref 150–400)
PMV BLD AUTO: 9.3 FL (ref 8.9–12.9)
POTASSIUM SERPL-SCNC: 4.1 MMOL/L (ref 3.5–5.1)
PROT UR-MCNC: 231 MG/DL (ref 0–11.9)
PROT/CREAT UR-RTO: 1.4
RBC # BLD AUTO: 3.25 M/UL (ref 4.1–5.7)
SERVICE CMNT-IMP: ABNORMAL
SERVICE CMNT-IMP: ABNORMAL
SERVICE CMNT-IMP: NORMAL
SERVICE CMNT-IMP: NORMAL
SODIUM SERPL-SCNC: 140 MMOL/L (ref 136–145)
SODIUM UR-SCNC: 55 MMOL/L
WBC # BLD AUTO: 15.1 K/UL (ref 4.1–11.1)

## 2021-09-06 PROCEDURE — 94640 AIRWAY INHALATION TREATMENT: CPT

## 2021-09-06 PROCEDURE — 94760 N-INVAS EAR/PLS OXIMETRY 1: CPT

## 2021-09-06 PROCEDURE — 82436 ASSAY OF URINE CHLORIDE: CPT

## 2021-09-06 PROCEDURE — 74011250636 HC RX REV CODE- 250/636: Performed by: INTERNAL MEDICINE

## 2021-09-06 PROCEDURE — 74011250637 HC RX REV CODE- 250/637: Performed by: INTERNAL MEDICINE

## 2021-09-06 PROCEDURE — 51798 US URINE CAPACITY MEASURE: CPT

## 2021-09-06 PROCEDURE — 85025 COMPLETE CBC W/AUTO DIFF WBC: CPT

## 2021-09-06 PROCEDURE — 84100 ASSAY OF PHOSPHORUS: CPT

## 2021-09-06 PROCEDURE — 74011000258 HC RX REV CODE- 258: Performed by: INTERNAL MEDICINE

## 2021-09-06 PROCEDURE — 80048 BASIC METABOLIC PNL TOTAL CA: CPT

## 2021-09-06 PROCEDURE — 84300 ASSAY OF URINE SODIUM: CPT

## 2021-09-06 PROCEDURE — 82962 GLUCOSE BLOOD TEST: CPT

## 2021-09-06 PROCEDURE — 84156 ASSAY OF PROTEIN URINE: CPT

## 2021-09-06 PROCEDURE — 36415 COLL VENOUS BLD VENIPUNCTURE: CPT

## 2021-09-06 PROCEDURE — 74011000250 HC RX REV CODE- 250: Performed by: NURSE PRACTITIONER

## 2021-09-06 PROCEDURE — 83735 ASSAY OF MAGNESIUM: CPT

## 2021-09-06 PROCEDURE — 65270000029 HC RM PRIVATE

## 2021-09-06 RX ORDER — LABETALOL HYDROCHLORIDE 5 MG/ML
10 INJECTION, SOLUTION INTRAVENOUS
Status: DISCONTINUED | OUTPATIENT
Start: 2021-09-06 | End: 2021-09-10 | Stop reason: HOSPADM

## 2021-09-06 RX ORDER — MAGNESIUM SULFATE 1 G/100ML
1 INJECTION INTRAVENOUS ONCE
Status: COMPLETED | OUTPATIENT
Start: 2021-09-06 | End: 2021-09-06

## 2021-09-06 RX ADMIN — LACTULOSE 45 ML: 20 SOLUTION ORAL at 17:53

## 2021-09-06 RX ADMIN — MORPHINE SULFATE 1 MG: 2 INJECTION, SOLUTION INTRAMUSCULAR; INTRAVENOUS at 16:27

## 2021-09-06 RX ADMIN — TIOTROPIUM BROMIDE AND OLODATEROL 2 PUFF: 3.124; 2.736 SPRAY, METERED RESPIRATORY (INHALATION) at 07:38

## 2021-09-06 RX ADMIN — ATORVASTATIN CALCIUM 80 MG: 40 TABLET, FILM COATED ORAL at 22:04

## 2021-09-06 RX ADMIN — ALLOPURINOL 100 MG: 100 TABLET ORAL at 08:50

## 2021-09-06 RX ADMIN — MORPHINE SULFATE 1 MG: 2 INJECTION, SOLUTION INTRAMUSCULAR; INTRAVENOUS at 23:14

## 2021-09-06 RX ADMIN — ISOSORBIDE MONONITRATE 15 MG: 30 TABLET, EXTENDED RELEASE ORAL at 08:50

## 2021-09-06 RX ADMIN — Medication 10 ML: at 16:27

## 2021-09-06 RX ADMIN — PANTOPRAZOLE SODIUM 40 MG: 40 TABLET, DELAYED RELEASE ORAL at 08:50

## 2021-09-06 RX ADMIN — METOPROLOL SUCCINATE 25 MG: 25 TABLET, EXTENDED RELEASE ORAL at 08:50

## 2021-09-06 RX ADMIN — ROPINIROLE HYDROCHLORIDE 0.5 MG: 1 TABLET, FILM COATED ORAL at 22:04

## 2021-09-06 RX ADMIN — LABETALOL HYDROCHLORIDE 10 MG: 5 INJECTION INTRAVENOUS at 03:59

## 2021-09-06 RX ADMIN — MORPHINE SULFATE 1 MG: 2 INJECTION, SOLUTION INTRAMUSCULAR; INTRAVENOUS at 05:06

## 2021-09-06 RX ADMIN — Medication 10 ML: at 22:03

## 2021-09-06 RX ADMIN — ASPIRIN 81 MG: 81 TABLET, COATED ORAL at 22:04

## 2021-09-06 RX ADMIN — LACTULOSE 45 ML: 20 SOLUTION ORAL at 10:26

## 2021-09-06 RX ADMIN — CEFTRIAXONE 1 G: 1 INJECTION, POWDER, FOR SOLUTION INTRAMUSCULAR; INTRAVENOUS at 22:05

## 2021-09-06 RX ADMIN — SODIUM CHLORIDE 100 ML/HR: 9 INJECTION, SOLUTION INTRAVENOUS at 10:17

## 2021-09-06 RX ADMIN — MAGNESIUM SULFATE HEPTAHYDRATE 1 G: 1 INJECTION, SOLUTION INTRAVENOUS at 10:18

## 2021-09-06 RX ADMIN — Medication 10 ML: at 05:06

## 2021-09-06 RX ADMIN — AMLODIPINE BESYLATE 10 MG: 5 TABLET ORAL at 08:50

## 2021-09-06 RX ADMIN — HYDROXYZINE HYDROCHLORIDE 20 MG: 10 TABLET ORAL at 14:17

## 2021-09-06 NOTE — PROGRESS NOTES
Problem: Falls - Risk of  Goal: *Absence of Falls  Description: Document [facility] Fall Risk and appropriate interventions in the flowsheet.   Outcome: Progressing Towards Goal  Note: Fall Risk Interventions:  Mobility Interventions: Bed/chair exit alarm, Communicate number of staff needed for ambulation/transfer         Medication Interventions: Bed/chair exit alarm, Patient to call before getting OOB    Elimination Interventions: Bed/chair exit alarm, Call light in reach              Problem: Patient Education: Go to Patient Education Activity  Goal: Patient/Family Education  Outcome: Progressing Towards Goal

## 2021-09-06 NOTE — PROGRESS NOTES
Problem: Falls - Risk of  Goal: *Absence of Falls  Description: Document [place] Fall Risk and appropriate interventions in the flowsheet.   Outcome: Progressing Towards Goal  Note: Fall Risk Interventions:  Mobility Interventions: Bed/chair exit alarm, Communicate number of staff needed for ambulation/transfer         Medication Interventions: Bed/chair exit alarm, Patient to call before getting OOB    Elimination Interventions: Bed/chair exit alarm, Call light in reach

## 2021-09-06 NOTE — PROGRESS NOTES
End of Shift Note    Bedside shift change report given to Vianca Shine (oncoming nurse) by Karmen Schirmer, GN (offgoing nurse).   Report included the following information SBAR    Shift worked:  [id number]   Shift summary and any significant changes:     Ultrasound completed; urine culture sent; PRN Morphine given x2; PRN Atarax given x1; one time order of Dilaudid given after Morphine didn't help pain; one time order of Oxy given after IV access was lost; PRN Hydralazine x1; PRN Labetalol x1        Concerns for physician to address:  Pain management- patient in pain throughout night    Zone phone for oncoming shift:   [phone]     Patient Information  Adilia Helton  64 y.o.  9/4/2021  8:31 PM by Kavin Tubbs MD. Adilia Helton was admitted from Home    Problem List  Patient Active Problem List    Diagnosis Date Noted    Pyelonephritis 09/04/2021    Acute hypoxemic respiratory failure ([address] 04/08/2021    Anemia 04/08/2021    Hx of completed stroke 04/08/2021    Right kidney mass 04/05/2021    CKD (chronic kidney disease) stage 4, GFR 15-29 ml/min ([address] 11/23/2020    Diabetic peripheral neuropathy associated with type 2 diabetes mellitus ([address] 03/03/2020    History of left-sided carotid endarterectomy 03/03/2020    Bilateral carotid artery stenosis 01/09/2020    Pure hypercholesterolemia 08/30/2019    Coronary artery disease due to lipid rich plaque 04/27/2016    Coronary artery disease involving native coronary artery of native heart without angina pectoris 03/16/2016    S/P coronary artery stent placement 05/12/2015    Benign essential tremor 01/22/2014    Hypertensive kidney disease with chronic kidney disease stage III ([address] 11/22/2013    Obesity 01/30/2013    Gout 01/30/2013    Chronic kidney disease, stage III (moderate) ([address] 10/11/2009    Mixed hyperlipidemia 10/11/2009    Obstructive sleep apnea 10/11/2009    RLS (restless legs syndrome) 10/11/2009    Peripheral neuropathy 10/11/2009  DJD (degenerative joint disease), multiple sites 10/11/2009    Essential hypertension 10/11/2009    GERD (gastroesophageal reflux disease) 10/11/2009    Anxiety associated with depression 10/11/2009     Past Medical History:   Diagnosis Date    Abnormal stress echo 5/12/2015    Anemia NEC 03/2013    Anxiety     Borderline diabetes     CAD (coronary artery disease) 2009    cath Mike Mcmahan) revealed 20%RCA and 50%2nd diagonal    Calculus of kidney     Chronic kidney disease, stage III (moderate) ([address] 10/11/2009    30% kidney function    COPD, mild (HCC)     Followed by pulmonary associates    DJD (degenerative joint disease)     Fatty liver     GERD (gastroesophageal reflux disease) 10/11/2009    Gout     Hypertension     MELODY on CPAP 10/11/2009    nasal pillows; pressure set at 10-11 -     Peripheral neuropathy 10/11/2009    bilateral feet (numbness)    Renal cell cancer, right ([facility] 01/2021    RLS (restless legs syndrome) 10/11/2009    S/P coronary artery stent placement 05/12/2015    PCI./MALI to LCx, 8/2019 PCI/MALI Prox LAD (RCA 40-50% lesions)       Core Measures:  CVA: No No  CHF:No No  PNA:No No    Activity:  Activity Level: Up with Assistance  Number times ambulated in hallways past shift: 0  Number of times OOB to chair past shift: 0    Cardiac:   Cardiac Monitoring: No           Access:   Current line(s): PIV     Genitourinary:   Urinary status: voiding    Respiratory:   O2 Device: None (Room air)  Chronic home O2 use?: N/A  Incentive spirometer at bedside: N/A       GI:  Last Bowel Movement Date: 09/03/21  Current diet:  ADULT DIET Regular; 4 carb choices (60 gm/meal)  Passing flatus: YES  Tolerating current diet: YES       Pain Management:   Patient states pain is manageable on current regimen: NO    Skin:  Josh Score: 20  Interventions: increase time out of bed and limit briefs    Patient Safety:  Fall Score:  Total Score: 3  Interventions: bed/chair alarm, gripper socks and pt to call before getting OOB  High Fall Risk: Yes  @Rollbelt  @dexterity to release roll belt  Yes/No ( must document dexterity  here by stating Yes or No here, otherwise this is a restraint and must follow restraint documentation policy.)    DVT prophylaxis:  DVT prophylaxis Med- No  DVT prophylaxis SCD or PHONG- Yes     Wounds: (If Applicable)  Wounds- Yes  Location: L foot blister     Active Consults:  IP CONSULT TO UROLOGY    Length of Stay:  Expected LOS: - - -  Actual LOS: 2  Discharge Plan: Yes; home       REBA Geller

## 2021-09-06 NOTE — PROGRESS NOTES
Hospitalist Progress Note    NAME: Saba Bruce   :  1945   MRN:  [id number]       Assessment / Plan:    Acute left-sided pyelonephritis POA  History of renal cancer with right partial nephrectomy  Suspected left renal hematoma POA  -Admit to the surgical floor  -Reduce ceftriaxone to 1 g IV  -Bcx NGTD  -Ucx ordered, UA unimpressive  -Morphine for pain control  -Patient had a CT of the abdomen done at [facility] with the following findings  Postoperative interval partial right nephrectomy with resection of midpole hypodensities seen previously, now with prominent right retroperitoneal postsurgical changes. Fat-containing lesion contiguous with the inferior right perinephric space likely postoperative fat necrosis. Multiple left renal hypodensities, the dominant 1 is in the lower pole of the left kidney is slightly larger with slightly higher internal density since 2020, suggesting mild bleeding or infection. Inferior left perinephric fat stranding has also increased. Enlarging cystic tumor less likely but not entirely excluded. Depending on renal function, ultrasound with contrast or CT/MRI without and with contrast could further evaluate.     -Patient has leukocytosis of 15k (slightly worse)  -No fevers  -Monitor CBC  -Consult urology for abnormal CT scan. Unable to perform with contrast at this time due to CKD.   Awaiting urology input   -BOLA noted with no clear etiology for pain     Hypertension  -Resume home blood pressure medications including metoprolol, ARB (on hold), Imdur and amlodipine (increase to 10 mg)  -Use as needed IV hydralazine for systolic blood pressure greater than 160     Coronary artery disease  -Continue beta-blocker, statin, aspirin(ARB on hold)        MICHI on CKD stage IV  - baseline is 2.5  -Cr worsened today 4  -Hold ARB   -Avoid nephrotoxins  -Trend Cr  -Consult Nephrology  -Check urine studies  -Replete Mg   PVR WNL     Code Status: Full code  Surrogate Decision Maker:     DVT Prophylaxis: SCDs as there is concern for left renal hematoma  GI Prophylaxis: not indicated     Baseline: Independent    30.0 - 39.9 Obese / There is no height or weight on file to calculate BMI. Estimated discharge date: 9/7  Barriers: Diagnosis, Pain control, MICHI         Subjective:     Chief Complaint / Reason for Physician Visit  Patient seen and examined with daughter at bedside. Patient had another episode of severe pain overnight which did subside with morphine. At the time my examination, he did feel improved but is still very tender to the left side of his abdomen. He reports no issues with urination. Discussed with RN events overnight. Review of Systems:  Symptom Y/N Comments  Symptom Y/N Comments   Fever/Chills    Chest Pain     Poor Appetite    Edema     Cough    Abdominal Pain     Sputum    Joint Pain     SOB/SMITH    Pruritis/Rash     Nausea/vomit    Tolerating PT/OT     Diarrhea    Tolerating Diet     Constipation    Other       Could NOT obtain due to:      Objective:     VITALS:   Last 24hrs VS reviewed since prior progress note. Most recent are:  Patient Vitals for the past 24 hrs:   Temp Pulse Resp BP SpO2   09/06/21 1149 98.2 °F (36.8 °C) 84 18 (!) 148/64 92 %   09/06/21 0739     91 %   09/06/21 0738 98.9 °F (37.2 °C) 79 18 (!) 151/69 92 %   09/06/21 0357  (!) 105  (!) 201/109    09/06/21 0327 99 °F (37.2 °C) (!) 103 20 (!) 190/86 90 %   09/05/21 2245 98.2 °F (36.8 °C) (!) 101 20 (!) 166/71 90 %   09/05/21 1908 99.8 °F (37.7 °C) 90 20 (!) 144/84 99 %   09/05/21 1535 98.4 °F (36.9 °C) 86 20 135/67 96 %       Intake/Output Summary (Last 24 hours) at 9/6/2021 1409  Last data filed at 9/6/2021 1036  Gross per 24 hour   Intake    Output 300 ml   Net -300 ml        I had a face to face encounter and independently examined this patient on 9/6/2021, as outlined below:  PHYSICAL EXAM:  General: WD, WN.  Alert, cooperative, no acute distress EENT:  EOMI. Anicteric sclerae. MMM  Resp:  CTA bilaterally, no wheezing or rales. No accessory muscle use  CV:  Regular  rhythm,  No edema  GI:  Soft, Non distended, severely tender to palpation of left abdomen. +Bowel sounds  Neurologic:  Alert and oriented X 3, normal speech,   Psych:   Good insight. Not anxious nor agitated  Skin:  No rashes. No jaundice    Reviewed most current lab test results and cultures  YES  Reviewed most current radiology test results   YES  Review and summation of old records today    NO  Reviewed patient's current orders and MAR    YES  PMH/SH reviewed - no change compared to H&P  ________________________________________________________________________  Care Plan discussed with:    Comments   Patient     Family      RN     Care Manager     Consultant                        Multidiciplinary team rounds were held today with , nursing, pharmacist and clinical coordinator. Patient's plan of care was discussed; medications were reviewed and discharge planning was addressed. ________________________________________________________________________  Total NON critical care TIME:  31   Minutes    Total CRITICAL CARE TIME Spent:   Minutes non procedure based      Comments   >50% of visit spent in counseling and coordination of care     ________________________________________________________________________  Wandy Prado MD     Procedures: see electronic medical records for all procedures/Xrays and details which were not copied into this note but were reviewed prior to creation of Plan. LABS:  I reviewed today's most current labs and imaging studies.   Pertinent labs include:  Recent Labs     09/06/21  0030 09/05/21  0019   WBC 15.1* 14.3*   HGB 10.1* 11.6*   HCT 31.5* 36.2*    217     Recent Labs     09/06/21  0030 09/05/21  0019    141   K 4.1 4.2    106   CO2 25 27   * 137*   BUN 39* 28*   CREA 4.06* 3.23*   CA 7.8* 8.7   MG 1.4*  -- PHOS 3.5  --        Signed: Vaughn Hunt MD

## 2021-09-06 NOTE — CONSULTS
Nephrology Consult Note     Sukhi Dominguez     [facility]. [facility]              Phone - [phone]   Patient: Antoinette Eldridge   YOB: 1945    Date- 9/6/2021  MRN: [id number]             REASON FOR CONSULTATION: MICHI ON CKD  CONSULTING PHYSICIAN: DR. ABREU    ADMIT Jose Cronin MD     IMPRESSION & PLAN:     MICHI likely due to poor po intake +  telmisartan use--no hydronephrosis on renal usg   ckd 4 due to HTN AND Partial right nephrectomy--bl cr 2.6-2.9   Hypertension   HYPOMAGNESEMIA   H/o renal ca   Gout   GERD   H/o renal stone   constipation    PLAN-  · Continue ivf  · Check urine lytes  · Check bladder scan  · MAG Sulfate iv  · Lactulose po  · Continue norvasc  · Continue to old telmisartan     Active Problems:    Pyelonephritis (9/4/2021)        [x] High complexity decision making was performed  [x] Patient is at high-risk of decompensation with multiple organ involvement    Subjective:   HPI: Antoinette Eldridge is a 64 y.o.  male. He presented to er with c/o abdo pain. He has developed MICHI with cr 4.0  He reports poor po intake  He denies taking NSAIDS  He is on telmisartan. He is not taking any diuretics  He is urine is negative for UTI  Renal usg negative for hydro. Or perinephric fluid collection  Mg low - 1.4  His bp was high on admission. He is followed by [facility]  His bl. Cr. 2.6-2.9 in 2021      On 4/5/21 patient underwent right RAL partial nephrectomy 1 in the setting of right renal mass which was biopsied and proven to represent [address]  PMHX includes: HTN, Anemia, renal cell cancer, s/p right partial nephrectomy in 04/2021  Cardiac cath and sent placement in August 2019  H/O iron deficiency anemia but could not tolerate iron supplements due to constipation. HX Kidney Stones in the past  Review of Systems:     A 11 point review of system was performed today.  Pertinent positives and negatives are mentioned in the HPI. The reminder of the ROS is negative and noncontributory. Past Medical History:   Diagnosis Date    Abnormal stress echo 5/12/2015    Anemia NEC 03/2013    Anxiety     Borderline diabetes     CAD (coronary artery disease) 2009    cath Guillermo Gordon) revealed 20%RCA and 50%2nd diagonal    Calculus of kidney     CKD (chronic kidney disease) stage 4, GFR 15-29 ml/min (HCC)     COPD, mild (HCC)     Followed by pulmonary associates    DJD (degenerative joint disease)     Fatty liver     GERD (gastroesophageal reflux disease) 10/11/2009    Gout     Hypertension     MELODY on CPAP 10/11/2009    nasal pillows; pressure set at 10-11 -     Peripheral neuropathy 10/11/2009    bilateral feet (numbness)    Renal cell cancer, right ([address] 01/2021    RLS (restless legs syndrome) 10/11/2009    S/P coronary artery stent placement 05/12/2015    PCI./MALI to LCx, 8/2019 PCI/MALI Prox LAD (RCA 40-50% lesions)      Past Surgical History:   Procedure Laterality Date    HX BUNIONECTOMY  2019    HX CAROTID ENDARTERECTOMY Left 01/2020    Followed by Dr. Edgardo Nielsen  2009, 7/17    x2    HX HERNIA REPAIR      McTamaney/umbilical    HX KNEE REPLACEMENT Left 07/23/2011    HX ORTHOPAEDIC      left shoulder manipulation    HX UROLOGICAL      basket extraction of kidney stones    MA COLONOSCOPY FLX DX W/COLLJ SPEC WHEN PFRMD  8/5/2010         MA COLSC FLX W/RMVL OF TUMOR POLYP LESION SNARE TQ  9/24/2014           Prior to Admission medications    Medication Sig Start Date End Date Taking? Authorizing Provider   gabapentin (NEURONTIN) 300 mg capsule TAKE 1 CAPSULE BY MOUTH EVERY DAY AT BEDTIME FOR 30 DAYS 8/11/21   Provider, Historical   hydrOXYzine HCL (ATARAX) 10 mg tablet Take 1-2 Tablets by mouth three (3) times daily as needed for Itching or Anxiety.  8/23/21   Jamin Singer MD   telmisartan (MICARDIS) 40 mg tablet TAKE 1 TABLET BY MOUTH EVERY DAY (REPLACES IRBESARTAN) 8/5/21 Cheri Stafford MD   methocarbamoL (ROBAXIN) 750 mg tablet Take 1 Tablet by mouth three (3) times daily as needed for Muscle Spasm(s). 7/22/21   Rosy Hills MD   pantoprazole (PROTONIX) 40 mg tablet TAKE 1 TABLET BY MOUTH EVERY DAY 6/27/21   hCeri Stafford MD   metoprolol succinate (TOPROL-XL) 25 mg XL tablet TAKE 1 TABLET BY MOUTH EVERY DAY 5/4/21   Kevin JOYCE NP   fluocinoNIDE (LIDEX) 0.05 % external solution APPLY TO ITCHY SPOTS ON SCALP AS NEEDED FOR FLARE 3/7/21   Provider, Historical   albuterol (ProAir HFA) 90 mcg/actuation inhaler Take 1 Puff by inhalation every four (4) hours. Provider, Historical   umeclidinium-vilanteroL (Anoro Ellipta) 62.5-25 mcg/actuation inhaler Take 1 Puff by inhalation daily. Provider, Historical   rOPINIRole (Requip) 0.5 mg tablet Take 0.5 mg by mouth nightly. Provider, Historical   ketoconazole (NIZORAL) 2 % shampoo Apply 1 mL to affected area daily as needed for Itching. Provider, Historical   atorvastatin (Lipitor) 80 mg tablet Take 80 mg by mouth nightly. Provider, Historical   isosorbide mononitrate ER (IMDUR) 30 mg tablet Take 0.5 Tabs by mouth daily. 10/21/20   Kevin JOYCE NP   nitroglycerin (NITROSTAT) 0.4 mg SL tablet TAKE 1 TABLET BY SUBLINGUAL ROUTE EVERY 5 MINUTES AS NEEDED FOR CHEST PAIN. 3/4/20   Cheri Stafford MD   VITAMIN D3 2,000 unit cap capsule Take 2,000 Units by mouth daily. 3/3/15   Provider, Historical   amLODIPine (NORVASC) 5 mg tablet Take 5 mg by mouth daily. 6/16/14   Provider, Historical   GLUCOSAMINE HCL/CHONDR PETERSEN A NA (GLUCOSAMINE-CHONDROITIN) 750-600 mg Tab Take 1 Tab by mouth daily. Brand: Move Free    Provider, Historical   aspirin delayed-release 81 mg tablet Take 81 mg by mouth nightly. Provider, Historical   allopurinol (ZYLOPRIM) 100 mg tablet Take 100 mg by mouth every morning. 6/5/12   Provider, Historical   MULTIVITS W-FE,OTHER MIN (CENTRUM PO) Take 1 Tab by mouth daily.     Provider, Historical Allergies   Allergen Reactions    Bactrim [Sulfamethoxazole-Trimethoprim] Hives    Codeine Itching    Lisinopril (Bulk) Cough    Neurontin [Gabapentin] Other (comments)     drowsy    Zostavax [Zoster Vaccine Live (Pf)] Rash     See 9/10/13 visit    Penicillins Hives     Pt states he has taken Keflex before without problems      Social History     Tobacco Use    Smoking status: Former Smoker     Packs/day: 1.00     Years: 10.00     Pack years: 10.00     Types: Cigarettes     Quit date: 1968     Years since quittin.7    Smokeless tobacco: Never Used   Substance Use Topics    Alcohol use: No     Alcohol/week: 0.0 standard drinks      Family History   Problem Relation Age of Onset    Heart Disease Father     Hypertension Father     Other Father         abdominal aneurysm     Dementia Mother     Alzheimer Mother     Heart Disease Brother     Heart Attack Brother     Heart Disease Maternal Grandmother     Heart Disease Paternal Grandmother     Heart Attack Paternal Grandmother     Heart Disease Paternal Grandfather     Heart Attack Paternal Grandfather     Elevated Lipids Son     Elevated Lipids Daughter     Cancer Neg Hx     Diabetes Neg Hx     Stroke Neg Hx         Objective:      Patient Vitals for the past 24 hrs:   Temp Pulse Resp BP SpO2   21 0739     91 %   21 0738 98.9 °F (37.2 °C) 79 18 (!) 151/69 92 %   21 0357  (!) 105  (!) 201/109    21 0327 99 °F (37.2 °C) (!) 103 20 (!) 190/86 90 %   21 2245 98.2 °F (36.8 °C) (!) 101 20 (!) 166/71 90 %   21 1908 99.8 °F (37.7 °C) 90 20 (!) 144/84 99 %   21 1535 98.4 °F (36.9 °C) 86 20 135/67 96 %   21 1223 98.6 °F (37 °C) 86 20 (!) 141/63 96 %      0701 -  1900  In: -   Out: 300 [Urine:300]  There were no vitals filed for this visit.    Physical Exam:  General:Alert, No distress,   Eyes:No scleral icterus, No conjunctival pallor  Neck:Supple,no mass palpable,no thyromegaly  Lungs:Clears to auscultation Bilaterally, normal respiratory effort  CVS:RRR, S1 S2 normal,  No rub,  Abdomen:Soft, Non tender, No hepatosplenomegaly  Extremities: trace LE edema  Skin:No rash or lesions, Warm and DRY   Psych: appropriate affect    :  No kilgore  Musculoskeletal : no redness, no joint tenderness  NEURO: Normal Speech, Non focal        CODE STATUS:  full  Care Plan discussed with:  Patient, nurse     Chart reviewed.    y Reviewed previous records   y Discussion with patient and/or family and questions answered       ECG[de-identified] Rev:yes  Xray/CT/US/MRI REV:yes  Lab Data Personally Reviewed: (see below)  Recent Labs     09/06/21  0030 09/05/21  0019   WBC 15.1* 14.3*   HGB 10.1* 11.6*    217   ANEU 10.7*  --     141   K 4.1 4.2   * 137*   BUN 39* 28*   CREA 4.06* 3.23*   CA 7.8* 8.7   MG 1.4*  --    PHOS 3.5  --      Lab Results   Component Value Date/Time    Color YELLOW/STRAW 09/04/2021 11:26 PM    Appearance CLEAR 09/04/2021 11:26 PM    Specific gravity 1.013 09/04/2021 11:26 PM    pH (UA) 7.0 09/04/2021 11:26 PM    Protein 300 (A) 09/04/2021 11:26 PM    Glucose Negative 09/04/2021 11:26 PM    Ketone Negative 09/04/2021 11:26 PM    Bilirubin Negative 09/04/2021 11:26 PM    Urobilinogen 0.2 09/04/2021 11:26 PM    Nitrites Negative 09/04/2021 11:26 PM    Leukocyte Esterase Negative 09/04/2021 11:26 PM    Epithelial cells FEW 09/04/2021 11:26 PM    Bacteria Negative 09/04/2021 11:26 PM    WBC 0-4 09/04/2021 11:26 PM    RBC 0-5 09/04/2021 11:26 PM       Lab Results   Component Value Date/Time    Iron 10 (L) 04/08/2021 05:14 AM    TIBC 271 04/08/2021 05:14 AM    Iron % saturation 4 (L) 04/08/2021 05:14 AM    Ferritin 53 04/08/2021 05:14 AM     Lab Results   Component Value Date/Time    Culture result: (A) 09/04/2021 11:40 PM     GRAM POSITIVE COCCI IN CLUSTERS GROWING IN 1 OF 2 BOTTLES DRAWN (SITE=LEFT AC)    Culture result: REMAINING BOTTLE(S) HAS/HAVE NO GROWTH SO FAR 09/04/2021 11:40 PM    Culture result: NO GROWTH 1 DAY 09/04/2021 11:14 PM     Prior to Admission Medications   Prescriptions Last Dose Informant Patient Reported? Taking? GLUCOSAMINE HCL/CHONDR PETERSEN A NA (GLUCOSAMINE-CHONDROITIN) 750-600 mg Tab   Yes No   Sig: Take 1 Tab by mouth daily. Brand: Move Free   MULTIVITS W-FE,OTHER MIN (CENTRUM PO)   Yes No   Sig: Take 1 Tab by mouth daily. VITAMIN D3 2,000 unit cap capsule   Yes No   Sig: Take 2,000 Units by mouth daily. albuterol (ProAir HFA) 90 mcg/actuation inhaler   Yes No   Sig: Take 1 Puff by inhalation every four (4) hours. allopurinol (ZYLOPRIM) 100 mg tablet   Yes No   Sig: Take 100 mg by mouth every morning. amLODIPine (NORVASC) 5 mg tablet   Yes No   Sig: Take 5 mg by mouth daily. aspirin delayed-release 81 mg tablet   Yes No   Sig: Take 81 mg by mouth nightly. atorvastatin (Lipitor) 80 mg tablet   Yes No   Sig: Take 80 mg by mouth nightly. fluocinoNIDE (LIDEX) 0.05 % external solution   Yes No   Sig: APPLY TO ITCHY SPOTS ON SCALP AS NEEDED FOR FLARE   gabapentin (NEURONTIN) 300 mg capsule   Yes No   Sig: TAKE 1 CAPSULE BY MOUTH EVERY DAY AT BEDTIME FOR 30 DAYS   hydrOXYzine HCL (ATARAX) 10 mg tablet   No No   Sig: Take 1-2 Tablets by mouth three (3) times daily as needed for Itching or Anxiety. isosorbide mononitrate ER (IMDUR) 30 mg tablet   No No   Sig: Take 0.5 Tabs by mouth daily. ketoconazole (NIZORAL) 2 % shampoo   Yes No   Sig: Apply 1 mL to affected area daily as needed for Itching. methocarbamoL (ROBAXIN) 750 mg tablet   No No   Sig: Take 1 Tablet by mouth three (3) times daily as needed for Muscle Spasm(s). metoprolol succinate (TOPROL-XL) 25 mg XL tablet   No No   Sig: TAKE 1 TABLET BY MOUTH EVERY DAY   nitroglycerin (NITROSTAT) 0.4 mg SL tablet   No No   Sig: TAKE 1 TABLET BY SUBLINGUAL ROUTE EVERY 5 MINUTES AS NEEDED FOR CHEST PAIN.    pantoprazole (PROTONIX) 40 mg tablet   No No   Sig: TAKE 1 TABLET BY MOUTH EVERY DAY rOPINIRole (Requip) 0.5 mg tablet   Yes No   Sig: Take 0.5 mg by mouth nightly. telmisartan (MICARDIS) 40 mg tablet   No No   Sig: TAKE 1 TABLET BY MOUTH EVERY DAY (REPLACES IRBESARTAN)   umeclidinium-vilanteroL (Anoro Ellipta) 62.5-25 mcg/actuation inhaler   Yes No   Sig: Take 1 Puff by inhalation daily. Facility-Administered Medications: None     Imaging:    Medications list Personally Reviewed   [x]      Yes     []               No    Thank you for allowing us to participate in the care this patient. We will follow patient with you. Signed By: Sangita Brown MD  [facility]  [facility], [address], [address]  Phone - [phone]         Fax - [phone] [facility] Office  [address] [place] [address]  Phone - [phone]        Fax - [phone]     [email or web address]

## 2021-09-06 NOTE — PROGRESS NOTES
Received notification from bedside RN about patient with regards to: 10/10 LLQ pain not relieved by Morphine, requesting medication for relief  VS: /84, HR 90, RR 20, O2 sat 99% on RA    Intervention given: Dilaudid 0.5 mg IV x 1 dose ordered    2340: still 10/10 pain, lost IV access and requesting PO pain medication while.  RN will attempt to obtain new IV access  VS: /71, , RR 20, O2 sat 90% on RA    - Roxicodone 10 mg PO x 1 dose ordered    0346: Notified of elevated BP, not yet time for PRN Hydralazine  V: /86, , RR 20, o2 sat 90% on RA    - Labetalol IV prn ordered

## 2021-09-07 ENCOUNTER — TELEPHONE (OUTPATIENT)
Dept: INTERNAL MEDICINE CLINIC | Age: 76
End: 2021-09-07

## 2021-09-07 LAB
ALBUMIN SERPL-MCNC: 2.8 G/DL (ref 3.5–5)
ANION GAP SERPL CALC-SCNC: 9 MMOL/L (ref 5–15)
BACTERIA SPEC CULT: NORMAL
BASOPHILS # BLD: 0.1 K/UL (ref 0–0.1)
BASOPHILS NFR BLD: 0 % (ref 0–1)
BUN SERPL-MCNC: 41 MG/DL (ref 6–20)
BUN/CREAT SERPL: 9 (ref 12–20)
CALCIUM SERPL-MCNC: 8 MG/DL (ref 8.5–10.1)
CHLORIDE SERPL-SCNC: 109 MMOL/L (ref 97–108)
CO2 SERPL-SCNC: 23 MMOL/L (ref 21–32)
CREAT SERPL-MCNC: 4.67 MG/DL (ref 0.7–1.3)
DIFFERENTIAL METHOD BLD: ABNORMAL
EOSINOPHIL # BLD: 0.1 K/UL (ref 0–0.4)
EOSINOPHIL NFR BLD: 1 % (ref 0–7)
ERYTHROCYTE [DISTWIDTH] IN BLOOD BY AUTOMATED COUNT: 14.7 % (ref 11.5–14.5)
GLUCOSE BLD STRIP.AUTO-MCNC: 100 MG/DL (ref 65–117)
GLUCOSE BLD STRIP.AUTO-MCNC: 119 MG/DL (ref 65–117)
GLUCOSE BLD STRIP.AUTO-MCNC: 130 MG/DL (ref 65–117)
GLUCOSE BLD STRIP.AUTO-MCNC: 136 MG/DL (ref 65–117)
GLUCOSE SERPL-MCNC: 118 MG/DL (ref 65–100)
HCT VFR BLD AUTO: 29.1 % (ref 36.6–50.3)
HGB BLD-MCNC: 9.3 G/DL (ref 12.1–17)
IMM GRANULOCYTES # BLD AUTO: 0.1 K/UL (ref 0–0.04)
IMM GRANULOCYTES NFR BLD AUTO: 1 % (ref 0–0.5)
LYMPHOCYTES # BLD: 1.9 K/UL (ref 0.8–3.5)
LYMPHOCYTES NFR BLD: 15 % (ref 12–49)
MAGNESIUM SERPL-MCNC: 1.8 MG/DL (ref 1.6–2.4)
MCH RBC QN AUTO: 30.9 PG (ref 26–34)
MCHC RBC AUTO-ENTMCNC: 32 G/DL (ref 30–36.5)
MCV RBC AUTO: 96.7 FL (ref 80–99)
MONOCYTES # BLD: 1.6 K/UL (ref 0–1)
MONOCYTES NFR BLD: 13 % (ref 5–13)
NEUTS SEG # BLD: 8.8 K/UL (ref 1.8–8)
NEUTS SEG NFR BLD: 70 % (ref 32–75)
NRBC # BLD: 0 K/UL (ref 0–0.01)
NRBC BLD-RTO: 0 PER 100 WBC
PHOSPHATE SERPL-MCNC: 3.5 MG/DL (ref 2.6–4.7)
PLATELET # BLD AUTO: 169 K/UL (ref 150–400)
PMV BLD AUTO: 9.8 FL (ref 8.9–12.9)
POTASSIUM SERPL-SCNC: 3.9 MMOL/L (ref 3.5–5.1)
RBC # BLD AUTO: 3.01 M/UL (ref 4.1–5.7)
SERVICE CMNT-IMP: ABNORMAL
SERVICE CMNT-IMP: NORMAL
SERVICE CMNT-IMP: NORMAL
SODIUM SERPL-SCNC: 141 MMOL/L (ref 136–145)
WBC # BLD AUTO: 12.7 K/UL (ref 4.1–11.1)

## 2021-09-07 PROCEDURE — 74011250636 HC RX REV CODE- 250/636: Performed by: INTERNAL MEDICINE

## 2021-09-07 PROCEDURE — 85025 COMPLETE CBC W/AUTO DIFF WBC: CPT

## 2021-09-07 PROCEDURE — 94640 AIRWAY INHALATION TREATMENT: CPT

## 2021-09-07 PROCEDURE — 74011250637 HC RX REV CODE- 250/637: Performed by: INTERNAL MEDICINE

## 2021-09-07 PROCEDURE — 97116 GAIT TRAINING THERAPY: CPT

## 2021-09-07 PROCEDURE — 36415 COLL VENOUS BLD VENIPUNCTURE: CPT

## 2021-09-07 PROCEDURE — 65270000029 HC RM PRIVATE

## 2021-09-07 PROCEDURE — 74011000258 HC RX REV CODE- 258: Performed by: INTERNAL MEDICINE

## 2021-09-07 PROCEDURE — 80069 RENAL FUNCTION PANEL: CPT

## 2021-09-07 PROCEDURE — 82962 GLUCOSE BLOOD TEST: CPT

## 2021-09-07 PROCEDURE — 83735 ASSAY OF MAGNESIUM: CPT

## 2021-09-07 PROCEDURE — 2709999900 HC NON-CHARGEABLE SUPPLY

## 2021-09-07 PROCEDURE — 97163 PT EVAL HIGH COMPLEX 45 MIN: CPT

## 2021-09-07 PROCEDURE — 97530 THERAPEUTIC ACTIVITIES: CPT

## 2021-09-07 RX ADMIN — ACETAMINOPHEN 650 MG: 325 TABLET ORAL at 23:19

## 2021-09-07 RX ADMIN — Medication 10 ML: at 23:22

## 2021-09-07 RX ADMIN — PANTOPRAZOLE SODIUM 40 MG: 40 TABLET, DELAYED RELEASE ORAL at 08:40

## 2021-09-07 RX ADMIN — ALLOPURINOL 100 MG: 100 TABLET ORAL at 08:38

## 2021-09-07 RX ADMIN — Medication 10 ML: at 05:18

## 2021-09-07 RX ADMIN — MORPHINE SULFATE 1 MG: 2 INJECTION, SOLUTION INTRAMUSCULAR; INTRAVENOUS at 19:28

## 2021-09-07 RX ADMIN — POLYETHYLENE GLYCOL 3350 17 G: 17 POWDER, FOR SOLUTION ORAL at 19:45

## 2021-09-07 RX ADMIN — ISOSORBIDE MONONITRATE 15 MG: 30 TABLET, EXTENDED RELEASE ORAL at 08:38

## 2021-09-07 RX ADMIN — CEFTRIAXONE 1 G: 1 INJECTION, POWDER, FOR SOLUTION INTRAMUSCULAR; INTRAVENOUS at 23:18

## 2021-09-07 RX ADMIN — SODIUM CHLORIDE 100 ML/HR: 9 INJECTION, SOLUTION INTRAVENOUS at 14:28

## 2021-09-07 RX ADMIN — ATORVASTATIN CALCIUM 80 MG: 40 TABLET, FILM COATED ORAL at 23:19

## 2021-09-07 RX ADMIN — ROPINIROLE HYDROCHLORIDE 0.5 MG: 1 TABLET, FILM COATED ORAL at 23:19

## 2021-09-07 RX ADMIN — TIOTROPIUM BROMIDE AND OLODATEROL 2 PUFF: 3.124; 2.736 SPRAY, METERED RESPIRATORY (INHALATION) at 10:02

## 2021-09-07 RX ADMIN — Medication 10 ML: at 14:28

## 2021-09-07 RX ADMIN — METOPROLOL SUCCINATE 25 MG: 25 TABLET, EXTENDED RELEASE ORAL at 08:39

## 2021-09-07 RX ADMIN — AMLODIPINE BESYLATE 10 MG: 5 TABLET ORAL at 08:38

## 2021-09-07 RX ADMIN — ASPIRIN 81 MG: 81 TABLET, COATED ORAL at 23:19

## 2021-09-07 RX ADMIN — SODIUM CHLORIDE 100 ML/HR: 9 INJECTION, SOLUTION INTRAVENOUS at 02:27

## 2021-09-07 NOTE — PROGRESS NOTES
Transition of Care Plan:    RUR: 24% - \"moderate risk\"  Disposition: Home with resumed out-pt therapy ([facility]) & f/u apts  Follow up appointments: PCP & specialist as indicated   DME needed: Pt owns DME necessary for d/c  Transportation at Discharge: Pt's daughter to provide transportation at d/c  [place] or means to access home: Pt has access to his home      IM Medicare Letter: 2nd IM needed prior to d/c  Is patient a BCPI-A Bundle: N/A         Caregiver Contact: Pt's daughter Harlan Mutters: [phone])  Discharge Caregiver contacted prior to discharge? To be contacted prior to d/c             Initial note: CM reviewed pt's chart prior to moving forward with d/c planning. CM contacted pt via bedside phone to introduce role and complete initial assessment. Upon contacting bedside phone & introducing role, pt's daughter Harlan Mutters: [phone]) answered the phone and requested to complete initial assessment. Pt's daughter confirmed demographic information is up-to-date in the chart. Per daughter, pt was receiving out-pt therapy at [address] [place] [address] prior to current admission; daughter wishes for pt to resume out-pt therapy at d/c. Pt's daughter confirmed she will provide transportation at the time of d/c. CM inquired if pt's daughter had any immediate questions or concerns related to the d/c plan; daughter declined. Pt's daughter to bring copy of AMD to be scanned into the chart prior to d/c. Full assessment detailed below:    Reason for Admission: Pyelonephritis, UTI               RUR Score: 24% - \"moderate risk\"                 PCP: First and Last name:   Fred Humphreys MD   Name of Practice: [facility]   Are you a current patient: Yes/No: Yes   Approximate date of last visit: July 22, 2021   Can you participate in a virtual visit if needed: Yes, with daughter's assistance     Do you (patient/family) have any concerns for transition/discharge?  Pt's daughter lives with him and remains accessible if needs arise; daughter serves as a strong support net-work for pt. No immediate concerns identified upon conducting initial assessment. Plan for utilizing home health: PT/OT consulted; PT consult completed. PT recommendations suggest pt would benefit from resumed out-pt therapy services. Per daughter, pt has been Gallinal issues cleaning himself & toileting\" due to shoulder issues. Pt's daughter identified DME use in the form of a CPAP machine; daughter reported no regular home O2 use. Per daughter, pt has no prior hx of utilizing HH, SNF, or IPR interventions. Current Advanced Directive/Advance Care Plan:  Full Code    Advance Care Planning     General Advance Care Planning (ACP) Conversation      Date of Conversation: 9/4/2021  Conducted with: Patient with Decision Making Capacity and Healthcare Decision Maker: Next of Kin by law (only applies in absence of a Healthcare Power of  or Legal Guardian)    Content/Action Overview:   Has NO ACP documents/care preferences - requested patient complete ACP documents  CM requested for pt's daughter to provide copy of AMD to be scanned into pt's chart (9/7/21); daughter to complete request.    Length of Voluntary ACP Conversation in minutes:  <16 minutes (Non-Billable)    Care Management Interventions  PCP Verified by CM: Yes  Last Visit to PCP: 07/22/21  Palliative Care Criteria Met (RRAT>21 & CHF Dx)?: Yes  Palliative Consult Recommended?: No  Reason Palliative Care Not Recommended?: Palliative Care not utilized by provider  Mode of Transport at Discharge: Other (see comment) (Pt's daughter to provide transportation at d/c)  Transition of Care Consult (CM Consult): Discharge Planning  Discharge Durable Medical Equipment: No  Physical Therapy Consult: Yes  Occupational Therapy Consult: Yes  Speech Therapy Consult: No  Support Systems: Child(chris) (Pt's daughter lives with him in a two story home with 4-5 MARLON.  Pt uses back entrance of home which has a ramp)  Confirm Follow Up Transport: [address] Provided?: No  Discharge Location  Discharge Placement: Home with outpatient services (resumed out-pt therapy & f/u apts)    Emir Spangler, [address] [facility], [facility]

## 2021-09-07 NOTE — PROGRESS NOTES
Nephrology Progress Note  Sukhi Dominguez     [facility]. [email or web address]  Phone - [phone]   Patient: Maryclare Moritz    YOB: 1945        Date- 9/7/2021   Admit Date: 9/4/2021  CC: Follow up for  MICHI on CKD         IMPRESSION & PLAN:   · Acute kidney injury likely due to poor po intake +  telmisartan use--no hydronephrosis on renal usg  · ckd 4 due to HTN AND Partial right nephrectomy--bl cr 2.6-2.9  · Hypertension  · HYPOMAGNESEMIA  · H/o renal ca  · Gout  · GERD  · H/o renal stone  · constipation     PLAN-  · Continue IVF today  · Cr worse but suspect it is pletueaing  · UOP better at 1L  · Renal US shows no hydro and acquired cystic disease  · Continue to hold telmisartan  · No plans for RRT for now. Subjective: Interval History:   -  Feels better  -  On IV NS  -  Cr worse today,lytes are stable. Objective:   Vitals:    09/06/21 2348 09/07/21 0331 09/07/21 0717 09/07/21 1002   BP: (!) 158/78 (!) 154/87 (!) 171/74    Pulse: 79 79 93    Resp: 20 18 18    Temp: 98.9 °F (37.2 °C) 98 °F (36.7 °C) 98.4 °F (36.9 °C)    SpO2: 94% 96% 93% 91%      09/06 0701 - 09/07 0700  In: -   Out: 0719 [Urine:1050]  There were no vitals filed for this visit. Physical exam:   GEN: NAD  NECK- Supple, no mass  RESP: No wheezing, Clear b/l  CVS: S1,S2  RRR  NEURO: Normal speech, Non focal  EXT: No Edema   PSYCH: Normal Mood    Chart reviewed. Pertinent Notes reviewed. Data Review :  Recent Labs     09/07/21  0219 09/06/21  0030 09/05/21  0019    140 141   K 3.9 4.1 4.2   * 106 106   CO2 23 25 27   BUN 41* 39* 28*   CREA 4.67* 4.06* 3.23*   * 126* 137*   CA 8.0* 7.8* 8.7   MG 1.8 1.4*  --    PHOS 3.5 3.5  --      Recent Labs     09/07/21  0219 09/06/21  0030 09/05/21  0019   WBC 12.7* 15.1* 14.3*   HGB 9.3* 10.1* 11.6*   HCT 29.1* 31.5* 36.2*    190 217     No results for input(s): FE, TIBC, PSAT, FERR in the last 72 hours.    Medication list reviewed  Current Facility-Administered Medications   Medication Dose Route Frequency    labetaloL (NORMODYNE;TRANDATE) injection 10 mg  10 mg IntraVENous Q4H PRN    insulin lispro (HUMALOG) injection   SubCUTAneous AC&HS    glucose chewable tablet 16 g  4 Tablet Oral PRN    dextrose (D50W) injection syrg 12.5-25 g  12.5-25 g IntraVENous PRN    glucagon (GLUCAGEN) injection 1 mg  1 mg IntraMUSCular PRN    cefTRIAXone (ROCEPHIN) 1 g in 0.9% sodium chloride (MBP/ADV) 50 mL MBP  1 g IntraVENous Q24H    amLODIPine (NORVASC) tablet 10 mg  10 mg Oral DAILY    albuterol (PROVENTIL HFA, VENTOLIN HFA, PROAIR HFA) inhaler 1 Puff  1 Puff Inhalation Q4H PRN    allopurinoL (ZYLOPRIM) tablet 100 mg  100 mg Oral 7am    aspirin delayed-release tablet 81 mg  81 mg Oral QHS    atorvastatin (LIPITOR) tablet 80 mg  80 mg Oral QHS    hydrOXYzine HCL (ATARAX) tablet 10-20 mg  10-20 mg Oral TID PRN    isosorbide mononitrate ER (IMDUR) tablet 15 mg  15 mg Oral DAILY    methocarbamoL (ROBAXIN) tablet 750 mg  750 mg Oral TID PRN    metoprolol succinate (TOPROL-XL) XL tablet 25 mg  25 mg Oral DAILY    pantoprazole (PROTONIX) tablet 40 mg  40 mg Oral DAILY    rOPINIRole (REQUIP) tablet 0.5 mg  0.5 mg Oral QHS    tiotropium-olodateroL (STIOLTO RESPIMAT) 2.5-2.5 mcg/actuation inhaler 2 Puff  2 Puff Inhalation DAILY    sodium chloride (NS) flush 5-40 mL  5-40 mL IntraVENous Q8H    sodium chloride (NS) flush 5-40 mL  5-40 mL IntraVENous PRN    acetaminophen (TYLENOL) tablet 650 mg  650 mg Oral Q6H PRN    Or    acetaminophen (TYLENOL) suppository 650 mg  650 mg Rectal Q6H PRN    polyethylene glycol (MIRALAX) packet 17 g  17 g Oral DAILY PRN    ondansetron (ZOFRAN ODT) tablet 4 mg  4 mg Oral Q8H PRN    Or    ondansetron (ZOFRAN) injection 4 mg  4 mg IntraVENous Q6H PRN    0.9% sodium chloride infusion  100 mL/hr IntraVENous CONTINUOUS    morphine injection 1 mg  1 mg IntraVENous Q3H PRN    hydrALAZINE (APRESOLINE) 20 mg/mL injection 10 mg  10 mg IntraVENous Q6H PRN          Keli Wright, [address] [facility]  [facility] / [facility]  [address], [address]  [place], [address]  Phone - [phone]               Fax - [phone]

## 2021-09-07 NOTE — PROGRESS NOTES
Hospitalist Progress Note    NAME: Gely Youssef   :  1945   MRN:  [id number]       Assessment / Plan:    Acute left-sided pyelonephritis POA  History of renal cancer with right partial nephrectomy  Suspected left renal hematoma POA    -ceftriaxone to 1 g IV  -Bcx NGTD (coag negative staph, contaminate)  -Ucx NGTD, UA unimpressive  -Morphine for pain control  -Patient had a CT of the abdomen done at [address] [place] [address] with the following findings  Postoperative interval partial right nephrectomy with resection of midpole hypodensities seen previously, now with prominent right retroperitoneal postsurgical changes. Fat-containing lesion contiguous with the inferior right perinephric space likely postoperative fat necrosis. Multiple left renal hypodensities, the dominant 1 is in the lower pole of the left kidney is slightly larger with slightly higher internal density since 2020, suggesting mild bleeding or infection. Inferior left perinephric fat stranding has also increased. Enlarging cystic tumor less likely but not entirely excluded. Depending on renal function, ultrasound with contrast or CT/MRI without and with contrast could further evaluate.     -Patient has leukocytosis of 12.7k (improved)  -No fevers  -Monitor CBC  -Consult urology for abnormal CT scan. Unable to perform with contrast at this time due to CKD.   Awaiting urology input   -BOLA noted      Hypertension  -Resume home blood pressure medications including metoprolol, ARB (on hold), Imdur and amlodipine (increase to 10 mg)  -Use as needed IV hydralazine for systolic blood pressure greater than 160     Coronary artery disease  -Continue beta-blocker, statin, aspirin(ARB on hold)        MICHI on CKD stage IV  - baseline is 2.5  -Cr worsened today 4.6  -Hold ARB   -Avoid nephrotoxins  -Trend Cr  -Nephrology, following.    -Monitor I/O  -IVF  -No signs of uremia or need for RRT at this moment  -Check urine studies  -Replete Mg   PVR WNL     Code Status: Full code  Surrogate Decision Maker:     DVT Prophylaxis: SCDs as there is concern for left renal hematoma  GI Prophylaxis: not indicated     Baseline: Independent    30.0 - 39.9 Obese / There is no height or weight on file to calculate BMI. Estimated discharge date: 9/9  Barriers: Diagnosis, Pain control, MICHI         Subjective:     Chief Complaint / Reason for Physician Visit  Patient seen and examined at the bedside. He currently has no complaints at this time. He says his pain in the abdomen is markedly improved. +ve BM. Discussed with RN events overnight. Review of Systems:  Symptom Y/N Comments  Symptom Y/N Comments   Fever/Chills    Chest Pain     Poor Appetite    Edema     Cough    Abdominal Pain     Sputum    Joint Pain     SOB/SMITH    Pruritis/Rash     Nausea/vomit    Tolerating PT/OT     Diarrhea    Tolerating Diet     Constipation    Other       Could NOT obtain due to:      Objective:     VITALS:   Last 24hrs VS reviewed since prior progress note. Most recent are:  Patient Vitals for the past 24 hrs:   Temp Pulse Resp BP SpO2   09/07/21 1206  (!) 110  (!) 154/74 94 %   09/07/21 1002     91 %   09/07/21 0717 98.4 °F (36.9 °C) 93 18 (!) 171/74 93 %   09/07/21 0331 98 °F (36.7 °C) 79 18 (!) 154/87 96 %   09/06/21 2348 98.9 °F (37.2 °C) 79 20 (!) 158/78 94 %   09/06/21 1929 99.3 °F (37.4 °C) 88 20 (!) 176/79 92 %   09/06/21 1503 99 °F (37.2 °C) 85 20 (!) 147/77 92 %       Intake/Output Summary (Last 24 hours) at 9/7/2021 1448  Last data filed at 9/6/2021 2008  Gross per 24 hour   Intake    Output 500 ml   Net -500 ml        I had a face to face encounter and independently examined this patient on 9/7/2021, as outlined below:  PHYSICAL EXAM:  General: WD, WN. Alert, cooperative, no acute distress    EENT:  EOMI. Anicteric sclerae. MMM  Resp:  CTA bilaterally, no wheezing or rales.   No accessory muscle use  CV:  Regular  rhythm,  No edema  GI:  Soft, Non distended, No longer tender on left abdomen  +Bowel sounds  Neurologic:  Alert and oriented X 3, normal speech,   Psych:   Good insight. Not anxious nor agitated  Skin:  No rashes. No jaundice    Reviewed most current lab test results and cultures  YES  Reviewed most current radiology test results   YES  Review and summation of old records today    NO  Reviewed patient's current orders and MAR    YES  PMH/SH reviewed - no change compared to H&P  ________________________________________________________________________  Care Plan discussed with:    Comments   Patient     Family      RN     Care Manager     Consultant                        Multidiciplinary team rounds were held today with , nursing, pharmacist and clinical coordinator. Patient's plan of care was discussed; medications were reviewed and discharge planning was addressed. ________________________________________________________________________  Total NON critical care TIME:  31   Minutes    Total CRITICAL CARE TIME Spent:   Minutes non procedure based      Comments   >50% of visit spent in counseling and coordination of care     ________________________________________________________________________  Olga Ko MD     Procedures: see electronic medical records for all procedures/Xrays and details which were not copied into this note but were reviewed prior to creation of Plan. LABS:  I reviewed today's most current labs and imaging studies.   Pertinent labs include:  Recent Labs     09/07/21  0219 09/06/21  0030 09/05/21  0019   WBC 12.7* 15.1* 14.3*   HGB 9.3* 10.1* 11.6*   HCT 29.1* 31.5* 36.2*    190 217     Recent Labs     09/07/21  0219 09/06/21  0030 09/05/21  0019    140 141   K 3.9 4.1 4.2   * 106 106   CO2 23 25 27   * 126* 137*   BUN 41* 39* 28*   CREA 4.67* 4.06* 3.23*   CA 8.0* 7.8* 8.7   MG 1.8 1.4*  --    PHOS 3.5 3.5  --    ALB 2.8*  --   --        Signed: Olga Ko MD

## 2021-09-07 NOTE — PROGRESS NOTES
Problem: Mobility Impaired (Adult and Pediatric)  Goal: *Acute Goals and Plan of Care (Insert Text)  Description: FUNCTIONAL STATUS PRIOR TO ADMISSION: Patient was independent and active without use of DME.    HOME SUPPORT PRIOR TO ADMISSION: The patient's daughter lives with him but did not require assist. His spouse passed away less than a year ago. Lives on 1st floor of [address] [place] home with 5 steps to enter. Physical Therapy Goals  Initiated 9/7/2021  1. Patient will move from supine to sit and sit to supine , scoot up and down, and roll side to side in bed with independence within 7 day(s). 2.  Patient will transfer from bed to chair and chair to bed with independence using the least restrictive device within 7 day(s). 3.  Patient will perform sit to stand with independence within 7 day(s). 4.  Patient will ambulate with independence for 300 feet with the least restrictive device within 7 day(s). 5.  Patient will ascend/descend 13 stairs with 1 handrail(s) with modified independence within 7 day(s). 9/7/2021 1246 by Salomon Osorio, PT  Outcome: Progressing Towards Goal  PHYSICAL THERAPY EVALUATION  Patient: [place] (53 y.o. male)  Date: 9/7/2021  Primary Diagnosis: Pyelonephritis [N12]        Precautions:        ASSESSMENT  Based on the objective data described below, the patient presents with mild generalized weakness, decreased activity tolerance, mildly delayed standing balance reactions and decreased mobility skills below baseline independent. His HR increased to 110 after ambulating and doing stairs. Otherwise VSS. He will likely only need 1 more acute care PT session. Vitals:    09/07/21 0331 09/07/21 0717 09/07/21 1002 09/07/21 1206   BP: (!) 154/87 (!) 171/74  (!) 154/74   BP 1 Location:  Left arm  Left arm   BP Patient Position: Lying Lying  Sitting; Walking  Comment: post walking and stairs   Pulse: 79 93  (!) 110   Temp: 98 °F (36.7 °C) 98.4 °F (36.9 °C)     Resp: 18 18 SpO2: 96% RA 93% RA 91% 94% RA        Current Level of Function Impacting Discharge (mobility/balance): modified independent bed mobility and supine to sit transfers. Sba sit to stand and bed to chair transfers. Sba ambulation 125 feet; supervision stairs. Functional Outcome Measure: The patient scored 80/100 on the Barthel outcome measure which is indicative of 20% functional impairment. Other factors to consider for discharge: independent baseline; daughter lives with patient. Patient will benefit from skilled therapy intervention to address the above noted impairments. PLAN :  Recommendations and Planned Interventions: bed mobility training, transfer training, gait training, therapeutic exercises, neuromuscular re-education, patient and family training/education, and therapeutic activities      Frequency/Duration: Patient will be followed by physical therapy:  3 times a week to address goals. Recommendation for discharge: (in order for the patient to meet his/her long term goals)  Outpatient physical therapy follow up recommended for L shoulder - resume    This discharge recommendation:  Has not yet been discussed the attending provider and/or case management    IF patient discharges home will need the following DME: patient owns DME required for discharge       SUBJECTIVE:   Patient stated  I need to get to the toilet.  - had a large BM at the toilet    OBJECTIVE DATA SUMMARY:   HISTORY:    Past Medical History:   Diagnosis Date    Abnormal stress echo 5/12/2015    Anemia NEC 03/2013    Anxiety     Borderline diabetes     CAD (coronary artery disease) 2009    [facility]) revealed 20%RCA and 50%2nd diagonal    Calculus of kidney     CKD (chronic kidney disease) stage 4, GFR 15-29 ml/min (HCC)     COPD, mild (HCC)     Followed by pulmonary associates    DJD (degenerative joint disease)     Fatty liver     GERD (gastroesophageal reflux disease) 10/11/2009    Gout     Hypertension     MELODY on CPAP 10/11/2009    nasal pillows; pressure set at 10-11 -     Peripheral neuropathy 10/11/2009    bilateral feet (numbness)    Renal cell cancer, right ([address] 01/2021    RLS (restless legs syndrome) 10/11/2009    S/P coronary artery stent placement 05/12/2015    PCI./MALI to LCx, 8/2019 PCI/MALI Prox LAD (RCA 40-50% lesions)     Past Surgical History:   Procedure Laterality Date    HX BUNIONECTOMY  2019    HX CAROTID ENDARTERECTOMY Left 01/2020    Followed by Dr. Jailene Rollins  2009, 7/17    x2    HX HERNIA REPAIR      [place]/umbilical    HX KNEE REPLACEMENT Left 07/23/2011    HX ORTHOPAEDIC      left shoulder manipulation    HX UROLOGICAL      basket extraction of kidney stones    TN COLONOSCOPY FLX DX W/COLLJ SPEC WHEN PFRMD  8/5/2010         TN COLSC FLX W/RMVL OF TUMOR POLYP LESION SNARE TQ  9/24/2014            Personal factors and/or comorbidities impacting plan of care: copd, cad, mart    Home Situation  Home Environment: Private residence  # Steps to Enter: 5  Rails to Enter: Yes  Hand Rails : Bilateral  Wheelchair Ramp: Yes  One/Two Story Residence: Two story  # of Interior Steps: 13  Interior Rails: Both  Lift Chair Available: Yes  Living Alone: No  Support Systems: Child(chris) (daughter lives with patient)  Patient Expects to be Discharged to[de-identified] [employer]  Current DME Used/Available at Home: [address] [facility], [place], straight, Commode, bedside, Grab bars, Shower chair, Walker, rolling, Safety frame toliet, Raised toilet seat, Wheelchair  Tub or Shower Type: Shower    EXAMINATION/PRESENTATION/DECISION MAKING:   Critical Behavior:  Neurologic State: Alert  Orientation Level: Oriented X4  Cognition: Appropriate safety awareness, Appropriate for age attention/concentration, Appropriate decision making, Follows commands  Safety/Judgement: Awareness of environment, Good awareness of safety precautions  Hearing:   Auditory  Auditory Impairment: None  Skin:  no finding  Edema: none  Range Of Motion:  AROM: Within functional limits           PROM: Generally decreased, functional (L shoulder abd and flexion to 75 degrees)           Strength:    Strength: Generally decreased, functional                    Tone & Sensation:   Tone: Normal              Sensation: Intact               Coordination:  Coordination: Within functional limits  Vision:   Acuity: Within Defined Limits  Corrective Lenses: Glasses  Functional Mobility:  Bed Mobility:  Rolling: Modified independent  Supine to Sit: Modified independent; Additional time     Scooting: Modified independent  Transfers:  Sit to Stand: Stand-by assistance  Stand to Sit: Stand-by assistance        Bed to Chair: Stand-by assistance              Balance:   Sitting: Intact  Standing: Impaired  Standing - Static: Good  Standing - Dynamic : Fair;Occasional  Ambulation/Gait Training:  Distance (ft): 125 Feet (ft)  Assistive Device: Gait belt  Ambulation - Level of Assistance: Stand-by assistance     Gait Description (WDL): Exceptions to WDL  Gait Abnormalities: Altered arm swing  Right Side Weight Bearing: Full  Left Side Weight Bearing: Full  Base of Support: Widened     Speed/Anabel: Pace decreased (<100 feet/min)  Step Length: Right shortened;Left shortened        Interventions: Safety awareness training            Stairs:  Number of Stairs Trained: 13 (up/down the bottom step due to iv pole)  Stairs - Level of Assistance: Supervision   Rail Use: Right       Functional Measure:  Barthel Index:    Bathin  Bladder: 10  Bowels: 10  Groomin  Dressin  Feeding: 10  Mobility: 10  Stairs: 10  Toilet Use: 10  Transfer (Bed to Chair and Back): 10  Total: 80/100       The Barthel ADL Index: Guidelines  1. The index should be used as a record of what a patient does, not as a record of what a patient could do. 2. The main aim is to establish degree of independence from any help, physical or verbal, however minor and for whatever reason.   3. The need for supervision renders the patient not independent. 4. A patient's performance should be established using the best available evidence. Asking the patient, friends/relatives and nurses are the usual sources, but direct observation and common sense are also important. However direct testing is not needed. 5. Usually the patient's performance over the preceding 24-48 hours is important, but occasionally longer periods will be relevant. 6. Middle categories imply that the patient supplies over 50 per cent of the effort. 7. Use of aids to be independent is allowed. Gm Garcia., Barthel, DChingW. (2484). Functional evaluation: the Barthel Index. [address] [facility] (14)2. Pattie Peoples jacquie DANA Cardona, Oly Price., Nataly Espinosa., [place], 93 Pasha Naqvi (1999). Measuring the change indisability after inpatient rehabilitation; comparison of the responsiveness of the Barthel Index and Functional [place] Measure. Journal of Neurology, Neurosurgery, and Psychiatry, 66(4), 664-806. Alexa Dow, NChingJ.NAGA, PETAR Villarreal, & Anel Cespedes M.A. (2004.) Assessment of post-stroke quality of life in cost-effectiveness studies: The usefulness of the Barthel Index and the EuroQoL-5D.  Quality of Life Research, 15, 370-30        Physical Therapy Evaluation Charge Determination   History Examination Presentation Decision-Making   HIGH Complexity :3+ comorbidities / personal factors will impact the outcome/ POC  HIGH Complexity : 4+ Standardized tests and measures addressing body structure, function, activity limitation and / or participation in recreation  MEDIUM Complexity : Evolving with changing characteristics  Other outcome measures Barthel  LOW       Based on the above components, the patient evaluation is determined to be of the following complexity level: LOW     Pain Rating:  None reported    Activity Tolerance:   Fair, SpO2 stable on RA, and requires rest breaks    After treatment patient left in no apparent distress:   Sitting in chair, Call bell within reach, and Bed / chair alarm activated    COMMUNICATION/EDUCATION:   The patients plan of care was discussed with: Registered nurse and Rehabilitation technician. Fall prevention education was provided and the patient/caregiver indicated understanding., Patient/family have participated as able in goal setting and plan of care. , and Patient/family agree to work toward stated goals and plan of care.     Thank you for this referral.  Codie Vega, PT   Time Calculation: 29 mins

## 2021-09-07 NOTE — PROGRESS NOTES
End of Shift Note     Bedside shift change report given to Rachel Yee RN (oncoming nurse) by Raj Bear (offgoing nurse).   Report included the following information SBAR, Kardex, Intake/Output and MAR     Shift worked: 7a-3p   Shift summary and any significant changes:     None         Concerns for physician to address:  none   Zone phone for oncoming shift:   [phone]      Patient Information  Saba Bruce  64 y.o.  9/4/2021  8:31 PM by Josh Stafford MD. Saba Bruce was admitted from Home     Problem List       Patient Active Problem List     Diagnosis Date Noted    Pyelonephritis 09/04/2021    Acute hypoxemic respiratory failure ([address] 04/08/2021    Anemia 04/08/2021    Hx of completed stroke 04/08/2021    Right kidney mass 04/05/2021    CKD (chronic kidney disease) stage 4, GFR 15-29 ml/min ([address] 11/23/2020    Diabetic peripheral neuropathy associated with type 2 diabetes mellitus ([address] 03/03/2020    History of left-sided carotid endarterectomy 03/03/2020    Bilateral carotid artery stenosis 01/09/2020    Pure hypercholesterolemia 08/30/2019    Coronary artery disease due to lipid rich plaque 04/27/2016    Coronary artery disease involving native coronary artery of native heart without angina pectoris 03/16/2016    S/P coronary artery stent placement 05/12/2015    Benign essential tremor 01/22/2014    Hypertensive kidney disease with chronic kidney disease stage III ([address] 11/22/2013    Obesity 01/30/2013    Gout 01/30/2013    Chronic kidney disease, stage III (moderate) ([address] 10/11/2009    Mixed hyperlipidemia 10/11/2009    Obstructive sleep apnea 10/11/2009    RLS (restless legs syndrome) 10/11/2009    Peripheral neuropathy 10/11/2009    DJD (degenerative joint disease), multiple sites 10/11/2009    Essential hypertension 10/11/2009    GERD (gastroesophageal reflux disease) 10/11/2009    Anxiety associated with depression 10/11/2009           Past Medical History: Diagnosis Date    Abnormal stress echo 5/12/2015    Anemia NEC 03/2013    Anxiety      Borderline diabetes      CAD (coronary artery disease) 2009     cath [facility]) revealed 20%RCA and 50%2nd diagonal    Calculus of kidney      CKD (chronic kidney disease) stage 4, GFR 15-29 ml/min (HCC)      COPD, mild (HCC)       Followed by pulmonary associates    DJD (degenerative joint disease)      Fatty liver      GERD (gastroesophageal reflux disease) 10/11/2009    Gout      Hypertension      MELODY on CPAP 10/11/2009     nasal pillows; pressure set at 10-11 -     Peripheral neuropathy 10/11/2009     bilateral feet (numbness)    Renal cell cancer, right ([address] 01/2021    RLS (restless legs syndrome) 10/11/2009    S/P coronary artery stent placement 05/12/2015     PCI./MALI to LCx, 8/2019 PCI/MALI Prox LAD (RCA 40-50% lesions)         Core Measures:  CVA: No No       Activity:  Activity Level: Up with Assistance  Number times ambulated in hallways past shift: 0  Number of times OOB to chair past shift: 3     Cardiac:   Cardiac Monitoring: No         Access:   Current line(s): PIV   Central Line? No Placement date      Genitourinary:   Urinary status: voiding     Respiratory:   O2 Device: CPAP nasal  Chronic home O2 use?: NO  Incentive spirometer at bedside: NO     GI:  Last Bowel Movement Date: 09/07/21  Current diet:  ADULT DIET Regular; 4 carb choices (60 gm/meal)  Passing flatus: YES  Tolerating current diet: YES     Pain Management:   Patient states pain is manageable on current regimen: YES     Skin:  Josh Score: 20  Interventions: turn team    Patient Safety:  Fall Score: Total Score: 3  Interventions: bed/chair alarm, gripper socks and pt to call before getting OOB  High Fall Risk:  Yes     DVT prophylaxis:  DVT prophylaxis Med- No  DVT prophylaxis SCD or PHONG- Yes      Wounds: (If Applicable)  Wounds- No  Location no      Active Consults:  IP CONSULT TO UROLOGY  IP CONSULT TO NEPHROLOGY     Length of Stay:  Expected LOS: - - -  Actual LOS: 3  Discharge Plan: Rani Alvarado,, RN

## 2021-09-07 NOTE — PROGRESS NOTES
Problem: Falls - Risk of  Goal: *Absence of Falls  Description: Document Allen Solisrosendo Fall Risk and appropriate interventions in the flowsheet.   Outcome: Progressing Towards Goal  Note: Fall Risk Interventions:  Mobility Interventions: Bed/chair exit alarm, Patient to call before getting OOB         Medication Interventions: Bed/chair exit alarm, Patient to call before getting OOB    Elimination Interventions: Bed/chair exit alarm              Problem: Patient Education: Go to Patient Education Activity  Goal: Patient/Family Education  Outcome: Progressing Towards Goal

## 2021-09-07 NOTE — PROGRESS NOTES
End of Shift Note    Bedside shift change report given to Carolin Marino RN (oncoming nurse) by Ethan Isaac RN (offgoing nurse). Report included the following information SBAR, Kardex, Intake/Output and MAR    Shift worked:  night   Shift summary and any significant changes:     POC reviewed, IVF, CPAP at night, standby assist, call bell and phone within reach of patient . Safety and fall precaution maintained. All due med given plus prn morphine for flanks pain Pt, decline SCD's, education given .        Concerns for physician to address:  none   Zone phone for oncoming shift:   [phone]     Patient Information  Jackson Montgomery  64 y.o.  9/4/2021  8:31 PM by Quoc Back MD. Jackson Montgmoery was admitted from Home    Problem List  Patient Active Problem List    Diagnosis Date Noted    Pyelonephritis 09/04/2021    Acute hypoxemic respiratory failure ([address] 04/08/2021    Anemia 04/08/2021    Hx of completed stroke 04/08/2021    Right kidney mass 04/05/2021    CKD (chronic kidney disease) stage 4, GFR 15-29 ml/min ([address] 11/23/2020    Diabetic peripheral neuropathy associated with type 2 diabetes mellitus ([address] 03/03/2020    History of left-sided carotid endarterectomy 03/03/2020    Bilateral carotid artery stenosis 01/09/2020    Pure hypercholesterolemia 08/30/2019    Coronary artery disease due to lipid rich plaque 04/27/2016    Coronary artery disease involving native coronary artery of native heart without angina pectoris 03/16/2016    S/P coronary artery stent placement 05/12/2015    Benign essential tremor 01/22/2014    Hypertensive kidney disease with chronic kidney disease stage III ([address] 11/22/2013    Obesity 01/30/2013    Gout 01/30/2013    Chronic kidney disease, stage III (moderate) ([address] 10/11/2009    Mixed hyperlipidemia 10/11/2009    Obstructive sleep apnea 10/11/2009    RLS (restless legs syndrome) 10/11/2009    Peripheral neuropathy 10/11/2009    DJD (degenerative joint disease), multiple sites 10/11/2009    Essential hypertension 10/11/2009    GERD (gastroesophageal reflux disease) 10/11/2009    Anxiety associated with depression 10/11/2009     Past Medical History:   Diagnosis Date    Abnormal stress echo 5/12/2015    Anemia NEC 03/2013    Anxiety     Borderline diabetes     CAD (coronary artery disease) 2009    [facility]) revealed 20%RCA and 50%2nd diagonal    Calculus of kidney     CKD (chronic kidney disease) stage 4, GFR 15-29 ml/min (HCC)     COPD, mild (HCC)     Followed by pulmonary associates    DJD (degenerative joint disease)     Fatty liver     GERD (gastroesophageal reflux disease) 10/11/2009    Gout     Hypertension     MELODY on CPAP 10/11/2009    nasal pillows; pressure set at 10-11 -     Peripheral neuropathy 10/11/2009    bilateral feet (numbness)    Renal cell cancer, right ([address] 01/2021    RLS (restless legs syndrome) 10/11/2009    S/P coronary artery stent placement 05/12/2015    PCI./MALI to LCx, 8/2019 PCI/MALI Prox LAD (RCA 40-50% lesions)       Core Measures:  CVA: No No  patient on a pathway:[id number]}    Activity:  Activity Level: Up with Assistance  Number times ambulated in hallways past shift: 0  Number of times OOB to chair past shift: 5    Cardiac:   Cardiac Monitoring: No           Access:   Current line(s): PIV   Central Line? No Placement date     Genitourinary:   Urinary status: voiding    Respiratory:   O2 Device: CPAP nasal  Chronic home O2 use?: NO  Incentive spirometer at bedside: NO       GI:  Last Bowel Movement Date: 09/03/21  Current diet:  ADULT DIET Regular; 4 carb choices (60 gm/meal)  Passing flatus: YES  Tolerating current diet: YES       Pain Management:   Patient states pain is manageable on current regimen: YES    Skin:  Josh Score: 20  Interventions: turn team    Patient Safety:  Fall Score:  Total Score: 3  Interventions: bed/chair alarm, gripper socks and pt to call before getting OOB  High Fall Risk: Yes  @Rollbelt  @dexterity to release roll belt  Yes/No ( must document dexterity  here by stating Yes or No here, otherwise this is a restraint and must follow restraint documentation policy.)    DVT prophylaxis:  DVT prophylaxis Med- Refused  DVT prophylaxis SCD or PHONG- Yes     Wounds: (If Applicable)  Wounds- No  Location no     Active Consults:  IP CONSULT TO UROLOGY  IP CONSULT TO NEPHROLOGY    Length of Stay:  Expected LOS: - - -  Actual LOS: 3  Discharge Plan: No       Samuel Morris RN

## 2021-09-07 NOTE — TELEPHONE ENCOUNTER
----- Message -----  From: Tamar Camara  Sent: 9/4/2021  12:38 PM EDT  To: [facility] Office Pool  Subject: Dr. Menezes Shock                           Level 1/Escalated Issue      Caller's first and last name and relationship (if not the patient): Roger Seen (Daughter)        Best contact number(s): [phone]        What are the symptoms: Elevated BP of 192/85, LLE pain,Vomiting      Transfer successful - yes/no (include outcome): No, patient requesting to have call back to discuss options. Transfer declined - yes/no (include reason): No, no patient requesting to be called back. Was caller advised to seek appropriate level of care - yes/no: Yes       Details to clarify the request: Patient advising will go to urgent care or ER if no contact within 30 mins. Third party verification completed.          Message from [facility]

## 2021-09-07 NOTE — CONSULTS
Requesting Provider: Alea Ibarra MD - Reason for Consultation: \"abnormal CT findings\"  Pre-existing [facility] Patient:   No                Patient: Ankita Cruz MRN: [id number]  SSN: [id number]    YOB: 1945  Age: 64 y.o. Sex: male     Location: [address] [place] [address]       Code Status: Full Code   PCP: Praneeth Duff MD  - [phone]   Emergency Contact:  Primary Emergency Contact: Kalani Babb, Vitaly Phone: [phone]   Race/Druze/Language: Perry Reyes / Rosa Bloodgood / Tray Petty   Payor: Payor: Natalia Bradley / Plan: Arsenio Adrian / Product Type: Medicare /    Prior Admission Data: [facility] EMERGENCY DEPT     Hospitalized:  Hospital Day: 4 - Admitted 2021  8:31 PM   POD # * No surgery found *  by * Surgery not found * - Blood Loss: * No surgery found * * Surgery not found *     CONSULTANTS  IP CONSULT TO UROLOGY  IP CONSULT TO NEPHROLOGY  IP CONSULT TO UROLOGY   ADMISSION DIAGNOSES  No diagnosis found. Assessment/Plan:       S/p RAL partial nephrectomy on 2021; Hx of RCC  MICHI on CKD stage 4  ? Left hemorrhagic cyst  Left pyelonephritis    - No urgent urologic intervention warranted at this time. Patient believes [facility] CT was prior to RAL partial nephrectomy in 2021. patient already has planned labs on 10/05/21, renal MRI scheduled 10/12/21, and chest XR and f/u wit Dr. Devin Gupta on 10/19/21 at 3:50 at the [facility] location. Continue to keep appointment  - Please attempt to import [facility] CT imaging for comparison. - Will follow    Supervising MD, Dr. Valentine Stevens     CC: No chief complaint on file. HPI: [id number]. Per chart review, PMH of renal cancer status post right partial nephrectomy in 2021, CAD, HTN, dyslipidemia, hyperuricemia, and CKD stage IV. Pt to ED d/t sudden 10/10 left-sided abdominal pain.   He had a CT abdomen done at [facility] with the following findings.       Postoperative interval partial right nephrectomy with resection of midpole hypodensities seen previously, now with prominent right retroperitoneal postsurgical changes. Fat-containing lesion contiguous with the inferior right perinephric space likely postoperative fat necrosis. Multiple left renal hypodensities, the dominant 1 is in the lower pole of the left kidney is slightly larger with slightly higher internal density since November 13, 2020, suggesting mild bleeding or infection. Inferior left perinephric fat stranding has also increased. Enlarging cystic tumor less likely but not entirely excluded. Depending on renal function, ultrasound with contrast or CT/MRI without and with contrast could further evaluate. [facility] was consulted regarding abnormal CT findings. This is a known patient of Dr. Farzana Botello, last OV note below from 04/2021. UOP 1050cc. Patient reported left lower abdominal pain, subjective fevers/chills, and nausea prior to arrival. Currently reports improvement in pain post large bowel movement. Currently denies flank pain, dysuria, urgency, or nausea/vomiting. Reports going chills and mild left lower abdominal pain. Currently afebrile. Sitting in chair resting comfortably. States he believes the CT at [facility] was prior to his RAL partial nephrectomy in 04/2021? Afebrile, 171/74  WBC: 12.7   Hgb: 9.3   Cr: 4.67 (2.56 in 07/2021, nephrology following, notes reviewed.)  UA: wnl  +rocephin    CT at [facility]???? Unknown date:  Impression per hospitalist note:  Postoperative interval partial right nephrectomy with resection of midpole hypodensities seen previously, now with prominent right retroperitoneal postsurgical changes. Fat-containing lesion contiguous with the inferior right perinephric space likely postoperative fat necrosis. Multiple left renal hypodensities, the dominant 1 is in the lower pole of the left kidney is slightly larger with slightly higher internal density since November 13, 2020, suggesting mild bleeding or infection.   Inferior left perinephric fat stranding has also increased. Enlarging cystic tumor less likely but not entirely excluded. Depending on renal function, ultrasound with contrast or CT/MRI without and with contrast could further evaluate. 09/05/21  Renal US:  FINDINGS: Study limited by patient's increased body habitus. Right kidney  measures 9.8 cm in length. 2.4 cm anechoic right upper pole cyst. Partial right  nephrectomy changes seen on prior CT not evident sonographically. Left kidney  measures 11.1 cm in length. 4.9 cm left upper lower pole anechoic cyst. No  hydronephrosis. There is no definite nephrolithiasis or renal mass. Bilateral  renal cortical atrophy. IMPRESSION  1. Study limited by patient's increased body habitus. 2.  No hydronephrosis. 3.  Bilateral renal cortical atrophy and cysts. 4.  Partial right nephrectomy changes seen on prior CT not evident  Sonographically. 07/22/21  CT AP wo:  KIDNEYS/URETERS: Bilateral renal cysts. Partial right lower pole nephrectomy. Renal cortical thinning on the right and on the left. Dystrophic renal  calcifications on the right. Perinephric stranding on the right. No hydroureter. Area of fatty necrosis in the right paracolic gutter is new compared to prior  study. IMPRESSION  Postprocedural changes related to partial nephrectomy on the right. Dystrophic  calcifications and fat necrosis in the right renal fossa. No definite recurrent  mass lesion. There are bilateral renal cysts.       ==last OV note below==    This is a 18-year-old male that returns following right RAL partial nephrectomy at [address] [place] [address] on 4/5/2021. Preoperatively the patient had undergone renal biopsy which showed RCC. History COPD, DM, HTN, CKD with baseline preoperative creatinine 2.4, CAD status post PCI x2, s/p CEA. Remote smoking history. S/p umbilical hernia with mesh. On ASA 81. Warm ischemic time 21 minutes. He stayed in the hospital until postoperative day 4.     He had issues with pulmonary congestion postoperatively requiring oxygen that was slowly weaned off. His creatinine peaked postoperatively on POD 2-3.5 and had down trended to 2.9 on day of discharge. He was followed by the hospitalist team, nephrology and pulmonology as an inpatient. He required 1 unit PRBC transfusion in the hospital.    Pathology showed 2.9cm grade 2 ccRCC with a positive renal capsular margin exteriorly. The deep pathology (kidney side) appeared negative for tumor and was grossly negative during resection, even accounted for and a deeper resection was performed. pT1aNx disease. 6 month renal mass MRI + CXR + labs. [facility] RCC nomogram predicts 93% recurrence-free probability at 5 yrs. Doing well postop. Tolerating regular diet. Pain control. Not taking oxycodone. Normal flatus/BMs. No nausea, vomiting, fevers or chills. Voiding well. Incisions intact. Right lower quadrant existing EDIE site dressing removed. PAST MEDICAL HISTORY:    Allergies: NEURONTIN (GABAPENTIN TABS) (Severe)  PENICILLIN (PENICILLIN V POTASSIUM) (Mild)  BACTRIM (SULFAMETHOXAZOLE-TRIMETHOPRIM) (Mild)  CODEINE (Mild)  * LISINOPRIL (Mild)  ZOSTAVAX (Mild)  DENIES: Latex, Shellfish, X-Ray Dye, Iodine. Medications: FLUOCINONIDE 0.05 % EXTERNAL SOLUTION (FLUOCINONIDE) APPLY TO ITCHY AREA(S) ON SCALP AS NEEDED FOR FLARE; Route: EXTERNAL  TELMISARTAN 40 MG ORAL TABLET (TELMISARTAN) TAKE 1 TABLET BY MOUTH EVERY DAY (REPLACES IRBESARTAN);  Route: ORAL  ROPINIROLE HCL 0.5 MG ORAL TABLET (ROPINIROLE HCL) two daily; Route: ORAL  PANTOPRAZOLE SODIUM 40 MG ORAL TABLET DELAYED RELEASE (PANTOPRAZOLE SODIUM) daily; Route: ORAL  METOPROLOL SUCCINATE ER 25 MG ORAL TABLET EXTENDED RELEASE 24 HOUR (METOPROLOL SUCCINATE) daily; Route: ORAL  ISOSORBIDE MONONITRATE ER 30 MG ORAL TABLET EXTENDED RELEASE 24 HOUR (ISOSORBIDE MONONITRATE) daily; Route: ORAL  GLUCOSAMINE-CHONDROITIN 750-600 MG ORAL TABLET CHEWABLE (GLUCOSAMINE-CHONDROITIN) daily; Route: ORAL  CITALOPRAM HYDROBROMIDE 40 MG ORAL TABLET (CITALOPRAM HYDROBROMIDE) daily; Route: ORAL  CENTRUM SILVER ORAL TABLET (MULTIPLE VITAMINS-MINERALS) daily; Route: ORAL  ATORVASTATIN CALCIUM 40 MG ORAL TABLET (ATORVASTATIN CALCIUM) daily; Route: ORAL  ASPIRIN 81 MG ORAL TABLET CHEWABLE (ASPIRIN) daily; Route: ORAL  AMLODIPINE BESYLATE 5 MG ORAL TABLET (AMLODIPINE BESYLATE) daily; Route: ORAL  ALLOPURINOL 100 MG ORAL TABLET (ALLOPURINOL) daily; Route: ORAL  PROAIR  (90 BASE) MCG/ACT INHALATION AEROSOL SOLUTION (ALBUTEROL SULFATE) every six hours as needed; Route: INHALATION  NITROGLYCERIN 0.4 MG SUBLINGUAL TABLET SUBLINGUAL (NITROGLYCERIN) prn; Route: SUBLINGUAL  VITAMIN D3 2000 UNIT ORAL CAPSULE (CHOLECALCIFEROL) daily; Route: ORAL    Problems: Renal cell carcinoma, right kidney (ICD-189.0) (IBX39-R19.1)  Back pain (ICD-724.5) (JVT31-M69.9)  CKD (chronic kidney disease) (ICD-585.9) (XBL02-R40.9)  CKD stage 3 (gfr 30-59) (not billable after 10/1/2020) (ICD-585.3) (HDK52-H21.3)  Renal lesion (ICD-593.9) (YOH19-X77.9)  Renal cysts, bilateral (ICD-593.2) (IUF14-Z93.1)  BPH (ICD-600.00) (GWT79-L56.0)    Illnesses: Heart Disease, High Blood Pressure, and Kidney Problems. DENIES: Pacemaker/Defibrillator, Lung Disease, Diabetes, Bowel Problems, Stroke/Seizure, Bleeding Problems, HIV, Hepatitis, or Cancer. Surgeries: Umbilical hernia repair with mesh, Kidney Stone Surgery, Joint Replacement Surgery, Heart Stent Procedure, and Hernia Repair. Family History: DENIES: Prostate cancer, Kidney cancer, Kidney disease, Kidney stones. Social History: Retired. . Smoking status: former smoker. Does not drink alcohol. System Review: Admits to: Leg Swelling, Dry Skin, Easy Bleeding, and Rash. DENIES: Unexplained Weight Loss, Dry Eyes, Dry Mouth, Shortness of Breath, Constipation, Involuntary Urine Loss, Lower Extremity Weakness, Difficulty Walking, Psychiatric Problems, Impaired Sex Drive. EXAMINATION: Appearance: well-developed NAD Conjunctiva/Lids: conjunctivae and lids normal Pupil/Iris: pupils equal, round External Ears/Nose: normal no lesions or deformities Hearing: grossly intact Neck: supple Thyroid: no enlargement Respiratory Effort: breathing easily Abd. Aorta: no enlargement Lower Extremity: no edema Abdomen/Flank: soft non-tender without masses; no CVA tenderness Liver/Spleen: no organomegaly Hernia: no ventral hernia Lymph: no adenopathy Gait/Station: normal Digits/Nails: no clubbing cyanosis petechiae Skin Inspection: warm and dry Skin Palpation: no SQ nodularity Sensation: no sensory deficits Judgment/Insight: intact Mood/Affect: normal     DIAGNOSTIC STUDIES:  Renal mass biopsy 2/2/2021:  Specimen Source  1: Renal, Right, Core biopsy with touch preparation      CYTOLOGIC INTERPRETATION:       Right kidney, imaging guided core needle biopsy with immediate  interpretation of touch imprint smears:       Clear cell renal cell carcinoma, see comment    General Categorization  Positive for malignancy. Specimen Adequacy  Satisfactory for evaluation. URINALYSIS from Voided specimen  Urine Dip: pH: 6.0, Bld: +++, LE: Neg, Nit: Neg, Prot: ++, Ket: Neg, Gluc: Neg  Urine Micro: WBC: 0, RBC: 0, Bacteria: 0  Comment: Acellular debris present. IMPRESSION:    1. RENAL CELL CARCINOMA - RIGHT KIDNEY (ODJ21-C91.1)    PLAN: Encourage patient to continue follow-up with all of his other physicians including cardiology, nephrology, pulmonology, Dr. Regina De La Torre  Pathology reviewed with patient and his daughter Davonte Rivera today. We did discuss the finding of the renal capsular positive margin which I think is just an artifact from extraction.   The deep margin was grossly negative  No heavy lifting greater than 10 pounds for 6 weeks postoperatively  Change EDIE dressing site as needed  No soaking underwater for 4 weeks postoperatively  Tentatively follow-up in 6 months with MRI renal mass, chest x-ray, CMP and CBC. cc: Shahana Ayala MD  Today's Services  Urinalysis Microscopic    Upcoming Orders  Return Office Visit - with Shilpa Vinson MD MS in 6 months  CBC w/ Differential, CMP, CXR, MRI Abd (+/-Greg)        ]      Electronically signed by Shilpa Vinson MD MS on 04/15/2021 at 1:15 PM    ________________________________________________________________________                Temp (24hrs), Av.6 °F (37 °C), Min:98 °F (36.7 °C), Max:99.3 °F (37.4 °C)    Urinary Status: Voiding  Creatinine   Date/Time Value Ref Range Status   2021 02:19 AM 4.67 (H) 0.70 - 1.30 MG/DL Final   2021 12:30 AM 4.06 (H) 0.70 - 1.30 MG/DL Final     Comment:     INVESTIGATED PER DELTA CHECK PROTOCOL   2021 12:19 AM 3.23 (H) 0.70 - 1.30 MG/DL Final   2021 03:12 PM 2.56 (H) 0.70 - 1.30 MG/DL Final   2021 07:29 AM 2.96 (H) 0.70 - 1.30 MG/DL Final     Current Antimicrobial Therapy (168h ago, onward)     Ordered     Start Stop    21 1113  cefTRIAXone (ROCEPHIN) 1 g in 0.9% sodium chloride (MBP/ADV) 50 mL MBP  1 g,   IntraVENous,   EVERY 24 HOURS      21 2200 21 2159              Key Anti-Platelet Anticoagulant Meds             aspirin delayed-release 81 mg tablet Take 81 mg by mouth nightly.         Diet: ADULT DIET Regular; 4 carb choices (60 gm/meal) -       Labs     Lab Results   Component Value Date/Time    Lactic acid 1.4 2010 12:05 AM    WBC 12.7 (H) 2021 02:19 AM    HCT 29.1 (L) 2021 02:19 AM    PLATELET 208  02:19 AM    Sodium 141 2021 02:19 AM    Potassium 3.9 2021 02:19 AM    Chloride 109 (H) 2021 02:19 AM    CO2 23 2021 02:19 AM    BUN 41 (H) 2021 02:19 AM    Creatinine 4.67 (H) 2021 02:19 AM    Glucose 118 (H) 2021 02:19 AM    Calcium 8.0 (L) 2021 02:19 AM    Magnesium 1.8 2021 02:19 AM    INR 1.1 2020 09:40 PM    Prostate Specific Ag 1.8 2018 03:43 PM     UA:   Lab Results   Component Value Date/Time    Color YELLOW/STRAW 09/04/2021 11:26 PM    Appearance CLEAR 09/04/2021 11:26 PM    Specific gravity 1.013 09/04/2021 11:26 PM    pH (UA) 7.0 09/04/2021 11:26 PM    Protein 300 (A) 09/04/2021 11:26 PM    Glucose Negative 09/04/2021 11:26 PM    Ketone Negative 09/04/2021 11:26 PM    Bilirubin Negative 09/04/2021 11:26 PM    Urobilinogen 0.2 09/04/2021 11:26 PM    Nitrites Negative 09/04/2021 11:26 PM    Leukocyte Esterase Negative 09/04/2021 11:26 PM    Epithelial cells FEW 09/04/2021 11:26 PM    Bacteria Negative 09/04/2021 11:26 PM    WBC 0-4 09/04/2021 11:26 PM    RBC 0-5 09/04/2021 11:26 PM     Imaging     Results for orders placed during the hospital encounter of 07/22/21    CT ABD PELV WO CONT    Narrative  CLINICAL HISTORY: R side flank pain, h/o partial nephrectomy, r/o  nephrolithiasis  INDICATION: R side flank pain, h/o partial nephrectomy, r/o nephrolithiasis  COMPARISON: 12/18/2020  CONTRAST:  None. TECHNIQUE:  Thin axial images were obtained through the abdomen and pelvis. Coronal and  sagittal reformats were generated. Oral contrast was not administered. CT dose  reduction was achieved through use of a standardized protocol tailored for this  examination and automatic exposure control for dose modulation. The absence of intravenous contrast material reduces the sensitivity for  evaluation of visceral organs and vasculature including presence of small mass  lesions, hemodynamically significant stenoses, dissections, mucosal  abnormalities etc.    FINDINGS:  LUNG BASES: Coronary vascular calcifications. INCIDENTALLY IMAGED HEART AND MEDIASTINUM: Normal heart size. Coronary  calcification. No pericardial effusion. LIVER: Mildly nodular liver contour. GALLBLADDER: Unremarkable. SPLEEN: Unremarkable  PANCREAS: No mass or ductal dilatation. ADRENALS: Unremarkable. KIDNEYS/URETERS: Bilateral renal cysts. Partial right lower pole nephrectomy.   Renal cortical thinning on the right and on the left. Dystrophic renal  calcifications on the right. Perinephric stranding on the right. No hydroureter. Area of fatty necrosis in the right paracolic gutter is new compared to prior  study. STOMACH: Unremarkable. SMALL BOWEL: No dilatation or wall thickening. COLON: Diverticulosis without diverticulitis. APPENDIX: Unremarkable. PERITONEUM: Minimal irregular fatty necrosis in the right lower quadrant. RETROPERITONEUM: No lymphadenopathy or aortic aneurysm. REPRODUCTIVE ORGANS: Unremarkable. URINARY BLADDER: No acute lesion. The bladder is not significantly distended. BONES: Degenerative changes. Impression  Postprocedural changes related to partial nephrectomy on the right. Dystrophic  calcifications and fat necrosis in the right renal fossa. No definite recurrent  mass lesion. There are bilateral renal cysts. US Results (most recent):  Results from [place] encounter on 09/04/21    US RETROPERITONEUM COMP    Narrative  EXAM: US RETROPERITONEUM COMP    INDICATION: MICHI/ pain    COMPARISON: CT abdomen and pelvis 7/22/2021    TECHNIQUE: Ultrasound of the kidneys and urinary bladder. FINDINGS: Study limited by patient's increased body habitus. Right kidney  measures 9.8 cm in length. 2.4 cm anechoic right upper pole cyst. Partial right  nephrectomy changes seen on prior CT not evident sonographically. Left kidney  measures 11.1 cm in length. 4.9 cm left upper lower pole anechoic cyst. No  hydronephrosis. There is no definite nephrolithiasis or renal mass. Bilateral  renal cortical atrophy. The bladder is underdistended. Aorta not well evaluated. IVC within normal  limits. Impression  1. Study limited by patient's increased body habitus. 2.  No hydronephrosis. 3.  Bilateral renal cortical atrophy and cysts. 4.  Partial right nephrectomy changes seen on prior CT not evident  sonographically.       Cultures     All Micro Results     Procedure Component Value Units Date/Time    CULTURE, BLOOD #1 [[id number]]  (Abnormal) Collected: 09/04/21 2340    Order Status: Completed Specimen: Blood Updated: 09/07/21 0815     Special Requests: NO SPECIAL REQUESTS        Culture result:       STAPHYLOCOCCUS SPECIES, COAGULASE NEGATIVE GROWING IN 1 OF 2 BOTTLES DRAWN (SITE=LEFT AC)                  REMAINING BOTTLE(S) HAS/HAVE NO GROWTH SO FAR          CULTURE, URINE [[id number]] Collected: 09/05/21 2235    Order Status: Completed Specimen: Urine from Clean catch Updated: 09/07/21 0742     Special Requests: NO SPECIAL REQUESTS        Culture result: No growth (<1,000 CFU/ML)       CULTURE, BLOOD #2 [[id number]] Collected: 09/04/21 2314    Order Status: Completed Specimen: Blood Updated: 09/07/21 0726     Special Requests: NO SPECIAL REQUESTS        Culture result: NO GROWTH 2 DAYS       MICRO TRACKING [[id number]] Collected: 09/04/21 2340    Order Status: Completed Updated: 09/05/21 2246           Past History: (Complete 2+/3 areas)     Allergies   Allergen Reactions    Bactrim [Sulfamethoxazole-Trimethoprim] Hives    Codeine Itching    Lisinopril (Bulk) Cough    Neurontin [Gabapentin] Other (comments)     drowsy    Zostavax [Zoster Vaccine Live (Pf)] Rash     See 9/10/13 visit    Penicillins Hives     Pt states he has taken Keflex before without problems      Current Facility-Administered Medications   Medication Dose Route Frequency    labetaloL (NORMODYNE;TRANDATE) injection 10 mg  10 mg IntraVENous Q4H PRN    insulin lispro (HUMALOG) injection   SubCUTAneous AC&HS    glucose chewable tablet 16 g  4 Tablet Oral PRN    dextrose (D50W) injection syrg 12.5-25 g  12.5-25 g IntraVENous PRN    glucagon (GLUCAGEN) injection 1 mg  1 mg IntraMUSCular PRN    cefTRIAXone (ROCEPHIN) 1 g in 0.9% sodium chloride (MBP/ADV) 50 mL MBP  1 g IntraVENous Q24H    amLODIPine (NORVASC) tablet 10 mg  10 mg Oral DAILY    albuterol (PROVENTIL HFA, VENTOLIN HFA, PROAIR HFA) inhaler 1 Puff  1 Puff Inhalation Q4H PRN    allopurinoL (ZYLOPRIM) tablet 100 mg  100 mg Oral 7am    aspirin delayed-release tablet 81 mg  81 mg Oral QHS    atorvastatin (LIPITOR) tablet 80 mg  80 mg Oral QHS    hydrOXYzine HCL (ATARAX) tablet 10-20 mg  10-20 mg Oral TID PRN    isosorbide mononitrate ER (IMDUR) tablet 15 mg  15 mg Oral DAILY    methocarbamoL (ROBAXIN) tablet 750 mg  750 mg Oral TID PRN    metoprolol succinate (TOPROL-XL) XL tablet 25 mg  25 mg Oral DAILY    pantoprazole (PROTONIX) tablet 40 mg  40 mg Oral DAILY    rOPINIRole (REQUIP) tablet 0.5 mg  0.5 mg Oral QHS    tiotropium-olodateroL (STIOLTO RESPIMAT) 2.5-2.5 mcg/actuation inhaler 2 Puff  2 Puff Inhalation DAILY    sodium chloride (NS) flush 5-40 mL  5-40 mL IntraVENous Q8H    sodium chloride (NS) flush 5-40 mL  5-40 mL IntraVENous PRN    acetaminophen (TYLENOL) tablet 650 mg  650 mg Oral Q6H PRN    Or    acetaminophen (TYLENOL) suppository 650 mg  650 mg Rectal Q6H PRN    polyethylene glycol (MIRALAX) packet 17 g  17 g Oral DAILY PRN    ondansetron (ZOFRAN ODT) tablet 4 mg  4 mg Oral Q8H PRN    Or    ondansetron (ZOFRAN) injection 4 mg  4 mg IntraVENous Q6H PRN    0.9% sodium chloride infusion  100 mL/hr IntraVENous CONTINUOUS    morphine injection 1 mg  1 mg IntraVENous Q3H PRN    hydrALAZINE (APRESOLINE) 20 mg/mL injection 10 mg  10 mg IntraVENous Q6H PRN    Prior to Admission medications    Medication Sig Start Date End Date Taking? Authorizing Provider   gabapentin (NEURONTIN) 300 mg capsule TAKE 1 CAPSULE BY MOUTH EVERY DAY AT BEDTIME FOR 30 DAYS 8/11/21   Provider, Historical   hydrOXYzine HCL (ATARAX) 10 mg tablet Take 1-2 Tablets by mouth three (3) times daily as needed for Itching or Anxiety.  8/23/21   Yamel Molina MD   telmisartan (MICARDIS) 40 mg tablet TAKE 1 TABLET BY MOUTH EVERY DAY (REPLACES IRBESARTAN) 8/5/21   Yamel Molina MD   methocarbamoL (ROBAXIN) 750 mg tablet Take 1 Tablet by mouth three (3) times daily as needed for Muscle Spasm(s). 7/22/21   Radha Merchant MD   pantoprazole (PROTONIX) 40 mg tablet TAKE 1 TABLET BY MOUTH EVERY DAY 6/27/21   Ally Mckenzie MD   metoprolol succinate (TOPROL-XL) 25 mg XL tablet TAKE 1 TABLET BY MOUTH EVERY DAY 5/4/21   Mir JOYCE NP   fluocinoNIDE (LIDEX) 0.05 % external solution APPLY TO ITCHY SPOTS ON SCALP AS NEEDED FOR FLARE 3/7/21   Provider, Historical   albuterol (ProAir HFA) 90 mcg/actuation inhaler Take 1 Puff by inhalation every four (4) hours. Provider, Historical   umeclidinium-vilanteroL (Anoro Ellipta) 62.5-25 mcg/actuation inhaler Take 1 Puff by inhalation daily. Provider, Historical   rOPINIRole (Requip) 0.5 mg tablet Take 0.5 mg by mouth nightly. Provider, Historical   ketoconazole (NIZORAL) 2 % shampoo Apply 1 mL to affected area daily as needed for Itching. Provider, Historical   atorvastatin (Lipitor) 80 mg tablet Take 80 mg by mouth nightly. Provider, Historical   isosorbide mononitrate ER (IMDUR) 30 mg tablet Take 0.5 Tabs by mouth daily. 10/21/20   Mir JOYCE NP   nitroglycerin (NITROSTAT) 0.4 mg SL tablet TAKE 1 TABLET BY SUBLINGUAL ROUTE EVERY 5 MINUTES AS NEEDED FOR CHEST PAIN. 3/4/20   Ally Mckenzie MD   VITAMIN D3 2,000 unit cap capsule Take 2,000 Units by mouth daily. 3/3/15   Provider, Historical   amLODIPine (NORVASC) 5 mg tablet Take 5 mg by mouth daily. 6/16/14   Provider, Historical   GLUCOSAMINE HCL/CHONDR PETERSEN A NA (GLUCOSAMINE-CHONDROITIN) 750-600 mg Tab Take 1 Tab by mouth daily. Brand: Move Free    Provider, Historical   aspirin delayed-release 81 mg tablet Take 81 mg by mouth nightly. Provider, Historical   allopurinol (ZYLOPRIM) 100 mg tablet Take 100 mg by mouth every morning. 6/5/12   Provider, Historical   MULTIVITS W-FE,OTHER MIN (CENTRUM PO) Take 1 Tab by mouth daily.     Provider, Historical        PMHx:  has a past medical history of Abnormal stress echo (5/12/2015), Anemia NEC (03/2013), Anxiety, Borderline diabetes, CAD (coronary artery disease) (2009), Calculus of kidney, CKD (chronic kidney disease) stage 4, GFR 15-29 ml/min ([facility]), COPD, mild ([facility] [address] DJD (degenerative joint disease), Fatty liver, GERD (gastroesophageal reflux disease) (10/11/2009), Gout, Hypertension, MELODY on CPAP (10/11/2009), Peripheral neuropathy (10/11/2009), Renal cell cancer, right ([facility] [address] (01/2021), RLS (restless legs syndrome) (10/11/2009), and S/P coronary artery stent placement (05/12/2015). He also has no past medical history of Malignant hyperthermia due to anesthesia. PSurgHx:  has a past surgical history that includes pr colonoscopy flx dx w/collj spec when pfrmd (8/5/2010); pr colsc flx w/rmvl of tumor polyp lesion snare tq (9/24/2014); hx hernia repair; hx bunionectomy (2019); hx heart catheterization (2009, 7/17); hx urological; hx orthopaedic; hx knee replacement (Left, 07/23/2011); and hx carotid endarterectomy (Left, 01/2020). PSocHx:  reports that he quit smoking about 53 years ago. His smoking use included cigarettes. He has a 10.00 pack-year smoking history. He has never used smokeless tobacco. He reports that he does not drink alcohol and does not use drugs.    ROS:  (Complete - 10 systems) - DENIES: Weightloss (Constitutional), Dry mouth (ENMT), Chest pain (CV), SOB (Respiratory), Constipation (GI), Weakness (MS), Pallor (Skin), TIA Sx (Neuro), Confusion (Psych), Easy bruising (Heme)    Physical Exam: (Comprehesive - 8+ 1995 Systems)     (1) Constitutional:  FIO2:   on SpO2: O2 Sat (%): 93 %  O2 Device: CPAP nasal    Patient Vitals for the past 24 hrs:   BP Temp Pulse Resp SpO2   09/07/21 0717 (!) 171/74 98.4 °F (36.9 °C) 93 18 93 %   09/07/21 0331 (!) 154/87 98 °F (36.7 °C) 79 18 96 %   09/06/21 2348 (!) 158/78 98.9 °F (37.2 °C) 79 20 94 %   09/06/21 1929 (!) 176/79 99.3 °F (37.4 °C) 88 20 92 %   09/06/21 1503 (!) 147/77 99 °F (37.2 °C) 85 20 92 %   09/06/21 1149 (!) 148/64 98.2 °F (36.8 °C) 84 18 92 %       Date 09/06/21 0700 - 09/07/21 0659 09/07/21 0700 - 09/08/21 0659   Shift 5457-1703 4862-1903 24 Hour Total 1850-0484 5686-9849 24 Hour Total   INTAKE   Shift Total         OUTPUT   Urine         Urine Voided       Shift Total       NET -550 -500 -1050      Weight (kg)                                                               Signed By: Kathia Butler NP  - September 7, 2021

## 2021-09-08 ENCOUNTER — APPOINTMENT (OUTPATIENT)
Dept: PHYSICAL THERAPY | Age: 76
End: 2021-09-08
Payer: MEDICARE

## 2021-09-08 ENCOUNTER — APPOINTMENT (OUTPATIENT)
Dept: CT IMAGING | Age: 76
DRG: 690 | End: 2021-09-08
Attending: NURSE PRACTITIONER
Payer: MEDICARE

## 2021-09-08 ENCOUNTER — APPOINTMENT (OUTPATIENT)
Dept: NON INVASIVE DIAGNOSTICS | Age: 76
DRG: 690 | End: 2021-09-08
Attending: NURSE PRACTITIONER
Payer: MEDICARE

## 2021-09-08 LAB
ALBUMIN SERPL-MCNC: 2.5 G/DL (ref 3.5–5)
ANION GAP SERPL CALC-SCNC: 10 MMOL/L (ref 5–15)
ANION GAP SERPL CALC-SCNC: 9 MMOL/L (ref 5–15)
BASOPHILS # BLD: 0 K/UL (ref 0–0.1)
BASOPHILS NFR BLD: 0 % (ref 0–1)
BUN SERPL-MCNC: 43 MG/DL (ref 6–20)
BUN SERPL-MCNC: 43 MG/DL (ref 6–20)
BUN/CREAT SERPL: 9 (ref 12–20)
BUN/CREAT SERPL: 9 (ref 12–20)
CALCIUM SERPL-MCNC: 8.1 MG/DL (ref 8.5–10.1)
CALCIUM SERPL-MCNC: 8.1 MG/DL (ref 8.5–10.1)
CHLORIDE SERPL-SCNC: 110 MMOL/L (ref 97–108)
CHLORIDE SERPL-SCNC: 111 MMOL/L (ref 97–108)
CO2 SERPL-SCNC: 20 MMOL/L (ref 21–32)
CO2 SERPL-SCNC: 21 MMOL/L (ref 21–32)
CREAT SERPL-MCNC: 4.73 MG/DL (ref 0.7–1.3)
CREAT SERPL-MCNC: 4.74 MG/DL (ref 0.7–1.3)
DIFFERENTIAL METHOD BLD: ABNORMAL
EOSINOPHIL # BLD: 0.2 K/UL (ref 0–0.4)
EOSINOPHIL NFR BLD: 2 % (ref 0–7)
ERYTHROCYTE [DISTWIDTH] IN BLOOD BY AUTOMATED COUNT: 14.5 % (ref 11.5–14.5)
GLUCOSE BLD STRIP.AUTO-MCNC: 102 MG/DL (ref 65–117)
GLUCOSE BLD STRIP.AUTO-MCNC: 113 MG/DL (ref 65–117)
GLUCOSE BLD STRIP.AUTO-MCNC: 114 MG/DL (ref 65–117)
GLUCOSE BLD STRIP.AUTO-MCNC: 115 MG/DL (ref 65–117)
GLUCOSE SERPL-MCNC: 98 MG/DL (ref 65–100)
GLUCOSE SERPL-MCNC: 99 MG/DL (ref 65–100)
HCT VFR BLD AUTO: 26.7 % (ref 36.6–50.3)
HGB BLD-MCNC: 8.6 G/DL (ref 12.1–17)
IMM GRANULOCYTES # BLD AUTO: 0.1 K/UL (ref 0–0.04)
IMM GRANULOCYTES NFR BLD AUTO: 1 % (ref 0–0.5)
LYMPHOCYTES # BLD: 2.1 K/UL (ref 0.8–3.5)
LYMPHOCYTES NFR BLD: 20 % (ref 12–49)
MAGNESIUM SERPL-MCNC: 1.7 MG/DL (ref 1.6–2.4)
MCH RBC QN AUTO: 31.4 PG (ref 26–34)
MCHC RBC AUTO-ENTMCNC: 32.2 G/DL (ref 30–36.5)
MCV RBC AUTO: 97.4 FL (ref 80–99)
MONOCYTES # BLD: 1.6 K/UL (ref 0–1)
MONOCYTES NFR BLD: 16 % (ref 5–13)
NEUTS SEG # BLD: 6.5 K/UL (ref 1.8–8)
NEUTS SEG NFR BLD: 61 % (ref 32–75)
NRBC # BLD: 0 K/UL (ref 0–0.01)
NRBC BLD-RTO: 0 PER 100 WBC
PHOSPHATE SERPL-MCNC: 3.7 MG/DL (ref 2.6–4.7)
PHOSPHATE SERPL-MCNC: 3.7 MG/DL (ref 2.6–4.7)
PLATELET # BLD AUTO: 167 K/UL (ref 150–400)
PMV BLD AUTO: 9.4 FL (ref 8.9–12.9)
POTASSIUM SERPL-SCNC: 3.7 MMOL/L (ref 3.5–5.1)
POTASSIUM SERPL-SCNC: 3.9 MMOL/L (ref 3.5–5.1)
RBC # BLD AUTO: 2.74 M/UL (ref 4.1–5.7)
SERVICE CMNT-IMP: NORMAL
SODIUM SERPL-SCNC: 139 MMOL/L (ref 136–145)
SODIUM SERPL-SCNC: 142 MMOL/L (ref 136–145)
WBC # BLD AUTO: 10.6 K/UL (ref 4.1–11.1)

## 2021-09-08 PROCEDURE — 74011250636 HC RX REV CODE- 250/636: Performed by: INTERNAL MEDICINE

## 2021-09-08 PROCEDURE — 80069 RENAL FUNCTION PANEL: CPT

## 2021-09-08 PROCEDURE — 97165 OT EVAL LOW COMPLEX 30 MIN: CPT

## 2021-09-08 PROCEDURE — 84100 ASSAY OF PHOSPHORUS: CPT

## 2021-09-08 PROCEDURE — 94640 AIRWAY INHALATION TREATMENT: CPT

## 2021-09-08 PROCEDURE — 51798 US URINE CAPACITY MEASURE: CPT

## 2021-09-08 PROCEDURE — 36415 COLL VENOUS BLD VENIPUNCTURE: CPT

## 2021-09-08 PROCEDURE — 97535 SELF CARE MNGMENT TRAINING: CPT

## 2021-09-08 PROCEDURE — 83735 ASSAY OF MAGNESIUM: CPT

## 2021-09-08 PROCEDURE — 65270000029 HC RM PRIVATE

## 2021-09-08 PROCEDURE — 82962 GLUCOSE BLOOD TEST: CPT

## 2021-09-08 PROCEDURE — 74176 CT ABD & PELVIS W/O CONTRAST: CPT

## 2021-09-08 PROCEDURE — 74011250636 HC RX REV CODE- 250/636: Performed by: NURSE PRACTITIONER

## 2021-09-08 PROCEDURE — 85025 COMPLETE CBC W/AUTO DIFF WBC: CPT

## 2021-09-08 PROCEDURE — 74011250637 HC RX REV CODE- 250/637: Performed by: INTERNAL MEDICINE

## 2021-09-08 PROCEDURE — 74011000258 HC RX REV CODE- 258: Performed by: INTERNAL MEDICINE

## 2021-09-08 PROCEDURE — 87040 BLOOD CULTURE FOR BACTERIA: CPT

## 2021-09-08 PROCEDURE — 80048 BASIC METABOLIC PNL TOTAL CA: CPT

## 2021-09-08 RX ORDER — MORPHINE SULFATE 2 MG/ML
1 INJECTION, SOLUTION INTRAMUSCULAR; INTRAVENOUS ONCE
Status: COMPLETED | OUTPATIENT
Start: 2021-09-08 | End: 2021-09-08

## 2021-09-08 RX ADMIN — SODIUM CHLORIDE 100 ML/HR: 9 INJECTION, SOLUTION INTRAVENOUS at 03:28

## 2021-09-08 RX ADMIN — HYDROXYZINE HYDROCHLORIDE 20 MG: 10 TABLET ORAL at 22:39

## 2021-09-08 RX ADMIN — ASPIRIN 81 MG: 81 TABLET, COATED ORAL at 22:07

## 2021-09-08 RX ADMIN — AMLODIPINE BESYLATE 10 MG: 5 TABLET ORAL at 09:27

## 2021-09-08 RX ADMIN — MORPHINE SULFATE 1 MG: 2 INJECTION, SOLUTION INTRAMUSCULAR; INTRAVENOUS at 03:39

## 2021-09-08 RX ADMIN — ALLOPURINOL 100 MG: 100 TABLET ORAL at 09:26

## 2021-09-08 RX ADMIN — MORPHINE SULFATE 1 MG: 2 INJECTION, SOLUTION INTRAMUSCULAR; INTRAVENOUS at 20:07

## 2021-09-08 RX ADMIN — ISOSORBIDE MONONITRATE 15 MG: 30 TABLET, EXTENDED RELEASE ORAL at 09:26

## 2021-09-08 RX ADMIN — MORPHINE SULFATE 1 MG: 2 INJECTION, SOLUTION INTRAMUSCULAR; INTRAVENOUS at 22:38

## 2021-09-08 RX ADMIN — MORPHINE SULFATE 1 MG: 2 INJECTION, SOLUTION INTRAMUSCULAR; INTRAVENOUS at 08:34

## 2021-09-08 RX ADMIN — CEFTRIAXONE 1 G: 1 INJECTION, POWDER, FOR SOLUTION INTRAMUSCULAR; INTRAVENOUS at 22:06

## 2021-09-08 RX ADMIN — MORPHINE SULFATE 1 MG: 2 INJECTION, SOLUTION INTRAMUSCULAR; INTRAVENOUS at 12:22

## 2021-09-08 RX ADMIN — POLYETHYLENE GLYCOL 3350 17 G: 17 POWDER, FOR SOLUTION ORAL at 09:32

## 2021-09-08 RX ADMIN — SODIUM CHLORIDE 100 ML/HR: 9 INJECTION, SOLUTION INTRAVENOUS at 22:54

## 2021-09-08 RX ADMIN — Medication 10 ML: at 22:55

## 2021-09-08 RX ADMIN — ATORVASTATIN CALCIUM 80 MG: 40 TABLET, FILM COATED ORAL at 22:08

## 2021-09-08 RX ADMIN — PANTOPRAZOLE SODIUM 40 MG: 40 TABLET, DELAYED RELEASE ORAL at 09:26

## 2021-09-08 RX ADMIN — TIOTROPIUM BROMIDE AND OLODATEROL 2 PUFF: 3.124; 2.736 SPRAY, METERED RESPIRATORY (INHALATION) at 10:00

## 2021-09-08 RX ADMIN — METOPROLOL SUCCINATE 25 MG: 25 TABLET, EXTENDED RELEASE ORAL at 09:27

## 2021-09-08 RX ADMIN — SODIUM CHLORIDE 100 ML/HR: 9 INJECTION, SOLUTION INTRAVENOUS at 08:36

## 2021-09-08 RX ADMIN — HYDROXYZINE HYDROCHLORIDE 20 MG: 10 TABLET ORAL at 13:36

## 2021-09-08 RX ADMIN — Medication 10 ML: at 09:34

## 2021-09-08 RX ADMIN — Medication 10 ML: at 13:38

## 2021-09-08 RX ADMIN — HYDRALAZINE HYDROCHLORIDE 10 MG: 20 INJECTION INTRAMUSCULAR; INTRAVENOUS at 08:34

## 2021-09-08 RX ADMIN — VANCOMYCIN HYDROCHLORIDE 2500 MG: 10 INJECTION, POWDER, LYOPHILIZED, FOR SOLUTION INTRAVENOUS at 12:29

## 2021-09-08 RX ADMIN — ROPINIROLE HYDROCHLORIDE 0.5 MG: 1 TABLET, FILM COATED ORAL at 22:07

## 2021-09-08 NOTE — PROGRESS NOTES
Problem: Falls - Risk of  Goal: *Absence of Falls  Description: Document Janeth Knottrachel Fall Risk and appropriate interventions in the flowsheet.   Outcome: Progressing Towards Goal  Note: Fall Risk Interventions:  Mobility Interventions: Bed/chair exit alarm, Patient to call before getting OOB         Medication Interventions: Bed/chair exit alarm, Patient to call before getting OOB, Teach patient to arise slowly    Elimination Interventions: Bed/chair exit alarm, Call light in reach, Urinal in reach

## 2021-09-08 NOTE — WOUND CARE
Wound care consulted for diabetic foot / toe wounds that were present on admission:  Chart reviewed and patient assessed for this today. Patient had bandaids that were removed from the left toes and they revealed scabs from partial thickness wounds from \"rubbing toes together\" in his shoes. Assessment and treatment:  the toe wounds are stable and can be left open to air. Clean, keep clean daily and leave open to air unless the rubbing starts to occur again. Apply Band-aids as needed.    Justo Franklin, RN, BSN, [address] [place] [address]

## 2021-09-08 NOTE — PROGRESS NOTES
Problem: Self Care Deficits Care Plan (Adult)  Goal: *Acute Goals and Plan of Care (Insert Text)  Description:   FUNCTIONAL STATUS PRIOR TO ADMISSION: Lives alone PTA, uses w/c ramp in/out of home; drives, retired from [employer]. \"  x ~ 1 yr; PMH L shoulder injury 2012 with current out patient therapy; C-pap  HOME SUPPORT: Lives alone with local support of family    Occupational Therapy Goals  Initiated 9/8/2021  1. Patient will perform grooming in standing VSS with modified independence within 7 day(s). 2.  Patient will perform bathing with modified independence within 7 day(s). 3.  Patient will perform upper body dressing and lower body dressing with modified independence within 7 day(s). 4.  Patient will perform toilet transfers with modified independence within 7 day(s). 5.  Patient will perform all aspects of toileting with modified independence within 7 day(s). 6.  Patient will participate in upper extremity therapeutic exercise/activities with modified independence for 5 minutes within 7 day(s). 7.  Patient will utilize energy conservation, fall prevention, pain management, PLB techniques during functional activities with verbal cues within 7 day(s). Outcome: Progressing Towards Goal   OCCUPATIONAL THERAPY EVALUATION  Patient: Geo Hobson (68 y.o. male)  Date: 9/8/2021  Primary Diagnosis: Pyelonephritis [N12]        Precautions:   Fall    ASSESSMENT  Based on the objective data described below, the patient presents with general debility  post admit for UTI, sharp pain L side on 9-4, still in 10/10 pain this morning, with marked decline compared to PT eval 9-7 when he was S overall with functional mobility. Note temp this session 100.9,  in standing and /80;  seated at rest.  O2 sats in mid 90's throughout session but RR upper 20's in standing.     Current Level of Function Impacting Discharge (ADLs/self-care): set up UE ADLs, Mod/max A LE ADls limited by pain, min A functional mobility with very poor endurance    Functional Outcome Measure: The patient scored Total: 45/100 on the Barthel Index outcome measure which is indicative of 55% impaired ability to care for basic self needs/dependency on others; inferred 90% dependency on others for instrumental ADLs. Other factors to consider for discharge: should improve functionally once pain is managed     Patient will benefit from skilled therapy intervention to address the above noted impairments. PLAN :  Recommendations and Planned Interventions: self care training, functional mobility training, therapeutic exercise, balance training, therapeutic activities, cognitive retraining, endurance activities, neuromuscular re-education, patient education, home safety training, and family training/education    Frequency/Duration: Patient will be followed by occupational therapy 4 times a week to address goals. Recommendation for discharge: (in order for the patient to meet his/her long term goals)  Occupational therapy at least 2 days/week in the home AND ensure assist and/or supervision for safety with self care and functional mobility vs return to outpatient PT: will depend on progress in acute care    This discharge recommendation:  A follow-up discussion with the attending provider and/or case management is planned    IF patient discharges home will need the following DME: AE: long handled bathing, AE: long handled dressing, and patient owns DME required for discharge       SUBJECTIVE:   Patient stated This pain is something else; I cant bend over at all.     OBJECTIVE DATA SUMMARY:   HISTORY:   Past Medical History:   Diagnosis Date    Abnormal stress echo 5/12/2015    Anemia NEC 03/2013    Anxiety     Borderline diabetes     CAD (coronary artery disease) 2009    cath Rosendo Caputo) revealed 20%RCA and 50%2nd diagonal    Calculus of kidney     CKD (chronic kidney disease) stage 4, GFR 15-29 ml/min (HCC)     COPD, mild (HCC)     Followed by pulmonary associates    DJD (degenerative joint disease)     Fatty liver     GERD (gastroesophageal reflux disease) 10/11/2009    Gout     Hypertension     MELODY on CPAP 10/11/2009    nasal pillows; pressure set at 10-11 -     Peripheral neuropathy 10/11/2009    bilateral feet (numbness)    Renal cell cancer, right ([address] 01/2021    RLS (restless legs syndrome) 10/11/2009    S/P coronary artery stent placement 05/12/2015    PCI./MALI to LCx, 8/2019 PCI/MALI Prox LAD (RCA 40-50% lesions)     Past Surgical History:   Procedure Laterality Date    HX BUNIONECTOMY  2019    HX CAROTID ENDARTERECTOMY Left 01/2020    Followed by Dr. Corbin Ferreira  2009, 7/17    x2    HX HERNIA REPAIR      McTamaney/umbilical    HX KNEE REPLACEMENT Left 07/23/2011    HX ORTHOPAEDIC      left shoulder manipulation    HX UROLOGICAL      basket extraction of kidney stones    AK COLONOSCOPY FLX DX W/COLLJ SPEC WHEN PFRMD  8/5/2010         AK COLSC FLX W/RMVL OF TUMOR POLYP LESION SNARE TQ  9/24/2014            Expanded or extensive additional review of patient history:     Home Situation  Home Environment: Private residence  # Steps to Enter: 5  Rails to Enter: Yes  Hand Rails : Bilateral  Wheelchair Ramp: Yes (back entrance)  One/Two Story Residence: Two story  # of Interior Steps: 13  Interior Rails: Both  Lift Chair Available: Yes  Living Alone: No  Support Systems: Child(chris) ( x ~1 yr)  Patient Expects to be Discharged to[de-identified] [employer]  Current DME Used/Available at Home: Cane, straight, Commode, bedside, Grab bars, CPAP, Shower chair, Walker, rolling, Safety frame toliet, Raised toilet seat, Wheelchair  Tub or Shower Type: Shower    Hand dominance: Right    EXAMINATION OF PERFORMANCE DEFICITS:  Cognitive/Behavioral Status:  Neurologic State: Alert  Orientation Level: Oriented X4  Cognition: Follows commands  Perception: Appears intact  Perseveration: No perseveration noted  Safety/Judgement: Fall prevention; Awareness of environment; Insight into deficits    Skin: see nsg notes, scabs L toes post recent surgery    Edema: none    Hearing: Auditory  Auditory Impairment: None    Vision/Perceptual:                           Acuity:  (WFL)    Corrective Lenses: Glasses    Range of Motion:    AROM: Generally decreased, functional (L shoulder limited PMH injury; currently in outpatient thera)  PROM: Generally decreased, functional (L shoulder ABD and flexion to ~75 degrees)                      Strength:  B UE  Strength: Generally decreased, functional with poor functional endurance today limiting all self care; SOB just standing                Coordination:  Coordination: Generally decreased, functional  Fine Motor Skills-Upper: Left Intact; Right Intact    Gross Motor Skills-Upper: Left Impaired;Right Intact    Tone & Sensation:    Tone: Normal  Sensation: Impaired (neuropathy B feet; L toes recently operated on/scabbed)                      Balance:  Sitting: Impaired  Sitting - Static: Good (unsupported)  Sitting - Dynamic:  (unable to bend over due to pain in L flank)  Standing: Impaired  Standing - Static: Fair;Constant support  Standing - Dynamic : Fair;Constant support;Occasional    Functional Mobility and Transfers for ADLs:  Bed Mobility:  Rolling: Minimum assistance  Supine to Sit: Minimum assistance  Sit to Supine: Minimum assistance  Scooting: Contact guard assistance    Transfers:  Sit to Stand: Contact guard assistance  Stand to Sit: Contact guard assistance  Bed to Chair: Minimum assistance  Toilet Transfer : Minimum assistance    ADL Assessment:  Feeding: Modified independent    Oral Facial Hygiene/Grooming: Supervision; Additional time (not self initiating tasks)    Bathing: Moderate assistance (limited by pain as noted; unable to reach distally)    Upper Body Dressing: Setup;Supervision; Additional time    Lower Body Dressing: Maximum assistance;Minimum assistance; Additional time (may benefit from LE AE for pain management PRN)    Toileting: Minimum assistance; Additional time                ADL Intervention and task modifications:        Patient instructed and indicated understanding the benefits of maintaining activity tolerance, functional mobility, and independence with self care tasks during acute stay  to ensure safe return home and to baseline. Encouraged patient to increase frequency and duration OOB, be out of bed for all meals, perform daily ADLs (as approved by RN/MD regarding bathing etc), and performing functional mobility to/from bathroom. Cognitive Retraining  Safety/Judgement: Fall prevention; Awareness of environment; Insight into deficits      Functional Measure:  Barthel Index:    Bathin  Bladder: 10  Bowels: 10  Groomin  Dressin  Feedin  Mobility: 0  Stairs: 0  Toilet Use: 5  Transfer (Bed to Chair and Back): 10  Total: 45/100        The Barthel ADL Index: Guidelines  1. The index should be used as a record of what a patient does, not as a record of what a patient could do. 2. The main aim is to establish degree of independence from any help, physical or verbal, however minor and for whatever reason. 3. The need for supervision renders the patient not independent. 4. A patient's performance should be established using the best available evidence. Asking the patient, friends/relatives and nurses are the usual sources, but direct observation and common sense are also important. However direct testing is not needed. 5. Usually the patient's performance over the preceding 24-48 hours is important, but occasionally longer periods will be relevant. 6. Middle categories imply that the patient supplies over 50 per cent of the effort. 7. Use of aids to be independent is allowed. Stephanie Ramsay., Barthel, D.W. (3100). Functional evaluation: the Barthel Index. [address] [facility] (14)2.   Terra Webb [facility], NATHEN OLIVER. Cherelle Stewart., Rinku Marquez., Rocio Tanner (1999). Measuring the change indisability after inpatient rehabilitation; comparison of the responsiveness of the Barthel Index and Functional Hinds Measure. Journal of Neurology, Neurosurgery, and Psychiatry, 66(4), 970-284. BRANDY Naranjo, PETAR Villarreal, & Ken Easton M.A. (2004.) Assessment of post-stroke quality of life in cost-effectiveness studies: The usefulness of the Barthel Index and the EuroQoL-5D. Quality of Life Research, 15, 510-51         Occupational Therapy Evaluation Charge Determination   History Examination Decision-Making   LOW Complexity : Brief history review  HIGH Complexity : 5 or more performance deficits relating to physical, cognitive , or psychosocial skils that result in activity limitations and / or participation restrictions HIGH Complexity : Patient presents with comorbidities that affect occupational performance. Signifigant modification of tasks or assistance (eg, physical or verbal) with assessment (s) is necessary to enable patient to complete evaluation       Based on the above components, the patient evaluation is determined to be of the following complexity level: LOW   Pain Rating:  10/10, L flank, Nsg notified; medicated ~ 1 hour prior to tx per patient; pain limiting all activity    Activity Tolerance:   Poor, SpO2 stable on RA, requires frequent rest breaks, observed SOB with activity, and tachy 129 in standing; 104 at rest    After treatment patient left in no apparent distress:    Sitting in chair and Call bell within reach    COMMUNICATION/EDUCATION:   The patients plan of care was discussed with: Registered nurse. Home safety education was provided and the patient/caregiver indicated understanding., Patient/family have participated as able in goal setting and plan of care. , and Patient/family agree to work toward stated goals and plan of care.     This patients plan of care is appropriate for delegation to JOSE.     Thank you for this referral.  Arabella Waters OTR/L  Time Calculation: 40 mins

## 2021-09-08 NOTE — PROGRESS NOTES
n  End of Shift Note    Bedside shift change report given to Angela Villarreal (oncoming nurse) by Gilda Guardado (offgoing nurse). Report included the following information SBAR, Kardex, ED Summary, Intake/Output, MAR, Accordion and Recent Results    Shift worked:  night     Shift summary and any significant changes:    Pt stable throughout shift. Scheduled meds given and prn pain  meds given x3. Labs drawn   Concerns for physician to address: \\   Zone phone for oncoming shift:  \     Activity:  Activity Level: Up with Assistance  Number times ambulated in hallways past shift: 0  Number of times OOB to chair past shift: 0    Cardiac:   Cardiac Monitoring: No           Access:   Current line(s): PIV     Genitourinary:   Urinary status: voiding    Respiratory:   O2 Device: None (Room air)  Chronic home O2 use?: NO  Incentive spirometer at bedside: NO     GI:  Last Bowel Movement Date: 09/07/21  Current diet:  ADULT DIET Regular; 4 carb choices (60 gm/meal)  Passing flatus: YES  Tolerating current diet: YES       Pain Management:   Patient states pain is manageable on current regimen: YES    Skin:  Josh Score: 20  Interventions: increase time out of bed    Patient Safety:  Fall Score:  Total Score: 3  Interventions: bed/chair alarm, gripper socks and pt to call before getting OOB  High Fall Risk: Yes    Length of Stay:  Expected LOS: - - -  Actual LOS: [address] [facility]

## 2021-09-08 NOTE — PROGRESS NOTES
Hospitalist Progress Note    NAME: Zoltan Barrera   :  1945   MRN:  [id number]     I reviewed pertinent labs and imaging, and discussed /agreed on the plan of care with Dr. Elizabeth Marley. Assessment / Plan:  Acute left-sided pyelonephritis POA  Acute Kidney Injury Cr 4.73  Chronic Kidney Disease Baseline Cr 2.6-2.9  History of renal cancer with right partial nephrectomy  Suspected left renal hematoma POA  -Completed 4 days of Ceftriaxone   -Urine Culture NGTD, UA unimpressive  -Blood Cultures - coag negative staph, likely contaminate  -Add Vancomycin today with worsening abdominal pain and fever  -Repeat Blood Cultures   -Obtain ECHO   -Morphine for pain control  -Patient had a CT of the abdomen done at [facility] new can with the following findings  Postoperative interval partial right nephrectomy with resection of midpole hypodensities seen previously, now with prominent right retroperitoneal postsurgical changes. Fat-containing lesion contiguous with the inferior right perinephric space likely postoperative fat necrosis. Multiple left renal hypodensities, the dominant 1 is in the lower pole of the left kidney is slightly larger with slightly higher internal density since 2020, suggesting mild bleeding or infection.  Inferior left perinephric fat stranding has also increased.  Enlarging cystic tumor less likely but not entirely excluded.  Depending on renal function, ultrasound with contrast or CT/MRI without and with contrast could further evaluate.  -Renal US        1. Study limited by patient's increased body habitus. 2.  No hydronephrosis. 3.  Bilateral renal cortical atrophy and cysts. 4.  Partial right nephrectomy changes seen on prior CT not evident sonographically  -Leukocytosis resolved, follow   -Febrile today  -Repeat CT abd/pelvis with worsening abdominal pain -         1. Left pyelonephritis appearance, new, without apparent abscess.         2. Stable right partial nephrectomy. 3. No urinary tract stone. 4. New trace left pleural effusion, likely reactive. -Appreciate Nephrology input - continue to hold telmisartan, no need for RRT for now   -Continue IVF   -Follow Creatinine - remains elevated     Hypertension  Coronary artery disease  Hyperlipidemia   -Continue PTA metoprolol, Imdur, statin ASA and amlodipine   -Hold ARB  -Use as needed IV hydralazine for systolic blood pressure greater than 160     30.0 - 39.9 Obese / There is no height or weight on file to calculate BMI. Estimated discharge date: September 10  Barriers:    Code status: Full  Prophylaxis: SCD's  Recommended Disposition: Home w/Family     Subjective:     Chief Complaint / Reason for Physician Visit  Patient seen at bedside, sitting in chair. Reports worsening abdominal pain. Discussed plan of care, patient verbalized understanding/agreement. Discussed with RN events overnight. Review of Systems:  Symptom Y/N Comments  Symptom Y/N Comments   Fever/Chills n   Chest Pain n    Poor Appetite n   Edema n    Cough n   Abdominal Pain y    Sputum n   Joint Pain n    SOB/SMITH n   Pruritis/Rash n    Nausea/vomit n   Tolerating PT/OT y    Diarrhea n   Tolerating Diet y    Constipation n   Other       Could NOT obtain due to:      Objective:     VITALS:   Last 24hrs VS reviewed since prior progress note.  Most recent are:  Patient Vitals for the past 24 hrs:   Temp Pulse Resp BP SpO2   09/08/21 1217 98.1 °F (36.7 °C) (!) 102 20 (!) 149/87 94 %   09/08/21 1100 (!) 100.9 °F (38.3 °C) (!) 129 26 (!) 154/80 95 %   09/08/21 1001     96 %   09/08/21 0825 97.9 °F (36.6 °C) 97 18 (!) 167/79 96 %   09/07/21 2322 99.3 °F (37.4 °C) 100 18 (!) 165/75 96 %   09/07/21 1917 98.6 °F (37 °C) 98 18 (!) 177/81 97 %   09/07/21 1627 99.2 °F (37.3 °C) 81 18 (!) 147/68 94 %       Intake/Output Summary (Last 24 hours) at 9/8/2021 1433  Last data filed at 9/8/2021 1343  Gross per 24 hour   Intake    Output 560 ml Net -560 ml        I had a face to face encounter and independently examined this patient on 9/8/2021, as outlined below:  PHYSICAL EXAM:  General: WD, WN. Alert, cooperative, no acute distress    EENT:  EOMI. Anicteric sclerae. MMM  Resp:  CTA bilaterally, no wheezing or rales. No accessory muscle use  CV:  Regular  rhythm,  No edema  GI:  Soft, obese, tender. +Bowel sounds  Neurologic:  Alert and oriented X 3, normal speech,   Psych:   Good insight. Not anxious nor agitated  Skin:  No rashes. No jaundice    Reviewed most current lab test results and cultures  YES  Reviewed most current radiology test results   YES  Review and summation of old records today    NO  Reviewed patient's current orders and MAR    YES  PMH/SH reviewed - no change compared to H&P  ________________________________________________________________________  Care Plan discussed with:    Comments   Patient x    Family      RN x    Care Manager x    Consultant  x Nephrology                      Multidiciplinary team rounds were held today with , nursing, pharmacist and clinical coordinator. Patient's plan of care was discussed; medications were reviewed and discharge planning was addressed. ________________________________________________________________________  Total NON critical care TIME:  25   Minutes    Total CRITICAL CARE TIME Spent:   Minutes non procedure based      Comments   >50% of visit spent in counseling and coordination of care x    ________________________________________________________________________  Mone Morris NP     Procedures: see electronic medical records for all procedures/Xrays and details which were not copied into this note but were reviewed prior to creation of Plan. LABS:  I reviewed today's most current labs and imaging studies.   Pertinent labs include:  Recent Labs     09/08/21  0351 09/07/21  0219 09/06/21  0030   WBC 10.6 12.7* 15.1*   HGB 8.6* 9.3* 10.1*   HCT 26.7* 29.1* 31.5*  169 190     Recent Labs     09/08/21  0351 09/07/21  0219 09/06/21  0030     142 141 140   K 3.7  3.9 3.9 4.1   *  111* 109* 106   CO2 20*  21 23 25   GLU 98  99 118* 126*   BUN 43*  43* 41* 39*   CREA 4.74*  4.73* 4.67* 4.06*   CA 8.1*  8.1* 8.0* 7.8*   MG 1.7 1.8 1.4*   PHOS 3.7  3.7 3.5 3.5   ALB 2.5* 2.8*  --        Signed: Martin Jean Baptiste NP

## 2021-09-08 NOTE — PROGRESS NOTES
Patient: Napoleon Lopes MRN: [id number]  SSN: [id number]    YOB: 1945  Age: 64 y.o. Sex: male        ADMITTED: 9/4/2021 to Ranjeet Hanley MD by Radha Almaraz MD for Pyelonephritis [N12]    F/U for abnormal CT findings at [facility]. PMH of renal cancer status post right partial nephrectomy in 04/2021, CAD, HTN, dyslipidemia, hyperuricemia, and CKD stage IV.  He had a CT abdomen done at [facility] with findings suggesting left renal hemorrhagic cyst vs infection. Patient believes the CT at [facility] was prior to his RAL partial nephrectomy in 04/2021?     Patient reports ongoing left lower abdominal pain. Currently denies flank pain, dysuria, hematuria, urgency, or nausea/vomiting. Voiding independently. Afebrile, VSS, hypertensive  WBC: 10.6 from 12.7   Hgb: 8.6  Cr: unchanged at 4.74 (bl cr 2.6-2.9, nephrology following, notes reviewed.)  UA: wnl  +rocephin     CT at [facility]???? Unknown date:  Impression per hospitalist note:  Postoperative interval partial right nephrectomy with resection of midpole hypodensities seen previously, now with prominent right retroperitoneal postsurgical changes.  Fat-containing lesion contiguous with the inferior right perinephric space likely postoperative fat necrosis. Multiple left renal hypodensities, the dominant 1 is in the lower pole of the left kidney is slightly larger with slightly higher internal density since November 13, 2020, suggesting mild bleeding or infection.  Inferior left perinephric fat stranding has also increased.  Enlarging cystic tumor less likely but not entirely excluded.  Depending on renal function, ultrasound with contrast or CT/MRI without and with contrast could further evaluate.     09/05/21  Renal US:  FINDINGS: Study limited by patient's increased body habitus. Right kidney  measures 9.8 cm in length. 2.4 cm anechoic right upper pole cyst. Partial right  nephrectomy changes seen on prior CT not evident sonographically.  Left kidney  measures 11.1 cm in length. 4.9 cm left upper lower pole anechoic cyst. No  hydronephrosis. There is no definite nephrolithiasis or renal mass. Bilateral  renal cortical atrophy. IMPRESSION  1.  Study limited by patient's increased body habitus. 2.  No hydronephrosis. 3.  Bilateral renal cortical atrophy and cysts. 4.  Partial right nephrectomy changes seen on prior CT not evident  Sonographically.     21  CT AP wo:  KIDNEYS/URETERS: Bilateral renal cysts. Partial right lower pole nephrectomy. Renal cortical thinning on the right and on the left. Dystrophic renal  calcifications on the right. Perinephric stranding on the right. No hydroureter. Area of fatty necrosis in the right paracolic gutter is new compared to prior  study. IMPRESSION  Postprocedural changes related to partial nephrectomy on the right. Dystrophic  calcifications and fat necrosis in the right renal fossa. No definite recurrent  mass lesion. There are bilateral renal cysts. Vitals: Temp (24hrs), Av °F (37.2 °C), Min:98.6 °F (37 °C), Max:99.3 °F (37.4 °C)    Blood pressure (!) 165/75, pulse 100, temperature 99.3 °F (37.4 °C), resp. rate 18, SpO2 96 %. Intake and Output:   1901 -  0700  In: -   Out: 500 [Urine:500]  No intake/output data recorded. EDIE Output lats 24 hrs: No data found. EDIE Output last 8 hrs: No data found. BM over last 24 hrs: No data found.     Physical Exam  General: NAD, pleasant  Respiratory: no distress, breathing easily, room air  Abdomen: soft, no distention; LLQ TTP  : no CVA tenderness, voiding independently, clear yellow UA  Neuro: Appropriate, no focal neurological deficits  Skin: warm, dry  Extremities: moves all, full ROM    Labs:  CBC:   Lab Results   Component Value Date/Time    WBC 10.6 2021 03:51 AM    HCT 26.7 (L) 2021 03:51 AM    PLATELET 689  03:51 AM     BMP:   Lab Results   Component Value Date/Time    Glucose 99 2021 03:51 AM    Glucose 98 2021 03:51 AM    Sodium 142 09/08/2021 03:51 AM    Sodium 139 09/08/2021 03:51 AM    Potassium 3.9 09/08/2021 03:51 AM    Potassium 3.7 09/08/2021 03:51 AM    Chloride 111 (H) 09/08/2021 03:51 AM    Chloride 110 (H) 09/08/2021 03:51 AM    CO2 21 09/08/2021 03:51 AM    CO2 20 (L) 09/08/2021 03:51 AM    BUN 43 (H) 09/08/2021 03:51 AM    BUN 43 (H) 09/08/2021 03:51 AM    Creatinine 4.73 (H) 09/08/2021 03:51 AM    Creatinine 4.74 (H) 09/08/2021 03:51 AM    Calcium 8.1 (L) 09/08/2021 03:51 AM    Calcium 8.1 (L) 09/08/2021 03:51 AM       Assessment/Plan:   S/p RAL partial nephrectomy on 4/5/2021; Hx of RCC  MICHI on CKD stage 4  ? Left hemorrhagic cyst vs. infection    - UA unremarkable. AF, WBC wnl. Creatinine relatively unchanged. - Patient believes [facility] CT was prior to RAL partial nephrectomy in 04/2021. Patient already has planned labs on 10/05/21, renal MRI scheduled 10/12/21, and chest XR and f/u wit Dr. Elio Burnette on 10/19/21 at 3:50 at the [facility] location. Maintain appointment. - Please attempt to obtain records from [facility]. Discussed with staff. May need to consider repeat CT A/P if unable to obtain outside records. - Will follow. 4:28 PM  Events noted since patient seen early this AM on rounds. Temp increased to 100.9 F and the patient became tachy to 129. CT A/P ordered. Images reviewed. Mild left hydronephrosis without dilated ureter. Perinephric stranding noted. No abscess, hemorrhagic cyst, or stones seen. B/L renal cysts noted. The bladder is unremarkable. There is mild prostatomegaly. - Findings consistent with pyelonephritis. BCx 9/4 + staph species 1/2 bottles.  Agree with broad spectrum abx, then culture specific therapy.      Supervising MD, Dr. Camp Deal By: Barbara Delaney TODD - September 8, 2021

## 2021-09-08 NOTE — PROGRESS NOTES
Nephrology Progress Note  Sukhi Dominguez     [facility]. [email or web address]  Phone - [phone]   Patient: Vidal Rollins    YOB: 1945        Date- 9/8/2021   Admit Date: 9/4/2021  CC: Follow up for  MICHI on CKD         IMPRESSION & PLAN:   · Acute kidney injury likely due to poor po intake +  telmisartan use--no hydronephrosis on renal usg  · ckd 4 due to HTN AND Partial right nephrectomy--bl cr 2.6-2.9  · Hypertension  · HYPOMAGNESEMIA  · H/o renal ca  · Gout  · GERD  · H/o renal stone  · Constipation  · Gram positive cocci     PLAN-  · Continue IVF today  · Cr stable today, B/L is 2.6-2.9  · UOP declined. · Renal US shows no hydro and acquired cystic disease  · Need to repeat CT scan for abdominal pain  · Will need Urology eval for abnormal CT done in [facility],will need to repeat CT again . · Will also need ECHO  · Continue to hold telmisartan  · No plans for RRT for now. Subjective: Interval History:   -  C/O abd pain today. -  On IV NS  -  Cr stable today,lytes are stable. Objective:   Vitals:    09/07/21 2322 09/08/21 0825 09/08/21 1001 09/08/21 1100   BP: (!) 165/75 (!) 167/79  (!) 154/80   Pulse: 100 97  (!) 129   Resp: 18 18  26   Temp: 99.3 °F (37.4 °C) 97.9 °F (36.6 °C)  (!) 100.9 °F (38.3 °C)   SpO2: 96% 96% 96% 95%      No intake/output data recorded. There were no vitals filed for this visit. Physical exam:   GEN: mild distress  NECK- Supple, no mass  RESP: No wheezing, Clear b/l  ABD: epigastric tenderness  CVS: S1,S2  RRR  NEURO: Normal speech, Non focal  EXT: No Edema   PSYCH: Normal Mood    Chart reviewed. Pertinent Notes reviewed.      Data Review :  Recent Labs     09/08/21  0351 09/07/21  0219 09/06/21  0030     142 141 140   K 3.7  3.9 3.9 4.1   *  111* 109* 106   CO2 20*  21 23 25   BUN 43*  43* 41* 39*   CREA 4.74*  4.73* 4.67* 4.06*   GLU 98  99 118* 126*   CA 8.1*  8.1* 8.0* 7.8*   MG 1.7 1.8 1.4*   PHOS 3.7  3.7 3.5 3.5     Recent Labs     09/08/21  0351 09/07/21  0219 09/06/21  0030   WBC 10.6 12.7* 15.1*   HGB 8.6* 9.3* 10.1*   HCT 26.7* 29.1* 31.5*    169 190     No results for input(s): FE, TIBC, PSAT, FERR in the last 72 hours.    Medication list  reviewed  Current Facility-Administered Medications   Medication Dose Route Frequency    vancomycin (VANCOCIN) 2500 mg in  mL infusion  2,500 mg IntraVENous ONCE    labetaloL (NORMODYNE;TRANDATE) injection 10 mg  10 mg IntraVENous Q4H PRN    insulin lispro (HUMALOG) injection   SubCUTAneous AC&HS    glucose chewable tablet 16 g  4 Tablet Oral PRN    dextrose (D50W) injection syrg 12.5-25 g  12.5-25 g IntraVENous PRN    glucagon (GLUCAGEN) injection 1 mg  1 mg IntraMUSCular PRN    cefTRIAXone (ROCEPHIN) 1 g in 0.9% sodium chloride (MBP/ADV) 50 mL MBP  1 g IntraVENous Q24H    amLODIPine (NORVASC) tablet 10 mg  10 mg Oral DAILY    albuterol (PROVENTIL HFA, VENTOLIN HFA, PROAIR HFA) inhaler 1 Puff  1 Puff Inhalation Q4H PRN    allopurinoL (ZYLOPRIM) tablet 100 mg  100 mg Oral 7am    aspirin delayed-release tablet 81 mg  81 mg Oral QHS    atorvastatin (LIPITOR) tablet 80 mg  80 mg Oral QHS    hydrOXYzine HCL (ATARAX) tablet 10-20 mg  10-20 mg Oral TID PRN    isosorbide mononitrate ER (IMDUR) tablet 15 mg  15 mg Oral DAILY    methocarbamoL (ROBAXIN) tablet 750 mg  750 mg Oral TID PRN    metoprolol succinate (TOPROL-XL) XL tablet 25 mg  25 mg Oral DAILY    pantoprazole (PROTONIX) tablet 40 mg  40 mg Oral DAILY    rOPINIRole (REQUIP) tablet 0.5 mg  0.5 mg Oral QHS    tiotropium-olodateroL (STIOLTO RESPIMAT) 2.5-2.5 mcg/actuation inhaler 2 Puff  2 Puff Inhalation DAILY    sodium chloride (NS) flush 5-40 mL  5-40 mL IntraVENous Q8H    sodium chloride (NS) flush 5-40 mL  5-40 mL IntraVENous PRN    acetaminophen (TYLENOL) tablet 650 mg  650 mg Oral Q6H PRN    Or    acetaminophen (TYLENOL) suppository 650 mg  650 mg Rectal Q6H PRN    polyethylene glycol (MIRALAX) packet 17 g  17 g Oral DAILY PRN    ondansetron (ZOFRAN ODT) tablet 4 mg  4 mg Oral Q8H PRN    Or    ondansetron (ZOFRAN) injection 4 mg  4 mg IntraVENous Q6H PRN    0.9% sodium chloride infusion  100 mL/hr IntraVENous CONTINUOUS    morphine injection 1 mg  1 mg IntraVENous Q3H PRN    hydrALAZINE (APRESOLINE) 20 mg/mL injection 10 mg  10 mg IntraVENous Q6H PRN          Jennifer Fried, [id number] [facility]  [facility], [address]  [place], [address]  Phone - [phone]               Fax - [phone]

## 2021-09-08 NOTE — PROGRESS NOTES
Pharmacy Antimicrobial Kinetic Dosing    Indication for Antimicrobials: Bloodstream infection/UTI     Current Regimen of Each Antimicrobial:  Ceftriaxone 1 g q24h (Start Date ; Day 4)  Vancomycin 2500 mg IV LD, then 750 mg IV q36h (Start Date ; Day 1)    Previous Antimicrobial Therapy:      Goal Level: AUC: 400-600 mg/hr/Liter/day    Date Dose & Interval Measured (mcg/mL) Extrapolated (mcg/mL)                       Date & time of next level:     Significant Positive Cultures:    Blood: Staph coag neg in 1/2 bottles   Urine: NG    Conditions for Dosing Consideration: None    Labs:  Recent Labs     21  0030   CREA 4.74*  4.73* 4.67* 4.06*   BUN 43*  43* 41* 39*     Recent Labs     21  0030   WBC 10.6 12.7* 15.1*     Temp (24hrs), Av.2 °F (37.3 °C), Min:97.9 °F (36.6 °C), Max:100.9 °F (38.3 °C)        Creatinine Clearance (mL/min):   CrCl (Ideal Body Weight): 14.1   If actual weight < IBW: CrCl (Actual Body Weight) 20.6    Impression/Plan:   Finishing 5 days of CTX on  for UTI  Staph coag neg is likely contaminant but will get repeat blood cx today  Starting vancomycin 2500 mg IV loading dose, followed by 750 mg q36h for projected trough of 15.9 and   Antimicrobial stop date TBD     Pharmacy will follow daily and adjust medications as appropriate for renal function and/or serum levels.     Thank you,  Swathi Phillips, PHARMD    Vancomycin Dosing Document    Documents located on pharmacy Teams site: Clinical Practice -> Antimicrobial Stewardship -> Antibiotics_Vancomycin     Aminoglycoside Dosing Document    Documents located on pharmacy Teams site: Clinical Practice -> Antimicrobial Stewardship -> Antibiotics_Aminoglycosides

## 2021-09-08 NOTE — PROGRESS NOTES
Occupational Therapy  OT eval completed; patient with 10/10 L flank pain, 100.9 temp. Nsg notified; Min A over all mobility and max A LE ADLs as unable to bend over due to pain. Full note to follow.  Mary Alice Jordan OTR/L

## 2021-09-09 ENCOUNTER — APPOINTMENT (OUTPATIENT)
Dept: NON INVASIVE DIAGNOSTICS | Age: 76
DRG: 690 | End: 2021-09-09
Attending: NURSE PRACTITIONER
Payer: MEDICARE

## 2021-09-09 LAB
ALBUMIN SERPL-MCNC: 2.4 G/DL (ref 3.5–5)
ANION GAP SERPL CALC-SCNC: 10 MMOL/L (ref 5–15)
APPEARANCE UR: CLEAR
BACTERIA URNS QL MICRO: NEGATIVE /HPF
BILIRUB UR QL: NEGATIVE
BUN SERPL-MCNC: 40 MG/DL (ref 6–20)
BUN/CREAT SERPL: 9 (ref 12–20)
CALCIUM SERPL-MCNC: 8.2 MG/DL (ref 8.5–10.1)
CHLORIDE SERPL-SCNC: 109 MMOL/L (ref 97–108)
CO2 SERPL-SCNC: 21 MMOL/L (ref 21–32)
COLOR UR: ABNORMAL
CREAT SERPL-MCNC: 4.53 MG/DL (ref 0.7–1.3)
EPITH CASTS URNS QL MICRO: ABNORMAL /LPF
GLUCOSE BLD STRIP.AUTO-MCNC: 100 MG/DL (ref 65–117)
GLUCOSE BLD STRIP.AUTO-MCNC: 102 MG/DL (ref 65–117)
GLUCOSE BLD STRIP.AUTO-MCNC: 117 MG/DL (ref 65–117)
GLUCOSE BLD STRIP.AUTO-MCNC: 89 MG/DL (ref 65–117)
GLUCOSE SERPL-MCNC: 100 MG/DL (ref 65–100)
GLUCOSE UR STRIP.AUTO-MCNC: NEGATIVE MG/DL
HGB UR QL STRIP: ABNORMAL
KETONES UR QL STRIP.AUTO: NEGATIVE MG/DL
LEUKOCYTE ESTERASE UR QL STRIP.AUTO: NEGATIVE
NITRITE UR QL STRIP.AUTO: NEGATIVE
PH UR STRIP: 6 [PH] (ref 5–8)
PHOSPHATE SERPL-MCNC: 3 MG/DL (ref 2.6–4.7)
POTASSIUM SERPL-SCNC: 3.9 MMOL/L (ref 3.5–5.1)
PROT UR STRIP-MCNC: 100 MG/DL
RBC #/AREA URNS HPF: ABNORMAL /HPF (ref 0–5)
SERVICE CMNT-IMP: NORMAL
SODIUM SERPL-SCNC: 140 MMOL/L (ref 136–145)
SP GR UR REFRACTOMETRY: 1.02 (ref 1–1.03)
UA: UC IF INDICATED,UAUC: ABNORMAL
UROBILINOGEN UR QL STRIP.AUTO: 0.2 EU/DL (ref 0.2–1)
WBC URNS QL MICRO: ABNORMAL /HPF (ref 0–4)

## 2021-09-09 PROCEDURE — 74011250636 HC RX REV CODE- 250/636: Performed by: INTERNAL MEDICINE

## 2021-09-09 PROCEDURE — 74011250636 HC RX REV CODE- 250/636: Performed by: NURSE PRACTITIONER

## 2021-09-09 PROCEDURE — 97116 GAIT TRAINING THERAPY: CPT

## 2021-09-09 PROCEDURE — 74011250637 HC RX REV CODE- 250/637: Performed by: INTERNAL MEDICINE

## 2021-09-09 PROCEDURE — C8929 TTE W OR WO FOL WCON,DOPPLER: HCPCS

## 2021-09-09 PROCEDURE — 81001 URINALYSIS AUTO W/SCOPE: CPT

## 2021-09-09 PROCEDURE — 65270000029 HC RM PRIVATE

## 2021-09-09 PROCEDURE — 82962 GLUCOSE BLOOD TEST: CPT

## 2021-09-09 PROCEDURE — 94640 AIRWAY INHALATION TREATMENT: CPT

## 2021-09-09 PROCEDURE — 80069 RENAL FUNCTION PANEL: CPT

## 2021-09-09 PROCEDURE — 36415 COLL VENOUS BLD VENIPUNCTURE: CPT

## 2021-09-09 PROCEDURE — 97530 THERAPEUTIC ACTIVITIES: CPT

## 2021-09-09 PROCEDURE — 74011000258 HC RX REV CODE- 258: Performed by: NURSE PRACTITIONER

## 2021-09-09 RX ADMIN — ASPIRIN 81 MG: 81 TABLET, COATED ORAL at 22:24

## 2021-09-09 RX ADMIN — ATORVASTATIN CALCIUM 80 MG: 40 TABLET, FILM COATED ORAL at 22:24

## 2021-09-09 RX ADMIN — ISOSORBIDE MONONITRATE 15 MG: 30 TABLET, EXTENDED RELEASE ORAL at 11:36

## 2021-09-09 RX ADMIN — MORPHINE SULFATE 1 MG: 2 INJECTION, SOLUTION INTRAMUSCULAR; INTRAVENOUS at 11:47

## 2021-09-09 RX ADMIN — MORPHINE SULFATE 1 MG: 2 INJECTION, SOLUTION INTRAMUSCULAR; INTRAVENOUS at 20:00

## 2021-09-09 RX ADMIN — Medication 5 ML: at 22:00

## 2021-09-09 RX ADMIN — AMLODIPINE BESYLATE 10 MG: 5 TABLET ORAL at 11:36

## 2021-09-09 RX ADMIN — PERFLUTREN 2 ML: 6.52 INJECTION, SUSPENSION INTRAVENOUS at 12:48

## 2021-09-09 RX ADMIN — ALLOPURINOL 100 MG: 100 TABLET ORAL at 11:36

## 2021-09-09 RX ADMIN — MORPHINE SULFATE 1 MG: 2 INJECTION, SOLUTION INTRAMUSCULAR; INTRAVENOUS at 04:33

## 2021-09-09 RX ADMIN — Medication 10 ML: at 14:19

## 2021-09-09 RX ADMIN — Medication 10 ML: at 06:27

## 2021-09-09 RX ADMIN — METOPROLOL SUCCINATE 25 MG: 25 TABLET, EXTENDED RELEASE ORAL at 11:36

## 2021-09-09 RX ADMIN — TIOTROPIUM BROMIDE AND OLODATEROL 2 PUFF: 3.124; 2.736 SPRAY, METERED RESPIRATORY (INHALATION) at 09:59

## 2021-09-09 RX ADMIN — VANCOMYCIN HYDROCHLORIDE 500 MG: 500 INJECTION, POWDER, LYOPHILIZED, FOR SOLUTION INTRAVENOUS at 14:16

## 2021-09-09 RX ADMIN — ROPINIROLE HYDROCHLORIDE 0.5 MG: 1 TABLET, FILM COATED ORAL at 22:23

## 2021-09-09 RX ADMIN — SODIUM CHLORIDE 75 ML/HR: 9 INJECTION, SOLUTION INTRAVENOUS at 20:00

## 2021-09-09 RX ADMIN — PANTOPRAZOLE SODIUM 40 MG: 40 TABLET, DELAYED RELEASE ORAL at 11:36

## 2021-09-09 NOTE — PROGRESS NOTES
Hospitalist Progress Note    NAME: Dennie Mooring   :  1945   MRN:  [id number]     I reviewed pertinent labs and imaging, and discussed /agreed on the plan of care with Dr. Mir Carter. Assessment / Plan:  Acute left-sided pyelonephritis POA  Acute Kidney Injury Cr 4.73  Chronic Kidney Disease Baseline Cr 2.6-2.9  History of renal cancer with right partial nephrectomy  Suspected left renal hematoma POA  -Completed 4 days of Ceftriaxone   -Urine Culture NGTD, UA unimpressive  -Blood Cultures - coag negative staph, likely contaminate  -Repeat Blood Cultures - NG so far   -Continue Ceftriaxone (day 5) and Vancomycin (day 2)   -ECHO pending   -Morphine for pain control - although pain is much better today   -Patient had a CT of the abdomen done at [facility] with the following findings  Postoperative interval partial right nephrectomy with resection of midpole hypodensities seen previously, now with prominent right retroperitoneal postsurgical changes. Fat-containing lesion contiguous with the inferior right perinephric space likely postoperative fat necrosis. Multiple left renal hypodensities, the dominant 1 is in the lower pole of the left kidney is slightly larger with slightly higher internal density since 2020, suggesting mild bleeding or infection.  Inferior left perinephric fat stranding has also increased.  Enlarging cystic tumor less likely but not entirely excluded.  Depending on renal function, ultrasound with contrast or CT/MRI without and with contrast could further evaluate.  -Renal US        1. Study limited by patient's increased body habitus. 2.  No hydronephrosis. 3.  Bilateral renal cortical atrophy and cysts. 4.  Partial right nephrectomy changes seen on prior CT not evident sonographically  -Leukocytosis resolved  -Afebrile overnight   -Repeat CT abd/pelvis with worsening abdominal pain -         1.  Left pyelonephritis appearance, new, without apparent abscess. 2. Stable right partial nephrectomy. 3. No urinary tract stone. 4. New trace left pleural effusion, likely reactive. -Appreciate Urology input - Signed off, follow up Yoly Burdick Nephrology input - continue to hold telmisartan, no need for RRT for now   -Continue reduced rate IVF   -Follow Creatinine - slight trend down today, follow    -Anticipate DC when Cr trending down, hopefully in next 24 to 48 hours. Hypertension  Coronary artery disease  Hyperlipidemia   -Continue PTA metoprolol, Imdur, statin ASA and amlodipine   -Hold ARB  -Use as needed IV hydralazine for systolic blood pressure greater than 160     30.0 - 39.9 Obese / Body mass index is 33.91 kg/m². Estimated discharge date: September 10  Barriers: afebrile and BC results, Cr trending down     Code status: Full  Prophylaxis: SCD's  Recommended Disposition: Home w/Family     Subjective:     Chief Complaint / Reason for Physician Visit  Patient seen at bedside, sitting in chair, daughter also at bedside. Reports abdominal pain is better. Discussed plan of care, patient and daughter verbalized understanding/agreement. Discussed with RN events overnight. Review of Systems:  Symptom Y/N Comments  Symptom Y/N Comments   Fever/Chills n   Chest Pain n    Poor Appetite n   Edema n    Cough n   Abdominal Pain y    Sputum n   Joint Pain n    SOB/SMITH n   Pruritis/Rash n    Nausea/vomit n   Tolerating PT/OT y    Diarrhea n   Tolerating Diet y    Constipation n   Other       Could NOT obtain due to:      Objective:     VITALS:   Last 24hrs VS reviewed since prior progress note.  Most recent are:  Patient Vitals for the past 24 hrs:   Temp Pulse Resp BP SpO2   09/09/21 1234    134/73    09/09/21 0750 98.1 °F (36.7 °C) 95 20 134/73 97 %   09/08/21 2123 99.1 °F (37.3 °C) (!) 106 20 (!) 142/72 97 %   09/08/21 1537 99.3 °F (37.4 °C) 100 20 (!) 145/87 96 %       Intake/Output Summary (Last 24 hours) at 9/9/2021 [address] filed at 9/9/2021 1146  Gross per 24 hour   Intake    Output 350 ml   Net -350 ml        I had a face to face encounter and independently examined this patient on 9/9/2021, as outlined below:  PHYSICAL EXAM:  General: WD, WN. Alert, cooperative, no acute distress    EENT:  EOMI. Anicteric sclerae. MMM  Resp:  CTA bilaterally, no wheezing or rales. No accessory muscle use  CV:  Regular  rhythm,  No edema  GI:  Soft, obese, tender. +Bowel sounds  Neurologic:  Alert and oriented X 3, normal speech,   Psych:   Good insight. Not anxious nor agitated  Skin:  No rashes. No jaundice    Reviewed most current lab test results and cultures  YES  Reviewed most current radiology test results   YES  Review and summation of old records today    NO  Reviewed patient's current orders and MAR    YES  PMH/SH reviewed - no change compared to H&P  ________________________________________________________________________  Care Plan discussed with:    Comments   Patient x    Family  x daughter   RN x    Care Manager x    Consultant  x Nephrology                      Multidiciplinary team rounds were held today with , nursing, pharmacist and clinical coordinator. Patient's plan of care was discussed; medications were reviewed and discharge planning was addressed. ________________________________________________________________________  Total NON critical care TIME:  25   Minutes    Total CRITICAL CARE TIME Spent:   Minutes non procedure based      Comments   >50% of visit spent in counseling and coordination of care x    ________________________________________________________________________  Delicia Pronto, NP     Procedures: see electronic medical records for all procedures/Xrays and details which were not copied into this note but were reviewed prior to creation of Plan. LABS:  I reviewed today's most current labs and imaging studies.   Pertinent labs include:  Recent Labs     09/08/21  0353 09/07/21 0219   WBC 10.6 12.7*   HGB 8.6* 9.3*   HCT 26.7* 29.1*    169     Recent Labs     09/09/21  1115 09/08/21  0351 09/07/21 0219    139  142 141   K 3.9 3.7  3.9 3.9   * 110*  111* 109*   CO2 21 20*  21 23    98  99 118*   BUN 40* 43*  43* 41*   CREA 4.53* 4.74*  4.73* 4.67*   CA 8.2* 8.1*  8.1* 8.0*   MG  --  1.7 1.8   PHOS 3.0 3.7  3.7 3.5   ALB 2.4* 2.5* 2.8*       Signed: Jose Benedict, NP

## 2021-09-09 NOTE — PROGRESS NOTES
Problem: Mobility Impaired (Adult and Pediatric)  Goal: *Acute Goals and Plan of Care (Insert Text)  Description: FUNCTIONAL STATUS PRIOR TO ADMISSION: Patient was independent and active without use of DME.    HOME SUPPORT PRIOR TO ADMISSION: The patient's daughter lives with him but did not require assist. His spouse passed away less than a year ago. Lives on 1st floor of [address] [place] home with 5 steps to enter. Physical Therapy Goals  Initiated 9/7/2021  1. Patient will move from supine to sit and sit to supine , scoot up and down, and roll side to side in bed with independence within 7 day(s). 2.  Patient will transfer from bed to chair and chair to bed with independence using the least restrictive device within 7 day(s). 3.  Patient will perform sit to stand with independence within 7 day(s). 4.  Patient will ambulate with independence for 300 feet with the least restrictive device within 7 day(s). 5.  Patient will ascend/descend 13 stairs with 1 handrail(s) with modified independence within 7 day(s). Outcome: Progressing Towards Goal   PHYSICAL THERAPY TREATMENT  Patient: Josh Colunga (46 y.o. male)  Date: 9/9/2021  Diagnosis: Pyelonephritis [N12] <principal problem not specified>       Precautions: Fall  Chart, physical therapy assessment, plan of care and goals were reviewed. ASSESSMENT  Patient continues with skilled PT services and is progressing towards goals. Pt demonstrates difficulty with exiting bed , but daughter reports similar struggles in home. They note having access to hospital bed and potential use at D/c. Pt is able to increase amb distance and resume stair training. SOB with continued activity, but SPO2 range was 93-97% on RA. No other significant mobility issues observed. Shoe selection D/w pt and daughter.      Current Level of Function Impacting Discharge (mobility/balance): Mild balance deficits and SOB with exertion      Other factors to consider for discharge: Need for assist with bed exit         PLAN :  Patient continues to benefit from skilled intervention to address the above impairments. Continue treatment per established plan of care. to address goals. Recommendation for discharge: (in order for the patient to meet his/her long term goals)  Physical therapy at least 2 days/week in the home or resume OPPT    This discharge recommendation:  Has been made in collaboration with the attending provider and/or case management    IF patient discharges home will need the following DME: rolling walker (owns)       SUBJECTIVE:   Patient stated I've been up to the bathroom.     OBJECTIVE DATA SUMMARY:   Critical Behavior:  Neurologic State: Alert  Orientation Level: Oriented X4  Cognition: Follows commands  Safety/Judgement: Fall prevention, Awareness of environment, Insight into deficits  Functional Mobility Training:  Bed Mobility:  Rolling: Moderate assistance  Supine to Sit: Moderate assistance  Sit to Supine: Supervision  Scooting: Supervision  Transfers:  Sit to Stand: Stand-by assistance  Stand to Sit: Stand-by assistance  Bed to Chair: Supervision  Balance:  Sitting: Intact  Sitting - Static: Good (unsupported)  Standing: Impaired  Standing - Static: Fair  Standing - Dynamic : Fair  Ambulation/Gait Training:  Distance (ft): 250 Feet (ft)  Assistive Device: Gait belt;Walker, rolling  Ambulation - Level of Assistance: Stand-by assistance  Gait Abnormalities: Altered arm swing  Speed/Anabel: Slow  Step Length: Right shortened;Left shortened  Stairs:  Number of Stairs Trained: 5  Stairs - Level of Assistance: Stand-by assistance   Rail Use: Right   Pain Rating:  Pain in: 2  Pain out: 2    Activity Tolerance:   Fair, with moderate SOB during/after exertion       After treatment patient left in no apparent distress:   Supine in bed and Caregiver / family present    COMMUNICATION/COLLABORATION:   The patients plan of care was discussed with: Registered nurseChing Peña Ash Kramer, PTA   Time Calculation: 31 mins

## 2021-09-09 NOTE — PROGRESS NOTES
Bedside shift change report given to JAY Sesay (oncoming nurse) by Jose R Aponte RN (offgoing nurse). Report included the following information SBAR, Kardex, Intake/Output and MAR.      Pt stable, PRN Morphine and Hydroxyzine given for pain and to help ease anxiety, PRN BP meds also given at beginning of shift for elevated /79 with pain, Pt had an elevated temp with OT today but it was back WNL when I reassessed his temp - I discussed this with NP Lashell Berg and she decided for me to go ahead and draw the second redraw one set of blood cultures that were due originally with AM 9/9 labs -- Cultures from 9/4 were Positive with Staph in 1/2 bottles    Pt rested today, seen by Urology and Bladder scan today was 0 mL post void, fluids running, wound care saw left toe and only put in wound care orders for daily CHG of toe and foot, Old chart from [facility] was faxed to the unit, No insulin coverage was needed

## 2021-09-09 NOTE — PROGRESS NOTES
Pharmacy Antimicrobial Kinetic Dosing    Indication for Antimicrobials: Bloodstream infection/UTI     Current Regimen of Each Antimicrobial:  Vancomycin Pharmacy dosing (Start Date ; Day 2)    Previous Antimicrobial Therapy:  Ceftriaxone 1 g q24h (Start Date ; Day 5)    Goal Level: AUC: 400-600 mg/hr/Liter/day    Date Dose & Interval Measured (mcg/mL) Extrapolated (mcg/mL)                       Date & time of next level: 9/10, 1230    Significant Positive Cultures:    Blood: Staph coag neg in 12 bottles   Urine: NG. Final   blood: NG. pending    Conditions for Dosing Consideration: None    Labs:  Recent Labs     21  0351 21  021   CREA 4.74*  4.73* 4.67*   BUN 43*  43* 41*     Recent Labs     21  03521   WBC 10.6 12.7*     Temp (24hrs), Av.8 °F (37.1 °C), Min:98.1 °F (36.7 °C), Max:99.3 °F (37.4 °C)        Creatinine Clearance (mL/min):   CrCl (Ideal Body Weight): 14.1   If actual weight < IBW: CrCl (Actual Body Weight) 20.6    Impression/Plan:   Finishing 5 days of CTX  Adjusted Vancomycin to 500 mg IV q24h for expected trough ~16;   BMP or renal penal daily  Antimicrobial stop date TBD     Pharmacy will follow daily and adjust medications as appropriate for renal function and/or serum levels.     Thank you,  Ashli Gloria, PHARMD

## 2021-09-09 NOTE — PROGRESS NOTES
Nephrology Progress Note  Sukhi Dominguez     [email or web address]  Phone - [phone]   Patient: Maryclare Moritz    YOB: 1945        Date- 9/9/2021   Admit Date: 9/4/2021  CC: Follow up for  MICHI on CKD         IMPRESSION & PLAN:   · Acute kidney injury likely due to poor po intake +  telmisartan use--no hydronephrosis on renal usg  · ckd 4 due to HTN AND Partial right nephrectomy--bl cr 2.6-2.9  · Hypertension  · HYPOMAGNESEMIA  · H/o renal ca  · Gout  · GERD  · H/o renal stone  · Constipation  · Gram positive cocci     PLAN-  · Continue IVF today, will reduce rate to 75 ml/ hour  · Labs ordered for today  · Noted findings on repeat CT scan,hydro with pyelonephritis, on broad spectrum antibiotics. Urology already seen the patient  · UOP not charted,hesitant to put kilgore in with ongoing pyelo. · Will monitor for now, repeat labs today. · No plans for RRT for now. Subjective: Interval History:   -  C/O abd pain today. -  On IV NS  -  Labs not done today. - Had CT done yesterday  - Spiked fever yesterday    Objective:   Vitals:    09/08/21 1217 09/08/21 1537 09/08/21 2123 09/09/21 0750   BP: (!) 149/87 (!) 145/87 (!) 142/72 134/73   Pulse: (!) 102 100 (!) 106 95   Resp: 20 20 20 20   Temp: 98.1 °F (36.7 °C) 99.3 °F (37.4 °C) 99.1 °F (37.3 °C) 98.1 °F (36.7 °C)   SpO2: 94% 96% 97% 97%      09/08 0701 - 09/09 0700  In: -   Out: 710 [Urine:710]  There were no vitals filed for this visit. Physical exam:   GEN: mild distress  NECK- Supple, no mass  RESP: No wheezing, Clear b/l  ABD: epigastric tenderness  CVS: S1,S2  RRR  NEURO: Normal speech, Non focal  EXT: No Edema   PSYCH: Normal Mood    Chart reviewed. Pertinent Notes reviewed.      Data Review :  Recent Labs     09/08/21  0351 09/07/21  0219     142 141   K 3.7  3.9 3.9   *  111* 109*   CO2 20*  21 23   BUN 43*  43* 41*   CREA 4.74*  4.73* 4.67*   GLU 98  99 118*   CA 8.1*  8.1* 8.0* MG 1.7 1.8   PHOS 3.7  3.7 3.5     Recent Labs     09/08/21  0351 09/07/21  0219   WBC 10.6 12.7*   HGB 8.6* 9.3*   HCT 26.7* 29.1*    169     No results for input(s): FE, TIBC, PSAT, FERR in the last 72 hours.    Medication list  reviewed  Current Facility-Administered Medications   Medication Dose Route Frequency    [START ON 9/10/2021] vancomycin (VANCOCIN) 750 mg in 0.9% sodium chloride 250 mL (VIAL-MATE)  750 mg IntraVENous Q36H    labetaloL (NORMODYNE;TRANDATE) injection 10 mg  10 mg IntraVENous Q4H PRN    insulin lispro (HUMALOG) injection   SubCUTAneous AC&HS    glucose chewable tablet 16 g  4 Tablet Oral PRN    dextrose (D50W) injection syrg 12.5-25 g  12.5-25 g IntraVENous PRN    glucagon (GLUCAGEN) injection 1 mg  1 mg IntraMUSCular PRN    amLODIPine (NORVASC) tablet 10 mg  10 mg Oral DAILY    albuterol (PROVENTIL HFA, VENTOLIN HFA, PROAIR HFA) inhaler 1 Puff  1 Puff Inhalation Q4H PRN    allopurinoL (ZYLOPRIM) tablet 100 mg  100 mg Oral 7am    aspirin delayed-release tablet 81 mg  81 mg Oral QHS    atorvastatin (LIPITOR) tablet 80 mg  80 mg Oral QHS    hydrOXYzine HCL (ATARAX) tablet 10-20 mg  10-20 mg Oral TID PRN    isosorbide mononitrate ER (IMDUR) tablet 15 mg  15 mg Oral DAILY    methocarbamoL (ROBAXIN) tablet 750 mg  750 mg Oral TID PRN    metoprolol succinate (TOPROL-XL) XL tablet 25 mg  25 mg Oral DAILY    pantoprazole (PROTONIX) tablet 40 mg  40 mg Oral DAILY    rOPINIRole (REQUIP) tablet 0.5 mg  0.5 mg Oral QHS    tiotropium-olodateroL (STIOLTO RESPIMAT) 2.5-2.5 mcg/actuation inhaler 2 Puff  2 Puff Inhalation DAILY    sodium chloride (NS) flush 5-40 mL  5-40 mL IntraVENous Q8H    sodium chloride (NS) flush 5-40 mL  5-40 mL IntraVENous PRN    acetaminophen (TYLENOL) tablet 650 mg  650 mg Oral Q6H PRN    Or    acetaminophen (TYLENOL) suppository 650 mg  650 mg Rectal Q6H PRN    polyethylene glycol (MIRALAX) packet 17 g  17 g Oral DAILY PRN    ondansetron (ZOFRAN ODT) tablet 4 mg  4 mg Oral Q8H PRN    Or    ondansetron (ZOFRAN) injection 4 mg  4 mg IntraVENous Q6H PRN    0.9% sodium chloride infusion  75 mL/hr IntraVENous CONTINUOUS    morphine injection 1 mg  1 mg IntraVENous Q3H PRN    hydrALAZINE (APRESOLINE) 20 mg/mL injection 10 mg  10 mg IntraVENous Q6H PRN          [place], [address] [facility]  [facility] / [facility] [address]  [place], [address]  Phone - [phone]               Fax - [phone]

## 2021-09-09 NOTE — PROGRESS NOTES
Received message from patient's nurse stating: Patient is having break through pain. I gave him 0.5mg of Morphine at 2000. His pain level now is 8 on a pain scale of 0-10. Discussion / orders:    Morphine 1 mg iv x 1         Please note that this note was dictated using Dragon computer voice recognition software. Quite often unanticipated grammatical, syntax, homophones, and other interpretive errors are inadvertently transcribed by the computer software. Please disregard these errors. Please excuse any errors that have escaped final proofreading.

## 2021-09-09 NOTE — PROGRESS NOTES
Patient: Mariama Smith MRN: [id number]  SSN: [id number]    YOB: 1945  Age: 64 y.o. Sex: male        ADMITTED: 9/4/2021 to Kareem Pierce MD by Polly Castellanos MD for Pyelonephritis [N12]    F/U for abnormal CT findings at [facility]. PMH of renal cancer status post right partial nephrectomy in 04/2021, CAD, HTN, dyslipidemia, hyperuricemia, and CKD stage IV.  He had a CT abdomen done at [facility] with findings suggesting left renal hemorrhagic cyst vs infection. Patient believes the CT at [facility] was prior to his RAL partial nephrectomy in 04/2021?     Patient reports ongoing left lower abdominal pain. Currently denies flank pain, dysuria, hematuria, urgency, or nausea/vomiting. Voiding independently. Temp max 100.9 yesterday, HR 120s  No labs today  Urine culture with no growth  +rocephin     CT at [facility]???? Unknown date:  Impression per hospitalist note:  Postoperative interval partial right nephrectomy with resection of midpole hypodensities seen previously, now with prominent right retroperitoneal postsurgical changes.  Fat-containing lesion contiguous with the inferior right perinephric space likely postoperative fat necrosis. Multiple left renal hypodensities, the dominant 1 is in the lower pole of the left kidney is slightly larger with slightly higher internal density since November 13, 2020, suggesting mild bleeding or infection.  Inferior left perinephric fat stranding has also increased.  Enlarging cystic tumor less likely but not entirely excluded.  Depending on renal function, ultrasound with contrast or CT/MRI without and with contrast could further evaluate.     09/05/21  Renal US:  FINDINGS: Study limited by patient's increased body habitus. Right kidney  measures 9.8 cm in length. 2.4 cm anechoic right upper pole cyst. Partial right  nephrectomy changes seen on prior CT not evident sonographically. Left kidney  measures 11.1 cm in length.  4.9 cm left upper lower pole anechoic cyst. No  hydronephrosis. There is no definite nephrolithiasis or renal mass. Bilateral  renal cortical atrophy. IMPRESSION  1.  Study limited by patient's increased body habitus. 2.  No hydronephrosis. 3.  Bilateral renal cortical atrophy and cysts. 4.  Partial right nephrectomy changes seen on prior CT not evident  Sonographically.     21  CT AP wo:  KIDNEYS/URETERS: Bilateral renal cysts. Partial right lower pole nephrectomy. Renal cortical thinning on the right and on the left. Dystrophic renal  calcifications on the right. Perinephric stranding on the right. No hydroureter. Area of fatty necrosis in the right paracolic gutter is new compared to prior  study. IMPRESSION  Postprocedural changes related to partial nephrectomy on the right. Dystrophic  calcifications and fat necrosis in the right renal fossa. No definite recurrent  mass lesion. There are bilateral renal cysts. Vitals: Temp (24hrs), Av.7 °F (37.1 °C), Min:98.1 °F (36.7 °C), Max:99.3 °F (37.4 °C)    Blood pressure 134/73, pulse 95, temperature 98.1 °F (36.7 °C), resp. rate 20, SpO2 97 %. Intake and Output:  1901 - 700  In: -   Out: 919 [VAPPC:952]  701 - 1900  In: -   Out: 200 [Urine:200]  EDIE Output lats 24 hrs: No data found. EDIE Output last 8 hrs: No data found. BM over last 24 hrs: No data found.     Physical Exam  General: NAD, pleasant  Respiratory: no distress, breathing easily, room air  Abdomen: soft, no distention; LLQ TTP  : no CVA tenderness, voiding independently, clear yellow UA  Neuro: Appropriate, no focal neurological deficits  Skin: warm, dry  Extremities: moves all, full ROM    Labs:  CBC:   Lab Results   Component Value Date/Time    WBC 10.6 2021 03:51 AM    HCT 26.7 (L) 2021 03:51 AM    PLATELET 808  03:51 AM     BMP:   Lab Results   Component Value Date/Time    Glucose 99 2021 03:51 AM    Glucose 98 2021 03:51 AM    Sodium 142 2021 03:51 AM Sodium 139 09/08/2021 03:51 AM    Potassium 3.9 09/08/2021 03:51 AM    Potassium 3.7 09/08/2021 03:51 AM    Chloride 111 (H) 09/08/2021 03:51 AM    Chloride 110 (H) 09/08/2021 03:51 AM    CO2 21 09/08/2021 03:51 AM    CO2 20 (L) 09/08/2021 03:51 AM    BUN 43 (H) 09/08/2021 03:51 AM    BUN 43 (H) 09/08/2021 03:51 AM    Creatinine 4.73 (H) 09/08/2021 03:51 AM    Creatinine 4.74 (H) 09/08/2021 03:51 AM    Calcium 8.1 (L) 09/08/2021 03:51 AM    Calcium 8.1 (L) 09/08/2021 03:51 AM       Assessment/Plan:   S/p RAL partial nephrectomy on 4/5/2021; Hx of RCC  MICHI on CKD stage 4  Left pyelonephritis    - UA unremarkable  - BMP pending, nephrology following  - Findings consistent with pyelonephritis. BCx 9/4 + staph species 1/2 bottles. Agree with broad spectrum abx, then culture specific therapy. Will arrange outpatient follow up and sign off.  Please call if needed.     Supervising MD, Dr. Lisa Santiago  Signed By: Pretty Dominguez NP - September 9, 2021

## 2021-09-09 NOTE — PROGRESS NOTES
End of Shift Note    Bedside shift change report given to Sly Bennett (oncoming nurse) by Shagufta Clay (offgoing nurse). Report included the following information SBAR, Kardex and MAR    Shift worked:  7p-7a     Shift summary and any significant changes:     Scheduled medications have been given, see MAR. Patient did complain of pain during my shift, Morphine was given twice, see PRN MAR. Patient was having break through pain as well, NP was notified and Morphine was prescribed once and given, see MAR. Patient did complain of anxiety, Atarax was given, see PRN MAR. Patient is up with the assistance of one to the bathroom. Patient teaching and routine rounding has been done. Concerns for physician to address:       Zone phone for oncoming shift:          Activity:  Activity Level: Up with Assistance  Number times ambulated in hallways past shift: 0  Number of times OOB to chair past shift: 0    Cardiac:   Cardiac Monitoring: No           Access:   Current line(s): PIV     Genitourinary:   Urinary status: voiding    Respiratory:   O2 Device: None (Room air)  Chronic home O2 use?: NO  Incentive spirometer at bedside: NO     GI:  Last Bowel Movement Date: 09/07/21  Current diet:  ADULT DIET Regular; 4 carb choices (60 gm/meal)  Passing flatus: YES  Tolerating current diet: YES       Pain Management:   Patient states pain is manageable on current regimen: YES    Skin:  Josh Score: 20  Interventions: float heels and increase time out of bed    Patient Safety:  Fall Score:  Total Score: 3  Interventions: bed/chair alarm, assistive device (walker, cane, etc) and pt to call before getting OOB  High Fall Risk: Yes    Length of Stay:  Expected LOS: 2d 21h  Actual LOS: 2005 A [facility]

## 2021-09-10 ENCOUNTER — APPOINTMENT (OUTPATIENT)
Dept: PHYSICAL THERAPY | Age: 76
End: 2021-09-10
Payer: MEDICARE

## 2021-09-10 VITALS
WEIGHT: 250 LBS | SYSTOLIC BLOOD PRESSURE: 109 MMHG | BODY MASS INDEX: 33.86 KG/M2 | TEMPERATURE: 98.2 F | HEART RATE: 83 BPM | OXYGEN SATURATION: 99 % | RESPIRATION RATE: 19 BRPM | DIASTOLIC BLOOD PRESSURE: 92 MMHG | HEIGHT: 72 IN

## 2021-09-10 LAB
ALBUMIN SERPL-MCNC: 2.3 G/DL (ref 3.5–5)
ANION GAP SERPL CALC-SCNC: 10 MMOL/L (ref 5–15)
BACTERIA SPEC CULT: ABNORMAL
BACTERIA SPEC CULT: ABNORMAL
BUN SERPL-MCNC: 42 MG/DL (ref 6–20)
BUN/CREAT SERPL: 9 (ref 12–20)
CALCIUM SERPL-MCNC: 8.2 MG/DL (ref 8.5–10.1)
CHLORIDE SERPL-SCNC: 110 MMOL/L (ref 97–108)
CO2 SERPL-SCNC: 20 MMOL/L (ref 21–32)
COMMENT, HOLDF: NORMAL
CREAT SERPL-MCNC: 4.6 MG/DL (ref 0.7–1.3)
ECHO AO ROOT DIAM: 2.18 CM
ECHO AV AREA PEAK VELOCITY: 1.91 CM2
ECHO AV AREA VTI: 2.43 CM2
ECHO AV AREA/BSA PEAK VELOCITY: 0.8 CM2/M2
ECHO AV AREA/BSA VTI: 1 CM2/M2
ECHO AV MEAN GRADIENT: 9.24 MMHG
ECHO AV PEAK GRADIENT: 18.36 MMHG
ECHO AV PEAK VELOCITY: 214.24 CM/S
ECHO AV VTI: 34.36 CM
ECHO LA MAJOR AXIS: 4.45 CM
ECHO LA MINOR AXIS: 1.9 CM
ECHO LV E' LATERAL VELOCITY: 5.3 CM/S
ECHO LV E' SEPTAL VELOCITY: 6.46 CM/S
ECHO LV INTERNAL DIMENSION DIASTOLIC: 4.05 CM (ref 4.2–5.9)
ECHO LV INTERNAL DIMENSION SYSTOLIC: 3.26 CM
ECHO LV IVSD: 1.15 CM (ref 0.6–1)
ECHO LV MASS 2D: 163.4 G (ref 88–224)
ECHO LV MASS INDEX 2D: 69.8 G/M2 (ref 49–115)
ECHO LV POSTERIOR WALL DIASTOLIC: 1.2 CM (ref 0.6–1)
ECHO LVOT DIAM: 1.96 CM
ECHO LVOT PEAK GRADIENT: 7.38 MMHG
ECHO LVOT PEAK VELOCITY: 135.83 CM/S
ECHO LVOT SV: 83.5 ML
ECHO LVOT VTI: 27.76 CM
ECHO MV A VELOCITY: 104.66 CM/S
ECHO MV AREA PHT: 3.18 CM2
ECHO MV AREA VTI: 2.58 CM2
ECHO MV E DECELERATION TIME (DT): 198.34 MS
ECHO MV E VELOCITY: 101.5 CM/S
ECHO MV E/A RATIO: 0.97
ECHO MV E/E' LATERAL: 19.15
ECHO MV E/E' RATIO (AVERAGED): 17.43
ECHO MV E/E' SEPTAL: 15.71
ECHO MV MAX VELOCITY: 133.7 CM/S
ECHO MV MEAN GRADIENT: 3.03 MMHG
ECHO MV PEAK GRADIENT: 7.15 MMHG
ECHO MV PRESSURE HALF TIME (PHT): 69.2 MS
ECHO MV VTI: 32.3 CM
ECHO PV MAX VELOCITY: 161.22 CM/S
ECHO PV PEAK INSTANTANEOUS GRADIENT SYSTOLIC: 10.4 MMHG
GLUCOSE BLD STRIP.AUTO-MCNC: 107 MG/DL (ref 65–117)
GLUCOSE BLD STRIP.AUTO-MCNC: 87 MG/DL (ref 65–117)
GLUCOSE SERPL-MCNC: 87 MG/DL (ref 65–100)
LVOT MG: 3.69 MMHG
PHOSPHATE SERPL-MCNC: 3.3 MG/DL (ref 2.6–4.7)
POTASSIUM SERPL-SCNC: 3.7 MMOL/L (ref 3.5–5.1)
SAMPLES BEING HELD,HOLD: NORMAL
SERVICE CMNT-IMP: ABNORMAL
SERVICE CMNT-IMP: NORMAL
SERVICE CMNT-IMP: NORMAL
SODIUM SERPL-SCNC: 140 MMOL/L (ref 136–145)

## 2021-09-10 PROCEDURE — 74011250637 HC RX REV CODE- 250/637: Performed by: INTERNAL MEDICINE

## 2021-09-10 PROCEDURE — 82962 GLUCOSE BLOOD TEST: CPT

## 2021-09-10 PROCEDURE — 80069 RENAL FUNCTION PANEL: CPT

## 2021-09-10 PROCEDURE — 93306 TTE W/DOPPLER COMPLETE: CPT | Performed by: INTERNAL MEDICINE

## 2021-09-10 PROCEDURE — 97535 SELF CARE MNGMENT TRAINING: CPT

## 2021-09-10 PROCEDURE — 36415 COLL VENOUS BLD VENIPUNCTURE: CPT

## 2021-09-10 RX ORDER — CIPROFLOXACIN 500 MG/1
500 TABLET ORAL DAILY
Qty: 10 TABLET | Refills: 0 | Status: SHIPPED | OUTPATIENT
Start: 2021-09-10 | End: 2021-09-15

## 2021-09-10 RX ORDER — CIPROFLOXACIN 500 MG/1
500 TABLET ORAL 2 TIMES DAILY
Qty: 20 TABLET | Refills: 0 | Status: SHIPPED | OUTPATIENT
Start: 2021-09-10 | End: 2021-09-10 | Stop reason: SDUPTHER

## 2021-09-10 RX ORDER — AMLODIPINE BESYLATE 10 MG/1
10 TABLET ORAL DAILY
Qty: 30 TABLET | Refills: 1 | Status: SHIPPED | OUTPATIENT
Start: 2021-09-11

## 2021-09-10 RX ADMIN — ALLOPURINOL 100 MG: 100 TABLET ORAL at 09:16

## 2021-09-10 RX ADMIN — PANTOPRAZOLE SODIUM 40 MG: 40 TABLET, DELAYED RELEASE ORAL at 09:16

## 2021-09-10 RX ADMIN — METOPROLOL SUCCINATE 25 MG: 25 TABLET, EXTENDED RELEASE ORAL at 09:16

## 2021-09-10 RX ADMIN — ISOSORBIDE MONONITRATE 15 MG: 30 TABLET, EXTENDED RELEASE ORAL at 09:15

## 2021-09-10 RX ADMIN — TIOTROPIUM BROMIDE AND OLODATEROL 2 PUFF: 3.124; 2.736 SPRAY, METERED RESPIRATORY (INHALATION) at 09:17

## 2021-09-10 RX ADMIN — AMLODIPINE BESYLATE 10 MG: 5 TABLET ORAL at 09:16

## 2021-09-10 RX ADMIN — Medication 10 ML: at 07:19

## 2021-09-10 NOTE — PROGRESS NOTES
Problem: Self Care Deficits Care Plan (Adult)  Goal: *Acute Goals and Plan of Care (Insert Text)  Description:   FUNCTIONAL STATUS PRIOR TO ADMISSION: Lives alone PTA, uses w/c ramp in/out of home; drives, retired from [employer]. \"  x ~ 1 yr; PMH L shoulder injury 2012 with current out patient therapy; C-pap  HOME SUPPORT: Lives alone with local support of family    Occupational Therapy Goals  Initiated 9/8/2021  1. Patient will perform grooming in standing VSS with modified independence within 7 day(s). 2.  Patient will perform bathing with modified independence within 7 day(s). 3.  Patient will perform upper body dressing and lower body dressing with modified independence within 7 day(s). 4.  Patient will perform toilet transfers with modified independence within 7 day(s). 5.  Patient will perform all aspects of toileting with modified independence within 7 day(s). 6.  Patient will participate in upper extremity therapeutic exercise/activities with modified independence for 5 minutes within 7 day(s). 7.  Patient will utilize energy conservation, fall prevention, pain management, PLB techniques during functional activities with verbal cues within 7 day(s). Outcome: Progressing Towards Goal   OCCUPATIONAL THERAPY TREATMENT  Patient: Flory Munoz (60 y.o. male)  Date: 9/10/2021  Diagnosis: Pyelonephritis [N12] <principal problem not specified>       Precautions: Fall  Chart, occupational therapy assessment, plan of care, and goals were reviewed. ASSESSMENT  Patient continues with skilled OT services and is progressing towards goals. Patient received reclined in bed amenable to session, completed light grooming/dressing ADL with CGA-Mod A throughout. Patient has good awareness of deficits and ways to adapt activities including using DME at home for ADL/IADL. Left seated in bedside chair, with all needs in reach and in NAD.  Will continue to benefit from OT services and progress as able. Current Level of Function Impacting Discharge (ADLs): up to Mod A LE ADL, CGA transfers/mobility    Other factors to consider for discharge: below baseline, fall risk          PLAN :  Patient continues to benefit from skilled intervention to address the above impairments. Continue treatment per established plan of care to address goals. Recommend with staff: OOB 3x daily for meals, functional mobility to bathroom    Recommend next OT session: OOB ADL, activity tolerance    Recommendation for discharge: (in order for the patient to meet his/her long term goals)  Occupational therapy at least 2 days/week in the home     This discharge recommendation:  Has not yet been discussed the attending provider and/or case management    IF patient discharges home will need the following DME: TBD pending progress       SUBJECTIVE:   Patient stated I have those reachers everywhere.     OBJECTIVE DATA SUMMARY:   Cognitive/Behavioral Status:  Neurologic State: Alert  Orientation Level: Oriented X4  Cognition: Appropriate decision making; Follows commands  Perception: Appears intact  Perseveration: No perseveration noted  Safety/Judgement: Awareness of environment; Fall prevention    Functional Mobility and Transfers for ADLs:  Bed Mobility:  Rolling: Supervision  Supine to Sit: Minimum assistance    Transfers:  Sit to Stand: Contact guard assistance     Bed to Chair: Contact guard assistance    Balance:  Sitting: Intact  Sitting - Static: Good (unsupported)  Sitting - Dynamic: Fair (occasional)  Standing: Impaired  Standing - Static: Good  Standing - Dynamic : Fair    ADL Intervention:  Feeding  Food to Mouth: Set-up  Drink to Mouth: Set-up    Grooming  Grooming Assistance: Supervision  Position Performed: Standing  Washing Face: Supervision  Washing Hands: Supervision              Upper Body [address] Gown: Minimum  assistance    Lower Body Dressing Assistance  Socks: Moderate assistance         Cognitive Retraining  Safety/Judgement: Awareness of environment; Fall prevention      Pain:  None reported    Activity Tolerance:   Fair and tolerates ADLs without rest breaks    After treatment patient left in no apparent distress:   Sitting in chair, Call bell within reach, and RN aware     COMMUNICATION/COLLABORATION:   The patients plan of care was discussed with: Registered nurse.      Shari Sy OT  Time Calculation: 24 mins

## 2021-09-10 NOTE — PROGRESS NOTES
Problem: Falls - Risk of  Goal: *Absence of Falls  Description: Document Ayanna Roth Fall Risk and appropriate interventions in the flowsheet.   9/10/2021 1341 by Juan Diego Vail RN  Outcome: Resolved/Met  Note: Fall Risk Interventions:  Mobility Interventions: Patient to call before getting OOB         Medication Interventions: Patient to call before getting OOB    Elimination Interventions: Call light in reach           9/10/2021 1100 by Juan Diego Vail RN  Outcome: Progressing Towards Goal  Note: Fall Risk Interventions:  Mobility Interventions: Patient to call before getting OOB         Medication Interventions: Patient to call before getting OOB    Elimination Interventions: Call light in reach              Problem: Patient Education: Go to Patient Education Activity  Goal: Patient/Family Education  9/10/2021 1341 by Juan Diego Vail RN  Outcome: Resolved/Met  9/10/2021 1100 by Juan Diego Vail RN  Outcome: Progressing Towards Goal     Problem: Patient Education: Go to Patient Education Activity  Goal: Patient/Family Education  9/10/2021 1341 by Juan Diego Vail RN  Outcome: Resolved/Met  9/10/2021 1100 by Juan Diego Vail RN  Outcome: Progressing Towards Goal

## 2021-09-10 NOTE — PROGRESS NOTES
Pharmacist Discharge Medication Reconciliation    Significant PMH:   Past Medical History:   Diagnosis Date    Abnormal stress echo 5/12/2015    Anemia NEC 03/2013    Anxiety     Borderline diabetes     CAD (coronary artery disease) 2009    cath [facility]) revealed 20%RCA and 50%2nd diagonal    Calculus of kidney     CKD (chronic kidney disease) stage 4, GFR 15-29 ml/min (HCC)     COPD, mild (HCC)     Followed by pulmonary associates    DJD (degenerative joint disease)     Fatty liver     GERD (gastroesophageal reflux disease) 10/11/2009    Gout     Hypertension     MELODY on CPAP 10/11/2009    nasal pillows; pressure set at 10-11 -     Peripheral neuropathy 10/11/2009    bilateral feet (numbness)    Renal cell cancer, right ([address] 01/2021    RLS (restless legs syndrome) 10/11/2009    S/P coronary artery stent placement 05/12/2015    PCI./MALI to LCx, 8/2019 PCI/MALI Prox LAD (RCA 40-50% lesions)     Encounter Diagnoses:  Acute left-sided pyelonephritis POA  Acute Kidney Injury Cr 4.73  Chronic Kidney Disease Baseline Cr 2.6-2.9  History of renal cancer with right partial nephrectomy  Suspected left renal hematoma POA  Ruled out Bacteremia     Allergies: Bactrim [sulfamethoxazole-trimethoprim], Codeine, Lisinopril (bulk), Neurontin [gabapentin], Zostavax [zoster vaccine live (pf)], and Penicillins    Discharge Medications:   Current Discharge Medication List        START taking these medications    Details   ciprofloxacin HCl (Cipro) 500 mg tablet Take 1 Tablet by mouth daily for 10 days. Qty: 10 Tablet, Refills: 0  Start date: 9/10/2021, End date: 9/20/2021           CONTINUE these medications which have CHANGED    Details   amLODIPine (NORVASC) 10 mg tablet Take 1 Tablet by mouth daily.   Qty: 30 Tablet, Refills: 1  Start date: 9/11/2021           CONTINUE these medications which have NOT CHANGED    Details   gabapentin (NEURONTIN) 300 mg capsule TAKE 1 CAPSULE BY MOUTH EVERY DAY AT BEDTIME FOR 30 DAYS      hydrOXYzine HCL (ATARAX) 10 mg tablet Take 1-2 Tablets by mouth three (3) times daily as needed for Itching or Anxiety. Qty: 30 Tablet, Refills: 0    Comments: STOPPING CELEXA      methocarbamoL (ROBAXIN) 750 mg tablet Take 1 Tablet by mouth three (3) times daily as needed for Muscle Spasm(s). Qty: 21 Tablet, Refills: 0      pantoprazole (PROTONIX) 40 mg tablet TAKE 1 TABLET BY MOUTH EVERY DAY  Qty: 90 Tablet, Refills: 1      metoprolol succinate (TOPROL-XL) 25 mg XL tablet TAKE 1 TABLET BY MOUTH EVERY DAY  Qty: 90 Tab, Refills: 1      fluocinoNIDE (LIDEX) 0.05 % external solution APPLY TO ITCHY SPOTS ON SCALP AS NEEDED FOR FLARE      albuterol (ProAir HFA) 90 mcg/actuation inhaler Take 1 Puff by inhalation every four (4) hours. umeclidinium-vilanteroL (Anoro Ellipta) 62.5-25 mcg/actuation inhaler Take 1 Puff by inhalation daily. rOPINIRole (Requip) 0.5 mg tablet Take 0.5 mg by mouth nightly.      ketoconazole (NIZORAL) 2 % shampoo Apply 1 mL to affected area daily as needed for Itching. atorvastatin (Lipitor) 80 mg tablet Take 80 mg by mouth nightly. isosorbide mononitrate ER (IMDUR) 30 mg tablet Take 0.5 Tabs by mouth daily. Qty: 45 Tab, Refills: 3      nitroglycerin (NITROSTAT) 0.4 mg SL tablet TAKE 1 TABLET BY SUBLINGUAL ROUTE EVERY 5 MINUTES AS NEEDED FOR CHEST PAIN. Qty: 1 Bottle, Refills: 0      VITAMIN D3 2,000 unit cap capsule Take 2,000 Units by mouth daily. Refills: 2      GLUCOSAMINE HCL/CHONDR PETERSEN A NA (GLUCOSAMINE-CHONDROITIN) 750-600 mg Tab Take 1 Tab by mouth daily. Brand: Move Free      aspirin delayed-release 81 mg tablet Take 81 mg by mouth nightly. allopurinol (ZYLOPRIM) 100 mg tablet Take 100 mg by mouth every morning. Associated Diagnoses: Chronic kidney disease, stage III (moderate) ([facility])      MULTIVITS W-FE,OTHER MIN (CENTRUM PO) Take 1 Tab by mouth daily.            STOP taking these medications       telmisartan (MICARDIS) 40 mg tablet Comments:   Reason for Stopping: The patient's chart, MAR and AVS were reviewed by Kt Church RPH.     Discharging Provider: Kaela Najera NP    Thank you,     Kt Church, [facility]

## 2021-09-10 NOTE — DISCHARGE SUMMARY
Hospitalist Discharge Summary     Patient ID:  Leonidas Ren  [id number]  64 y.o.  1945 9/4/2021    PCP on record: Winter Baca MD    Admit date: 9/4/2021  Discharge date and time: 9/10/2021    DISCHARGE DIAGNOSIS:    Acute left-sided pyelonephritis   Acute Kidney Injury   Chronic Kidney Disease   History of renal cancer with right partial nephrectomy  Hypertension  Coronary artery disease  Hyperlipidemia     CONSULTATIONS:  IP CONSULT TO UROLOGY  IP CONSULT TO NEPHROLOGY  IP CONSULT TO UROLOGY    Excerpted HPI from H&P of Johnie Shelley MD:  Leonidas Ren is a 64 y.o.  male with past medical history significant for renal cancer status post right partial nephrectomy in April of this year, coronary artery disease, hypertension, dyslipidemia, hyperuricemia and CKD stage IV who presented to the [facility] emergency department today with a complains of sudden onset left-sided abdominal pain. Patient reports his abdominal pain is 10 out of 10 intensity, located in the left flank region, radiating to the left groin. He also reports subjective fever chills but denies any vomiting but reports nausea. He denies any dysuria, urgency or frequency. Patient denies any diarrhea. He had a CT abdomen done at [facility] with the following findings.       Postoperative interval partial right nephrectomy with resection of midpole hypodensities seen previously, now with prominent right retroperitoneal postsurgical changes. Fat-containing lesion contiguous with the inferior right perinephric space likely postoperative fat necrosis. Multiple left renal hypodensities, the dominant 1 is in the lower pole of the left kidney is slightly larger with slightly higher internal density since November 13, 2020, suggesting mild bleeding or infection. Inferior left perinephric fat stranding has also increased. Enlarging cystic tumor less likely but not entirely excluded.   Depending on renal function, ultrasound with contrast or CT/MRI without and with contrast could further evaluate.     Labs reviewed showing sodium of 144, potassium 4.7, chloride 109, bicarb 25, BUN 26, creatinine 2.62, glucose 110, magnesium 1.5, WBC 10.3, hematocrit 31.9, hemoglobin 10.8, platelet 666     We were asked to admit for work up and evaluation of the above problems. ______________________________________________________________________  DISCHARGE SUMMARY/HOSPITAL COURSE:  for full details see H&P, daily progress notes, labs, consult notes. Acute left-sided pyelonephritis POA  Acute Kidney Injury Cr 4.73  Chronic Kidney Disease Baseline Cr 2.6-2.9  History of renal cancer with right partial nephrectomy  Suspected left renal hematoma POA  Ruled out Bacteremia  -Completed 4 days of Ceftriaxone 9/8  -Urine Culture NGTD, UA unimpressive  -Blood Cultures - coag negative staph, likely contaminate  -Repeat Blood Cultures - NG so far   -Completed Ceftriaxone (day 5) and Vancomycin (day 2)   -ECHO - EF 55-60%. Mild concentric hypertrophy. Mild LV diastolic dysfunction.  -Patient had a CT of the abdomen done at [facility] with the following findings  Postoperative interval partial right nephrectomy with resection of midpole hypodensities seen previously, now with prominent right retroperitoneal postsurgical changes. Fat-containing lesion contiguous with the inferior right perinephric space likely postoperative fat necrosis.  Multiple left renal hypodensities, the dominant 1 is in the lower pole of the left kidney is slightly larger with slightly higher internal density since November 13, 2020, suggesting mild bleeding or infection.  Inferior left perinephric fat stranding has also increased.  Enlarging cystic tumor less likely but not entirely excluded.  Depending on renal function, ultrasound with contrast or CT/MRI without and with contrast could further evaluate.  -Renal US        1.  Study limited by patient's increased body habitus. 2.  No hydronephrosis. 3.  Bilateral renal cortical atrophy and cysts. 4.  Partial right nephrectomy changes seen on prior CT not evident sonographically  -Leukocytosis resolved  -Low grade fever yesterday - afebrile overnight   -Repeat CT abd/pelvis with worsening abdominal pain -         1. Left pyelonephritis appearance, new, without apparent abscess. 2. Stable right partial nephrectomy. 3. No urinary tract stone. 4. New trace left pleural effusion, likely reactive. -Appreciate Urology input - Signed off, follow up Yoly Burdick Nephrology input - Follow up in office   -Cr remains elevated but stable   -Continue course of cipro OP for pyelonephritis (daily cipro with renal function)  Hypertension  Coronary artery disease  Hyperlipidemia   -Continue PTA metoprolol, Imdur, statin ASA and amlodipine   -Use as needed IV hydralazine for systolic blood pressure greater than 160     Patient seen at bedside. Patient's plan of care has been reviewed with them. Patient and/or family have verbally conveyed their understanding and agreement of the patient's signs, symptoms, diagnosis, treatment and prognosis and additionally agree to follow up as recommended or return to [facility] should their condition change prior to follow-up. Discharge instructions have also been provided to the patient with some educational information regarding their diagnosis as well a list of reasons why they would want to return to the office prior to their follow-up appointment should their condition change.    ______________________________________________________________________  Patient seen and examined by me on discharge day. Pertinent Findings:  Gen:    Not in distress  Chest: Clear lungs  CVS:   Regular rhythm.   No edema  Abd:  Soft, obese, not tender  Neuro:  Alert and oriented x3  _______________________________________________________________________  DISCHARGE MEDICATIONS:   Current Discharge Medication List      START taking these medications    Details   ciprofloxacin HCl (Cipro) 500 mg tablet Take 1 Tablet by mouth daily for 10 days. Qty: 10 Tablet, Refills: 0  Start date: 9/10/2021, End date: 9/20/2021         CONTINUE these medications which have CHANGED    Details   amLODIPine (NORVASC) 10 mg tablet Take 1 Tablet by mouth daily. Qty: 30 Tablet, Refills: 1  Start date: 9/11/2021         CONTINUE these medications which have NOT CHANGED    Details   gabapentin (NEURONTIN) 300 mg capsule TAKE 1 CAPSULE BY MOUTH EVERY DAY AT BEDTIME FOR 30 DAYS      hydrOXYzine HCL (ATARAX) 10 mg tablet Take 1-2 Tablets by mouth three (3) times daily as needed for Itching or Anxiety. Qty: 30 Tablet, Refills: 0    Comments: STOPPING CELEXA      methocarbamoL (ROBAXIN) 750 mg tablet Take 1 Tablet by mouth three (3) times daily as needed for Muscle Spasm(s). Qty: 21 Tablet, Refills: 0      pantoprazole (PROTONIX) 40 mg tablet TAKE 1 TABLET BY MOUTH EVERY DAY  Qty: 90 Tablet, Refills: 1      metoprolol succinate (TOPROL-XL) 25 mg XL tablet TAKE 1 TABLET BY MOUTH EVERY DAY  Qty: 90 Tab, Refills: 1      fluocinoNIDE (LIDEX) 0.05 % external solution APPLY TO ITCHY SPOTS ON SCALP AS NEEDED FOR FLARE      albuterol (ProAir HFA) 90 mcg/actuation inhaler Take 1 Puff by inhalation every four (4) hours. umeclidinium-vilanteroL (Anoro Ellipta) 62.5-25 mcg/actuation inhaler Take 1 Puff by inhalation daily. rOPINIRole (Requip) 0.5 mg tablet Take 0.5 mg by mouth nightly.      ketoconazole (NIZORAL) 2 % shampoo Apply 1 mL to affected area daily as needed for Itching. atorvastatin (Lipitor) 80 mg tablet Take 80 mg by mouth nightly. isosorbide mononitrate ER (IMDUR) 30 mg tablet Take 0.5 Tabs by mouth daily.   Qty: 45 Tab, Refills: 3      nitroglycerin (NITROSTAT) 0.4 mg SL tablet TAKE 1 TABLET BY SUBLINGUAL ROUTE EVERY 5 MINUTES AS NEEDED FOR CHEST PAIN. Qty: 1 Bottle, Refills: 0      VITAMIN D3 2,000 unit cap capsule Take 2,000 Units by mouth daily. Refills: 2      GLUCOSAMINE HCL/CHONDR PETERSEN A NA (GLUCOSAMINE-CHONDROITIN) 750-600 mg Tab Take 1 Tab by mouth daily. Brand: Move Free      aspirin delayed-release 81 mg tablet Take 81 mg by mouth nightly. allopurinol (ZYLOPRIM) 100 mg tablet Take 100 mg by mouth every morning. Associated Diagnoses: Chronic kidney disease, stage III (moderate) (HCC)      MULTIVITS W-FE,OTHER MIN (CENTRUM PO) Take 1 Tab by mouth daily. STOP taking these medications       telmisartan (MICARDIS) 40 mg tablet Comments:   Reason for Stopping:                 Patient Follow Up Instructions: Activity: PT/OT Eval and Treat  Diet: Regular Diet  Wound Care: None needed      Follow-up Information     Follow up With Specialties Details Why Ambar Garcia MD Internal Medicine On 9/14/2021 For [facility] follow up appointment at 11:30AM  [address] [facility] [phone]      [facility]  On 10/19/2021 Dr. Denny Reasons as previously scheduled Len Romeo 38  Ben Barth MD Nephrology In 1 week Office will contact you to schedule an appointment. Please contact in 1-2 business days if you do not hear from them.   Allison Wong 1045  [phone]          ________________________________________________________________    Risk of deterioration: High    Condition at Discharge:  Stable  __________________________________________________________________    Disposition  Home with family and home health services    ____________________________________________________________________    Code Status: Full Code  ___________________________________________________________________      Total time in minutes spent coordinating this discharge (includes going over instructions, follow-up, prescriptions, and preparing report for sign off to her PCP) :  >30 minutes    Signed:  Bird Thomson NP

## 2021-09-10 NOTE — PROGRESS NOTES
Problem: Falls - Risk of  Goal: *Absence of Falls  Description: Document Ricki Mathew Fall Risk and appropriate interventions in the flowsheet.   Outcome: Progressing Towards Goal  Note: Fall Risk Interventions:  Mobility Interventions: Patient to call before getting OOB         Medication Interventions: Patient to call before getting OOB    Elimination Interventions: Call light in reach              Problem: Patient Education: Go to Patient Education Activity  Goal: Patient/Family Education  Outcome: Progressing Towards Goal     Problem: Patient Education: Go to Patient Education Activity  Goal: Patient/Family Education  Outcome: Progressing Towards Goal

## 2021-09-10 NOTE — PROGRESS NOTES
When family comes to transport pt home, in a CM perspective, pt is ready for discharge. RN made aware. Transition of Care Plan:     RUR: 26% - \"moderate risk\"  Disposition: Home with resumed out-pt therapy ([facility]) & f/u apts  Follow up appointments: PCP, Urology, Nephrology   DME needed: Pt owns DME necessary for d/c  Transportation at Discharge: Pt's son (Sara Rodrigez) [phone] ETA 1:00 pm.  Masha Dominique or means to access home: Pt has access to his home      IM Medicare Letter: 2nd  Medicare letter - received 9/10  Is patient a BCPI-A Bundle: N/A         Caregiver Contact: Pt's daughter Natty Mantle: [phone])  Discharge Caregiver contacted prior to discharge? yes    Updated Note 11:55 am:  Patient will be picked up by his son around 1:00 pm.    Initial Note 11:15 am:  CM at bedside to discuss discharge planning. Pt declined [facility] services. States that he would just continue outpt therapy because \"it works for him and his daughter. \"  Pt verbalized understanding and has no questions/ concerns for CM. CM contacted daughter and discussed discharge planning. She verbalized understanding. Daughter states that she is currently babysitting her grandchildren at her son's house and has no way to get pt until this evening. States she will make some phone calls and call CM back. When family comes to transport pt home, in a CM perspective, pt is ready for discharge. RN made aware. Medicare pt has received, reviewed, and signed 2nd  letter informing them of their right to appeal the discharge. Signed copied has been placed on pt bedside chart (CM received verbal consent). Unit CM will continue to follow. Care Management Interventions  PCP Verified by CM:  Yes  Last Visit to PCP: 07/22/21  Palliative Care Criteria Met (RRAT>21 & CHF Dx)?: Yes  Palliative Consult Recommended?: No  Reason Palliative Care Not Recommended?: Palliative Care not utilized by provider  Mode of Transport at Discharge: Other (see comment) (daughter)  Transition of Care Consult (CM Consult): Discharge Planning  Discharge Durable Medical Equipment: No  Physical Therapy Consult: Yes  Occupational Therapy Consult: Yes  Speech Therapy Consult: No  Support Systems: Child(chris) (lives with daughter)  Confirm Follow Up Transport: Family  The Plan for Transition of Care is Related to the Following Treatment Goals : home with outpt services  The Patient and/or Patient Representative was Provided with a Choice of Provider and Agrees with the Discharge Plan?: Yes  Name of the Patient Representative Who was Provided with a Choice of Provider and Agrees with the Discharge Plan: patient and daughter Kassandra Trent  Freedom of Choice List was Provided with Basic Dialogue that Supports the Patient's Individualized Plan of Care/Goals, Treatment Preferences and Shares the Quality Data Associated with the Providers?: Yes  [place] Resource Information Provided?: No  Discharge Location  Discharge Placement: Home with outpatient services    Shilpa Lutz RN, BSN, [address] [place] [address] Manager  [phone]

## 2021-09-10 NOTE — DISCHARGE INSTRUCTIONS
Patient Education        Kidney Infection: Care Instructions  Your Care Instructions     A kidney infection (pyelonephritis) is a type of urinary tract infection, or UTI. Most UTIs are bladder infections. Kidney infections tend to make people much sicker than bladder infections do. A kidney infection is also more serious because it can cause lasting damage if it is not treated quickly. Follow-up care is a key part of your treatment and safety. Be sure to make and go to all appointments, and call your doctor if you are having problems. It's also a good idea to know your test results and keep a list of the medicines you take. How can you care for yourself at home? · Take your antibiotics as directed. Do not stop taking them just because you feel better. You need to take the full course of antibiotics. · Drink plenty of water. This may help wash out bacteria that are causing the infection. If you have kidney, heart, or liver disease and have to limit fluids, talk with your doctor before you increase the amount of fluids you drink. · Urinate often. Try to empty your bladder each time. · To relieve pain, take a hot shower or lay a heating pad (set on low) over your lower belly. Never go to sleep with a heating pad in place. Put a thin cloth between the heating pad and your skin. To help prevent kidney infections  · Drink plenty of water each day. This helps you urinate often, which clears bacteria from your system. If you have kidney, heart, or liver disease and have to limit fluids, talk with your doctor before you increase the amount of fluids you drink. · Urinate when you have the urge. Do not hold your urine for a long time. Urinate before you go to sleep. · If you have symptoms of a bladder infection, such as burning when you urinate or having to urinate often, call your doctor so you can treat the problem before it gets worse.  If you do not treat a bladder infection quickly, it can spread to the kidney. · Men should keep the tip of the penis clean. If you are a woman, keep these ideas in mind:  · Urinate right after you have sex. · Change sanitary pads often. Avoid douches, feminine hygiene sprays, and other feminine hygiene products that have deodorants. · After going to the bathroom, wipe from front to back. When should you call for help? Call your doctor now or seek immediate medical care if:    · You have symptoms that a kidney infection is getting worse. These may include:  ? Pain or burning when you urinate. ? A frequent need to urinate without being able to pass much urine. ? Pain in the flank, which is just below the rib cage and above the waist on either side of the back. ? Blood in the urine. ? A fever.     · You are vomiting or nauseated. Watch closely for changes in your health, and be sure to contact your doctor if:    · You do not get better as expected. Where can you learn more? Go to http://www.gray.com/  Enter X297 in the search box to learn more about \"Kidney Infection: Care Instructions. \"  Current as of: 2020               Content Version: 12.8  © 0453-3415 [employer]. Care instructions adapted under license by [employer] (which disclaims liability or warranty for this information). If you have questions about a medical condition or this instruction, always ask your healthcare professional. [place] [address] any warranty or liability for your use of this information.                HOSPITALIST DISCHARGE INSTRUCTIONS    NAME: Ryley Espino   :  1945   MRN:  [id number]     Date/Time:  9/10/2021 10:47 AM    ADMIT DATE: 2021   DISCHARGE DATE: 9/10/2021     Attending Physician: Nadya Forrester NP    DISCHARGE DIAGNOSIS:    Acute left-sided pyelonephritis   Acute Kidney Injury   Chronic Kidney Disease   History of renal cancer with right partial nephrectomy  Hypertension  Coronary artery disease  Hyperlipidemia     Medications: Per above medication reconciliation. Pain Management: per above medications    Recommended diet: Regular Diet    Recommended activity: PT/OT per Home Health    Wound care: None    Indwelling devices:  None    Supplemental Oxygen: None    Required Lab work: follow up with Nephrology to recheck renal function     Glucose management:  None    Code status: Full        Outside physician follow up: Follow-up Information     Follow up With Specialties Details Why Peg Trevino MD Internal Medicine On 9/14/2021 For [facility] follow up appointment at 11:30AM  [address] Urology  On 10/19/2021 Dr. Mavis Alaniz as previously scheduled [address]  Doc Madrigal MD Nephrology In 1 week Office will contact you to schedule an appointment. Please contact in 1-2 business days if you do not hear from them. [address] [facility] to avoid frequent ED visits. [facility] treat; pains, sprains, cuts, wounds, high fevers, upper respiratory infections and much more. There medical team is equipped with all the tools necessary to provide advanced medical care in the comfort of you home, workplace, or location of need. The medical team consists of doctors, nurse practitioners, and EMTs. [facility] is available 7 days per week 9 am to 9 pm.   Request care by calling [phone] or by going online at [employer]      Information obtained by :  I understand that if any problems occur once I am at home I am to contact my physician. I understand and acknowledge receipt of the instructions indicated above. Physician's or R.N.'s Signature                                                                  Date/Time                                                                                                                                              Patient or Repres

## 2021-09-10 NOTE — PROGRESS NOTES
Nephrology Progress Note  Sukhi Dominguez     www. [employer]  Phone - [phone]   Patient: Loco Hayes    YOB: 1945        Date- 9/10/2021   Admit Date: 9/4/2021  CC: Follow up for  MICHI on CKD         IMPRESSION & PLAN:   · MICHI  likely due to poor po intake +  telmisartan use   · ckd 4 due to HTN AND Partial right nephrectomy--bl cr 2.6-2.9  · Hypertension  · HYPOMAGNESEMIA  · H/o renal ca  · Gout  · GERD  · H/o renal stone  · Constipation  · Gram positive cocci     PLAN-  · D/c IVF  · Cr and Lytes have been stable  · Broad spectrum antibiotics for pyelonephritis. · Will monitor for now. · No plans for RRT for now. · Will need follow up with us in office in 1-2 weeks after  D/C . Subjective: Interval History:   -  Feels better  - No more fevers  Objective:   Vitals:    09/09/21 1234 09/09/21 1617 09/09/21 2001 09/10/21 0754   BP: 134/73 (!) 155/72 (!) 108/91 (!) 109/92   Pulse:  95 90 83   Resp:  20 19 19   Temp:  100.1 °F (37.8 °C) 98.6 °F (37 °C) 98.2 °F (36.8 °C)   SpO2:  95% 95% 99%   Weight: 113.4 kg (250 lb)      Height: 6' (1.829 m)         09/09 0701 - 09/10 0700  In: -   Out: 200 [Urine:200]  Last 3 Recorded Weights in this Encounter    09/09/21 1234   Weight: 113.4 kg (250 lb)      Physical exam:   GEN: mild distress  NECK- Supple, no mass  RESP: No wheezing, Clear b/l  ABD: epigastric tenderness  CVS: S1,S2  RRR  NEURO: Normal speech, Non focal  EXT: No Edema   PSYCH: Normal Mood    Chart reviewed. Pertinent Notes reviewed.      Data Review :  Recent Labs     09/10/21  0448 09/09/21  1115 09/08/21  0351    140 139  142   K 3.7 3.9 3.7  3.9   * 109* 110*  111*   CO2 20* 21 20*  21   BUN 42* 40* 43*  43*   CREA 4.60* 4.53* 4.74*  4.73*   GLU 87 100 98  99   CA 8.2* 8.2* 8.1*  8.1*   MG  --   --  1.7   PHOS 3.3 3.0 3.7  3.7     Recent Labs     09/08/21  0351   WBC 10.6   HGB 8.6*   HCT 26.7*        No results for input(s): FE, TIBC, PSAT, FERR in the last 72 hours. Medication list  reviewed  Current Facility-Administered Medications   Medication Dose Route Frequency    vancomycin (VANCOCIN) 500 mg in 0.9% sodium chloride (MBP/ADV) 100 mL MBP  500 mg IntraVENous Q24H    Vancomycin trough on 9/10 prior to the 1300 dose.  thanks   Other ONCE    labetaloL (NORMODYNE;TRANDATE) injection 10 mg  10 mg IntraVENous Q4H PRN    insulin lispro (HUMALOG) injection   SubCUTAneous AC&HS    glucose chewable tablet 16 g  4 Tablet Oral PRN    dextrose (D50W) injection syrg 12.5-25 g  12.5-25 g IntraVENous PRN    glucagon (GLUCAGEN) injection 1 mg  1 mg IntraMUSCular PRN    amLODIPine (NORVASC) tablet 10 mg  10 mg Oral DAILY    albuterol (PROVENTIL HFA, VENTOLIN HFA, PROAIR HFA) inhaler 1 Puff  1 Puff Inhalation Q4H PRN    allopurinoL (ZYLOPRIM) tablet 100 mg  100 mg Oral 7am    aspirin delayed-release tablet 81 mg  81 mg Oral QHS    atorvastatin (LIPITOR) tablet 80 mg  80 mg Oral QHS    hydrOXYzine HCL (ATARAX) tablet 10-20 mg  10-20 mg Oral TID PRN    isosorbide mononitrate ER (IMDUR) tablet 15 mg  15 mg Oral DAILY    methocarbamoL (ROBAXIN) tablet 750 mg  750 mg Oral TID PRN    metoprolol succinate (TOPROL-XL) XL tablet 25 mg  25 mg Oral DAILY    pantoprazole (PROTONIX) tablet 40 mg  40 mg Oral DAILY    rOPINIRole (REQUIP) tablet 0.5 mg  0.5 mg Oral QHS    tiotropium-olodateroL (STIOLTO RESPIMAT) 2.5-2.5 mcg/actuation inhaler 2 Puff  2 Puff Inhalation DAILY    sodium chloride (NS) flush 5-40 mL  5-40 mL IntraVENous Q8H    sodium chloride (NS) flush 5-40 mL  5-40 mL IntraVENous PRN    acetaminophen (TYLENOL) tablet 650 mg  650 mg Oral Q6H PRN    Or    acetaminophen (TYLENOL) suppository 650 mg  650 mg Rectal Q6H PRN    polyethylene glycol (MIRALAX) packet 17 g  17 g Oral DAILY PRN    ondansetron (ZOFRAN ODT) tablet 4 mg  4 mg Oral Q8H PRN    Or    ondansetron (ZOFRAN) injection 4 mg  4 mg IntraVENous Q6H PRN  morphine injection 1 mg  1 mg IntraVENous Q3H PRN    hydrALAZINE (APRESOLINE) 20 mg/mL injection 10 mg  10 mg IntraVENous Q6H PRN          Edil Mcgrath, [address] [facility]  [facility] / [facility]  [facility], [address]  [place], [address]  Phone - [phone]               Fax - [phone]

## 2021-09-10 NOTE — PROGRESS NOTES
Bedside shift change report given to Bella kennedy RN (oncoming nurse) by Paige Gibson RN (offgoing nurse). Report included the following information SBAR, Kardex, Intake/Output, MAR and Recent Results.      Pt stable, meds given, labs drawn that were not obtained by night shift RN, new IV placed at the time of lab draw, urine and culture collected from new urinal, fluid rate changed to protect the pt's kidney function, pain meds given for lower left abdominal pain, no insulin needed, pt ambulated well with PT today in hallway with only slight BLE weakness - was a tad SOB with ambulation though

## 2021-09-11 ENCOUNTER — HOSPITAL ENCOUNTER (INPATIENT)
Age: 76
LOS: 2 days | Discharge: HOME | DRG: 391 | End: 2021-09-15
Attending: STUDENT IN AN ORGANIZED HEALTH CARE EDUCATION/TRAINING PROGRAM | Admitting: INTERNAL MEDICINE
Payer: MEDICARE

## 2021-09-11 ENCOUNTER — APPOINTMENT (OUTPATIENT)
Dept: CT IMAGING | Age: 76
DRG: 391 | End: 2021-09-11
Attending: STUDENT IN AN ORGANIZED HEALTH CARE EDUCATION/TRAINING PROGRAM
Payer: MEDICARE

## 2021-09-11 DIAGNOSIS — R10.9 FLANK PAIN: ICD-10-CM

## 2021-09-11 DIAGNOSIS — N12 PYELONEPHRITIS: ICD-10-CM

## 2021-09-11 DIAGNOSIS — N17.9 ACUTE KIDNEY INJURY SUPERIMPOSED ON CHRONIC KIDNEY DISEASE (HCC): Primary | ICD-10-CM

## 2021-09-11 DIAGNOSIS — N18.9 ACUTE KIDNEY INJURY SUPERIMPOSED ON CHRONIC KIDNEY DISEASE (HCC): Primary | ICD-10-CM

## 2021-09-11 LAB
ALBUMIN SERPL-MCNC: 2.7 G/DL (ref 3.5–5)
ALBUMIN/GLOB SERPL: 0.6 {RATIO} (ref 1.1–2.2)
ALP SERPL-CCNC: 130 U/L (ref 45–117)
ALT SERPL-CCNC: 108 U/L (ref 12–78)
ANION GAP SERPL CALC-SCNC: 7 MMOL/L (ref 5–15)
APPEARANCE UR: ABNORMAL
AST SERPL-CCNC: 106 U/L (ref 15–37)
BACTERIA SPEC CULT: NORMAL
BACTERIA URNS QL MICRO: ABNORMAL /HPF
BASOPHILS # BLD: 0.1 K/UL (ref 0–0.1)
BASOPHILS NFR BLD: 0 % (ref 0–1)
BILIRUB SERPL-MCNC: 0.8 MG/DL (ref 0.2–1)
BILIRUB UR QL: NEGATIVE
BUN SERPL-MCNC: 42 MG/DL (ref 6–20)
BUN/CREAT SERPL: 9 (ref 12–20)
CALCIUM SERPL-MCNC: 8.9 MG/DL (ref 8.5–10.1)
CHLORIDE SERPL-SCNC: 113 MMOL/L (ref 97–108)
CO2 SERPL-SCNC: 21 MMOL/L (ref 21–32)
COLOR UR: ABNORMAL
CREAT SERPL-MCNC: 4.72 MG/DL (ref 0.7–1.3)
DIFFERENTIAL METHOD BLD: ABNORMAL
EOSINOPHIL # BLD: 0.3 K/UL (ref 0–0.4)
EOSINOPHIL NFR BLD: 3 % (ref 0–7)
EPITH CASTS URNS QL MICRO: ABNORMAL /LPF
ERYTHROCYTE [DISTWIDTH] IN BLOOD BY AUTOMATED COUNT: 14.2 % (ref 11.5–14.5)
GLOBULIN SER CALC-MCNC: 4.8 G/DL (ref 2–4)
GLUCOSE SERPL-MCNC: 112 MG/DL (ref 65–100)
GLUCOSE UR STRIP.AUTO-MCNC: NEGATIVE MG/DL
HCT VFR BLD AUTO: 26.9 % (ref 36.6–50.3)
HGB BLD-MCNC: 8.9 G/DL (ref 12.1–17)
HGB UR QL STRIP: ABNORMAL
IMM GRANULOCYTES # BLD AUTO: 0.1 K/UL (ref 0–0.04)
IMM GRANULOCYTES NFR BLD AUTO: 1 % (ref 0–0.5)
KETONES UR QL STRIP.AUTO: NEGATIVE MG/DL
LEUKOCYTE ESTERASE UR QL STRIP.AUTO: NEGATIVE
LIPASE SERPL-CCNC: 247 U/L (ref 73–393)
LYMPHOCYTES # BLD: 1.3 K/UL (ref 0.8–3.5)
LYMPHOCYTES NFR BLD: 12 % (ref 12–49)
MCH RBC QN AUTO: 30.9 PG (ref 26–34)
MCHC RBC AUTO-ENTMCNC: 33.1 G/DL (ref 30–36.5)
MCV RBC AUTO: 93.4 FL (ref 80–99)
MONOCYTES # BLD: 1.3 K/UL (ref 0–1)
MONOCYTES NFR BLD: 11 % (ref 5–13)
NEUTS SEG # BLD: 8.4 K/UL (ref 1.8–8)
NEUTS SEG NFR BLD: 73 % (ref 32–75)
NITRITE UR QL STRIP.AUTO: NEGATIVE
NRBC # BLD: 0 K/UL (ref 0–0.01)
NRBC BLD-RTO: 0 PER 100 WBC
PH UR STRIP: 5.5 [PH] (ref 5–8)
PLATELET # BLD AUTO: 251 K/UL (ref 150–400)
PMV BLD AUTO: 9.1 FL (ref 8.9–12.9)
POTASSIUM SERPL-SCNC: 4 MMOL/L (ref 3.5–5.1)
PROT SERPL-MCNC: 7.5 G/DL (ref 6.4–8.2)
PROT UR STRIP-MCNC: 100 MG/DL
RBC # BLD AUTO: 2.88 M/UL (ref 4.1–5.7)
RBC #/AREA URNS HPF: ABNORMAL /HPF (ref 0–5)
SERVICE CMNT-IMP: NORMAL
SODIUM SERPL-SCNC: 141 MMOL/L (ref 136–145)
SP GR UR REFRACTOMETRY: 1.01 (ref 1–1.03)
SPERM URNS QL MICRO: PRESENT
UR CULT HOLD, URHOLD: NORMAL
UROBILINOGEN UR QL STRIP.AUTO: 0.2 EU/DL (ref 0.2–1)
WBC # BLD AUTO: 11.4 K/UL (ref 4.1–11.1)
WBC URNS QL MICRO: ABNORMAL /HPF (ref 0–4)

## 2021-09-11 PROCEDURE — 87086 URINE CULTURE/COLONY COUNT: CPT

## 2021-09-11 PROCEDURE — 36415 COLL VENOUS BLD VENIPUNCTURE: CPT

## 2021-09-11 PROCEDURE — 80053 COMPREHEN METABOLIC PANEL: CPT

## 2021-09-11 PROCEDURE — 81001 URINALYSIS AUTO W/SCOPE: CPT

## 2021-09-11 PROCEDURE — 85025 COMPLETE CBC W/AUTO DIFF WBC: CPT

## 2021-09-11 PROCEDURE — 74176 CT ABD & PELVIS W/O CONTRAST: CPT

## 2021-09-11 PROCEDURE — 99282 EMERGENCY DEPT VISIT SF MDM: CPT

## 2021-09-11 PROCEDURE — 74011250636 HC RX REV CODE- 250/636: Performed by: STUDENT IN AN ORGANIZED HEALTH CARE EDUCATION/TRAINING PROGRAM

## 2021-09-11 PROCEDURE — 83690 ASSAY OF LIPASE: CPT

## 2021-09-11 PROCEDURE — 96374 THER/PROPH/DIAG INJ IV PUSH: CPT

## 2021-09-11 RX ORDER — MORPHINE SULFATE 4 MG/ML
4 INJECTION INTRAVENOUS
Status: COMPLETED | OUTPATIENT
Start: 2021-09-11 | End: 2021-09-11

## 2021-09-11 RX ADMIN — MORPHINE SULFATE 4 MG: 4 INJECTION, SOLUTION INTRAMUSCULAR; INTRAVENOUS at 21:24

## 2021-09-11 NOTE — ED TRIAGE NOTES
Pt ambulatory to ED with c/o left flank, LLQ abdominal pain, constipation and urinary urgency onset 7 days ago.

## 2021-09-11 NOTE — PROGRESS NOTES
09/11/21 8098  Accessed the chart to discuss discharge with patient's daughter Ashia Gant. She was concerned that her father is experiencing pain and does not have \"anything for pain. \"   States the pain is in the lower left stomach \"same as before. \"  I reached out to the hospitalist NP, who recommended that the patient contact urology, or if he feels as if the pain is worsening, to go to the emergency room so that he can be properly assessed.   I had an extended conversation with Vipin Schmidt, in which she expressed frustration with this hospitalization

## 2021-09-12 PROBLEM — R10.30 INTRACTABLE LOWER ABDOMINAL PAIN: Status: ACTIVE | Noted: 2021-09-12

## 2021-09-12 LAB
ALBUMIN SERPL-MCNC: 2.3 G/DL (ref 3.5–5)
ALBUMIN/GLOB SERPL: 0.6 {RATIO} (ref 1.1–2.2)
ALP SERPL-CCNC: 108 U/L (ref 45–117)
ALT SERPL-CCNC: 80 U/L (ref 12–78)
ANION GAP SERPL CALC-SCNC: 4 MMOL/L (ref 5–15)
AST SERPL-CCNC: 74 U/L (ref 15–37)
BACTERIA SPEC CULT: NORMAL
BASOPHILS # BLD: 0 K/UL (ref 0–0.1)
BASOPHILS NFR BLD: 0 % (ref 0–1)
BILIRUB SERPL-MCNC: 0.7 MG/DL (ref 0.2–1)
BUN SERPL-MCNC: 39 MG/DL (ref 6–20)
BUN/CREAT SERPL: 8 (ref 12–20)
CALCIUM SERPL-MCNC: 8 MG/DL (ref 8.5–10.1)
CHLORIDE SERPL-SCNC: 117 MMOL/L (ref 97–108)
CO2 SERPL-SCNC: 22 MMOL/L (ref 21–32)
CREAT SERPL-MCNC: 4.62 MG/DL (ref 0.7–1.3)
DIFFERENTIAL METHOD BLD: ABNORMAL
EOSINOPHIL # BLD: 0.2 K/UL (ref 0–0.4)
EOSINOPHIL NFR BLD: 2 % (ref 0–7)
ERYTHROCYTE [DISTWIDTH] IN BLOOD BY AUTOMATED COUNT: 14.1 % (ref 11.5–14.5)
GLOBULIN SER CALC-MCNC: 4 G/DL (ref 2–4)
GLUCOSE SERPL-MCNC: 130 MG/DL (ref 65–100)
HCT VFR BLD AUTO: 23.4 % (ref 36.6–50.3)
HGB BLD-MCNC: 7.7 G/DL (ref 12.1–17)
IMM GRANULOCYTES # BLD AUTO: 0.1 K/UL (ref 0–0.04)
IMM GRANULOCYTES NFR BLD AUTO: 1 % (ref 0–0.5)
LYMPHOCYTES # BLD: 1.7 K/UL (ref 0.8–3.5)
LYMPHOCYTES NFR BLD: 17 % (ref 12–49)
MCH RBC QN AUTO: 30.9 PG (ref 26–34)
MCHC RBC AUTO-ENTMCNC: 32.9 G/DL (ref 30–36.5)
MCV RBC AUTO: 94 FL (ref 80–99)
MONOCYTES # BLD: 1.2 K/UL (ref 0–1)
MONOCYTES NFR BLD: 12 % (ref 5–13)
NEUTS SEG # BLD: 6.7 K/UL (ref 1.8–8)
NEUTS SEG NFR BLD: 68 % (ref 32–75)
NRBC # BLD: 0 K/UL (ref 0–0.01)
NRBC BLD-RTO: 0 PER 100 WBC
PLATELET # BLD AUTO: 223 K/UL (ref 150–400)
PMV BLD AUTO: 9.2 FL (ref 8.9–12.9)
POTASSIUM SERPL-SCNC: 3.8 MMOL/L (ref 3.5–5.1)
PROT SERPL-MCNC: 6.3 G/DL (ref 6.4–8.2)
RBC # BLD AUTO: 2.49 M/UL (ref 4.1–5.7)
SERVICE CMNT-IMP: NORMAL
SODIUM SERPL-SCNC: 143 MMOL/L (ref 136–145)
WBC # BLD AUTO: 10 K/UL (ref 4.1–11.1)

## 2021-09-12 PROCEDURE — 80053 COMPREHEN METABOLIC PANEL: CPT

## 2021-09-12 PROCEDURE — 74011250636 HC RX REV CODE- 250/636: Performed by: NURSE PRACTITIONER

## 2021-09-12 PROCEDURE — 36415 COLL VENOUS BLD VENIPUNCTURE: CPT

## 2021-09-12 PROCEDURE — 74011250636 HC RX REV CODE- 250/636: Performed by: INTERNAL MEDICINE

## 2021-09-12 PROCEDURE — 74011250636 HC RX REV CODE- 250/636: Performed by: STUDENT IN AN ORGANIZED HEALTH CARE EDUCATION/TRAINING PROGRAM

## 2021-09-12 PROCEDURE — 65390000012 HC CONDITION CODE 44 OBSERVATION

## 2021-09-12 PROCEDURE — 96376 TX/PRO/DX INJ SAME DRUG ADON: CPT

## 2021-09-12 PROCEDURE — 99218 HC RM OBSERVATION: CPT

## 2021-09-12 PROCEDURE — 85025 COMPLETE CBC W/AUTO DIFF WBC: CPT

## 2021-09-12 PROCEDURE — 74011250637 HC RX REV CODE- 250/637: Performed by: INTERNAL MEDICINE

## 2021-09-12 PROCEDURE — 96375 TX/PRO/DX INJ NEW DRUG ADDON: CPT

## 2021-09-12 PROCEDURE — 74011000258 HC RX REV CODE- 258: Performed by: INTERNAL MEDICINE

## 2021-09-12 RX ORDER — HYDROXYZINE HYDROCHLORIDE 10 MG/1
10-20 TABLET, FILM COATED ORAL
Status: DISCONTINUED | OUTPATIENT
Start: 2021-09-12 | End: 2021-09-15 | Stop reason: HOSPADM

## 2021-09-12 RX ORDER — ONDANSETRON 2 MG/ML
4 INJECTION INTRAMUSCULAR; INTRAVENOUS
Status: DISCONTINUED | OUTPATIENT
Start: 2021-09-12 | End: 2021-09-15 | Stop reason: HOSPADM

## 2021-09-12 RX ORDER — ATORVASTATIN CALCIUM 40 MG/1
80 TABLET, FILM COATED ORAL
Status: DISCONTINUED | OUTPATIENT
Start: 2021-09-12 | End: 2021-09-15 | Stop reason: HOSPADM

## 2021-09-12 RX ORDER — ENOXAPARIN SODIUM 100 MG/ML
30 INJECTION SUBCUTANEOUS EVERY 24 HOURS
Status: DISCONTINUED | OUTPATIENT
Start: 2021-09-12 | End: 2021-09-12

## 2021-09-12 RX ORDER — HYDRALAZINE HYDROCHLORIDE 50 MG/1
100 TABLET, FILM COATED ORAL 3 TIMES DAILY
Status: DISCONTINUED | OUTPATIENT
Start: 2021-09-12 | End: 2021-09-15 | Stop reason: HOSPADM

## 2021-09-12 RX ORDER — GABAPENTIN 100 MG/1
300 CAPSULE ORAL
Status: DISCONTINUED | OUTPATIENT
Start: 2021-09-12 | End: 2021-09-15 | Stop reason: HOSPADM

## 2021-09-12 RX ORDER — PANTOPRAZOLE SODIUM 40 MG/1
40 TABLET, DELAYED RELEASE ORAL DAILY
Status: DISCONTINUED | OUTPATIENT
Start: 2021-09-12 | End: 2021-09-15 | Stop reason: HOSPADM

## 2021-09-12 RX ORDER — METOPROLOL SUCCINATE 50 MG/1
25 TABLET, EXTENDED RELEASE ORAL DAILY
Status: DISCONTINUED | OUTPATIENT
Start: 2021-09-12 | End: 2021-09-12

## 2021-09-12 RX ORDER — MORPHINE SULFATE 2 MG/ML
4 INJECTION, SOLUTION INTRAMUSCULAR; INTRAVENOUS ONCE
Status: COMPLETED | OUTPATIENT
Start: 2021-09-12 | End: 2021-09-12

## 2021-09-12 RX ORDER — SODIUM CHLORIDE 9 MG/ML
100 INJECTION, SOLUTION INTRAVENOUS CONTINUOUS
Status: DISCONTINUED | OUTPATIENT
Start: 2021-09-12 | End: 2021-09-13

## 2021-09-12 RX ORDER — ACETAMINOPHEN 325 MG/1
650 TABLET ORAL
Status: DISCONTINUED | OUTPATIENT
Start: 2021-09-12 | End: 2021-09-15 | Stop reason: HOSPADM

## 2021-09-12 RX ORDER — ONDANSETRON 4 MG/1
4 TABLET, ORALLY DISINTEGRATING ORAL
Status: DISCONTINUED | OUTPATIENT
Start: 2021-09-12 | End: 2021-09-15 | Stop reason: HOSPADM

## 2021-09-12 RX ORDER — METHOCARBAMOL 750 MG/1
750 TABLET, FILM COATED ORAL
Status: DISCONTINUED | OUTPATIENT
Start: 2021-09-12 | End: 2021-09-12

## 2021-09-12 RX ORDER — ASPIRIN 81 MG/1
81 TABLET ORAL
Status: DISCONTINUED | OUTPATIENT
Start: 2021-09-12 | End: 2021-09-15 | Stop reason: HOSPADM

## 2021-09-12 RX ORDER — POLYETHYLENE GLYCOL 3350 17 G/17G
17 POWDER, FOR SOLUTION ORAL DAILY PRN
Status: DISCONTINUED | OUTPATIENT
Start: 2021-09-12 | End: 2021-09-15 | Stop reason: HOSPADM

## 2021-09-12 RX ORDER — METOPROLOL SUCCINATE 25 MG/1
25 TABLET, EXTENDED RELEASE ORAL DAILY
Status: DISCONTINUED | OUTPATIENT
Start: 2021-09-12 | End: 2021-09-15 | Stop reason: HOSPADM

## 2021-09-12 RX ORDER — SODIUM CHLORIDE 0.9 % (FLUSH) 0.9 %
5-40 SYRINGE (ML) INJECTION EVERY 8 HOURS
Status: DISCONTINUED | OUTPATIENT
Start: 2021-09-12 | End: 2021-09-15 | Stop reason: HOSPADM

## 2021-09-12 RX ORDER — MORPHINE SULFATE 4 MG/ML
4 INJECTION INTRAVENOUS
Status: COMPLETED | OUTPATIENT
Start: 2021-09-12 | End: 2021-09-12

## 2021-09-12 RX ORDER — MORPHINE SULFATE 2 MG/ML
2 INJECTION, SOLUTION INTRAMUSCULAR; INTRAVENOUS
Status: DISCONTINUED | OUTPATIENT
Start: 2021-09-12 | End: 2021-09-13

## 2021-09-12 RX ORDER — SODIUM CHLORIDE 0.9 % (FLUSH) 0.9 %
5-40 SYRINGE (ML) INJECTION AS NEEDED
Status: DISCONTINUED | OUTPATIENT
Start: 2021-09-12 | End: 2021-09-15 | Stop reason: HOSPADM

## 2021-09-12 RX ORDER — OXYCODONE HYDROCHLORIDE 5 MG/1
5 TABLET ORAL
Status: DISCONTINUED | OUTPATIENT
Start: 2021-09-12 | End: 2021-09-15 | Stop reason: HOSPADM

## 2021-09-12 RX ORDER — ACETAMINOPHEN 650 MG/1
650 SUPPOSITORY RECTAL
Status: DISCONTINUED | OUTPATIENT
Start: 2021-09-12 | End: 2021-09-15 | Stop reason: HOSPADM

## 2021-09-12 RX ORDER — FACIAL-BODY WIPES
10 EACH TOPICAL DAILY PRN
Status: DISCONTINUED | OUTPATIENT
Start: 2021-09-12 | End: 2021-09-15 | Stop reason: HOSPADM

## 2021-09-12 RX ORDER — ROPINIROLE 0.25 MG/1
0.5 TABLET, FILM COATED ORAL
Status: DISCONTINUED | OUTPATIENT
Start: 2021-09-12 | End: 2021-09-15 | Stop reason: HOSPADM

## 2021-09-12 RX ORDER — ISOSORBIDE MONONITRATE 30 MG/1
15 TABLET, EXTENDED RELEASE ORAL DAILY
Status: DISCONTINUED | OUTPATIENT
Start: 2021-09-12 | End: 2021-09-15 | Stop reason: HOSPADM

## 2021-09-12 RX ORDER — MELATONIN
2000 DAILY
Status: DISCONTINUED | OUTPATIENT
Start: 2021-09-12 | End: 2021-09-15 | Stop reason: HOSPADM

## 2021-09-12 RX ORDER — ENOXAPARIN SODIUM 100 MG/ML
30 INJECTION SUBCUTANEOUS DAILY
Status: DISCONTINUED | OUTPATIENT
Start: 2021-09-12 | End: 2021-09-12

## 2021-09-12 RX ORDER — AMLODIPINE BESYLATE 5 MG/1
10 TABLET ORAL DAILY
Status: DISCONTINUED | OUTPATIENT
Start: 2021-09-12 | End: 2021-09-15 | Stop reason: HOSPADM

## 2021-09-12 RX ADMIN — METOPROLOL SUCCINATE 25 MG: 25 TABLET, EXTENDED RELEASE ORAL at 10:18

## 2021-09-12 RX ADMIN — HYDRALAZINE HYDROCHLORIDE 100 MG: 50 TABLET, FILM COATED ORAL at 10:18

## 2021-09-12 RX ADMIN — MULTIPLE VITAMINS W/ MINERALS TAB 1 TABLET: TAB at 10:17

## 2021-09-12 RX ADMIN — Medication 10 ML: at 03:08

## 2021-09-12 RX ADMIN — ASPIRIN 81 MG: 81 TABLET, COATED ORAL at 02:07

## 2021-09-12 RX ADMIN — ATORVASTATIN CALCIUM 80 MG: 40 TABLET, FILM COATED ORAL at 23:01

## 2021-09-12 RX ADMIN — GABAPENTIN 300 MG: 100 CAPSULE ORAL at 20:39

## 2021-09-12 RX ADMIN — BISACODYL 10 MG: 10 SUPPOSITORY RECTAL at 11:01

## 2021-09-12 RX ADMIN — HYDRALAZINE HYDROCHLORIDE 100 MG: 50 TABLET, FILM COATED ORAL at 18:21

## 2021-09-12 RX ADMIN — Medication 10 ML: at 14:00

## 2021-09-12 RX ADMIN — HYDRALAZINE HYDROCHLORIDE 100 MG: 50 TABLET, FILM COATED ORAL at 23:01

## 2021-09-12 RX ADMIN — POLYETHYLENE GLYCOL 3350 17 G: 17 POWDER, FOR SOLUTION ORAL at 11:01

## 2021-09-12 RX ADMIN — CEFTRIAXONE SODIUM 1 G: 1 INJECTION, POWDER, FOR SOLUTION INTRAMUSCULAR; INTRAVENOUS at 11:01

## 2021-09-12 RX ADMIN — ATORVASTATIN CALCIUM 80 MG: 40 TABLET, FILM COATED ORAL at 02:08

## 2021-09-12 RX ADMIN — MORPHINE SULFATE 4 MG: 2 INJECTION, SOLUTION INTRAMUSCULAR; INTRAVENOUS at 02:10

## 2021-09-12 RX ADMIN — PANTOPRAZOLE SODIUM 40 MG: 40 TABLET, DELAYED RELEASE ORAL at 10:18

## 2021-09-12 RX ADMIN — Medication 10 ML: at 20:42

## 2021-09-12 RX ADMIN — SODIUM CHLORIDE 100 ML/HR: 9 INJECTION, SOLUTION INTRAVENOUS at 03:34

## 2021-09-12 RX ADMIN — ASPIRIN 81 MG: 81 TABLET, COATED ORAL at 20:39

## 2021-09-12 RX ADMIN — MORPHINE SULFATE 2 MG: 2 INJECTION, SOLUTION INTRAMUSCULAR; INTRAVENOUS at 20:40

## 2021-09-12 RX ADMIN — MORPHINE SULFATE 4 MG: 4 INJECTION, SOLUTION INTRAMUSCULAR; INTRAVENOUS at 00:49

## 2021-09-12 RX ADMIN — HYDRALAZINE HYDROCHLORIDE 100 MG: 50 TABLET, FILM COATED ORAL at 02:07

## 2021-09-12 RX ADMIN — OXYCODONE 5 MG: 5 TABLET ORAL at 03:08

## 2021-09-12 RX ADMIN — ROPINIROLE HYDROCHLORIDE 0.5 MG: 0.25 TABLET, FILM COATED ORAL at 23:01

## 2021-09-12 RX ADMIN — ROPINIROLE HYDROCHLORIDE 0.5 MG: 0.25 TABLET, FILM COATED ORAL at 03:08

## 2021-09-12 RX ADMIN — ISOSORBIDE MONONITRATE 15 MG: 30 TABLET, EXTENDED RELEASE ORAL at 10:19

## 2021-09-12 RX ADMIN — Medication 2000 UNITS: at 10:17

## 2021-09-12 RX ADMIN — MORPHINE SULFATE 2 MG: 2 INJECTION, SOLUTION INTRAMUSCULAR; INTRAVENOUS at 03:08

## 2021-09-12 RX ADMIN — GABAPENTIN 300 MG: 100 CAPSULE ORAL at 02:08

## 2021-09-12 RX ADMIN — AMLODIPINE BESYLATE 10 MG: 5 TABLET ORAL at 10:18

## 2021-09-12 NOTE — PROGRESS NOTES
Patient was admitted earlier this morning for evaluation of uncontrolled pain. He was recently discharged [facility] the [facility] where he was treated for pyelonephritis.   His creatinine has been stable in the mid fours  Continue current management as per HPI  Patient reports not having BM in 2 weeks, will add Dulcolax suppository and c/w Miralax  This may be cause of pain

## 2021-09-12 NOTE — ED PROVIDER NOTES
Gely Youssef is a 64 y.o. male with past medical history notable for CAD, carotid atherosclerotic disease status post carotid endarterectomy, DJD, fatty liver disease, gout, hypertension, peripheral neuropathy, RLS, presenting with persistent and worsening left flank pain, worse with movement and palpation, associated with urination. He has had fevers and chills since discharge. He was discharged yesterday from [facility]. Wife concerned because he has not discharged with a pain medication and his symptoms have been worsening throughout his stay, he has chronic kidney disease and his renal function seem to be worsening during this period of time as well. He had a repeat CT in the hospital which demonstrated new findings consistent with pyelonephritis.             Past Medical History:   Diagnosis Date    Abnormal stress echo 5/12/2015    Anemia NEC 03/2013    Anxiety     Borderline diabetes     CAD [facility]coronary artery disease) 2009    [facility] revealed 20%RCA and 50%2nd diagonal    Calculus of kidney     CKD (chronic kidney disease) stage 4, GFR 15-29 ml/min (HCC)     COPD, mild (HCC)     Followed by pulmonary associates    DJD (degenerative joint disease)     Fatty liver     GERD (gastroesophageal reflux disease) 10/11/2009    Gout     Hypertension     MELODY on CPAP 10/11/2009    nasal pillows; pressure set at 10-11 -     Peripheral neuropathy 10/11/2009    bilateral feet (numbness)    Renal cell cancer, right ([address] 01/2021    RLS (restless legs syndrome) 10/11/2009    S/P coronary artery stent placement 05/12/2015    PCI./MALI to LCx, 8/2019 PCI/MALI Prox LAD (RCA 40-50% lesions)       Past Surgical History:   Procedure Laterality Date    HX BUNIONECTOMY  2019    HX CAROTID ENDARTERECTOMY Left 01/2020    Followed by Dr. Libby Escobar  2009, 7/17    x2    HX HERNIA REPAIR      McTamaney/umbilical    HX KNEE REPLACEMENT Left 07/23/2011    HX ORTHOPAEDIC      left shoulder manipulation    HX UROLOGICAL      basket extraction of kidney stones    IN COLONOSCOPY FLX DX W/COLLJ SPEC WHEN PFRMD  2010         IN COLSC FLX W/RMVL OF TUMOR POLYP LESION SNARE TQ  2014              Family History:   Problem Relation Age of Onset    Heart Disease Father     Hypertension Father     Other Father         abdominal aneurysm     Dementia Mother     Alzheimer Mother     Heart Disease Brother     Heart Attack Brother     Heart Disease Maternal Grandmother     Heart Disease Paternal Grandmother     Heart Attack Paternal Grandmother     Heart Disease Paternal Grandfather     Heart Attack Paternal Grandfather     Elevated Lipids Son     Elevated Lipids Daughter     Cancer Neg Hx     Diabetes Neg Hx     Stroke Neg Hx        Social History     Socioeconomic History    Marital status:      Spouse name: David Maxwell Number of children: 2    Years of education: graduated then 4 year apprenticship    Highest education level: Associate degree: academic program   Occupational History    Not on file   Tobacco Use    Smoking status: Former Smoker     Packs/day: 1.00     Years: 10.00     Pack years: 10.00     Types: Cigarettes     Quit date: 1968     Years since quittin.7    Smokeless tobacco: Never Used   Vaping Use    Vaping Use: Never used   Substance and Sexual Activity    Alcohol use: No     Alcohol/week: 0.0 standard drinks    Drug use: No    Sexual activity: Yes     Partners: Female     Birth control/protection: None   Other Topics Concern     Service Not Asked    Blood Transfusions Not Asked    Caffeine Concern Not Asked    Occupational Exposure Not Asked    Hobby Hazards Not Asked    Sleep Concern Not Asked    Stress Concern Not Asked    Weight Concern Not Asked    Special Diet Not Asked    Back Care Not Asked    Exercise Not Asked    Bike Helmet Not Asked    [address] Not Asked    Self-Exams Not Asked   Social History Narrative    Not on file     Social Determinants of Health     Financial Resource Strain:     Difficulty of Paying Living Expenses:    Food Insecurity:     Worried About Running Out of Food in the Last Year:     [address] in the Last Year:    Transportation Needs:     Lack of Transportation (Medical):  Lack of Transportation (Non-Medical):    Physical Activity:     Days of Exercise per Week:     Minutes of Exercise per Session:    Stress:     Feeling of Stress :    Social Connections:     Frequency of Communication with Friends and Family:     Frequency of Social Gatherings with Friends and Family:     Attends [employer] Services:     Active Member of Clubs or Organizations:     Attends Club or Organization Meetings:     Marital Status:    Intimate Partner Violence:     Fear of Current or Ex-Partner:     Emotionally Abused:     Physically Abused:     Sexually Abused: ALLERGIES: Bactrim [sulfamethoxazole-trimethoprim], Codeine, Lisinopril (bulk), Neurontin [gabapentin], Zostavax [zoster vaccine live (pf)], and Penicillins    Review of Systems   Constitutional: Positive for chills, fatigue and fever. HENT: Negative for ear pain, sore throat and trouble swallowing. Eyes: Negative for visual disturbance. Respiratory: Negative for cough and shortness of breath. Cardiovascular: Negative for chest pain. Gastrointestinal: Positive for abdominal pain. Genitourinary: Positive for dysuria and flank pain. Musculoskeletal: Negative for back pain. Skin: Negative for rash. Neurological: Negative for light-headedness and headaches. Psychiatric/Behavioral: Negative for confusion. All other systems reviewed and are negative.       Vitals:    09/11/21 1833   BP: (!) 186/87   Pulse: (!) 103   Resp: 26   Temp: 99.4 °F (37.4 °C)   SpO2: 97%   Weight: 113.4 kg (250 lb)   Height: 6' (1.829 m)            Physical Exam  Constitutional:       Appearance: He is ill-appearing. HENT:      Head: Normocephalic and atraumatic. Mouth/Throat:      Mouth: Mucous membranes are moist.   Eyes:      Extraocular Movements: Extraocular movements intact. Cardiovascular:      Rate and Rhythm: Normal rate and regular rhythm. Heart sounds: Normal heart sounds. Pulmonary:      Effort: Pulmonary effort is normal. No respiratory distress. Breath sounds: Normal breath sounds. Abdominal:      Palpations: Abdomen is soft. Tenderness: There is abdominal tenderness in the left lower quadrant. There is left CVA tenderness. Musculoskeletal:      Cervical back: Normal range of motion. Right lower leg: No edema. Left lower leg: No edema. Skin:     Capillary Refill: Capillary refill takes less than 2 seconds. Neurological:      General: No focal deficit present. Mental Status: He is alert and oriented to person, place, and time. Psychiatric:         Mood and Affect: Mood normal.          MDM  Number of Diagnoses or Management Options     Amount and/or Complexity of Data Reviewed  Decide to obtain previous medical records or to obtain history from someone other than the patient: yes             MEDICAL DECISION MAKIN y.o. male presents with flank pain  Differential diagnosis includes but not limited to:   UTI, hydronephrosis, pyelonephritis, renal hemorrhage    LABORATORY TESTS:  Labs Reviewed   CBC WITH AUTOMATED DIFF - Abnormal; Notable for the following components:       Result Value    WBC 11.4 (*)     RBC 2.88 (*)     HGB 8.9 (*)     HCT 26.9 (*)     IMMATURE GRANULOCYTES 1 (*)     ABS. NEUTROPHILS 8.4 (*)     ABS. MONOCYTES 1.3 (*)     ABS. IMM.  GRANS. 0.1 (*)     All other components within normal limits   METABOLIC PANEL, COMPREHENSIVE - Abnormal; Notable for the following components:    Chloride 113 (*)     Glucose 112 (*)     BUN 42 (*)     Creatinine 4.72 (*)     BUN/Creatinine ratio 9 (*)     GFR est AA 15 (*)     GFR est non-AA 12 (*) ALT (SGPT) 108 (*)     AST (SGOT) 106 (*)     Alk. phosphatase 130 (*)     Albumin 2.7 (*)     Globulin 4.8 (*)     A-G Ratio 0.6 (*)     All other components within normal limits   URINALYSIS W/MICROSCOPIC - Abnormal; Notable for the following components:    Appearance CLOUDY (*)     Protein 100 (*)     Blood MODERATE (*)     Bacteria 1+ (*)     All other components within normal limits   URINE CULTURE HOLD SAMPLE   CULTURE, URINE   SAMPLES BEING HELD   LIPASE       IMAGING RESULTS:  CT ABD PELV WO CONT   Final Result   1. New area of subtle hyperdensity in a left midpole calyx may represent a small   area of hemorrhage. 2. Unchanged mild left hydronephrosis. Unchanged mild bilateral perinephric   stranding. Several renal cysts are unchanged. No surgical changes in the right   kidney. 3. Unchanged trace bilateral pleural effusions. MEDICATIONS GIVEN:  Medications   morphine injection 4 mg (has no administration in time range)   morphine injection 4 mg (4 mg IntraVENous Given 9/11/21 2124)       PROGRESS NOTE:   12:09 AM Patient's symptoms have improved after treatment    EKG:  none completed    CONSULTS:  Hospitalist Consult: [address] for Admission  12:09 AM    ED Room Number: R31/R31  Patient Name and age:  Flory Munoz 64 y.o.  male  Working Diagnosis:   1. Acute kidney injury superimposed on chronic kidney disease ([facility] [address]    2. Pyelonephritis    3. Flank pain        COVID-19 Suspicion:  no  Sepsis present:  no  Reassessment needed: no  Code Status:  Full Code  Readmission: no  Isolation Requirements:  no  Recommended Level of Care:  med/surg  Department:[facility] Adult ED - 21   Other: Patient was admitted to [facility] and discharged yesterday for same complaint, however has been gradually worsening during his hospitalization his renal function has been gradually worsening as well and he was abruptly discharged.   His CT shows some findings of worsening infection vs hemmorhage. IMPRESSION:  1. Acute kidney injury superimposed on chronic kidney disease ([address]    2. Pyelonephritis    3. Flank pain        PLAN:  - Admit to hospitalist      Efrain Palm MD          Please note that this dictation was completed with [employer], the computer voice recognition software. Quite often unanticipated grammatical, syntax, homophones, and other interpretive errors are inadvertently transcribed by the computer software. Please disregard these errors. Please excuse any errors that have escaped final proofreading.     Procedures

## 2021-09-12 NOTE — H&P
History and Physical    Patient: John Ledesma MRN: [id number]  SSN: [id number]    YOB: 1945  Age: 64 y.o. Sex: male      Assessment/plan:     1. Pyelonephritis. Patient is being appropriately treated with ciprofloxacin following 4 days of IV ceftriaxone at [facility].  We will resume ceftriaxone as ciprofloxacin is not on formulary, and it is also noted that [address] would have to be renally dosed. He does not have any fever or leukocytosis. Awaiting new urine culture results. 2.  Mild left hydronephrosis. Unchanged from previous CT scan of 9/8/2021. There was also question of hyperdensity in the left kidney midpole calyx may represent hemorrhage? Per review of previous CT scans dating back to last year, patient has had this hyperdensity chronically and it is unlikely to be significant. Will request urology consultation for the hydronephrosis. 3.  Left groin pain. It is a consideration that this may be due to kidney stone or left hydronephrosis. Patient has had multiple CT scans without contrast over the past week which did not show any kidney stone. Pain management with oxycodone 5 mg p.o. every 4 hours as needed severe pain, morphine 2 mg IV every 4 hours as needed breakthrough pain. 4.  Acute kidney injury on chronic kidney disease stage IV. Please see bottom of document for creatinine trend. It appears that patient had creatinine the range of 3-3.5 during most of this year, 2021 until recent hospitalization last week at [facility].  Since then, creatinine has been in the 4.5-4.7 range. Gentle IV fluids with normal saline at 100 cc/hour. 5.  Essential hypertension. Uncontrolled at this time. We will add hydralazine 100 mg p.o. every 8 hours. Continue home medications of Toprol-XL 25 mg p.o. daily, Norvasc 10 mg p.o. daily, Imdur 15 mg p.o. daily  6. Obstructive sleep apnea.   Per daughter, patient CPAP machine has recently been recalled by , and thus he is not using CPAP machine currently at home. 7.  Coronary artery disease. S/P drug-eluting stent to LAD in August, 2019. Patient previously had drug-eluting stent to left circumflex in May, 2015. Continue aspirin, metoprolol, atorvastatin. Continue Imdur 15 mg p.o. daily. Subjective:      Mary Kate Mills is a 64 y.o. male who was at [facility] last week, transferred from [facility] outpatient urgent care center. Patient presented with left groin pain that started a couple of weeks previously. He states that the pain was sharp in character. Patient states that the pain was severe, rated 10/10. At [facility], he was treated with IV antibiotics and analgesia. Patient states that he had onset of severe pain the same day that he was admitted to [facility] on Saturday, September 4, 2021. He states that he vomited once prior to going to [facility] last Saturday. Discharge summary from [facility] on September 10, 2021 indicates that patient was treated with 4 days of IV ceftriaxone there, and discharged on ciprofloxacin. 1/4 blood cultures had come back positive with coagulase-negative Staph aureus, and patient was given 2 days of vancomycin but this was discontinued as the staph aureus was suspected to be a contaminant. Remarkably, patient's urine cultures were negative at [facility].  During his stay at [facility], his creatinine was initially 3.23 and then 4.06 the next day, then 4.5-4.7 on September 7-10. Patient is known to have chronic kidney disease stage IV. He states that nephrologist has discussed with him that he may need dialysis sometime in the future, but not in the near future. Patient does not have an AV fistula. Patient was discharged on prescription for ciprofloxacin 500 mg p.o. twice a day for 10 days on September 10, 2021. Patient now presents with intractable pain in the right groin.   He states that the pain has continued after his discharge from [facility] on September 10, 2021. Patient has not had any vomiting after returning home. He has also not had any subjective fever. He is making good urine. He denies any dysuria. Patient's daughter is at bedside. She states that due to the persistent pain, patient's daughter called the nurses station at [facility].  She then called urology and nephrology. They were advised to come to the emergency department again due to the intractable pain. Patient is s/p right partial nephrectomy on 4/5/2021 at [facility] for kidney cancer. He did not have any chemotherapy or radiation. Patient's daughter states that patient has been going to physical therapy twice a week strength training overall, and also more specifically to treat bilateral frozen shoulder. Patient does have chronic pain in his low back, but does not take any opiate medication on a regular basis at home currently is not also taking any muscle relaxants. Her daughter, even after patient's partial nephrectomy in April, 2021, pain was managed basically with only Tylenol and only a couple of pills of opiates total.  However, after returning home on September 10, she was afraid to give him Tylenol as she was worried that it would worsen his renal function. His daughter states that patient takes iron pills, which have made him constipated at times. He did have some constipation prior to admission to [facility] last week. She is not sure whether he has melanotic stools, and states that patient usually does not pay attention to things like this. Per daughter, patient was sweating all night long on Friday, September 10 after returning home. She did not check his temperature at home. Patient has had 2 doses of the Fried Peter COVID-19 vaccine      Primary nephrologist: Dr. Sarah Fernando.        Review of Systems:  A comprehensive review of systems was negative except for that written in the History of Present Illness and the following:  Patient has had weight gain 20-30 lbs over last 18 months due to decreased physical activity since the COVID-19 pandemic started. Patient's daughter, patient sometimes has swelling in the legs. .Patient only smokes cigarettes for a few years out of his life,  but worked at [employer] for many years, and was exposed to a lot of secondhand smoke. Per daughter, he she does hear her father wheeze quite often. Past Medical History:   Diagnosis Date    Abnormal stress echo 5/12/2015    Anemia NEC 03/2013    Anxiety     Borderline diabetes     CAD (coronary artery disease) 2009    cath [facility]) revealed 20% RCA and 50% 2nd diagonal    Calculus of kidney     CKD (chronic kidney disease) stage 4, GFR 15-29 ml/min (HCC)     COPD, mild (HCC)     Followed by pulmonary associates    DJD (degenerative joint disease)     Environmental allergies     Fatty liver     GERD (gastroesophageal reflux disease) 10/11/2009    Gout     Hypertension     MELODY on CPAP 10/11/2009    nasal pillows; pressure set at 10-11 -     Peripheral neuropathy 10/11/2009    bilateral feet (numbness)    Renal cell cancer, right ([address] 01/2021    RLS (restless legs syndrome) 10/11/2009    S/P coronary artery stent placement 05/12/2015    PCI./MALI to LCx, 8/2019 PCI/MALI Prox LAD (RCA 40-50% lesions)     Past Surgical History:   Procedure Laterality Date    HX BUNIONECTOMY  2019    HX CAROTID ENDARTERECTOMY Left 01/2020    Followed by Dr. Ludwin Pittman      S/P stent to left circumflex in May, 2015; s/p stent to LAD in August, 2019   [address]  2009, 7/17    per heart cath of 7/13/2017, discrete 40 % stenosis. in proximal LAD, discrete 40 % stenosis in proximal RCA, 30% stenosis in distal RCA    HX HERNIA REPAIR      [place]/umbilical    HX KNEE REPLACEMENT Left 07/23/2011    HX ORTHOPAEDIC      left shoulder manipulation    HX UROLOGICAL      basket extraction of kidney stones    TN COLONOSCOPY FLX DX W/COLLJ SPEC WHEN PFRMD  2010         TN COLSC FLX W/RMVL OF TUMOR POLYP LESION SNARE TQ  2014           Family History   Problem Relation Age of Onset    Heart Disease Father     Hypertension Father     Other Father         abdominal aneurysm     Dementia Mother     Alzheimer Mother     Heart Disease Brother     Heart Attack Brother     Heart Disease Maternal Grandmother     Heart Disease Paternal Grandmother     Heart Attack Paternal Grandmother     Heart Disease Paternal Grandfather     Heart Attack Paternal Grandfather     Elevated Lipids Son     Elevated Lipids Daughter     Cancer Neg Hx     Diabetes Neg Hx     Stroke Neg Hx      Social History     Tobacco Use    Smoking status: Former Smoker     Packs/day: 1.00     Years: 10.00     Pack years: 10.00     Types: Cigarettes     Quit date: 1968     Years since quittin.7    Smokeless tobacco: Never Used   Substance Use Topics    Alcohol use: No     Alcohol/week: 0.0 standard drinks      Home medications:  Prior to Admission medications    Medication Sig Start Date Authorizing Provider   amLODIPine (NORVASC) 10 mg tablet Take 1 Tablet by mouth daily. 21 Rob Ronquillo NP   ciprofloxacin HCl (Cipro) 500 mg tablet Take 1 Tablet by mouth daily for 10 days. 9/10/21 Rob Ronquillo NP   gabapentin (NEURONTIN) 300 mg capsule TAKE 1 CAPSULE BY MOUTH EVERY DAY AT BEDTIME FOR 30 DAYS 21 Provider, Historical   hydrOXYzine HCL (ATARAX) 10 mg tablet Take 1-2 Tablets by mouth three (3) times daily as needed for Itching or Anxiety.  21 Shireen Blum MD   methocarbamoL (ROBAXIN) 750 mg tablet Take 1 Tablet by mouth three (3) times daily as needed for Muscle Spasm(s). 21 Mary Singh MD   pantoprazole (PROTONIX) 40 mg tablet TAKE 1 TABLET BY MOUTH EVERY DAY 21 Shireen Blum MD   metoprolol succinate (TOPROL-XL) 25 mg XL tablet TAKE 1 TABLET BY MOUTH EVERY DAY 5/4/21 Pebbles JOYCE NP   fluocinoNIDE (LIDEX) 0.05 % external solution APPLY TO ITCHY SPOTS ON SCALP AS NEEDED FOR FLARE 3/7/21 Provider, Historical   albuterol (ProAir HFA) 90 mcg/actuation inhaler Take 1 Puff by inhalation every four (4) hours. Provider, Historical   umeclidinium-vilanteroL (Anoro Ellipta) 62.5-25 mcg/actuation inhaler Take 1 Puff by inhalation daily. Provider, Historical   rOPINIRole (Requip) 0.5 mg tablet Take 0.5 mg by mouth nightly. Provider, Historical   ketoconazole (NIZORAL) 2 % shampoo Apply 1 mL to affected area daily as needed for Itching. Provider, Historical   atorvastatin (Lipitor) 80 mg tablet Take 80 mg by mouth nightly. Provider, Historical   isosorbide mononitrate ER (IMDUR) 30 mg tablet Take 0.5 Tabs by mouth daily. 10/21/20 Pebbles JOYCE NP   nitroglycerin (NITROSTAT) 0.4 mg SL tablet TAKE 1 TABLET BY SUBLINGUAL ROUTE EVERY 5 MINUTES AS NEEDED FOR CHEST PAIN. 3/4/20 James Barr MD   VITAMIN D3 2,000 unit cap capsule Take 2,000 Units by mouth daily. 3/3/15 Provider, Historical   GLUCOSAMINE HCL/CHONDR PETERSEN A NA (GLUCOSAMINE-CHONDROITIN) 750-600 mg Tab Take 1 Tab by mouth daily. Brand: Move Free  Provider, Historical   aspirin delayed-release 81 mg tablet Take 81 mg by mouth nightly. Provider, Historical   allopurinol (ZYLOPRIM) 100 mg tablet Take 100 mg by mouth every morning. 6/5/12 Provider, Historical   MULTIVITS W-FE,OTHER MIN (CENTRUM PO) Take 1 Tab by mouth daily.   Provider, Historical        Allergies   Allergen Reactions    Bactrim [Sulfamethoxazole-Trimethoprim] Hives    Codeine Itching    Lisinopril (Bulk) Cough    Neurontin [Gabapentin] Other (comments)     drowsy    Zostavax [Zoster Vaccine Live (Pf)] Rash     See 9/10/13 visit    Penicillins Hives     Pt states he has taken Keflex before without problems         Objective:     Patient Vitals for the past 24 hrs:   BP Temp Pulse Resp SpO2  oxygen therapy    09/12/21 0700 (!) 149/76 98.3 °F (36.8 °C) 89 22 96 %  room air   09/12/21 0249 (!) 177/61 98.6 °F (37 °C) 100 20 94 %  room air   09/12/21 0207 (!) 171/88 -- (!) 101 22 96 %  room air   09/12/21 0132 (!) 169/89 98.8 °F (37.1 °C) 94 18 97 %  room air   09/11/21 1833 (!) 186/87 99.4 °F (37.4 °C) (!) 103 26 97 %  room air          Physical Exam:  Estimated body mass index is 33.91 kg/m² as calculated from the following:    Height as of this encounter: 6' (1.829 m). Weight as of this encounter: 113.4 kg (250 lb). General: In no acute distress. Well developed, well nourished. Head: Normocephalic, atraumatic. Eyes: Anicteric sclera. PERRL. Extraocular muscles intact. ENT: External ears and nose appear normal.  Oral mucosa moist.  Neck: Supple. No jugular venous distention. Heart: Regular rate and rhythm. No murmurs appreciated. Chest: Symmetrical excursion. Clear to auscultation bilaterally. Abdomen: Soft, nontender. No abnormal distention. Bowel sounds are present throughout. Extremities: No gross deformities. No edema, no cyanosis. Feet are warm to touch. Neurological: Moving all extremities spontaneously with at least antigravity strength. Alert, oriented X3. Skin: No jaundice. No rashes.         Patient resides:  Independently    Assisted Living     SNF     With family care  x      Ambulates:   Independently x   w/cane     w/walker     w/wc     CODE STATUS:  DNR     Full x   Other        Assessment:     Hospital Problems  Date Reviewed: 9/2/2021        Codes Class Noted POA    Intractable lower abdominal pain ICD-10-CM: R10.30  ICD-9-CM: 789.09  9/12/2021 Unknown        Pyelonephritis ICD-10-CM: N12  ICD-9-CM: 590.80  9/4/2021 Unknown            Recent Results (from the past 24 hour(s))   CBC WITH AUTOMATED DIFF    Collection Time: 09/11/21  7:30 PM   Result Value Ref Range    WBC 11.4 (H) 4.1 - 11.1 K/uL    RBC 2.88 (L) 4.10 - 5.70 M/uL    HGB 8.9 (L) 12.1 - 17.0 g/dL HCT 26.9 (L) 36.6 - 50.3 %    MCV 93.4 80.0 - 99.0 FL    MCH 30.9 26.0 - 34.0 PG    MCHC 33.1 30.0 - 36.5 g/dL    RDW 14.2 11.5 - 14.5 %    PLATELET 932 853 - 187 K/uL    MPV 9.1 8.9 - 12.9 FL    NRBC 0.0 0  WBC    ABSOLUTE NRBC 0.00 0.00 - 0.01 K/uL    NEUTROPHILS 73 32 - 75 %    LYMPHOCYTES 12 12 - 49 %    MONOCYTES 11 5 - 13 %    EOSINOPHILS 3 0 - 7 %    BASOPHILS 0 0 - 1 %    IMMATURE GRANULOCYTES 1 (H) 0.0 - 0.5 %    ABS. NEUTROPHILS 8.4 (H) 1.8 - 8.0 K/UL    ABS. LYMPHOCYTES 1.3 0.8 - 3.5 K/UL    ABS. MONOCYTES 1.3 (H) 0.0 - 1.0 K/UL    ABS. EOSINOPHILS 0.3 0.0 - 0.4 K/UL    ABS. BASOPHILS 0.1 0.0 - 0.1 K/UL    ABS. IMM. GRANS. 0.1 (H) 0.00 - 0.04 K/UL    DF AUTOMATED     METABOLIC PANEL, COMPREHENSIVE    Collection Time: 09/11/21  7:30 PM   Result Value Ref Range    Sodium 141 136 - 145 mmol/L    Potassium 4.0 3.5 - 5.1 mmol/L    Chloride 113 (H) 97 - 108 mmol/L    CO2 21 21 - 32 mmol/L    Anion gap 7 5 - 15 mmol/L    Glucose 112 (H) 65 - 100 mg/dL    BUN 42 (H) 6 - 20 MG/DL    Creatinine 4.72 (H) 0.70 - 1.30 MG/DL    BUN/Creatinine ratio 9 (L) 12 - 20      GFR est AA 15 (L) >60 ml/min/1.73m2    GFR est non-AA 12 (L) >60 ml/min/1.73m2    Calcium 8.9 8.5 - 10.1 MG/DL    Bilirubin, total 0.8 0.2 - 1.0 MG/DL    ALT (SGPT) 108 (H) 12 - 78 U/L    AST (SGOT) 106 (H) 15 - 37 U/L    Alk.  phosphatase 130 (H) 45 - 117 U/L    Protein, total 7.5 6.4 - 8.2 g/dL    Albumin 2.7 (L) 3.5 - 5.0 g/dL    Globulin 4.8 (H) 2.0 - 4.0 g/dL    A-G Ratio 0.6 (L) 1.1 - 2.2     URINALYSIS W/MICROSCOPIC    Collection Time: 09/11/21  7:30 PM   Result Value Ref Range    Color YELLOW/STRAW      Appearance CLOUDY (A) CLEAR      Specific gravity 1.015 1.003 - 1.030      pH (UA) 5.5 5.0 - 8.0      Protein 100 (A) NEG mg/dL    Glucose Negative NEG mg/dL    Ketone Negative NEG mg/dL    Bilirubin Negative NEG      Blood MODERATE (A) NEG      Urobilinogen 0.2 0.2 - 1.0 EU/dL    Nitrites Negative NEG      Leukocyte Esterase Negative NEG WBC 5-10 0 - 4 /hpf    RBC 0-5 0 - 5 /hpf    Epithelial cells FEW FEW /lpf    Bacteria 1+ (A) NEG /hpf    Spermatozoa PRESENT     URINE CULTURE HOLD SAMPLE    Collection Time: 09/11/21  7:30 PM    Specimen: Serum; Urine   Result Value Ref Range    Urine culture hold        Urine on hold in Microbiology dept for 2 days. If unpreserved urine is submitted, it cannot be used for addtional testing after 24 hours, recollection will be required. LIPASE    Collection Time: 09/11/21  7:30 PM   Result Value Ref Range    Lipase 247 73 - 393 U/L   CBC WITH AUTOMATED DIFF    Collection Time: 09/12/21  3:39 AM   Result Value Ref Range    WBC 10.0 4.1 - 11.1 K/uL    RBC 2.49 (L) 4.10 - 5.70 M/uL    HGB 7.7 (L) 12.1 - 17.0 g/dL    HCT 23.4 (L) 36.6 - 50.3 %    MCV 94.0 80.0 - 99.0 FL    MCH 30.9 26.0 - 34.0 PG    MCHC 32.9 30.0 - 36.5 g/dL    RDW 14.1 11.5 - 14.5 %    PLATELET 292 094 - 411 K/uL    MPV 9.2 8.9 - 12.9 FL    NRBC 0.0 0  WBC    ABSOLUTE NRBC 0.00 0.00 - 0.01 K/uL    NEUTROPHILS 68 32 - 75 %    LYMPHOCYTES 17 12 - 49 %    MONOCYTES 12 5 - 13 %    EOSINOPHILS 2 0 - 7 %    BASOPHILS 0 0 - 1 %    IMMATURE GRANULOCYTES 1 (H) 0.0 - 0.5 %    ABS. NEUTROPHILS 6.7 1.8 - 8.0 K/UL    ABS. LYMPHOCYTES 1.7 0.8 - 3.5 K/UL    ABS. MONOCYTES 1.2 (H) 0.0 - 1.0 K/UL    ABS. EOSINOPHILS 0.2 0.0 - 0.4 K/UL    ABS. BASOPHILS 0.0 0.0 - 0.1 K/UL    ABS. IMM.  GRANS. 0.1 (H) 0.00 - 0.04 K/UL    DF AUTOMATED     METABOLIC PANEL, COMPREHENSIVE    Collection Time: 09/12/21  3:39 AM   Result Value Ref Range    Sodium 143 136 - 145 mmol/L    Potassium 3.8 3.5 - 5.1 mmol/L    Chloride 117 (H) 97 - 108 mmol/L    CO2 22 21 - 32 mmol/L    Anion gap 4 (L) 5 - 15 mmol/L    Glucose 130 (H) 65 - 100 mg/dL    BUN 39 (H) 6 - 20 MG/DL    Creatinine 4.62 (H) 0.70 - 1.30 MG/DL    BUN/Creatinine ratio 8 (L) 12 - 20      GFR est AA 15 (L) >60 ml/min/1.73m2    GFR est non-AA 12 (L) >60 ml/min/1.73m2    Calcium 8.0 (L) 8.5 - 10.1 MG/DL    Bilirubin, total 0.7 0.2 - 1.0 MG/DL    ALT (SGPT) 80 (H) 12 - 78 U/L    AST (SGOT) 74 (H) 15 - 37 U/L    Alk.  phosphatase 108 45 - 117 U/L    Protein, total 6.3 (L) 6.4 - 8.2 g/dL    Albumin 2.3 (L) 3.5 - 5.0 g/dL    Globulin 4.0 2.0 - 4.0 g/dL    A-G Ratio 0.6 (L) 1.1 - 2.2            Ref. Range 4/6/2021 05:38 4/7/2021 04:57 4/8/2021 05:14 4/9/2021 07:29 7/22/2021 15:12 9/5/2021 00:19 9/6/2021 00:30 9/7/2021 02:19 9/8/2021 03:51 9/8/2021 03:51 9/9/2021 11:15 9/10/2021 04:48 9/11/2021 19:30 9/12/2021 03:39   Sodium Latest Ref Range: 136 - 145 mmol/L 140 136 139 141 144 141 140 141 142 139 140 140 141 143   Potassium Latest Ref Range: 3.5 - 5.1 mmol/L 4.7 4.0 4.0 4.2 4.5 4.2 4.1 3.9 3.9 3.7 3.9 3.7 4.0 3.8   Chloride Latest Ref Range: 97 - 108 mmol/L 109 (H) 103 109 (H) 111 (H) 110 (H) 106 106 109 (H) 111 (H) 110 (H) 109 (H) 110 (H) 113 (H) 117 (H)   CO2 Latest Ref Range: 21 - 32 mmol/L 23 26 24 26 27 27 25 23 21 20 (L) 21 20 (L) 21 22   Anion gap Latest Ref Range: 5 - 15 mmol/L 8 7 6 4 (L) 7 8 9 9 10 9 10 10 7 4 (L)   Glucose Latest Ref Range: 65 - 100 mg/dL 122 (H) 103 (H) 103 (H) 99 101 (H) 137 (H) 126 (H) 118 (H) 99 98 100 87 112 (H) 130 (H)   BUN Latest Ref Range: 6 - 20 MG/DL 33 (H) 46 (H) 45 (H) 37 (H) 26 (H) 28 (H) 39 (H) 41 (H) 43 (H) 43 (H) 40 (H) 42 (H) 42 (H) 39 (H)   Creatinine Latest Ref Range: 0.70 - 1.30 MG/DL 3.19 (H) 3.58 (H) 3.45 (H) 2.96 (H) 2.56 (H) 3.23 (H) 4.06 (H) 4.67 (H) 4.73 (H) 4.74 (H) 4.53 (H) 4.60 (H) 4.72 (H) 4.62 (H)   BUN/Creatinine ratio Latest Ref Range: 12 - 20   10 (L) 13 13 13 10 (L) 9 (L) 10 (L) 9 (L) 9 (L) 9 (L) 9 (L) 9 (L) 9 (L) 8 (L)   Calcium Latest Ref Range: 8.5 - 10.1 MG/DL 8.3 (L) 7.6 (L) 7.8 (L) 8.1 (L) 8.9 8.7 7.8 (L) 8.0 (L) 8.1 (L) 8.1 (L) 8.2 (L) 8.2 (L) 8.9 8.0 (L)   Phosphorus Latest Ref Range: 2.6 - 4.7 MG/DL    2.6   3.5 3.5 3.7 3.7 3.0 3.3     Magnesium Latest Ref Range: 1.6 - 2.4 mg/dL       1.4 (L) 1.8  1.7       GFR est non-AA Latest Ref Range: >60 ml/min/1.73m2 19 (L) 17 (L) 17 (L) 21 (L) 24 (L) 19 (L) 14 (L) 12 (L) 12 (L) 12 (L) 13 (L) 12 (L) 12 (L) 12 (L)   GFR est AA Latest Ref Range: >60 ml/min/1.73m2 23 (L) 20 (L) 21 (L) 25 (L) 30 (L) 23 (L) 17 (L) 15 (L) 15 (L) 15 (L) 15 (L) 15 (L) 15 (L) 15 (L)   Bilirubin, total Latest Ref Range: 0.2 - 1.0 MG/DL     0.6        0.8 0.7   Protein, total Latest Ref Range: 6.4 - 8.2 g/dL     7.2        7.5 6.3 (L)   Albumin Latest Ref Range: 3.5 - 5.0 g/dL    2.9 (L) 3.5   2.8 (L) 2.5 (L)  2.4 (L) 2.3 (L) 2.7 (L) 2.3 (L)   Globulin Latest Ref Range: 2.0 - 4.0 g/dL     3.7        4.8 (H) 4.0   A-G Ratio Latest Ref Range: 1.1 - 2.2       0.9 (L)        0.6 (L) 0.6 (L)   ALT Latest Ref Range: 12 - 78 U/L     25        108 (H) 80 (H)   AST Latest Ref Range: 15 - 37 U/L     14 (L)        106 (H) 74 (H)   Alk.  phosphatase Latest Ref Range: 45 - 117 U/L     112        130 (H) 108   LD Latest Ref Range: 85 - 241 U/L   315 (H)              Lipase Latest Ref Range: 73 - 393 U/L     236        247    Procalcitonin Latest Units: ng/mL                 Triglyceride Latest Ref Range: 0 - 149 mg/dL                 Cholesterol, total Latest Ref Range: 100 - 199 mg/dL                 HDL Cholesterol Latest Ref Range: >39 mg/dL                 VLDL, calculated Latest Ref Range: 5 - 40 mg/dL                 LDL, calculated Latest Ref Range: 0 - 99 mg/dL                 Vitamin B12 Latest Ref Range: 193 - 986 pg/mL   249              Folate Latest Ref Range: 5.0 - 21.0 ng/mL   18.0              Hemoglobin A1c, (calculated) Latest Ref Range: 4.8 - 5.6 %                 Estimated average glucose Latest Units: mg/dL                 Iron Latest Ref Range: 35 - 150 ug/dL   10 (L)              TIBC Latest Ref Range: 250 - 450 ug/dL   271              Iron % saturation Latest Ref Range: 20 - 50 %   4 (L)              Ferritin Latest Ref Range: 26 - 388 NG/ML   53              Haptoglobin Latest Ref Range: 30 - 200 mg/dL   213 (H)                    CT ABD PELV WO CONT  Narrative: EXAM: CT ABD PELV WO CONT    INDICATION: Worsening renal failure in the context of pyonephritis, evaluate for  obstruction, renal abscess    COMPARISON: 9/8/2021    CONTRAST:  None. TECHNIQUE:   Thin axial images were obtained through the abdomen and pelvis. Coronal and  sagittal reformats were generated. Oral contrast was not administered. CT dose  reduction was achieved through use of a standardized protocol tailored for this  examination and automatic exposure control for dose modulation. The absence of intravenous contrast material reduces the sensitivity for  evaluation of the vasculature and solid organs. FINDINGS:   LOWER THORAX: Trace bilateral pleural effusions. LIVER: No mass. BILIARY TREE: Gallbladder is within normal limits. CBD is not dilated. SPLEEN: within normal limits. PANCREAS: No focal abnormality. ADRENALS: Unremarkable. KIDNEYS/URETERS: The patient is status post prior partial right nephrectomy. There is a 2.1 cm cyst in the medial right kidney. There is a 3.3 cm cyst in the  superior left kidney. There is a 4.5 cm cyst in the lower pole left kidney. No  nephrolithiasis. There is unchanged mild left hydronephrosis. There is unchanged  mild bilateral perinephric stranding. Atherosclerotic calcification is seen in  the left renal hilum. There is a new area of subtle hyperdensity in a calyx of  the posterior left kidney measuring approximately 1.5 cm on image 35. STOMACH: Unremarkable. SMALL BOWEL: No dilatation or wall thickening. COLON: No dilatation or wall thickening. Several colonic diverticula are  present. APPENDIX: None  PERITONEUM: No ascites or pneumoperitoneum. RETROPERITONEUM: No lymphadenopathy or aortic aneurysm. REPRODUCTIVE ORGANS: Unremarkable  URINARY BLADDER: No mass or calculus. BONES: No destructive bone lesion. ABDOMINAL WALL: No mass or hernia. ADDITIONAL COMMENTS: N/A  Impression: 1.  New area of subtle hyperdensity in a left midpole calyx may represent a small  area of hemorrhage. 2. Unchanged mild left hydronephrosis. Unchanged mild bilateral perinephric  stranding. Several renal cysts are unchanged. No surgical changes in the right  kidney. 3. Unchanged trace bilateral pleural effusions.           Signed By: Freda Coates DO     September 12, 2021

## 2021-09-12 NOTE — PROGRESS NOTES
Problem: Falls - Risk of  Goal: *Absence of Falls  Description: Document Libra Pillow Fall Risk and appropriate interventions in the flowsheet.   Outcome: Progressing Towards Goal  Note: Fall Risk Interventions:

## 2021-09-12 NOTE — PROGRESS NOTES
Problem: Falls - Risk of  Goal: *Absence of Falls  Description: Document Guzman Wujasen Fall Risk and appropriate interventions in the flowsheet.   Outcome: Progressing Towards Goal  Note: Fall Risk Interventions:            Medication Interventions: Evaluate medications/consider consulting pharmacy, Patient to call before getting OOB                   Problem: Patient Education: Go to Patient Education Activity  Goal: Patient/Family Education  Outcome: Progressing Towards Goal

## 2021-09-12 NOTE — ROUTINE PROCESS
Bedside shift change report given to Emmy Hahn 42 (oncoming nurse) by Bernice Roland RN (offgoing nurse). Report included the following information SBAR, Kardex, ED Summary, Intake/Output, MAR, Cardiac Rhythm NSR and Dual Neuro Assessment.

## 2021-09-12 NOTE — ED NOTES
TRANSFER - OUT REPORT:    Verbal report given to Staff RN (name) on Mary Kate Mills  being transferred to 1/01 for routine progression of care       Report consisted of patients Situation, Background, Assessment and   Recommendations(SBAR). Information from the following report(s) SBAR, Kardex, ED Summary and MAR was reviewed with the receiving nurse. Lines:   Peripheral IV 09/11/21 Left Wrist (Active)   Site Assessment Clean, dry, & intact 09/11/21 1925   Phlebitis Assessment 0 09/11/21 1925   Infiltration Assessment 0 09/11/21 1925   Dressing Status Clean, dry, & intact 09/11/21 1925   Dressing Type Topical skin adhesive;Transparent 09/11/21 1925   Hub Color/Line Status Pink;Capped;Flushed;Patent 09/11/21 1925   Action Taken Blood drawn 09/11/21 1925        Opportunity for questions and clarification was provided.       Patient transported with:   [employer]

## 2021-09-13 ENCOUNTER — APPOINTMENT (OUTPATIENT)
Dept: PHYSICAL THERAPY | Age: 76
End: 2021-09-13
Payer: MEDICARE

## 2021-09-13 PROBLEM — R10.9 ABDOMINAL PAIN: Status: ACTIVE | Noted: 2021-09-13

## 2021-09-13 LAB
ALBUMIN SERPL-MCNC: 2.6 G/DL (ref 3.5–5)
ALBUMIN/GLOB SERPL: 0.6 {RATIO} (ref 1.1–2.2)
ALP SERPL-CCNC: 118 U/L (ref 45–117)
ALT SERPL-CCNC: 80 U/L (ref 12–78)
ANION GAP SERPL CALC-SCNC: 8 MMOL/L (ref 5–15)
AST SERPL-CCNC: 59 U/L (ref 15–37)
BILIRUB SERPL-MCNC: 0.6 MG/DL (ref 0.2–1)
BUN SERPL-MCNC: 41 MG/DL (ref 6–20)
BUN/CREAT SERPL: 9 (ref 12–20)
CALCIUM SERPL-MCNC: 9.1 MG/DL (ref 8.5–10.1)
CHLORIDE SERPL-SCNC: 112 MMOL/L (ref 97–108)
CO2 SERPL-SCNC: 20 MMOL/L (ref 21–32)
CREAT SERPL-MCNC: 4.71 MG/DL (ref 0.7–1.3)
GLOBULIN SER CALC-MCNC: 4.6 G/DL (ref 2–4)
GLUCOSE SERPL-MCNC: 101 MG/DL (ref 65–100)
POTASSIUM SERPL-SCNC: 4 MMOL/L (ref 3.5–5.1)
PROT SERPL-MCNC: 7.2 G/DL (ref 6.4–8.2)
SODIUM SERPL-SCNC: 140 MMOL/L (ref 136–145)

## 2021-09-13 PROCEDURE — 74011250636 HC RX REV CODE- 250/636: Performed by: INTERNAL MEDICINE

## 2021-09-13 PROCEDURE — 74011250637 HC RX REV CODE- 250/637: Performed by: INTERNAL MEDICINE

## 2021-09-13 PROCEDURE — 99218 HC RM OBSERVATION: CPT

## 2021-09-13 PROCEDURE — 74011000258 HC RX REV CODE- 258: Performed by: INTERNAL MEDICINE

## 2021-09-13 PROCEDURE — 65270000029 HC RM PRIVATE

## 2021-09-13 PROCEDURE — 77010033678 HC OXYGEN DAILY

## 2021-09-13 PROCEDURE — 80053 COMPREHEN METABOLIC PANEL: CPT

## 2021-09-13 PROCEDURE — 36415 COLL VENOUS BLD VENIPUNCTURE: CPT

## 2021-09-13 PROCEDURE — 96376 TX/PRO/DX INJ SAME DRUG ADON: CPT

## 2021-09-13 RX ORDER — POLYETHYLENE GLYCOL 3350 17 G/17G
17 POWDER, FOR SOLUTION ORAL DAILY
Status: DISCONTINUED | OUTPATIENT
Start: 2021-09-13 | End: 2021-09-15 | Stop reason: HOSPADM

## 2021-09-13 RX ORDER — METHOCARBAMOL 500 MG/1
500 TABLET, FILM COATED ORAL 4 TIMES DAILY
Status: DISCONTINUED | OUTPATIENT
Start: 2021-09-13 | End: 2021-09-15 | Stop reason: HOSPADM

## 2021-09-13 RX ORDER — ADHESIVE BANDAGE
30 BANDAGE TOPICAL ONCE
Status: COMPLETED | OUTPATIENT
Start: 2021-09-13 | End: 2021-09-13

## 2021-09-13 RX ORDER — AMOXICILLIN 250 MG
2 CAPSULE ORAL DAILY
Status: DISCONTINUED | OUTPATIENT
Start: 2021-09-13 | End: 2021-09-15 | Stop reason: HOSPADM

## 2021-09-13 RX ORDER — HYDROMORPHONE HYDROCHLORIDE 1 MG/ML
1 INJECTION, SOLUTION INTRAMUSCULAR; INTRAVENOUS; SUBCUTANEOUS
Status: DISCONTINUED | OUTPATIENT
Start: 2021-09-13 | End: 2021-09-14

## 2021-09-13 RX ADMIN — HYDRALAZINE HYDROCHLORIDE 100 MG: 50 TABLET, FILM COATED ORAL at 10:57

## 2021-09-13 RX ADMIN — AMLODIPINE BESYLATE 10 MG: 5 TABLET ORAL at 10:57

## 2021-09-13 RX ADMIN — METHOCARBAMOL TABLETS 500 MG: 500 TABLET, COATED ORAL at 21:20

## 2021-09-13 RX ADMIN — GABAPENTIN 300 MG: 100 CAPSULE ORAL at 21:20

## 2021-09-13 RX ADMIN — MULTIPLE VITAMINS W/ MINERALS TAB 1 TABLET: TAB at 10:57

## 2021-09-13 RX ADMIN — ISOSORBIDE MONONITRATE 15 MG: 30 TABLET, EXTENDED RELEASE ORAL at 10:57

## 2021-09-13 RX ADMIN — PANTOPRAZOLE SODIUM 40 MG: 40 TABLET, DELAYED RELEASE ORAL at 10:57

## 2021-09-13 RX ADMIN — CEFTRIAXONE SODIUM 1 G: 1 INJECTION, POWDER, FOR SOLUTION INTRAMUSCULAR; INTRAVENOUS at 10:57

## 2021-09-13 RX ADMIN — HYDRALAZINE HYDROCHLORIDE 100 MG: 50 TABLET, FILM COATED ORAL at 21:20

## 2021-09-13 RX ADMIN — Medication 10 ML: at 21:20

## 2021-09-13 RX ADMIN — METHOCARBAMOL TABLETS 500 MG: 500 TABLET, COATED ORAL at 17:22

## 2021-09-13 RX ADMIN — HYDRALAZINE HYDROCHLORIDE 100 MG: 50 TABLET, FILM COATED ORAL at 17:22

## 2021-09-13 RX ADMIN — ROPINIROLE HYDROCHLORIDE 0.5 MG: 0.25 TABLET, FILM COATED ORAL at 21:20

## 2021-09-13 RX ADMIN — POLYETHYLENE GLYCOL 3350 17 G: 17 POWDER, FOR SOLUTION ORAL at 12:30

## 2021-09-13 RX ADMIN — SENNOSIDES AND DOCUSATE SODIUM 2 TABLET: 50; 8.6 TABLET ORAL at 12:30

## 2021-09-13 RX ADMIN — ATORVASTATIN CALCIUM 80 MG: 40 TABLET, FILM COATED ORAL at 21:20

## 2021-09-13 RX ADMIN — MORPHINE SULFATE 2 MG: 2 INJECTION, SOLUTION INTRAMUSCULAR; INTRAVENOUS at 01:41

## 2021-09-13 RX ADMIN — Medication 10 ML: at 13:06

## 2021-09-13 RX ADMIN — METOPROLOL SUCCINATE 25 MG: 25 TABLET, EXTENDED RELEASE ORAL at 10:58

## 2021-09-13 RX ADMIN — ASPIRIN 81 MG: 81 TABLET, COATED ORAL at 21:20

## 2021-09-13 RX ADMIN — Medication 2000 UNITS: at 10:57

## 2021-09-13 RX ADMIN — MAGNESIUM HYDROXIDE 30 ML: 400 SUSPENSION ORAL at 12:30

## 2021-09-13 NOTE — CONSULTS
Requesting Provider: Bindu Forbes MD - Reason for Consultation: \"?renal hemorrhage\"  Pre-existing [facility] Patient:   Yes                Patient: Mathew Garcia MRN: [id number]  SSN: [id number]    YOB: 1945  Age: 64 y.o. Sex: male     Location: Formerly named [facility]       Code Status: Full Code   PCP: Juan Antonio Cali MD  - [phone]   Emergency Contact:  Primary Emergency Contact: Sonny Abbott, Home Phone: [phone]   Race/Spiritism/Language: [address] [facility] [place] / [place] Fitzpatrick / [place] [employer]   Payor: Payor: Ryan Flores / Plan: Conrad Hi / Product Type: Medicare /    Prior Admission Data: 9/10/21 [facility]   Hospitalized:  Hospital Day: 3 - Admitted 9/11/2021  7:55 PM     CONSULTANTS  IP CONSULT TO UROLOGY  IP CONSULT TO [address] [place] [address]    ICD-10-CM ICD-9-CM   1. Acute kidney injury superimposed on chronic kidney disease ([facility] [address]  N17.9 866.00    N18.9 585.9   2. Pyelonephritis  N12 590.80   3. Flank pain  R10.9 789.09         Assessment/Plan:       · Left abdominal pain   · Pyelonephritis  · Concern for left renal hemorrhage on CT  · Constipation  · MICHI on CKD    - There is concern for findings of possible left renal hemorrhage in the posterior left kidney measuring approximately 1.5 cm on CT. It is very subtle. It appears present on the last 2 CTs dated on 9/8/21 and 7/22/21, images viewed for comparrison. He denies recent trauma, fall, or blunt injury to the back. No flank pain or hematuria. No emergent intervention indicated at this time. Recommend continued conservative management. Monitor Hgb. Could consider repeat imaging if he shows signs of a progressive bleed clinically. He is scheduled for follow up with Dr. Paola Viera on 10/19/2021 at 4:00 PM for a renal mass MRI, CXR, and labs. Will plan to evaluate further with MRI at that time. - Consider other sources of abdominal pain.  Possibly GI related with constipation for 2 weeks and several colonic diverticula on CT. Possibly musculoskeletal in source after tractor incident in the mud.   - Renal function up to 4.71 (unsure of baseline, 2.56 on 7/22/21). Agree with nephrology consult. Supervising MD, Dr. Alex Hassan      CC: Abdominal Pain and Constipation   HPI: He is a 64 y.o. male. Per chart review, PMH of COPD, DM, HTN, CAD status post PCI x2, s/p CEA, and CKD stage IV who presented to this hospital on 9/11 intractable pain in the left lower abdomen. He is seen in consultation by Urology for questionable renal hemorrhage on CT. He is followed by Dr. Siri Gaona at [facility] for Renal Cell Carcinoma s/p right RAL partial nephrectomy at [facility] on 4/5/2021. To note, he was recently admitted to [facility] from 9/4 - 9/10 for left abdominal pain. He was seen in consultation by Urology during that admission for left pyelonephritis. He was treated with 4 days of IV ceftriaxone, and discharged on ciprofloxacin. The urine culture was negative. He notes ongoing left lower quadrant abdominal pain since he originally presented to [facility] on 9/4. The pain is characterized as dull. It does not radiate. It is intermittent. Severity 10/10 when it occurs. Currently controlled with PRN medication. He admits associated chills. He denies flank pain, voiding difficulties, dysuria, hematuria, or testicular pain. He denies recent fall or trauma to his back. He recalls getting his tractor stuck in the mud prior to his admission at [facility], he is unsure if he pulled or strained a muscle at that time. He also notes that his last BM was 2 weeks ago. He has received a ducolax suppository, miralax, pericolace, & milk of mag w/o relief. He is AF, intermittent tachycardia to 109, hypertensive. WBC wnl. Hgb 7.7. Cr 4.71 (unsure of baseline, 2.43 on 3/15/21), Nephrology consult pending. UA +moderate blood, 5-10 WBC, 0-5 RBC, 1+ bacteria. UCx negative. He has been started on Rocephin. CT A/P WO findings significant for mild left hydro, possible extra renal pelvis, with b/l perinephric stranding, unchanged since last CT 21. There is concern for a new area of subtle hyperdensity in a calyx of the posterior left kidney measuring approximately 1.5 cm. However, it appears present on last 2 CTs on 21 and 21, images viewed for comparrison. He has bilateral renal cysts that are unchanged,  2.1 cm cyst in the medial right kidney, 3.3 cm cyst in the superior left kidney, 4.5 cm cyst in the lower pole left kidney. Several colonic diverticula are present. CT A/P :  IMPRESSION  1. New area of subtle hyperdensity in a left midpole calyx may represent a small  area of hemorrhage. 2. Unchanged mild left hydronephrosis. Unchanged mild bilateral perinephric  stranding. Several renal cysts are unchanged. No surgical changes in the right  kidney. 3. Unchanged trace bilateral pleural effusions. Temp (24hrs), Av.9 °F (36.6 °C), Min:97.4 °F (36.3 °C), Max:98.6 °F (37 °C)    Urinary Status: Voiding  Creatinine   Date/Time Value Ref Range Status   2021 06:52 AM 4.71 (H) 0.70 - 1.30 MG/DL Final   2021 03:39 AM 4.62 (H) 0.70 - 1.30 MG/DL Final   2021 07:30 PM 4.72 (H) 0.70 - 1.30 MG/DL Final   09/10/2021 04:48 AM 4.60 (H) 0.70 - 1.30 MG/DL Final   2021 11:15 AM 4.53 (H) 0.70 - 1.30 MG/DL Final     Current Antimicrobial Therapy (168h ago, onward)     Ordered     Start Stop    21 1026  cefTRIAXone (ROCEPHIN) 1 g in 0.9% sodium chloride (MBP/ADV) 50 mL MBP  1 g,   IntraVENous,   EVERY 24 HOURS      21 1100 21 1059              Key Anti-Platelet Anticoagulant Meds             aspirin delayed-release 81 mg tablet (Taking) Take 81 mg by mouth nightly.         Diet: ADULT DIET Regular -       Labs     Lab Results   Component Value Date/Time    Lactic acid 1.4 2010 12:05 AM    WBC 10.0 2021 03:39 AM    HCT 23.4 (L) 2021 03:39 AM PLATELET 933 97/58/9872 03:39 AM    Sodium 140 09/13/2021 06:52 AM    Potassium 4.0 09/13/2021 06:52 AM    Chloride 112 (H) 09/13/2021 06:52 AM    CO2 20 (L) 09/13/2021 06:52 AM    BUN 41 (H) 09/13/2021 06:52 AM    Creatinine 4.71 (H) 09/13/2021 06:52 AM    Glucose 101 (H) 09/13/2021 06:52 AM    Calcium 9.1 09/13/2021 06:52 AM    Magnesium 1.7 09/08/2021 03:51 AM    INR 1.1 01/08/2020 09:40 PM    Prostate Specific Ag 1.8 02/28/2018 03:43 PM     UA:   Lab Results   Component Value Date/Time    Color YELLOW/STRAW 09/11/2021 07:30 PM    Appearance CLOUDY (A) 09/11/2021 07:30 PM    Specific gravity 1.015 09/11/2021 07:30 PM    Specific gravity 1.020 09/09/2021 11:42 AM    pH (UA) 5.5 09/11/2021 07:30 PM    Protein 100 (A) 09/11/2021 07:30 PM    Glucose Negative 09/11/2021 07:30 PM    Ketone Negative 09/11/2021 07:30 PM    Bilirubin Negative 09/11/2021 07:30 PM    Urobilinogen 0.2 09/11/2021 07:30 PM    Nitrites Negative 09/11/2021 07:30 PM    Leukocyte Esterase Negative 09/11/2021 07:30 PM    Epithelial cells FEW 09/11/2021 07:30 PM    Bacteria 1+ (A) 09/11/2021 07:30 PM    WBC 5-10 09/11/2021 07:30 PM    RBC 0-5 09/11/2021 07:30 PM     Imaging     Results for orders placed during the hospital encounter of 09/11/21    CT ABD PELV WO CONT    Narrative  EXAM: CT ABD PELV WO CONT    INDICATION: Worsening renal failure in the context of pyonephritis, evaluate for  obstruction, renal abscess    COMPARISON: 9/8/2021    CONTRAST:  None. TECHNIQUE:  Thin axial images were obtained through the abdomen and pelvis. Coronal and  sagittal reformats were generated. Oral contrast was not administered. CT dose  reduction was achieved through use of a standardized protocol tailored for this  examination and automatic exposure control for dose modulation. The absence of intravenous contrast material reduces the sensitivity for  evaluation of the vasculature and solid organs.     FINDINGS:  LOWER THORAX: Trace bilateral pleural effusions. LIVER: No mass. BILIARY TREE: Gallbladder is within normal limits. CBD is not dilated. SPLEEN: within normal limits. PANCREAS: No focal abnormality. ADRENALS: Unremarkable. KIDNEYS/URETERS: The patient is status post prior partial right nephrectomy. There is a 2.1 cm cyst in the medial right kidney. There is a 3.3 cm cyst in the  superior left kidney. There is a 4.5 cm cyst in the lower pole left kidney. No  nephrolithiasis. There is unchanged mild left hydronephrosis. There is unchanged  mild bilateral perinephric stranding. Atherosclerotic calcification is seen in  the left renal hilum. There is a new area of subtle hyperdensity in a calyx of  the posterior left kidney measuring approximately 1.5 cm on image 35. STOMACH: Unremarkable. SMALL BOWEL: No dilatation or wall thickening. COLON: No dilatation or wall thickening. Several colonic diverticula are  present. APPENDIX: None  PERITONEUM: No ascites or pneumoperitoneum. RETROPERITONEUM: No lymphadenopathy or aortic aneurysm. REPRODUCTIVE ORGANS: Unremarkable  URINARY BLADDER: No mass or calculus. BONES: No destructive bone lesion. ABDOMINAL WALL: No mass or hernia. ADDITIONAL COMMENTS: N/A    Impression  1. New area of subtle hyperdensity in a left midpole calyx may represent a small  area of hemorrhage. 2. Unchanged mild left hydronephrosis. Unchanged mild bilateral perinephric  stranding. Several renal cysts are unchanged. No surgical changes in the right  kidney. 3. Unchanged trace bilateral pleural effusions. US Results (most recent):  Results from [place] encounter on 09/04/21    US RETROPERITONEUM COMP    Narrative  EXAM: US RETROPERITONEUM COMP    INDICATION: MICHI/ pain    COMPARISON: CT abdomen and pelvis 7/22/2021    TECHNIQUE: Ultrasound of the kidneys and urinary bladder. FINDINGS: Study limited by patient's increased body habitus. Right kidney  measures 9.8 cm in length.  2.4 cm anechoic right upper pole cyst. Partial right  nephrectomy changes seen on prior CT not evident sonographically. Left kidney  measures 11.1 cm in length. 4.9 cm left upper lower pole anechoic cyst. No  hydronephrosis. There is no definite nephrolithiasis or renal mass. Bilateral  renal cortical atrophy. The bladder is underdistended. Aorta not well evaluated. IVC within normal  limits. Impression  1. Study limited by patient's increased body habitus. 2.  No hydronephrosis. 3.  Bilateral renal cortical atrophy and cysts. 4.  Partial right nephrectomy changes seen on prior CT not evident  sonographically. Cultures     All Micro Results     Procedure Component Value Units Date/Time    CULTURE, URINE [[id number]] Collected: 09/11/21 1930    Order Status: Completed Specimen: Urine from Clean catch Updated: 09/12/21 [address]     Special Requests: NO SPECIAL REQUESTS        Culture result: No growth (<1,000 CFU/ML)       CULTURE, URINE [[id number]]     Order Status: Canceled Specimen: Urine from Clean catch     URINE CULTURE HOLD SAMPLE [[id number]] Collected: 09/11/21 1930    Order Status: Completed Specimen: Urine from Serum Updated: 09/11/21 1937     Urine culture hold       Urine on hold in Microbiology dept for 2 days. If unpreserved urine is submitted, it cannot be used for addtional testing after 24 hours, recollection will be required.                  Past History: (Complete 2+/3 areas)     Allergies   Allergen Reactions    Bactrim [Sulfamethoxazole-Trimethoprim] Hives    Codeine Itching    Lisinopril (Bulk) Cough    Neurontin [Gabapentin] Other (comments)     drowsy    Zostavax [Zoster Vaccine Live (Pf)] Rash     See 9/10/13 visit    Penicillins Hives     Pt states he has taken Keflex before without problems      Current Facility-Administered Medications   Medication Dose Route Frequency    senna-docusate (PERICOLACE) 8.6-50 mg per tablet 2 Tablet  2 Tablet Oral DAILY    polyethylene glycol (MIRALAX) packet 17 g  17 g Oral DAILY    HYDROmorphone (DILAUDID) injection 1 mg  1 mg IntraVENous Q4H PRN    methocarbamoL (ROBAXIN) tablet 500 mg  500 mg Oral QID    sodium chloride (NS) flush 5-40 mL  5-40 mL IntraVENous Q8H    sodium chloride (NS) flush 5-40 mL  5-40 mL IntraVENous PRN    acetaminophen (TYLENOL) tablet 650 mg  650 mg Oral Q6H PRN    Or    acetaminophen (TYLENOL) suppository 650 mg  650 mg Rectal Q6H PRN    polyethylene glycol (MIRALAX) packet 17 g  17 g Oral DAILY PRN    ondansetron (ZOFRAN ODT) tablet 4 mg  4 mg Oral Q8H PRN    Or    ondansetron (ZOFRAN) injection 4 mg  4 mg IntraVENous Q6H PRN    atorvastatin (LIPITOR) tablet 80 mg  80 mg Oral QHS    hydrOXYzine HCL (ATARAX) tablet 10-20 mg  10-20 mg Oral TID PRN    isosorbide mononitrate ER (IMDUR) tablet 15 mg  15 mg Oral DAILY    gabapentin (NEURONTIN) capsule 300 mg  300 mg Oral QHS    aspirin delayed-release tablet 81 mg  81 mg Oral QHS    amLODIPine (NORVASC) tablet 10 mg  10 mg Oral DAILY    multivitamin, tx-iron-ca-min (THERA-M w/ IRON) tablet 1 Tablet  1 Tablet Oral DAILY    rOPINIRole (REQUIP) tablet 0.5 mg  0.5 mg Oral QHS    pantoprazole (PROTONIX) tablet 40 mg  40 mg Oral DAILY    cholecalciferol (VITAMIN D3) (1000 Units /25 mcg) tablet 2,000 Units  2,000 Units Oral DAILY    hydrALAZINE (APRESOLINE) tablet 100 mg  100 mg Oral TID    oxyCODONE IR (ROXICODONE) tablet 5 mg  5 mg Oral Q4H PRN    metoprolol succinate (TOPROL-XL) XL tablet 25 mg  25 mg Oral DAILY    0.9% sodium chloride infusion  100 mL/hr IntraVENous CONTINUOUS    cefTRIAXone (ROCEPHIN) 1 g in 0.9% sodium chloride (MBP/ADV) 50 mL MBP  1 g IntraVENous Q24H    bisacodyL (DULCOLAX) suppository 10 mg  10 mg Rectal DAILY PRN    Prior to Admission medications    Medication Sig Start Date End Date Taking? Authorizing Provider   amLODIPine (NORVASC) 10 mg tablet Take 1 Tablet by mouth daily.  9/11/21  Yes Mauro Lema Brochure, NP   ciprofloxacin HCl (Cipro) 500 mg tablet Take 1 Tablet by mouth daily for 10 days. 9/10/21 9/20/21 Yes Mauro Lema NP   gabapentin (NEURONTIN) 300 mg capsule TAKE 1 CAPSULE BY MOUTH EVERY DAY AT BEDTIME FOR 30 DAYS 8/11/21  Yes Provider, Historical   hydrOXYzine HCL (ATARAX) 10 mg tablet Take 1-2 Tablets by mouth three (3) times daily as needed for Itching or Anxiety. 8/23/21  Yes Mary Ann Pemberton MD   pantoprazole (PROTONIX) 40 mg tablet TAKE 1 TABLET BY MOUTH EVERY DAY 6/27/21  Yes Mary Ann Pemberton MD   metoprolol succinate (TOPROL-XL) 25 mg XL tablet TAKE 1 TABLET BY MOUTH EVERY DAY 5/4/21  Yes Nichole JOYCE NP   rOPINIRole (Requip) 0.5 mg tablet Take 0.5 mg by mouth nightly. Yes Provider, Historical   atorvastatin (Lipitor) 80 mg tablet Take 80 mg by mouth nightly. Yes Provider, Historical   isosorbide mononitrate ER (IMDUR) 30 mg tablet Take 0.5 Tabs by mouth daily. 10/21/20  Yes Nichole JOYCE NP   VITAMIN D3 2,000 unit cap capsule Take 2,000 Units by mouth daily. 3/3/15  Yes Provider, Historical   GLUCOSAMINE HCL/CHONDR PETERSEN A NA (GLUCOSAMINE-CHONDROITIN) 750-600 mg Tab Take 1 Tab by mouth daily. Brand: Move Free   Yes Provider, Historical   aspirin delayed-release 81 mg tablet Take 81 mg by mouth nightly. Yes Provider, Historical   allopurinol (ZYLOPRIM) 100 mg tablet Take 100 mg by mouth every morning. 6/5/12  Yes Provider, Historical   MULTIVITS W-FE,OTHER MIN (CENTRUM PO) Take 1 Tab by mouth daily. Yes Provider, Historical   methocarbamoL (ROBAXIN) 750 mg tablet Take 1 Tablet by mouth three (3) times daily as needed for Muscle Spasm(s). Patient not taking: Reported on 9/12/2021 7/22/21   Aj Padilla MD   fluocinoNIDE (LIDEX) 0.05 % external solution APPLY TO ITCHY SPOTS ON SCALP AS NEEDED FOR FLARE 3/7/21   Provider, Historical   albuterol (ProAir HFA) 90 mcg/actuation inhaler Take 1 Puff by inhalation every four (4) hours.     Provider, Historical   umeclidinium-vilanteroL (Anoro Ellipta) 62.5-25 mcg/actuation inhaler Take 1 Puff by inhalation daily. Provider, Historical   ketoconazole (NIZORAL) 2 % shampoo Apply 1 mL to affected area daily as needed for Itching. Provider, Historical   nitroglycerin (NITROSTAT) 0.4 mg SL tablet TAKE 1 TABLET BY SUBLINGUAL ROUTE EVERY 5 MINUTES AS NEEDED FOR CHEST PAIN. 3/4/20   Conchita Hanley MD        PMHx:  has a past medical history of Abnormal stress echo (5/12/2015), Anemia NEC (03/2013), Anxiety, Borderline diabetes, CAD (coronary artery disease) (2009), Calculus of kidney, CKD (chronic kidney disease) stage 4, GFR 15-29 ml/min ([address] COPD, mild ([address] DJD (degenerative joint disease), Environmental allergies, Fatty liver, GERD (gastroesophageal reflux disease) (10/11/2009), Gout, Hypertension, MELODY on CPAP (10/11/2009), Peripheral neuropathy (10/11/2009), Renal cell cancer, right ([address] (01/2021), RLS (restless legs syndrome) (10/11/2009), and S/P coronary artery stent placement (05/12/2015). PSurgHx:  has a past surgical history that includes pr colonoscopy flx dx w/collj spec when pfrmd (8/5/2010); pr colsc flx w/rmvl of tumor polyp lesion snare tq (9/24/2014); hx hernia repair; hx bunionectomy (2019); hx urological; hx orthopaedic; hx knee replacement (Left, 07/23/2011); hx carotid endarterectomy (Left, 01/2020); hx heart catheterization (2009, 7/17); and hx coronary stent placement. PSocHx:  reports that he quit smoking about 53 years ago. His smoking use included cigarettes. He has a 10.00 pack-year smoking history. He has never used smokeless tobacco. He reports that he does not drink alcohol and does not use drugs.    ROS:  (Complete - 10 systems) - DENIES: Weightloss (Constitutional), Dry mouth (ENMT), Chest pain (CV), SOB (Respiratory), Constipation (GI), Weakness (MS), Pallor (Skin), TIA Sx (Neuro), Confusion (Psych), Easy bruising (Heme)    Physical Exam: (Comprehesive - 8+ 1995 Systems)     (1) Constitutional:  NAD; pleasant  FIO2:   on SpO2: O2 Sat (%): 97 %  O2 Device: Nasal cannula O2 Flow Rate (L/min): 2 l/min  Patient Vitals for the past 24 hrs:   BP Temp Pulse Resp SpO2   09/13/21 1346 (!) 156/78 97.9 °F (36.6 °C) 82 18 97 %   09/13/21 1200 -- -- 92 -- --   09/13/21 1000 -- -- (!) 103 -- --   09/13/21 0916 (!) 167/78 97.4 °F (36.3 °C) (!) 109 20 97 %   09/13/21 0400 -- -- 97 -- --   09/13/21 0232 (!) 182/76 98.6 °F (37 °C) 94 20 94 %   09/13/21 0200 -- -- 90 -- --   09/12/21 1924 (!) 163/85 97.6 °F (36.4 °C) 74 19 95 %   09/12/21 1821 (!) 133/97 -- 92 -- --   09/12/21 1820 -- -- 67 -- --   09/12/21 1502 (!) 150/68 97.9 °F (36.6 °C) 89 20 96 %       Date 09/12/21 0700 - 09/13/21 0659 09/13/21 0700 - 09/14/21 0659   Shift 3638-5213 3629-8602 24 Hour Total 2306-2695 1087-0906 24 Hour Total   INTAKE   P.O.    240  240     P. O.    240  240   I. V.(mL/kg/hr)    0  0     I.V.    0  0   Shift Total(mL/kg)    240(2.1)  240(2.1)   OUTPUT   Urine(mL/kg/hr)  925(0.7) 925(0.3) 300  300     Urine Voided  925 925 300  300     Urine Occurrence(s)  1 x 1 x      Shift Total(mL/kg)  925(8.2) 925(8.2) 300(2.6)  300(2.6)   NET  -925 -925 -60  -60   Weight (kg) 113.4 113.4 113.4 113.4 113.4 113.4      (2) ENMT:   moist mucous membranes, normal sinuses   (3) Respiratory:  breathing easily, no distress   (4) GI:  no abdominal masses, no tenderness   (5) :   Voiding independently, yellow UA, no CVA tenderness   (6) Lymphatic:  no adenopathy, neck supple   (7) Muscloskeletal:  no gross deformity, normal ROM   (8) Skin:  no rash, warm & dry   (9) Neuro:  Alert, no focal deficits, normal speech      Signed By: Demetrio Tompkins NP  - September 13, 2021

## 2021-09-13 NOTE — PROGRESS NOTES
[facility]   [address] [facility], [address] [place] [address] [address], [facility]  Phone: [phone]   [phone]       Nephrology Progress Note  Zoltan Barrera     1945     [id number]  Date of Admission : 9/11/2021 09/13/21    CC: Follow up for  MICHI     Assessment and Plan   MICHI on CKD   - resolving ATN from IVVD, ARB use and Pyelo   - Cr stable   - ok to stop IVF and watch gven his pleural effusions  - No emergent need for HD    CKD 4  - 2/2 HTN  - prior R partial Nephrectomy   - baseline Cr 2.6-2.9 mg/dl     Abdominal Pain :?  diverticulitis       Recent B/L Pyelo   Chronic Left Hydro   Left Mid Pole renal hemorrhage   hx of Kidney stones  - urology following     Recent UTI   - abx per primary team     Chronic anemia   - ? 2/2 CKD     Interval History:  Seen and examined. D/c from [facility] and re-admitted with abdominal pain   2 weeks of constipation reported   CT A/P and urology recs noted   Cr stable since d/c from [facility]    Review of Systems: A comprehensive review of systems was negative except for that written in the HPI.     Current Medications:   Current Facility-Administered Medications   Medication Dose Route Frequency    senna-docusate (PERICOLACE) 8.6-50 mg per tablet 2 Tablet  2 Tablet Oral DAILY    polyethylene glycol (MIRALAX) packet 17 g  17 g Oral DAILY    HYDROmorphone (DILAUDID) injection 1 mg  1 mg IntraVENous Q4H PRN    methocarbamoL (ROBAXIN) tablet 500 mg  500 mg Oral QID    sodium chloride (NS) flush 5-40 mL  5-40 mL IntraVENous Q8H    sodium chloride (NS) flush 5-40 mL  5-40 mL IntraVENous PRN    acetaminophen (TYLENOL) tablet 650 mg  650 mg Oral Q6H PRN    Or    acetaminophen (TYLENOL) suppository 650 mg  650 mg Rectal Q6H PRN    polyethylene glycol (MIRALAX) packet 17 g  17 g Oral DAILY PRN    ondansetron (ZOFRAN ODT) tablet 4 mg  4 mg Oral Q8H PRN    Or    ondansetron (ZOFRAN) injection 4 mg  4 mg IntraVENous Q6H PRN    atorvastatin (LIPITOR) tablet 80 mg  80 mg Oral QHS    hydrOXYzine HCL (ATARAX) tablet 10-20 mg  10-20 mg Oral TID PRN    isosorbide mononitrate ER (IMDUR) tablet 15 mg  15 mg Oral DAILY    gabapentin (NEURONTIN) capsule 300 mg  300 mg Oral QHS    aspirin delayed-release tablet 81 mg  81 mg Oral QHS    amLODIPine (NORVASC) tablet 10 mg  10 mg Oral DAILY    multivitamin, tx-iron-ca-min (THERA-M w/ IRON) tablet 1 Tablet  1 Tablet Oral DAILY    rOPINIRole (REQUIP) tablet 0.5 mg  0.5 mg Oral QHS    pantoprazole (PROTONIX) tablet 40 mg  40 mg Oral DAILY    cholecalciferol (VITAMIN D3) (1000 Units /25 mcg) tablet 2,000 Units  2,000 Units Oral DAILY    hydrALAZINE (APRESOLINE) tablet 100 mg  100 mg Oral TID    oxyCODONE IR (ROXICODONE) tablet 5 mg  5 mg Oral Q4H PRN    metoprolol succinate (TOPROL-XL) XL tablet 25 mg  25 mg Oral DAILY    0.9% sodium chloride infusion  100 mL/hr IntraVENous CONTINUOUS    cefTRIAXone (ROCEPHIN) 1 g in 0.9% sodium chloride (MBP/ADV) 50 mL MBP  1 g IntraVENous Q24H    bisacodyL (DULCOLAX) suppository 10 mg  10 mg Rectal DAILY PRN      Allergies   Allergen Reactions    Bactrim [Sulfamethoxazole-Trimethoprim] Hives    Codeine Itching    Lisinopril (Bulk) Cough    Neurontin [Gabapentin] Other (comments)     drowsy    Zostavax [Zoster Vaccine Live (Pf)] Rash     See 9/10/13 visit    Penicillins Hives     Pt states he has taken Keflex before without problems       Objective:  Vitals:    Vitals:    09/13/21 1000 09/13/21 1200 09/13/21 1346 09/13/21 1400   BP:   (!) 156/78    Pulse: (!) 103 92 82 85   Resp:   18    Temp:   97.9 °F (36.6 °C)    SpO2:   97%    Weight:       Height:         Intake and Output:  09/13 0701 - 09/13 1900  In: 240 [P.O.:240]  Out: 300 [Urine:300]  09/11 1901 - 09/13 0700  In: -   Out: 925 [Urine:925]    Physical Examination:    General: Obese   Neck:  Supple, no mass  Resp:  Lungs CTA B/L, no wheezing , normal respiratory effort  CV:  RRR,  no murmur or rub,no  LE edema  GI:  Distended, tender in LLQ  Neurologic:  Non focal  Psych:             AAO x 3 appropriate affect   Skin:  No Rash  :  No Quiroz     []    High complexity decision making was performed  []    Patient is at high-risk of decompensation with multiple organ involvement    Lab Data Personally Reviewed: I have reviewed all the pertinent labs, microbiology data and radiology studies during assessment.     Recent Labs     09/13/21 0652 09/12/21 0339 09/11/21 1930    143 141   K 4.0 3.8 4.0   * 117* 113*   CO2 20* 22 21   * 130* 112*   BUN 41* 39* 42*   CREA 4.71* 4.62* 4.72*   CA 9.1 8.0* 8.9   ALB 2.6* 2.3* 2.7*   ALT 80* 80* 108*     Recent Labs     09/12/21 0339 09/11/21 1930   WBC 10.0 11.4*   HGB 7.7* 8.9*   HCT 23.4* 26.9*    251     Lab Results   Component Value Date/Time    Specimen Description: PBC 12/13/2012 08:15 PM    Specimen Description: NARES 07/09/2012 03:20 PM    Specimen Description: VOIDED URINE 03/31/2010 02:00 AM    Specimen Description: BLOOD 03/31/2010 12:10 AM    Specimen Description: BLOOD 03/16/2010 12:20 PM     Lab Results   Component Value Date/Time    Culture result: No growth (<1,000 CFU/ML) 09/11/2021 07:30 PM    Culture result: NO GROWTH 5 DAYS 09/08/2021 05:03 PM    Culture result: No growth (<1,000 CFU/ML) 09/05/2021 10:35 PM    Culture result: (A) 09/04/2021 11:40 PM     STAPHYLOCOCCUS SPECIES, COAGULASE NEGATIVE GROWING IN 1 OF 2 BOTTLES DRAWN (SITE=LEFT AC)    Culture result: REMAINING BOTTLE(S) HAS/HAVE NO GROWTH IN 5 DAYS 09/04/2021 11:40 PM     Recent Results (from the past 24 hour(s))   METABOLIC PANEL, COMPREHENSIVE    Collection Time: 09/13/21  6:52 AM   Result Value Ref Range    Sodium 140 136 - 145 mmol/L    Potassium 4.0 3.5 - 5.1 mmol/L    Chloride 112 (H) 97 - 108 mmol/L    CO2 20 (L) 21 - 32 mmol/L    Anion gap 8 5 - 15 mmol/L    Glucose 101 (H) 65 - 100 mg/dL    BUN 41 (H) 6 - 20 MG/DL    Creatinine 4.71 (H) 0.70 - 1.30 MG/DL    BUN/Creatinine ratio 9 (L) 12 - 20 GFR est AA 15 (L) >60 ml/min/1.73m2    GFR est non-AA 12 (L) >60 ml/min/1.73m2    Calcium 9.1 8.5 - 10.1 MG/DL    Bilirubin, total 0.6 0.2 - 1.0 MG/DL    ALT (SGPT) 80 (H) 12 - 78 U/L    AST (SGOT) 59 (H) 15 - 37 U/L    Alk. phosphatase 118 (H) 45 - 117 U/L    Protein, total 7.2 6.4 - 8.2 g/dL    Albumin 2.6 (L) 3.5 - 5.0 g/dL    Globulin 4.6 (H) 2.0 - 4.0 g/dL    A-G Ratio 0.6 (L) 1.1 - 2.2             Total time spent with patient:  xxx   min. Care Plan discussed with:  Patient     Family      RN      Consulting Physician [id number],         I have reviewed the flowsheets. Chart and Pertinent Notes have been reviewed. No change in PMH ,family and social history from Consult note.       Waldemar Gómez MD

## 2021-09-13 NOTE — CONSULTS
Consult noted   Was not called to us yet.  Will see the pt around noon     [facility]  Office :[phone]  Fax: [phone]'

## 2021-09-13 NOTE — PROGRESS NOTES
Hospitalist Progress Note      Hospital summary: Paul Johnson is a 64 y.o. male who was at [facility] last week, transferred from [facility] outpatient urgent care center. Discharge summary from [facility] on September 10, 2021 indicates that patient was treated with 4 days of IV ceftriaxone there, and discharged on ciprofloxacin. Patient is known to have chronic kidney disease stage IV.       Patient now presents with intractable pain in the right groin. He states that the pain has continued after his discharge from [facility] on September 10, 2021. He denies any dysuria. Patient is s/p right partial nephrectomy on 4/5/2021 at [facility] for kidney cancer. He did not have any chemotherapy or radiation.  9/11/2021      Assessment/Plan:  Acute Pyelonephritis with Mild left hydronephrosis   - CT abd: New area of subtle hyperdensity in a left midpole calyx may represent a small area of hemorrhage. Unchanged mild left hydronephrosis. Unchanged mild bilateral perinephric  stranding. Several renal cysts are unchanged. No surgical changes in the right kidney. - urine cx 9/11: negative   - c/w IV ceftriaxone   - urology consult pending     Left groin pain  -  unclear etiology yet. Pyelonephritis vs constipation vs other causes   -  Pain meds - adjusted   - intensified bowel regimen    MICHI on Chronic kidney disease stage IV. - cr 4.71  - on IVF   - nephrology consult placed  -  will monitor renal function    Hypertension. c/w Toprol-XL, Norvasc, Imdur, Hydralazine   Obstructive sleep apnea on CPAP  CAD S/P drug-eluting stents - Continue aspirin, metoprolol, atorvastatin, Imdur    Code status: Full  DVT prophylaxis: SCDs  Disposition: TBD. PT/OT eval  ----------------------------------------------    CC: Right groin pain     S: Patient is seen and examined at bedside this AM. He still has right groin pain - pain meds helping.  Constipated - intensified bowel regimen. Spoke with daughter Alida Lackey on phone and updated patient's status- explained about CT abd findings, pain - unclear etiology yet, will add muscle relaxant, waiting for urology recs     Review of Systems:  A comprehensive review of systems was negative. O:  Visit Vitals  BP (!) 167/78 (BP 1 Location: Right arm, BP Patient Position: At rest)   Pulse 92   Temp 97.4 °F (36.3 °C)   Resp 20   Ht 6' (1.829 m)   Wt 113.4 kg (250 lb)   SpO2 97%   BMI 33.91 kg/m²       PHYSICAL EXAM:  Gen: mild - moderate distress, elderly   HEENT: anicteric sclerae, normal conjunctiva, oropharynx clear, MM moist  Neck: supple, trachea midline, no adenopathy  Heart: RRR, no MRG, no JVD, no peripheral edema  Lungs: CTA b/l, non-labored respirations  Abd: soft, LLQ tenderness, ND, BS+, no organomegaly  Extr: warm  Skin: dry, no rash  Neuro: CN II-XII grossly intact, normal speech, moves all extremities  Psych: normal mood, appropriate affect      Intake/Output Summary (Last 24 hours) at 9/13/2021 1349  Last data filed at 9/13/2021 1055  Gross per 24 hour   Intake 240 ml   Output 1225 ml   Net -985 ml        Recent labs & imaging reviewed:  Recent Results (from the past 24 hour(s))   METABOLIC PANEL, COMPREHENSIVE    Collection Time: 09/13/21  6:52 AM   Result Value Ref Range    Sodium 140 136 - 145 mmol/L    Potassium 4.0 3.5 - 5.1 mmol/L    Chloride 112 (H) 97 - 108 mmol/L    CO2 20 (L) 21 - 32 mmol/L    Anion gap 8 5 - 15 mmol/L    Glucose 101 (H) 65 - 100 mg/dL    BUN 41 (H) 6 - 20 MG/DL    Creatinine 4.71 (H) 0.70 - 1.30 MG/DL    BUN/Creatinine ratio 9 (L) 12 - 20      GFR est AA 15 (L) >60 ml/min/1.73m2    GFR est non-AA 12 (L) >60 ml/min/1.73m2    Calcium 9.1 8.5 - 10.1 MG/DL    Bilirubin, total 0.6 0.2 - 1.0 MG/DL    ALT (SGPT) 80 (H) 12 - 78 U/L    AST (SGOT) 59 (H) 15 - 37 U/L    Alk.  phosphatase 118 (H) 45 - 117 U/L    Protein, total 7.2 6.4 - 8.2 g/dL    Albumin 2.6 (L) 3.5 - 5.0 g/dL    Globulin 4.6 (H) 2.0 - 4.0 g/dL    A-G Ratio 0.6 (L) 1.1 - 2.2       Recent Labs     09/12/21 0339 09/11/21 1930   WBC 10.0 11.4*   HGB 7.7* 8.9*   HCT 23.4* 26.9*    251     Recent Labs     09/13/21  0652 09/12/21 0339 09/11/21 1930    143 141   K 4.0 3.8 4.0   * 117* 113*   CO2 20* 22 21   BUN 41* 39* 42*   CREA 4.71* 4.62* 4.72*   * 130* 112*   CA 9.1 8.0* 8.9     Recent Labs     09/13/21 0652 09/12/21 0339 09/11/21 1930   ALT 80* 80* 108*   * 108 130*   TBILI 0.6 0.7 0.8   TP 7.2 6.3* 7.5   ALB 2.6* 2.3* 2.7*   GLOB 4.6* 4.0 4.8*   LPSE  --   --  247     No results for input(s): INR, PTP, APTT, INREXT in the last 72 hours. No results for input(s): FE, TIBC, PSAT, FERR in the last 72 hours. Lab Results   Component Value Date/Time    Folate 18.0 04/08/2021 05:14 AM      No results for input(s): PH, PCO2, PO2 in the last 72 hours. No results for input(s): CPK, CKNDX, TROIQ in the last 72 hours.     No lab exists for component: CPKMB  Lab Results   Component Value Date/Time    Cholesterol, total 119 07/26/2021 11:20 AM    HDL Cholesterol 37 (L) 07/26/2021 11:20 AM    LDL, calculated 55 07/26/2021 11:20 AM    LDL, calculated 49 03/03/2020 12:07 PM    Triglyceride 161 (H) 07/26/2021 11:20 AM    CHOL/HDL Ratio 2.9 01/09/2020 02:45 AM     Lab Results   Component Value Date/Time    Glucose (POC) 107 09/10/2021 11:35 AM    Glucose (POC) 87 09/10/2021 08:31 AM    Glucose (POC) 102 09/09/2021 10:20 PM    Glucose (POC) 117 09/09/2021 04:44 PM    Glucose (POC) 100 09/09/2021 12:00 PM     Lab Results   Component Value Date/Time    Color YELLOW/STRAW 09/11/2021 07:30 PM    Appearance CLOUDY (A) 09/11/2021 07:30 PM    Specific gravity 1.015 09/11/2021 07:30 PM    Specific gravity 1.020 09/09/2021 11:42 AM    pH (UA) 5.5 09/11/2021 07:30 PM    Protein 100 (A) 09/11/2021 07:30 PM    Glucose Negative 09/11/2021 07:30 PM    Ketone Negative 09/11/2021 07:30 PM    Bilirubin Negative 09/11/2021 07:30 PM Urobilinogen 0.2 09/11/2021 07:30 PM    Nitrites Negative 09/11/2021 07:30 PM    Leukocyte Esterase Negative 09/11/2021 07:30 PM    Epithelial cells FEW 09/11/2021 07:30 PM    Bacteria 1+ (A) 09/11/2021 07:30 PM    WBC 5-10 09/11/2021 07:30 PM    RBC 0-5 09/11/2021 07:30 PM       Med list reviewed  Current Facility-Administered Medications   Medication Dose Route Frequency    senna-docusate (PERICOLACE) 8.6-50 mg per tablet 2 Tablet  2 Tablet Oral DAILY    polyethylene glycol (MIRALAX) packet 17 g  17 g Oral DAILY    sodium chloride (NS) flush 5-40 mL  5-40 mL IntraVENous Q8H    sodium chloride (NS) flush 5-40 mL  5-40 mL IntraVENous PRN    acetaminophen (TYLENOL) tablet 650 mg  650 mg Oral Q6H PRN    Or    acetaminophen (TYLENOL) suppository 650 mg  650 mg Rectal Q6H PRN    polyethylene glycol (MIRALAX) packet 17 g  17 g Oral DAILY PRN    ondansetron (ZOFRAN ODT) tablet 4 mg  4 mg Oral Q8H PRN    Or    ondansetron (ZOFRAN) injection 4 mg  4 mg IntraVENous Q6H PRN    atorvastatin (LIPITOR) tablet 80 mg  80 mg Oral QHS    hydrOXYzine HCL (ATARAX) tablet 10-20 mg  10-20 mg Oral TID PRN    isosorbide mononitrate ER (IMDUR) tablet 15 mg  15 mg Oral DAILY    gabapentin (NEURONTIN) capsule 300 mg  300 mg Oral QHS    aspirin delayed-release tablet 81 mg  81 mg Oral QHS    amLODIPine (NORVASC) tablet 10 mg  10 mg Oral DAILY    multivitamin, tx-iron-ca-min (THERA-M w/ IRON) tablet 1 Tablet  1 Tablet Oral DAILY    rOPINIRole (REQUIP) tablet 0.5 mg  0.5 mg Oral QHS    pantoprazole (PROTONIX) tablet 40 mg  40 mg Oral DAILY    cholecalciferol (VITAMIN D3) (1000 Units /25 mcg) tablet 2,000 Units  2,000 Units Oral DAILY    hydrALAZINE (APRESOLINE) tablet 100 mg  100 mg Oral TID    morphine injection 2 mg  2 mg IntraVENous Q4H PRN    oxyCODONE IR (ROXICODONE) tablet 5 mg  5 mg Oral Q4H PRN    metoprolol succinate (TOPROL-XL) XL tablet 25 mg  25 mg Oral DAILY    0.9% sodium chloride infusion  100 mL/hr IntraVENous CONTINUOUS    cefTRIAXone (ROCEPHIN) 1 g in 0.9% sodium chloride (MBP/ADV) 50 mL MBP  1 g IntraVENous Q24H    bisacodyL (DULCOLAX) suppository 10 mg  10 mg Rectal DAILY PRN       Care Plan discussed with:  Patient/Family and Nurse    Paul Kunz MD  Internal Medicine  Date of Service: 9/13/2021

## 2021-09-14 LAB
ALBUMIN SERPL-MCNC: 2.4 G/DL (ref 3.5–5)
ALBUMIN/GLOB SERPL: 0.5 {RATIO} (ref 1.1–2.2)
ALP SERPL-CCNC: 125 U/L (ref 45–117)
ALT SERPL-CCNC: 73 U/L (ref 12–78)
ANION GAP SERPL CALC-SCNC: 8 MMOL/L (ref 5–15)
AST SERPL-CCNC: 63 U/L (ref 15–37)
BACTERIA SPEC CULT: NORMAL
BILIRUB SERPL-MCNC: 0.5 MG/DL (ref 0.2–1)
BUN SERPL-MCNC: 41 MG/DL (ref 6–20)
BUN/CREAT SERPL: 9 (ref 12–20)
CALCIUM SERPL-MCNC: 8.8 MG/DL (ref 8.5–10.1)
CHLORIDE SERPL-SCNC: 112 MMOL/L (ref 97–108)
CO2 SERPL-SCNC: 19 MMOL/L (ref 21–32)
CREAT SERPL-MCNC: 4.64 MG/DL (ref 0.7–1.3)
ERYTHROCYTE [DISTWIDTH] IN BLOOD BY AUTOMATED COUNT: 14.3 % (ref 11.5–14.5)
GLOBULIN SER CALC-MCNC: 4.8 G/DL (ref 2–4)
GLUCOSE SERPL-MCNC: 102 MG/DL (ref 65–100)
HCT VFR BLD AUTO: 26.4 % (ref 36.6–50.3)
HGB BLD-MCNC: 8.6 G/DL (ref 12.1–17)
MCH RBC QN AUTO: 31 PG (ref 26–34)
MCHC RBC AUTO-ENTMCNC: 32.6 G/DL (ref 30–36.5)
MCV RBC AUTO: 95.3 FL (ref 80–99)
NRBC # BLD: 0 K/UL (ref 0–0.01)
NRBC BLD-RTO: 0 PER 100 WBC
PLATELET # BLD AUTO: 275 K/UL (ref 150–400)
PMV BLD AUTO: 8.7 FL (ref 8.9–12.9)
POTASSIUM SERPL-SCNC: 4.1 MMOL/L (ref 3.5–5.1)
PROT SERPL-MCNC: 7.2 G/DL (ref 6.4–8.2)
RBC # BLD AUTO: 2.77 M/UL (ref 4.1–5.7)
SERVICE CMNT-IMP: NORMAL
SODIUM SERPL-SCNC: 139 MMOL/L (ref 136–145)
WBC # BLD AUTO: 12.7 K/UL (ref 4.1–11.1)

## 2021-09-14 PROCEDURE — 74011250637 HC RX REV CODE- 250/637: Performed by: INTERNAL MEDICINE

## 2021-09-14 PROCEDURE — 97165 OT EVAL LOW COMPLEX 30 MIN: CPT

## 2021-09-14 PROCEDURE — 65270000029 HC RM PRIVATE

## 2021-09-14 PROCEDURE — 36415 COLL VENOUS BLD VENIPUNCTURE: CPT

## 2021-09-14 PROCEDURE — 97535 SELF CARE MNGMENT TRAINING: CPT

## 2021-09-14 PROCEDURE — 97161 PT EVAL LOW COMPLEX 20 MIN: CPT

## 2021-09-14 PROCEDURE — 97116 GAIT TRAINING THERAPY: CPT

## 2021-09-14 PROCEDURE — 80053 COMPREHEN METABOLIC PANEL: CPT

## 2021-09-14 PROCEDURE — 74011250636 HC RX REV CODE- 250/636: Performed by: INTERNAL MEDICINE

## 2021-09-14 PROCEDURE — 74011000258 HC RX REV CODE- 258: Performed by: INTERNAL MEDICINE

## 2021-09-14 PROCEDURE — 85027 COMPLETE CBC AUTOMATED: CPT

## 2021-09-14 RX ORDER — HYDROMORPHONE HYDROCHLORIDE 1 MG/ML
0.5 INJECTION, SOLUTION INTRAMUSCULAR; INTRAVENOUS; SUBCUTANEOUS
Status: DISCONTINUED | OUTPATIENT
Start: 2021-09-14 | End: 2021-09-15 | Stop reason: HOSPADM

## 2021-09-14 RX ORDER — SODIUM BICARBONATE 650 MG/1
325 TABLET ORAL 2 TIMES DAILY
Status: DISCONTINUED | OUTPATIENT
Start: 2021-09-14 | End: 2021-09-15 | Stop reason: HOSPADM

## 2021-09-14 RX ADMIN — ROPINIROLE HYDROCHLORIDE 0.5 MG: 0.25 TABLET, FILM COATED ORAL at 21:35

## 2021-09-14 RX ADMIN — ASPIRIN 81 MG: 81 TABLET, COATED ORAL at 21:35

## 2021-09-14 RX ADMIN — SODIUM BICARBONATE 325 MG: 650 TABLET ORAL at 09:46

## 2021-09-14 RX ADMIN — HYDRALAZINE HYDROCHLORIDE 100 MG: 50 TABLET, FILM COATED ORAL at 21:35

## 2021-09-14 RX ADMIN — METHOCARBAMOL TABLETS 500 MG: 500 TABLET, COATED ORAL at 09:47

## 2021-09-14 RX ADMIN — METHOCARBAMOL TABLETS 500 MG: 500 TABLET, COATED ORAL at 12:44

## 2021-09-14 RX ADMIN — Medication 10 ML: at 21:37

## 2021-09-14 RX ADMIN — GABAPENTIN 300 MG: 100 CAPSULE ORAL at 21:35

## 2021-09-14 RX ADMIN — OXYCODONE 5 MG: 5 TABLET ORAL at 21:35

## 2021-09-14 RX ADMIN — SENNOSIDES AND DOCUSATE SODIUM 2 TABLET: 50; 8.6 TABLET ORAL at 09:46

## 2021-09-14 RX ADMIN — Medication 2000 UNITS: at 09:46

## 2021-09-14 RX ADMIN — MULTIPLE VITAMINS W/ MINERALS TAB 1 TABLET: TAB at 09:47

## 2021-09-14 RX ADMIN — POLYETHYLENE GLYCOL 3350 17 G: 17 POWDER, FOR SOLUTION ORAL at 09:45

## 2021-09-14 RX ADMIN — ISOSORBIDE MONONITRATE 15 MG: 30 TABLET, EXTENDED RELEASE ORAL at 09:46

## 2021-09-14 RX ADMIN — CEFTRIAXONE SODIUM 1 G: 1 INJECTION, POWDER, FOR SOLUTION INTRAMUSCULAR; INTRAVENOUS at 12:44

## 2021-09-14 RX ADMIN — Medication 10 ML: at 15:42

## 2021-09-14 RX ADMIN — Medication 10 ML: at 06:00

## 2021-09-14 RX ADMIN — AMLODIPINE BESYLATE 10 MG: 5 TABLET ORAL at 09:47

## 2021-09-14 RX ADMIN — SODIUM BICARBONATE 325 MG: 650 TABLET ORAL at 17:29

## 2021-09-14 RX ADMIN — PANTOPRAZOLE SODIUM 40 MG: 40 TABLET, DELAYED RELEASE ORAL at 09:46

## 2021-09-14 RX ADMIN — METHOCARBAMOL TABLETS 500 MG: 500 TABLET, COATED ORAL at 17:30

## 2021-09-14 RX ADMIN — METOPROLOL SUCCINATE 25 MG: 25 TABLET, EXTENDED RELEASE ORAL at 09:46

## 2021-09-14 RX ADMIN — METHOCARBAMOL TABLETS 500 MG: 500 TABLET, COATED ORAL at 21:35

## 2021-09-14 RX ADMIN — ATORVASTATIN CALCIUM 80 MG: 40 TABLET, FILM COATED ORAL at 21:35

## 2021-09-14 RX ADMIN — HYDRALAZINE HYDROCHLORIDE 100 MG: 50 TABLET, FILM COATED ORAL at 09:46

## 2021-09-14 RX ADMIN — HYDRALAZINE HYDROCHLORIDE 100 MG: 50 TABLET, FILM COATED ORAL at 15:41

## 2021-09-14 NOTE — PROGRESS NOTES
Hospitalist Progress Note      Hospital summary: Vidal Rollins is a 64 y.o. male who was at [facility] last week, transferred from [address] [place] [address] urgent care center. Discharge summary from [facility] on September 10, 2021 indicates that patient was treated with 4 days of IV ceftriaxone there, and discharged on ciprofloxacin. Patient is known to have chronic kidney disease stage IV.       Patient now presents with intractable pain in the right groin. He states that the pain has continued after his discharge from [facility] on September 10, 2021. He denies any dysuria. Patient is s/p right partial nephrectomy on 4/5/2021 at [facility] for kidney cancer. He did not have any chemotherapy or radiation.  9/11/2021      Assessment/Plan:  Acute Pyelonephritis  Chronic left hydronephrosis   Left Mid Pole renal hemorrhage - conservative management   Hx Kidney stones  - CT abd: New area of subtle hyperdensity in a left midpole calyx may represent a small area of hemorrhage. Unchanged mild left hydronephrosis. Unchanged mild bilateral perinephric  stranding. Several renal cysts are unchanged. No surgical changes in the right kidney. - urine cx 9/11: negative   - c/w IV ceftriaxone   - appreciate urology input     Left groin pain  -  unclear etiology yet. Pyelonephritis vs constipation vs other causes   -  Pain meds - adjusted   - intensified bowel regimen    MICHI on Chronic kidney disease stage IV. - cr stable   - appreciate nephrology input   -  will monitor renal function    Hypertension. c/w Toprol-XL, Norvasc, Imdur, Hydralazine   Obstructive sleep apnea on CPAP  CAD S/P drug-eluting stents - Continue aspirin, metoprolol, atorvastatin, Imdur    Code status: Full  DVT prophylaxis: SCDs  Disposition: TBD.  PT/OT eval. Possible dc to home tomorrow   ----------------------------------------------    CC: Right groin pain     S: Patient is seen and examined at bedside this AM. He still has right groin pain - improving. Had a bowel movement last night. Explained possible dc tomorrow     Review of Systems:  A comprehensive review of systems was negative. O:  Visit Vitals  BP (!) 178/82 (BP 1 Location: Right arm, BP Patient Position: At rest)   Pulse (!) 102   Temp 99 °F (37.2 °C)   Resp 18   Ht 6' (1.829 m)   Wt 113.4 kg (250 lb)   SpO2 95%   BMI 33.91 kg/m²       PHYSICAL EXAM:  Gen: mild distress, elderly   HEENT: anicteric sclerae, normal conjunctiva, oropharynx clear, MM moist  Neck: supple, trachea midline, no adenopathy  Heart: RRR, no MRG, no JVD, no peripheral edema  Lungs: CTA b/l, non-labored respirations  Abd: soft, LLQ tenderness, ND, BS+, no organomegaly  Extr: warm  Skin: dry, no rash  Neuro: CN II-XII grossly intact, normal speech, moves all extremities  Psych: normal mood, appropriate affect      Intake/Output Summary (Last 24 hours) at 9/14/2021 1305  Last data filed at 9/14/2021 1006  Gross per 24 hour   Intake 320 ml   Output 650 ml   Net -330 ml        Recent labs & imaging reviewed:  Recent Results (from the past 24 hour(s))   METABOLIC PANEL, COMPREHENSIVE    Collection Time: 09/14/21  2:35 AM   Result Value Ref Range    Sodium 139 136 - 145 mmol/L    Potassium 4.1 3.5 - 5.1 mmol/L    Chloride 112 (H) 97 - 108 mmol/L    CO2 19 (L) 21 - 32 mmol/L    Anion gap 8 5 - 15 mmol/L    Glucose 102 (H) 65 - 100 mg/dL    BUN 41 (H) 6 - 20 MG/DL    Creatinine 4.64 (H) 0.70 - 1.30 MG/DL    BUN/Creatinine ratio 9 (L) 12 - 20      GFR est AA 15 (L) >60 ml/min/1.73m2    GFR est non-AA 12 (L) >60 ml/min/1.73m2    Calcium 8.8 8.5 - 10.1 MG/DL    Bilirubin, total 0.5 0.2 - 1.0 MG/DL    ALT (SGPT) 73 12 - 78 U/L    AST (SGOT) 63 (H) 15 - 37 U/L    Alk.  phosphatase 125 (H) 45 - 117 U/L    Protein, total 7.2 6.4 - 8.2 g/dL    Albumin 2.4 (L) 3.5 - 5.0 g/dL    Globulin 4.8 (H) 2.0 - 4.0 g/dL    A-G Ratio 0.5 (L) 1.1 - 2.2     CBC W/O DIFF    Collection Time: 09/14/21  2:35 AM   Result Value Ref Range    WBC 12.7 (H) 4.1 - 11.1 K/uL    RBC 2.77 (L) 4.10 - 5.70 M/uL    HGB 8.6 (L) 12.1 - 17.0 g/dL    HCT 26.4 (L) 36.6 - 50.3 %    MCV 95.3 80.0 - 99.0 FL    MCH 31.0 26.0 - 34.0 PG    MCHC 32.6 30.0 - 36.5 g/dL    RDW 14.3 11.5 - 14.5 %    PLATELET 176 694 - 158 K/uL    MPV 8.7 (L) 8.9 - 12.9 FL    NRBC 0.0 0  WBC    ABSOLUTE NRBC 0.00 0.00 - 0.01 K/uL     Recent Labs     09/14/21 0235 09/12/21 0339   WBC 12.7* 10.0   HGB 8.6* 7.7*   HCT 26.4* 23.4*    223     Recent Labs     09/14/21 0235 09/13/21 0652 09/12/21 0339    140 143   K 4.1 4.0 3.8   * 112* 117*   CO2 19* 20* 22   BUN 41* 41* 39*   CREA 4.64* 4.71* 4.62*   * 101* 130*   CA 8.8 9.1 8.0*     Recent Labs     09/14/21 0235 09/13/21 0652 09/12/21 0339 09/11/21 1930 09/11/21 1930   ALT 73 80* 80*   < > 108*   * 118* 108   < > 130*   TBILI 0.5 0.6 0.7   < > 0.8   TP 7.2 7.2 6.3*   < > 7.5   ALB 2.4* 2.6* 2.3*   < > 2.7*   GLOB 4.8* 4.6* 4.0   < > 4.8*   LPSE  --   --   --   --  247    < > = values in this interval not displayed. No results for input(s): INR, PTP, APTT, INREXT, INREXT in the last 72 hours. No results for input(s): FE, TIBC, PSAT, FERR in the last 72 hours. Lab Results   Component Value Date/Time    Folate 18.0 04/08/2021 05:14 AM      No results for input(s): PH, PCO2, PO2 in the last 72 hours. No results for input(s): CPK, CKNDX, TROIQ in the last 72 hours.     No lab exists for component: CPKMB  Lab Results   Component Value Date/Time    Cholesterol, total 119 07/26/2021 11:20 AM    HDL Cholesterol 37 (L) 07/26/2021 11:20 AM    LDL, calculated 55 07/26/2021 11:20 AM    LDL, calculated 49 03/03/2020 12:07 PM    Triglyceride 161 (H) 07/26/2021 11:20 AM    CHOL/HDL Ratio 2.9 01/09/2020 02:45 AM     Lab Results   Component Value Date/Time    Glucose (POC) 107 09/10/2021 11:35 AM    Glucose (POC) 87 09/10/2021 08:31 AM    Glucose (POC) 102 09/09/2021 10:20 PM    Glucose (POC) 117 09/09/2021 04:44 PM    Glucose (POC) 100 09/09/2021 12:00 PM     Lab Results   Component Value Date/Time    Color YELLOW/STRAW 09/11/2021 07:30 PM    Appearance CLOUDY (A) 09/11/2021 07:30 PM    Specific gravity 1.015 09/11/2021 07:30 PM    Specific gravity 1.020 09/09/2021 11:42 AM    pH (UA) 5.5 09/11/2021 07:30 PM    Protein 100 (A) 09/11/2021 07:30 PM    Glucose Negative 09/11/2021 07:30 PM    Ketone Negative 09/11/2021 07:30 PM    Bilirubin Negative 09/11/2021 07:30 PM    Urobilinogen 0.2 09/11/2021 07:30 PM    Nitrites Negative 09/11/2021 07:30 PM    Leukocyte Esterase Negative 09/11/2021 07:30 PM    Epithelial cells FEW 09/11/2021 07:30 PM    Bacteria 1+ (A) 09/11/2021 07:30 PM    WBC 5-10 09/11/2021 07:30 PM    RBC 0-5 09/11/2021 07:30 PM       Med list reviewed  Current Facility-Administered Medications   Medication Dose Route Frequency    sodium bicarbonate tablet 325 mg  325 mg Oral BID    senna-docusate (PERICOLACE) 8.6-50 mg per tablet 2 Tablet  2 Tablet Oral DAILY    polyethylene glycol (MIRALAX) packet 17 g  17 g Oral DAILY    HYDROmorphone (DILAUDID) injection 1 mg  1 mg IntraVENous Q4H PRN    methocarbamoL (ROBAXIN) tablet 500 mg  500 mg Oral QID    sodium chloride (NS) flush 5-40 mL  5-40 mL IntraVENous Q8H    sodium chloride (NS) flush 5-40 mL  5-40 mL IntraVENous PRN    acetaminophen (TYLENOL) tablet 650 mg  650 mg Oral Q6H PRN    Or    acetaminophen (TYLENOL) suppository 650 mg  650 mg Rectal Q6H PRN    polyethylene glycol (MIRALAX) packet 17 g  17 g Oral DAILY PRN    ondansetron (ZOFRAN ODT) tablet 4 mg  4 mg Oral Q8H PRN    Or    ondansetron (ZOFRAN) injection 4 mg  4 mg IntraVENous Q6H PRN    atorvastatin (LIPITOR) tablet 80 mg  80 mg Oral QHS    hydrOXYzine HCL (ATARAX) tablet 10-20 mg  10-20 mg Oral TID PRN    isosorbide mononitrate ER (IMDUR) tablet 15 mg  15 mg Oral DAILY    gabapentin (NEURONTIN) capsule 300 mg  300 mg Oral QHS    aspirin delayed-release tablet 81 mg  81 mg Oral QHS    amLODIPine (NORVASC) tablet 10 mg  10 mg Oral DAILY    multivitamin, tx-iron-ca-min (THERA-M w/ IRON) tablet 1 Tablet  1 Tablet Oral DAILY    rOPINIRole (REQUIP) tablet 0.5 mg  0.5 mg Oral QHS    pantoprazole (PROTONIX) tablet 40 mg  40 mg Oral DAILY    cholecalciferol (VITAMIN D3) (1000 Units /25 mcg) tablet 2,000 Units  2,000 Units Oral DAILY    hydrALAZINE (APRESOLINE) tablet 100 mg  100 mg Oral TID    oxyCODONE IR (ROXICODONE) tablet 5 mg  5 mg Oral Q4H PRN    metoprolol succinate (TOPROL-XL) XL tablet 25 mg  25 mg Oral DAILY    cefTRIAXone (ROCEPHIN) 1 g in 0.9% sodium chloride (MBP/ADV) 50 mL MBP  1 g IntraVENous Q24H    bisacodyL (DULCOLAX) suppository 10 mg  10 mg Rectal DAILY PRN       Care Plan discussed with:  Patient/Family and Nurse    Sarkis Toledo MD  Internal Medicine  Date of Service: 9/14/2021

## 2021-09-14 NOTE — PROGRESS NOTES
PHYSICAL THERAPY EVALUATION/DISCHARGE  Patient: Ismael Ortiz (16 y.o. male)  Date: 9/14/2021  Primary Diagnosis: Pyelonephritis [N12]  Intractable lower abdominal pain [R10.30]  Abdominal pain [R10.9]       Precautions: fall         ASSESSMENT  Based on the objective data described below, the patient presents with baseline mobility having some decreased UE ROM , balance impairment and SOB with activity. Did manage well on room air. Initial balance assessment was with bedroom shoes which significantly impair him and recommend he NOT wear them, best in hospital socks, barefoot or tennis shoes. Able to walk to stairwell and manage full flight of steps. He indicates going to out patient PT and recommend follow up with it post discharge. Meanwhile recommend staff supervision for mobility. .    Functional Outcome Measure: The patient scored 21/28 on the Tinetti outcome measure which is indicative of moderate fall risk. Other factors to consider for discharge:      Further skilled acute physical therapy is not indicated at this time. PLAN :  Recommendation for discharge: (in order for the patient to meet his/her long term goals)  Outpatient physical therapy follow up recommended for balance, gait    This discharge recommendation:  Has been made in collaboration with the attending provider and/or case management    IF patient discharges home will need the following DME: patient owns DME required for discharge       SUBJECTIVE:   Patient stated I do all right at home.     OBJECTIVE DATA SUMMARY:   HISTORY:    Past Medical History:   Diagnosis Date    Abnormal stress echo 5/12/2015    Anemia NEC 03/2013    Anxiety     Borderline diabetes     CAD (coronary artery disease) 2009    cath [facility]) revealed 20% RCA and 50% 2nd diagonal    Calculus of kidney     CKD (chronic kidney disease) stage 4, GFR 15-29 ml/min ([facility])     COPD, mild ([facility])     Followed by pulmonary associates    DJD (degenerative joint disease)     Environmental allergies     Fatty liver     GERD (gastroesophageal reflux disease) 10/11/2009    Gout     Hypertension     MELODY on CPAP 10/11/2009    nasal pillows; pressure set at 10-11 -     Peripheral neuropathy 10/11/2009    bilateral feet (numbness)    Renal cell cancer, right ([address] 01/2021    RLS (restless legs syndrome) 10/11/2009    S/P coronary artery stent placement 05/12/2015    PCI./MALI to LCx, 8/2019 PCI/MALI Prox LAD (RCA 40-50% lesions)     Past Surgical History:   Procedure Laterality Date    HX BUNIONECTOMY  2019    HX CAROTID ENDARTERECTOMY Left 01/2020    Followed by Dr. Vish Palomo      S/P stent to left circumflex in May, 2015; s/p stent to LAD in August, 2019   [facility]  [facility], 7[facility]17    per heart cath of 7/13/2017, discrete 40 % stenosis. in proximal LAD, discrete 40 % stenosis in proximal RCA, 30% stenosis in distal RCA    HX HERNIA REPAIR      McTamaney/umbilical    HX KNEE REPLACEMENT Left 07/23/2011    HX ORTHOPAEDIC      left shoulder manipulation    HX UROLOGICAL      basket extraction of kidney stones    PA COLONOSCOPY FLX DX W/COLLJ SPEC WHEN PFRMD  8/5/2010         PA COLSC FLX W/RMVL OF TUMOR POLYP LESION SNARE TQ  9/24/2014            Prior level of function: modified I ndependent; lives with daughter  Personal factors and/or comorbidities impacting plan of care:     Home Situation  Home Environment: Private residence  Wheelchair Ramp: Yes  One/Two Story Residence: Two story    EXAMINATION/PRESENTATION/DECISION MAKING:   Critical Behavior:  Neurologic State: Alert, Appropriate for age  Orientation Level: Oriented X4  Cognition: Appropriate decision making, Appropriate for age attention/concentration, Appropriate safety awareness, Follows commands     Hearing:   Auditory  Auditory Impairment: None    Range Of Motion:  AROM: Generally decreased, functional (very limited shoulder ROM) Strength:    Strength: Generally decreased, functional                    Tone & Sensation:   Tone: Normal              Sensation: Impaired               Coordination:  Coordination: Within functional limits  Vision:      Functional Mobility:  Bed Mobility:  Rolling: Modified independent  Supine to Sit: Modified independent        Transfers:  Sit to Stand: Modified independent  Stand to Sit: Modified independent                       Balance:   Sitting: Intact  Standing: Impaired; Without support  Standing - Static: Good  Standing - Dynamic : Fair  Ambulation/Gait Training:  Distance (ft): 200 Feet (ft)  Assistive Device: Gait belt  Ambulation - Level of Assistance: Supervision     Gait Description (WDL): Exceptions to WDL  Gait Abnormalities: Decreased step clearance;Trunk sway increased        Base of Support: Widened     Speed/Anabel: Slow  Step Length: Left shortened;Right shortened       Stairs:  Number of Stairs Trained: 12  Stairs - Level of Assistance: Supervision   Rail Use: Right   Functional Measure:  Tinetti test:    Sitting Balance: 1  Arises: 2  Attempts to Rise: 2  Immediate Standing Balance: 1  Standing Balance: 1  Nudged: 1  Eyes Closed: 0  Turn 360 Degrees - Continuous/Discontinuous: 1  Turn 360 Degrees - Steady/Unsteady: 1  Sitting Down: 1  Balance Score: 11 Balance total score  Indication of Gait: 1  R Step Length/Height: 1  L Step Length/Height: 1  R Foot Clearance: 1  L Foot Clearance: 1  Step Symmetry: 1  Step Continuity: 1  Path: 1  Trunk: 1  Walking Time: 1  Gait Score: 10 Gait total score  Total Score: 21/28 Overall total score         Tinetti Tool Score Risk of Falls  <19 = High Fall Risk  19-24 = Moderate Fall Risk  25-28 = Low Fall Risk  Tinetti ME. Performance-Oriented Assessment of Mobility Problems in Elderly Patients. [place] [address]; [id number].  (Scoring Description: PT Bulletin Feb. 10, 1993)    Older adults: Jarod Young et al, 2009; n = [address] [employer] elderly evaluated with ABC, DESMOND, ADL, and IADL)  · Mean DESMOND score for males aged 69-68 years = 26.21(3.40)  · Mean DESMOND score for females age 69-68 years = 25.16(4.30)  · Mean DESMOND score for males over 80 years = 23.29(6.02)  · Mean DESMOND score for females over [de-identified] years = 17.20(8.32)            Physical Therapy Evaluation Charge Determination   History Examination Presentation Decision-Making   HIGH Complexity :3+ comorbidities / personal factors will impact the outcome/ POC  MEDIUM Complexity : 3 Standardized tests and measures addressing body structure, function, activity limitation and / or participation in recreation  LOW Complexity : Stable, uncomplicated        Based on the above components, the patient evaluation is determined to be of the following complexity level: LOW     Pain Ratin    Activity Tolerance:   Good and observed SOB with activity      After treatment patient left in no apparent distress:   Sitting in chair and Call bell within reach    COMMUNICATION/EDUCATION:   The patients plan of care was discussed with: Occupational therapist, Registered nurse and Case management.          Thank you for this referral.  Amelie Salazar, PT   Time Calculation: 27 mins

## 2021-09-14 NOTE — PROGRESS NOTES
OCCUPATIONAL THERAPY EVALUATION/DISCHARGE  Patient: Gabriel Conroy (40 y.o. male)  Date: 9/14/2021  Primary Diagnosis: Pyelonephritis [N12]  Intractable lower abdominal pain [R10.30]  Abdominal pain [R10.9]       Precautions: fall       ASSESSMENT  Based on the objective data described below, the patient presents at functional baseline s/p admission for flank pain/ pyelonephritis/ MICHI. Patient did not report pain during OT evaluation. He has limited shoulder AROM at baseline (R shoulder flexion to ~100, L to ~40) and reports he was receiving OP PT for this impairment PTA. He also endorses impaired distal LB access at baseline, and his daughter assists with LB dressing. Patient was receptive to LB AE training in session today and practiced donning socks and underwear. He also ambulated within room with mild unsteadiness but no overt LOB, at supervision level. Reports mobility is at baseline. Patient does not endorse recent falls and was receptive to education on fall prevention. Patient does not require additional OT at this time. Recommend d/c home when medically stable. Current Level of Function (ADLs/self-care): supervision to modified independent     Functional Outcome Measure: The patient scored 90/100 on the Barthel Index outcome measure which is indicative of ~10% impairment in functional performance. PLAN :    Recommendation for discharge: (in order for the patient to meet his/her long term goals)  No skilled occupational therapy/ follow up rehabilitation needs identified at this time. This discharge recommendation:  Has been made in collaboration with the attending provider and/or case management         SUBJECTIVE:   Patient stated That [LB Jarek Math is helpful.     OBJECTIVE DATA SUMMARY:   HISTORY:   Past Medical History:   Diagnosis Date    Abnormal stress echo 5/12/2015    Anemia NEC 03/2013    Anxiety     Borderline diabetes     CAD (coronary artery disease) 2009    cath [facility]) revealed 20% RCA and 50% 2nd diagonal    Calculus of kidney     CKD (chronic kidney disease) stage 4, GFR 15-29 ml/min ([facility])     COPD, mild ([facility])     Followed by pulmonary associates    DJD (degenerative joint disease)     Environmental allergies     Fatty liver     GERD (gastroesophageal reflux disease) 10/11/2009    Gout     Hypertension     MELODY on CPAP 10/11/2009    nasal pillows; pressure set at 10-11 -     Peripheral neuropathy 10/11/2009    bilateral feet (numbness)    Renal cell cancer, right ([address] 01/2021    RLS (restless legs syndrome) 10/11/2009    S/P coronary artery stent placement 05/12/2015    PCI./MALI to LCx, 8/2019 PCI/MALI Prox LAD (RCA 40-50% lesions)     Past Surgical History:   Procedure Laterality Date    HX BUNIONECTOMY  2019    HX CAROTID ENDARTERECTOMY Left 01/2020    Followed by Dr. Terry Machado      S/P stent to left circumflex in May, 2015; s/p stent to LAD in August, 2019   [address]  2009, 7/17    per heart cath of 7/13/2017, discrete 40 % stenosis. in proximal LAD, discrete 40 % stenosis in proximal RCA, 30% stenosis in distal RCA    HX HERNIA REPAIR      McTamaney/umbilical    HX KNEE REPLACEMENT Left 07/23/2011    HX ORTHOPAEDIC      left shoulder manipulation    HX UROLOGICAL      basket extraction of kidney stones    CA COLONOSCOPY FLX DX W/COLLJ SPEC WHEN PFRMD  8/5/2010         CA COLSC FLX W/RMVL OF TUMOR POLYP LESION SNARE TQ  9/24/2014            Prior Level of Function/Environment/Context: assistance with LB dressing, otherwise independent without AD  Expanded or extensive additional review of patient history:   Home Situation  Home Environment: Private residence  Wheelchair Ramp: Yes  One/Two Story Residence: Two story    EXAMINATION OF PERFORMANCE DEFICITS:  Cognitive/Behavioral Status:  Neurologic State: Alert     Cognition: Follows commands  Perception: Appears intact  Perseveration: No perseveration noted       Skin: visible skin appears intact    Edema: none noted    Hearing: Auditory  Auditory Impairment: None    Vision/Perceptual:                           Acuity: Within Defined Limits         Range of Motion:    AROM: Generally decreased, functional (very limited shoulder ROM)                         Strength:    Strength: Generally decreased, functional                Coordination:  Coordination: Within functional limits  Fine Motor Skills-Upper: Left Intact; Right Intact    Gross Motor Skills-Upper: Left Intact; Right Intact    Tone & Sensation:    Tone: Normal  Sensation: Impaired (BLE absent to LT, baseline neuropathy)        Balance:  Sitting: Intact  Standing: Impaired; Without support  Standing - Static: Good  Standing - Dynamic : Fair    Functional Mobility and Transfers for ADLs:  Bed Mobility:  Rolling: Modified independent  Supine to Sit: Modified independent    Transfers:  Sit to Stand: Modified independent  Stand to Sit: Modified independent    ADL Assessment:  Feeding: Independent (inferred)    Oral Facial Hygiene/Grooming: Independent (inferred)    Bathing: Modified independent (inferred using AE)    Upper Body Dressing: Modified independent (inferred)    Lower Body Dressing: Modified independent (after LB AE training, donned socks and underwear)    Toileting: Modified independent (inferred)            Functional Measure:  Barthel Index:    Bathin  Bladder: 10  Bowels: 10  Groomin  Dressing: 10  Feeding: 10  Mobility: 10  Stairs: 5  Toilet Use: 10  Transfer (Bed to Chair and Back): 15  Total: 90/100        The Barthel ADL Index: Guidelines  1. The index should be used as a record of what a patient does, not as a record of what a patient could do. 2. The main aim is to establish degree of independence from any help, physical or verbal, however minor and for whatever reason. 3. The need for supervision renders the patient not independent.   4. A patient's performance should be established using the best available evidence. Asking the patient, friends/relatives and nurses are the usual sources, but direct observation and common sense are also important. However direct testing is not needed. 5. Usually the patient's performance over the preceding 24-48 hours is important, but occasionally longer periods will be relevant. 6. Middle categories imply that the patient supplies over 50 per cent of the effort. 7. Use of aids to be independent is allowed. Shira Gates., Barthel, D.W. (3451). Functional evaluation: the Barthel Index. [facility] (14)2. Jed Dockery jacquie DANA Cardona, Karol Dye., Dinorah Payne., [place], [address] (1999). Measuring the change indisability after inpatient rehabilitation; comparison of the responsiveness of the Barthel Index and Functional [place] Measure. Journal of Neurology, Neurosurgery, and Psychiatry, 66(4), 914-808. Sienna Patricia, N.J.A, PETAR Villarreal, & Lucretia Kearns M.A. (2004.) Assessment of post-stroke quality of life in cost-effectiveness studies: The usefulness of the Barthel Index and the EuroQoL-5D.  Quality of Life Research, 15, 198-51         Occupational Therapy Evaluation Charge Determination   History Examination Decision-Making   LOW Complexity : Brief history review  LOW Complexity : 1-3 performance deficits relating to physical, cognitive , or psychosocial skils that result in activity limitations and / or participation restrictions  LOW Complexity : No comorbidities that affect functional and no verbal or physical assistance needed to complete eval tasks       Based on the above components, the patient evaluation is determined to be of the following complexity level: LOW   Pain Rating:  Patient did not report pain    Activity Tolerance:   VSS, good    After treatment patient left in no apparent distress:    Supine in bed and Call bell within reach    COMMUNICATION/EDUCATION:   The patients plan of care was discussed with: Physical therapist and Registered nurse.      Thank you for this referral.  Lenard Williamson OT  Time Calculation: 37 mins

## 2021-09-14 NOTE — PROGRESS NOTES
Patient: Adilia Helton MRN: [id number]  SSN: [id number]    YOB: 1945  Age: 64 y.o. Sex: male        ADMITTED: 2021 to Garry Doran MD by Radha Burns MD for Pyelonephritis [N12]  Intractable lower abdominal pain [R10.30]  Abdominal pain [R10.9]    F/U for findings of possible left renal hemorrhage on CT. The patient is sleeping quietly. He reports improvement in his abdominal pain, severity 4/10, intermittent, no further sharp pains. He had a BM last night. He denies flank pain, voiding difficulties, dysuria, hematuria, or testicular pain. Voiding independently, clear yellow UA. He is AF, VSS. Hypertensive. Hgb improved to 8.6 from 7.7. Cr slightly improved to 4.6 from 4.7. Vitals: Temp (24hrs), Av.9 °F (37.2 °C), Min:98.1 °F (36.7 °C), Max:99.5 °F (37.5 °C)    Blood pressure (!) 178/82, pulse (!) 102, temperature 99 °F (37.2 °C), resp. rate 18, height 6' (1.829 m), weight 113.4 kg (250 lb), SpO2 95 %. Intake and Output:  1901 -  0700  In: 240 [P.O.:240]  Out: 7930 [Urine:1625]   07 -  1900  In: 320 [P.O.:320]  Out: 250 [Urine:250]  EDIE Output lats 24 hrs: No data found. EDIE Output last 8 hrs: No data found. BM over last 24 hrs: No data found.     Physical Exam  General: NAD, pleasant  Respiratory: no distress, breathing easily, room air  Abdomen: soft, no distention; LLQ minimally tender to palpation  : no CVA tenderness, voiding independently, clear yellow UA  Neuro: Appropriate, no focal neurological deficits  Skin: warm, dry  Extremities: moves all, full ROM    Labs:  CBC:   Lab Results   Component Value Date/Time    WBC 12.7 (H) 2021 02:35 AM    HCT 26.4 (L) 2021 02:35 AM    PLATELET 000  02:35 AM     BMP:   Lab Results   Component Value Date/Time    Glucose 102 (H) 2021 02:35 AM    Sodium 139 2021 02:35 AM    Potassium 4.1 2021 02:35 AM    Chloride 112 (H) 2021 02:35 AM    CO2 19 (L) 09/14/2021 02:35 AM    BUN 41 (H) 09/14/2021 02:35 AM    Creatinine 4.64 (H) 09/14/2021 02:35 AM    Calcium 8.8 09/14/2021 02:35 AM     Assessment/Plan:     · Left abdominal pain   · Pyelonephritis  · Concern for left renal hemorrhage on CT  · Constipation  · MICHI on CKD     - Hgb improved to 8.6 from 7.7. Continue conservative management. Monitor Hgb. Could consider repeat imaging if he shows signs of a progressive bleed clinically. He is scheduled for follow up with Dr. Michelle Marquez on 10/19/2021 at 4:00 PM for a renal mass MRI, CXR, and labs. Will plan to evaluate further with MRI at that time. - Consider other sources of abdominal pain. Possibly GI related with constipation for 2 weeks and several colonic diverticula on CT. Pain improved after BM yesterday.    - Renal function improved to 4.6 from 4.71. Nephrology following, suspected resolving ATN.      Please call for further questions.    Supervising Dr. Tomasa PATEL   Signed By: Anthony Sanchez, NP - September 14, 2021

## 2021-09-14 NOTE — PROGRESS NOTES
[facility]   [address] [facility], [address] [place] [address] [address], [facility]  Phone: [phone]   [phone]       Nephrology Progress Note  Jaimee Pink     1945     [id number]  Date of Admission : 9/11/2021 09/14/21    CC: Follow up for  MICHI     Assessment and Plan   MICHI on CKD   - resolving ATN from IVVD, ARB use and Pyelo   - Cr stable   -Continue with daily labs  -If he gets discharged, he should follow-up with Dr. Landon Jack in 1-2 weeks    CKD 4  - 2/2 HTN  - prior R partial Nephrectomy   - baseline Cr 2.6-2.9 mg/dl     Abdominal Pain :?  diverticulitis     Recent B/L Pyelo   Chronic Left Hydro   Left Mid Pole renal hemorrhage   hx of Kidney stones  - urology following     Recent UTI   - abx per primary team     Chronic anemia   - ? 2/2 CKD     Interval History:  Reports having a bowel movement last night. Abdominal pain is slightly better. He was seen working with physical therapy this morning. Creatinine is stable. Review of Systems: A comprehensive review of systems was negative except for that written in the HPI.     Current Medications:   Current Facility-Administered Medications   Medication Dose Route Frequency    sodium bicarbonate tablet 325 mg  325 mg Oral BID    senna-docusate (PERICOLACE) 8.6-50 mg per tablet 2 Tablet  2 Tablet Oral DAILY    polyethylene glycol (MIRALAX) packet 17 g  17 g Oral DAILY    HYDROmorphone (DILAUDID) injection 1 mg  1 mg IntraVENous Q4H PRN    methocarbamoL (ROBAXIN) tablet 500 mg  500 mg Oral QID    sodium chloride (NS) flush 5-40 mL  5-40 mL IntraVENous Q8H    sodium chloride (NS) flush 5-40 mL  5-40 mL IntraVENous PRN    acetaminophen (TYLENOL) tablet 650 mg  650 mg Oral Q6H PRN    Or    acetaminophen (TYLENOL) suppository 650 mg  650 mg Rectal Q6H PRN    polyethylene glycol (MIRALAX) packet 17 g  17 g Oral DAILY PRN    ondansetron (ZOFRAN ODT) tablet 4 mg  4 mg Oral Q8H PRN    Or    ondansetron (ZOFRAN) injection 4 mg  4 mg IntraVENous Q6H PRN    atorvastatin (LIPITOR) tablet 80 mg  80 mg Oral QHS    hydrOXYzine HCL (ATARAX) tablet 10-20 mg  10-20 mg Oral TID PRN    isosorbide mononitrate ER (IMDUR) tablet 15 mg  15 mg Oral DAILY    gabapentin (NEURONTIN) capsule 300 mg  300 mg Oral QHS    aspirin delayed-release tablet 81 mg  81 mg Oral QHS    amLODIPine (NORVASC) tablet 10 mg  10 mg Oral DAILY    multivitamin, tx-iron-ca-min (THERA-M w/ IRON) tablet 1 Tablet  1 Tablet Oral DAILY    rOPINIRole (REQUIP) tablet 0.5 mg  0.5 mg Oral QHS    pantoprazole (PROTONIX) tablet 40 mg  40 mg Oral DAILY    cholecalciferol (VITAMIN D3) (1000 Units /25 mcg) tablet 2,000 Units  2,000 Units Oral DAILY    hydrALAZINE (APRESOLINE) tablet 100 mg  100 mg Oral TID    oxyCODONE IR (ROXICODONE) tablet 5 mg  5 mg Oral Q4H PRN    metoprolol succinate (TOPROL-XL) XL tablet 25 mg  25 mg Oral DAILY    cefTRIAXone (ROCEPHIN) 1 g in 0.9% sodium chloride (MBP/ADV) 50 mL MBP  1 g IntraVENous Q24H    bisacodyL (DULCOLAX) suppository 10 mg  10 mg Rectal DAILY PRN      Allergies   Allergen Reactions    Bactrim [Sulfamethoxazole-Trimethoprim] Hives    Codeine Itching    Lisinopril (Bulk) Cough    Neurontin [Gabapentin] Other (comments)     drowsy    Zostavax [Zoster Vaccine Live (Pf)] Rash     See 9/10/13 visit    Penicillins Hives     Pt states he has taken Keflex before without problems       Objective:  Vitals:    Vitals:    09/14/21 0617 09/14/21 0800 09/14/21 0922 09/14/21 1000   BP:   (!) 178/82    Pulse: 77 91 97 (!) 102   Resp:   18    Temp:   99 °F (37.2 °C)    SpO2:   95%    Weight:       Height:         Intake and Output:  09/14 0701 - 09/14 1900  In: 320 [P.O.:320]  Out: 250 [Urine:250]  09/12 1901 - 09/14 0700  In: 240 [P.O.:240]  Out: 1625 [Urine:1625]    Physical Examination:    General: Obese   Neck:  Supple, no mass  Resp:  Lungs CTA B/L, no wheezing , normal respiratory effort  CV:  RRR,  no murmur or rub,no  LE edema  GI:  Distended, tender in LLQ  Neurologic:  Non focal  Psych:             AAO x 3 appropriate affect   Skin:  No Rash  :  No Quiroz     []    High complexity decision making was performed  []    Patient is at high-risk of decompensation with multiple organ involvement    Lab Data Personally Reviewed: I have reviewed all the pertinent labs, microbiology data and radiology studies during assessment.     Recent Labs     09/14/21 0235 09/13/21  0652 09/12/21 0339 09/11/21 1930    140 143 141   K 4.1 4.0 3.8 4.0   * 112* 117* 113*   CO2 19* 20* 22 21   * 101* 130* 112*   BUN 41* 41* 39* 42*   CREA 4.64* 4.71* 4.62* 4.72*   CA 8.8 9.1 8.0* 8.9   ALB 2.4* 2.6* 2.3* 2.7*   ALT 73 80* 80* 108*     Recent Labs     09/14/21 0235 09/12/21 0339 09/11/21 1930   WBC 12.7* 10.0 11.4*   HGB 8.6* 7.7* 8.9*   HCT 26.4* 23.4* 26.9*    223 251     Lab Results   Component Value Date/Time    Specimen Description: [facility] 12/13/2012 08:15 PM    Specimen Description: NARES 07/09/2012 03:20 PM    Specimen Description: VOIDED URINE 03/31/2010 02:00 AM    Specimen Description: BLOOD 03/31/2010 12:10 AM    Specimen Description: BLOOD 03/16/2010 12:20 PM     Lab Results   Component Value Date/Time    Culture result: No growth (<1,000 CFU/ML) 09/11/2021 07:30 PM    Culture result: NO GROWTH 6 DAYS 09/08/2021 05:03 PM    Culture result: No growth (<1,000 CFU/ML) 09/05/2021 10:35 PM    Culture result: (A) 09/04/2021 11:40 PM     STAPHYLOCOCCUS SPECIES, COAGULASE NEGATIVE GROWING IN 1 OF 2 BOTTLES DRAWN (SITE=LEFT AC)    Culture result: REMAINING BOTTLE(S) HAS/HAVE NO GROWTH IN 5 DAYS 09/04/2021 11:40 PM     Recent Results (from the past 24 hour(s))   METABOLIC PANEL, COMPREHENSIVE    Collection Time: 09/14/21  2:35 AM   Result Value Ref Range    Sodium 139 136 - 145 mmol/L    Potassium 4.1 3.5 - 5.1 mmol/L    Chloride 112 (H) 97 - 108 mmol/L    CO2 19 (L) 21 - 32 mmol/L    Anion gap 8 5 - 15 mmol/L    Glucose 102 (H) 65 - 100 mg/dL    BUN 41 (H) 6 - 20 MG/DL    Creatinine 4.64 (H) 0.70 - 1.30 MG/DL    BUN/Creatinine ratio 9 (L) 12 - 20      GFR est AA 15 (L) >60 ml/min/1.73m2    GFR est non-AA 12 (L) >60 ml/min/1.73m2    Calcium 8.8 8.5 - 10.1 MG/DL    Bilirubin, total 0.5 0.2 - 1.0 MG/DL    ALT (SGPT) 73 12 - 78 U/L    AST (SGOT) 63 (H) 15 - 37 U/L    Alk. phosphatase 125 (H) 45 - 117 U/L    Protein, total 7.2 6.4 - 8.2 g/dL    Albumin 2.4 (L) 3.5 - 5.0 g/dL    Globulin 4.8 (H) 2.0 - 4.0 g/dL    A-G Ratio 0.5 (L) 1.1 - 2.2     CBC W/O DIFF    Collection Time: 09/14/21  2:35 AM   Result Value Ref Range    WBC 12.7 (H) 4.1 - 11.1 K/uL    RBC 2.77 (L) 4.10 - 5.70 M/uL    HGB 8.6 (L) 12.1 - 17.0 g/dL    HCT 26.4 (L) 36.6 - 50.3 %    MCV 95.3 80.0 - 99.0 FL    MCH 31.0 26.0 - 34.0 PG    MCHC 32.6 30.0 - 36.5 g/dL    RDW 14.3 11.5 - 14.5 %    PLATELET 830 183 - 801 K/uL    MPV 8.7 (L) 8.9 - 12.9 FL    NRBC 0.0 0  WBC    ABSOLUTE NRBC 0.00 0.00 - 0.01 K/uL           Total time spent with patient:  xxx   min. Care Plan discussed with:  Patient     Family      RN      Consulting Physician [facility],         I have reviewed the flowsheets. Chart and Pertinent Notes have been reviewed. No change in PMH ,family and social history from Consult note.       Ward Peralta MD

## 2021-09-15 ENCOUNTER — HOSPITAL ENCOUNTER (OUTPATIENT)
Dept: PHYSICAL THERAPY | Age: 76
End: 2021-09-15
Payer: MEDICARE

## 2021-09-15 VITALS
SYSTOLIC BLOOD PRESSURE: 138 MMHG | WEIGHT: 250 LBS | TEMPERATURE: 98.2 F | HEART RATE: 87 BPM | RESPIRATION RATE: 16 BRPM | BODY MASS INDEX: 33.86 KG/M2 | OXYGEN SATURATION: 97 % | DIASTOLIC BLOOD PRESSURE: 66 MMHG | HEIGHT: 72 IN

## 2021-09-15 LAB
ALBUMIN SERPL-MCNC: 2.3 G/DL (ref 3.5–5)
ALBUMIN/GLOB SERPL: 0.5 {RATIO} (ref 1.1–2.2)
ALP SERPL-CCNC: 109 U/L (ref 45–117)
ALT SERPL-CCNC: 69 U/L (ref 12–78)
ANION GAP SERPL CALC-SCNC: 9 MMOL/L (ref 5–15)
AST SERPL-CCNC: 58 U/L (ref 15–37)
BASOPHILS # BLD: 0 K/UL (ref 0–0.1)
BASOPHILS NFR BLD: 0 % (ref 0–1)
BILIRUB SERPL-MCNC: 0.5 MG/DL (ref 0.2–1)
BUN SERPL-MCNC: 43 MG/DL (ref 6–20)
BUN/CREAT SERPL: 9 (ref 12–20)
CALCIUM SERPL-MCNC: 8.5 MG/DL (ref 8.5–10.1)
CHLORIDE SERPL-SCNC: 110 MMOL/L (ref 97–108)
CO2 SERPL-SCNC: 20 MMOL/L (ref 21–32)
CREAT SERPL-MCNC: 4.82 MG/DL (ref 0.7–1.3)
DIFFERENTIAL METHOD BLD: ABNORMAL
EOSINOPHIL # BLD: 0.4 K/UL (ref 0–0.4)
EOSINOPHIL NFR BLD: 4 % (ref 0–7)
ERYTHROCYTE [DISTWIDTH] IN BLOOD BY AUTOMATED COUNT: 14.4 % (ref 11.5–14.5)
GLOBULIN SER CALC-MCNC: 4.5 G/DL (ref 2–4)
GLUCOSE SERPL-MCNC: 99 MG/DL (ref 65–100)
HCT VFR BLD AUTO: 24.6 % (ref 36.6–50.3)
HGB BLD-MCNC: 7.8 G/DL (ref 12.1–17)
IMM GRANULOCYTES # BLD AUTO: 0.2 K/UL (ref 0–0.04)
IMM GRANULOCYTES NFR BLD AUTO: 2 % (ref 0–0.5)
LYMPHOCYTES # BLD: 1.9 K/UL (ref 0.8–3.5)
LYMPHOCYTES NFR BLD: 19 % (ref 12–49)
MCH RBC QN AUTO: 30.4 PG (ref 26–34)
MCHC RBC AUTO-ENTMCNC: 31.7 G/DL (ref 30–36.5)
MCV RBC AUTO: 95.7 FL (ref 80–99)
MONOCYTES # BLD: 1 K/UL (ref 0–1)
MONOCYTES NFR BLD: 10 % (ref 5–13)
NEUTS SEG # BLD: 6.4 K/UL (ref 1.8–8)
NEUTS SEG NFR BLD: 65 % (ref 32–75)
NRBC # BLD: 0 K/UL (ref 0–0.01)
NRBC BLD-RTO: 0 PER 100 WBC
PLATELET # BLD AUTO: 275 K/UL (ref 150–400)
PMV BLD AUTO: 8.8 FL (ref 8.9–12.9)
POTASSIUM SERPL-SCNC: 4.1 MMOL/L (ref 3.5–5.1)
PROT SERPL-MCNC: 6.8 G/DL (ref 6.4–8.2)
RBC # BLD AUTO: 2.57 M/UL (ref 4.1–5.7)
SODIUM SERPL-SCNC: 139 MMOL/L (ref 136–145)
WBC # BLD AUTO: 9.9 K/UL (ref 4.1–11.1)

## 2021-09-15 PROCEDURE — 80053 COMPREHEN METABOLIC PANEL: CPT

## 2021-09-15 PROCEDURE — 74011000258 HC RX REV CODE- 258: Performed by: INTERNAL MEDICINE

## 2021-09-15 PROCEDURE — 36415 COLL VENOUS BLD VENIPUNCTURE: CPT

## 2021-09-15 PROCEDURE — 85025 COMPLETE CBC W/AUTO DIFF WBC: CPT

## 2021-09-15 PROCEDURE — 74011250637 HC RX REV CODE- 250/637: Performed by: INTERNAL MEDICINE

## 2021-09-15 PROCEDURE — 74011250636 HC RX REV CODE- 250/636: Performed by: INTERNAL MEDICINE

## 2021-09-15 RX ORDER — SODIUM BICARBONATE 325 MG/1
325 TABLET ORAL 2 TIMES DAILY
Qty: 60 TABLET | Refills: 0 | Status: SHIPPED | OUTPATIENT
Start: 2021-09-15

## 2021-09-15 RX ORDER — AMOXICILLIN 250 MG
2 CAPSULE ORAL DAILY
Qty: 20 TABLET | Refills: 0 | Status: SHIPPED | OUTPATIENT
Start: 2021-09-16 | End: 2022-10-17 | Stop reason: ALTCHOICE

## 2021-09-15 RX ORDER — POLYETHYLENE GLYCOL 3350 17 G/17G
17 POWDER, FOR SOLUTION ORAL DAILY
Qty: 10 EACH | Refills: 0 | Status: SHIPPED | OUTPATIENT
Start: 2021-09-16 | End: 2021-10-20 | Stop reason: ALTCHOICE

## 2021-09-15 RX ADMIN — METOPROLOL SUCCINATE 25 MG: 25 TABLET, EXTENDED RELEASE ORAL at 09:13

## 2021-09-15 RX ADMIN — Medication 2000 UNITS: at 09:13

## 2021-09-15 RX ADMIN — METHOCARBAMOL TABLETS 500 MG: 500 TABLET, COATED ORAL at 09:13

## 2021-09-15 RX ADMIN — SENNOSIDES AND DOCUSATE SODIUM 2 TABLET: 50; 8.6 TABLET ORAL at 09:12

## 2021-09-15 RX ADMIN — SODIUM BICARBONATE 325 MG: 650 TABLET ORAL at 09:13

## 2021-09-15 RX ADMIN — HYDRALAZINE HYDROCHLORIDE 100 MG: 50 TABLET, FILM COATED ORAL at 09:12

## 2021-09-15 RX ADMIN — MULTIPLE VITAMINS W/ MINERALS TAB 1 TABLET: TAB at 09:13

## 2021-09-15 RX ADMIN — ISOSORBIDE MONONITRATE 15 MG: 30 TABLET, EXTENDED RELEASE ORAL at 09:13

## 2021-09-15 RX ADMIN — AMLODIPINE BESYLATE 10 MG: 5 TABLET ORAL at 09:12

## 2021-09-15 RX ADMIN — POLYETHYLENE GLYCOL 3350 17 G: 17 POWDER, FOR SOLUTION ORAL at 09:11

## 2021-09-15 RX ADMIN — CEFTRIAXONE SODIUM 1 G: 1 INJECTION, POWDER, FOR SOLUTION INTRAMUSCULAR; INTRAVENOUS at 11:32

## 2021-09-15 RX ADMIN — PANTOPRAZOLE SODIUM 40 MG: 40 TABLET, DELAYED RELEASE ORAL at 09:13

## 2021-09-15 NOTE — PROGRESS NOTES
Transition of Care Plan   RUR- Medium 23%    DISPOSITION: The disposition plan is home with family assistance  o The pt is followed by Mariposa Chavez at [facility] for OP PT   F/U with PCP/Specialist     Transport: Daughter; Wilner Chopra [phone] after Lunch    This cm met the pt at bedside to provide him with an opportunity to present questions and concerns, prior to discharge. The pt did not voice any concerns or barriers. The pt stated that his daughter would transport him home. Medicare pt has received, reviewed, and signed 2nd IM letter informing them of their right to appeal the discharge. Signed copy has been placed on pt bedside chart.     CM: [address]. MSW,   [phone]

## 2021-09-15 NOTE — PROGRESS NOTES
[facility]   [address] [facility], [facility] [address], [facility]  Phone: [phone]   [phone]       Nephrology Progress Note  Antoinette Eldridge     1945     [id number]  Date of Admission : 9/11/2021  09/15/21    CC: Follow up for  MICHI     Assessment and Plan   MICHI on CKD   - resolving ATN from IVVD, ARB use and Pyelo   - Cr stable   -  follow-up with Dr. Marcos Aguilar in 1-2 weeks    CKD 4  - 2/2 HTN  - prior R partial Nephrectomy   - baseline Cr 2.6-2.9 mg/dl     Abdominal Pain :   - Presumed secondary to constipation: Resolved    Recent B/L Pyelo   Chronic Left Hydro   Left Mid [place] renal hemorrhage   hx of Kidney stones  - urology following     Recent UTI   - abx per primary team     Chronic anemia   - ? 2/2 CKD     Interval History:  Seen and examined. Has been having regular bowel movements. His abdominal pain is resolved. Her creatinine is slightly worse. He has no new symptoms. Review of Systems: A comprehensive review of systems was negative except for that written in the HPI.     Current Medications:   Current Facility-Administered Medications   Medication Dose Route Frequency    sodium bicarbonate tablet 325 mg  325 mg Oral BID    HYDROmorphone (DILAUDID) injection 0.5 mg  0.5 mg IntraVENous Q6H PRN    senna-docusate (PERICOLACE) 8.6-50 mg per tablet 2 Tablet  2 Tablet Oral DAILY    polyethylene glycol (MIRALAX) packet 17 g  17 g Oral DAILY    methocarbamoL (ROBAXIN) tablet 500 mg  500 mg Oral QID    sodium chloride (NS) flush 5-40 mL  5-40 mL IntraVENous Q8H    sodium chloride (NS) flush 5-40 mL  5-40 mL IntraVENous PRN    acetaminophen (TYLENOL) tablet 650 mg  650 mg Oral Q6H PRN    Or    acetaminophen (TYLENOL) suppository 650 mg  650 mg Rectal Q6H PRN    polyethylene glycol (MIRALAX) packet 17 g  17 g Oral DAILY PRN    ondansetron (ZOFRAN ODT) tablet 4 mg  4 mg Oral Q8H PRN    Or    ondansetron (ZOFRAN) injection 4 mg  4 mg IntraVENous Q6H PRN    atorvastatin (LIPITOR) tablet 80 mg  80 mg Oral QHS    hydrOXYzine HCL (ATARAX) tablet 10-20 mg  10-20 mg Oral TID PRN    isosorbide mononitrate ER (IMDUR) tablet 15 mg  15 mg Oral DAILY    gabapentin (NEURONTIN) capsule 300 mg  300 mg Oral QHS    aspirin delayed-release tablet 81 mg  81 mg Oral QHS    amLODIPine (NORVASC) tablet 10 mg  10 mg Oral DAILY    multivitamin, tx-iron-ca-min (THERA-M w/ IRON) tablet 1 Tablet  1 Tablet Oral DAILY    rOPINIRole (REQUIP) tablet 0.5 mg  0.5 mg Oral QHS    pantoprazole (PROTONIX) tablet 40 mg  40 mg Oral DAILY    cholecalciferol (VITAMIN D3) (1000 Units /25 mcg) tablet 2,000 Units  2,000 Units Oral DAILY    hydrALAZINE (APRESOLINE) tablet 100 mg  100 mg Oral TID    oxyCODONE IR (ROXICODONE) tablet 5 mg  5 mg Oral Q4H PRN    metoprolol succinate (TOPROL-XL) XL tablet 25 mg  25 mg Oral DAILY    cefTRIAXone (ROCEPHIN) 1 g in 0.9% sodium chloride (MBP/ADV) 50 mL MBP  1 g IntraVENous Q24H    bisacodyL (DULCOLAX) suppository 10 mg  10 mg Rectal DAILY PRN      Allergies   Allergen Reactions    Bactrim [Sulfamethoxazole-Trimethoprim] Hives    Codeine Itching    Lisinopril (Bulk) Cough    Neurontin [Gabapentin] Other (comments)     drowsy    Zostavax [Zoster Vaccine Live (Pf)] Rash     See 9/10/13 visit    Penicillins Hives     Pt states he has taken Keflex before without problems       Objective:  Vitals:    Vitals:    09/14/21 1550 09/14/21 2024 09/15/21 0217 09/15/21 0848   BP: (!) 141/69 (!) 162/73 (!) 153/71 (!) 164/87   Pulse: 88 89 91 (!) 105   Resp: 18 20 18 14   Temp: 98.3 °F (36.8 °C) 98.9 °F (37.2 °C) 98.8 °F (37.1 °C) 98.7 °F (37.1 °C)   SpO2: 95% 94% 95% 96%   Weight:       Height:         Intake and Output:  09/15 0701 - 09/15 1900  In: -   Out: 200 [Urine:200]  09/13 1901 - 09/15 0700  In: 560 [P.O.:560]  Out: 850 [Urine:850]    Physical Examination:    General: Obese   Neck:  Supple, no mass  Resp:  Lungs CTA B/L, no wheezing , normal respiratory effort  CV:  RRR, no murmur or rub,+  LE edema  GI:  Distended, tender in LLQ  Neurologic:  Non focal  Psych:             AAO x 3 appropriate affect   Skin:  No Rash  :  No Quiroz     []    High complexity decision making was performed  []    Patient is at high-risk of decompensation with multiple organ involvement    Lab Data Personally Reviewed: I have reviewed all the pertinent labs, microbiology data and radiology studies during assessment.     Recent Labs     09/15/21  0220 09/14/21  0235 09/13/21  0652    139 140   K 4.1 4.1 4.0   * 112* 112*   CO2 20* 19* 20*   GLU 99 102* 101*   BUN 43* 41* 41*   CREA 4.82* 4.64* 4.71*   CA 8.5 8.8 9.1   ALB 2.3* 2.4* 2.6*   ALT 69 73 80*     Recent Labs     09/15/21  0220 09/14/21  0235   WBC 9.9 12.7*   HGB 7.8* 8.6*   HCT 24.6* 26.4*    275     Lab Results   Component Value Date/Time    Specimen Description: [facility] 12/13/2012 08:15 PM    Specimen Description: NARES 07/09/2012 03:20 PM    Specimen Description: VOIDED URINE 03/31/2010 02:00 AM    Specimen Description: BLOOD 03/31/2010 12:10 AM    Specimen Description: BLOOD 03/16/2010 12:20 PM     Lab Results   Component Value Date/Time    Culture result: No growth (<1,000 CFU/ML) 09/11/2021 07:30 PM    Culture result: NO GROWTH 6 DAYS 09/08/2021 05:03 PM    Culture result: No growth (<1,000 CFU/ML) 09/05/2021 10:35 PM    Culture result: (A) 09/04/2021 11:40 PM     STAPHYLOCOCCUS SPECIES, COAGULASE NEGATIVE GROWING IN 1 OF 2 BOTTLES DRAWN (SITE=LEFT AC)    Culture result: REMAINING BOTTLE(S) HAS/HAVE NO GROWTH IN 5 DAYS 09/04/2021 11:40 PM     Recent Results (from the past 24 hour(s))   METABOLIC PANEL, COMPREHENSIVE    Collection Time: 09/15/21  2:20 AM   Result Value Ref Range    Sodium 139 136 - 145 mmol/L    Potassium 4.1 3.5 - 5.1 mmol/L    Chloride 110 (H) 97 - 108 mmol/L    CO2 20 (L) 21 - 32 mmol/L    Anion gap 9 5 - 15 mmol/L    Glucose 99 65 - 100 mg/dL    BUN 43 (H) 6 - 20 MG/DL    Creatinine 4.82 (H) 0.70 - 1.30 MG/DL    BUN/Creatinine ratio 9 (L) 12 - 20      GFR est AA 14 (L) >60 ml/min/1.73m2    GFR est non-AA 12 (L) >60 ml/min/1.73m2    Calcium 8.5 8.5 - 10.1 MG/DL    Bilirubin, total 0.5 0.2 - 1.0 MG/DL    ALT (SGPT) 69 12 - 78 U/L    AST (SGOT) 58 (H) 15 - 37 U/L    Alk. phosphatase 109 45 - 117 U/L    Protein, total 6.8 6.4 - 8.2 g/dL    Albumin 2.3 (L) 3.5 - 5.0 g/dL    Globulin 4.5 (H) 2.0 - 4.0 g/dL    A-G Ratio 0.5 (L) 1.1 - 2.2     CBC WITH AUTOMATED DIFF    Collection Time: 09/15/21  2:20 AM   Result Value Ref Range    WBC 9.9 4.1 - 11.1 K/uL    RBC 2.57 (L) 4.10 - 5.70 M/uL    HGB 7.8 (L) 12.1 - 17.0 g/dL    HCT 24.6 (L) 36.6 - 50.3 %    MCV 95.7 80.0 - 99.0 FL    MCH 30.4 26.0 - 34.0 PG    MCHC 31.7 30.0 - 36.5 g/dL    RDW 14.4 11.5 - 14.5 %    PLATELET 048 801 - 868 K/uL    MPV 8.8 (L) 8.9 - 12.9 FL    NRBC 0.0 0  WBC    ABSOLUTE NRBC 0.00 0.00 - 0.01 K/uL    NEUTROPHILS 65 32 - 75 %    LYMPHOCYTES 19 12 - 49 %    MONOCYTES 10 5 - 13 %    EOSINOPHILS 4 0 - 7 %    BASOPHILS 0 0 - 1 %    IMMATURE GRANULOCYTES 2 (H) 0.0 - 0.5 %    ABS. NEUTROPHILS 6.4 1.8 - 8.0 K/UL    ABS. LYMPHOCYTES 1.9 0.8 - 3.5 K/UL    ABS. MONOCYTES 1.0 0.0 - 1.0 K/UL    ABS. EOSINOPHILS 0.4 0.0 - 0.4 K/UL    ABS. BASOPHILS 0.0 0.0 - 0.1 K/UL    ABS. IMM. GRANS. 0.2 (H) 0.00 - 0.04 K/UL    DF AUTOMATED             Total time spent with patient:  xxx   min. Care Plan discussed with:  Patient     Family      RN      Consulting Physician [address] [facility],         I have reviewed the flowsheets. Chart and Pertinent Notes have been reviewed. No change in PMH ,family and social history from Consult note.       Ward Peralta MD

## 2021-09-15 NOTE — PROGRESS NOTES
Patient is being discharge home and will be accompanied by the daughter. Patient's daughter will like to speak with MD before discharge. md made aware.

## 2021-09-15 NOTE — DISCHARGE INSTRUCTIONS
Please bring this form with you to show your primary care provider at your follow-up appointment. Primary care provider:  Dr. Glenys Maldonado MD    Discharging provider:  Shama Anglin MD    You have been admitted to the hospital with the following diagnoses:  · Pyelonephritis [N12]  · Intractable lower abdominal pain [R10.30]  · Abdominal pain [R10.9]    FOLLOW-UP CARE RECOMMENDATIONS:    APPOINTMENTS:  · Follow-up with primary care provider, Dr. Glenys Maldonado MD  -  Please call [phone] shortly after discharge to set up an appointment to be seen in  1 week    · Urology and nephrology in 1-2 weeks     FOLLOW-UP TESTS recommended: bmp in 1 week     PENDING TEST RESULTS:  At the time of your discharge the following test results are still pending: none   Please make sure you review these results with your outpatient follow-up provider(s). SYMPTOMS to watch for: chest pain, shortness of breath, fever, chills, nausea, vomiting, diarrhea, change in mentation, falling, weakness, bleeding. DIET/what to eat:  Renal Diet    ACTIVITY:  Activity as tolerated    What to do if new or unexpected symptoms occur? If you experience any of the above symptoms (or should other concerns or questions arise after discharge) please call your primary care physician. Return to the emergency room if you cannot get hold of your doctor. · It is very important that you keep your follow-up appointment(s). · Please bring discharge papers, medication list (and/or medication bottles) to your follow-up appointments for review by your outpatient provider(s). · Please check the list of medications and be sure it includes every medication (even non-prescription medications) that your provider wants you to take. · It is important that you take the medication exactly as they are prescribed.    · Keep your medication in the bottles provided by the pharmacist and keep a list of the medication names, dosages, and times to be taken in your wallet. · Do not take other medications without consulting your doctor. · If you have any questions about your medications or other instructions, please talk to your nurse or care provider before you leave the hospital.    I understand that if any problems occur once I am at home I am to contact my physician. These instructions were explained to me and I had the opportunity to ask questions.

## 2021-09-15 NOTE — DISCHARGE SUMMARY
Discharge Summary     Patient:  Rissa Florence       MRN: [id number]       YOB: 1945       Age: 64 y.o. Date of admission:  9/11/2021    Date of discharge:  9/15/2021    Primary care provider: Dr. Glenys Maldonado MD    Admitting provider:  Shama Anglin MD    Discharging provider:  Sushma Courtney U[address].: [phone]. If unavailable, call [phone] and ask the  to page the triage hospitalist.    Consultations  · IP CONSULT TO UROLOGY  · IP CONSULT TO NEPHROLOGY    Procedures  · * No surgery found *    Discharge destination: home with [facility]. The patient is stable for discharge. Admission diagnosis  · Pyelonephritis [N12]  · Intractable lower abdominal pain [R10.30]  · Abdominal pain [R10.9]    Current Discharge Medication List      START taking these medications    Details   polyethylene glycol (MIRALAX) 17 gram packet Take 1 Packet by mouth daily. Qty: 10 Each, Refills: 0  Start date: 9/16/2021      senna-docusate (PERICOLACE) 8.6-50 mg per tablet Take 2 Tablets by mouth daily. Qty: 20 Tablet, Refills: 0  Start date: 9/16/2021      sodium bicarbonate 325 mg tablet Take 1 Tablet by mouth two (2) times a day. Qty: 60 Tablet, Refills: 0  Start date: 9/15/2021         CONTINUE these medications which have NOT CHANGED    Details   amLODIPine (NORVASC) 10 mg tablet Take 1 Tablet by mouth daily. Qty: 30 Tablet, Refills: 1  Start date: 9/11/2021      gabapentin (NEURONTIN) 300 mg capsule TAKE 1 CAPSULE BY MOUTH EVERY DAY AT BEDTIME FOR 30 DAYS      hydrOXYzine HCL (ATARAX) 10 mg tablet Take 1-2 Tablets by mouth three (3) times daily as needed for Itching or Anxiety.   Qty: 30 Tablet, Refills: 0    Comments: STOPPING CELEXA      pantoprazole (PROTONIX) 40 mg tablet TAKE 1 TABLET BY MOUTH EVERY DAY  Qty: 90 Tablet, Refills: 1      metoprolol succinate (TOPROL-XL) 25 mg XL tablet TAKE 1 TABLET BY MOUTH EVERY DAY  Qty: 90 Tab, Refills: 1      rOPINIRole (Requip) 0.5 mg tablet Take 0.5 mg by mouth nightly. atorvastatin (Lipitor) 80 mg tablet Take 80 mg by mouth nightly. isosorbide mononitrate ER (IMDUR) 30 mg tablet Take 0.5 Tabs by mouth daily. Qty: 45 Tab, Refills: 3      VITAMIN D3 2,000 unit cap capsule Take 2,000 Units by mouth daily. Refills: 2      GLUCOSAMINE HCL/CHONDR PETERSEN A NA (GLUCOSAMINE-CHONDROITIN) 750-600 mg Tab Take 1 Tab by mouth daily. Brand: Move Free      aspirin delayed-release 81 mg tablet Take 81 mg by mouth nightly. MULTIVITS W-FE,OTHER MIN (CENTRUM PO) Take 1 Tab by mouth daily. methocarbamoL (ROBAXIN) 750 mg tablet Take 1 Tablet by mouth three (3) times daily as needed for Muscle Spasm(s). Qty: 21 Tablet, Refills: 0      fluocinoNIDE (LIDEX) 0.05 % external solution APPLY TO ITCHY SPOTS ON SCALP AS NEEDED FOR FLARE      albuterol (ProAir HFA) 90 mcg/actuation inhaler Take 1 Puff by inhalation every four (4) hours. umeclidinium-vilanteroL (Anoro Ellipta) 62.5-25 mcg/actuation inhaler Take 1 Puff by inhalation daily. ketoconazole (NIZORAL) 2 % shampoo Apply 1 mL to affected area daily as needed for Itching. nitroglycerin (NITROSTAT) 0.4 mg SL tablet TAKE 1 TABLET BY SUBLINGUAL ROUTE EVERY 5 MINUTES AS NEEDED FOR CHEST PAIN. Qty: 1 Bottle, Refills: 0         STOP taking these medications       ciprofloxacin HCl (Cipro) 500 mg tablet Comments:   Reason for Stopping:         allopurinol (ZYLOPRIM) 100 mg tablet Comments:   Reason for Stopping: Follow-up Information     Follow up With Specialties Details Why Eriberto Lobato MD Urology  follow up with Dr. Glenryo Lake on 10/19/2021 at 4:00 PM for MRI, Chest XR, and labs.   [facility]  [phone]      Anabel Dunn MD Internal Medicine In 1 week  [facility]  [phone] Héctor Cortes MD Nephrology In 2 weeks  [address]  [place] [address] [facility] [address] [address]  [phone]            Final discharge diagnoses and brief hospital course  Blanche Durant a 64 y. o. male who was at [facility] last week, transferred from [facility] outpatient urgent care center.   Discharge summary from [facility] on September 10, 2021 indicates that patient was treated with 4 days of IV ceftriaxone there, and discharged on ciprofloxacin. Patient is known to have chronic kidney disease stage IV.       Patient now presents with intractable pain in the right groin.  He states that the pain has continued after his discharge from [facility] on September 10, 2021. [facility] denies any dysuria.   Patient is s/p right partial nephrectomy on 4/5/2021 at [facility] for kidney [address] did not have any chemotherapy or radiation. Left groin pain - resolved. Likely due to constipation   -  bowel regimen  - discontinued opioids     Recent Acute Pyelonephritis  Chronic left hydronephrosis   Left Mid Pole renal hemorrhage - conservative management   Hx Kidney stones  - CT abd: New area of subtle hyperdensity in a left midpole calyx may represent a small area of hemorrhage. Unchanged mild left hydronephrosis. Unchanged mild bilateral perinephric  stranding. Several renal cysts are unchanged. No surgical changes in the right kidney. - urine cx 9/11: negative   - completed course of po cipro. He was on IV ceftriaxone while in hospital   - appreciate urology input.  Discussed with urology NP - outpt f/u with MRI      MICHI on Chronic kidney disease stage IV.    - cr trended up 4.82   - appreciate nephrology input   - will monitor renal function  - discussed with nephrology Dr. Russell Mcneil - ok to DC  - f/u with Dr. Sarah Fernando in 1-2 weeks      Hypertension.  c/w Toprol-XL, Norvasc, Imdur, Hydralazine   Obstructive sleep apnea on CPAP  CAD S/P drug-eluting stents - Continue aspirin, metoprolol, atorvastatin, Imdur    Discharge recommendations:  PCP f/u in 1 week  Urology in 1-2 weeks  Nephro in 1-2 weeks  Cbc, bmp in 1 week  Monitor BP    Discharge plan explained in detail to patient and his daughter on phone. They understood and agreed.        Physical examination at discharge  Visit Vitals  BP (!) 164/87 (BP 1 Location: Right arm, BP Patient Position: At rest)   Pulse (!) 105   Temp 98.7 °F (37.1 °C)   Resp 14   Ht 6' (1.829 m)   Wt 113.4 kg (250 lb)   SpO2 96%   BMI 33.91 kg/m²     Gen: no distress, elderly   HEENT: anicteric sclerae, normal conjunctiva, oropharynx clear, MM moist  Neck: supple, trachea midline, no adenopathy  Heart: RRR, no MRG, no JVD, no peripheral edema  Lungs: CTA b/l, non-labored respirations  Abd: soft, LLQ tenderness, ND, BS+, no organomegaly  Extr: warm  Skin: dry, no rash  Neuro: CN II-XII grossly intact, normal speech, moves all extremities  Psych: normal mood, appropriate affect       Pertinent imaging studies:    Per EMR     Recent Labs     09/15/21  0220 09/14/21  0235   WBC 9.9 12.7*   HGB 7.8* 8.6*   HCT 24.6* 26.4*    275     Recent Labs     09/15/21  0220 09/14/21  0235 09/13/21  0652    139 140   K 4.1 4.1 4.0   * 112* 112*   CO2 20* 19* 20*   BUN 43* 41* 41*   CREA 4.82* 4.64* 4.71*   GLU 99 102* 101*   CA 8.5 8.8 9.1     Recent Labs     09/15/21  0220 09/14/21  0235 09/13/21  0652    125* 118*   TP 6.8 7.2 7.2   ALB 2.3* 2.4* 2.6*   GLOB 4.5* 4.8* 4.6*     No results for input(s): INR, PTP, APTT, INREXT in the last 72 hours. No results for input(s): FE, TIBC, PSAT, FERR in the last 72 hours. No results for input(s): PH, PCO2, PO2 in the last 72 hours. No results for input(s): CPK, CKMB in the last 72 hours.     No lab exists for component: TROPONINI  No components found for: [address] [facility]    Chronic Diagnoses:    Problem List as of 9/15/2021 Date Reviewed: 9/2/2021        Codes Class Noted - Resolved    Abdominal pain ICD-10-CM: R10.9  ICD-9-CM: 789.00  9/13/2021 - Present        Intractable lower abdominal pain ICD-10-CM: R10.30  ICD-9-CM: 789.09  9/12/2021 - Present        Pyelonephritis ICD-10-CM: N12  ICD-9-CM: 590.80  9/4/2021 - Present        Acute hypoxemic respiratory failure (HCC) ICD-10-CM: J96.01  ICD-9-CM: 518.81  4/8/2021 - Present        Anemia ICD-10-CM: D64.9  ICD-9-CM: 285.9  4/8/2021 - Present        Hx of completed stroke ICD-10-CM: Z86.73  ICD-9-CM: V12.54  4/8/2021 - Present        Right kidney mass ICD-10-CM: N28.89  ICD-9-CM: 593.9  4/5/2021 - Present        CKD (chronic kidney disease) stage 4, GFR 15-29 ml/min ([facility]) ICD-10-CM: N18.4  ICD-9-CM: 585.4  11/23/2020 - Present        Diabetic peripheral neuropathy associated with type 2 diabetes mellitus (HCC) ICD-10-CM: E11.42  ICD-9-CM: 250.60, 357.2  3/3/2020 - Present        History of left-sided carotid endarterectomy ICD-10-CM: Z98.890  ICD-9-CM: V45.89  3/3/2020 - Present        Bilateral carotid artery stenosis ICD-10-CM: I65.23  ICD-9-CM: 433.10, 433.30  1/9/2020 - Present        Pure hypercholesterolemia ICD-10-CM: E78.00  ICD-9-CM: 272.0  8/30/2019 - Present        Coronary artery disease due to lipid rich plaque ICD-10-CM: I25.10, I25.83  ICD-9-CM: 414.00, 414.3  4/27/2016 - Present        Coronary artery disease involving native coronary artery of native heart without angina pectoris ICD-10-CM: I25.10  ICD-9-CM: 414.01  3/16/2016 - Present        S/P coronary artery stent placement ICD-10-CM: Z95.5  ICD-9-CM: V45.82  5/12/2015 - Present    Overview Addendum 8/5/2019  2:28 PM by Rachelle Villa NP     5/11/15 PCI./MALI to LCx    8/5/19:  MALI LAD             Benign essential tremor ICD-10-CM: G25.0  ICD-9-CM: 333.1  1/22/2014 - Present        Hypertensive kidney disease with chronic kidney disease stage III (HCC) (Chronic) ICD-10-CM: I12.9, N18.30  ICD-9-CM: 403.90, 585.3  11/22/2013 - Present        Obesity ICD-10-CM: E66.9  ICD-9-CM: 278.00  1/30/2013 - Present        Gout ICD-10-CM: M10.9  ICD-9-CM: 274.9  1/30/2013 - Present        Chronic kidney disease, stage III (moderate) ([facility] [address] ICD-10-CM: N18.30  ICD-9-CM: 585.3  10/11/2009 - Present        Mixed hyperlipidemia (Chronic) ICD-10-CM: E78.2  ICD-9-CM: 272.2  10/11/2009 - Present        Obstructive sleep apnea ICD-10-CM: G47.33  ICD-9-CM: 327.23  10/11/2009 - Present        RLS (restless legs syndrome) ICD-10-CM: G25.81  ICD-9-CM: 333.94  10/11/2009 - Present        Peripheral neuropathy ICD-10-CM: G62.9  ICD-9-CM: 356.9  10/11/2009 - Present        DJD (degenerative joint disease), multiple sites ICD-10-CM: M15.9  ICD-9-CM: 715.89  10/11/2009 - Present        Essential hypertension (Chronic) ICD-10-CM: I10  ICD-9-CM: 401.9  10/11/2009 - Present        GERD (gastroesophageal reflux disease) ICD-10-CM: K21.9  ICD-9-CM: 530.81  10/11/2009 - Present        Anxiety associated with depression ICD-10-CM: F41.8  ICD-9-CM: 300.4  10/11/2009 - Present        RESOLVED: B12 deficiency ICD-10-CM: E53.8  ICD-9-CM: 266.2  3/3/2020 - 4/8/2021        RESOLVED: Idiopathic small and large fiber sensory neuropathy ICD-10-CM: G60.8  ICD-9-CM: 356.4  3/3/2020 - 4/8/2021        RESOLVED: Carotid stenosis ICD-10-CM: I65.29  ICD-9-CM: 433.10  1/15/2020 - 4/8/2021        RESOLVED: Symptomatic carotid artery stenosis, bilateral ICD-10-CM: I65.23  ICD-9-CM: 433.10, 433.30  1/8/2020 - 4/8/2021        RESOLVED: Acute CVA (cerebrovascular accident) ([facility] [address] ICD-10-CM: I63.9  ICD-9-CM: 434.91  1/8/2020 - 4/8/2021        RESOLVED: Right sided sciatica ICD-10-CM: M54.31  ICD-9-CM: 724.3  8/30/2019 - 4/8/2021        RESOLVED: Stable angina ([facility] [address] ICD-10-CM: I20.8  ICD-9-CM: 413.9  7/31/2019 - 1/13/2020    Overview Signed 7/31/2019 11:16 AM by Mario Barrientos MD     Added automatically from request for surgery [id number]             RESOLVED: History of kidney stones ICD-10-CM: Z87.442  ICD-9-CM: V13.01  7/12/2018 - 4/8/2021        RESOLVED: Chest pain ICD-10-CM: R07.9  ICD-9-CM: 786.50  7/7/2017 - 4/8/2021        RESOLVED: Abnormal stress echo ICD-10-CM: R94.39  ICD-9-CM: 794.39  5/12/2015 - 8/3/2015        RESOLVED: SMITH (dyspnea on exertion) ICD-10-CM: R06.00  ICD-9-CM: 786.09  4/28/2015 - 8/3/2015        RESOLVED: Ear discomfort ICD-10-CM: H92.09  ICD-9-CM: 388.70  10/1/2014 - 2/2/2015        RESOLVED: Iron deficiency ICD-10-CM: E61.1  ICD-9-CM: 280.9  5/23/2013 - 4/8/2021        RESOLVED: Prediabetes ICD-10-CM: R73.03  ICD-9-CM: 790.29  1/7/2012 - 4/8/2021        RESOLVED: Allergic rhinitis ICD-10-CM: J30.9  ICD-9-CM: 477.9  10/11/2009 - 4/8/2021        RESOLVED: ED (erectile dysfunction) ICD-10-CM: N52.9  ICD-9-CM: 607.84  10/11/2009 - 4/8/2021        RESOLVED: Chest pain ICD-10-CM: R07.9  ICD-9-CM: 786.50  10/11/2009 - 7/2/2012    Overview Signed 10/11/2009  3:46 PM by Rc Madrigal MD     Minimal CAD on cath 5/2009. Time spent on discharge related activities today greater than 30 minutes.       Signed:  Roel Quinteros MD                 Hospitalist, Internal Medicine      Cc: Melquiades Suarez MD

## 2021-09-15 NOTE — PROGRESS NOTES
Hospital follow-up Virtual PCP transitional care appointment has been scheduled with Dr. Emir Carter for Tuesday, 9/21/21 at 11:00 a.m. Pending patient discharge.   Yehuda Key, Care Management Specialist.

## 2021-09-16 ENCOUNTER — PATIENT OUTREACH (OUTPATIENT)
Dept: CASE MANAGEMENT | Age: 76
End: 2021-09-16

## 2021-09-16 NOTE — PROGRESS NOTES
Care Transitions Initial Call    Call within 2 business days of discharge: Yes     Patient: Selena Gonzalez Patient : 1945 MRN: [id number]    Last Discharge [address]       Complaint Diagnosis Description Type Department Provider    21 Abdominal Pain; Constipation Acute kidney injury superimposed on chronic kidney disease ([facility] [address] . .. ED to Hosp-Admission (Discharged) (ADMIT) Ld Akers MD; Rosina Gotti. .. Was this an external facility discharge? No     Challenges to be reviewed by the provider   Additional needs identified to be addressed with provider:  Yes    Admission -9/10 at [address] for pyelonephritis-UTI. Discharged on oral cipro to complete therapy. Returned on -9/15 to [facility] for increased/continued abdominal/flank pain. Urology consulted- CT indicated mild left pole renal hemorrhage. Monitored and follow up with Dr. Antoine Maxwell scheduled for 10/19- will be doing repeat MRI and CXR and lab work. MICHI- Nephrology consulted- felt to be related to: IVVD, ARB use and pyelo. Chronic hydronephrosis. Improvement noted with IVF's- recommended follow up in 1-2 weeks. 9/15/21- Cr+ was 4.82- slight increase. (BL- 21- 2.56). CO2- 20-sl improvement-remains low. New RX for NACO2- 325 mg BID. Anemia- H&H- 7.8 / 24.6. DM2-  A1C on 21- 5.9. takes no meds at this time. HTN, CAD-s/p stents, CVA and Left CEA surgery- BP values labile. Continue:   Toprol XL- 25 mg daily; amlodipine 10 mg daily; imdur 30 SR- 1/2 tab daily. ASA 81 mg daily. MELODY- due to CPAP recall- returned to using previous machine- working with [facility] to adjust settings for prescribed therapy. Constipation- no BM for 2 weeks reported-POA. PMH severe chronic diverticula noted on CT. New orders for:  Miralax daily, senna BID. Psoriasis- sees dermatology- most symptoms are with scalp and eyebrows.  CTN encouraged daughter to follow up with provider for options to help with managing symptoms. Depression:  Recent change from cymbalta to atarax PRN- daughter concerned about current symptoms related to SE of this medication. Asked her to discuss with PCP at [address] on - CTN updated through chart routing. Method of communication with provider : chart routing    [id number] related testing was not done at this time. Advance Care Planning:   Does patient have an Advance Directive:  on file, document does not designate medical decision makers; may benefit from updated document    Inpatient Readmission Risk score: Unplanned Readmit Risk Score: 24    Was this a readmission? yes   Patient stated reason for the admission: returned for increased-continued pain    Patients top risk factors for readmission: lack of knowledge about disease, level of motivation, medical condition- , medication management, polypharmacy and to be further assessed   Interventions to address risk factors: Education of patient/family/caregiver/guardian to support self-management-  , Assessment and support for treatment adherence and medication management-  and communication with PCP    Care Transition Nurse (CTN) contacted the patient by telephone to perform post hospital discharge assessment. Verified name and  with daughterJoselito as identifiers. Provided introduction to self, and explanation of the CTN role. CTN reviewed discharge instructions, medical action plan and red flags with family who verbalized understanding. Were discharge instructions available to patient? yes. Reviewed appropriate site of care based on symptoms and resources available to patient including: PCP, Specialist and [address] [place] [address] Road. Family given an opportunity to ask questions and does not have any further questions or concerns at this time. The family agrees to contact the PCP office for questions related to their healthcare.      Medication reconciliation was performed with family, who verbalizes understanding of administration of home medications. Advised obtaining a 90-day supply of all daily and as-needed medications. Referral to Pharm D needed: CTN will continue to assess     Home Health/Outpatient orders at discharge: none    Durable Medical Equipment ordered at discharge: None    Covid Risk Education    Educated patient about risk for severe COVID-19 due to risk factors according to CDC guidelines. CTN reviewed discharge instructions, medical action plan and red flag symptoms with the family who verbalized understanding. Discussed COVID vaccination status: no. Education provided on COVID-19 vaccination as appropriate. Discussed exposure protocols and quarantine with CDC Guidelines. Family was given an opportunity to verbalize any questions and concerns and agrees to contact CTN or health care provider for questions related to their healthcare. Was patient discharged with a pulse oximeter? NA    Discussed follow-up appointments. If no appointment was previously scheduled, appointment scheduling offered: not needed; daughter will call Nephrology to schedule follow up. Is follow up appointment scheduled within 7 days of discharge? yes.    [address] follow up appointment(s):   Future Appointments   Date Time Provider Seth Skelton   9/20/2021 11:30 AM Trinh Bland, PT [facility]   9/21/2021 11:00 AM Winter Baca MD [facility] BS AMB   9/22/2021 10:30 AM Trinh Bland, PT [facility]   9/27/2021 10:30 AM Trinh Bland, PT [facility]   9/29/2021 10:30 AM Trinh Bland, PT [facility]   1/26/2022  2:00 PM Winter Baca MD [facility] BS AMB   5/17/2022 10:30 AM MD JEFF Ontiveros BS AMB     Non-[facility] follow up appointment(s):   Nephrology- Dr. Kodak Pena- 1-2 wks recommended  Urology- Dr. Ric Clarke- 10/19 at 4 pm-  Per EMR- for MRI, CXR and labs  GI- Dr. Kirk Hernandez- TBD    Plan for follow-up call in 5-7 days based on severity of symptoms and risk factors. Plan for next call:     · Current symptoms: mental, alertness/sleepiness, itching-psoriasis. · Follow up and plan with nephrology  · Attended VV with PCP  · Embracing changes for increasing activity, hydration and better good choices  CTN provided contact information for future needs. Goals Addressed                 This Visit's Progress       General     Reduce Risk of Hospitalization        9/16/21- spoke with daughter, Jj Frey. She stated that father was asleep. She is asking if he should continue the atarax- she has been giving BID. She feels this is the cause of his symptoms- she relays: sleepy and jittery, not mentally clear and wonders if it also affects constipation. She said she has been reading the SE of the medications and feels he has several.   She shared that he had changed to atarax from celexa to help with: itching from psoriasis- she will reach out to dermatologist for options. He has the most itching in scalp and eyebrows. Discussion in general about kidney disease and clearance- especially related to medications. Encouraged her to ask the nephrologist before starting new meds and asking about which supplements he can and cannot take- especially OTC not RX. Explained the benefit of a good medication review and working with all providers to communicate possible SE. Understanding reason for taking-goal of therapy and ensuring achieving. She fills and uses a twice a day pill box system. He takes on own. She said her father has challenges with:     being active- has the two frozen shoulders, neuropathy in both feet. They both go to out pt PT together.  Eating healthier and would benefit from losing weight. He does like sweets- will purchase on own. She does live with him.  Constipation and maintaining hydration.      CTN explained how no sugar and diabetic labeled products are sweetened- usually with sorbitol or sugar alcohol which affects body as a laxative. Consumed in small amounts may help with constipation- reduce need for using the prescribed senna. Discussion and explanation about constipation - using stool softeners to help with retaining moisture, staying adequately hydrated- he does not like to drink many fluids- she said the nephrology \"told him how much he could drink\" she is not sure if it is a restriction or attempt to increase hydration- asked him to drink 3-4 glasses each day. He likes to drink coffee and cold tea- she has switched to some decaf for tea. Explained caffeine affects on diuresis. Encouraged her to use the water container from hospital- which is approx 30 ounces- ask him to drink on through out the day and be gone by end of day. Can flavor with something other than lemon. She relays working with [facility] provider on managing MELODY- returned to using older machine due to current recall on newer machine- [facility]- encouraged her to continue to communicate with team to troubleshoot therapy. Good sleep can help with: BP control, alertness, weight loss and overall demeanor. Explained benefits of walking on: weight, lowering BP, helping with constipation, etc.    Asked her to start walking and increase to the goal of doing 20-30  for 5 days a week. Daily would be even better.   Walking- not fast paced, etc.     LLC

## 2021-09-20 ENCOUNTER — APPOINTMENT (OUTPATIENT)
Dept: PHYSICAL THERAPY | Age: 76
End: 2021-09-20
Payer: MEDICARE

## 2021-09-22 ENCOUNTER — OFFICE VISIT (OUTPATIENT)
Dept: URGENT CARE | Age: 76
End: 2021-09-22
Payer: MEDICARE

## 2021-09-22 ENCOUNTER — APPOINTMENT (OUTPATIENT)
Dept: PHYSICAL THERAPY | Age: 76
End: 2021-09-22
Payer: MEDICARE

## 2021-09-22 VITALS — OXYGEN SATURATION: 95 % | RESPIRATION RATE: 18 BRPM | TEMPERATURE: 97.7 F | HEART RATE: 87 BPM

## 2021-09-22 DIAGNOSIS — Z20.822 SUSPECTED COVID-19 VIRUS INFECTION: Primary | ICD-10-CM

## 2021-09-22 LAB — SARS-COV-2 POC: NEGATIVE

## 2021-09-22 PROCEDURE — 1111F DSCHRG MED/CURRENT MED MERGE: CPT | Performed by: FAMILY MEDICINE

## 2021-09-22 PROCEDURE — 3288F FALL RISK ASSESSMENT DOCD: CPT | Performed by: FAMILY MEDICINE

## 2021-09-22 PROCEDURE — 99202 OFFICE O/P NEW SF 15 MIN: CPT | Performed by: FAMILY MEDICINE

## 2021-09-22 PROCEDURE — G8536 NO DOC ELDER MAL SCRN: HCPCS | Performed by: FAMILY MEDICINE

## 2021-09-22 PROCEDURE — G8417 CALC BMI ABV UP PARAM F/U: HCPCS | Performed by: FAMILY MEDICINE

## 2021-09-22 PROCEDURE — 1100F PTFALLS ASSESS-DOCD GE2>/YR: CPT | Performed by: FAMILY MEDICINE

## 2021-09-22 PROCEDURE — 87426 SARSCOV CORONAVIRUS AG IA: CPT | Performed by: FAMILY MEDICINE

## 2021-09-22 PROCEDURE — G8756 NO BP MEASURE DOC: HCPCS | Performed by: FAMILY MEDICINE

## 2021-09-22 PROCEDURE — G9717 DOC PT DX DEP/BP F/U NT REQ: HCPCS | Performed by: FAMILY MEDICINE

## 2021-09-22 PROCEDURE — G8427 DOCREV CUR MEDS BY ELIG CLIN: HCPCS | Performed by: FAMILY MEDICINE

## 2021-09-22 NOTE — PROGRESS NOTES
This patient was seen at [address] [place] [address] Urgent Care while in their vehicle due to COVID-19 pandemic with PPE and focused examination in order to decrease community viral transmission. The patient/guardian gave verbal consent to treat. The history is provided by the patient. Cough  The history is provided by the patient. This is a new problem. The current episode started more than 1 week ago. The problem occurs every few minutes. The problem has not changed since onset. The cough is productive of sputum. Patient reports a subjective fever - was not measured. The fever has been present for 1 - 2 days. Associated symptoms include chills, headaches and myalgias. Pertinent negatives include no chest pain, no rhinorrhea, no shortness of breath, no nausea and no vomiting. He has tried nothing for the symptoms. Risk factors: was in hospital recently for 2 weeks - he was sick for kidney infection and treated with antibiotics  He is not a smoker. His past medical history is significant for pneumonia.         Past Medical History:   Diagnosis Date    Abnormal stress echo 5/12/2015    Anemia NEC 03/2013    Anxiety     Borderline diabetes     CAD (coronary artery disease) 2009    cath [facility]) revealed 20% RCA and 50% 2nd diagonal    Calculus of kidney     CKD (chronic kidney disease) stage 4, GFR 15-29 ml/min (HCC)     COPD, mild (HCC)     Followed by pulmonary associates    DJD (degenerative joint disease)     Environmental allergies     Fatty liver     GERD (gastroesophageal reflux disease) 10/11/2009    Gout     Hypertension     MELODY on CPAP 10/11/2009    nasal pillows; pressure set at 10-11 -     Peripheral neuropathy 10/11/2009    bilateral feet (numbness)    Renal cell cancer, right ([address] 01/2021    RLS (restless legs syndrome) 10/11/2009    S/P coronary artery stent placement 05/12/2015    PCI./MALI to LCx, 8/2019 PCI/MALI Prox LAD (RCA 40-50% lesions)        Past Surgical History: Procedure Laterality Date    HX BUNIONECTOMY      HX CAROTID ENDARTERECTOMY Left 2020    Followed by Dr. Danette Hewitt      S/P stent to left circumflex in May, 2015; s/p stent to LAD in    [address]  ,     per heart cath of 2017, discrete 40 % stenosis. in proximal LAD, discrete 40 % stenosis in proximal RCA, 30% stenosis in distal RCA    HX HERNIA REPAIR      McTamaney/umbilical    HX KNEE REPLACEMENT Left 2011    HX ORTHOPAEDIC      left shoulder manipulation    HX UROLOGICAL      basket extraction of kidney stones    PA COLONOSCOPY FLX DX W/COLLJ SPEC WHEN PFRMD  2010         PA COLSC FLX W/RMVL OF TUMOR POLYP LESION SNARE TQ  2014              Family History   Problem Relation Age of Onset    Heart Disease Father     Hypertension Father     Other Father         abdominal aneurysm     Dementia Mother     Alzheimer Mother     Heart Disease Brother     Heart Attack Brother     Heart Disease Maternal Grandmother     Heart Disease Paternal Grandmother     Heart Attack Paternal Grandmother     Heart Disease Paternal Grandfather     Heart Attack Paternal Grandfather     Elevated Lipids Son     Elevated Lipids Daughter     Cancer Neg Hx     Diabetes Neg Hx     Stroke Neg Hx         Social History     Socioeconomic History    Marital status:      Spouse name: Cuong Baltazar Number of children: 2    Years of education: graduated then 4 year apprenticship    Highest education level: Associate degree: academic program   Occupational History    Not on file   Tobacco Use    Smoking status: Former Smoker     Packs/day: 1.00     Years: 10.00     Pack years: 10.00     Types: Cigarettes     Quit date: 1968     Years since quittin.7    Smokeless tobacco: Never Used   Vaping Use    Vaping Use: Never used   Substance and Sexual Activity    Alcohol use: No     Alcohol/week: 0.0 standard drinks    Drug use: No    Sexual activity: Yes     Partners: Female     Birth control/protection: None   Other Topics Concern     Service Not Asked    Blood Transfusions Not Asked    Caffeine Concern Not Asked    Occupational Exposure Not Asked    Hobby Hazards Not Asked    Sleep Concern Not Asked    Stress Concern Not Asked    Weight Concern Not Asked    Special Diet Not Asked    Back Care Not Asked    Exercise Not Asked    Bike Helmet Not Asked   [address] Not Asked    Self-Exams Not Asked   Social History Narrative    Not on file     Social Determinants of Health     Financial Resource Strain:     Difficulty of Paying Living Expenses:    Food Insecurity:     Worried About Running Out of Food in the Last Year:     [address] in the Last Year:    Transportation Needs:     Lack of Transportation (Medical):  Lack of Transportation (Non-Medical):    Physical Activity:     Days of Exercise per Week:     Minutes of Exercise per Session:    Stress:     Feeling of Stress :    Social Connections:     Frequency of Communication with Friends and Family:     Frequency of Social Gatherings with Friends and Family:     Attends Yarsani Services:     Active Member of Clubs or Organizations:     Attends Club or Organization Meetings:     Marital Status:    Intimate Partner Violence:     Fear of Current or Ex-Partner:     Emotionally Abused:     Physically Abused:     Sexually Abused: ALLERGIES: Bactrim [sulfamethoxazole-trimethoprim], Codeine, Lisinopril (bulk), Neurontin [gabapentin], Zostavax [zoster vaccine live (pf)], and Penicillins    Review of Systems   Constitutional: Positive for chills. HENT: Negative for rhinorrhea. Respiratory: Positive for cough. Negative for shortness of breath. Cardiovascular: Negative for chest pain. Gastrointestinal: Negative for nausea and vomiting. Musculoskeletal: Positive for myalgias.    Neurological: Positive for headaches. All other systems reviewed and are negative. Vitals:    09/22/21 1817   Pulse: 87   Resp: 18   Temp: 97.7 °F (36.5 °C)   SpO2: 95%       Physical Exam  Vitals and nursing note reviewed. Constitutional:       General: He is not in acute distress. Appearance: He is not ill-appearing. HENT:      Nose: No congestion. Pulmonary:      Effort: Pulmonary effort is normal. No respiratory distress. Breath sounds: Normal breath sounds. No wheezing, rhonchi or rales. Neurological:      Mental Status: He is alert. MDM    Procedures        ICD-10-CM ICD-9-CM    1. Suspected COVID-19 virus infection  Z20.822 V01.79 AMB POC SARS-COV-2      NOVEL CORONAVIRUS (COVID-19)       If covid negative and sxs persist/ worsen - go to ED       No orders of the defined types were placed in this encounter. No results found for any visits on 09/22/21. The patients condition was discussed with the patient and they understand. The patient is to follow up with primary care doctor. If signs and symptoms become worse the pt is to go to the ER. The patient is to take medications as prescribed.

## 2021-09-24 ENCOUNTER — TELEPHONE (OUTPATIENT)
Dept: INTERNAL MEDICINE CLINIC | Age: 76
End: 2021-09-24

## 2021-09-24 NOTE — TELEPHONE ENCOUNTER
Returned patient and dtrs call about confusion over medications s/p hosp visit   Upwards of 15 mins on the phone     Explained extensively medications patient was advised to stop  And start after visit     Explained bowel regimen ordered  Shes concerned about diarrhea , although not having any regularly only a few loose stools since med changes , advised her to monitor and log if its happening a lot and md can advise on altering regimen next week (  Just started senna , mirilax and furosemide )     Also said he had to stop hydroxyzine d/t unusual behavior so he isnt on that or celexa and mood has improved    She verbalized understanding of how to administer meds and is filling pill case for 2 weeks because this will make it easier for her she feels   She is aware of f/u visits coming up   And she said will call us with any concerns if they develop before next week      Em lpn

## 2021-09-24 NOTE — TELEPHONE ENCOUNTER
Spoke w/ pt's daughter, r/s pt's appt for 10/1 at 2:15 pm. Pt's daughter would like someone to reach out to her to discuss medications and help her w/ the medications from the hospital f/up.  Please reach out to pt's daughter and advise

## 2021-09-25 LAB
SARS-COV-2, NAA 2 DAY TAT: NORMAL
SARS-COV-2, NAA: NOT DETECTED

## 2021-09-27 ENCOUNTER — APPOINTMENT (OUTPATIENT)
Dept: PHYSICAL THERAPY | Age: 76
End: 2021-09-27
Payer: MEDICARE

## 2021-09-27 DIAGNOSIS — F41.8 ANXIETY ASSOCIATED WITH DEPRESSION: ICD-10-CM

## 2021-09-28 RX ORDER — CITALOPRAM 40 MG/1
TABLET, FILM COATED ORAL
Qty: 90 TABLET | Refills: 3 | Status: SHIPPED | OUTPATIENT
Start: 2021-09-28 | End: 2022-09-19

## 2021-10-01 ENCOUNTER — VIRTUAL VISIT (OUTPATIENT)
Dept: INTERNAL MEDICINE CLINIC | Age: 76
End: 2021-10-01

## 2021-10-07 ENCOUNTER — APPOINTMENT (OUTPATIENT)
Dept: CT IMAGING | Age: 76
End: 2021-10-07
Attending: EMERGENCY MEDICINE
Payer: MEDICARE

## 2021-10-07 ENCOUNTER — TELEPHONE (OUTPATIENT)
Dept: INTERNAL MEDICINE CLINIC | Age: 76
End: 2021-10-07

## 2021-10-07 DIAGNOSIS — R35.0 BENIGN PROSTATIC HYPERPLASIA WITH URINARY FREQUENCY: Primary | ICD-10-CM

## 2021-10-07 DIAGNOSIS — N40.1 BENIGN PROSTATIC HYPERPLASIA WITH URINARY FREQUENCY: Primary | ICD-10-CM

## 2021-10-07 LAB
APPEARANCE UR: CLEAR
BACTERIA URNS QL MICRO: NEGATIVE /HPF
BASOPHILS # BLD: 0.1 K/UL (ref 0–0.1)
BASOPHILS NFR BLD: 1 % (ref 0–1)
BILIRUB UR QL: NEGATIVE
COLOR UR: ABNORMAL
COMMENT, HOLDF: NORMAL
DIFFERENTIAL METHOD BLD: ABNORMAL
EOSINOPHIL # BLD: 0.4 K/UL (ref 0–0.4)
EOSINOPHIL NFR BLD: 3 % (ref 0–7)
EPITH CASTS URNS QL MICRO: ABNORMAL /LPF
ERYTHROCYTE [DISTWIDTH] IN BLOOD BY AUTOMATED COUNT: 14.7 % (ref 11.5–14.5)
GLUCOSE UR STRIP.AUTO-MCNC: NEGATIVE MG/DL
HCT VFR BLD AUTO: 31.5 % (ref 36.6–50.3)
HGB BLD-MCNC: 10.4 G/DL (ref 12.1–17)
HGB UR QL STRIP: ABNORMAL
HYALINE CASTS URNS QL MICRO: ABNORMAL /LPF (ref 0–5)
IMM GRANULOCYTES # BLD AUTO: 0.1 K/UL (ref 0–0.04)
IMM GRANULOCYTES NFR BLD AUTO: 1 % (ref 0–0.5)
KETONES UR QL STRIP.AUTO: NEGATIVE MG/DL
LEUKOCYTE ESTERASE UR QL STRIP.AUTO: NEGATIVE
LIPASE SERPL-CCNC: 466 U/L (ref 73–393)
LYMPHOCYTES # BLD: 4.1 K/UL (ref 0.8–3.5)
LYMPHOCYTES NFR BLD: 33 % (ref 12–49)
MCH RBC QN AUTO: 31.6 PG (ref 26–34)
MCHC RBC AUTO-ENTMCNC: 33 G/DL (ref 30–36.5)
MCV RBC AUTO: 95.7 FL (ref 80–99)
MONOCYTES # BLD: 1.2 K/UL (ref 0–1)
MONOCYTES NFR BLD: 10 % (ref 5–13)
NEUTS SEG # BLD: 6.6 K/UL (ref 1.8–8)
NEUTS SEG NFR BLD: 53 % (ref 32–75)
NITRITE UR QL STRIP.AUTO: NEGATIVE
NRBC # BLD: 0 K/UL (ref 0–0.01)
NRBC BLD-RTO: 0 PER 100 WBC
PH UR STRIP: 5.5 [PH] (ref 5–8)
PLATELET # BLD AUTO: 268 K/UL (ref 150–400)
PMV BLD AUTO: 9.7 FL (ref 8.9–12.9)
PROT UR STRIP-MCNC: 100 MG/DL
RBC # BLD AUTO: 3.29 M/UL (ref 4.1–5.7)
RBC #/AREA URNS HPF: ABNORMAL /HPF (ref 0–5)
SAMPLES BEING HELD,HOLD: NORMAL
SP GR UR REFRACTOMETRY: 1.01 (ref 1–1.03)
UR CULT HOLD, URHOLD: NORMAL
UROBILINOGEN UR QL STRIP.AUTO: 0.2 EU/DL (ref 0.2–1)
WBC # BLD AUTO: 12.5 K/UL (ref 4.1–11.1)
WBC URNS QL MICRO: ABNORMAL /HPF (ref 0–4)

## 2021-10-07 PROCEDURE — 74176 CT ABD & PELVIS W/O CONTRAST: CPT

## 2021-10-07 PROCEDURE — 83690 ASSAY OF LIPASE: CPT

## 2021-10-07 PROCEDURE — 81001 URINALYSIS AUTO W/SCOPE: CPT

## 2021-10-07 PROCEDURE — 80053 COMPREHEN METABOLIC PANEL: CPT

## 2021-10-07 PROCEDURE — 36415 COLL VENOUS BLD VENIPUNCTURE: CPT

## 2021-10-07 PROCEDURE — 99284 EMERGENCY DEPT VISIT MOD MDM: CPT

## 2021-10-07 PROCEDURE — 85025 COMPLETE CBC W/AUTO DIFF WBC: CPT

## 2021-10-08 ENCOUNTER — HOSPITAL ENCOUNTER (EMERGENCY)
Age: 76
Discharge: HOME | End: 2021-10-08
Attending: EMERGENCY MEDICINE | Admitting: EMERGENCY MEDICINE
Payer: MEDICARE

## 2021-10-08 ENCOUNTER — TELEPHONE (OUTPATIENT)
Dept: INTERNAL MEDICINE CLINIC | Age: 76
End: 2021-10-08

## 2021-10-08 ENCOUNTER — APPOINTMENT (OUTPATIENT)
Dept: VASCULAR SURGERY | Age: 76
End: 2021-10-08
Attending: EMERGENCY MEDICINE
Payer: MEDICARE

## 2021-10-08 VITALS
OXYGEN SATURATION: 97 % | TEMPERATURE: 98.2 F | SYSTOLIC BLOOD PRESSURE: 141 MMHG | DIASTOLIC BLOOD PRESSURE: 83 MMHG | RESPIRATION RATE: 20 BRPM | HEART RATE: 102 BPM

## 2021-10-08 DIAGNOSIS — M79.604 PAIN OF RIGHT LOWER EXTREMITY: Primary | ICD-10-CM

## 2021-10-08 DIAGNOSIS — N18.9 CHRONIC KIDNEY DISEASE, UNSPECIFIED CKD STAGE: ICD-10-CM

## 2021-10-08 DIAGNOSIS — R10.9 CHRONIC FLANK PAIN: ICD-10-CM

## 2021-10-08 DIAGNOSIS — M79.604 PAIN AND SWELLING OF RIGHT LOWER EXTREMITY: Primary | ICD-10-CM

## 2021-10-08 DIAGNOSIS — G89.29 CHRONIC FLANK PAIN: ICD-10-CM

## 2021-10-08 DIAGNOSIS — M79.89 PAIN AND SWELLING OF RIGHT LOWER EXTREMITY: Primary | ICD-10-CM

## 2021-10-08 LAB
ALBUMIN SERPL-MCNC: 3.9 G/DL (ref 3.5–5)
ALBUMIN/GLOB SERPL: 0.8 {RATIO} (ref 1.1–2.2)
ALP SERPL-CCNC: 110 U/L (ref 45–117)
ALT SERPL-CCNC: 27 U/L (ref 12–78)
ANION GAP SERPL CALC-SCNC: 8 MMOL/L (ref 5–15)
AST SERPL-CCNC: 24 U/L (ref 15–37)
BILIRUB SERPL-MCNC: 0.5 MG/DL (ref 0.2–1)
BUN SERPL-MCNC: 62 MG/DL (ref 6–20)
BUN/CREAT SERPL: 13 (ref 12–20)
CALCIUM SERPL-MCNC: 9.7 MG/DL (ref 8.5–10.1)
CHLORIDE SERPL-SCNC: 107 MMOL/L (ref 97–108)
CO2 SERPL-SCNC: 24 MMOL/L (ref 21–32)
CREAT SERPL-MCNC: 4.7 MG/DL (ref 0.7–1.3)
GLOBULIN SER CALC-MCNC: 5.1 G/DL (ref 2–4)
GLUCOSE SERPL-MCNC: 106 MG/DL (ref 65–100)
POTASSIUM SERPL-SCNC: 3.7 MMOL/L (ref 3.5–5.1)
PROT SERPL-MCNC: 9 G/DL (ref 6.4–8.2)
SODIUM SERPL-SCNC: 139 MMOL/L (ref 136–145)

## 2021-10-08 PROCEDURE — 93971 EXTREMITY STUDY: CPT

## 2021-10-08 PROCEDURE — 74011250636 HC RX REV CODE- 250/636: Performed by: EMERGENCY MEDICINE

## 2021-10-08 RX ADMIN — SODIUM CHLORIDE 1000 ML: 9 INJECTION, SOLUTION INTRAVENOUS at 01:44

## 2021-10-08 NOTE — TELEPHONE ENCOUNTER
----- Message from Melvin Morrell sent at 10/8/2021  4:37 PM EDT -----  Regarding: Dr. Corona Ped: [phone]  Level 1/Escalated Issue      Caller's first and last name and relationship (if not the patient): Nanette Ram - Daughter      Best contact number(s):[phone]      What are the symptoms: Severe leg pain      Transfer successful - yes/no (include outcome): no/no answer      Transfer declined - yes/no (include reason): no/no answer      Was caller advised to seek appropriate level of care - yes/no: yes      Details to clarify the request: PT went to [facility] ER night of 10/7/21. CT Scan showed enlarged Prostate and high white blood cell count. PT continues to have pain. In addition to leg pain, PT feels like he can't sit still, frequent urge to urinate.          Message from [facility]

## 2021-10-08 NOTE — ED TRIAGE NOTES
Pt reports he has had leg pain x1 week. Pt reports he has had fecal incontinence for the last few days (reports pooping every time he tries to pee). Pt reports he had a partial right neprectomy 2 weeks ago.       Pt denies fever, difficulty urinating

## 2021-10-08 NOTE — TELEPHONE ENCOUNTER
Patient's daughter, Roel Daniel, states she needs a call back Asap in reference to patient's Leg Pain & [facility] ER visit last night, 10/7/21. Trevor Thomas states she is concerned with Lab results from last night & also needs to discuss Medication Changes made by hospital. Trevor Thomas states patient is also having difficulty sleeping due to the Leg Pain is so bad. Please call to discuss & advise.  Thank you

## 2021-10-08 NOTE — ED NOTES
10:46 PM    65 yo M with hx of kidney cancer and recent right partial nephrectomy presents for 2 weeks of left sided flank pain, chills, and fecal incontinence while he urinates. Recent admission 3 weeks ago for MICHI and pyelonephritis. I have evaluated the patient as the Provider in Triage. I have reviewed His vital signs and the triage nurse assessment. I have talked with the patient and any available family and advised that I am the provider in triage and have ordered the appropriate study to initiate their work up based on the clinical presentation during my assessment. I have advised that the patient will be accommodated in the Main ED as soon as possible. I have also requested to contact the triage nurse or myself immediately if the patient experiences any changes in their condition during this brief waiting period.   DORY Orozco

## 2021-10-09 NOTE — ED PROVIDER NOTES
15-year-old male with history as below, presents to the emergency department noting multiple complaints of various convexities. He notes persistent left flank plain that he has had since his prior procedure over the last couple of months. He states this is no different than it usually is. He also notes approximately 1 week history of predominantly right lower extremity swelling that seems to improve with elevation. He notes achy pain to his right lower extremity. He denies any prior DVT/PE but notes concern for DVT. He is not on any blood thinning medications. He does have a history of CKD stage IV and follows with nephrology states that he has an appointment scheduled with them coming up. He also states that he has been alternating between constipation and diarrhea and states that when he has diarrhea sometimes he will have bowel movements while he is urinating when he also has. He denies any fever, chills, dysuria, hematuria. Denies any nausea, vomiting, cough, URI symptoms.            Past Medical History:   Diagnosis Date    Abnormal stress echo 5/12/2015    Anemia NEC 03/2013    Anxiety     Borderline diabetes     CAD (coronary artery disease) 2009    cath [facility]) revealed 20% RCA and 50% 2nd diagonal    Calculus of kidney     CKD (chronic kidney disease) stage 4, GFR 15-29 ml/min (HCC)     COPD, mild (HCC)     Followed by pulmonary associates    DJD (degenerative joint disease)     Environmental allergies     Fatty liver     GERD (gastroesophageal reflux disease) 10/11/2009    Gout     Hypertension     MELODY on CPAP 10/11/2009    nasal pillows; pressure set at 10-11 -     Peripheral neuropathy 10/11/2009    bilateral feet (numbness)    Renal cell cancer, right ([address] 01/2021    RLS (restless legs syndrome) 10/11/2009    S/P coronary artery stent placement 05/12/2015    PCI./MALI to LCx, 8/2019 PCI/MALI Prox LAD (RCA 40-50% lesions)       Past Surgical History:   Procedure Laterality Date    HX BUNIONECTOMY      HX CAROTID ENDARTERECTOMY Left 2020    Followed by Dr. Eric Snow      S/P stent to left circumflex in May, 2015; s/p stent to LAD in    [address]  ,     per heart cath of 2017, discrete 40 % stenosis. in proximal LAD, discrete 40 % stenosis in proximal RCA, 30% stenosis in distal RCA    HX HERNIA REPAIR      McTamaney/umbilical    HX KNEE REPLACEMENT Left 2011    HX ORTHOPAEDIC      left shoulder manipulation    HX UROLOGICAL      basket extraction of kidney stones    LA COLONOSCOPY FLX DX W/COLLJ SPEC WHEN PFRMD  2010         LA COLSC FLX W/RMVL OF TUMOR POLYP LESION SNARE TQ  2014              Family History:   Problem Relation Age of Onset    Heart Disease Father     Hypertension Father     Other Father         abdominal aneurysm     Dementia Mother     Alzheimer Mother     Heart Disease Brother     Heart Attack Brother     Heart Disease Maternal Grandmother     Heart Disease Paternal Grandmother     Heart Attack Paternal Grandmother     Heart Disease Paternal Grandfather     Heart Attack Paternal Grandfather     Elevated Lipids Son     Elevated Lipids Daughter     Cancer Neg Hx     Diabetes Neg Hx     Stroke Neg Hx        Social History     Socioeconomic History    Marital status:      Spouse name: Genie Moses Number of children: 2    Years of education: graduated then 4 year apprenticship    Highest education level: Associate degree: academic program   Occupational History    Not on file   Tobacco Use    Smoking status: Former Smoker     Packs/day: 1.00     Years: 10.00     Pack years: 10.00     Types: Cigarettes     Quit date: 1968     Years since quittin.8    Smokeless tobacco: Never Used   Vaping Use    Vaping Use: Never used   Substance and Sexual Activity    Alcohol use: No     Alcohol/week: 0.0 standard drinks    Drug use: No    Sexual activity: Yes     Partners: Female     Birth control/protection: None   Other Topics Concern     Service Not Asked    Blood Transfusions Not Asked    Caffeine Concern Not Asked    Occupational Exposure Not Asked    Hobby Hazards Not Asked    Sleep Concern Not Asked    Stress Concern Not Asked    Weight Concern Not Asked    Special Diet Not Asked    Back Care Not Asked    Exercise Not Asked    Bike Helmet Not Asked   [address] Not Asked    Self-Exams Not Asked   Social History Narrative    Not on file     Social Determinants of Health     Financial Resource Strain:     Difficulty of Paying Living Expenses:    Food Insecurity:     Worried About Running Out of Food in the Last Year:     [address] in the Last Year:    Transportation Needs:     Lack of Transportation (Medical):  Lack of Transportation (Non-Medical):    Physical Activity:     Days of Exercise per Week:     Minutes of Exercise per Session:    Stress:     Feeling of Stress :    Social Connections:     Frequency of Communication with Friends and Family:     Frequency of Social Gatherings with Friends and Family:     Attends Pentecostal[employer] Services:     Active Member of Clubs or Organizations:     Attends Club or Organization Meetings:     Marital Status:    Intimate Partner Violence:     Fear of Current or Ex-Partner:     Emotionally Abused:     Physically Abused:     Sexually Abused: ALLERGIES: Bactrim [sulfamethoxazole-trimethoprim], Codeine, Lisinopril (bulk), Neurontin [gabapentin], Zostavax [zoster vaccine live (pf)], and Penicillins    Review of Systems   Constitutional: Positive for chills. Negative for activity change, appetite change and fever. HENT: Negative for congestion, rhinorrhea, sinus pain, sneezing and sore throat. Eyes: Negative for photophobia and visual disturbance. Respiratory: Negative for cough and shortness of breath. Cardiovascular: Positive for leg swelling.  Negative for chest pain. Gastrointestinal: Negative for abdominal pain, blood in stool, constipation, diarrhea, nausea and vomiting. Genitourinary: Positive for flank pain. Negative for difficulty urinating, dysuria, hematuria, penile pain and testicular pain. Musculoskeletal: Positive for myalgias. Negative for arthralgias, back pain and neck pain. Skin: Negative for rash and wound. Neurological: Negative for syncope, weakness, light-headedness, numbness and headaches. Psychiatric/Behavioral: Negative for self-injury and suicidal ideas. All other systems reviewed and are negative. Vitals:    10/07/21 2111 10/08/21 0100 10/08/21 0144 10/08/21 0223   BP: (!) 162/89 (!) 149/84  (!) 141/83   Pulse: 100 99  (!) 102   Resp: 18 17  20   Temp: 97.6 °F (36.4 °C)   98.2 °F (36.8 °C)   SpO2: 96% 96% 97% 97%            Physical Exam  Vitals and nursing note reviewed. Constitutional:       General: He is not in acute distress. Appearance: Normal appearance. He is well-developed. He is obese. He is not diaphoretic. HENT:      Head: Normocephalic and atraumatic. Nose: Nose normal.   Eyes:      Extraocular Movements: Extraocular movements intact. Conjunctiva/sclera: Conjunctivae normal.      Pupils: Pupils are equal, round, and reactive to light. Cardiovascular:      Rate and Rhythm: Normal rate and regular rhythm. Heart sounds: Normal heart sounds. Pulmonary:      Effort: Pulmonary effort is normal.      Breath sounds: Normal breath sounds. Abdominal:      General: There is no distension. Palpations: Abdomen is soft. Tenderness: There is abdominal tenderness (Mild without guarding or peritonitis.) in the left lower quadrant. There is no right CVA tenderness, left CVA tenderness, guarding or rebound. Musculoskeletal:         General: Tenderness (Mild tenderness throughout leg.) present. Cervical back: Neck supple.       Right lower leg: Edema (Mild bilateral, right slightly greater than left) present. Left lower leg: Edema present. Skin:     General: Skin is warm and dry. Neurological:      General: No focal deficit present. Mental Status: He is alert and oriented to person, place, and time. Cranial Nerves: No cranial nerve deficit. Sensory: No sensory deficit. Motor: No weakness. Coordination: Coordination normal.          MDM    64 y.o. with right leg pain and swelling as well as chronic lower flank pain. Afebrile with mild tachycardia, given IV fluid bolus. .  Stable blood pressure, afebrile no distress. Labs returned showing no significant abnormalities. Creatinine elevated at 4.70, BUN 62, similar to prior measures. UA negative. CT abdomen pelvis shows no significant change compared to prior study. Chronic mild left hydronephrosis and proximal left hydroureter but no obstructing renal stones. No acute abnormalities. Lower extremity ultrasound negative for acute DVT. Likely dependent edema. Recommended elevation and compression stockings and follow-up with nephrologist as scheduled for further evaluation. Return precautions were given for worsening or concerns. This plan was discussed with patient and his wife at the bedside and they stated both understanding and agreement. Please note that this dictation was completed with ZEB, the [employer] voice recognition software. Quite often unanticipated grammatical, syntax, homophones, and other interpretive errors are inadvertently transcribed by the computer software. Please disregard these errors. Please excuse any errors that have escaped final proofreading.     Procedures

## 2021-10-11 ENCOUNTER — PATIENT OUTREACH (OUTPATIENT)
Dept: CASE MANAGEMENT | Age: 76
End: 2021-10-11

## 2021-10-11 RX ORDER — TRAMADOL HYDROCHLORIDE 50 MG/1
50 TABLET ORAL
Qty: 30 TABLET | Refills: 0 | Status: SHIPPED | OUTPATIENT
Start: 2021-10-11 | End: 2021-11-10

## 2021-10-11 NOTE — TELEPHONE ENCOUNTER
Patient's daughter, Beth Lundberg, states she is still waiting on a Call back to be advised what to do & Plan of Care from Dr. Bernardo Pepe on lab results that Beth Lundberg states there are concerns about. Please call to discuss & advise.  Thank you

## 2021-10-11 NOTE — TELEPHONE ENCOUNTER
Returned call to patient and dtr  Jordan Payne   Verified identity and updated on msg below from md :    \"Notify family labs done on 10/7 show mildly elevated white blood cell count and anemia improving with hemoglobin 10.4. This is increased from 7.8 on 9/15. Kidney tests remain abnormal. Calcium mildly elevated likely due to kidney dysfunction. Should be following routinely with nephrologist. Pancreatic enzyme elevated.  However CT scan shows that the pancreas is normal. I have sent in tramadol for his leg pain\"    dtr is concerned about his prostate  She also is concerned about his pain   She is concerned about him getting in with kidney dr is not until November   She has taken him off of his citalopram and his hydroxyzine as they were making him confused     Notified her of mds msg , and for her to give the tramadol per orders for pain    notified her she would have to discuss prostate and medications at appt on 10-19   If he has pain with urination he can let us know   dtr requested md be sent an updated, note forwarded   Em lpn

## 2021-10-11 NOTE — PROGRESS NOTES
10/11/2021  1:03 PM    Patient outreach by this ACM today to perform initial post-discharge assessment; two patient identifiers verified. Educated patient about risk for severe COVID-19 due to risk factors according to CDC guidelines. ACM reviewed discharge instructions, medical action plan and red flag symptoms with the patient who verbalized understanding. Discussed COVID vaccination status: Yes; patient reports that he received two COVID-19 vaccine doses in [employer] series. Education provided on COVID-19 vaccination as appropriate. Discussed exposure protocols and quarantine with CDC Guidelines. Patient was given an opportunity to verbalize any questions and concerns and agrees to contact ACM or health care provider for questions related to their healthcare. Patient was advised to follow-up with his PCP (Dr. Vic Joshua) and his Nephrologist post-discharge. Daughter reports that she has contacted Dr. Olvin Bailey (Nephrologist) post-discharge and is waiting for a return phone call to schedule patient follow-up. Daughter reports that patient has follow-up scheduled with his Urologist on 10/18/21. Future Appointments:  Future Appointments   Date Time Provider Seth Skelton   10/19/2021 10:00 AM Denice Burrell MD [facility] BS AMB   1/26/2022  2:00 PM Denice Burrell MD [facility] BS AMB   5/17/2022 10:30 AM MD JEFF Ashford [facility] BS AMB          *Per chart review, patient is currently followed by Aric Sarabia RN for Complex Case Management. Encounter routed by this ACM today for continuity of care and future follow-up/patient outreach.

## 2021-10-11 NOTE — TELEPHONE ENCOUNTER
Please notify daughter that a referral to urology, Dr. Jada Ashby. They can call his office directly for an appointment. If the patient's prostate is enlarged Dr. Roxanna Zacarias would be helpful for that. Advise daughter to call Dr. Preethi Mcqueen office to see if they can get a sooner appointment regarding his renal failure. Dr. Kyle Erickson is listed as his nephrologist currently.

## 2021-10-13 ENCOUNTER — TELEPHONE (OUTPATIENT)
Dept: INTERNAL MEDICINE CLINIC | Age: 76
End: 2021-10-13

## 2021-10-13 NOTE — TELEPHONE ENCOUNTER
Called pt to r/s appt w/ Dr. Shelia Degroot, got him scheduled for 10/28 with Dr. Jaylene Lynch. Pt's daughter is concerned because he's been in the hospital 3 times recently. Would like to get him in sooner than the 28th to get his medicine straight. Is there someone else he can see?     Jasmyn Dobbs [phone]

## 2021-10-19 ENCOUNTER — PATIENT OUTREACH (OUTPATIENT)
Dept: CASE MANAGEMENT | Age: 76
End: 2021-10-19

## 2021-10-19 NOTE — PROGRESS NOTES
Goals Addressed                 This Visit's Progress     Attends follow up appointments on schedule   Improving     09/01/21    Will attend follow up appts with urology, nephrology, and cardiology   PCP office will contact patient to schedule JANINA appt   Will attend all scheduled appointments      10/19/21    Attended Urology appointment today   Will attend PCP follow up on 10/20/21   Will inquire about [facility] for help in the home   ACM will follow again in 12-14 days. pmk       Independent self-management skills   Improving     09/01/21   Outreach completed, continue to have back and shoulder pain- receiving PT by     Amber Cain, PT  Mrm Op Pt    Encouraged to take  Robaxin  and tylenol as ordered   Educated on safety precautions   Instructed patient on how to access supportive resources   Will attend all follow up appointments as ordered. Pmk    10/19/21    Outreach completed, spoke to daughter Mau Stafford) whom patient gave this ACM permission to speak for him   Patient very weak still according to daughter, has follow up with urology today   Will take his pain medication, still some discomfort   Will ask about [facility] in the home when they follow up with PCP on 10/20/21   Educated on the Importance of Supportive resources in place to maintain patient in the community (ie. Home Health, DME equipment, refer to, medication assistant plan, [facility] etc.)   ACM contact information given. Will follow up in 10-14 days.  [address] [place] [address]

## 2021-10-20 ENCOUNTER — OFFICE VISIT (OUTPATIENT)
Dept: INTERNAL MEDICINE CLINIC | Age: 76
End: 2021-10-20
Payer: MEDICARE

## 2021-10-20 VITALS
HEIGHT: 72 IN | SYSTOLIC BLOOD PRESSURE: 106 MMHG | DIASTOLIC BLOOD PRESSURE: 68 MMHG | OXYGEN SATURATION: 99 % | RESPIRATION RATE: 18 BRPM | BODY MASS INDEX: 31.42 KG/M2 | HEART RATE: 66 BPM | TEMPERATURE: 97.2 F | WEIGHT: 232 LBS

## 2021-10-20 DIAGNOSIS — F41.8 ANXIETY ASSOCIATED WITH DEPRESSION: ICD-10-CM

## 2021-10-20 DIAGNOSIS — Z23 NEEDS FLU SHOT: ICD-10-CM

## 2021-10-20 DIAGNOSIS — I10 ESSENTIAL HYPERTENSION: Primary | ICD-10-CM

## 2021-10-20 DIAGNOSIS — N18.4 CKD (CHRONIC KIDNEY DISEASE) STAGE 4, GFR 15-29 ML/MIN (HCC): ICD-10-CM

## 2021-10-20 PROCEDURE — 99214 OFFICE O/P EST MOD 30 MIN: CPT | Performed by: FAMILY MEDICINE

## 2021-10-20 PROCEDURE — G8417 CALC BMI ABV UP PARAM F/U: HCPCS | Performed by: FAMILY MEDICINE

## 2021-10-20 PROCEDURE — 90694 VACC AIIV4 NO PRSRV 0.5ML IM: CPT | Performed by: FAMILY MEDICINE

## 2021-10-20 PROCEDURE — G8536 NO DOC ELDER MAL SCRN: HCPCS | Performed by: FAMILY MEDICINE

## 2021-10-20 PROCEDURE — G8427 DOCREV CUR MEDS BY ELIG CLIN: HCPCS | Performed by: FAMILY MEDICINE

## 2021-10-20 PROCEDURE — G9717 DOC PT DX DEP/BP F/U NT REQ: HCPCS | Performed by: FAMILY MEDICINE

## 2021-10-20 PROCEDURE — G8752 SYS BP LESS 140: HCPCS | Performed by: FAMILY MEDICINE

## 2021-10-20 PROCEDURE — G8754 DIAS BP LESS 90: HCPCS | Performed by: FAMILY MEDICINE

## 2021-10-20 PROCEDURE — 3288F FALL RISK ASSESSMENT DOCD: CPT | Performed by: FAMILY MEDICINE

## 2021-10-20 PROCEDURE — 1100F PTFALLS ASSESS-DOCD GE2>/YR: CPT | Performed by: FAMILY MEDICINE

## 2021-10-20 RX ORDER — FUROSEMIDE 80 MG/1
40 TABLET ORAL DAILY
COMMUNITY
Start: 2021-10-18

## 2021-10-20 NOTE — PROGRESS NOTES
Identified pt with two pt identifiers(name and ). Chief Complaint   Patient presents with   [facility] Follow Up     Left Flank Pain 10/8/2021      Vitals:    10/20/21 0816   BP: 106/68   Pulse: 66   Resp: 18   Temp: 97.2 °F (36.2 °C)   SpO2: 99%   Weight: 232 lb (105.2 kg)   Height: 6' (1.829 m)   PainSc:   0 - No pain      Health Maintenance Due   Topic    Eye Exam Retinal or Dilated     Foot Exam Q1     Flu Vaccine (1)    COVID-19 Vaccine (3 - Pfizer booster)       Depression Screening:  :     3 most recent PHQ Screens 10/20/2021   PHQ Not Done -   Little interest or pleasure in doing things Nearly every day   Feeling down, depressed, irritable, or hopeless More than half the days   Total Score PHQ 2 5   Trouble falling or staying asleep, or sleeping too much Not at all   Feeling tired or having little energy Not at all   Poor appetite, weight loss, or overeating Not at all   Feeling bad about yourself - or that you are a failure or have let yourself or your family down More than half the days   Trouble concentrating on things such as school, work, reading, or watching TV Not at all   Moving or speaking so slowly that other people could have noticed; or the opposite being so fidgety that others notice Not at all   Thoughts of being better off dead, or hurting yourself in some way Not at all   PHQ 9 Score 7   How difficult have these problems made it for you to do your work, take care of your home and get along with others Somewhat difficult        Fall Risk Assessment:  :     Fall Risk Assessment, last 12 mths 10/20/2021   Able to walk? Yes   Fall in past 12 months? 1   Do you feel unsteady? 0   Are you worried about falling 0   Is TUG test greater than 12 seconds? 0   Is the gait abnormal? 0   Number of falls in past 12 months 1   Fall with injury? 0       Abuse Screening:  :     Abuse Screening Questionnaire 2021   Do you ever feel afraid of your partner?  N   Are you in a relationship with someone who physically or mentally threatens you? N   Is it safe for you to go home? Y        Coordination of Care Questionnaire:  :     1. Have you been to the ER, urgent care clinic since your last visit? Hospitalized since your last visit? Yes ED [facility] 10/8/2021 for Left Side Flank Pain     2. Have you seen or consulted any other health care providers outside of the [address] [place] [address] since your last visit? Include any pap smears or colon screening.  No

## 2021-10-20 NOTE — PROGRESS NOTES
SUBJECTIVE:   Mr. Mary Alice Jameson is a 64 y.o. male who is here for an ER follow-up. Pt's PCP is Dr. Tova Cantu. CKD: Pt presented to [address] [place] [address] on 10/08/21 for c/o leg pain, L flank pain, and incontinence. Pt indicates an appointment with the Nephrologist on 11/24/21. Pt's wife indicates the pt was admitted into the hospital for pyelonephritis twice in September. Pt had an appointment with Dr. Francine Merlin (surgeon) for his 6 month follow-up. Pt's wife remarks Dr. Francine Merlin performed his partial nephrectomy. He reports having difficulty with eating at this time d/t not wanting to eat as much. Pt remarks enjoying [employer] and Coffee in the mornings. Pt notes his sleep has been difficult d/t not having his proper CPAP machine. He indicates returning his CPAP machine that was recalled but has since began using his old CPAP machine. PREVENTIVE:  Flu: Requested  COVID Booster: Interested when available    At this time, he is otherwise doing well and has brought no other complaints to my attention today. For a list of the medical issues addressed today, see the assessment and plan below.     PMH:   Past Medical History:   Diagnosis Date    Abnormal stress echo 5/12/2015    Anemia NEC 03/2013    Anxiety     Borderline diabetes     CAD (coronary artery disease) 2009    cath [facility]) revealed 20% RCA and 50% 2nd diagonal    Calculus of kidney     CKD (chronic kidney disease) stage 4, GFR 15-29 ml/min (HCC)     COPD, mild (HCC)     Followed by pulmonary associates    DJD (degenerative joint disease)     Environmental allergies     Fatty liver     GERD (gastroesophageal reflux disease) 10/11/2009    Gout     Hypertension     MELODY on CPAP 10/11/2009    nasal pillows; pressure set at 10-11 -     Peripheral neuropathy 10/11/2009    bilateral feet (numbness)    Renal cell cancer, right ([address] 01/2021    RLS (restless legs syndrome) 10/11/2009    S/P coronary artery stent placement 05/12/2015 PCI./MALI to LCx, 8/2019 PCI/MALI Prox LAD (RCA 40-50% lesions)     PSH:  has a past surgical history that includes pr colonoscopy flx dx w/collj spec when pfrmd (8/5/2010); pr colsc flx w/rmvl of tumor polyp lesion snare tq (9/24/2014); hx hernia repair; hx bunionectomy (2019); hx urological; hx orthopaedic; hx knee replacement (Left, 07/23/2011); hx carotid endarterectomy (Left, 01/2020); hx heart catheterization (2009, 7/17); and hx coronary stent placement. All: is allergic to bactrim [sulfamethoxazole-trimethoprim], codeine, lisinopril (bulk), neurontin [gabapentin], zostavax [zoster vaccine live (pf)], and penicillins. MEDS:   Current Outpatient Medications   Medication Sig    furosemide (LASIX) 80 mg tablet Take 80 mg by mouth daily.  traMADoL (ULTRAM) 50 mg tablet Take 1 Tablet by mouth every six (6) hours as needed for Pain for up to 30 days. Max Daily Amount: 200 mg.  citalopram (CELEXA) 40 mg tablet TAKE 1 TABLET BY MOUTH EVERY DAY (Patient taking differently: 20 mg.)    senna-docusate (PERICOLACE) 8.6-50 mg per tablet Take 2 Tablets by mouth daily.  sodium bicarbonate 325 mg tablet Take 1 Tablet by mouth two (2) times a day.  amLODIPine (NORVASC) 10 mg tablet Take 1 Tablet by mouth daily.  gabapentin (NEURONTIN) 300 mg capsule TAKE 1 CAPSULE BY MOUTH EVERY DAY AT BEDTIME FOR 30 DAYS    pantoprazole (PROTONIX) 40 mg tablet TAKE 1 TABLET BY MOUTH EVERY DAY    metoprolol succinate (TOPROL-XL) 25 mg XL tablet TAKE 1 TABLET BY MOUTH EVERY DAY    fluocinoNIDE (LIDEX) 0.05 % external solution APPLY TO ITCHY SPOTS ON SCALP AS NEEDED FOR FLARE    albuterol (ProAir HFA) 90 mcg/actuation inhaler Take 1 Puff by inhalation every four (4) hours.  umeclidinium-vilanteroL (Anoro Ellipta) 62.5-25 mcg/actuation inhaler Take 1 Puff by inhalation daily.  rOPINIRole (Requip) 0.5 mg tablet Take 0.5 mg by mouth nightly.     ketoconazole (NIZORAL) 2 % shampoo Apply 1 mL to affected area daily as needed for Itching.  atorvastatin (Lipitor) 80 mg tablet Take 80 mg by mouth nightly.  isosorbide mononitrate ER (IMDUR) 30 mg tablet Take 0.5 Tabs by mouth daily.  nitroglycerin (NITROSTAT) 0.4 mg SL tablet TAKE 1 TABLET BY SUBLINGUAL ROUTE EVERY 5 MINUTES AS NEEDED FOR CHEST PAIN.  VITAMIN D3 2,000 unit cap capsule Take 2,000 Units by mouth daily.  GLUCOSAMINE HCL/CHONDR PETERSEN A NA (GLUCOSAMINE-CHONDROITIN) 750-600 mg Tab Take 1 Tab by mouth daily. Brand: Move Free    aspirin delayed-release 81 mg tablet Take 81 mg by mouth nightly.  MULTIVITS W-FE,OTHER MIN (CENTRUM PO) Take 1 Tab by mouth daily. No current facility-administered medications for this visit. FH: family history includes Alzheimer in his mother; Dementia in his mother; Elevated Lipids in his daughter and son; Heart Attack in his brother, paternal grandfather, and paternal grandmother; Heart Disease in his brother, father, maternal grandmother, paternal grandfather, and paternal grandmother; Hypertension in his father; Other in his father. SH:  reports that he quit smoking about 53 years ago. His smoking use included cigarettes. He has a 10.00 pack-year smoking history. He has never used smokeless tobacco. He reports that he does not drink alcohol and does not use drugs.      Review of Systems - History obtained from the patient  General ROS: no fever, chills, fatigue, body aches  Psychological ROS: no change in anxiety, depression, SI/HI  Ophthalmic ROS: no blurred vision, myopia, double vision  ENT ROS: no dysphagia, otalgia, otorrhea, rhinorrhea, post nasal drip  Respiratory ROS: no cough, shortness of breath, or wheezing  Cardiovascular ROS: no chest pain or dyspnea on exertion  Gastrointestinal ROS: no abdominal pain, change in bowel habits, or black or bloody stools  Genito-Urinary ROS: no frequency, urgency, incontinence, dysuria, hematuria  Musculoskeletal ROS: no arthralgia, myalgia  Neurological ROS: no headaches, dizziness, lightheadedness, tremors, seizures  Dermatological ROS: no rash or lesions    OBJECTIVE:   Vitals:   Visit Vitals  /68 (BP 1 Location: Right arm, BP Patient Position: Sitting, BP Cuff Size: Adult)   Pulse 66   Temp 97.2 °F (36.2 °C)   Resp 18   Ht 6' (1.829 m)   Wt 232 lb (105.2 kg)   SpO2 99%   BMI 31.46 kg/m²      Gen: Pleasant 64 y.o.  male in NAD. HEART: RRR, No M/G/R.      LUNGS: CTAB No W/R. ABDOMEN: S, NT, ND, BS+ RLQ tenderness upon palpation. NEURO: Alert and oriented x 3. Cranial nerves grossly intact. No focal sensory or motor deficits noted. SKIN: Warm. Dry. No rashes or other lesions noted. ASSESSMENT/ PLAN: Diagnoses and all orders for this visit:    1. Essential hypertension    2. CKD (chronic kidney disease) stage 4, GFR 15-29 ml/min (HCC)    3. Anxiety associated with depression      1. Essential Hypertension  BP seems to be well controlled. I recommended continuing current dose of amlodipine 10 mg tablet, metoprolol succinate 25 mg XL tablet, and Lasix 80 mg tablet, eating a low sodium diet, and increasing exercise. Recheck CMP. 2. CKD (chronic kidney disease) stage 4, GFR 15-29 ml/min   I advised that he drink plenty of fluids and ensure he is getting proper nutrition. I see no reason as to why he cannot return to physical therapy at this time. He was informed that as he continues to recover from his recent infection the kidney function may show mild improvement. 3. Anxiety associated with depression   I encouraged him to continue with his current regimen of Celexa. I will send Dr. Jose Joyce a message about today's visit. ICD-10-CM ICD-9-CM    1. Essential hypertension  I10 401.9    2. CKD (chronic kidney disease) stage 4, GFR 15-29 ml/min (HCC)  N18.4 585.4    3. Anxiety associated with depression  F41.8 300.4       Follow-up and Dispositions    · Return in about 2 months (around 12/20/2021) for follow up PCP.      I have reviewed the patient's medications and risks/side effects/benefits were discussed. Diagnosis(-es) explained to patient and questions answered. Literature provided where appropriate.      Written by Sterling Soto, as dictated by Kitty Webb MD.

## 2021-11-03 ENCOUNTER — PATIENT OUTREACH (OUTPATIENT)
Dept: CASE MANAGEMENT | Age: 76
End: 2021-11-03

## 2021-11-04 NOTE — PROGRESS NOTES
Goals Addressed                 This Visit's Progress     Attends follow up appointments on schedule   On track     09/01/21    Will attend follow up appts with urology, nephrology, and cardiology   PCP office will contact patient to schedule JANINA appt   Will attend all scheduled appointments      10/19/21    Attended Urology appointment today   Will attend PCP follow up on 10/20/21   Will inquire about [facility] for help in the home   ACM will follow again in 12-14 days. Pmk    11/03/21   [facility] ED visit on 10/08/21 for long pain and L flank pain   10/20 21 attended follow up  with PCP on 10/20/21   Will follow up with Nephrologist on 11/24/21   Will attend next PCP follow up on 12/20/21 or earlier if needed.  ACM will follow up in 12-14 days. pmk   Reviewed up coming appointments   Attended        Coordinate pain management plan for patient. 11/03/21   Left flank and leg pain on 10/08/21   Will continue to take Ultram and Neurontin for discomfort   Complain of generalize weakness   Incontinent of urine at times   Will use assistive device to prevent falls   Will use supplements to substitute meals   ACM will follow up again in 12-14 days. pmk       Independent self-management skills   On track     09/01/21  [place] Outreach completed, continue to have back and shoulder pain- receiving PT by     Yaquelin Le, PT  Mrm Op Pt    Encouraged to take  Robaxin  and tylenol as ordered   Educated on safety precautions   Instructed patient on how to access supportive resources   Will attend all follow up appointments as ordered.  Pmk    10/19/21    Outreach completed, spoke to daughter Reinaldo Castellano) whom patient gave this AC permission to speak for him   Patient very weak still according to daughter, has follow up with urology today   Will take his pain medication, still some discomfort   Will ask about [facility] in the home when they follow up with PCP on 10/20/21   Educated on the Importance of Supportive resources in place to maintain patient in the community (ie. Home Health, DME equipment, refer to, medication assistant plan, [facility] etc.)   ACM contact information given. Will follow up in 10-14 days.  [address] [place] [address]

## 2021-11-08 ENCOUNTER — PATIENT MESSAGE (OUTPATIENT)
Dept: INTERNAL MEDICINE CLINIC | Age: 76
End: 2021-11-08

## 2021-11-08 DIAGNOSIS — G25.81 RLS (RESTLESS LEGS SYNDROME): Primary | ICD-10-CM

## 2021-11-08 RX ORDER — METOPROLOL SUCCINATE 25 MG/1
TABLET, EXTENDED RELEASE ORAL
Qty: 90 TABLET | Refills: 1 | Status: SHIPPED | OUTPATIENT
Start: 2021-11-08

## 2021-11-09 RX ORDER — ROPINIROLE 0.5 MG/1
0.5 TABLET, FILM COATED ORAL
Qty: 90 TABLET | Refills: 1 | Status: SHIPPED | OUTPATIENT
Start: 2021-11-09 | End: 2022-05-22

## 2021-11-09 NOTE — TELEPHONE ENCOUNTER
PCP: Abel Hernández MD    Last appt: 10/20/2021  Future Appointments   Date Time Provider Seth Skelton   1/5/2022 11:00 AM Abel Hernández MD [facility] BS AMB   1/26/2022  2:00 PM Abel Hernández MD [facility] BS AMB   5/17/2022 10:30 AM MD JEFF Gordon Rarden BS AMB       Requested Prescriptions     Pending Prescriptions Disp Refills    rOPINIRole (Requip) 0.5 mg tablet 90 Tablet 1     Sig: Take 1 Tablet by mouth nightly.

## 2021-11-09 NOTE — TELEPHONE ENCOUNTER
From: Paulina Whalen  To: Amisha Lopez MD  Sent: 11/8/2021 9:47 PM EST  Subject: Prescription Question    This message is being sent by Christy Lopez on behalf of Paulina Whalen. Can you please call in a prescription to [facility] Rad[facility] clemons ([phone]) for Ropinirole 0.5 mg tab for Daddy. It has a lock on it in the refill tab. Thank you.   Anabela Hernández (daughter)

## 2021-11-17 ENCOUNTER — PATIENT OUTREACH (OUTPATIENT)
Dept: CASE MANAGEMENT | Age: 76
End: 2021-11-17

## 2021-11-17 NOTE — PROGRESS NOTES
11/17/21     Goals Addressed                 This Visit's Progress     Attends follow up appointments on schedule   On track     09/01/21    Will attend follow up appts with urology, nephrology, and cardiology   PCP office will contact patient to schedule JANINA appt   Will attend all scheduled appointments      10/19/21    Attended Urology appointment today   Will attend PCP follow up on 10/20/21   Will inquire about [facility] for help in the home   ACM will follow again in 12-14 days. Pmk    11/03/21   [facility] ED visit on 10/08/21 for long pain and L flank pain   10/20 21 attended follow up  with PCP on 10/20/21   Will follow up with Nephrologist on 11/24/21   Will attend next PCP follow up on 12/20/21 or earlier if needed.  ACM will follow up in 12-14 days. pmk   Reviewed up coming appointments    11/17/21    Has followed up with all scheduled appointments   Will attend upcoming appointment as scheduled. pmk       Coordinate pain management plan for patient. On track     11/03/21   Left flank and leg pain on 10/08/21   Will continue to take Ultram and Neurontin for discomfort   Complain of generalize weakness   Incontinent of urine at times   Will use assistive device to prevent falls   Will use supplements to substitute meals   ACM will follow up again in 12-14 days. Pmk    11/17/21    Outreach completed today   Patient still having some flank pain   Admits to taking all medications s ordered   Will continue to take Tramadol and Gabapentin. pmk         Independent self-management skills   On track     09/01/21  [employer] Outreach completed, continue to have back and shoulder pain- receiving PT by     Janine Garcia PT  Mrm Op Pt    Encouraged to take  Robaxin  and tylenol as ordered   Educated on safety precautions   Instructed patient on how to access supportive resources   Will attend all follow up appointments as ordered.  Pmk    10/19/21    Outreach completed, spoke to daughter [facility]) whom patient gave this ACM permission to speak for him   Patient very weak still according to daughter, has follow up with urology today   Will take his pain medication, still some discomfort   Will ask about [facility] in the home when they follow up with PCP on 10/20/21   Educated on the Importance of Supportive resources in place to maintain patient in the community (ie. Home Health, DME equipment, refer to, medication assistant plan, [facility] etc.)   ACM contact information given. Will follow up in 10-14 days. [address] [place] [address]       11/17/21    Outreach completed, spoke with patient who says he still has intermittent flank pain, but it is much better. He is traveling at this time back from trip in [place].  Admits to not checking blood sugars daily   Will keep log of BP and BS for providers   Will stick to a sensible diabetic diet   Will notify if changes in condition and not feeling well. ACM will follow up in 12-14 days.  [address] [place] [address]

## 2021-12-06 RX ORDER — ISOSORBIDE MONONITRATE 30 MG/1
TABLET, EXTENDED RELEASE ORAL
Qty: 45 TABLET | Refills: 3 | Status: SHIPPED | OUTPATIENT
Start: 2021-12-06

## 2021-12-20 RX ORDER — PANTOPRAZOLE SODIUM 40 MG/1
TABLET, DELAYED RELEASE ORAL
Qty: 90 TABLET | Refills: 1 | Status: SHIPPED | OUTPATIENT
Start: 2021-12-20 | End: 2022-06-22

## 2021-12-28 NOTE — ED PROVIDER NOTES
CC:  Fever and chills and congestion and headache and cough    HPI: Pleasant 23 years of age female came to Urgent Care for evaluation of Fever and chills and congestion and headache and cough and sneezing since yesterday.  Hello had low-grade elevation of temperature.  Last week tested negative for COVID.  Cough worsens the headache up to moderate.  Cough is especially bothersome at night when she is lying in bed.  Patient is using her inhalers with good result.  Patient is tobacco smoker.    ROS: No diarrhea.  Cough.  I have reviewed the allergy list and the vital signs.    PE:  Stable, alert and oriented to time and place patient in no acute distress. Lungs have good air entry and are clear. Heart is regular. There is no heart murmur.  Oral mucosa is moist with mild diffuse pharyngeal erythema.  Both ear canals are normal. Tympanic membranes are normal. No excess cerumen.  Percussion of sinuses is nontender.  Conjunctivae are clear.  There is no enlargement of lymph nodes at the neck. There are no meningeal signs. Skin is dry and warm. Voice is not hoarse. Gait is normal.    A/P:  URI.  COVID-19 test result is negative.  Quarantine advised.  I have prescribed Z-corrine orally. Side effects and expectations reviewed. Probiotics encouraged.  For cough suppression I have prescribed Tessalon Perles, 1 or 2 perles orally every 8 hours, as needed.  Patient was advised to consider OTC remedies for the symptoms as advised by Pharmacist.  To reduce cough also I have advised to hydrate self well but also to consider sleeping in a recliner or upright position.  Excuse for work provided.  Please see discharge instructions for details.  I advised to follow-up with primary care provider at any time if worsening or preferably next week if needed and then have chest x-ray.  All questions were answered. The patient indicated understanding of the diagnosis and agreed with the plan of care.      EMERGENCY DEPARTMENT HISTORY AND PHYSICAL EXAM      Date: 5/31/2019  Patient Name: Minal Hale    History of Presenting Illness     Please note that this dictation was completed with [employer], the computer voice recognition software. Quite often unanticipated grammatical, syntax, homophones, and other interpretive errors are inadvertently transcribed by the computer software. Please disregard these errors. Please excuse any errors that have escaped final proofreading. Chief Complaint   Patient presents with    Dizziness     secondary to head injury two weeks ago; also c/o associated general malaise; referred by nephrology for head CT        History Provided By: Patient and Patient's Wife    HPI: Minal Hale, 74 y.o. male with PMHx  for peripheral neuropathy obesity, hypertension, anemia, chronic kidney disease, diabetes,, presents to the ED with cc of intermittent dizziness since patient had a fall 2 weeks ago. Patient states that he tripped over something outside on the pavement and he fell and hit his head on concrete. Patient denies any loss of consciousness. He states that he got up and was fine but since then has been having these intermittent episodes of dizziness. Patient told his nephrologist who advised him to come to the ER for CT scan. Patient states that he is also has some general malaise and has been feeling tired. Per wife and has been he has been bitten by a lot of ticks over the past month and everyone in his family has been  diagnosed besides the  Pt with ehrlichiosis. Patient is wife and patient states that he constantly finds ticks on him but denies any rash.     Patient denies any chest pain, headaches, syncope or near syncope, no thunderclap sound or ams, confusion, extremity weakness, facial drooping, visual changes, lightheadedness, or syncope, fevers, chills, nausea, vomiting, chest pain, shortness of breath, headache, rash, diarrhea, sweating or weight loss.          There are no other complaints, changes, or physical findings at this time. Social History     Tobacco Use    Smoking status: Former Smoker     Packs/day: 1.00     Years: 10.00     Pack years: 10.00     Types: Cigarettes     Last attempt to quit: 1968     Years since quittin.4    Smokeless tobacco: Never Used   Substance Use Topics    Alcohol use: No     Alcohol/week: 0.0 oz    Drug use: No       Allergies   Allergen Reactions    Penicillins Hives    Bactrim [Sulfamethoxazole-Trimethoprim] Hives    Codeine Itching    Lisinopril (Bulk) Cough    Neurontin [Gabapentin] Other (comments)     drowsy    Zostavax [Zoster Vaccine Live (Pf)] Rash     See 9/10/13 visit         PCP: Abdulaziz Medina MD    No current facility-administered medications on file prior to encounter. Current Outpatient Medications on File Prior to Encounter   Medication Sig Dispense Refill    chlorthalidone (HYGROTEN) 25 mg tablet TAKE 1/2 TABLET BY MOUTH EVERY DAY 30 Tab 0    metoprolol succinate (TOPROL-XL) 25 mg XL tablet TAKE 1 TABLET BY MOUTH EVERY DAY 90 Tab 1    clopidogrel (PLAVIX) 75 mg tab TAKE 1 TABLET BY MOUTH EVERY DAY 90 Tab 1    isosorbide mononitrate ER (IMDUR) 30 mg tablet TAKE 1/2 TABLET BY MOUTH EVERY DAY 45 Tab 1    pantoprazole (PROTONIX) 40 mg tablet TAKE 1 TABLET BY MOUTH EVERY DAY 30 Tab 5    rOPINIRole (REQUIP) 0.5 mg tablet Take 1- 2 tablets daily in the evening 60 Tab 5    nitroglycerin (NITROSTAT) 0.4 mg SL tablet TAKE 1 TABLET BY SUBLINGUAL ROUTE EVERY 5 MINUTES AS NEEDED FOR CHEST PAIN. 1 Bottle 0    citalopram (CELEXA) 40 mg tablet TAKE 1 TABLET BY MOUTH EVERY DAY 90 Tab 3    irbesartan (AVAPRO) 150 mg tablet Take 1 Tab by mouth nightly. 90 Tab 3    cyclobenzaprine (FLEXERIL) 10 mg tablet Take 1 Tab by mouth three (3) times daily as needed for Muscle Spasm(s).  20 Tab 0    oxyCODONE-acetaminophen (PERCOCET) 5-325 mg per tablet Take 1-2 tablets every 6 hours as needed for pain. 30 Tab 0    promethazine (PHENERGAN) 25 mg tablet Take 1 Tab by mouth every six (6) hours as needed for Nausea. Indications: Pain Treatment Adjunct, POST-OPERATIVE NAUSEA AND VOMITING 20 Tab 0    albuterol (PROVENTIL HFA, VENTOLIN HFA, PROAIR HFA) 90 mcg/actuation inhaler Take 1 Puff by inhalation every six (6) hours as needed for Wheezing. 1 Inhaler 0    VITAMIN D3 2,000 unit cap capsule Take 2,000 Units by mouth daily. 2    OXYGEN-AIR DELIVERY SYSTEMS (HORIZON NASAL CPAP SYSTEM) by Does Not Apply route.  amLODIPine (NORVASC) 5 mg tablet Take 5 mg by mouth daily.  GLUCOSAMINE HCL/CHONDR PETERSEN A NA (GLUCOSAMINE-CHONDROITIN) 750-600 mg Tab Take 1 Tab by mouth daily. Brand: Move Free      aspirin delayed-release 81 mg tablet Take 81 mg by mouth daily.  allopurinol (ZYLOPRIM) 100 mg tablet Take 100 mg by mouth every morning.  MULTIVITS W-FE,OTHER MIN (CENTRUM PO) Take 1 Tab by mouth daily.          Past History     Past Medical History:  Past Medical History:   Diagnosis Date    Abnormal stress echo 5/12/2015    Anemia NEC 03/2013    Last 2-3 months; finished taking iron supplement    Anxiety     CAD (coronary artery disease) 2009    cath [facility]) revealed 20%RCA and 50%2nd diagonal    Calculus of kidney     Chronic kidney disease     sees nephrologist every 6 months    Chronic kidney disease, stage III (moderate) ([address] 10/11/2009    30% kidney function    Diabetes ([address]     Pre-diabetic    DJD (degenerative joint disease)     GERD (gastroesophageal reflux disease) 10/11/2009    controlled with medication    Gout     Hypertension     Liver disease     fatty liver    Obesity     Obstructive sleep apnea (adult) (pediatric) 10/11/2009    nasal pillows; pressure set at 10-11 - uses cpap    Peripheral neuropathy 10/11/2009    bilateral feet (numbness)    Prediabetes     RLS (restless legs syndrome) 10/11/2009    S/P coronary artery stent placement 5/12/2015    5/11/15 PCI./MALI to LCx       Past Surgical History:  Past Surgical History:   Procedure Laterality Date    HX HEART CATHETERIZATION  2009, 7/17    x1 stent    HX HERNIA REPAIR      [place]aney/umbilical    HX KNEE REPLACEMENT Left 11     LEFT TOTAL KNEE ARTHROPLASTY    HX ORTHOPAEDIC      left great toe pinning/plate    HX ORTHOPAEDIC      left shoulder manipulation    HX UROLOGICAL      basket extraction of kidney stones    DC COLONOSCOPY FLX DX W/COLLJ SPEC WHEN PFRMD  2010         DC COLSC FLX W/RMVL OF TUMOR POLYP LESION SNARE TQ  2014            Family History:  Family History   Problem Relation Age of Onset    Heart Disease Father     Hypertension Father     Other Father         abdominal aneurysm     Dementia Mother     Alzheimer Mother     Heart Disease Brother     Heart Attack Brother     Heart Disease Maternal Grandmother     Heart Disease Paternal Grandmother     Heart Attack Paternal Grandmother     Heart Disease Paternal Grandfather     Heart Attack Paternal Grandfather     Elevated Lipids Son     Elevated Lipids Daughter     Cancer Neg Hx     Diabetes Neg Hx     Stroke Neg Hx        Social History:  Social History     Tobacco Use    Smoking status: Former Smoker     Packs/day: 1.00     Years: 10.00     Pack years: 10.00     Types: Cigarettes     Last attempt to quit: 1968     Years since quittin.4    Smokeless tobacco: Never Used   Substance Use Topics    Alcohol use: No     Alcohol/week: 0.0 oz    Drug use: No       Allergies: Allergies   Allergen Reactions    Penicillins Hives    Bactrim [Sulfamethoxazole-Trimethoprim] Hives    Codeine Itching    Lisinopril (Bulk) Cough    Neurontin [Gabapentin] Other (comments)     drowsy    Zostavax [Zoster Vaccine Live (Pf)] Rash     See 9/10/13 visit         Review of Systems   Review of Systems   Constitutional: Positive for fatigue. Negative for chills and fever. HENT: Negative. Eyes: Negative. Respiratory: Negative. Negative for shortness of breath. Cardiovascular: Negative. Negative for chest pain. Gastrointestinal: Negative. Negative for abdominal pain, diarrhea, nausea and vomiting. Endocrine: Negative. Genitourinary: Negative. Musculoskeletal: Positive for arthralgias and myalgias. Skin: Negative. Allergic/Immunologic: Negative. Neurological: Positive for dizziness. Negative for tremors, seizures, syncope, facial asymmetry, speech difficulty, weakness, light-headedness and numbness. Hematological: Negative. Psychiatric/Behavioral: Negative. All other systems reviewed and are negative. Physical Exam   Physical Exam   Constitutional: He is oriented to person, place, and time. He appears well-developed and well-nourished. No distress. HENT:   Head: Normocephalic and atraumatic. Head is without raccoon's eyes, without Toledo's sign, without abrasion, without contusion, without laceration, without right periorbital erythema and without left periorbital erythema. Hair is normal.   Right Ear: External ear normal.   Left Ear: External ear normal.   Nose: Nose normal.   Mouth/Throat: Oropharynx is clear and moist. No oropharyngeal exudate. No bilateral hemotympanum. Eyes: Pupils are equal, round, and reactive to light. Conjunctivae and EOM are normal.   Neck: Normal range of motion. Neck supple. No tracheal deviation present. Cardiovascular: Normal rate, regular rhythm, normal heart sounds and intact distal pulses. Pulmonary/Chest: Effort normal and breath sounds normal. No respiratory distress. He has no wheezes. He has no rales. Abdominal: Soft. Bowel sounds are normal. He exhibits no distension. There is no tenderness. There is no rebound, no guarding, no CVA tenderness, no tenderness at McBurney's point and negative Solorio's sign. Musculoskeletal: Normal range of motion. He exhibits no edema, tenderness or deformity.    Lymphadenopathy:     He has no cervical adenopathy. Neurological: He is alert and oriented to person, place, and time. He has normal strength and normal reflexes. He is not disoriented. He displays no atrophy, no tremor and normal reflexes. No cranial nerve deficit or sensory deficit. He exhibits normal muscle tone. He displays a negative Romberg sign. He displays no seizure activity. Coordination and gait normal.   Intact finger to nose, no pronator drift    Skin: Skin is warm and dry. No rash noted. He is not diaphoretic. No pallor. Psychiatric: He has a normal mood and affect. His behavior is normal. Judgment and thought content normal.   Nursing note and vitals reviewed. Diagnostic Study Results     Labs -     No results found for this or any previous visit (from the past 12 hour(s)). Radiologic Studies -   CT SPINE CERV WO CONT   Final Result   IMPRESSION:   Multilevel spondylosis without acute abnormality. CT HEAD WO CONT   Final Result   IMPRESSION: No acute abnormality. CT Results  (Last 48 hours)    None        CXR Results  (Last 48 hours)    None            Medical Decision Making   I am the first provider for this patient. I reviewed the vital signs, available nursing notes, past medical history, past surgical history, family history and social history. Vital Signs-Reviewed the patient's vital signs. No data found. Records Reviewed: Nursing Notes, Old Medical Records, Previous Radiology Studies and Previous Laboratory Studies    Provider Notes (Medical Decision Making):   Dizziness post fall for 2 weeks. Patient also complains of tick bites. Will get troponin, CT head and labs as well as a Lyme titer. Patient has no neuro deficits on exam and no dizziness currently.   Worsening si/sxs discussed extensively   Follow up with PCP or RTC if symptoms/signs worsen  Side effects of medication discussed  Education materials provided at discharge   Pt verbalizes agreement with plan      ED Course: Initial assessment performed. The patients presenting problems have been discussed, and they are in agreement with the care plan formulated and outlined with them. I have encouraged them to ask questions as they arise throughout their visit. Disposition:  Discharge     Care plan outlined and precautions discussed. Patient has no new complaints, changes, or physical findings. Results of visit were reviewed with the patient. All medications were reviewed with the patient; will d/c home. All of pt's questions and concerns were addressed. Patient was instructed and agrees to follow up with pcp, as well as to return to the ED upon further deterioration. Patient is ready to go home. Diagnosis     Clinical Impression:   1. Dizziness    2.  Tick bite, initial encounter

## 2022-01-05 ENCOUNTER — OFFICE VISIT (OUTPATIENT)
Dept: INTERNAL MEDICINE CLINIC | Age: 77
End: 2022-01-05
Payer: MEDICARE

## 2022-01-05 VITALS
OXYGEN SATURATION: 91 % | WEIGHT: 231.8 LBS | BODY MASS INDEX: 31.4 KG/M2 | HEIGHT: 72 IN | HEART RATE: 88 BPM | DIASTOLIC BLOOD PRESSURE: 63 MMHG | RESPIRATION RATE: 16 BRPM | SYSTOLIC BLOOD PRESSURE: 100 MMHG | TEMPERATURE: 98.1 F

## 2022-01-05 DIAGNOSIS — I25.10 CORONARY ARTERY DISEASE DUE TO LIPID RICH PLAQUE: ICD-10-CM

## 2022-01-05 DIAGNOSIS — R73.09 ELEVATED GLUCOSE: ICD-10-CM

## 2022-01-05 DIAGNOSIS — K21.9 GASTROESOPHAGEAL REFLUX DISEASE, UNSPECIFIED WHETHER ESOPHAGITIS PRESENT: ICD-10-CM

## 2022-01-05 DIAGNOSIS — G47.33 OBSTRUCTIVE SLEEP APNEA: ICD-10-CM

## 2022-01-05 DIAGNOSIS — E78.2 MIXED HYPERLIPIDEMIA: ICD-10-CM

## 2022-01-05 DIAGNOSIS — C64.1 RENAL CELL CARCINOMA OF RIGHT KIDNEY (HCC): ICD-10-CM

## 2022-01-05 DIAGNOSIS — I10 ESSENTIAL HYPERTENSION: Primary | ICD-10-CM

## 2022-01-05 DIAGNOSIS — E11.42 DIABETIC PERIPHERAL NEUROPATHY ASSOCIATED WITH TYPE 2 DIABETES MELLITUS (HCC): ICD-10-CM

## 2022-01-05 DIAGNOSIS — I25.83 CORONARY ARTERY DISEASE DUE TO LIPID RICH PLAQUE: ICD-10-CM

## 2022-01-05 DIAGNOSIS — E66.09 CLASS 1 OBESITY DUE TO EXCESS CALORIES WITH BODY MASS INDEX (BMI) OF 34.0 TO 34.9 IN ADULT, UNSPECIFIED WHETHER SERIOUS COMORBIDITY PRESENT: ICD-10-CM

## 2022-01-05 DIAGNOSIS — N18.4 CKD (CHRONIC KIDNEY DISEASE) STAGE 4, GFR 15-29 ML/MIN (HCC): ICD-10-CM

## 2022-01-05 DIAGNOSIS — E55.9 VITAMIN D DEFICIENCY: ICD-10-CM

## 2022-01-05 PROCEDURE — 99214 OFFICE O/P EST MOD 30 MIN: CPT | Performed by: FAMILY MEDICINE

## 2022-01-05 PROCEDURE — 1101F PT FALLS ASSESS-DOCD LE1/YR: CPT | Performed by: FAMILY MEDICINE

## 2022-01-05 PROCEDURE — G8536 NO DOC ELDER MAL SCRN: HCPCS | Performed by: FAMILY MEDICINE

## 2022-01-05 PROCEDURE — G0463 HOSPITAL OUTPT CLINIC VISIT: HCPCS | Performed by: FAMILY MEDICINE

## 2022-01-05 PROCEDURE — G9717 DOC PT DX DEP/BP F/U NT REQ: HCPCS | Performed by: FAMILY MEDICINE

## 2022-01-05 PROCEDURE — G8754 DIAS BP LESS 90: HCPCS | Performed by: FAMILY MEDICINE

## 2022-01-05 PROCEDURE — G8752 SYS BP LESS 140: HCPCS | Performed by: FAMILY MEDICINE

## 2022-01-05 PROCEDURE — G8417 CALC BMI ABV UP PARAM F/U: HCPCS | Performed by: FAMILY MEDICINE

## 2022-01-05 PROCEDURE — G8427 DOCREV CUR MEDS BY ELIG CLIN: HCPCS | Performed by: FAMILY MEDICINE

## 2022-01-05 RX ORDER — TAMSULOSIN HYDROCHLORIDE 0.4 MG/1
0.4 CAPSULE ORAL DAILY
COMMUNITY
Start: 2021-12-17

## 2022-01-05 NOTE — PROGRESS NOTES
Elvin Arzola is a 64 y.o. male who presents for follow up. In with daughter. Has a wound on right sided buttocks and on right posterior thigh, about 4-5 weeks per daughter. It was painful. Some dry coughing. Has throat clearing. No URI sx. Treated for COPD. Forgets to use anoro at night,  not using albuterol. Followed by pulmonary. MELODY Off CPAP right now.      Sees derm, using topicals for psoriasis, on scalp, ears, neck. Rash still bothersome.      Per daughter, patient is anxious. On celexa 40mg, still anxious.      Prediabetic. HbA1C 5.8%. healthy diet.       Treated for HLP. LDL 68. On statin. No myalgias.      Treated for HTN. BP controlled. Trying to watch salt.  Not active.       History of CKD. Followed by Dr Ashish Cardenas, nephrology. Last seen Nov 24. S/p right partial nephrectomy in April 2021.  hgb 10.4 and creatinine 4.7 in October. Followed by Dr Norma Contreras, urology. On flomax.      Followed by cardiology, Dr Kushal Julio, for CAD. EF 50-55%.    Patient is not active. Has SMITH. No palpitations or CP.       Sees vascular surgery, Dr Wilberto Ortega, for carotid stenosis. S/p left CEA. Followed by Dr Lv Greene, neurology, Prior CVA.            Past Medical History:   Diagnosis Date    Abnormal stress echo 5/12/2015    Anemia NEC 03/2013    Anxiety     Borderline diabetes     CAD (coronary artery disease) 2009    cath [facility]) revealed 20% RCA and 50% 2nd diagonal    Calculus of kidney     CKD (chronic kidney disease) stage 4, GFR 15-29 ml/min (HCC)     COPD, mild (HCC)     Followed by pulmonary associates    DJD (degenerative joint disease)     Environmental allergies     Fatty liver     GERD (gastroesophageal reflux disease) 10/11/2009    Gout     Hypertension     MELODY on CPAP 10/11/2009    nasal pillows; pressure set at 10-11 -     Peripheral neuropathy 10/11/2009    bilateral feet (numbness)    Renal cell cancer, right ([address] 01/2021    RLS (restless legs syndrome) 10/11/2009    S/P coronary artery stent placement 2015    PCI./MALI to LCx, 2019 PCI/MALI Prox LAD (RCA 40-50% lesions)       Family History   Problem Relation Age of Onset    Heart Disease Father     Hypertension Father     Other Father         abdominal aneurysm     Dementia Mother     Alzheimer's Disease Mother     Heart Disease Brother     Heart Attack Brother     Heart Disease Maternal Grandmother     Heart Disease Paternal Grandmother     Heart Attack Paternal Grandmother     Heart Disease Paternal Grandfather     Heart Attack Paternal Grandfather     Elevated Lipids Son     Elevated Lipids Daughter     Cancer Neg Hx     Diabetes Neg Hx     Stroke Neg Hx        Social History     Socioeconomic History    Marital status:      Spouse name: Sabrina Ga Number of children: 2    Years of education: graduated then 4 year apprenticship    Highest education level: Associate degree: academic program   Occupational History    Not on file   Tobacco Use    Smoking status: Former Smoker     Packs/day: 1.00     Years: 10.00     Pack years: 10.00     Types: Cigarettes     Quit date: 1968     Years since quittin.0    Smokeless tobacco: Never Used   Vaping Use    Vaping Use: Never used   Substance and Sexual Activity    Alcohol use: No     Alcohol/week: 0.0 standard drinks    Drug use: No    Sexual activity: Yes     Partners: Female     Birth control/protection: None   Other Topics Concern     Service Not Asked    Blood Transfusions Not Asked    Caffeine Concern Not Asked    Occupational Exposure Not Asked    Hobby Hazards Not Asked    Sleep Concern Not Asked    Stress Concern Not Asked    Weight Concern Not Asked    Special Diet Not Asked    Back Care Not Asked    Exercise Not Asked    Bike Helmet Not Asked    Seat Belt Not Asked    Self-Exams Not Asked   Social History Narrative    Not on file     Social Determinants of Health     Financial Resource Strain:     Difficulty of Paying Living Expenses: Not on file   Food Insecurity:     Worried About Running Out of Food in the Last Year: Not on file    Ran Out of Food in the Last Year: Not on file   Transportation Needs:     Lack of Transportation (Medical): Not on file    Lack of Transportation (Non-Medical): Not on file   Physical Activity:     Days of Exercise per Week: Not on file    Minutes of Exercise per Session: Not on file   Stress:     Feeling of Stress : Not on file   Social Connections:     Frequency of Communication with Friends and Family: Not on file    Frequency of Social Gatherings with Friends and Family: Not on file    Attends Mandaen Services: Not on file    Active Member of [address] [place] [address] [employer] or Organizations: Not on file    Attends Club or Organization Meetings: Not on file    Marital Status: Not on file   Intimate Partner Violence:     Fear of Current or Ex-Partner: Not on file    Emotionally Abused: Not on file    Physically Abused: Not on file    Sexually Abused: Not on file   Housing Stability:     Unable to Pay for Housing in the Last Year: Not on file    Number of [place] in the Last Year: Not on file    Unstable Housing in the Last Year: Not on file       Current Outpatient Medications on File Prior to Visit   Medication Sig Dispense Refill    tamsulosin (FLOMAX) 0.4 mg capsule       pantoprazole (PROTONIX) 40 mg tablet TAKE 1 TABLET BY MOUTH EVERY DAY 90 Tablet 1    isosorbide mononitrate ER (IMDUR) 30 mg tablet TAKE 1/2 TABLET BY MOUTH EVERY DAY 45 Tablet 3    rOPINIRole (Requip) 0.5 mg tablet Take 1 Tablet by mouth nightly. 90 Tablet 1    metoprolol succinate (TOPROL-XL) 25 mg XL tablet TAKE 1 TABLET BY MOUTH EVERY DAY 90 Tablet 1    furosemide (LASIX) 80 mg tablet Take 80 mg by mouth daily.  citalopram (CELEXA) 40 mg tablet TAKE 1 TABLET BY MOUTH EVERY DAY (Patient taking differently: 20 mg.) 90 Tablet 3    sodium bicarbonate 325 mg tablet Take 1 Tablet by mouth two (2) times a day. 60 Tablet 0    amLODIPine (NORVASC) 10 mg tablet Take 1 Tablet by mouth daily. 30 Tablet 1    fluocinoNIDE (LIDEX) 0.05 % external solution APPLY TO ITCHY SPOTS ON SCALP AS NEEDED FOR FLARE      albuterol (ProAir HFA) 90 mcg/actuation inhaler Take 1 Puff by inhalation every four (4) hours.  umeclidinium-vilanteroL (Anoro Ellipta) 62.5-25 mcg/actuation inhaler Take 1 Puff by inhalation daily.  ketoconazole (NIZORAL) 2 % shampoo Apply 1 mL to affected area daily as needed for Itching.  atorvastatin (Lipitor) 80 mg tablet Take 80 mg by mouth nightly.  nitroglycerin (NITROSTAT) 0.4 mg SL tablet TAKE 1 TABLET BY SUBLINGUAL ROUTE EVERY 5 MINUTES AS NEEDED FOR CHEST PAIN. 1 Bottle 0    VITAMIN D3 2,000 unit cap capsule Take 2,000 Units by mouth daily. 2    GLUCOSAMINE HCL/CHONDR PETERSEN A NA (GLUCOSAMINE-CHONDROITIN) 750-600 mg Tab Take 1 Tab by mouth daily. Brand: Move Free      aspirin delayed-release 81 mg tablet Take 81 mg by mouth nightly.  MULTIVITS W-FE,OTHER MIN (CENTRUM PO) Take 1 Tab by mouth daily.  senna-docusate (PERICOLACE) 8.6-50 mg per tablet Take 2 Tablets by mouth daily. (Patient not taking: Reported on 1/5/2022) 20 Tablet 0    [DISCONTINUED] gabapentin (NEURONTIN) 300 mg capsule TAKE 1 CAPSULE BY MOUTH EVERY DAY AT BEDTIME FOR 30 DAYS (Patient not taking: Reported on 1/5/2022)       No current facility-administered medications on file prior to visit. Review of Systems  Pertinent items are noted in HPI. Objective:     Visit Vitals  /63 (BP 1 Location: Right arm, BP Patient Position: Sitting, BP Cuff Size: Adult)   Pulse 88   Temp 98.1 °F (36.7 °C) (Temporal)   Resp 16   Ht 6' (1.829 m)   Wt 231 lb 12.8 oz (105.1 kg)   SpO2 91%   BMI 31.44 kg/m²     Gen: well appearing male  HEENT:   PERRL,normal conjunctiva. External ear and canals normal, TMs no opacification or erythema,  OP no erythema, no exudates, MMM  Neck:  Supple.  Thyroid normal size, nontender, without nodules. No masses or LAD  Resp:  No wheezing, no rhonchi, no rales. CV:  RRR, normal S1S2, no murmur. GI: soft, nontender, without masses. No hepatosplenomegaly. Extrem:  +2 pulses, no edema, warm distally  Skin: erythematous lesions right buttocks and posterior thigh, no superinfetion    Assessment/Plan:       ICD-10-CM ICD-9-CM    1. Essential hypertension  I10 401.9    2. Renal cell carcinoma of right kidney (HCC)  C64.1 189.0    3. Diabetic peripheral neuropathy associated with type 2 diabetes mellitus ([facility])  E11.42 250.60      357.2    4. CKD (chronic kidney disease) stage 4, GFR 15-29 ml/min ([facility])  N18.4 585.4    5. Coronary artery disease due to lipid rich plaque  I25.10 414.00     I25.83 414.3    6. Gastroesophageal reflux disease, unspecified whether esophagitis present  K21.9 530.81    7. Class 1 obesity due to excess calories with body mass index (BMI) of 34.0 to 34.9 in adult, unspecified whether serious comorbidity present  E66.09 278.00     Z68.34 V85.34    8. Mixed hyperlipidemia  E78.2 272.2 LIPID PANEL   9. Obstructive sleep apnea  G47.33 327.23    10. Elevated glucose  R73.09 790.29 HEMOGLOBIN A1C WITH EAG   11. Vitamin D deficiency  E55.9 268.9 VITAMIN D, 25 HYDROXY     Likely h.zoster on right buttocks, resolving. Follow up with Dr Regina Berkowitz, nephrology. To take HD class. Follow-up and Dispositions    · Return in about 6 months (around 7/5/2022).          Anusha Sanchez MD

## 2022-01-05 NOTE — PATIENT INSTRUCTIONS
START CLARITIN 10MG DAILY. TREAT POSTNASAL DRIP WHICH MAY TRIGGER FOR COUGH. RESUME ANORO INHALER REGULARLY.

## 2022-01-06 LAB
25(OH)D3 SERPL-MCNC: 75.9 NG/ML (ref 30–100)
CHOLEST SERPL-MCNC: 124 MG/DL
EST. AVERAGE GLUCOSE BLD GHB EST-MCNC: 126 MG/DL
HBA1C MFR BLD: 6 % (ref 4–5.6)
HDLC SERPL-MCNC: 42 MG/DL
HDLC SERPL: 3 {RATIO} (ref 0–5)
LDLC SERPL CALC-MCNC: 49.8 MG/DL (ref 0–100)
TRIGL SERPL-MCNC: 161 MG/DL (ref ?–150)
VLDLC SERPL CALC-MCNC: 32.2 MG/DL

## 2022-01-23 ENCOUNTER — PATIENT MESSAGE (OUTPATIENT)
Dept: INTERNAL MEDICINE CLINIC | Age: 77
End: 2022-01-23

## 2022-02-04 RX ORDER — ATORVASTATIN CALCIUM 80 MG/1
TABLET, FILM COATED ORAL
Qty: 90 TABLET | Refills: 3 | Status: SHIPPED | OUTPATIENT
Start: 2022-02-04

## 2022-03-08 ENCOUNTER — CLINICAL SUPPORT (OUTPATIENT)
Dept: INTERNAL MEDICINE CLINIC | Age: 77
End: 2022-03-08
Payer: MEDICARE

## 2022-03-08 ENCOUNTER — PATIENT MESSAGE (OUTPATIENT)
Dept: INTERNAL MEDICINE CLINIC | Age: 77
End: 2022-03-08

## 2022-03-08 DIAGNOSIS — R35.0 URINARY FREQUENCY: Primary | ICD-10-CM

## 2022-03-08 LAB
BILIRUB UR QL STRIP: NEGATIVE
GLUCOSE UR-MCNC: NEGATIVE MG/DL
KETONES P FAST UR STRIP-MCNC: NEGATIVE MG/DL
PH UR STRIP: 7 [PH] (ref 4.6–8)
PROT UR QL STRIP: NORMAL
SP GR UR STRIP: 1.02 (ref 1–1.03)
UA UROBILINOGEN AMB POC: NORMAL (ref 0.2–1)
URINALYSIS CLARITY POC: CLEAR
URINALYSIS COLOR POC: YELLOW
URINE BLOOD POC: NEGATIVE
URINE LEUKOCYTES POC: NORMAL
URINE NITRITES POC: NEGATIVE

## 2022-03-08 PROCEDURE — 81003 URINALYSIS AUTO W/O SCOPE: CPT | Performed by: FAMILY MEDICINE

## 2022-03-09 LAB
BACTERIA SPEC CULT: NORMAL
SERVICE CMNT-IMP: NORMAL

## 2022-03-18 PROBLEM — R10.9 ABDOMINAL PAIN: Status: ACTIVE | Noted: 2021-09-13

## 2022-03-18 PROBLEM — N12 PYELONEPHRITIS: Status: ACTIVE | Noted: 2021-09-04

## 2022-03-18 PROBLEM — N28.89 RIGHT KIDNEY MASS: Status: ACTIVE | Noted: 2021-04-05

## 2022-03-18 PROBLEM — N18.4 CKD (CHRONIC KIDNEY DISEASE) STAGE 4, GFR 15-29 ML/MIN (HCC): Status: ACTIVE | Noted: 2020-11-23

## 2022-03-19 PROBLEM — Z98.890 HISTORY OF LEFT-SIDED CAROTID ENDARTERECTOMY: Status: ACTIVE | Noted: 2020-03-03

## 2022-03-19 PROBLEM — I65.23 BILATERAL CAROTID ARTERY STENOSIS: Status: ACTIVE | Noted: 2020-01-09

## 2022-03-19 PROBLEM — D64.9 ANEMIA: Status: ACTIVE | Noted: 2021-04-08

## 2022-03-19 PROBLEM — J96.01 ACUTE HYPOXEMIC RESPIRATORY FAILURE (HCC): Status: ACTIVE | Noted: 2021-04-08

## 2022-03-19 PROBLEM — R10.30 INTRACTABLE LOWER ABDOMINAL PAIN: Status: ACTIVE | Noted: 2021-09-12

## 2022-03-19 PROBLEM — E78.00 PURE HYPERCHOLESTEROLEMIA: Status: ACTIVE | Noted: 2019-08-30

## 2022-03-19 PROBLEM — Z86.73 HX OF COMPLETED STROKE: Status: ACTIVE | Noted: 2021-04-08

## 2022-03-20 PROBLEM — E11.42 DIABETIC PERIPHERAL NEUROPATHY ASSOCIATED WITH TYPE 2 DIABETES MELLITUS (HCC): Status: ACTIVE | Noted: 2020-03-03

## 2022-03-21 ENCOUNTER — PATIENT MESSAGE (OUTPATIENT)
Dept: INTERNAL MEDICINE CLINIC | Age: 77
End: 2022-03-21

## 2022-03-31 ENCOUNTER — HOSPITAL ENCOUNTER (OUTPATIENT)
Dept: GENERAL RADIOLOGY | Age: 77
Discharge: HOME | End: 2022-03-31
Payer: MEDICARE

## 2022-03-31 ENCOUNTER — TELEPHONE (OUTPATIENT)
Dept: INTERNAL MEDICINE CLINIC | Age: 77
End: 2022-03-31

## 2022-03-31 ENCOUNTER — OFFICE VISIT (OUTPATIENT)
Dept: INTERNAL MEDICINE CLINIC | Age: 77
End: 2022-03-31
Payer: MEDICARE

## 2022-03-31 ENCOUNTER — TRANSCRIBE ORDER (OUTPATIENT)
Dept: REGISTRATION | Age: 77
End: 2022-03-31

## 2022-03-31 VITALS
DIASTOLIC BLOOD PRESSURE: 66 MMHG | OXYGEN SATURATION: 96 % | HEART RATE: 72 BPM | SYSTOLIC BLOOD PRESSURE: 143 MMHG | RESPIRATION RATE: 18 BRPM | BODY MASS INDEX: 32.96 KG/M2 | TEMPERATURE: 97.5 F | WEIGHT: 243 LBS

## 2022-03-31 DIAGNOSIS — J44.9 CHRONIC OBSTRUCTIVE PULMONARY DISEASE (COPD) (HCC): ICD-10-CM

## 2022-03-31 DIAGNOSIS — M79.632 LEFT FOREARM PAIN: Primary | ICD-10-CM

## 2022-03-31 DIAGNOSIS — J44.9 CHRONIC OBSTRUCTIVE PULMONARY DISEASE (COPD) (HCC): Primary | ICD-10-CM

## 2022-03-31 DIAGNOSIS — G89.29 CHRONIC LEFT SHOULDER PAIN: ICD-10-CM

## 2022-03-31 DIAGNOSIS — M25.512 CHRONIC LEFT SHOULDER PAIN: ICD-10-CM

## 2022-03-31 DIAGNOSIS — M79.632 LEFT FOREARM PAIN: ICD-10-CM

## 2022-03-31 PROCEDURE — 99213 OFFICE O/P EST LOW 20 MIN: CPT | Performed by: FAMILY MEDICINE

## 2022-03-31 PROCEDURE — G8753 SYS BP > OR = 140: HCPCS | Performed by: FAMILY MEDICINE

## 2022-03-31 PROCEDURE — G9717 DOC PT DX DEP/BP F/U NT REQ: HCPCS | Performed by: FAMILY MEDICINE

## 2022-03-31 PROCEDURE — G8427 DOCREV CUR MEDS BY ELIG CLIN: HCPCS | Performed by: FAMILY MEDICINE

## 2022-03-31 PROCEDURE — 71046 X-RAY EXAM CHEST 2 VIEWS: CPT

## 2022-03-31 PROCEDURE — G8754 DIAS BP LESS 90: HCPCS | Performed by: FAMILY MEDICINE

## 2022-03-31 PROCEDURE — 73030 X-RAY EXAM OF SHOULDER: CPT

## 2022-03-31 PROCEDURE — G8417 CALC BMI ABV UP PARAM F/U: HCPCS | Performed by: FAMILY MEDICINE

## 2022-03-31 PROCEDURE — 1101F PT FALLS ASSESS-DOCD LE1/YR: CPT | Performed by: FAMILY MEDICINE

## 2022-03-31 PROCEDURE — G8536 NO DOC ELDER MAL SCRN: HCPCS | Performed by: FAMILY MEDICINE

## 2022-03-31 PROCEDURE — 73090 X-RAY EXAM OF FOREARM: CPT

## 2022-03-31 PROCEDURE — G0463 HOSPITAL OUTPT CLINIC VISIT: HCPCS | Performed by: FAMILY MEDICINE

## 2022-03-31 NOTE — PROGRESS NOTES
1. \"Have you been to the ER, urgent care clinic since your last visit? Hospitalized since your last visit? \" No    2. \"Have you seen or consulted any other health care providers outside of the [address] [place] [address] since your last visit? \" Yes Reason for visit: Nu Ritchie     3. For patients aged 39-70: Has the patient had a colonoscopy / FIT/ Cologuard? NA - based on age      If the patient is female:    4. For patients aged 41-77: Has the patient had a mammogram within the past 2 years? NA - based on age or sex      11. For patients aged 21-65: Has the patient had a pap smear?  NA - based on age or sex

## 2022-03-31 NOTE — TELEPHONE ENCOUNTER
Patient came into office requesting an order from Dr. Mono Harrison to get an XR order for his left arm due to a recent fall. He states that he's going to XR today with an order from his pulmonary doctor for his chest and wanted to see if he would be able to get both done while he's there.

## 2022-03-31 NOTE — PROGRESS NOTES
Elsa Ceja is a 64 y.o. male who presents after fall a few days ago. In with daughter. . Carrying a crate and fell to the ground, crate it face, cut on nose above glasses. Abrasion to left arm, bruising on left shoulder and upper arm.   Patient has chronic arthritis in the left shoulder and has been unable to raise the left arm for quite some time          Past Medical History:   Diagnosis Date    Abnormal stress echo 5/12/2015    Anemia NEC 03/2013    Anxiety     Borderline diabetes     CAD (coronary artery disease) 2009    cath [facility]) revealed 20% RCA and 50% 2nd diagonal    Calculus of kidney     CKD (chronic kidney disease) stage 4, GFR 15-29 ml/min (HCC)     COPD, mild (HCC)     Followed by pulmonary associates    DJD (degenerative joint disease)     Environmental allergies     Fatty liver     GERD (gastroesophageal reflux disease) 10/11/2009    Gout     Hypertension     MELODY on CPAP 10/11/2009    nasal pillows; pressure set at 10-11 -     Peripheral neuropathy 10/11/2009    bilateral feet (numbness)    Renal cell cancer, right ([address] 01/2021    RLS (restless legs syndrome) 10/11/2009    S/P coronary artery stent placement 05/12/2015    PCI./MALI to LCx, 8/2019 PCI/MALI Prox LAD (RCA 40-50% lesions)       Family History   Problem Relation Age of Onset    Heart Disease Father     Hypertension Father     Other Father         abdominal aneurysm     Dementia Mother     Alzheimer's Disease Mother     Heart Disease Brother     Heart Attack Brother     Heart Disease Maternal Grandmother     Heart Disease Paternal Grandmother     Heart Attack Paternal Grandmother     Heart Disease Paternal Grandfather     Heart Attack Paternal Grandfather     Elevated Lipids Son     Elevated Lipids Daughter     Cancer Neg Hx     Diabetes Neg Hx     Stroke Neg Hx        Social History     Socioeconomic History    Marital status:      Spouse name: Carlita Barton Number of children: 2    Years of education: graduated then 4 year apprenticship    Highest education level: Associate degree: academic program   Occupational History    Not on file   Tobacco Use    Smoking status: Former Smoker     Packs/day: 1.00     Years: 10.00     Pack years: 10.00     Types: Cigarettes     Quit date: 1968     Years since quittin.2    Smokeless tobacco: Never Used   Vaping Use    Vaping Use: Never used   Substance and Sexual Activity    Alcohol use: No     Alcohol/week: 0.0 standard drinks    Drug use: No    Sexual activity: Yes     Partners: Female     Birth control/protection: None   Other Topics Concern     Service Not Asked    Blood Transfusions Not Asked    Caffeine Concern Not Asked    Occupational Exposure Not Asked    Hobby Hazards Not Asked    Sleep Concern Not Asked    Stress Concern Not Asked    Weight Concern Not Asked    Special Diet Not Asked    Back Care Not Asked    Exercise Not Asked    Bike Helmet Not Asked    [facility] Not Asked    Self-Exams Not Asked   Social History Narrative    Not on file     Social Determinants of Health     Financial Resource Strain:     Difficulty of Paying Living Expenses: Not on file   Food Insecurity:     Worried About Running Out of Food in the Last Year: Not on file    Emmanuel of Food in the Last Year: Not on file   Transportation Needs:     Lack of Transportation (Medical): Not on file    Lack of Transportation (Non-Medical):  Not on file   Physical Activity:     Days of Exercise per Week: Not on file    Minutes of Exercise per Session: Not on file   Stress:     Feeling of Stress : Not on file   Social Connections:     Frequency of Communication with Friends and Family: Not on file    Frequency of Social Gatherings with Friends and Family: Not on file    Attends Jainism Services: Not on file    Active Member of Clubs or Organizations: Not on file    Attends Club or Organization Meetings: Not on file    Marital Status: Not on file   Intimate Partner Violence:     Fear of Current or Ex-Partner: Not on file    Emotionally Abused: Not on file    Physically Abused: Not on file    Sexually Abused: Not on file   Housing Stability:     Unable to Pay for Housing in the Last Year: Not on file    Number of Places Lived in the Last Year: Not on file    Unstable Housing in the Last Year: Not on file       Current Outpatient Medications on File Prior to Visit   Medication Sig Dispense Refill    atorvastatin (LIPITOR) 80 mg tablet TAKE 1 TABLET BY MOUTH EVERY DAY 90 Tablet 3    tamsulosin (FLOMAX) 0.4 mg capsule       pantoprazole (PROTONIX) 40 mg tablet TAKE 1 TABLET BY MOUTH EVERY DAY 90 Tablet 1    isosorbide mononitrate ER (IMDUR) 30 mg tablet TAKE 1/2 TABLET BY MOUTH EVERY DAY 45 Tablet 3    rOPINIRole (Requip) 0.5 mg tablet Take 1 Tablet by mouth nightly. 90 Tablet 1    metoprolol succinate (TOPROL-XL) 25 mg XL tablet TAKE 1 TABLET BY MOUTH EVERY DAY 90 Tablet 1    furosemide (LASIX) 80 mg tablet Take 80 mg by mouth daily.  citalopram (CELEXA) 40 mg tablet TAKE 1 TABLET BY MOUTH EVERY DAY (Patient taking differently: 20 mg.) 90 Tablet 3    sodium bicarbonate 325 mg tablet Take 1 Tablet by mouth two (2) times a day. 60 Tablet 0    amLODIPine (NORVASC) 10 mg tablet Take 1 Tablet by mouth daily. 30 Tablet 1    fluocinoNIDE (LIDEX) 0.05 % external solution APPLY TO ITCHY SPOTS ON SCALP AS NEEDED FOR FLARE      albuterol (ProAir HFA) 90 mcg/actuation inhaler Take 1 Puff by inhalation every four (4) hours.  ketoconazole (NIZORAL) 2 % shampoo Apply 1 mL to affected area daily as needed for Itching.  nitroglycerin (NITROSTAT) 0.4 mg SL tablet TAKE 1 TABLET BY SUBLINGUAL ROUTE EVERY 5 MINUTES AS NEEDED FOR CHEST PAIN. 1 Bottle 0    VITAMIN D3 2,000 unit cap capsule Take 2,000 Units by mouth daily. 2    GLUCOSAMINE HCL/CHONDR PETERSEN A NA (GLUCOSAMINE-CHONDROITIN) 750-600 mg Tab Take 1 Tab by mouth daily. Brand: Move Free      aspirin delayed-release 81 mg tablet Take 81 mg by mouth nightly.  MULTIVITS W-FE,OTHER MIN (CENTRUM PO) Take 1 Tab by mouth daily.  senna-docusate (PERICOLACE) 8.6-50 mg per tablet Take 2 Tablets by mouth daily. (Patient not taking: Reported on 3/31/2022) 20 Tablet 0    umeclidinium-vilanteroL (Anoro Ellipta) 62.5-25 mcg/actuation inhaler Take 1 Puff by inhalation daily. (Patient not taking: Reported on 3/31/2022)       No current facility-administered medications on file prior to visit. Review of Systems  Pertinent items are noted in HPI. Objective:     Visit Vitals  BP (!) 143/66 (BP 1 Location: Right arm, BP Patient Position: Sitting, BP Cuff Size: Adult)   Pulse 72   Temp 97.5 °F (36.4 °C) (Temporal)   Resp 18   Wt 243 lb (110.2 kg)   SpO2 96%   BMI 32.96 kg/m²     Gen: well appearing male  Resp:  No wheezing, no rhonchi, no rales. CV:  RRR, normal S1S2   Extrem:  +2 pulses, no edema, warm distally, bruising left forearm with skin tear and swelling, bruising upper arm and shoulder. Limited ROM left shoulder (not new), FROM lower left arm      Assessment/Plan:       ICD-10-CM ICD-9-CM    1. Left forearm pain  M79.632 729.5 XR FOREARM LT AP/LAT   2. Chronic left shoulder pain  M25.512 719.41 XR SHOULDER LT AP/LAT MIN 2 V    G89.29 338.29      Wound dressed. Tylenol for pain. Check x-rays.       Eve Marshall MD

## 2022-04-01 ENCOUNTER — TELEPHONE (OUTPATIENT)
Dept: INTERNAL MEDICINE CLINIC | Age: 77
End: 2022-04-01

## 2022-04-01 NOTE — TELEPHONE ENCOUNTER
Please notify patient's family. X-rays show left forearm no fracture. X-ray left shoulder shows severe arthritis and an old fracture.   Nothing new from the recent fall

## 2022-04-04 NOTE — TELEPHONE ENCOUNTER
Called patient and dtr mark   Verified identity      notified of md  advisement below :  \"Please notify patient's family. X-rays show left forearm no fracture. X-ray left shoulder shows severe arthritis and an old fracture. Nothing new from the recent fall\"      Patient and dtr are inquiring about outpt. PT as his rom is impacted daily.   [facility]     Note sent to md to advise       Em lpn

## 2022-04-04 NOTE — TELEPHONE ENCOUNTER
Patient notified of mds advisement below  Via my chart   \"I would not recommend physical therapy for his recent trauma to the left forearm.  It will just take time for that inflammation to resolve.  Unfortunately, he has severe arthritis in the left shoulder and physical therapy will not be helpful at this point. \"       Em lpn

## 2022-04-10 NOTE — PROGRESS NOTES
Vertell Hatchet is a 64 y.o. male who presents with daughter for follow up. Prediabetic/Diabetic. HbA1C 6%. Trying to follow healthy diet.  weight unchanged. Has neuropathy, no neuropathic pain.       Treated for HLP.  LDL 50. On statin. No myalgias.      Treated for HTN. BP controlled. Trying to watch salt.  trying to be more active. Using riding mower. Has SMITH. Sees Dr Sowmya Sheridan, cardiology.       History of CKD.  Followed by Dr Luigi Mcpherson, nephrology. hgb 10.4 and creatinine 4.7 in October. every 3 months. Followed by Dr Elida Villela, urology. On flomax. Urination is ok. Prior right partial nephrectomy in April 2021. Treated for anxiety, on celexa. Sleep is ok. On requip. Up 2 times at night, BPH. Recent eye exam.     Saw pulmonary, for COPD, changed inhaler, not sure of name, feels better. Not needing albuterol.             Past Medical History:   Diagnosis Date    Abnormal stress echo 5/12/2015    Anemia NEC 03/2013    Anxiety     Borderline diabetes     CAD (coronary artery disease) 2009    [facility]ath [facility]) revealed 20% RCA and 50% 2nd diagonal    Calculus of kidney     CKD (chronic kidney disease) stage 4, GFR 15-29 ml/min (HCC)     COPD, mild (HCC)     Followed by pulmonary associates    DJD (degenerative joint disease)     Environmental allergies     Fatty liver     GERD (gastroesophageal reflux disease) 10/11/2009    Gout     Hypertension     MELODY on CPAP 10/11/2009    nasal pillows; pressure set at 10-11 -     Peripheral neuropathy 10/11/2009    bilateral feet (numbness)    Renal cell cancer, right ([facility] [address] 01/2021    RLS (restless legs syndrome) 10/11/2009    S/P coronary artery stent placement 05/12/2015    PCI./MALI to LCx, 8/2019 PCI/MALI Prox LAD (RCA 40-50% lesions)       Family History   Problem Relation Age of Onset    Heart Disease Father     Hypertension Father     Other Father         abdominal aneurysm     Dementia Mother     Alzheimer's Disease Mother  Heart Disease Brother     Heart Attack Brother     Heart Disease Maternal Grandmother     Heart Disease Paternal Grandmother     Heart Attack Paternal Grandmother     Heart Disease Paternal Grandfather     Heart Attack Paternal Grandfather     Elevated Lipids Son     Elevated Lipids Daughter     Cancer Neg Hx     Diabetes Neg Hx     Stroke Neg Hx        Social History     Socioeconomic History    Marital status:      Spouse name: Margarete Scheuermann Number of children: 2    Years of education: graduated then 4 year apprenticship    Highest education level: Associate degree: academic program   Occupational History    Not on file   Tobacco Use    Smoking status: Former Smoker     Packs/day: 1.00     Years: 10.00     Pack years: 10.00     Types: Cigarettes     Quit date: 1968     Years since quittin.3    Smokeless tobacco: Never Used   Vaping Use    Vaping Use: Never used   Substance and Sexual Activity    Alcohol use: No     Alcohol/week: 0.0 standard drinks    Drug use: No    Sexual activity: Yes     Partners: Female     Birth control/protection: None   Other Topics Concern     Service Not Asked    Blood Transfusions Not Asked    Caffeine Concern Not Asked    Occupational Exposure Not Asked    Hobby Hazards Not Asked    Sleep Concern Not Asked    Stress Concern Not Asked    Weight Concern Not Asked    Special Diet Not Asked    Back Care Not Asked    Exercise Not Asked    Bike Helmet Not Asked    [facility] Not Asked    Self-Exams Not Asked   Social History Narrative    Not on file     Social Determinants of Health     Financial Resource Strain:     Difficulty of Paying Living Expenses: Not on file   Food Insecurity:     Worried About Running Out of Food in the Last Year: Not on file    Emmanuel of Food in the Last Year: Not on file   Transportation Needs:     Lack of Transportation (Medical): Not on file    Lack of Transportation (Non-Medical):  Not on file Physical Activity:     Days of Exercise per Week: Not on file    Minutes of Exercise per Session: Not on file   Stress:     Feeling of Stress : Not on file   Social Connections:     Frequency of Communication with Friends and Family: Not on file    Frequency of Social Gatherings with Friends and Family: Not on file    Attends Pentecostalism Services: Not on file    Active Member of Clubs or Organizations: Not on file    Attends Club or Organization Meetings: Not on file    Marital Status: Not on file   Intimate Partner Violence:     Fear of Current or Ex-Partner: Not on file    Emotionally Abused: Not on file    Physically Abused: Not on file    Sexually Abused: Not on file   Housing Stability:     Unable to Pay for Housing in the Last Year: Not on file    Number of Places Lived in the Last Year: Not on file    Unstable Housing in the Last Year: Not on file       Current Outpatient Medications on File Prior to Visit   Medication Sig Dispense Refill    atorvastatin (LIPITOR) 80 mg tablet TAKE 1 TABLET BY MOUTH EVERY DAY 90 Tablet 3    tamsulosin (FLOMAX) 0.4 mg capsule       pantoprazole (PROTONIX) 40 mg tablet TAKE 1 TABLET BY MOUTH EVERY DAY 90 Tablet 1    isosorbide mononitrate ER (IMDUR) 30 mg tablet TAKE 1/2 TABLET BY MOUTH EVERY DAY 45 Tablet 3    rOPINIRole (Requip) 0.5 mg tablet Take 1 Tablet by mouth nightly. 90 Tablet 1    metoprolol succinate (TOPROL-XL) 25 mg XL tablet TAKE 1 TABLET BY MOUTH EVERY DAY 90 Tablet 1    furosemide (LASIX) 80 mg tablet Take 80 mg by mouth daily.  citalopram (CELEXA) 40 mg tablet TAKE 1 TABLET BY MOUTH EVERY DAY (Patient taking differently: 20 mg.) 90 Tablet 3    senna-docusate (PERICOLACE) 8.6-50 mg per tablet Take 2 Tablets by mouth daily. 20 Tablet 0    sodium bicarbonate 325 mg tablet Take 1 Tablet by mouth two (2) times a day. 60 Tablet 0    amLODIPine (NORVASC) 10 mg tablet Take 1 Tablet by mouth daily.  30 Tablet 1    fluocinoNIDE (LIDEX) 0.05 % external solution APPLY TO ITCHY SPOTS ON SCALP AS NEEDED FOR FLARE      ketoconazole (NIZORAL) 2 % shampoo Apply 1 mL to affected area daily as needed for Itching.  nitroglycerin (NITROSTAT) 0.4 mg SL tablet TAKE 1 TABLET BY SUBLINGUAL ROUTE EVERY 5 MINUTES AS NEEDED FOR CHEST PAIN. 1 Bottle 0    VITAMIN D3 2,000 unit cap capsule Take 2,000 Units by mouth daily. 2    GLUCOSAMINE HCL/CHONDR PETERSNE A NA (GLUCOSAMINE-CHONDROITIN) 750-600 mg Tab Take 1 Tab by mouth daily. Brand: Move Free      aspirin delayed-release 81 mg tablet Take 81 mg by mouth nightly.  MULTIVITS W-FE,OTHER MIN (CENTRUM PO) Take 1 Tab by mouth daily.  albuterol (ProAir HFA) 90 mcg/actuation inhaler Take 1 Puff by inhalation every four (4) hours. (Patient not taking: Reported on 4/14/2022)      [DISCONTINUED] umeclidinium-vilanteroL (Anoro Ellipta) 62.5-25 mcg/actuation inhaler Take 1 Puff by inhalation daily. (Patient not taking: Reported on 3/31/2022)       No current facility-administered medications on file prior to visit. Review of Systems  Pertinent items are noted in HPI. Objective:     Visit Vitals  /64 (BP 1 Location: Right arm, BP Patient Position: Sitting, BP Cuff Size: Adult)   Pulse 76   Temp 97.3 °F (36.3 °C) (Temporal)   Resp 20   Ht 6' (1.829 m)   Wt 245 lb (111.1 kg)   SpO2 96%   BMI 33.23 kg/m²     Gen: well appearing male  HEENT:   PERRL,normal conjunctiva. External ear and canals normal, TMs no opacification or erythema,  OP no erythema, no exudates, MMM  Neck:  No masses or LAD  Resp:  No wheezing, no rhonchi, no rales. CV:  RRR, normal S1S2   GI: soft, nontender, without masses. Extrem:  +1 edema, warm distally      Assessment/Plan:       ICD-10-CM ICD-9-CM    1. Essential hypertension  I10 401.9    2.  Diabetic peripheral neuropathy associated with type 2 diabetes mellitus (HCC)  E11.42 250.60 LIPID PANEL     357.2 HEMOGLOBIN A1C WITH EAG      HEPATIC FUNCTION PANEL      LIPID PANEL HEMOGLOBIN A1C WITH EAG      HEPATIC FUNCTION PANEL   3. CKD (chronic kidney disease) stage 4, GFR 15-29 ml/min ([facility])  N18.4 585.4    4. Idiopathic progressive neuropathy  G60.3 356.4    5. Coronary artery disease due to lipid rich plaque  I25.10 414.00     I25.83 414.3    6. Mixed hyperlipidemia  E78.2 272.2    work on weight loss. Follow diabetic diet. Follow-up and Dispositions    · Return in about 6 months (around 10/14/2022) for medicare wellness/follow up.  Hollis Yeh MD

## 2022-04-14 ENCOUNTER — OFFICE VISIT (OUTPATIENT)
Dept: INTERNAL MEDICINE CLINIC | Age: 77
End: 2022-04-14
Payer: MEDICARE

## 2022-04-14 VITALS
BODY MASS INDEX: 33.18 KG/M2 | OXYGEN SATURATION: 96 % | TEMPERATURE: 97.3 F | DIASTOLIC BLOOD PRESSURE: 64 MMHG | HEIGHT: 72 IN | HEART RATE: 76 BPM | SYSTOLIC BLOOD PRESSURE: 118 MMHG | WEIGHT: 245 LBS | RESPIRATION RATE: 20 BRPM

## 2022-04-14 DIAGNOSIS — E11.42 DIABETIC PERIPHERAL NEUROPATHY ASSOCIATED WITH TYPE 2 DIABETES MELLITUS (HCC): ICD-10-CM

## 2022-04-14 DIAGNOSIS — G60.3 IDIOPATHIC PROGRESSIVE NEUROPATHY: ICD-10-CM

## 2022-04-14 DIAGNOSIS — N18.4 CKD (CHRONIC KIDNEY DISEASE) STAGE 4, GFR 15-29 ML/MIN (HCC): ICD-10-CM

## 2022-04-14 DIAGNOSIS — I25.83 CORONARY ARTERY DISEASE DUE TO LIPID RICH PLAQUE: ICD-10-CM

## 2022-04-14 DIAGNOSIS — E78.2 MIXED HYPERLIPIDEMIA: ICD-10-CM

## 2022-04-14 DIAGNOSIS — I10 ESSENTIAL HYPERTENSION: Primary | ICD-10-CM

## 2022-04-14 DIAGNOSIS — I25.10 CORONARY ARTERY DISEASE DUE TO LIPID RICH PLAQUE: ICD-10-CM

## 2022-04-14 PROCEDURE — G8427 DOCREV CUR MEDS BY ELIG CLIN: HCPCS | Performed by: FAMILY MEDICINE

## 2022-04-14 PROCEDURE — G9717 DOC PT DX DEP/BP F/U NT REQ: HCPCS | Performed by: FAMILY MEDICINE

## 2022-04-14 PROCEDURE — G8536 NO DOC ELDER MAL SCRN: HCPCS | Performed by: FAMILY MEDICINE

## 2022-04-14 PROCEDURE — 1101F PT FALLS ASSESS-DOCD LE1/YR: CPT | Performed by: FAMILY MEDICINE

## 2022-04-14 PROCEDURE — G8417 CALC BMI ABV UP PARAM F/U: HCPCS | Performed by: FAMILY MEDICINE

## 2022-04-14 PROCEDURE — G8752 SYS BP LESS 140: HCPCS | Performed by: FAMILY MEDICINE

## 2022-04-14 PROCEDURE — 99214 OFFICE O/P EST MOD 30 MIN: CPT | Performed by: FAMILY MEDICINE

## 2022-04-14 PROCEDURE — G0463 HOSPITAL OUTPT CLINIC VISIT: HCPCS | Performed by: FAMILY MEDICINE

## 2022-04-14 PROCEDURE — G8754 DIAS BP LESS 90: HCPCS | Performed by: FAMILY MEDICINE

## 2022-04-14 NOTE — PROGRESS NOTES
Em.1. Have you been to the ER, urgent care clinic since your last visit? Hospitalized since your last visit? No    2. Have you seen or consulted any other health care providers outside of the [address] [place] [address] since your last visit? Include any pap smears or colon screening.  No     Other than recent xrays nothing new       Em lpn

## 2022-05-22 DIAGNOSIS — G25.81 RLS (RESTLESS LEGS SYNDROME): ICD-10-CM

## 2022-05-22 RX ORDER — ROPINIROLE 0.5 MG/1
TABLET, FILM COATED ORAL
Qty: 90 TABLET | Refills: 1 | Status: SHIPPED | OUTPATIENT
Start: 2022-05-22 | End: 2022-10-17 | Stop reason: ALTCHOICE

## 2022-05-25 ENCOUNTER — OFFICE VISIT (OUTPATIENT)
Dept: NEUROLOGY | Age: 77
End: 2022-05-25
Payer: MEDICARE

## 2022-05-25 VITALS
OXYGEN SATURATION: 96 % | HEIGHT: 72 IN | DIASTOLIC BLOOD PRESSURE: 62 MMHG | WEIGHT: 244 LBS | HEART RATE: 72 BPM | BODY MASS INDEX: 33.05 KG/M2 | SYSTOLIC BLOOD PRESSURE: 132 MMHG

## 2022-05-25 DIAGNOSIS — G47.52 RBD (REM BEHAVIORAL DISORDER): ICD-10-CM

## 2022-05-25 DIAGNOSIS — G25.81 RLS (RESTLESS LEGS SYNDROME): ICD-10-CM

## 2022-05-25 DIAGNOSIS — G25.0 BENIGN ESSENTIAL TREMOR: ICD-10-CM

## 2022-05-25 DIAGNOSIS — E11.42 DIABETIC PERIPHERAL NEUROPATHY ASSOCIATED WITH TYPE 2 DIABETES MELLITUS (HCC): ICD-10-CM

## 2022-05-25 DIAGNOSIS — R41.82 ACUTE ALTERATION IN MENTAL STATUS: ICD-10-CM

## 2022-05-25 DIAGNOSIS — R56.9 CONVULSIONS, UNSPECIFIED CONVULSION TYPE (HCC): ICD-10-CM

## 2022-05-25 DIAGNOSIS — G60.9 IDIOPATHIC PERIPHERAL NEUROPATHY: ICD-10-CM

## 2022-05-25 DIAGNOSIS — I65.23 BILATERAL CAROTID ARTERY STENOSIS: Primary | ICD-10-CM

## 2022-05-25 PROCEDURE — 1123F ACP DISCUSS/DSCN MKR DOCD: CPT | Performed by: PSYCHIATRY & NEUROLOGY

## 2022-05-25 PROCEDURE — G8752 SYS BP LESS 140: HCPCS | Performed by: PSYCHIATRY & NEUROLOGY

## 2022-05-25 PROCEDURE — G8427 DOCREV CUR MEDS BY ELIG CLIN: HCPCS | Performed by: PSYCHIATRY & NEUROLOGY

## 2022-05-25 PROCEDURE — G9717 DOC PT DX DEP/BP F/U NT REQ: HCPCS | Performed by: PSYCHIATRY & NEUROLOGY

## 2022-05-25 PROCEDURE — 3044F HG A1C LEVEL LT 7.0%: CPT | Performed by: PSYCHIATRY & NEUROLOGY

## 2022-05-25 PROCEDURE — G8417 CALC BMI ABV UP PARAM F/U: HCPCS | Performed by: PSYCHIATRY & NEUROLOGY

## 2022-05-25 PROCEDURE — 99214 OFFICE O/P EST MOD 30 MIN: CPT | Performed by: PSYCHIATRY & NEUROLOGY

## 2022-05-25 PROCEDURE — 1101F PT FALLS ASSESS-DOCD LE1/YR: CPT | Performed by: PSYCHIATRY & NEUROLOGY

## 2022-05-25 PROCEDURE — G8754 DIAS BP LESS 90: HCPCS | Performed by: PSYCHIATRY & NEUROLOGY

## 2022-05-25 PROCEDURE — G8536 NO DOC ELDER MAL SCRN: HCPCS | Performed by: PSYCHIATRY & NEUROLOGY

## 2022-05-25 RX ORDER — LORATADINE 10 MG/1
10 TABLET ORAL DAILY
COMMUNITY

## 2022-05-25 RX ORDER — FLUTICASONE FUROATE, UMECLIDINIUM BROMIDE AND VILANTEROL TRIFENATATE 100; 62.5; 25 UG/1; UG/1; UG/1
POWDER RESPIRATORY (INHALATION)
COMMUNITY
Start: 2022-05-05

## 2022-05-25 RX ORDER — COLCHICINE 0.6 MG/1
0.6 TABLET ORAL
COMMUNITY

## 2022-05-25 NOTE — PROGRESS NOTES
Consult  REFERRED BY:  Shahab Alegria MD    CHIEF COMPLAINT: Worsening neuropathy and syncopal episode and TIA and carotid stenosis      Subjective:     Gia Phipps is a 64 y.o. right-handed  male seen at the request of Dr. 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 He also is having more difficulty with a new problem of increasing difficulty with jerks in his legs, and on looking at a video, it almost looks more like periodic leg movements of sleep, and supposedly he is on a CPAP machine now, and finally getting his seen back after the recall 1 year ago, and hopefully that will help some in addition. He has restless leg syndrome and takes Requip 0.5 mg at bedtime each night and his family wants nothing to stop it just to see if that could be aggravating his sleep disorder, recent told him for sure they could, but that does not get him better they could try increasing it to 2 of the pills at night to see if that would help because supposedly the dopamine agonist help periodic leg movements of sleep in addition to restless leg syndrome. We will also get a sleep study to make sure there is no misfiring of the brain with this. I believe he has tried gabapentin in the past and did not like the way he felt. He also tried Lyrica and Cymbalta which she did not like either. He used to take 2 of the Requip at night. His vascular surgeon checks a Doppler every year and has had no recurrence of his stenosis and no more recurrent strokelike symptoms and takes a baby aspirin every day and high-dose statin of Lipitor 80 mg a day.   Patient has a known history of idiopathic neuropathy in the past, that seems to be worsening as he is more numbness in his feet and more tight sensation in his ankles. He is wondering why it may be getting worse. He did not respond well to gabapentin or Lyrica or Cymbalta for treatment of the neuropathy in the past.  He does have some minor discomfort but no major pain. Patient does not have much weakness in his feet. He also has restless leg syndrome probably from the neuropathy, has to take Requip 2 of the .5 mg at night for his restless leg syndrome that seems to help him significantly. He has a borderline prediabetic state on the chart, and that may be the cause of his neuropathy. He also has a new problem of having had kidney cancer requiring a partial nephrectomy on his right kidney, and now has a kidney stone in his left kidney and may need surgery for it or possibly lithotripsy and has been decided in the future. He also has benign prostatic hypertrophy and is on Flomax for that followed by urology. He has degenerative arthritis and has to limp on his knees. He continues to take his multivitamin and vitamin D on a regular basis. We encouraged him this time to try to stay mentally and physically active to help protect his memory in addition also, and to control his main risk factors for vascular disease being high blood pressure, diabetes, cholesterol, and smoking which she is trying to do. He denies any recent headache, fever, meningismus, new focal weakness, new sensory loss, no gait issues, or any other active neurologic problem.     Past Medical History:   Diagnosis Date    Abnormal stress echo 5/12/2015    Anemia NEC 03/2013    Anxiety     Borderline diabetes     CAD (coronary artery disease) 2009    cath [facility]) revealed 20% RCA and 50% 2nd diagonal    Calculus of kidney     CKD (chronic kidney disease) stage 4, GFR 15-29 ml/min (HCC)     COPD, mild ([facility])     Followed by pulmonary associates    DJD (degenerative joint disease)     Environmental allergies     Fatty liver     GERD (gastroesophageal reflux disease) 10/11/2009    Gout     Hypertension     MELODY on CPAP 10/11/2009    nasal pillows; pressure set at 10-11 -     Peripheral neuropathy 10/11/2009    bilateral feet (numbness)    Renal cell cancer, right ([address] 01/2021    RLS (restless legs syndrome) 10/11/2009    S/P coronary artery stent placement 05/12/2015    PCI./MALI to LCx, 8/2019 PCI/MALI Prox LAD (RCA 40-50% lesions)      Past Surgical History:   Procedure Laterality Date    HX BUNIONECTOMY  2019    HX CAROTID ENDARTERECTOMY Left 01/2020    Followed by Dr. Dinora Nuñez      S/P stent to left circumflex in May, 2015; s/p stent to LAD in August, 2019   [address]  2009, 7/17    per heart cath of 7/13/2017, discrete 40 % stenosis. in proximal LAD, discrete 40 % stenosis in proximal RCA, 30% stenosis in distal RCA    HX HERNIA REPAIR      [place]/umbilical    HX KNEE REPLACEMENT Left 07/23/2011    HX ORTHOPAEDIC      left shoulder manipulation    HX UROLOGICAL      basket extraction of kidney stones    MI COLONOSCOPY FLX DX W/COLLJ SPEC WHEN PFRMD  8/5/2010         MI COLSC FLX W/RMVL OF TUMOR POLYP LESION SNARE TQ  9/24/2014          Family History   Problem Relation Age of Onset    Heart Disease Father     Hypertension Father     Other Father         abdominal aneurysm     Dementia Mother     Alzheimer's Disease Mother     Heart Disease Brother     Heart Attack Brother     Heart Disease Maternal Grandmother     Heart Disease Paternal Grandmother     Heart Attack Paternal Grandmother     Heart Disease Paternal Grandfather     Heart Attack Paternal Grandfather     Elevated Lipids Son     Elevated Lipids Daughter     Cancer Neg Hx     Diabetes Neg Hx     Stroke Neg Hx       Social History     Tobacco Use    Smoking status: Former Smoker     Packs/day: 1.00     Years: 10.00     Pack years: 10.00     Types: Cigarettes     Quit date: 1968     Years since quittin.4    Smokeless tobacco: Never Used   Substance Use Topics    Alcohol use: No     Alcohol/week: 0.0 standard drinks         Current Outpatient Medications:     Trelegy Ellipta 100-62.5-25 mcg inhaler, TAKE 1 PUFF BY MOUTH EVERY DAY, Disp: , Rfl:     colchicine 0.6 mg tablet, Take 0.6 mg by mouth two (2) times daily as needed for Gout or Pain., Disp: , Rfl:     loratadine (Claritin) 10 mg tablet, Take 10 mg by mouth daily. , Disp: , Rfl:     rOPINIRole (REQUIP) 0.5 mg tablet, TAKE 1 TABLET BY MOUTH EVERY DAY AT NIGHT, Disp: 90 Tablet, Rfl: 1    atorvastatin (LIPITOR) 80 mg tablet, TAKE 1 TABLET BY MOUTH EVERY DAY, Disp: 90 Tablet, Rfl: 3    tamsulosin (FLOMAX) 0.4 mg capsule, Take 0.4 mg by mouth daily. , Disp: , Rfl:     pantoprazole (PROTONIX) 40 mg tablet, TAKE 1 TABLET BY MOUTH EVERY DAY, Disp: 90 Tablet, Rfl: 1    isosorbide mononitrate ER (IMDUR) 30 mg tablet, TAKE 1/2 TABLET BY MOUTH EVERY DAY, Disp: 45 Tablet, Rfl: 3    metoprolol succinate (TOPROL-XL) 25 mg XL tablet, TAKE 1 TABLET BY MOUTH EVERY DAY, Disp: 90 Tablet, Rfl: 1    furosemide (LASIX) 80 mg tablet, Take 80 mg by mouth daily. , Disp: , Rfl:     citalopram (CELEXA) 40 mg tablet, TAKE 1 TABLET BY MOUTH EVERY DAY (Patient taking differently: Take 40 mg by mouth daily.), Disp: 90 Tablet, Rfl: 3    senna-docusate (PERICOLACE) 8.6-50 mg per tablet, Take 2 Tablets by mouth daily. , Disp: 20 Tablet, Rfl: 0    sodium bicarbonate 325 mg tablet, Take 1 Tablet by mouth two (2) times a day., Disp: 60 Tablet, Rfl: 0    amLODIPine (NORVASC) 10 mg tablet, Take 1 Tablet by mouth daily. , Disp: 30 Tablet, Rfl: 1    fluocinoNIDE (LIDEX) 0.05 % external solution, APPLY TO ITCHY SPOTS ON SCALP AS NEEDED FOR FLARE, Disp: , Rfl:     albuterol (ProAir HFA) 90 mcg/actuation inhaler, Take 1 Puff by inhalation every four (4) hours. , Disp: , Rfl:     ketoconazole (NIZORAL) 2 % shampoo, Apply 1 mL to affected area daily as needed for Itching., Disp: , Rfl:     nitroglycerin (NITROSTAT) 0.4 mg SL tablet, TAKE 1 TABLET BY SUBLINGUAL ROUTE EVERY 5 MINUTES AS NEEDED FOR CHEST PAIN., Disp: 1 Bottle, Rfl: 0    VITAMIN D3 2,000 unit cap capsule, Take 2,000 Units by mouth daily. , Disp: , Rfl: 2    GLUCOSAMINE HCL/CHONDR PETERSEN A NA (GLUCOSAMINE-CHONDROITIN) 750-600 mg Tab, Take 1 Tab by mouth daily. Brand: Move Free, Disp: , Rfl:     aspirin delayed-release 81 mg tablet, Take 81 mg by mouth nightly., Disp: , Rfl:     MULTIVITS W-FE,OTHER MIN (CENTRUM PO), Take 1 Tab by mouth daily. , Disp: , Rfl:         Allergies   Allergen Reactions    Bactrim [Sulfamethoxazole-Trimethoprim] Hives    Codeine Itching    Lisinopril (Bulk) Cough    Neurontin [Gabapentin] Other (comments)     drowsy    Zostavax [Zoster Vaccine Live (Pf)] Rash     See 9/10/13 visit    Penicillins Hives     Pt states he has taken Keflex before without problems      MRI Results (most recent):  Results from [place] encounter on 01/08/20    MRI BRAIN WO CONT    Narrative  INDICATION:  Visual disturbance    COMPARISON:  CT head 1/8/2020    TECHNIQUE:  MR imaging of the brain was performed with sagittal T1, axial T1,  T2, FLAIR, GRE, DWI/ADC;    FINDINGS:    The ventricles are midline without hydrocephalus. There is no acute intra or extra-axial fluid collection. Mild periventricular  and focal left frontal subcortical increased T2/flair signal abnormalities are  nonspecific but may represent changes of chronic small vessel ischemic disease. No evidence for acute infarction. The major intracranial vascular flow-voids are patent. No abnormal signal in the mastoid air cells or visualized paranasal sinuses. Visualized soft tissues unremarkable.     Impression  IMPRESSION:    No evidence for acute infarction    Mild periventricular and focal left frontal subcortical increased T2/flair  signal abnormalities are nonspecific but may represent changes of chronic small  vessel ischemic disease. Results from Hospital Encounter encounter on 01/08/20    MRI BRAIN WO CONT    Narrative  INDICATION:  Visual disturbance    COMPARISON:  CT head 1/8/2020    TECHNIQUE:  MR imaging of the brain was performed with sagittal T1, axial T1,  T2, FLAIR, GRE, DWI/ADC;    FINDINGS:    The ventricles are midline without hydrocephalus. There is no acute intra or extra-axial fluid collection. Mild periventricular  and focal left frontal subcortical increased T2/flair signal abnormalities are  nonspecific but may represent changes of chronic small vessel ischemic disease. No evidence for acute infarction. The major intracranial vascular flow-voids are patent. No abnormal signal in the mastoid air cells or visualized paranasal sinuses. Visualized soft tissues unremarkable. Impression  IMPRESSION:    No evidence for acute infarction    Mild periventricular and focal left frontal subcortical increased T2/flair  signal abnormalities are nonspecific but may represent changes of chronic small  vessel ischemic disease. Review of Systems:  A comprehensive review of systems was negative except for: Constitutional: positive for fatigue and malaise  Ears, nose, mouth, throat, and face: positive for hearing loss and tinnitus  Musculoskeletal: positive for myalgias, arthralgias, stiff joints and back pain  Neurological: positive for paresthesia and Visual loss and endarterectomy  Behvioral/Psych: positive for depression   Vitals:    05/25/22 1505   BP: 132/62   Pulse: 72   SpO2: 96%   Weight: 244 lb (110.7 kg)   Height: 6' (1.829 m)     Objective:     I      NEUROLOGICAL EXAM:    Appearance: The patient is well developed, well nourished, provides a fair history and is in no acute distress. Mental Status: Oriented to time, place and person, and the president, cognitive function is slow but probably normal and speech is fluent and no aphasia or dysarthria. Mood and affect appropriate but slightly anxious and depressed. Cranial Nerves:   Intact visual fields. Fundi are benign, disc are flat, no lesions seen on funduscopy. CLAUDIA, EOM's full, no nystagmus, no ptosis. Facial sensation is normal. Corneal reflexes are not tested. Facial movement is symmetric. Hearing is abnormal bilaterally. Palate is midline with normal sternocleidomastoid and trapezius muscles are normal. Tongue is midline. Neck without meningismus or bruits  Temporal arteries are not tender or enlarged  TMJ areas are not tender on palpation   Motor:  4/5 strength in upper and lower proximal and distal muscles. Normal bulk and tone. No fasciculations. Rapid alternating movement is symmetric and intact bilaterally   Reflexes:   Deep tendon reflexes 1+/4 and symmetrical.  No babinski or clonus present   Sensory:   Abnormal to touch, pinprick and vibration and temperature in both feet to midcalf level. DSS is intact   Gait:  Normal gait for patient's age, though patient does move a little slowly due to his arthritis, and has a slight limp on his right leg. Tremor:   No tremor noted. Cerebellar:   Mildly abnormal cerebellar signs on Romberg and tandem testing and finger-nose-finger exam.   Neurovascular:  Normal heart sounds and regular rhythm, peripheral pulses decreased, and no carotid bruits. Assessment:       ICD-10-CM ICD-9-CM    1. Bilateral carotid artery stenosis  I65.23 433.10 NEURO EEG 24 HR     433.30    2. Diabetic peripheral neuropathy associated with type 2 diabetes mellitus (HCC)  E11.42 250.60 NEURO EEG 24 HR     357.2    3. Idiopathic peripheral neuropathy  G60.9 356.9 NEURO EEG 24 HR   4. RLS (restless legs syndrome)  G25.81 333.94 NEURO EEG 24 HR   5. RBD (REM behavioral disorder)  G47.52 327.42 NEURO EEG 24 HR   6. Convulsions, unspecified convulsion type ([facility]  R56.9 780.39 NEURO EEG 24 HR   7. Acute alteration in mental status  R41.82 780.97 NEURO EEG 24 HR   8.  Benign essential tremor  G25.0 333.1 NEURO EEG 24 HR     Active Problems:    * No active hospital problems. *      Plan:     Patient with new problem of jerks when he sleeping, that looks like periodic limb movements of sleep, but could be possibly restless leg syndrome, but I think it is more like the former, we will check an EEG to see if there is any abnormal discharge because his movements can be pretty pronounced, and his family is concerned about it. They would like to know if they could possibly stop the Requip has had a certainly could but I doubt that will make him better, if anything he might need more of it for his periodic limb movements of sleep which also are best treated with dopamine agonist in addition to his restless leg syndrome. His restless leg syndrome does not seem to cause him to get up and walk around that much at nighttime. For his carotid stenosis and TIA in the past with amaurosis in his left eye, he is status post left carotid endarterectomy and gets a yearly Doppler and just had 1 this year, and has had no recurrent stenosis or need for surgical intervention and continues his aspirin therapy and high-dose statin, and controlling his blood pressure and by not smoking, and try to control his diabetes which his family said he could do better on, and we encouraged him to take his vitamins and vitamin D and stay mentally and physically active. Mark Sun He tried Lyrica and Neurontin and Cymbalta all in the past, and did not like the side effect of these medications and takes no other medicine for his neuropathy. Patient did not appear to have any other active neurologic problem at this time. We will check him again in 6 months time or earlier if need be.   35-minute spent with patient, going over his records, reviewing his inpatient records, reviewing his MRI scan, reviewing his CTA, reviewing his duplex scan, I do agree that he has symptomatic left carotid stenosis as now corrected with endarterectomy and is doing well antiplatelet therapy without recurrent neurologic symptoms except for his neuropathy worse. He will call back if any problem, we will see him again in 6 months time or earlier as needed. Medications renewed for the patient. Signed By: Jani David MD     May 25, 2022       CC:  Eugenia Zaidi MD  FAX: [phone]

## 2022-06-22 ENCOUNTER — APPOINTMENT (OUTPATIENT)
Dept: CT IMAGING | Age: 77
End: 2022-06-22
Attending: EMERGENCY MEDICINE
Payer: MEDICARE

## 2022-06-22 ENCOUNTER — HOSPITAL ENCOUNTER (EMERGENCY)
Age: 77
Discharge: HOME | End: 2022-06-22
Attending: EMERGENCY MEDICINE
Payer: MEDICARE

## 2022-06-22 VITALS
BODY MASS INDEX: 33.86 KG/M2 | HEART RATE: 66 BPM | WEIGHT: 250 LBS | DIASTOLIC BLOOD PRESSURE: 72 MMHG | OXYGEN SATURATION: 97 % | TEMPERATURE: 98.2 F | SYSTOLIC BLOOD PRESSURE: 146 MMHG | RESPIRATION RATE: 18 BRPM | HEIGHT: 72 IN

## 2022-06-22 DIAGNOSIS — S01.01XA LACERATION OF SCALP, INITIAL ENCOUNTER: Primary | ICD-10-CM

## 2022-06-22 DIAGNOSIS — S16.1XXA STRAIN OF NECK MUSCLE, INITIAL ENCOUNTER: ICD-10-CM

## 2022-06-22 DIAGNOSIS — S09.90XA CLOSED HEAD INJURY, INITIAL ENCOUNTER: ICD-10-CM

## 2022-06-22 PROCEDURE — 70450 CT HEAD/BRAIN W/O DYE: CPT

## 2022-06-22 PROCEDURE — 72125 CT NECK SPINE W/O DYE: CPT

## 2022-06-22 PROCEDURE — 99284 EMERGENCY DEPT VISIT MOD MDM: CPT

## 2022-06-22 PROCEDURE — 74011250637 HC RX REV CODE- 250/637: Performed by: EMERGENCY MEDICINE

## 2022-06-22 RX ORDER — PANTOPRAZOLE SODIUM 40 MG/1
TABLET, DELAYED RELEASE ORAL
Qty: 90 TABLET | Refills: 1 | Status: SHIPPED | OUTPATIENT
Start: 2022-06-22

## 2022-06-22 RX ORDER — ACETAMINOPHEN 500 MG
1000 TABLET ORAL
Status: COMPLETED | OUTPATIENT
Start: 2022-06-22 | End: 2022-06-22

## 2022-06-22 RX ADMIN — ACETAMINOPHEN 1000 MG: 500 TABLET ORAL at 20:06

## 2022-06-22 NOTE — ED PROVIDER NOTES
EMERGENCY DEPARTMENT HISTORY AND PHYSICAL EXAM      Date: 6/22/2022  Patient Name: Sara Reid. History of Presenting Illness     Chief Complaint   Patient presents with    Head Injury     pt on blood thinners. He bent down to screw a screw into a board and fell over hitting a 2 x 4.  he has a large gash to his left scalp. bleeding is controlled with ice. ER physician made aware of pt's injuries       History Provided By: Patient    HPI: Sara Reid., 64 y.o. male with history of hypertension, coronary artery disease, COPD, CKD on dual antiplatelet agents presents to the ED with cc of fall, head injury, laceration. This occurred earlier today just prior to arrival.  Patient was drilling a screw into a board of wood. He lost his balance and fell forward striking his head against another piece of wood. He had significant bleeding and a large gash to the left side of his scalp. Bleeding controlled prior to arrival with ice and compression. He complains of left-sided headache. Denies any visual changes, numbness or weakness of extremities, or any other injury. Specifically denies any nausea, vomiting, chest pain, shortness of breath, or back pain. He is not on any anticoagulation. Tdap updated within the past 5 to 10 years. There are no other complaints, changes, or physical findings at this time. PCP: Dalia Quintero MD    No current facility-administered medications on file prior to encounter. Current Outpatient Medications on File Prior to Encounter   Medication Sig Dispense Refill    pantoprazole (PROTONIX) 40 mg tablet TAKE 1 TABLET BY MOUTH EVERY DAY 90 Tablet 1    Trelegy Ellipta 100-62.5-25 mcg inhaler TAKE 1 PUFF BY MOUTH EVERY DAY      colchicine 0.6 mg tablet Take 0.6 mg by mouth two (2) times daily as needed for Gout or Pain.  loratadine (Claritin) 10 mg tablet Take 10 mg by mouth daily.       rOPINIRole (REQUIP) 0.5 mg tablet TAKE 1 TABLET BY MOUTH EVERY DAY AT NIGHT 90 Tablet 1    atorvastatin (LIPITOR) 80 mg tablet TAKE 1 TABLET BY MOUTH EVERY DAY 90 Tablet 3    tamsulosin (FLOMAX) 0.4 mg capsule Take 0.4 mg by mouth daily.  isosorbide mononitrate ER (IMDUR) 30 mg tablet TAKE 1/2 TABLET BY MOUTH EVERY DAY 45 Tablet 3    metoprolol succinate (TOPROL-XL) 25 mg XL tablet TAKE 1 TABLET BY MOUTH EVERY DAY 90 Tablet 1    furosemide (LASIX) 80 mg tablet Take 80 mg by mouth daily.  citalopram (CELEXA) 40 mg tablet TAKE 1 TABLET BY MOUTH EVERY DAY (Patient taking differently: Take 40 mg by mouth daily.) 90 Tablet 3    senna-docusate (PERICOLACE) 8.6-50 mg per tablet Take 2 Tablets by mouth daily. 20 Tablet 0    sodium bicarbonate 325 mg tablet Take 1 Tablet by mouth two (2) times a day. 60 Tablet 0    amLODIPine (NORVASC) 10 mg tablet Take 1 Tablet by mouth daily. 30 Tablet 1    fluocinoNIDE (LIDEX) 0.05 % external solution APPLY TO ITCHY SPOTS ON SCALP AS NEEDED FOR FLARE      albuterol (ProAir HFA) 90 mcg/actuation inhaler Take 1 Puff by inhalation every four (4) hours.  ketoconazole (NIZORAL) 2 % shampoo Apply 1 mL to affected area daily as needed for Itching.  nitroglycerin (NITROSTAT) 0.4 mg SL tablet TAKE 1 TABLET BY SUBLINGUAL ROUTE EVERY 5 MINUTES AS NEEDED FOR CHEST PAIN. 1 Bottle 0    VITAMIN D3 2,000 unit cap capsule Take 2,000 Units by mouth daily. 2    GLUCOSAMINE HCL/CHONDR PETERSEN A NA (GLUCOSAMINE-CHONDROITIN) 750-600 mg Tab Take 1 Tab by mouth daily. Brand: Move Free      aspirin delayed-release 81 mg tablet Take 81 mg by mouth nightly.  MULTIVITS W-FE,OTHER MIN (CENTRUM PO) Take 1 Tab by mouth daily.          Past History     Past Medical History:  Past Medical History:   Diagnosis Date    Abnormal stress echo 5/12/2015    Anemia NEC 03/2013    Anxiety     Borderline diabetes     CAD (coronary artery disease) 2009    cath [facility]) revealed 20% RCA and 50% 2nd diagonal    Calculus of kidney     CKD (chronic kidney disease) stage 4, GFR 15-29 ml/min (HCC)     COPD, mild (HCC)     Followed by pulmonary associates    DJD (degenerative joint disease)     Environmental allergies     Fatty liver     GERD (gastroesophageal reflux disease) 10/11/2009    Gout     Hypertension     MELODY on CPAP 10/11/2009    nasal pillows; pressure set at 10-11 -     Peripheral neuropathy 10/11/2009    bilateral feet (numbness)    Renal cell cancer, right ([address] 01/2021    RLS (restless legs syndrome) 10/11/2009    S/P coronary artery stent placement 05/12/2015    PCI./MALI to LCx, 8/2019 PCI/MALI Prox LAD (RCA 40-50% lesions)       Past Surgical History:  Past Surgical History:   Procedure Laterality Date    HX BUNIONECTOMY  2019    HX CAROTID ENDARTERECTOMY Left 01/2020    Followed by Dr. Carole Gaitan      S/P stent to left circumflex in May, 2015; s/p stent to LAD in August, 2019   [address]  2009, 7/17    per heart cath of 7/13/2017, discrete 40 % stenosis. in proximal LAD, discrete 40 % stenosis in proximal RCA, 30% stenosis in distal RCA    HX HERNIA REPAIR      [place]/umbilical    HX KNEE REPLACEMENT Left 07/23/2011    HX ORTHOPAEDIC      left shoulder manipulation    HX UROLOGICAL      basket extraction of kidney stones    WI COLONOSCOPY FLX DX W/COLLJ SPEC WHEN PFRMD  8/5/2010         WI COLSC FLX W/RMVL OF TUMOR POLYP LESION SNARE TQ  9/24/2014            Family History:  Family History   Problem Relation Age of Onset    Heart Disease Father     Hypertension Father     Other Father         abdominal aneurysm     Dementia Mother     Alzheimer's Disease Mother     Heart Disease Brother     Heart Attack Brother     Heart Disease Maternal Grandmother     Heart Disease Paternal Grandmother     Heart Attack Paternal Grandmother     Heart Disease Paternal Grandfather     Heart Attack Paternal Grandfather     Elevated Lipids Son     Elevated Lipids Daughter     Cancer Neg Hx  Diabetes Neg Hx     Stroke Neg Hx        Social History:  Social History     Tobacco Use    Smoking status: Former Smoker     Packs/day: 1.00     Years: 10.00     Pack years: 10.00     Types: Cigarettes     Quit date: 1968     Years since quittin.5    Smokeless tobacco: Never Used   Vaping Use    Vaping Use: Never used   Substance Use Topics    Alcohol use: No     Alcohol/week: 0.0 standard drinks    Drug use: No       Allergies: Allergies   Allergen Reactions    Bactrim [Sulfamethoxazole-Trimethoprim] Hives    Codeine Itching    Lisinopril (Bulk) Cough    Neurontin [Gabapentin] Other (comments)     drowsy    Zostavax [Zoster Vaccine Live (Pf)] Rash     See 9/10/13 visit    Penicillins Hives     Pt states he has taken Keflex before without problems         Review of Systems   Review of Systems   Constitutional: Negative for chills and fever. HENT: Negative. Eyes: Negative for visual disturbance. Respiratory: Negative for cough and shortness of breath. Cardiovascular: Negative for chest pain and leg swelling. Gastrointestinal: Negative for abdominal pain, nausea and vomiting. Genitourinary: Negative. Musculoskeletal: Positive for neck pain. Negative for back pain and gait problem. Skin: Negative for color change and rash. Neurological: Positive for headaches. Negative for dizziness, weakness, light-headedness and numbness. Hematological: Does not bruise/bleed easily. All other systems reviewed and are negative. Physical Exam   Physical Exam  Vitals and nursing note reviewed. Constitutional:       General: He is not in acute distress. Appearance: Normal appearance. He is not ill-appearing or toxic-appearing. HENT:      Head: Normocephalic. Comments: There is a large 8 cm linear laceration to the left side of parietal scalp. Minimal active bleeding, mostly hemostatic.   Does not penetrate galea     Nose: Nose normal.      Mouth/Throat:      Mouth: Mucous membranes are moist.   Eyes:      Extraocular Movements: Extraocular movements intact. Pupils: Pupils are equal, round, and reactive to light. Cardiovascular:      Rate and Rhythm: Normal rate and regular rhythm. Heart sounds: No murmur heard. Pulmonary:      Effort: Pulmonary effort is normal. No respiratory distress. Breath sounds: Normal breath sounds. No wheezing. Abdominal:      General: There is no distension. Palpations: Abdomen is soft. Tenderness: There is no abdominal tenderness. There is no guarding or rebound. Musculoskeletal:         General: No swelling. Normal range of motion. Cervical back: Normal range of motion and neck supple. Tenderness ( Minimal midline TTP without crepitus, step-off, deformity. Preserved full range of motion. There is worsening bilateral trapezius TTP) present. Right lower leg: No edema. Left lower leg: No edema. Skin:     General: Skin is warm and dry. Coloration: Skin is not pale. Findings: No erythema. Neurological:      General: No focal deficit present. Mental Status: He is alert and oriented to person, place, and time. Comments: Cranial nerves intact, motor 5/5 throughout, sensation intact, no cerebellar deficits. Diagnostic Study Results     Labs -   No results found for this or any previous visit (from the past 12 hour(s)). Radiologic Studies -   CT SPINE CERV WO CONT   Final Result   Osteopenia and degenerative findings again shown. No acute fracture   or dislocation demonstrated. CT HEAD WO CONT   Final Result   No acute intracranial process. Imaging findings consistent with mild chronic microvascular ischemic change. There is a mild degree of cerebral atrophy. CT Results  (Last 48 hours)               06/22/22 1953  CT SPINE CERV WO CONT Final result    Impression:  Osteopenia and degenerative findings again shown.  No acute fracture   or dislocation demonstrated. Narrative:  EXAM:  CT CERVICAL SPINE WITHOUT CONTRAST       INDICATION: neck pain after head injury. Candida Ariza. COMPARISON: CT 5/31/2019       CONTRAST:  None. TECHNIQUE: Multislice helical CT of the cervical spine was performed without   intravenous contrast administration. Sagittal and coronal reformats were   generated. CT dose reduction was achieved through use of a standardized   protocol tailored for this examination and automatic exposure control for dose   modulation. FINDINGS:       The bones are mildly osteopenic. Vertebral body heights remain normal.   Straightening of cervical lordosis is again shown. There is 2 mm anterolisthesis   at C4-5 and 3 mm retrolisthesis at C5-6 again noted. Mild C2-3, mild-moderate   C3-4 and C4-5, severe C5-6 and C6-7, and mild-moderate C7-T1 disc space   narrowing is demonstrated associated with endplate osteophytes. Posterior   processes are intact. Substantial facet osteoarthrosis is present on the left at   C2-3, C3-4, C4-5 and C7-T1. On the right, this is demonstrated at C3-4 and   C7-T1. There is moderate canal stenosis at C4-5, in part related to partly   calcified disc herniation which was noted previously. Moderate canal stenosis is   also present at C5-6 and C6-7. Substantial foraminal stenosis is present   bilaterally at C3-4, C4-5, on the right at C5-6 and C6-7. No prevertebral soft   tissue swelling is shown.           06/22/22 1454  CT HEAD WO CONT Final result    Impression:  No acute intracranial process. Imaging findings consistent with mild chronic microvascular ischemic change. There is a mild degree of cerebral atrophy. Narrative:  EXAM: CT HEAD WO CONT        CLINICAL HISTORY: fall with head injury. pt on blood thinners   INDICATION: fall with head injury. pt on blood thinners   COMPARISON: 1/9/2020.    CT dose reduction was achieved through use of a standardized protocol tailored   for this examination and automatic exposure control for dose modulation. TECHNIQUE: Serial axial images with a collimation of 5 mm were obtained from the   skull base through the vertex     FINDINGS:    There is sulcal and ventricular prominence. Confluent periventricular and   scattered foci of hypodensity in the cerebral white matter. There is no evidence   of an acute infarction, hemorrhage, or mass-effect. There is no evidence of   midline shift or hydrocephalus. Posterior fossa structures are unremarkable. No   extra-axial collections are seen. Mastoid air cells are well pneumatized and clear. There is no evidence of depressed skull fractures of soft tissue swelling. CXR Results  (Last 48 hours)    None          Medical Decision Making   I am the first provider for this patient. I reviewed the vital signs, available nursing notes, past medical history, past surgical history, family history and social history. Vital Signs-Reviewed the patient's vital signs. Patient Vitals for the past 12 hrs:   Temp Pulse Resp BP SpO2   06/22/22 2007 -- 66 18 (!) 146/72 97 %   06/22/22 1540 98.2 °F (36.8 °C) 61 16 (!) 124/58 --   06/22/22 1411 98.3 °F (36.8 °C) 71 16 (!) 147/61 96 %       Records Reviewed: Nursing Notes    Provider Notes (Medical Decision Making):   64-year-old male here with head injury, scalp laceration after losing his balance and falling forward. No LOC, denies that this was a syncopal event. Afebrile and vital signs stable. Tdap already updated within the past 5 to 10 years. No focal deficits. Wound was thoroughly irrigated and repaired with staples. I will obtain CT head, CT C-spine, and discharge if negative.       Procedure Note - Laceration Repair:  6:31 PM  Procedure by Zac Teran MD.  Complexity: complex (simple, intermediate, or complex)  8cm linear laceration to scalp  was irrigated copiously with NS under jet lavage, prepped with Soap and saline and draped in a sterile fashion. The wound was explored with the following results: No foreign bodies found. The wound was repaired with 5 staples. The wound was closed with good hemostasis and approximation. Estimated blood loss: 5ml  The procedure took 1-15 minutes, and pt tolerated well. ED Course:   Initial assessment performed. The patients presenting problems have been discussed, and they are in agreement with the care plan formulated and outlined with them. I have encouraged them to ask questions as they arise throughout their visit. Discharge Note:  The patient has been re-evaluated and is ready for discharge. Reviewed available results with patient. Counseled patient on diagnosis and care plan. Patient has expressed understanding, and all questions have been answered. Patient agrees with plan and agrees to follow up as recommended, or to return to the ED if their symptoms worsen. Discharge instructions have been provided and explained to the patient, along with reasons to return to the ED. Disposition:  Discharge    DISCHARGE PLAN:  1. Discharge Medication List as of 6/22/2022  9:10 PM        2. Follow-up Information     Follow up With Specialties Details Why Abraham Perez MD Internal Medicine Physician Schedule an appointment as soon as possible for a visit in 1 week For suture removal, For wound re-check, If symptoms worsen, As needed [address] [place] [address] 83.  [phone]      [facility] EMERGENCY DEPT Emergency Medicine Go to  For suture removal, For wound re-check [address] [place] [address]  [address] [facility]  [phone]        3. Return to ED if worse     Diagnosis     Clinical Impression:   1. Laceration of scalp, initial encounter    2. Closed head injury, initial encounter    3. Strain of neck muscle, initial encounter        Attestations:  I am the first and primary provider of record for this patient's ED encounter.  I personally performed the services described above in this documentation. Lenin Mancilla MD    Please note that this dictation was completed with [employer], the computer voice recognition software. Quite often unanticipated grammatical, syntax, homophones, and other interpretive errors are inadvertently transcribed by the computer software. Please disregard these errors. Please excuse any errors that have escaped final proofreading. Thank you.

## 2022-06-23 NOTE — DISCHARGE INSTRUCTIONS
You were evaluated in the emergency department for head injury with scalp laceration. Your examination was reassuring as was your work-up including CT scan of head and neck. It will be important for you to follow-up with your primary care physician in 2-3 days. If you develop worsening symptoms such as fevers, redness, swelling, or weakness or numbness of your arms, please return to the emergency department immediately.

## 2022-06-24 ENCOUNTER — TELEPHONE (OUTPATIENT)
Dept: INTERNAL MEDICINE CLINIC | Age: 77
End: 2022-06-24

## 2022-06-24 NOTE — TELEPHONE ENCOUNTER
----- Message from Nazia Titus sent at 6/24/2022 11:05 AM EDT -----  Subject: Message to Provider    QUESTIONS  Information for Provider? Pt Daughter would like to know if Dr Fernandez Handing   and remove his staples in pt's head or will someone else need to do it. Pt   will need to have a hospital fu scheduled. Pt was at [facility]   6/22/22 fell and busted his head  ---------------------------------------------------------------------------  --------------  CALL BACK INFO  What is the best way for the office to contact you? OK to leave message on   voicemail  Preferred Call Back Phone Number? [phone]  ---------------------------------------------------------------------------  --------------  SCRIPT ANSWERS  Relationship to Patient? Other  Representative Name? daughter Crystal Rosado  Additional information verified (besides Name and Date of Birth)? Phone   Number  (Patient requests to see provider urgently. )? No  Do you have any questions for your primary care provider that need to be   answered prior to your appointment?  Yes

## 2022-06-26 NOTE — PROGRESS NOTES
Rah Robins is a 64 y.o. male who presents for follow up after fall on 6/22. Laceration to left scalp, 5 staples. Lost balance, fell forward. No LOC.         Past Medical History:   Diagnosis Date    Abnormal stress echo 5/12/2015    Anemia NEC 03/2013    Anxiety     Borderline diabetes     CAD (coronary artery disease) 2009    cath [facility]) revealed 20% RCA and 50% 2nd diagonal    Calculus of kidney     CKD (chronic kidney disease) stage 4, GFR 15-29 ml/min (HCC)     COPD, mild (HCC)     Followed by pulmonary associates    DJD (degenerative joint disease)     Environmental allergies     Fatty liver     GERD (gastroesophageal reflux disease) 10/11/2009    Gout     Hypertension     MELODY on CPAP 10/11/2009    nasal pillows; pressure set at 10-11 -     Peripheral neuropathy 10/11/2009    bilateral feet (numbness)    Renal cell cancer, right ([address] 01/2021    RLS (restless legs syndrome) 10/11/2009    S/P coronary artery stent placement 05/12/2015    PCI./MALI to LCx, 8/2019 PCI/MALI Prox LAD (RCA 40-50% lesions)       Family History   Problem Relation Age of Onset    Heart Disease Father     Hypertension Father     Other Father         abdominal aneurysm     Dementia Mother     Alzheimer's Disease Mother     Heart Disease Brother     Heart Attack Brother     Heart Disease Maternal Grandmother     Heart Disease Paternal Grandmother     Heart Attack Paternal Grandmother     Heart Disease Paternal Grandfather     Heart Attack Paternal Grandfather     Elevated Lipids Son     Elevated Lipids Daughter     Cancer Neg Hx     Diabetes Neg Hx     Stroke Neg Hx        Social History     Socioeconomic History    Marital status:      Spouse name: Vicky Earing Number of children: 2    Years of education: graduated then 4 year apprenticship    Highest education level: Associate degree: academic program   Occupational History    Not on file   Tobacco Use    Smoking status: Former Smoker     Packs/day: 1.00     Years: 10.00     Pack years: 10.00     Types: Cigarettes     Quit date: 1968     Years since quittin.6    Smokeless tobacco: Never Used   Vaping Use    Vaping Use: Never used   Substance and Sexual Activity    Alcohol use: No     Alcohol/week: 0.0 standard drinks    Drug use: No    Sexual activity: Yes     Partners: Female     Birth control/protection: None   Other Topics Concern     Service Not Asked    Blood Transfusions Not Asked    Caffeine Concern Not Asked    Occupational Exposure Not Asked    Hobby Hazards Not Asked    Sleep Concern Not Asked    Stress Concern Not Asked    Weight Concern Not Asked    Special Diet Not Asked    Back Care Not Asked    Exercise Not Asked    Bike Helmet Not Asked    Seat Belt Not Asked    Self-Exams Not Asked   Social History Narrative    Not on file     Social Determinants of Health     Financial Resource Strain:     Difficulty of Paying Living Expenses: Not on file   Food Insecurity:     Worried About Running Out of Food in the Last Year: Not on file    Emmanuel of Food in the Last Year: Not on file   Transportation Needs:     Lack of Transportation (Medical): Not on file    Lack of Transportation (Non-Medical):  Not on file   Physical Activity:     Days of Exercise per Week: Not on file    Minutes of Exercise per Session: Not on file   Stress:     Feeling of Stress : Not on file   Social Connections:     Frequency of Communication with Friends and Family: Not on file    Frequency of Social Gatherings with Friends and Family: Not on file    Attends Pent[employer]al Services: Not on file    Active Member of Clubs or Organizations: Not on file    Attends Club or Organization Meetings: Not on file    Marital Status: Not on file   Intimate Partner Violence:     Fear of Current or Ex-Partner: Not on file    Emotionally Abused: Not on file    Physically Abused: Not on file    Sexually Abused: Not on file   Housing Stability:     Unable to Pay for Housing in the Last Year: Not on file    Number of Places Lived in the Last Year: Not on file    Unstable Housing in the Last Year: Not on file       Current Outpatient Medications on File Prior to Visit   Medication Sig Dispense Refill    fluticasone propionate (Flonase Allergy Relief) 50 mcg/actuation nasal spray 2 Sprays by Both Nostrils route daily.  pantoprazole (PROTONIX) 40 mg tablet TAKE 1 TABLET BY MOUTH EVERY DAY 90 Tablet 1    Trelegy Ellipta 100-62.5-25 mcg inhaler TAKE 1 PUFF BY MOUTH EVERY DAY      colchicine 0.6 mg tablet Take 0.6 mg by mouth two (2) times daily as needed for Gout or Pain.  loratadine (Claritin) 10 mg tablet Take 10 mg by mouth daily.  atorvastatin (LIPITOR) 80 mg tablet TAKE 1 TABLET BY MOUTH EVERY DAY 90 Tablet 3    tamsulosin (FLOMAX) 0.4 mg capsule Take 0.4 mg by mouth daily.  isosorbide mononitrate ER (IMDUR) 30 mg tablet TAKE 1/2 TABLET BY MOUTH EVERY DAY 45 Tablet 3    metoprolol succinate (TOPROL-XL) 25 mg XL tablet TAKE 1 TABLET BY MOUTH EVERY DAY 90 Tablet 1    furosemide (LASIX) 80 mg tablet Take 80 mg by mouth daily.  citalopram (CELEXA) 40 mg tablet TAKE 1 TABLET BY MOUTH EVERY DAY (Patient taking differently: Take 40 mg by mouth daily.) 90 Tablet 3    sodium bicarbonate 325 mg tablet Take 1 Tablet by mouth two (2) times a day. 60 Tablet 0    amLODIPine (NORVASC) 10 mg tablet Take 1 Tablet by mouth daily. 30 Tablet 1    fluocinoNIDE (LIDEX) 0.05 % external solution APPLY TO ITCHY SPOTS ON SCALP AS NEEDED FOR FLARE      albuterol (ProAir HFA) 90 mcg/actuation inhaler Take 1 Puff by inhalation every four (4) hours.  ketoconazole (NIZORAL) 2 % shampoo Apply 1 mL to affected area daily as needed for Itching.  nitroglycerin (NITROSTAT) 0.4 mg SL tablet TAKE 1 TABLET BY SUBLINGUAL ROUTE EVERY 5 MINUTES AS NEEDED FOR CHEST PAIN.  1 Bottle 0    VITAMIN D3 2,000 unit cap capsule Take 2,000 Units by mouth daily. 2    GLUCOSAMINE HCL/CHONDR PETERSEN A NA (GLUCOSAMINE-CHONDROITIN) 750-600 mg Tab Take 1 Tab by mouth daily. Brand: Move Free      aspirin delayed-release 81 mg tablet Take 81 mg by mouth nightly.  MULTIVITS W-FE,OTHER MIN (CENTRUM PO) Take 1 Tab by mouth daily.  rOPINIRole (REQUIP) 0.5 mg tablet TAKE 1 TABLET BY MOUTH EVERY DAY AT NIGHT (Patient not taking: Reported on 6/29/2022) 90 Tablet 1    senna-docusate (PERICOLACE) 8.6-50 mg per tablet Take 2 Tablets by mouth daily. (Patient not taking: Reported on 6/29/2022) 20 Tablet 0     No current facility-administered medications on file prior to visit. Review of Systems  Pertinent items are noted in HPI. Objective:     Visit Vitals  BP (!) 123/57 (BP 1 Location: Left upper arm, BP Patient Position: Sitting, BP Cuff Size: Adult)   Pulse 65   Temp (!) 96.3 °F (35.7 °C) (Temporal)   Resp 18   Ht 6' (1.829 m)   Wt 249 lb 9.6 oz (113.2 kg)   BMI 33.85 kg/m²     Gen: well appearing male  HEENT: wound on left front of scalp, well healed, 5 staples removed, no signs of infection or bleeding. Assessment/Plan:       ICD-10-CM ICD-9-CM    1.  Visit for suture removal  Z48.02 V58.32            Tye Fitch MD

## 2022-06-29 ENCOUNTER — TELEPHONE (OUTPATIENT)
Dept: INTERNAL MEDICINE CLINIC | Age: 77
End: 2022-06-29

## 2022-06-29 ENCOUNTER — OFFICE VISIT (OUTPATIENT)
Dept: INTERNAL MEDICINE CLINIC | Age: 77
End: 2022-06-29

## 2022-06-29 VITALS
BODY MASS INDEX: 33.81 KG/M2 | HEART RATE: 65 BPM | WEIGHT: 249.6 LBS | RESPIRATION RATE: 18 BRPM | TEMPERATURE: 96.3 F | DIASTOLIC BLOOD PRESSURE: 57 MMHG | HEIGHT: 72 IN | SYSTOLIC BLOOD PRESSURE: 123 MMHG

## 2022-06-29 DIAGNOSIS — Z91.81 AT RISK FOR FALLS: Primary | ICD-10-CM

## 2022-06-29 DIAGNOSIS — Z48.02 VISIT FOR SUTURE REMOVAL: Primary | ICD-10-CM

## 2022-06-29 DIAGNOSIS — R26.81 UNSTEADY GAIT: ICD-10-CM

## 2022-06-29 RX ORDER — FLUTICASONE PROPIONATE 50 MCG
2 SPRAY, SUSPENSION (ML) NASAL DAILY
COMMUNITY

## 2022-06-29 NOTE — PROGRESS NOTES
1. \"Have you been to the ER, urgent care clinic since your last visit? Hospitalized since your last visit? Yes, MMR see notes    2. \"Have you seen or consulted any other health care providers outside of the [address] [place] [address] since your last visit? Yes, see car team     3. For patients aged 39-70: Has the patient had a colonoscopy / FIT/ Cologuard?  Yes - no Care Gap present

## 2022-07-12 ENCOUNTER — HOSPITAL ENCOUNTER (OUTPATIENT)
Dept: NEUROLOGY | Age: 77
Discharge: HOME | End: 2022-07-12
Attending: PSYCHIATRY & NEUROLOGY
Payer: MEDICARE

## 2022-07-12 DIAGNOSIS — G60.9 IDIOPATHIC PERIPHERAL NEUROPATHY: ICD-10-CM

## 2022-07-12 DIAGNOSIS — R56.9 CONVULSIONS, UNSPECIFIED CONVULSION TYPE (HCC): ICD-10-CM

## 2022-07-12 DIAGNOSIS — G25.0 BENIGN ESSENTIAL TREMOR: ICD-10-CM

## 2022-07-12 DIAGNOSIS — G25.81 RLS (RESTLESS LEGS SYNDROME): ICD-10-CM

## 2022-07-12 DIAGNOSIS — R41.82 ACUTE ALTERATION IN MENTAL STATUS: ICD-10-CM

## 2022-07-12 DIAGNOSIS — E11.42 DIABETIC PERIPHERAL NEUROPATHY ASSOCIATED WITH TYPE 2 DIABETES MELLITUS (HCC): ICD-10-CM

## 2022-07-12 DIAGNOSIS — G47.52 RBD (REM BEHAVIORAL DISORDER): ICD-10-CM

## 2022-07-12 DIAGNOSIS — I65.23 BILATERAL CAROTID ARTERY STENOSIS: ICD-10-CM

## 2022-07-13 PROCEDURE — 95719 EEG PHYS/QHP EA INCR W/O VID: CPT | Performed by: PSYCHIATRY & NEUROLOGY

## 2022-07-14 ENCOUNTER — DOCUMENTATION ONLY (OUTPATIENT)
Dept: NEUROLOGY | Age: 77
End: 2022-07-14

## 2022-07-14 ENCOUNTER — HOSPITAL ENCOUNTER (OUTPATIENT)
Dept: PHYSICAL THERAPY | Age: 77
Discharge: HOME | End: 2022-07-14
Payer: MEDICARE

## 2022-07-14 DIAGNOSIS — Z91.81 AT RISK FOR FALLS: ICD-10-CM

## 2022-07-14 DIAGNOSIS — R26.81 UNSTEADY GAIT: ICD-10-CM

## 2022-07-14 PROCEDURE — 97162 PT EVAL MOD COMPLEX 30 MIN: CPT | Performed by: PHYSICAL THERAPIST

## 2022-07-14 PROCEDURE — 97110 THERAPEUTIC EXERCISES: CPT | Performed by: PHYSICAL THERAPIST

## 2022-07-14 NOTE — PROGRESS NOTES
PT INITIAL EVALUATION NOTE - [facility] 2-15    Patient Name: Baljti Rubin. Date:2022  : 1945  [x]  Patient  Verified  Payor: Sameer Roa / Plan: [place] MEDICARE PART A & B / Product Type: Medicare /    In time: 747  Out time: 1000  Total Treatment Time (min): 52  Total Timed Codes (min): 20  1:1 Treatment Time ([address] only): 20   Visit #: 1     Treatment Area: At risk for falls [Z91.81]  Unsteady gait [R26.81]    SUBJECTIVE  Pain Level (0-10 scale): 5  Any medication changes, allergies to medications, adverse drug reactions, diagnosis change, or new procedure performed?: [] No    [x] Yes (see summary sheet for update)  Subjective:    Pt reports a history of falls. Most recently 1 month ago in which he hit his head and required 5 staples in his head. He states that he has fallen 4 times in the last year. He admits that each fall has been a result of doing something he shouldn't have been. His daughter states that he has been shuffling his feet more and his posture is rounded which doesn't help. He does have a cane and recently used it to [employer] in [place]. He does have some day to day pain that he equates to stiffness and soreness from arthritis. He lives at home with his daughter. He has a ramp to enter and bedroom on the second floor. He does spend most days sitting and doesn't get involved with much outdoor activity. He denies any dizziness and lightheadedness. He does report that he has neuropathy in his feet and he has diminished sensation as high as his knees. Daughter reports that he doesn't have much movement or flexibility in his foot.           OBJECTIVE/EXAMINATION  Posture:  Rounded shoulders and forward head  Other Observations:  unremarkable  Gait and Functional Mobility:  Wide based   Palpation: non tender    Joint Mobility Assessment: NA    LOWER QUARTER   MUSCLE STRENGTH  KEY       R  L  0 - No Contraction  Knee ext  5  5  1 - Trace            flex  4  5  2 - Poor   Hip ext   NT  NT  3 - Fair          flex   4  4  4 - Good         abd  NT  NT  5 - Normal         add  NT  NT      Ankle DF  3  4                PF  NT  NT                INV  NT  NT                EV  NT  NT    Neurological: Reflexes / Sensations: diminished bilaterally below the knee. Worsens the more distal you test on the leg.     Special Tests: Trendelenberg: +    Stork: NT      Raul: NT     Debora: NT                 H.S. SLR: +    Piriformis Ext: NT      Long Sit: NT     Hip Scour: -      Knee Varus/Valgus Stress: NT  Knee Lachmans: NT      Thesslay Test: NT      Other: NT    20 min Therapeutic Exercise:  [x] See flow sheet :   Rationale: increase ROM, increase strength, improve coordination, improve balance and increase proprioception to improve the patients ability to complete all activity      With   [x] TE   [] TA   [] Neuro   [] SC   [] other: Patient Education: [x] Review HEP    [x] Progressed/Changed HEP based on:   [x] positioning   [x] body mechanics   [] transfers   [] heat/ice application    [] other:      Other Objective/Functional Measures: FOTO Functional Measure: 38/100         TUG - 15.18, 14.23      2MWT - 365ft  = .93 m/sec  30 sec STS - 9x    Pain Level (0-10 scale) post treatment: 4-5    ASSESSMENT/Changes in Function:     [x]  See Plan of [place], NOREEN 7/14/2022

## 2022-07-14 NOTE — PROCEDURES
[address]  EEG    Name:  Liborio Rebolledo  MR#:  [id number]  :  1945  ACCOUNT #:  [de-identified]  DATE OF SERVICE:  2022    24-HOUR AMBULATORY EEG RECORDING    DATE OF INTERPRETATION:  2022 to 2022. CLINICAL INDICATION:  The patient is a 64-year-old with possible seizures. The patient had involuntary jerks in his legs and altered mental status. Possible convulsive seizures, possible partial seizures. EEG to rule out seizure, rule out cortical abnormality or encephalopathy. EEG CLASSIFICATION:  On this patient is essentially normal ambulatory 24-hour EEG recording. DESCRIPTION OF THE RECORD:  This is a 16-channel prolonged EEG recording on the patient for possible seizures. This study began on 2022 at approximately 09:30 a.m. and ended on 2022 at approximately 10:00 a.m. for a total time of study of 24-1/2 hours. During this time, the patient had a posteriorly located occipital alpha rhythm of 9-10 Hz that did attenuate some with eye opening in the awake state. Throughout the recording, there were no clear areas of focal slowing, no clear spike or spike-and-wave discharges and no recorded dysrhythmic or electrographic spells of any type seen in the study. There was some moderate muscle and electrode artifact at times during the recording, but overall the study was technically of good quality and interpretable. The patient did not perform hyperventilation or photic stimulation. During the evening hours, the patient entered prolonged states of sleep with K complexes and sleep spindles seen in the central head regions. INTERPRETATION:  This is essentially normal ambulatory 24-hour EEG recording showing no clear areas of focal abnormalities, no clear spike or spike-and-wave discharges and no electrographic or dysrhythmic spells. On the patient's clinical diary, he reported spells of leg jerking at 11:30 a.m. and 06:30 p.m.   During this time, there was no abnormal EEG activity seen, just a movement of muscle artifact seen. During the recording, the patient had some spells of occasional headache and feeling lightheaded. Again, there were no spells of dysrhythmic activity seen on EEG. Clinical correlation is recommended. Katie Alberto MD      [id number]  D:  07/13/2022 22:05  T:  07/14/2022 3:06  JOB #:  [id number]  CC:   Adama Pruitt MD

## 2022-07-14 NOTE — PROGRESS NOTES
I called the patient, and let him know that his EEG was normal, no seizures, no abnormal activity and he said thank you

## 2022-07-15 ENCOUNTER — OFFICE VISIT (OUTPATIENT)
Dept: SLEEP MEDICINE | Age: 77
End: 2022-07-15
Payer: MEDICARE

## 2022-07-15 ENCOUNTER — DOCUMENTATION ONLY (OUTPATIENT)
Dept: SLEEP MEDICINE | Age: 77
End: 2022-07-15

## 2022-07-15 VITALS
SYSTOLIC BLOOD PRESSURE: 120 MMHG | BODY MASS INDEX: 33.71 KG/M2 | RESPIRATION RATE: 20 BRPM | HEART RATE: 63 BPM | TEMPERATURE: 98.4 F | HEIGHT: 72 IN | DIASTOLIC BLOOD PRESSURE: 74 MMHG | WEIGHT: 248.9 LBS | OXYGEN SATURATION: 96 %

## 2022-07-15 DIAGNOSIS — I10 ESSENTIAL HYPERTENSION: ICD-10-CM

## 2022-07-15 DIAGNOSIS — G47.33 OSA (OBSTRUCTIVE SLEEP APNEA): Primary | ICD-10-CM

## 2022-07-15 PROCEDURE — G8754 DIAS BP LESS 90: HCPCS | Performed by: NURSE PRACTITIONER

## 2022-07-15 PROCEDURE — 99214 OFFICE O/P EST MOD 30 MIN: CPT | Performed by: NURSE PRACTITIONER

## 2022-07-15 PROCEDURE — G8536 NO DOC ELDER MAL SCRN: HCPCS | Performed by: NURSE PRACTITIONER

## 2022-07-15 PROCEDURE — 1101F PT FALLS ASSESS-DOCD LE1/YR: CPT | Performed by: NURSE PRACTITIONER

## 2022-07-15 PROCEDURE — G8427 DOCREV CUR MEDS BY ELIG CLIN: HCPCS | Performed by: NURSE PRACTITIONER

## 2022-07-15 PROCEDURE — G8752 SYS BP LESS 140: HCPCS | Performed by: NURSE PRACTITIONER

## 2022-07-15 PROCEDURE — G8417 CALC BMI ABV UP PARAM F/U: HCPCS | Performed by: NURSE PRACTITIONER

## 2022-07-15 PROCEDURE — G9717 DOC PT DX DEP/BP F/U NT REQ: HCPCS | Performed by: NURSE PRACTITIONER

## 2022-07-15 PROCEDURE — 1123F ACP DISCUSS/DSCN MKR DOCD: CPT | Performed by: NURSE PRACTITIONER

## 2022-07-15 NOTE — PROGRESS NOTES
[address] [facility] [place]. [place], [address]   Tel.  [phone]   Fax. [address] [facility] [address]   [place], [address] [facility]   Tel.  [phone]   Fax. [phone] [address]   Tel.  [phone]   Fax. [phone]     Jim Upton (: 1945) is a 64 y.o. male, established patient, seen for positive airway pressure follow-up and evaluation. He was last seen by me on 2021, previously seen by Dr. Luis Fernando Morales on 10/3/2019, prior notes and sleep testing reviewed in detail. Initial sleep apnea diagnosis 25+ years ago. In lab split sleep test 2014 showed AHI of 49.5/hr with a lowest SpO2 of 82%, adequate CPAP titration. ASSESSMENT/PLAN:    ICD-10-CM ICD-9-CM    1. MELODY (obstructive sleep apnea)  G47.33 327.23 AMB SUPPLY ORDER      AMB SUPPLY ORDER   2. Essential hypertension  I10 401.9    3. Adult BMI 33.0-33.9 kg/sq m  Z68.33 V85.33        AHI = 49.5(2014). On Respironics DS 2 APAP:  12-14 cmH2O. Set up 7/15/2019. New device set up 2022.     He is adherent with PAP therapy and PAP continues to benefit patient and remains necessary for control of his sleep apnea. His device was affected by the [place] recall, review of  Care  shows new device set up and he is using. Follow-up and Dispositions    · Return in about 1 year (around 7/15/2023) for annual follow up . Follow-up and Disposition History       1 - Sleep Apnea -  Sleep apnea condition has been exacerbated and treatment plan requires change. Change current pressure settings to APAP 12-16 cm H2O, Flex 3, ramp to starting at 8. Humidity changed to 4. Changes made by provider in  Care  and sent to [facility].      * download in 1 week to assess AHI    * Supplies ordered - nasal pillows mask and heated tubing    Orders Placed This Encounter    AMB SUPPLY ORDER     Diagnosis: (G47.33) MELODY (obstructive sleep apnea)  (primary encounter diagnosis) Replacement Supplies for Positive Airway Pressure Therapy Device:   Duration of need: 99 months.  Nasal Pillows (Replace) 2 per month.  Nasal Interface Mask 1 every 3 months.  Pos Airway pressure chin strap   Headgear 1 every 6 months.  Tubing with heating element 1 every 3 months.  Filter(s) Disposable 2 per month.  Filter(s) Non-Disposable 1 every 6 months. .   [facility] for Humidifier (Replace) 1 every 6 months. LANETTE Peralta; NPI: [id number]    Electronically signed. Date:- 07/15/22    AMB SUPPLY ORDER     Diagnosis: (G47.33) MELODY (obstructive sleep apnea)  (primary encounter diagnosis)     Pressure change APAP to 10-16 cmH2O. Flex 3, ramp start at 8 cmH2O. Changes made in sleep lab. LANETTE Peralta; NPI: [id number]    Electronically signed. Date:- 07/15/22     * Counseling was provided regarding the importance of regular PAP use with emphasis on ensuring sufficient total sleep time, proper sleep hygiene, and safe driving. He is limiting himself to short trips but did not have any issues recently on the long drive to [place]. * Re-enforced proper and regular cleaning for the device. We discussed the risk associated with use of cleaning devices and he will continue with use of dish soap and water as the appropriate cleaning method. * He was asked to contact our office for any problems regarding PAP therapy. 2. Hypertension -  continue on his current regimen, he will continue to monitor his BP and follow up with his PMD for reevaluation/adjustment of medications if warranted. 3. Encouraged continued weight management program through appropriate diet and exercise regimen as significant weight reduction has been shown to reduce severity of obstructive sleep apnea. He recently started PT for balance (recent fall with minor head injury requiring stitches).     SUBJECTIVE/OBJECTIVE:    He  is seen today for follow up on PAP device and reports no problems using the device. He is joined by his daughter Kandace Rinaldi today. The following concerns identified:    Drowsiness no Problems exhaling no   Snoring no Forget to put on no   Mask Comfortable yes Can't fall asleep no   Dry Mouth no Mask falls off no   Air Leaking no Frequent awakenings no       He admits that his sleep has improved on PAP therapy using nasal pillows mask and heated tubing. He reports doing well with new Dreamstation device, but notes he is more dry. He recently stopped ropinerole for RLS and notes that he is doing much better. Review of device download indicated:  Auto  pressure: 12-14 cmH2O; Peak Avg Pressure: 14.0 cmH2O;  Avg. Device Pressure <= 90 %: 14.0 cmH2O    Average % Night in Large Leak:  3.2  % Used Days >= 4 hours: 93.  Avg hours used:  9:01. Therapy Apnea Index averaged over PAP use: 9.4 /hr which reflects worsened sleep breathing condition. Pressure to be adjusted. Lincoln Sleepiness Score: 11 and Modified F.O.S.Q. Score Total / 2: 13.5 which reflects improved sleep quality over therapy time. Sleep Review of Systems: notable for Negative difficulty falling asleep; Positive awakenings at night; Negative early morning headaches; Negative memory problems; Negative concentration issues;  Negative chest pain; Negative shortness of breath; Negative significant joint pain at night; Negative significant muscle pain at night; Negative rashes or itching; Negative heartburn; Negative significant mood issues; daily afternoon naps in the recliner      Visit Vitals  /74 (BP 1 Location: Right arm, BP Patient Position: Sitting, BP Cuff Size: Large adult)   Pulse 63   Temp 98.4 °F (36.9 °C) (Temporal)   Resp 20   Ht 6' (1.829 m)   Wt 248 lb 14.4 oz (112.9 kg)   SpO2 96%   BMI 33.76 kg/m²          General:   Alert, oriented, not in acute distress   Eyes:  Anicteric Sclerae; no obvious strabismus   Nose:  No obvious nasal septum deviation    Neck:   Midline trachea   Chest/Lungs:  Symmetrical lung expansion, clear lung fields on auscultation    CVS:  Normal rate, regular rhythm,  no JVD   Extremities:  No obvious rashes, no edema    Neuro:  No focal deficits; No obvious tremor    Psych:  Normal affect,  normal countenance     Patient's phone number [phone] (home)  was reviewed and confirmed for accuracy. He gives permission for messages regarding results and appointments to be left at that number. An electronic signature was used to authenticate this note.     -- John Viveros NP, [facility]  07/15/22

## 2022-07-15 NOTE — PATIENT INSTRUCTIONS
[address] [facility] [place]. [place], [address]  Tel.  [phone]  Fax. [address] [facility]  [place], [address] [facility]  Tel.  [phone]  Fax. [phone] [address]  Tel.  [phone]  Fax. [phone]     Learning About CPAP for Sleep Apnea  What is CPAP? CPAP is a small machine that you use at home every night while you sleep. It increases air pressure in your throat to keep your airway open. When you have sleep apnea, this can help you sleep better so you feel much better. CPAP stands for \"continuous positive airway pressure. \"  The CPAP machine will have one of the following:  · A mask that covers your nose and mouth  · Prongs that fit into your nose  · A mask that covers your nose only, the most common type. This type is called NCPAP. The N stands for \"nasal.\"  Why is it done? CPAP is usually the best treatment for obstructive sleep apnea. It is the first treatment choice and the most widely used. Your doctor may suggest CPAP if you have:  · Moderate to severe sleep apnea. · Sleep apnea and coronary artery disease (CAD) or heart failure. How does it help? · CPAP can help you have more normal sleep, so you feel less sleepy and more alert during the daytime. · CPAP may help keep heart failure or other heart problems from getting worse. · NCPAP may help lower your blood pressure. · If you use CPAP, your bed partner may also sleep better because you are not snoring or restless. What are the side effects? Some people who use CPAP have:  · A dry or stuffy nose and a sore throat. · Irritated skin on the face. · Sore eyes. · Bloating. If you have any of these problems, work with your doctor to fix them. Here are some things you can try:  · Be sure the mask or nasal prongs fit well. · See if your doctor can adjust the pressure of your CPAP. · If your nose is dry, try a humidifier.   · If your nose is runny or stuffy, try decongestant medicine or a steroid nasal spray. If these things do not help, you might try a different type of machine. Some machines have air pressure that adjusts on its own. Others have air pressures that are different when you breathe in than when you breathe out. This may reduce discomfort caused by too much pressure in your nose. Where can you learn more? Go to "[employer]be  Enter Lorena Moffett in the search box to learn more about \"Learning About CPAP for Sleep Apnea. \"   © 0774-0658 Healthwise, [employer]. Care instructions adapted under license by [address] [place] [address] [employer] (which disclaims liability or warranty for this information). This care instruction is for use with your licensed healthcare professional. If you have questions about a medical condition or this instruction, always ask your healthcare professional. [place] [address] any warranty or liability for your use of this information. Content Version: 8.1.74173; Last Revised: January 11, 2010  PROPER SLEEP HYGIENE    What to avoid  · Do not have drinks with caffeine, such as coffee or black tea, for 8 hours before bed. · Do not smoke or use other types of tobacco near bedtime. Nicotine is a stimulant and can keep you awake. · Avoid drinking alcohol late in the evening, because it can cause you to wake in the middle of the night. · Do not eat a big meal close to bedtime. If you are hungry, eat a light snack. · Do not drink a lot of water close to bedtime, because the need to urinate may wake you up during the night. · Do not read or watch TV in bed. Use the bed only for sleeping and sexual activity. What to try  · Go to bed at the same time every night, and wake up at the same time every morning. Do not take naps during the day. · Keep your bedroom quiet, dark, and cool. · Get regular exercise, but not within 3 to 4 hours of your bedtime. .  · Sleep on a comfortable pillow and mattress.   · If watching the clock makes you anxious, turn it facing away from you so you cannot see the time. · If you worry when you lie down, start a worry book. Well before bedtime, write down your worries, and then set the book and your concerns aside. · Try meditation or other relaxation techniques before you go to bed. · If you cannot fall asleep, get up and go to another room until you feel sleepy. Do something relaxing. Repeat your bedtime routine before you go to bed again. · Make your house quiet and calm about an hour before bedtime. Turn down the lights, turn off the TV, log off the computer, and turn down the volume on music. This can help you relax after a busy day. Drowsy Driving: The [employer] cites drowsiness as a causing factor in more than 863,512 police reported crashes annually, resulting in 76,000 injuries and 1,500 deaths. Other surveys suggest 55% of people polled have driven while drowsy in the past year, 23% had fallen asleep but not crashed, 3% crashed, and 2% had and accident due to drowsy driving. Who is at risk? Young Drivers: One study of drowsy driving accidents states that 55% of the drivers were under 25 years. Of those, 75% were male. Shift Workers and Travelers: People who work overnight or travel across time zones frequently are at higher risk of experiencing Circadian Rhythm Disorders. They are trying to work and function when their body is programed to sleep. Sleep Deprived: Lack of sleep has a serious impact on your ability to pay attention or focus on a task. Consistently getting less than the average of 8 hours your body needs creates partial or cumulative sleep deprivation. Untreated Sleep Disorders: Sleep Apnea, Narcolepsy, R.L.S., and other sleep disorders (untreated) prevent a person from getting enough restful sleep. This leads to excessive daytime sleepiness and increases the risk for drowsy driving accidents by up to 7 times.   Medications / Alcohol: Even over the counter medications can cause drowsiness. Medications that impair a drivers attention should have a warning label. Alcohol naturally makes you sleepy and on its own can cause accidents. Combined with excessive drowsiness its effects are amplified. Signs of Drowsy Driving:   * You don't remember driving the last few miles   * You may drift out of your ella   * You are unable to focus and your thoughts wander   * You may yawn more often than normal   * You have difficulty keeping your eyes open / nodding off   * Missing traffic signs, speeding, or tailgating  Prevention-   Good sleep hygiene, lifestyle and behavioral choices have the most impact on drowsy driving. There is no substitute for sleep and the average person requires 8 hours nightly. If you find yourself driving drowsy, stop and sleep. Consider the sleep hygiene tips provided during your visit as well. Medication Refill Policy: Refills for all medications require 1 week advance notice. Please have your pharmacy fax a refill request. We are unable to fax, or call in \"controled substance\" medications and you will need to pick these prescriptions up from our office. [employer] Activation    Thank you for requesting access to [employer]. Please follow the instructions below to securely access and download your online medical record. [employer] allows you to send messages to your doctor, view your test results, renew your prescriptions, schedule appointments, and more. How Do I Sign Up? 1. In your internet browser, go to [email or web address]. 2. Click on the First Time User? Click Here link in the Sign In box. You will see the New Member Sign Up page. 3. Enter your [employer]ing Access Code exactly as it appears below. You will not need to use this code after youve completed the sign-up process. If you do not sign up before the expiration date, you must request a new code. [employer] Access Code:  Activation code not generated  Current Access Intelligence Status: Active (This is the date your Access Intelligence access code will )    4. Enter the last four digits of your Social Security Number (xxxx) and Date of Birth (mm/dd/yyyy) as indicated and click Submit. You will be taken to the next sign-up page. 5. Create a [employer] ID. This will be your [employer] login ID and cannot be changed, so think of one that is secure and easy to remember. 6. Create a Access Intelligence password. You can change your password at any time. 7. Enter your Password Reset Question and Answer. This can be used at a later time if you forget your password. 8. Enter your e-mail address. You will receive e-mail notification when new information is available in [address] 9. Click Sign Up. You can now view and download portions of your medical record. 10. Click the Download Summary menu link to download a portable copy of your medical information. Additional Information    If you have questions, please call [phone]. Remember, Access Intelligence is NOT to be used for urgent needs. For medical emergencies, dial 911.

## 2022-07-18 NOTE — THERAPY EVALUATION
[facility] Physical Therapy  [address] ([address] [place]  Phone: [phone] Fax: [phone]     Plan of Care/Statement of Necessity for Physical Therapy Services  2-15    Patient name: Sherlyn Steward. : 1945  Provider#: [id number]  Referral source: Olivia Agudelo MD      Medical/Treatment Diagnosis: At risk for falls [Z91.81]  Unsteady gait [R26.81]     Prior Hospitalization: see medical history     Comorbidities: allergies, anxiety/panic disorder, arthritis, back pain, BMI>30, cancer, COPD, CHF, depression, GI disease, headaches, HBP, incontinence, kidney/bladder/prostate/urination problems, previous accident, prior surgery, sleep dysfunction, stroke  Prior Level of Function: 20 minutes of exercise seldom if ever  Medications: Verified on Patient Summary List  Start of Care: 20      Onset Date: chronic   The Plan of Care and following information is based on the information from the initial evaluation. Assessment/ key information: Pt is a 65 yo male who is in today to begin therapy to address ongoing balance and stability deficits. He is present with his daughter who he lives with. They report that he fell about 1 month ago and hit his head requiring 5 staples. Pt reports this is the 4th fall in the last year but also admits that each time he has been doing something he shouldn't have been. Daughter reports that she has noticed an increase in him shuffling his feet and notes that his posture is more rounded. Despite this they both report that he recently hiked [employer] on a trip to [place] with assistance of a cane. He and his daughter both agree that he spends too much time just sitting and has been less involved in activities around the house. They also report that he has neuropathy that is limiting his feeling and sensation as high as his knee on both legs.   Additionally his daughter reports that he does not have much ROM or mobility in his feet. Pt is a good candidate for PT and will benefit from skilled services to address these deficits and work toward the goals listed below. Evaluation Complexity History HIGH Complexity :3+ comorbidities / personal factors will impact the outcome/ POC ; Examination HIGH Complexity : 4+ Standardized tests and measures addressing body structure, function, activity limitation and / or participation in recreation  ;Presentation MEDIUM Complexity : Evolving with changing characteristics  ; Clinical Decision Making MEDIUM Complexity : FOTO score of 26-74  Overall Complexity Rating: MEDIUM    Problem List: pain affecting function, decrease ROM, decrease strength, impaired gait/ balance, decrease ADL/ functional abilitiies, decrease activity tolerance, decrease flexibility/ joint mobility and decrease transfer abilities   Treatment Plan may include any combination of the following: Therapeutic exercise, Therapeutic activities, Neuromuscular re-education, Physical agent/modality, Gait/balance training, Manual therapy, Patient education, Self Care training, Functional mobility training, Home safety training and Stair training  Patient / Family readiness to learn indicated by: asking questions, trying to perform skills and interest  Persons(s) to be included in education:daughter  Barriers to Learning/Limitations: None  Patient Goal (s): help with motion  Patient Self Reported Health Status: fair  Rehabilitation Potential: good    Short Term Goals: To be accomplished in 8-10 treatments:   Pt will be consistent and demonstrate I with HEP  Pt will complain of pain 2-3/10 with all activity  Pt will demonstrate improved ROM to complete all ADL's more efficiently    Long Term Goals:  To be accomplished in 18-20 treatments:   Pt will complain of pain 0-1/10 with all activity  Pt will increase strength to improve static and dynamic balance to complete all household chores   Pt will increase FOTO score by 3 points to meet [address] and demonstrate improved function with all activity  Pt will return to light walking program to his mailbox without episodes of LOB or fatigue  Pt will use proper form and posture to complete all work tasks without increased symptoms 100% of the time    Frequency / Duration: Patient to be seen 2 times per week for 20 treatments. Patient/ Caregiver education and instruction: self care, activity modification and exercises    [x]  Plan of care has been reviewed with PTA        Certification Period: 7/14/22-10/12/22  Jennifer Lynch, PT 7/18/2022     ________________________________________________________________________    I certify that the above Therapy Services are being furnished while the patient is under my care. I agree with the treatment plan and certify that this therapy is necessary.     Physician's Signature:____________________  Date:____________Time: _________         Florencia Fothergill, MD

## 2022-07-20 ENCOUNTER — HOSPITAL ENCOUNTER (OUTPATIENT)
Dept: PHYSICAL THERAPY | Age: 77
Discharge: HOME | End: 2022-07-20
Payer: MEDICARE

## 2022-07-20 PROCEDURE — 97110 THERAPEUTIC EXERCISES: CPT

## 2022-07-20 NOTE — PROGRESS NOTES
PT DAILY TREATMENT NOTE - [facility] 2-15    Patient Name: Laura Dunbar. Date:2022  : 1945  [x]  Patient  Verified  Payor: Michaela Galaviz / Plan: [place] / Product Type: Medicare /    In time: 1227PM  Out time: 107  Total Treatment Time (min): 40  Total Timed Codes (min): 40  1:1 Treatment Time ( only): 40   Visit #:  2    Treatment Area: History of falling [Z91.81]  Unsteadiness on feet [R26.81]    SUBJECTIVE  Pain Level (0-10 scale): 8/10  Any medication changes, allergies to medications, adverse drug reactions, diagnosis change, or new procedure performed?: [x] No    [] Yes (see summary sheet for update)  Subjective functional status/changes:   [] No changes reported  Patient noted continuing with HEP and that the exercises have been going well. Describes the pain he was feeling today as more of a tightness around B knee joints right over the patella. Patient noted feeling less tightness in LE following warm up on bike today. Patient noted feeling a \"good stretch,\" in the posterior knee area during hamstring stretch. Patient noted feeling some soreness following previous treatment session. OBJECTIVE      40 min Therapeutic Exercise:  [x] See flow sheet :   Rationale: increase ROM, increase strength, and improve balance to improve the patients ability to perform daily activities without pain and reduce fall risk. With   [x] TE   [] TA   [] Neuro   [] SC   [] other: Patient Education: [] Review HEP    [] Progressed/Changed HEP based on:   [x] positioning   [x] body mechanics   [] transfers   [] heat/ice application    [] other:      Other Objective/Functional Measures: NA     Pain Level (0-10 scale) post treatment: 6/10    ASSESSMENT/Changes in Function:   Patient noted increased B LE ER, able to correct with verbal cuing. Patient demonstrated increased amounts of anterior trunk lean during sit to stands, able to reduce loss of balance following tactile/verbal cuing. Patient demonstrated anterior trunk lean, B elevated shoulders, and forward neck posture during ambulation today. Patient will continue to benefit from skilled PT services to modify and progress therapeutic interventions, address functional mobility deficits, address ROM deficits, address strength deficits, analyze and cue movement patterns, analyze and modify body mechanics/ergonomics, and assess and modify postural abnormalities to attain remaining goals. [x]  See Plan of Care  []  See progress note/recertification  []  See Discharge Summary         Progress towards goals / Updated goals:  Short Term Goals: To be accomplished in 8-10 treatments:              Pt will be consistent and demonstrate I with HEP  Pt will complain of pain 2-3/10 with all activity  Pt will demonstrate improved ROM to complete all ADL's more efficiently     Long Term Goals:  To be accomplished in 18-20 treatments:              Pt will complain of pain 0-1/10 with all activity  Pt will increase strength to improve static and dynamic balance to complete all household chores   Pt will increase FOTO score by 3 points to meet [address] and demonstrate improved function with all activity  Pt will return to light walking program to his mailbox without episodes of LOB or fatigue  Pt will use proper form and posture to complete all work tasks without increased symptoms 100% of the time    PLAN  [x]  Upgrade activities as tolerated     [x]  Continue plan of care  [x]  Update interventions per flow sheet       []  Discharge due to:_  []  Other:_      Dayday Castillo, ELIECER 7/20/2022

## 2022-07-25 ENCOUNTER — HOSPITAL ENCOUNTER (OUTPATIENT)
Dept: PHYSICAL THERAPY | Age: 77
Discharge: HOME | End: 2022-07-25
Payer: MEDICARE

## 2022-07-25 PROCEDURE — 97110 THERAPEUTIC EXERCISES: CPT

## 2022-07-25 NOTE — PROGRESS NOTES
PT DAILY TREATMENT NOTE - [facility] 2-15    Patient Name: Hayden Betts. Date:2022  : 1945  [x]  Patient  Verified  Payor: Patricio Bradley / Plan: [place] MED[place] PART A & B / Product Type: Medicare /    In time: 1155AM  Out time: 1235  Total Treatment Time (min): 40  Total Timed Codes (min): 40  1:1 Treatment Time ( only): 40   Visit #:  3    Treatment Area: History of falling [Z91.81]  Unsteadiness on feet [R26.81]    SUBJECTIVE  Pain Level (0-10 scale): 5/10  Any medication changes, allergies to medications, adverse drug reactions, diagnosis change, or new procedure performed?: [x] No    [] Yes (see summary sheet for update)  Subjective functional status/changes:   [] No changes reported  Patient noted some \"stiffness,\" following previous treatment session, noted mostly in their knees. Noted continued compliance with HEP, noted they have been getting easier since initial visit. Patient noted they feel like they've been able to move around more without losing balance/increased fatigue at home since initial visit. Patient noted increased fatigue/soreness following treatment session, but no increase in pain. OBJECTIVE      40 min Therapeutic Exercise:  [x] See flow sheet :   Rationale: increase ROM, increase strength, and improve balance to improve the patients ability to perform daily activities without pain and reduce fall risk. With   [x] TE   [] TA   [] Neuro   [] SC   [] other: Patient Education: [x] Review HEP    [x] Progressed/Changed HEP based on:   [x] positioning   [x] body mechanics   [] transfers   [] heat/ice application    [] other:      Other Objective/Functional Measures: NA     Pain Level (0-10 scale) post treatment: 5/10    ASSESSMENT/Changes in Function:   Patient tolerated exercises well today, noted no increase in pain or symptoms while increasing exercise intensity.  Patient demonstrated improved tandem stance balance today, able to maintain for >30s on both L and R LE. Patient ambulated into treatment session with increased gait speed and decreased anterior trunk lean today. Patient will continue to benefit from skilled PT services to modify and progress therapeutic interventions, address functional mobility deficits, address ROM deficits, address strength deficits, analyze and cue movement patterns, analyze and modify body mechanics/ergonomics, and assess and modify postural abnormalities to attain remaining goals. [x]  See Plan of Care  []  See progress note/recertification  []  See Discharge Summary         Progress towards goals / Updated goals:  Short Term Goals: To be accomplished in 8-10 treatments:              Pt will be consistent and demonstrate I with HEP  Pt will complain of pain 2-3/10 with all activity  Pt will demonstrate improved ROM to complete all ADL's more efficiently     Long Term Goals:  To be accomplished in 18-20 treatments:              Pt will complain of pain 0-1/10 with all activity  Pt will increase strength to  improve static and dynamic balance to complete all household chores   Pt will increase FOTO score by 3 points to meet [address] and demonstrate improved function with all activity  Pt will return to light walking program to his mailbox without episodes of LOB or fatigue  Pt will use proper form and posture to complete all work tasks without increased symptoms 100% of the time    PLAN  [x]  Upgrade activities as tolerated     [x]  Continue plan of care  [x]  Update interventions per flow sheet       []  Discharge due to:_  []  Other:_      Martine Nunez, PTA 7/25/2022

## 2022-07-27 ENCOUNTER — HOSPITAL ENCOUNTER (OUTPATIENT)
Dept: PHYSICAL THERAPY | Age: 77
Discharge: HOME | End: 2022-07-27
Payer: MEDICARE

## 2022-07-27 PROCEDURE — 97110 THERAPEUTIC EXERCISES: CPT

## 2022-07-27 NOTE — PROGRESS NOTES
PT DAILY TREATMENT NOTE - [facility] 2-15    Patient Name: Martine Estrada. Date:2022  : 1945  [x]  Patient  Verified  Payor: Umm Cathydonnie / Plan: [place] / Product Type: Medicare /    In time: 853  Out time: 149  Total Treatment Time (min): 48  Total Timed Codes (min): 48  1:1 Treatment Time ( only): 48   Visit #:  4    Treatment Area: History of falling [Z91.81]  Unsteadiness on feet [R26.81]    SUBJECTIVE  Pain Level (0-10 scale): 5/10  Any medication changes, allergies to medications, adverse drug reactions, diagnosis change, or new procedure performed?: [x] No    [] Yes (see summary sheet for update)  Subjective functional status/changes:   [] No changes reported  Patient noted feeling improvements since initial treatment session, notes no falls or moments of loss of balance at home. Also noted they have been able to sleep better without waking up due to the tingling and numbness in their LE. OBJECTIVE      48 min Therapeutic Exercise:  [x] See flow sheet :   Rationale: increase ROM, increase strength, and improve balance to improve the patients ability to perform daily activities without pain and reduce fall risk. With   [x] TE   [] TA   [] Neuro   [] SC   [] other: Patient Education: [x] Review HEP    [x] Progressed/Changed HEP based on:   [x] positioning   [x] body mechanics   [] transfers   [] heat/ice application    [] other:      Other Objective/Functional Measures: NA     Pain Level (0-10 scale) post treatment: 5/10    ASSESSMENT/Changes in Function:   Patient tolerated exercises well today, noted no increase in pain or symptoms while increasing exercise intensity. Patient presented with increased gait speed, and showed no instances of loss of balance today. Demonstrated improved static balance, able to maintain foam tandem stance =>30s with both LE.  Patient did demonstrate some increased fatigue following treatment today, but no increase in pain symptoms. Patient will continue to benefit from skilled PT services to modify and progress therapeutic interventions, address functional mobility deficits, address ROM deficits, address strength deficits, analyze and cue movement patterns, analyze and modify body mechanics/ergonomics, and assess and modify postural abnormalities to attain remaining goals. [x]  See Plan of Care  []  See progress note/recertification  []  See Discharge Summary         Progress towards goals / Updated goals:  Short Term Goals: To be accomplished in 8-10 treatments:              Pt will be consistent and demonstrate I with HEP  Pt will complain of pain 2-3/10 with all activity  Pt will demonstrate improved ROM to complete all ADL's more efficiently     Long Term Goals:  To be accomplished in 18-20 treatments:              Pt will complain of pain 0-1/10 with all activity  Pt will increase strength to  improve static and dynamic balance to complete all household chores   Pt will increase FOTO score by 3 points to meet [address] and demonstrate improved function with all activity  Pt will return to light walking program to his mailbox without episodes of LOB or fatigue  Pt will use proper form and posture to complete all work tasks without increased symptoms 100% of the time    PLAN  [x]  Upgrade activities as tolerated     [x]  Continue plan of care  [x]  Update interventions per flow sheet       []  Discharge due to:_  []  Other:_      Jasmyn Ballard, PTA 7/27/2022

## 2022-08-01 ENCOUNTER — HOSPITAL ENCOUNTER (OUTPATIENT)
Dept: PHYSICAL THERAPY | Age: 77
Discharge: HOME | End: 2022-08-01
Payer: MEDICARE

## 2022-08-01 PROCEDURE — 97110 THERAPEUTIC EXERCISES: CPT | Performed by: PHYSICAL THERAPIST

## 2022-08-01 NOTE — PROGRESS NOTES
PT DAILY TREATMENT NOTE - [facility] 2-15    Patient Name: Lauren Richards. Date:2022  : 1945  [x]  Patient  Verified  Payor: [place] MEDICARE / Plan: [place] MEDICARE PART A & B / Product Type: Medicare /    In time: 6339  Out time: 115  Total Treatment Time (min): 55  Total Timed Codes (min): 55  1:1 Treatment Time ( only): 54  Visit #:  5    Treatment Area: History of falling [Z91.81]  Unsteadiness on feet [R26.81]    SUBJECTIVE  Pain Level (0-10 scale): 6/10  Any medication changes, allergies to medications, adverse drug reactions, diagnosis change, or new procedure performed?: [x] No    [] Yes (see summary sheet for update)  Subjective functional status/changes:   [] No changes reported  Pt reports that he hurts every day. Feels that the balance is improved but reports that he is still painful and weak. Feels like he notices more strength deficits on the right leg when ascending steps    OBJECTIVE      50 min Therapeutic Exercise:  [x] See flow sheet :   Rationale: increase ROM, increase strength, and improve balance to improve the patients ability to perform daily activities without pain and reduce fall risk. With   [x] TE   [] TA   [] Neuro   [] SC   [] other: Patient Education: [x] Review HEP    [x] Progressed/Changed HEP based on:   [x] positioning   [x] body mechanics   [] transfers   [] heat/ice application    [] other:      Other Objective/Functional Measures: NA     Pain Level (0-10 scale) post treatment: 5/10    ASSESSMENT/Changes in Function:   Pt is able to complete all activity today. Needed some verbal cues to improve his step-up form. He has a tendency to not use the posterior chain and does not use any momentum which makes the task more difficult. He continues to complain of back pain as well. Will look to add more core stability exercises and find positions of comfort.       Patient will continue to benefit from skilled PT services to modify and progress therapeutic interventions, address functional mobility deficits, address ROM deficits, address strength deficits, analyze and cue movement patterns, analyze and modify body mechanics/ergonomics, and assess and modify postural abnormalities to attain remaining goals. [x]  See Plan of Care  []  See progress note/recertification  []  See Discharge Summary         Progress towards goals / Updated goals:  Short Term Goals: To be accomplished in 8-10 treatments:              Pt will be consistent and demonstrate I with HEP  Pt will complain of pain 2-3/10 with all activity  Pt will demonstrate improved ROM to complete all ADL's more efficiently     Long Term Goals:  To be accomplished in 18-20 treatments:              Pt will complain of pain 0-1/10 with all activity  Pt will increase strength to  improve static and dynamic balance to complete all household chores   Pt will increase FOTO score by 3 points to meet [address] and demonstrate improved function with all activity  Pt will return to light walking program to his mailbox without episodes of LOB or fatigue  Pt will use proper form and posture to complete all work tasks without increased symptoms 100% of the time    PLAN  [x]  Upgrade activities as tolerated     [x]  Continue plan of care  [x]  Update interventions per flow sheet       []  Discharge due to:_  []  Other:_      Ja Ocampo, PT 8/1/2022

## 2022-08-03 ENCOUNTER — HOSPITAL ENCOUNTER (OUTPATIENT)
Dept: PHYSICAL THERAPY | Age: 77
Discharge: HOME | End: 2022-08-03
Payer: MEDICARE

## 2022-08-03 PROCEDURE — 97110 THERAPEUTIC EXERCISES: CPT

## 2022-08-03 NOTE — PROGRESS NOTES
PT DAILY TREATMENT NOTE - [facility] 2-15    Patient Name: Anuja Mcpherson. [id number]  : 1945  [x]  Patient  Verified  Payor: [place] MEDICARE / Plan: [place] MEDICARE PART A & B / Product Type: Medicare /    In time: 8605  Out time: 76  Total Treatment Time (min): 56  Total Timed Codes (min): 56  1:1 Treatment Time ( only): 64   Visit #:  6    Treatment Area: History of falling [Z91.81]  Unsteadiness on feet [R26.81]    SUBJECTIVE  Pain Level (0-10 scale): 4/10  Any medication changes, allergies to medications, adverse drug reactions, diagnosis change, or new procedure performed?: [x] No    [] Yes (see summary sheet for update)  Subjective functional status/changes:   [] No changes reported  Patient noted he has still been experiencing soreness and tightness most days, noted \"legs are feeling a whole lot better,\". Patient also reports no instances of loss of balance or recent falls. Patient noted heel raise/toe raises being \"the hardest one for me,\". OBJECTIVE      56 min Therapeutic Exercise:  [x] See flow sheet :   Rationale: increase ROM, increase strength, and improve balance to improve the patients ability to perform daily activities without pain and reduce fall risk. With   [x] TE   [] TA   [] Neuro   [] SC   [] other: Patient Education: [] Review HEP    [] Progressed/Changed HEP based on:   [x] positioning   [x] body mechanics   [] transfers   [] heat/ice application    [] other:      Other Objective/Functional Measures: NA     Pain Level (0-10 scale) post treatment: NA    ASSESSMENT/Changes in Function:   Patient tolerated exercises well today, able to complete progressions and exercises without any increase in symptoms. Patient demonstrated improved static and dynamic balance today, noted one loss of balance during turn during mat walking with hurdles but was able to recover with use of step strategy without external assistance.  Patient needed moderate amounts of verbal cuing during step ups to maintain proper form and perform exercises correctly. Patient will continue to benefit from skilled PT services to modify and progress therapeutic interventions, address functional mobility deficits, address ROM deficits, address strength deficits, analyze and cue movement patterns, analyze and modify body mechanics/ergonomics, and assess and modify postural abnormalities to attain remaining goals. [x]  See Plan of Care  []  See progress note/recertification  []  See Discharge Summary         Progress towards goals / Updated goals:  Short Term Goals: To be accomplished in 8-10 treatments:              Pt will be consistent and demonstrate I with HEP  Pt will complain of pain 2-3/10 with all activity  Pt will demonstrate improved ROM to complete all ADL's more efficiently     Long Term Goals:  To be accomplished in 18-20 treatments:              Pt will complain of pain 0-1/10 with all activity  Pt will increase strength to  improve static and dynamic balance to complete all household chores   Pt will increase FOTO score by 3 points to meet [address] and demonstrate improved function with all activity  Pt will return to light walking program to his mailbox without episodes of LOB or fatigue  Pt will use proper form and posture to complete all work tasks without increased symptoms 100% of the time    PLAN  [x]  Upgrade activities as tolerated     [x]  Continue plan of care  [x]  Update interventions per flow sheet       []  Discharge due to:_  []  Other:_      Pari Griffith, PTA 8/3/2022

## 2022-08-04 ENCOUNTER — PATIENT MESSAGE (OUTPATIENT)
Dept: INTERNAL MEDICINE CLINIC | Age: 77
End: 2022-08-04

## 2022-08-04 LAB
ALBUMIN SERPL-MCNC: 4.6 G/DL (ref 3.7–4.7)
ALP SERPL-CCNC: 110 IU/L (ref 44–121)
ALT SERPL-CCNC: 15 IU/L (ref 0–44)
AST SERPL-CCNC: 21 IU/L (ref 0–40)
BILIRUB DIRECT SERPL-MCNC: 0.2 MG/DL (ref 0–0.4)
BILIRUB SERPL-MCNC: 0.6 MG/DL (ref 0–1.2)
CHOLEST SERPL-MCNC: 136 MG/DL (ref 100–199)
EST. AVERAGE GLUCOSE BLD GHB EST-MCNC: 123 MG/DL
HBA1C MFR BLD: 5.9 % (ref 4.8–5.6)
HDLC SERPL-MCNC: 43 MG/DL
LDLC SERPL CALC-MCNC: 68 MG/DL (ref 0–99)
PROT SERPL-MCNC: 6.9 G/DL (ref 6–8.5)
TRIGL SERPL-MCNC: 142 MG/DL (ref 0–149)
VLDLC SERPL CALC-MCNC: 25 MG/DL (ref 5–40)

## 2022-08-08 ENCOUNTER — HOSPITAL ENCOUNTER (OUTPATIENT)
Dept: PHYSICAL THERAPY | Age: 77
Discharge: HOME | End: 2022-08-08
Payer: MEDICARE

## 2022-08-08 PROCEDURE — 97110 THERAPEUTIC EXERCISES: CPT

## 2022-08-08 NOTE — PROGRESS NOTES
PT DAILY TREATMENT NOTE - [facility] 2-15    Patient Name: Lindsey Galdamez. Date:2022  : 1945  [x]  Patient  Verified  Payor: [place] MEDICARE / Plan: [place] MEDICARE PART A & B / Product Type: Medicare /    In time: 8239  Out time: 1215  Total Treatment Time (min): 45  Total Timed Codes (min): 40  1:1 Treatment Time (MC only): 40   Visit #:  7    Treatment Area: History of falling [Z91.81]  Unsteadiness on feet [R26.81]    SUBJECTIVE  Pain Level (0-10 scale): 6/10 (soreness)   Any medication changes, allergies to medications, adverse drug reactions, diagnosis change, or new procedure performed?: [x] No    [] Yes (see summary sheet for update)  Subjective functional status/changes:   [] No changes reported  Patient noted their back hasn't been bothering them as much since previous treatment session, noted they had some nights were it didn't bother them at all. Patient noted they went to their high school reunion and did some work around the house without increasing pain, just noted some instances of soreness following. Patient also noted they have not had any instances of loss of balance recently either. OBJECTIVE      45 min Therapeutic Exercise:  [x] See flow sheet :   Rationale: increase ROM, increase strength, and improve balance to improve the patients ability to perform daily activities without pain and reduce fall risk. With   [x] TE   [] TA   [] Neuro   [] SC   [] other: Patient Education: [] Review HEP    [] Progressed/Changed HEP based on:   [x] positioning   [x] body mechanics   [] transfers   [] heat/ice application    [] other:      Other Objective/Functional Measures: NA     Pain Level (0-10 scale) post treatment: 0/10    ASSESSMENT/Changes in Function:   Patient tolerated treatment session well today, noted reduction in pain symptoms following treatment session, indicating benefit from skilled care. Patient reported subjective improvements in balance and function over the weekend. Patient noted increased circumduction (L>R) during mat walking with hurdles when ambulating over larger hurdles, able to correct following verbal cues. Patient demonstrated increased exercise tolerance, able to perform progressions and exercises without increase in pain following treatment session. Patient will continue to benefit from skilled PT services to modify and progress therapeutic interventions, address functional mobility deficits, address ROM deficits, address strength deficits, analyze and cue movement patterns, analyze and modify body mechanics/ergonomics, and assess and modify postural abnormalities to attain remaining goals. [x]  See Plan of Care  []  See progress note/recertification  []  See Discharge Summary         Progress towards goals / Updated goals:  Short Term Goals: To be accomplished in 8-10 treatments:              Pt will be consistent and demonstrate I with HEP  Pt will complain of pain 2-3/10 with all activity  Pt will demonstrate improved ROM to complete all ADL's more efficiently     Long Term Goals:  To be accomplished in 18-20 treatments:              Pt will complain of pain 0-1/10 with all activity  Pt will increase strength to  improve static and dynamic balance to complete all household chores   Pt will increase FOTO score by 3 points to meet [address] and demonstrate improved function with all activity  Pt will return to light walking program to his mailbox without episodes of LOB or fatigue  Pt will use proper form and posture to complete all work tasks without increased symptoms 100% of the time    PLAN  [x]  Upgrade activities as tolerated     [x]  Continue plan of care  [x]  Update interventions per flow sheet       []  Discharge due to:_  []  Other:_      Ethan Soares, PTA 8/8/2022

## 2022-08-10 ENCOUNTER — HOSPITAL ENCOUNTER (OUTPATIENT)
Dept: PHYSICAL THERAPY | Age: 77
Discharge: HOME | End: 2022-08-10
Payer: MEDICARE

## 2022-08-10 PROCEDURE — 97110 THERAPEUTIC EXERCISES: CPT | Performed by: PHYSICAL THERAPIST

## 2022-08-15 ENCOUNTER — HOSPITAL ENCOUNTER (OUTPATIENT)
Dept: PHYSICAL THERAPY | Age: 77
Discharge: HOME | End: 2022-08-15
Payer: MEDICARE

## 2022-08-15 PROCEDURE — 97110 THERAPEUTIC EXERCISES: CPT | Performed by: PHYSICAL THERAPIST

## 2022-08-15 NOTE — PROGRESS NOTES
PT DAILY TREATMENT NOTE - [facility] 2-15    Patient Name: Estrella Sigala. Date:8/15/2022  : 1945  [x]  Patient  Verified  Payor: Leroy Vitale / Plan: [place] MEDICARE PART A & B / Product Type: Medicare /    In time: 100 Out time: 1***  Total Treatment Time (min): ***  Total Timed Codes (min): ***  1:1 Treatment Time ( only): ***  Visit #:  9    Treatment Area: History of falling [Z91.81]  Unsteadiness on feet [R26.81]    SUBJECTIVE  Pain Level (0-10 scale): 5/10 (soreness)   Any medication changes, allergies to medications, adverse drug reactions, diagnosis change, or new procedure performed?: [x] No    [] Yes (see summary sheet for update)  Subjective functional status/changes:   [] No changes reported  Pt reports that he did a lot of work this weekend and is a little sore. OBJECTIVE      *** min Therapeutic Exercise:  [x] See flow sheet :   Rationale: increase ROM, increase strength, and improve balance to improve the patients ability to perform daily activities without pain and reduce fall risk. With   [x] TE   [] TA   [] Neuro   [] SC   [] other: Patient Education: [] Review HEP    [] Progressed/Changed HEP based on:   [x] positioning   [x] body mechanics   [] transfers   [] heat/ice application    [] other:      Other Objective/Functional Measures: NA     Pain Level (0-10 scale) post treatment: 0/10    ASSESSMENT/Changes in Function:   Pt is able to complete all activity well without increased symptoms. He does have intermittent SOB that is relieved with brief periods of rest  Patient will continue to benefit from skilled PT services to modify and progress therapeutic interventions, address functional mobility deficits, address ROM deficits, address strength deficits, analyze and cue movement patterns, analyze and modify body mechanics/ergonomics, and assess and modify postural abnormalities to attain remaining goals.      [x]  See Plan of Care  []  See progress note/recertification  [] See Discharge Summary         Progress towards goals / Updated goals:  Short Term Goals: To be accomplished in 8-10 treatments:              Pt will be consistent and demonstrate I with HEP  Pt will complain of pain 2-3/10 with all activity  Pt will demonstrate improved ROM to complete all ADL's more efficiently     Long Term Goals:  To be accomplished in 18-20 treatments:              Pt will complain of pain 0-1/10 with all activity  Pt will increase strength to  improve static and dynamic balance to complete all household chores   Pt will increase FOTO score by 3 points to meet [address] and demonstrate improved function with all activity  Pt will return to light walking program to his mailbox without episodes of LOB or fatigue  Pt will use proper form and posture to complete all work tasks without increased symptoms 100% of the time    PLAN  [x]  Upgrade activities as tolerated     [x]  Continue plan of care  [x]  Update interventions per flow sheet       []  Discharge due to:_  []  Other:_      Colletta Raya, PT 8/15/2022

## 2022-08-17 ENCOUNTER — HOSPITAL ENCOUNTER (OUTPATIENT)
Dept: PHYSICAL THERAPY | Age: 77
Discharge: HOME | End: 2022-08-17
Payer: MEDICARE

## 2022-08-17 PROCEDURE — 97110 THERAPEUTIC EXERCISES: CPT | Performed by: PHYSICAL THERAPIST

## 2022-08-22 ENCOUNTER — HOSPITAL ENCOUNTER (OUTPATIENT)
Dept: PHYSICAL THERAPY | Age: 77
Discharge: HOME | End: 2022-08-22
Payer: MEDICARE

## 2022-08-22 PROCEDURE — 97110 THERAPEUTIC EXERCISES: CPT

## 2022-08-22 NOTE — PROGRESS NOTES
PT DAILY TREATMENT NOTE - [facility] 2-15    Patient Name: Emi Lopes. Date:2022  : 1945  [x]  Patient  Verified  Payor: Meredith Isaac / Plan: [place] MEDICARE PART A & B / Product Type: Medicare /    In time: 5158  Out time: 104  Total Treatment Time (min): 58  Total Timed Codes (min): 50  1:1 Treatment Time ( only): 50  Visit #:  11    Treatment Area: History of falling [Z91.81]  Unsteadiness on feet [R26.81]    SUBJECTIVE  Pain Level (0-10 scale): 2/10   Any medication changes, allergies to medications, adverse drug reactions, diagnosis change, or new procedure performed?: [x] No    [] Yes (see summary sheet for update)  Subjective functional status/changes:   [] No changes reported  Patient noted they have been feeling improvements in their back pain as well as their tightness in their LE improving as well. Patient noted calf tightness following mat walking with hurdles. OBJECTIVE      58 min Therapeutic Exercise:  [x] See flow sheet :   Rationale: increase ROM, increase strength, and improve balance to improve the patients ability to perform daily activities without pain and reduce fall risk. With   [x] TE   [] TA   [] Neuro   [] SC   [] other: Patient Education: [] Review HEP    [] Progressed/Changed HEP based on:   [x] positioning   [x] body mechanics   [] transfers   [] heat/ice application    [] other:      Other Objective/Functional Measures: NA     Pain Level (0-10 scale) post treatment: 3/10 \"tightness\"     ASSESSMENT/Changes in Function:   Patient tolerated treatment session today, able to perform exercises and progressions today. Patient continued demonstrated increased LE circumduction during mat walks with hurdles today, improved since previous treatment session, able to improve with verbal cuing. Patient reported demonstrated increased anterior trunk lean during wobble board, able to reduce following verbal cuing.    Patient will continue to benefit from skilled PT services to modify and progress therapeutic interventions, address functional mobility deficits, address ROM deficits, address strength deficits, analyze and cue movement patterns, analyze and modify body mechanics/ergonomics, and assess and modify postural abnormalities to attain remaining goals. [x]  See Plan of Care  []  See progress note/recertification  []  See Discharge Summary         Progress towards goals / Updated goals:  Short Term Goals: To be accomplished in 8-10 treatments:              Pt will be consistent and demonstrate I with HEP  Pt will complain of pain 2-3/10 with all activity  Pt will demonstrate improved ROM to complete all ADL's more efficiently     Long Term Goals:  To be accomplished in 18-20 treatments:              Pt will complain of pain 0-1/10 with all activity  Pt will increase strength to  improve static and dynamic balance to complete all household chores   Pt will increase FOTO score by 3 points to meet [address] and demonstrate improved function with all activity  Pt will return to light walking program to his mailbox without episodes of LOB or fatigue  Pt will use proper form and posture to complete all work tasks without increased symptoms 100% of the time    PLAN  [x]  Upgrade activities as tolerated     [x]  Continue plan of care  [x]  Update interventions per flow sheet       []  Discharge due to:_  []  Other:_      Chyrel Pod, PTA 8/22/2022

## 2022-08-24 ENCOUNTER — APPOINTMENT (OUTPATIENT)
Dept: PHYSICAL THERAPY | Age: 77
End: 2022-08-24
Payer: MEDICARE

## 2022-08-29 ENCOUNTER — HOSPITAL ENCOUNTER (OUTPATIENT)
Dept: PHYSICAL THERAPY | Age: 77
Discharge: HOME | End: 2022-08-29
Payer: MEDICARE

## 2022-08-29 PROCEDURE — 97110 THERAPEUTIC EXERCISES: CPT | Performed by: PHYSICAL THERAPIST

## 2022-08-31 ENCOUNTER — APPOINTMENT (OUTPATIENT)
Dept: PHYSICAL THERAPY | Age: 77
End: 2022-08-31
Payer: MEDICARE

## 2022-09-19 DIAGNOSIS — F41.8 ANXIETY ASSOCIATED WITH DEPRESSION: ICD-10-CM

## 2022-09-19 RX ORDER — CITALOPRAM 40 MG/1
TABLET, FILM COATED ORAL
Qty: 90 TABLET | Refills: 3 | Status: SHIPPED | OUTPATIENT
Start: 2022-09-19

## 2022-09-25 ENCOUNTER — TELEPHONE (OUTPATIENT)
Dept: INTERNAL MEDICINE CLINIC | Age: 77
End: 2022-09-25

## 2022-09-25 NOTE — TELEPHONE ENCOUNTER
Pt called w positive covid test today    Has advanced ckd    Will need ov to discuss possible monoclonal ab infusion for tx    Not a candidate for paxlovid    Will route to pcp and see if she has availability, otherwise I may be able to see pt

## 2022-09-27 ENCOUNTER — VIRTUAL VISIT (OUTPATIENT)
Dept: INTERNAL MEDICINE CLINIC | Age: 77
End: 2022-09-27
Payer: MEDICARE

## 2022-09-27 DIAGNOSIS — J40 BRONCHITIS: ICD-10-CM

## 2022-09-27 DIAGNOSIS — U07.1 COVID-19: Primary | ICD-10-CM

## 2022-09-27 PROCEDURE — G9717 DOC PT DX DEP/BP F/U NT REQ: HCPCS | Performed by: FAMILY MEDICINE

## 2022-09-27 PROCEDURE — G8427 DOCREV CUR MEDS BY ELIG CLIN: HCPCS | Performed by: FAMILY MEDICINE

## 2022-09-27 PROCEDURE — G8756 NO BP MEASURE DOC: HCPCS | Performed by: FAMILY MEDICINE

## 2022-09-27 PROCEDURE — G8417 CALC BMI ABV UP PARAM F/U: HCPCS | Performed by: FAMILY MEDICINE

## 2022-09-27 PROCEDURE — G8536 NO DOC ELDER MAL SCRN: HCPCS | Performed by: FAMILY MEDICINE

## 2022-09-27 PROCEDURE — 1101F PT FALLS ASSESS-DOCD LE1/YR: CPT | Performed by: FAMILY MEDICINE

## 2022-09-27 PROCEDURE — G0463 HOSPITAL OUTPT CLINIC VISIT: HCPCS | Performed by: FAMILY MEDICINE

## 2022-09-27 PROCEDURE — 99213 OFFICE O/P EST LOW 20 MIN: CPT | Performed by: FAMILY MEDICINE

## 2022-09-27 RX ORDER — AZITHROMYCIN 250 MG/1
250 TABLET, FILM COATED ORAL SEE ADMIN INSTRUCTIONS
Qty: 6 TABLET | Refills: 0 | Status: SHIPPED | OUTPATIENT
Start: 2022-09-27 | End: 2022-10-02

## 2022-09-27 RX ORDER — BENZONATATE 200 MG/1
200 CAPSULE ORAL
Qty: 30 CAPSULE | Refills: 1 | Status: SHIPPED | OUTPATIENT
Start: 2022-09-27

## 2022-09-27 NOTE — PROGRESS NOTES
Ihsan Kilgore is a 76 y.o. male who presents with covid. Patient previously immunized for COVID x3. Reports productive cough, runny nose, diarrhea, headache, sore throat. No fever. SpO2 96%. This is an established visit conducted via telemedicine with video. The patient has been instructed that this meets HIPAA criteria and acknowledges and agrees to this method of visitation. Pursuant to the emergency declaration under the [address] [place] [address], [facility] waiver authority and the [employer] Act, this Virtual Visit was conducted, with patient's consent, to reduce the patient's risk of exposure to COVID-19 and provide continuity of care for an established patient. Services were provided through a video synchronous discussion virtually to substitute for in-person clinic visit.         Past Medical History:   Diagnosis Date    Abnormal stress echo 5/12/2015    Anemia NEC 03/2013    Anxiety     Borderline diabetes     CAD (coronary artery disease) 2009    cath [facility]) revealed 20% RCA and 50% 2nd diagonal    Calculus of kidney     CKD (chronic kidney disease) stage 4, GFR 15-29 ml/min ([facility])     COPD, mild ([facility])     Followed by pulmonary associates    DJD (degenerative joint disease)     Environmental allergies     Fatty liver     GERD (gastroesophageal reflux disease) 10/11/2009    Gout     Hypertension     MELODY on CPAP 10/11/2009    nasal pillows; pressure set at 10-11 -     Peripheral neuropathy 10/11/2009    bilateral feet (numbness)    Renal cell cancer, right ([facility] [address] 01/2021    RLS (restless legs syndrome) 10/11/2009    S/P coronary artery stent placement 05/12/2015    PCI./MALI to LCx, 8/2019 PCI/MALI Prox LAD (RCA 40-50% lesions)       Family History   Problem Relation Age of Onset    Heart Disease Father     Hypertension Father     Other Father         abdominal aneurysm     Dementia Mother     Alzheimer's Disease Mother     Heart Disease Brother     Heart Attack Brother     Heart Disease Maternal Grandmother     Heart Disease Paternal Grandmother     Heart Attack Paternal Grandmother     Heart Disease Paternal Grandfather     Heart Attack Paternal Grandfather     Elevated Lipids Son     Elevated Lipids Daughter     Cancer Neg Hx     Diabetes Neg Hx     Stroke Neg Hx        Social History     Socioeconomic History    Marital status:      Spouse name: Delaney More    Number of children: 2    Years of education: graduated then 4 year apprenticship    Highest education level: Associate degree: academic program   Occupational History    Not on file   Tobacco Use    Smoking status: Former     Packs/day: 1.00     Years: 10.00     Pack years: 10.00     Types: Cigarettes     Quit date: 1968     Years since quittin.7    Smokeless tobacco: Never   Vaping Use    Vaping Use: Never used   Substance and Sexual Activity    Alcohol use: No     Alcohol/week: 0.0 standard drinks    Drug use: No    Sexual activity: Yes     Partners: Female     Birth control/protection: None   Other Topics Concern     Service Not Asked    Blood Transfusions Not Asked    Caffeine Concern Not Asked    Occupational Exposure Not Asked    Hobby Hazards Not Asked    Sleep Concern Not Asked    Stress Concern Not Asked    Weight Concern Not Asked    Special Diet Not Asked    Back Care Not Asked    Exercise Not Asked    Bike Helmet Not Asked    Seat Belt Not Asked    Self-Exams Not Asked   Social History Narrative    Not on file     Social Determinants of Health     Financial Resource Strain: Not on file   Food Insecurity: Not on file   Transportation Needs: Not on file   Physical Activity: Not on file   Stress: Not on file   Social Connections: Not on file   Intimate Partner Violence: Not on file   Housing Stability: Not on file       Current Outpatient Medications on File Prior to Visit   Medication Sig Dispense Refill    citalopram (CELEXA) 40 mg tablet TAKE 1 TABLET BY MOUTH EVERY DAY 90 Tablet 3    fluticasone propionate (FLONASE) 50 mcg/actuation nasal spray 2 Sprays by Both Nostrils route daily. pantoprazole (PROTONIX) 40 mg tablet TAKE 1 TABLET BY MOUTH EVERY DAY 90 Tablet 1    Trelegy Ellipta 100-62.5-25 mcg inhaler TAKE 1 PUFF BY MOUTH EVERY DAY      colchicine 0.6 mg tablet Take 0.6 mg by mouth two (2) times daily as needed for Gout or Pain.      loratadine (CLARITIN) 10 mg tablet Take 10 mg by mouth daily. atorvastatin (LIPITOR) 80 mg tablet TAKE 1 TABLET BY MOUTH EVERY DAY 90 Tablet 3    tamsulosin (FLOMAX) 0.4 mg capsule Take 0.4 mg by mouth daily. isosorbide mononitrate ER (IMDUR) 30 mg tablet TAKE 1/2 TABLET BY MOUTH EVERY DAY 45 Tablet 3    metoprolol succinate (TOPROL-XL) 25 mg XL tablet TAKE 1 TABLET BY MOUTH EVERY DAY 90 Tablet 1    furosemide (LASIX) 80 mg tablet Take 80 mg by mouth daily. sodium bicarbonate 325 mg tablet Take 1 Tablet by mouth two (2) times a day. 60 Tablet 0    amLODIPine (NORVASC) 10 mg tablet Take 1 Tablet by mouth daily. 30 Tablet 1    fluocinoNIDE (LIDEX) 0.05 % external solution APPLY TO ITCHY SPOTS ON SCALP AS NEEDED FOR FLARE      albuterol (PROVENTIL HFA, VENTOLIN HFA, PROAIR HFA) 90 mcg/actuation inhaler Take 1 Puff by inhalation every four (4) hours. ketoconazole (NIZORAL) 2 % shampoo Apply 1 mL to affected area daily as needed for Itching. nitroglycerin (NITROSTAT) 0.4 mg SL tablet TAKE 1 TABLET BY SUBLINGUAL ROUTE EVERY 5 MINUTES AS NEEDED FOR CHEST PAIN. 1 Bottle 0    VITAMIN D3 2,000 unit cap capsule Take 2,000 Units by mouth daily. 2    GLUCOSAMINE HCL/CHONDR PETERSEN A NA (GLUCOSAMINE-CHONDROITIN) 750-600 mg Tab Take 1 Tab by mouth daily. Brand: Move Free      aspirin delayed-release 81 mg tablet Take 81 mg by mouth nightly. MULTIVITS W-FE,OTHER MIN (CENTRUM PO) Take 1 Tab by mouth daily.       rOPINIRole (REQUIP) 0.5 mg tablet TAKE 1 TABLET BY MOUTH EVERY DAY AT NIGHT (Patient not taking: No sig reported) 90 Tablet 1    senna-docusate (PERICOLACE) 8.6-50 mg per tablet Take 2 Tablets by mouth daily. (Patient not taking: No sig reported) 20 Tablet 0     No current facility-administered medications on file prior to visit. Review of Systems  Pertinent items are noted in HPI. Objective:     Gen: mildly ill appearing male no SOB  HEENT: normal conjunctiva,  audible congestion, patient does not see oral erythema, has MMM  Neck: patient does not feel enlarged or tender LAD or masses  Resp: normal respiratory effort, no audible wheezing. CV: patient does not feel palpitations or heart irregularity  Neuro: Alert and oriented, able to answer questions without difficulty      Assessment/Plan:       ICD-10-CM ICD-9-CM    1. COVID-19  U07.1 079.89       2. Bronchitis  J40 490 azithromycin (ZITHROMAX) 250 mg tablet      benzonatate (TESSALON) 200 mg capsule      increase fluids and rest, tylenol as needed for aches/fever, RX for bronchitis. This was a telemedicine visit with video.         aNhomy Gaitan MD

## 2022-09-27 NOTE — PROGRESS NOTES
1. \"Have you been to the ER, urgent care clinic since your last visit? Hospitalized since your last visit? \" Yes 06/22 staples removed head    2. \"Have you seen or consulted any other health care providers outside of the [address] [place] [address] since your last visit? \" Yes       3. For patients aged 39-70: Has the patient had a colonoscopy / FIT/ Cologuard? No      If the patient is female:    4. For patients aged 41-77: Has the patient had a mammogram within the past 2 years? NA - based on age or sex      11. For patients aged 21-65: Has the patient had a pap smear?  NA - based on age or sex

## 2022-10-16 NOTE — PROGRESS NOTES
Herbert Flores. is a 76 y.o. male who presents  with daughter for follow up. reviewed labs. Had covid in July 2022. Reports nasal congestion has persistent. On flonase. No discolored mucous. Diabetic. HbA1C 5.9%. Trying to follow healthy diet. weight loss 7#. Has neuropathy, no neuropathic pain. Treated for HLP. LDL 68. On statin. No myalgias. Treated for HTN. BP controlled. Trying to watch salt. trying to be more active. Using riding mower. Has SMITH. Sees Dr Nathan Izaguirre, cardiology. History of CKD. Followed by Dr Samuel Cedeno, nephrology. every 3 months.  hgb 10.4 and creatinine 4.7 in October. every 3 months. Followed by Dr Annmarie Lyman, urology. On flomax. Urination is ok. Prior right partial nephrectomy in April 2021. Treated for anxiety, on celexa. Sleep is ok. Off requip for RLS. Up 2 times at night, BPH. Recent eye exam.      Saw pulmonary, for COPD, changed inhaler, not sure of name, feels better. Not needing albuterol.  has not resumed CPAP.          Past Medical History:   Diagnosis Date    Abnormal stress echo 5/12/2015    Anemia NEC 03/2013    Anxiety     Borderline diabetes     CAD (coronary artery disease) 2009    cath [facility]) revealed 20% RCA and 50% 2nd diagonal    Calculus of kidney     CKD (chronic kidney disease) stage 4, GFR 15-29 ml/min (HCC)     COPD, mild (HCC)     Followed by pulmonary associates    DJD (degenerative joint disease)     Environmental allergies     Fatty liver     GERD (gastroesophageal reflux disease) 10/11/2009    Gout     Hypertension     MELODY on CPAP 10/11/2009    nasal pillows; pressure set at 10-11 -     Peripheral neuropathy 10/11/2009    bilateral feet (numbness)    Renal cell cancer, right ([address] 01/2021    RLS (restless legs syndrome) 10/11/2009    S/P coronary artery stent placement 05/12/2015    PCI./MALI to LCx, 8/2019 PCI/MALI Prox LAD (RCA 40-50% lesions)       Family History   Problem Relation Age of Onset Heart Disease Father     Hypertension Father     Other Father         abdominal aneurysm     Dementia Mother     Alzheimer's Disease Mother     Heart Disease Brother     Heart Attack Brother     Heart Disease Maternal Grandmother     Heart Disease Paternal Grandmother     Heart Attack Paternal Grandmother     Heart Disease Paternal Grandfather     Heart Attack Paternal Grandfather     Elevated Lipids Son     Elevated Lipids Daughter     Cancer Neg Hx     Diabetes Neg Hx     Stroke Neg Hx        Social History     Socioeconomic History    Marital status:      Spouse name: Adalberto Arroyo    Number of children: 2    Years of education: graduated then 4 year apprenticship    Highest education level: Associate degree: academic program   Occupational History    Not on file   Tobacco Use    Smoking status: Former     Packs/day: 1.00     Years: 10.00     Pack years: 10.00     Types: Cigarettes     Quit date: 1968     Years since quittin.8    Smokeless tobacco: Never   Vaping Use    Vaping Use: Never used   Substance and Sexual Activity    Alcohol use: No     Alcohol/week: 0.0 standard drinks    Drug use: No    Sexual activity: Yes     Partners: Female     Birth control/protection: None   Other Topics Concern     Service Not Asked    Blood Transfusions Not Asked    Caffeine Concern Not Asked    Occupational Exposure Not Asked    Hobby Hazards Not Asked    Sleep Concern Not Asked    Stress Concern Not Asked    Weight Concern Not Asked    Special Diet Not Asked    Back Care Not Asked    Exercise Not Asked    Bike Helmet Not Asked    Seat Belt Not Asked    Self-Exams Not Asked   Social History Narrative    Not on file     Social Determinants of Health     Financial Resource Strain: Not on file   Food Insecurity: Not on file   Transportation Needs: Not on file   Physical Activity: Not on file   Stress: Not on file   Social Connections: Not on file   Intimate Partner Violence: Not on file   Housing Stability: Not on file       Current Outpatient Medications on File Prior to Visit   Medication Sig Dispense Refill    benzonatate (TESSALON) 200 mg capsule Take 1 Capsule by mouth three (3) times daily as needed for Cough. 30 Capsule 1    citalopram (CELEXA) 40 mg tablet TAKE 1 TABLET BY MOUTH EVERY DAY 90 Tablet 3    fluticasone propionate (FLONASE) 50 mcg/actuation nasal spray 2 Sprays by Both Nostrils route daily. pantoprazole (PROTONIX) 40 mg tablet TAKE 1 TABLET BY MOUTH EVERY DAY 90 Tablet 1    Trelegy Ellipta 100-62.5-25 mcg inhaler TAKE 1 PUFF BY MOUTH EVERY DAY      colchicine 0.6 mg tablet Take 0.6 mg by mouth two (2) times daily as needed for Gout or Pain.      loratadine (CLARITIN) 10 mg tablet Take 10 mg by mouth daily. atorvastatin (LIPITOR) 80 mg tablet TAKE 1 TABLET BY MOUTH EVERY DAY 90 Tablet 3    tamsulosin (FLOMAX) 0.4 mg capsule Take 0.4 mg by mouth daily. isosorbide mononitrate ER (IMDUR) 30 mg tablet TAKE 1/2 TABLET BY MOUTH EVERY DAY 45 Tablet 3    metoprolol succinate (TOPROL-XL) 25 mg XL tablet TAKE 1 TABLET BY MOUTH EVERY DAY 90 Tablet 1    furosemide (LASIX) 80 mg tablet Take 40 mg by mouth daily. sodium bicarbonate 325 mg tablet Take 1 Tablet by mouth two (2) times a day. 60 Tablet 0    amLODIPine (NORVASC) 10 mg tablet Take 1 Tablet by mouth daily. 30 Tablet 1    fluocinoNIDE (LIDEX) 0.05 % external solution APPLY TO ITCHY SPOTS ON SCALP AS NEEDED FOR FLARE      albuterol (PROVENTIL HFA, VENTOLIN HFA, PROAIR HFA) 90 mcg/actuation inhaler Take 1 Puff by inhalation every four (4) hours. ketoconazole (NIZORAL) 2 % shampoo Apply 1 mL to affected area daily as needed for Itching. nitroglycerin (NITROSTAT) 0.4 mg SL tablet TAKE 1 TABLET BY SUBLINGUAL ROUTE EVERY 5 MINUTES AS NEEDED FOR CHEST PAIN. 1 Bottle 0    VITAMIN D3 2,000 unit cap capsule Take 2,000 Units by mouth daily.   2    GLUCOSAMINE HCL/CHONDR PETERSEN A NA (GLUCOSAMINE-CHONDROITIN) 750-600 mg Tab Take 1 Tab by mouth daily. Brand: Move Free      aspirin delayed-release 81 mg tablet Take 81 mg by mouth nightly. MULTIVITS W-FE,OTHER MIN (CENTRUM PO) Take 1 Tab by mouth daily. [DISCONTINUED] rOPINIRole (REQUIP) 0.5 mg tablet TAKE 1 TABLET BY MOUTH EVERY DAY AT NIGHT (Patient not taking: No sig reported) 90 Tablet 1    [DISCONTINUED] senna-docusate (PERICOLACE) 8.6-50 mg per tablet Take 2 Tablets by mouth daily. (Patient not taking: No sig reported) 20 Tablet 0     No current facility-administered medications on file prior to visit. Review of Systems  Pertinent items are noted in HPI. Objective:     Visit Vitals  /72   Pulse 78   Temp 97.2 °F (36.2 °C) (Temporal)   Resp 16   Ht 5' 10\" (1.778 m)   Wt 241 lb (109.3 kg)   SpO2 96%   BMI 34.58 kg/m²     Gen: well appearing male  HEENT:   PERRL,normal conjunctiva. External ear and canals normal, TMs no opacification or erythema,  OP no erythema, no exudates, MMM  Neck:  No masses or LAD  Resp:  No wheezing, no rhonchi, no rales. CV:  RRR, normal S1S2, no murmur. Extrem:  no edema, warm distally      Assessment/Plan:       ICD-10-CM ICD-9-CM    1. Essential hypertension  I10 401.9       2. CKD (chronic kidney disease) stage 4, GFR 15-29 ml/min (HCC)  N18.4 585.4       3. Mixed hyperlipidemia  E78.2 272.2       4. Coronary artery disease due to lipid rich plaque  I25.10 414.00     I25.83 414.3       5. Pure hypercholesterolemia  E78.00 272.0       6. Controlled type 2 diabetes mellitus with stage 4 chronic kidney disease, without long-term current use of insulin ([facility])  E11.22 250.40     N18.4 585.4       7. Medicare annual wellness visit, subsequent  Z00.00 V70.0       8.  Chronic rhinitis  J31.0 472.0               Fredy Garcia MD       This is the Subsequent Medicare Annual Wellness Exam, performed 12 months or more after the Initial AWV or the last Subsequent AWV    I have reviewed the patient's medical history in detail and updated the computerized patient record. Assessment/Plan   Education and counseling provided:  Are appropriate based on today's review and evaluation    1. Essential hypertension  2. CKD (chronic kidney disease) stage 4, GFR 15-29 ml/min (HCC)  3. Mixed hyperlipidemia  4. Coronary artery disease due to lipid rich plaque  5. Pure hypercholesterolemia  6. Controlled type 2 diabetes mellitus with stage 4 chronic kidney disease, without long-term current use of insulin ([facility]  7. Medicare annual wellness visit, subsequent  8.  Chronic rhinitis       Depression Risk Factor Screening     3 most recent PHQ Screens 10/17/2022   PHQ Not Done -   Little interest or pleasure in doing things Several days   Feeling down, depressed, irritable, or hopeless Several days   Total Score PHQ 2 2   Trouble falling or staying asleep, or sleeping too much -   Feeling tired or having little energy -   Poor appetite, weight loss, or overeating -   Feeling bad about yourself - or that you are a failure or have let yourself or your family down -   Trouble concentrating on things such as school, work, reading, or watching TV -   Moving or speaking so slowly that other people could have noticed; or the opposite being so fidgety that others notice -   Thoughts of being better off dead, or hurting yourself in some way -   PHQ 9 Score -   How difficult have these problems made it for you to do your work, take care of your home and get along with others -       Alcohol & Drug Abuse Risk Screen   Do you average more than 1 drink per night or more than 7 drinks a week?: (P) No  In the past three months have you had more than 4 drinks containing alcohol on one occasion?: (P) No    Do you average more than 1 drink per night or more than 7 drinks a week: No    In the past three months have you have had more than 4 drinks containing alcohol on one occasion: No          Functional Ability and Level of Safety   Hearing:  Hearing: (P) additional comments below  Hearing comments: (P) Turns up TV very loud. Have to repeat what is said to him. Hearing:  hearing loss, needs evaluation     Activities of Daily Living: The home contains: (P) handrails, grab bars, rugs  Functional ADLs: (P) Patient needs help with self care  Patient needs help with: (P) phone, shopping, preparing meals, laundry, housework, managing money, dressing, hygiene, bathroom needs, walkingActivities of Daily Living: The home contains: no safety equipment. Patient does total self care     Ambulation:  Patient ambulates: (P) with difficulty  How far the patient can walk with difficulty: (P) He only walks around the house and occasionally he walks around outside. If we go to a store he rides the motorized cart. Walking is difficult due to: (P) pain, shortness of breathAmbulation: with no difficulty     Fall Risk:  Fall Risk Assessment, last 12 mths 10/17/2022   Able to walk? Yes   Fall in past 12 months? 1   Do you feel unsteady? 0   Are you worried about falling 0   Is TUG test greater than 12 seconds? 0   Is the gait abnormal? 0   Number of falls in past 12 months 2   Fall with injury?  1     Abuse Screen:  Do you ever feel afraid of your partner?: (P) No  Are you in a relationship with someone who physically or mentally threatens you?: (P) No  Is it safe for you to go home?: (P) Yes Abuse Screen:  Patient is not abused       Cognitive Screening   Has your family/caregiver stated any concerns about your memory?: (P) Arelis Chad your family/caregiver stated any concerns about your memory: no     Cognitive Screening: Normal - Verbal Fluency Test    Health Maintenance Due     Health Maintenance Due   Topic Date Due    MICROALBUMIN Q1  Never done    Eye Exam Retinal or Dilated  Never done    Foot Exam Q1  12/06/2018    COVID-19 Vaccine (4 - Booster for [employer] series) 04/20/2022    Flu Vaccine (1) 08/01/2022       Patient Care Team   Patient Care Team:  Silvano Ibarra MD as PCP - General (Internal Medicine Physician)  Emili Allison MD as PCP - [address] Provider  Florina Pollack (Inactive) as Physician (Dermatology Physician)  Alessio Regalado MD as Physician (Cardiovascular Disease Physician)  Jose Interiano MD as Physician (Nephrology)  Stacey Gamez MD as Physician (Neurology)  Jayme Rod MD as Physician (Orthopedic Surgery)  Adilson Alaniz MD (Gastroenterology)  Aida Richter MD (Sleep Medicine Physician)  Daren Gilman DPM as Consulting Provider (Podiatry)  Riya Edward NP as Nurse Practitioner (Nurse Practitioner)  Denzel Solorzano MD as Consulting Provider (Urology)  Derek Stark MD as Consulting Provider (Pulmonary Disease)    History     Patient Active Problem List   Diagnosis Code    Chronic kidney disease, stage III (moderate) ([facility] N18.30    Mixed hyperlipidemia E78.2    Obstructive sleep apnea G47.33    RLS (restless legs syndrome) G25.81    Peripheral neuropathy G62.9    DJD (degenerative joint disease), multiple sites M15.9    Essential hypertension I10    GERD (gastroesophageal reflux disease) K21.9    Anxiety associated with depression F41.8    Obesity E66.9    Gout M10.9    Hypertensive kidney disease with chronic kidney disease stage III (HCC) I12.9, N18.30    Benign essential tremor G25.0    S/P coronary artery stent placement Z95.5    Coronary artery disease involving native coronary artery of native heart without angina pectoris I25.10    Coronary artery disease due to lipid rich plaque I25.10, I25.83    Pure hypercholesterolemia E78.00    Bilateral carotid artery stenosis I65.23    Diabetic peripheral neuropathy associated with type 2 diabetes mellitus ([facility] E11.42    History of left-sided carotid endarterectomy Z98.890    CKD (chronic kidney disease) stage 4, GFR 15-29 ml/min ([facility]) N18.4    Right kidney mass N28.89    Acute hypoxemic respiratory failure (HCC) J96.01    Anemia D64.9    Hx of completed stroke Z86.73    Pyelonephritis N12 Intractable lower abdominal pain R10.30    Abdominal pain R10.9    RBD (REM behavioral disorder) G47.52    Convulsions (HCC) R56.9    Acute alteration in mental status R41.82     Past Medical History:   Diagnosis Date    Abnormal stress echo 5/12/2015    Anemia NEC 03/2013    Anxiety     Borderline diabetes     CAD (coronary artery disease) 2009    cath [facility]) revealed 20% RCA and 50% 2nd diagonal    Calculus of kidney     CKD (chronic kidney disease) stage 4, GFR 15-29 ml/min (HCC)     COPD, mild (HCC)     Followed by pulmonary associates    DJD (degenerative joint disease)     Environmental allergies     Fatty liver     GERD (gastroesophageal reflux disease) 10/11/2009    Gout     Hypertension     MELODY on CPAP 10/11/2009    nasal pillows; pressure set at 10-11 -     Peripheral neuropathy 10/11/2009    bilateral feet (numbness)    Renal cell cancer, right ([address] 01/2021    RLS (restless legs syndrome) 10/11/2009    S/P coronary artery stent placement 05/12/2015    PCI./MALI to LCx, 8/2019 PCI/MALI Prox LAD (RCA 40-50% lesions)      Past Surgical History:   Procedure Laterality Date    HX BUNIONECTOMY  2019    HX CAROTID ENDARTERECTOMY Left 01/2020    Followed by Dr. Courntey Woodruff      S/P stent to left circumflex in May, 2015; s/p stent to LAD in August, 2019    [address] [facility] [address]  2009, 7/17    per heart cath of 7/13/2017, discrete 40 % stenosis. in proximal LAD, discrete 40 % stenosis in proximal RCA, 30% stenosis in distal RCA    HX HERNIA REPAIR      McT[place]aney/umbilical    HX KNEE REPLACEMENT Left 07/23/2011    HX ORTHOPAEDIC      left shoulder manipulation    HX UROLOGICAL      basket extraction of kidney stones    DE COLONOSCOPY FLX DX W/COLLJ SPEC WHEN PFRMD  8/5/2010         DE COLSC FLX W/RMVL OF TUMOR POLYP LESION SNARE TQ  9/24/2014          Current Outpatient Medications   Medication Sig Dispense Refill    benzonatate (TESSALON) 200 mg capsule Take 1 Capsule by mouth three (3) times daily as needed for Cough. 30 Capsule 1    citalopram (CELEXA) 40 mg tablet TAKE 1 TABLET BY MOUTH EVERY DAY 90 Tablet 3    fluticasone propionate (FLONASE) 50 mcg/actuation nasal spray 2 Sprays by Both Nostrils route daily. pantoprazole (PROTONIX) 40 mg tablet TAKE 1 TABLET BY MOUTH EVERY DAY 90 Tablet 1    Trelegy Ellipta 100-62.5-25 mcg inhaler TAKE 1 PUFF BY MOUTH EVERY DAY      colchicine 0.6 mg tablet Take 0.6 mg by mouth two (2) times daily as needed for Gout or Pain.      loratadine (CLARITIN) 10 mg tablet Take 10 mg by mouth daily. atorvastatin (LIPITOR) 80 mg tablet TAKE 1 TABLET BY MOUTH EVERY DAY 90 Tablet 3    tamsulosin (FLOMAX) 0.4 mg capsule Take 0.4 mg by mouth daily. isosorbide mononitrate ER (IMDUR) 30 mg tablet TAKE 1/2 TABLET BY MOUTH EVERY DAY 45 Tablet 3    metoprolol succinate (TOPROL-XL) 25 mg XL tablet TAKE 1 TABLET BY MOUTH EVERY DAY 90 Tablet 1    furosemide (LASIX) 80 mg tablet Take 40 mg by mouth daily. sodium bicarbonate 325 mg tablet Take 1 Tablet by mouth two (2) times a day. 60 Tablet 0    amLODIPine (NORVASC) 10 mg tablet Take 1 Tablet by mouth daily. 30 Tablet 1    fluocinoNIDE (LIDEX) 0.05 % external solution APPLY TO ITCHY SPOTS ON SCALP AS NEEDED FOR FLARE      albuterol (PROVENTIL HFA, VENTOLIN HFA, PROAIR HFA) 90 mcg/actuation inhaler Take 1 Puff by inhalation every four (4) hours. ketoconazole (NIZORAL) 2 % shampoo Apply 1 mL to affected area daily as needed for Itching. nitroglycerin (NITROSTAT) 0.4 mg SL tablet TAKE 1 TABLET BY SUBLINGUAL ROUTE EVERY 5 MINUTES AS NEEDED FOR CHEST PAIN. 1 Bottle 0    VITAMIN D3 2,000 unit cap capsule Take 2,000 Units by mouth daily. 2    GLUCOSAMINE HCL/CHONDR PETERSEN A NA (GLUCOSAMINE-CHONDROITIN) 750-600 mg Tab Take 1 Tab by mouth daily. Brand: Move Free      aspirin delayed-release 81 mg tablet Take 81 mg by mouth nightly. MULTIVITS W-FE,OTHER MIN (CENTRUM PO) Take 1 Tab by mouth daily. Allergies   Allergen Reactions    Bactrim [Sulfamethoxazole-Trimethoprim] Hives    Codeine Itching    Lisinopril (Bulk) Cough    Neurontin [Gabapentin] Other (comments)     drowsy    Zostavax [Zoster Vaccine Live (Pf)] Rash     See 9/10/13 visit    Penicillins Hives     Pt states he has taken Keflex before without problems       Family History   Problem Relation Age of Onset    Heart Disease Father     Hypertension Father     Other Father         abdominal aneurysm     Dementia Mother     Alzheimer's Disease Mother     Heart Disease Brother     Heart Attack Brother     Heart Disease Maternal Grandmother     Heart Disease Paternal Grandmother     Heart Attack Paternal Grandmother     Heart Disease Paternal Grandfather     Heart Attack Paternal Grandfather     Elevated Lipids Son     Elevated Lipids Daughter     Cancer Neg Hx     Diabetes Neg Hx     Stroke Neg Hx      Social History     Tobacco Use    Smoking status: Former     Packs/day: 1.00     Years: 10.00     Pack years: 10.00     Types: Cigarettes     Quit date: 1968     Years since quittin.8    Smokeless tobacco: Never   Substance Use Topics    Alcohol use: No     Alcohol/week: 0.0 standard drinks         Raphael Graham MD

## 2022-10-16 NOTE — PATIENT INSTRUCTIONS
Medicare Wellness Visit, Male    The best way to live healthy is to have a lifestyle where you eat a well-balanced diet, exercise regularly, limit alcohol use, and quit all forms of tobacco/nicotine, if applicable. Regular preventive services are another way to keep healthy. Preventive services (vaccines, screening tests, monitoring & exams) can help personalize your care plan, which helps you manage your own care. Screening tests can find health problems at the earliest stages, when they are easiest to treat. [facility] follows the current, evidence-based guidelines published by the [facility] ([facility]) when recommending preventive services for our patients. Because we follow these guidelines, sometimes recommendations change over time as research supports it. (For example, a prostate screening blood test is no longer routinely recommended for men with no symptoms). Of course, you and your doctor may decide to screen more often for some diseases, based on your risk and co-morbidities (chronic disease you are already diagnosed with). Preventive services for you include:  - Medicare offers their members a free annual wellness visit, which is time for you and your primary care provider to discuss and plan for your preventive service needs. Take advantage of this benefit every year!  -All adults over age 72 should receive the recommended pneumonia vaccines. Current USPSTF guidelines recommend a series of two vaccines for the best pneumonia protection.   -All adults should have a flu vaccine yearly and tetanus vaccine every 10 years.  -All adults age 48 and older should receive the shingles vaccines (series of two vaccines).        -All adults age 38-68 who are overweight should have a diabetes screening test once every three years.   -Other screening tests & preventive services for persons with diabetes include: an eye exam to screen for diabetic retinopathy, a kidney function test, a foot exam, and stricter control over your cholesterol.   -Cardiovascular screening for adults with routine risk involves an electrocardiogram (ECG) at intervals determined by the provider.   -Colorectal cancer screening should be done for adults age 54-65 with no increased risk factors for colorectal cancer. There are a number of acceptable methods of screening for this type of cancer. Each test has its own benefits and drawbacks. Discuss with your provider what is most appropriate for you during your annual wellness visit. The different tests include: colonoscopy (considered the best screening method), a fecal occult blood test, a fecal DNA test, and sigmoidoscopy.  -All adults born between [facility] should be screened once for Hepatitis C.  -An Abdominal Aortic Aneurysm (AAA) Screening is recommended for men age 73-68 who has ever smoked in their lifetime. CONTINUE FLONASE 2 SPRAYS EACH NOSTRIL ONCE A DAY AND ADD ASTELIN (ASTELEZINE)  2 SPRAYS EACH NOSTRIL ONCE A DAY. FOR CONGESTION.

## 2022-10-17 ENCOUNTER — OFFICE VISIT (OUTPATIENT)
Dept: INTERNAL MEDICINE CLINIC | Age: 77
End: 2022-10-17
Payer: MEDICARE

## 2022-10-17 VITALS
BODY MASS INDEX: 34.5 KG/M2 | HEART RATE: 78 BPM | HEIGHT: 70 IN | WEIGHT: 241 LBS | RESPIRATION RATE: 16 BRPM | OXYGEN SATURATION: 96 % | TEMPERATURE: 97.2 F | DIASTOLIC BLOOD PRESSURE: 72 MMHG | SYSTOLIC BLOOD PRESSURE: 118 MMHG

## 2022-10-17 DIAGNOSIS — N18.4 CONTROLLED TYPE 2 DIABETES MELLITUS WITH STAGE 4 CHRONIC KIDNEY DISEASE, WITHOUT LONG-TERM CURRENT USE OF INSULIN (HCC): ICD-10-CM

## 2022-10-17 DIAGNOSIS — H91.90 HEARING LOSS, UNSPECIFIED HEARING LOSS TYPE, UNSPECIFIED LATERALITY: ICD-10-CM

## 2022-10-17 DIAGNOSIS — E78.2 MIXED HYPERLIPIDEMIA: ICD-10-CM

## 2022-10-17 DIAGNOSIS — Z23 NEEDS FLU SHOT: ICD-10-CM

## 2022-10-17 DIAGNOSIS — I25.10 CORONARY ARTERY DISEASE DUE TO LIPID RICH PLAQUE: ICD-10-CM

## 2022-10-17 DIAGNOSIS — Z00.00 MEDICARE ANNUAL WELLNESS VISIT, SUBSEQUENT: ICD-10-CM

## 2022-10-17 DIAGNOSIS — I10 ESSENTIAL HYPERTENSION: Primary | ICD-10-CM

## 2022-10-17 DIAGNOSIS — E11.22 CONTROLLED TYPE 2 DIABETES MELLITUS WITH STAGE 4 CHRONIC KIDNEY DISEASE, WITHOUT LONG-TERM CURRENT USE OF INSULIN (HCC): ICD-10-CM

## 2022-10-17 DIAGNOSIS — Z23 ENCOUNTER FOR IMMUNIZATION: ICD-10-CM

## 2022-10-17 DIAGNOSIS — J31.0 CHRONIC RHINITIS: ICD-10-CM

## 2022-10-17 DIAGNOSIS — N18.4 CKD (CHRONIC KIDNEY DISEASE) STAGE 4, GFR 15-29 ML/MIN (HCC): ICD-10-CM

## 2022-10-17 DIAGNOSIS — E78.00 PURE HYPERCHOLESTEROLEMIA: ICD-10-CM

## 2022-10-17 DIAGNOSIS — I25.83 CORONARY ARTERY DISEASE DUE TO LIPID RICH PLAQUE: ICD-10-CM

## 2022-10-17 PROCEDURE — G8752 SYS BP LESS 140: HCPCS | Performed by: FAMILY MEDICINE

## 2022-10-17 PROCEDURE — G8427 DOCREV CUR MEDS BY ELIG CLIN: HCPCS | Performed by: FAMILY MEDICINE

## 2022-10-17 PROCEDURE — 1101F PT FALLS ASSESS-DOCD LE1/YR: CPT | Performed by: FAMILY MEDICINE

## 2022-10-17 PROCEDURE — G0463 HOSPITAL OUTPT CLINIC VISIT: HCPCS | Performed by: FAMILY MEDICINE

## 2022-10-17 PROCEDURE — 99214 OFFICE O/P EST MOD 30 MIN: CPT | Performed by: FAMILY MEDICINE

## 2022-10-17 PROCEDURE — G8417 CALC BMI ABV UP PARAM F/U: HCPCS | Performed by: FAMILY MEDICINE

## 2022-10-17 PROCEDURE — G8536 NO DOC ELDER MAL SCRN: HCPCS | Performed by: FAMILY MEDICINE

## 2022-10-17 PROCEDURE — G8754 DIAS BP LESS 90: HCPCS | Performed by: FAMILY MEDICINE

## 2022-10-17 PROCEDURE — G0439 PPPS, SUBSEQ VISIT: HCPCS | Performed by: FAMILY MEDICINE

## 2022-10-17 PROCEDURE — 90694 VACC AIIV4 NO PRSRV 0.5ML IM: CPT | Performed by: FAMILY MEDICINE

## 2022-10-17 PROCEDURE — G9717 DOC PT DX DEP/BP F/U NT REQ: HCPCS | Performed by: FAMILY MEDICINE

## 2022-10-17 NOTE — PROGRESS NOTES
1. \"Have you been to the ER, urgent care clinic since your last visit? Hospitalized since your last visit? \" No    2. \"Have you seen or consulted any other health care providers outside of the [address] [place] [address] since your last visit? \" No     3. For patients aged 39-70: Has the patient had a colonoscopy / FIT/ Cologuard?  NA - based on age

## 2022-12-01 RX ORDER — ISOSORBIDE MONONITRATE 30 MG/1
TABLET, EXTENDED RELEASE ORAL
Qty: 45 TABLET | Refills: 3 | OUTPATIENT
Start: 2022-12-01

## 2022-12-05 ENCOUNTER — TELEPHONE (OUTPATIENT)
Dept: INTERNAL MEDICINE CLINIC | Age: 77
End: 2022-12-05

## 2022-12-05 RX ORDER — AZITHROMYCIN 250 MG/1
250 TABLET, FILM COATED ORAL SEE ADMIN INSTRUCTIONS
Qty: 6 TABLET | Refills: 0 | Status: SHIPPED | OUTPATIENT
Start: 2022-12-05 | End: 2022-12-10

## 2022-12-18 RX ORDER — PANTOPRAZOLE SODIUM 40 MG/1
TABLET, DELAYED RELEASE ORAL
Qty: 90 TABLET | Refills: 1 | Status: SHIPPED | OUTPATIENT
Start: 2022-12-18

## 2023-01-13 NOTE — PROGRESS NOTES
[address] [place] [address] [address], [place], [address] [facility]     Jada Waterman is a 74 y.o. male. Last seen by me 3 weeks ago. Subjective:     Jada Waterman reports he is feeling improvement in his chest pain since his cath. He reports daytime drowsiness. He is adherent with ASA and Plavix. Patient denies any exertional chest pain, dyspnea, palpitations, syncope, orthopnea, edema or paroxysmal nocturnal dyspnea. He is here today with his wife.      Patient Active Problem List    Diagnosis Date Noted    Stable angina ([address] 07/31/2019     Priority: 1 - One    History of kidney stones 07/12/2018    Chest pain 07/07/2017    Coronary artery disease due to lipid rich plaque 04/27/2016    Coronary artery disease involving native coronary artery of native heart without angina pectoris 03/16/2016    S/P coronary artery stent placement 05/12/2015    Benign essential tremor 01/22/2014    Hypertensive kidney disease with chronic kidney disease stage III ([address] 11/22/2013    Iron deficiency 05/23/2013    Obesity 01/30/2013    Gout 01/30/2013    Prediabetes 01/07/2012    Chronic kidney disease, stage III (moderate) ([address] 10/11/2009    Mixed hyperlipidemia 10/11/2009    Obstructive sleep apnea 10/11/2009    RLS (restless legs syndrome) 10/11/2009    Peripheral neuropathy 10/11/2009    DJD (degenerative joint disease), multiple sites 10/11/2009    Essential hypertension, benign 10/11/2009    Allergic rhinitis 10/11/2009    GERD (gastroesophageal reflux disease) 10/11/2009    ED (erectile dysfunction) 10/11/2009    Anxiety associated with depression 10/11/2009      Nikki Patricia MD  Past Medical History:   Diagnosis Date    Abnormal stress echo 5/12/2015    Anemia NEC 03/2013    Last 2-3 months; finished taking iron supplement    Anxiety     CAD (coronary artery disease) 2009    cath Aaron Joseph) revealed 20%RCA and 50%2nd diagonal    Calculus of kidney     Chronic kidney disease     sees nephrologist every 6 months    Chronic kidney disease, stage III (moderate) ([facility] 10/11/2009    30% kidney function    Diabetes ([facility]     Pre-diabetic    DJD (degenerative joint disease)     GERD (gastroesophageal reflux disease) 10/11/2009    controlled with medication    Gout     Hypertension     Liver disease     fatty liver    Obesity     Obstructive sleep apnea (adult) (pediatric) 10/11/2009    nasal pillows; pressure set at 10-11 - uses cpap    Peripheral neuropathy 10/11/2009    bilateral feet (numbness)    Prediabetes     RLS (restless legs syndrome) 10/11/2009    S/P coronary artery stent placement 5/12/2015    5/11/15 PCI./MALI to LCx      Past Surgical History:   Procedure Laterality Date    HX HEART CATHETERIZATION  2009, 7/17    x1 stent    HX HERNIA REPAIR      [place]aney/umbilical    HX KNEE REPLACEMENT Left 7/23/11     LEFT TOTAL KNEE ARTHROPLASTY    HX ORTHOPAEDIC      left great toe pinning/plate    HX ORTHOPAEDIC      left shoulder manipulation    HX UROLOGICAL      basket extraction of kidney stones    UT COLONOSCOPY FLX DX W/COLLJ SPEC WHEN PFRMD  8/5/2010         UT COLSC FLX W/RMVL OF TUMOR POLYP LESION SNARE TQ  9/24/2014          Allergies   Allergen Reactions    Penicillins Hives    Bactrim [Sulfamethoxazole-Trimethoprim] Hives    Codeine Itching    Lisinopril (Bulk) Cough    Neurontin [Gabapentin] Other (comments)     drowsy    Zostavax [Zoster Vaccine Live (Pf)] Rash     See 9/10/13 visit      Family History   Problem Relation Age of Onset    Heart Disease Father     Hypertension Father     Other Father         abdominal aneurysm     Dementia Mother     Alzheimer Mother     Heart Disease Brother     Heart Attack Brother     Heart Disease Maternal Grandmother     Heart Disease Paternal Grandmother     Heart Attack Paternal Grandmother     Heart Disease Paternal Grandfather     Heart Attack Paternal Grandfather     Elevated Lipids Son  Elevated Lipids Daughter     Cancer Neg Hx     Diabetes Neg Hx     Stroke Neg Hx       Social History     Socioeconomic History    Marital status:      Spouse name: Not on file    Number of children: Not on file    Years of education: Not on file    Highest education level: Not on file   Occupational History    Not on file   Social Needs    Financial resource strain: Not on file    Food insecurity:     Worry: Not on file     Inability: Not on file    Transportation needs:     Medical: Not on file     Non-medical: Not on file   Tobacco Use    Smoking status: Former Smoker     Packs/day: 1.00     Years: 10.00     Pack years: 10.00     Types: Cigarettes     Last attempt to quit: 1968     Years since quittin.6    Smokeless tobacco: Never Used   Substance and Sexual Activity    Alcohol use: No     Alcohol/week: 0.0 standard drinks    Drug use: No    Sexual activity: Yes     Partners: Female     Birth control/protection: None   Lifestyle    Physical activity:     Days per week: Not on file     Minutes per session: Not on file    Stress: Not on file   Relationships    Social connections:     Talks on phone: Not on file     Gets together: Not on file     Attends Gnosticist service: Not on file     Active member of club or organization: Not on file     Attends meetings of clubs or organizations: Not on file     Relationship status: Not on file    Intimate partner violence:     Fear of current or ex partner: Not on file     Emotionally abused: Not on file     Physically abused: Not on file     Forced sexual activity: Not on file   Other Topics Concern    Not on file   Social History Narrative    Not on file           Review of Systems  Constitutional: Negative for fever, chills, malaise and diaphoresis. +daytime drowsiness  Respiratory: Negative for cough, hemoptysis, SOB, sputum production, and wheezing.    Cardiovascular: Negative for palpitations, orthopnea, claudication, leg swelling and PND. +stable chest pressure, chest tightness  Gastrointestinal: Negative for heartburn, nausea, vomiting, blood in stool and melena. Genitourinary: Negative for dysuria and flank pain. Musculoskeletal: Negative for joint pain and back pain. Skin: Negative for rash. Neurological: Negative for focal weakness, seizures, loss of consciousness, weakness and headaches. Endo/Heme/Allergies: Does not bruise/bleed easily. Psychiatric/Behavioral: Negative for memory loss. The patient does not have insomnia. Physical Exam:    Visit Vitals  /54 (BP 1 Location: Left arm, BP Patient Position: Sitting)   Pulse 69   Resp 18   Ht 6' (1.829 m)   Wt 247 lb 12.8 oz (112.4 kg)   SpO2 97%   BMI 33.61 kg/m²     Wt Readings from Last 3 Encounters:   08/20/19 247 lb 12.8 oz (112.4 kg)   08/05/19 250 lb (113.4 kg)   07/30/19 246 lb 9.6 oz (111.9 kg)       Gen: NAD    Mental Status - Alert. General Appearance - Not in acute distress. Neck - no JVD    Chest and Lung Exam   Inspection: Accessory muscles - No use of accessory muscles in breathing. Auscultation:   Breath sounds: - Normal.     Cardiovascular   Inspection: Jugular vein - Bilateral - Inspection Normal.   Palpation/Percussion:   Apical Impulse: - Normal.   Auscultation: Rhythm - Regular. Heart Sounds - S1 WNL and S2 WNL. No S3 or S4. Murmurs & Other Heart Sounds: Auscultation of the heart reveals - No Murmurs. Peripheral Vascular   Upper Extremity: Inspection - Bilateral - No Cyanotic nailbeds or Digital clubbing. Lower Extremity:  Palpation: no edema    Abdomen: Soft, non-tender, bowel sounds are active. Neuro: A&O times 3, CN and motor grossly WNL    Cardiographics  EKG 08/20/19- SR, Intraventricular conduction delay  EKG 7/9/19 - SR IVCD  Nuclear stress test 7/12/19 - perfusion defect small-moderate in size, wall motion abnormalities   EKG 8/20/19 - SR, RBBB     Assessment:     Encounter Diagnoses   Name Primary?     Essential hypertension, benign Yes    Coronary artery disease involving native coronary artery of native heart without angina pectoris     S/P coronary artery stent placement     SMITH (dyspnea on exertion)     Chest pain, unspecified type     RBBB       Plan:     CP resolved and SMITH much better post PCI. F/U 6 mo.   Change toprol to QHS      Written by Francisco Javier Harden, as dictated by Jay Carbajal M.D. [Initial Consultation] : an initial consultation for [Mother] : mother

## 2023-01-17 NOTE — PROGRESS NOTES
Notify patient kidney function is mildly impaired, but stable. Mild anemia, stable. Avoid NSAIDS and drink water regularly. LDL cholesterol at goal. Triglycerides elevated, follow low fat diet. No change in medications. Labs sent to DR. Clarissa Navarro. Recommend 6 month follow up. Months Of Therapy Completed: 1

## 2023-01-25 ENCOUNTER — OFFICE VISIT (OUTPATIENT)
Dept: NEUROLOGY | Age: 78
End: 2023-01-25
Payer: MEDICARE

## 2023-01-25 VITALS
RESPIRATION RATE: 18 BRPM | WEIGHT: 244.6 LBS | HEART RATE: 72 BPM | OXYGEN SATURATION: 97 % | SYSTOLIC BLOOD PRESSURE: 118 MMHG | BODY MASS INDEX: 35.02 KG/M2 | DIASTOLIC BLOOD PRESSURE: 50 MMHG | TEMPERATURE: 97.5 F | HEIGHT: 70 IN

## 2023-01-25 DIAGNOSIS — I65.23 BILATERAL CAROTID ARTERY STENOSIS: Primary | ICD-10-CM

## 2023-01-25 DIAGNOSIS — E66.01 SEVERE OBESITY (BMI 35.0-39.9) WITH COMORBIDITY (HCC): ICD-10-CM

## 2023-01-25 DIAGNOSIS — G25.0 BENIGN ESSENTIAL TREMOR: ICD-10-CM

## 2023-01-25 DIAGNOSIS — G60.9 IDIOPATHIC PERIPHERAL NEUROPATHY: ICD-10-CM

## 2023-01-25 DIAGNOSIS — R56.9 CONVULSIONS, UNSPECIFIED CONVULSION TYPE (HCC): ICD-10-CM

## 2023-01-25 DIAGNOSIS — E11.42 DIABETIC PERIPHERAL NEUROPATHY ASSOCIATED WITH TYPE 2 DIABETES MELLITUS (HCC): ICD-10-CM

## 2023-01-25 PROCEDURE — 99213 OFFICE O/P EST LOW 20 MIN: CPT | Performed by: PSYCHIATRY & NEUROLOGY

## 2023-01-25 PROCEDURE — G8417 CALC BMI ABV UP PARAM F/U: HCPCS | Performed by: PSYCHIATRY & NEUROLOGY

## 2023-01-25 PROCEDURE — 1101F PT FALLS ASSESS-DOCD LE1/YR: CPT | Performed by: PSYCHIATRY & NEUROLOGY

## 2023-01-25 PROCEDURE — 3074F SYST BP LT 130 MM HG: CPT | Performed by: PSYCHIATRY & NEUROLOGY

## 2023-01-25 PROCEDURE — 3078F DIAST BP <80 MM HG: CPT | Performed by: PSYCHIATRY & NEUROLOGY

## 2023-01-25 PROCEDURE — G8427 DOCREV CUR MEDS BY ELIG CLIN: HCPCS | Performed by: PSYCHIATRY & NEUROLOGY

## 2023-01-25 PROCEDURE — G8536 NO DOC ELDER MAL SCRN: HCPCS | Performed by: PSYCHIATRY & NEUROLOGY

## 2023-01-25 PROCEDURE — G9717 DOC PT DX DEP/BP F/U NT REQ: HCPCS | Performed by: PSYCHIATRY & NEUROLOGY

## 2023-01-25 PROCEDURE — 1123F ACP DISCUSS/DSCN MKR DOCD: CPT | Performed by: PSYCHIATRY & NEUROLOGY

## 2023-01-25 NOTE — LETTER
1/25/2023    Patient: Collette Baston. YOB: 1945   Date of Visit: 1/25/2023     Liane Rodriguez MD  [address]  [address]  [address] [id number]  Via [address] [facility] [address]    Dear Liane Rodriguez MD,      Thank you for referring Mr. Tito Mae to [address] [place] [address] for evaluation. My notes for this consultation are attached. Consult  REFERRED BY:  Omkar Guardado MD    CHIEF COMPLAINT: Worsening neuropathy and carotid stenosis      Subjective:     Collette Baston. is a 76 y.o. right-handed  male seen at the request of Dr. Ye Lowery for evaluation of new problem of recent fall in June when he fell over and lost his balance picking up a piece of wood, and sustained a laceration to the scalp and a mild postconcussive syndrome, recent physical therapy for balance training, and it helped him maintain his balance better, and he had a normal CT of the head in June after the fall in the emergency room, because he has a sensory ataxia from worsening neuropathy in his feet, and having a little more difficulty walking with balance and coordination problems at times, and his neuropathy work-up last year when he saw him again, showed only that his hemoglobin A1c ran around 6.0, and 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 He also is having more difficulty with a new problem of increasing difficulty with jerks in his legs, and on looking at a video, it almost looks more like periodic leg movements of sleep, and supposedly he is on a CPAP machine now, and finally getting his seen back after the recall 1 year ago, and hopefully that will help some in addition.   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 We encouraged him to get his CPAP machine restarted because that might help his sleep cycle some. His vascular surgeon Dr. Ford Wiggins checks a Doppler every year and has had no recurrence of his stenosis and no more recurrent strokelike symptoms and takes a baby aspirin every day and high-dose statin of Lipitor 80 mg a day. Patient has a known history of idiopathic neuropathy in the past, that seems to be worsening as he is more numbness in his feet and more tight sensation in his ankles. He is wondering why it may be getting worse. He did not respond well to gabapentin or Lyrica or Cymbalta for treatment of the neuropathy in the past.  He does have some minor discomfort but no major pain. Patient does not have much weakness in his feet. He also has restless leg syndrome probably from the neuropathy, has to take Requip 2 of the .5 mg at night for his restless leg syndrome that seems to help him significantly. He has a borderline prediabetic state on the chart, and that may be the cause of his neuropathy. He also has a new problem of having had kidney cancer requiring a partial nephrectomy on his right kidney, and now has a kidney stone in his left kidney and may need surgery for it or possibly lithotripsy and has been decided in the future. He also has benign prostatic hypertrophy and is on Flomax for that followed by urology. He has degenerative arthritis and has to limp on his knees. He continues to take his multivitamin and vitamin D on a regular basis. We encouraged him this time to try to stay mentally and physically active to help protect his memory in addition also, and to control his main risk factors for vascular disease being high blood pressure, diabetes, cholesterol, and smoking which she is trying to do.   He denies any recent headache, fever, meningismus, new focal weakness, new sensory loss, no gait issues, or any other active neurologic problem. Past Medical History:   Diagnosis Date    Abnormal stress echo 5/12/2015    Anemia NEC 03/2013    Anxiety     Borderline diabetes     CAD (coronary artery disease) 2009    cath [facility]) revealed 20% RCA and 50% 2nd diagonal    Calculus of kidney     CKD (chronic kidney disease) stage 4, GFR 15-29 ml/min (HCC)     COPD, mild (HCC)     Followed by pulmonary associates    DJD (degenerative joint disease)     Environmental allergies     Fatty liver     GERD (gastroesophageal reflux disease) 10/11/2009    Gout     Hypertension     MELODY on CPAP 10/11/2009    nasal pillows; pressure set at 10-11 -     Peripheral neuropathy 10/11/2009    bilateral feet (numbness)    Renal cell cancer, right ([address] 01/2021    RLS (restless legs syndrome) 10/11/2009    S/P coronary artery stent placement 05/12/2015    PCI./MALI to LCx, 8/2019 PCI/MALI Prox LAD (RCA 40-50% lesions)      Past Surgical History:   Procedure Laterality Date    HX BUNIONECTOMY  2019    HX CAROTID ENDARTERECTOMY Left 01/2020    Followed by Dr. Edilma Ashby      S/P stent to left circumflex in May, 2015; s/p stent to LAD in August, 2019   [address]  2009, 7/17    per heart cath of 7/13/2017, discrete 40 % stenosis. in proximal LAD, discrete 40 % stenosis in proximal RCA, 30% stenosis in distal RCA    HX HERNIA REPAIR      [place]/umbilical    HX KNEE REPLACEMENT Left 07/23/2011    HX ORTHOPAEDIC      left shoulder manipulation    HX UROLOGICAL      basket extraction of kidney stones    [facility] COLONOSCOPY FLX DX W/COLLJ SPEC WHEN PFRMD  8/5/2010         VA COLSC FLX W/RMVL OF TUMOR POLYP LESION SNARE TQ  9/24/2014          Family History   Problem Relation Age of Onset    Heart Disease Father     Hypertension Father     Other Father         abdominal aneurysm     Dementia Mother     Alzheimer's Disease Mother     Heart Disease Brother     Heart Attack Brother     Heart Disease Maternal Grandmother     Heart Disease Paternal Grandmother     Heart Attack Paternal Grandmother     Heart Disease Paternal Grandfather     Heart Attack Paternal Grandfather     Elevated Lipids Son     Elevated Lipids Daughter     Cancer Neg Hx     Diabetes Neg Hx     Stroke Neg Hx       Social History     Tobacco Use    Smoking status: Former     Packs/day: 1.00     Years: 10.00     Pack years: 10.00     Types: Cigarettes     Quit date: 1968     Years since quittin.1    Smokeless tobacco: Never   Substance Use Topics    Alcohol use: No     Alcohol/week: 0.0 standard drinks         Current Outpatient Medications:     pantoprazole (PROTONIX) 40 mg tablet, TAKE 1 TABLET BY MOUTH EVERY DAY, Disp: 90 Tablet, Rfl: 1    citalopram (CELEXA) 40 mg tablet, TAKE 1 TABLET BY MOUTH EVERY DAY, Disp: 90 Tablet, Rfl: 3    fluticasone propionate (FLONASE) 50 mcg/actuation nasal spray, 2 Sprays by Both Nostrils route daily. , Disp: , Rfl:     Trelegy Ellipta 100-62.5-25 mcg inhaler, TAKE 1 PUFF BY MOUTH EVERY DAY, Disp: , Rfl:     colchicine 0.6 mg tablet, Take 0.6 mg by mouth two (2) times daily as needed for Gout or Pain., Disp: , Rfl:     loratadine (CLARITIN) 10 mg tablet, Take 10 mg by mouth daily. , Disp: , Rfl:     atorvastatin (LIPITOR) 80 mg tablet, TAKE 1 TABLET BY MOUTH EVERY DAY, Disp: 90 Tablet, Rfl: 3    tamsulosin (FLOMAX) 0.4 mg capsule, Take 0.4 mg by mouth daily. , Disp: , Rfl:     isosorbide mononitrate ER (IMDUR) 30 mg tablet, TAKE 1/2 TABLET BY MOUTH EVERY DAY, Disp: 45 Tablet, Rfl: 3    metoprolol succinate (TOPROL-XL) 25 mg XL tablet, TAKE 1 TABLET BY MOUTH EVERY DAY, Disp: 90 Tablet, Rfl: 1    furosemide (LASIX) 80 mg tablet, Take 40 mg by mouth daily. , Disp: , Rfl:     sodium bicarbonate 325 mg tablet, Take 1 Tablet by mouth two (2) times a day., Disp: 60 Tablet, Rfl: 0    amLODIPine (NORVASC) 10 mg tablet, Take 1 Tablet by mouth daily. (Patient taking differently: Take 5 mg by mouth daily.), Disp: 30 Tablet, Rfl: 1    albuterol (PROVENTIL HFA, VENTOLIN HFA, PROAIR HFA) 90 mcg/actuation inhaler, Take 1 Puff by inhalation every four (4) hours. , Disp: , Rfl:     ketoconazole (NIZORAL) 2 % shampoo, Apply 1 mL to affected area daily as needed for Itching., Disp: , Rfl:     nitroglycerin (NITROSTAT) 0.4 mg SL tablet, TAKE 1 TABLET BY SUBLINGUAL ROUTE EVERY 5 MINUTES AS NEEDED FOR CHEST PAIN., Disp: 1 Bottle, Rfl: 0    VITAMIN D3 2,000 unit cap capsule, Take 2,000 Units by mouth daily. , Disp: , Rfl: 2    GLUCOSAMINE HCL/CHONDR PETERSEN A NA (GLUCOSAMINE-CHONDROITIN) 750-600 mg Tab, Take 1 Tab by mouth daily. Brand: Move Free, Disp: , Rfl:     aspirin delayed-release 81 mg tablet, Take 81 mg by mouth nightly., Disp: , Rfl:     MULTIVITS W-FE,OTHER MIN (CENTRUM PO), Take 1 Tab by mouth daily. , Disp: , Rfl:         Allergies   Allergen Reactions    Bactrim [Sulfamethoxazole-Trimethoprim] Hives    Codeine Itching    Lisinopril (Bulk) Cough    Neurontin [Gabapentin] Other (comments)     drowsy    Zostavax [Zoster Vaccine Live (Pf)] Rash     See 9/10/13 visit    Penicillins Hives     Pt states he has taken Keflex before without problems      MRI Results (most recent):  Results from [place] encounter on 01/08/20    MRI BRAIN WO CONT    Narrative  INDICATION:  Visual disturbance    COMPARISON:  CT head 1/8/2020    TECHNIQUE:  MR imaging of the brain was performed with sagittal T1, axial T1,  T2, FLAIR, GRE, DWI/ADC;    FINDINGS:    The ventricles are midline without hydrocephalus. There is no acute intra or extra-axial fluid collection. Mild periventricular  and focal left frontal subcortical increased T2/flair signal abnormalities are  nonspecific but may represent changes of chronic small vessel ischemic disease. No evidence for acute infarction. The major intracranial vascular flow-voids are patent.     No abnormal signal in the mastoid air cells or visualized paranasal sinuses. Visualized soft tissues unremarkable. Impression  IMPRESSION:    No evidence for acute infarction    Mild periventricular and focal left frontal subcortical increased T2/flair  signal abnormalities are nonspecific but may represent changes of chronic small  vessel ischemic disease. Results from Hospital Encounter encounter on 01/08/20    MRI BRAIN WO CONT    Narrative  INDICATION:  Visual disturbance    COMPARISON:  CT head 1/8/2020    TECHNIQUE:  MR imaging of the brain was performed with sagittal T1, axial T1,  T2, FLAIR, GRE, DWI/ADC;    FINDINGS:    The ventricles are midline without hydrocephalus. There is no acute intra or extra-axial fluid collection. Mild periventricular  and focal left frontal subcortical increased T2/flair signal abnormalities are  nonspecific but may represent changes of chronic small vessel ischemic disease. No evidence for acute infarction. The major intracranial vascular flow-voids are patent. No abnormal signal in the mastoid air cells or visualized paranasal sinuses. Visualized soft tissues unremarkable. Impression  IMPRESSION:    No evidence for acute infarction    Mild periventricular and focal left frontal subcortical increased T2/flair  signal abnormalities are nonspecific but may represent changes of chronic small  vessel ischemic disease.     Review of Systems:  A comprehensive review of systems was negative except for: Constitutional: positive for fatigue and malaise  Ears, nose, mouth, throat, and face: positive for hearing loss and tinnitus  Musculoskeletal: positive for myalgias, arthralgias, stiff joints and back pain  Neurological: positive for paresthesia and Visual loss and endarterectomy  Behvioral/Psych: positive for depression   Vitals:    01/25/23 1254   BP: (!) 118/50   Pulse: 72   Resp: 18   Temp: 97.5 °F (36.4 °C)   SpO2: 97%   Weight: 244 lb 9.6 oz (110.9 kg)   Height: 5' 10\" (1.778 m) Objective:     I      NEUROLOGICAL EXAM:    Appearance: The patient is well developed, well nourished, provides a fair history and is in no acute distress. Mental Status: Oriented to time, place and person, and the president, cognitive function is slow but probably normal and speech is fluent and no aphasia or dysarthria. Mood and affect appropriate but slightly anxious and depressed. Cranial Nerves:   Intact visual fields. Fundi are benign, disc are flat, no lesions seen on funduscopy. CLAUDIA, EOM's full, no nystagmus, no ptosis. Facial sensation is normal. Corneal reflexes are not tested. Facial movement is symmetric. Hearing is abnormal bilaterally. Palate is midline with normal sternocleidomastoid and trapezius muscles are normal. Tongue is midline. Neck without meningismus or bruits  Temporal arteries are not tender or enlarged  TMJ areas are not tender on palpation   Motor:  4/5 strength in upper and lower proximal and distal muscles. Normal bulk and tone. No fasciculations. Rapid alternating movement is symmetric and intact bilaterally   Reflexes:   Deep tendon reflexes 1+/4 and symmetrical.  No babinski or clonus present   Sensory:   Abnormal to touch, pinprick and vibration and temperature in both feet to midcalf level. DSS is intact   Gait:  Normal gait for patient's age, though patient does move a little slowly due to his arthritis, and has a slight limp on his right leg. Tremor:   No tremor noted. Cerebellar:   Mildly abnormal cerebellar signs on Romberg and tandem testing and finger-nose-finger exam.   Neurovascular:  Normal heart sounds and regular rhythm, peripheral pulses decreased, and no carotid bruits. Assessment:       ICD-10-CM ICD-9-CM    1. Bilateral carotid artery stenosis  I65.23 433.10      433.30       2. Severe obesity (BMI 35.0-39. 9) with comorbidity ([address]  E66.01 278.01       3. Convulsions, unspecified convulsion type ([facility]  R56.9 780.39       4.  Idiopathic peripheral neuropathy  G60.9 356.9       5. Benign essential tremor  G25.0 333.1       6. Diabetic peripheral neuropathy associated with type 2 diabetes mellitus (HCC)  E11.42 250.60      357.2         Active Problems:    * No active hospital problems. *      Plan:     Patient with new problem of recent fall, mild concussion, back in June, had a normal CT of the head, doing well watching his bowels more, not had any recurrent falls or new neurological symptoms. He just had his carotid Doppler done by Dr. [facility] we saw him for his previous right carotid endarterectomy. Patient with new problem of jerks when he sleeping, that looks like periodic limb movements of sleep, but could be possibly restless leg syndrome, but I think it is more like the former, we did check an EEG July 2022 that was normal to see if there is any abnormal discharge because his movements can be pretty pronounced, and his family is concerned about it. They stopped the Requip and he is actually doing a lot better as far as her sleep, but still has the periodic limb movements of sleep  His restless leg syndrome does not seem to cause him to get up and walk around that much at nighttime. For his carotid stenosis and TIA in the past with amaurosis in his left eye, he is status post left carotid endarterectomy and gets a yearly Doppler and just had 1 this year, and has had no recurrent stenosis or need for surgical intervention and continues his aspirin therapy and high-dose statin, and controlling his blood pressure and by not smoking, and try to control his diabetes which his family said he could do better on, and we encouraged him to take his vitamins and vitamin D and stay mentally and physically active. Ant Wright He tried Lyrica and Neurontin and Cymbalta all in the past, and did not like the side effect of these medications and takes no other medicine for his neuropathy.   Patient did not appear to have any other active neurologic problem at this time.  We will check him again in 6 months time or earlier if need be. 28 -minute spent with patient, going over his records, reviewing his inpatient records, reviewing his MRI scan, reviewing his CTA, reviewing his duplex scan, I do agree that he has symptomatic left carotid stenosis as now corrected with endarterectomy and is doing well antiplatelet therapy without recurrent neurologic symptoms except for his neuropathy worse. He will call back if any problem, we will see him again in 12 months time or earlier as needed. Medications renewed for the patient. Signed By: Mariela Pickens MD     January 25, 2023       CC: Ernestine Beverly MD  FAX: [phone]          If you have questions, please do not hesitate to call me. I look forward to following your patient along with you.       Sincerely,    Mariela Pickens MD

## 2023-01-25 NOTE — PROGRESS NOTES
Consult  REFERRED BY:  Darius Carablalo MD    CHIEF COMPLAINT: Worsening neuropathy and carotid stenosis      Subjective:     Estrella Park is a 76 y.o. right-handed  male seen at the request of Dr. 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 He also is having more difficulty with a new problem of increasing difficulty with jerks in his legs, and on looking at a video, it almost looks more like periodic leg movements of sleep, and supposedly he is on a CPAP machine now, and finally getting his seen back after the recall 1 year ago, and hopefully that will help some in addition. 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 We encouraged him to get his CPAP machine restarted because that might help his sleep cycle some.  His vascular surgeon Dr. HARMONY Olson checks a Doppler every year and has had no recurrence of his stenosis and no more recurrent strokelike symptoms and takes a baby aspirin every day and high-dose statin of Lipitor 80 mg a day. Patient has a known history of idiopathic neuropathy in the past, that seems to be worsening as he is more numbness in his feet and more tight sensation in his ankles. He is wondering why it may be getting worse. He did not respond well to gabapentin or Lyrica or Cymbalta for treatment of the neuropathy in the past.  He does have some minor discomfort but no major pain. Patient does not have much weakness in his feet. He also has restless leg syndrome probably from the neuropathy, has to take Requip 2 of the .5 mg at night for his restless leg syndrome that seems to help him significantly. He has a borderline prediabetic state on the chart, and that may be the cause of his neuropathy. He also has a new problem of having had kidney cancer requiring a partial nephrectomy on his right kidney, and now has a kidney stone in his left kidney and may need surgery for it or possibly lithotripsy and has been decided in the future. He also has benign prostatic hypertrophy and is on Flomax for that followed by urology. He has degenerative arthritis and has to limp on his knees. He continues to take his multivitamin and vitamin D on a regular basis. We encouraged him this time to try to stay mentally and physically active to help protect his memory in addition also, and to control his main risk factors for vascular disease being high blood pressure, diabetes, cholesterol, and smoking which she is trying to do. He denies any recent headache, fever, meningismus, new focal weakness, new sensory loss, no gait issues, or any other active neurologic problem.     Past Medical History:   Diagnosis Date    Abnormal stress echo 5/12/2015    Anemia NEC 03/2013    Anxiety     Borderline diabetes     CAD (coronary artery disease) 2009    cath [facility]) revealed 20% RCA and 50% 2nd diagonal    Calculus of kidney     CKD (chronic kidney disease) stage 4, GFR 15-29 ml/min ([facility])     COPD, mild ([address]     Followed by pulmonary associates    DJD (degenerative joint disease)     Environmental allergies     Fatty liver     GERD (gastroesophageal reflux disease) 10/11/2009    Gout     Hypertension     MELODY on CPAP 10/11/2009    nasal pillows; pressure set at 10-11 -     Peripheral neuropathy 10/11/2009    bilateral feet (numbness)    Renal cell cancer, right ([address] 01/2021    RLS (restless legs syndrome) 10/11/2009    S/P coronary artery stent placement 05/12/2015    PCI./MALI to LCx, 8/2019 PCI/MALI Prox LAD (RCA 40-50% lesions)      Past Surgical History:   Procedure Laterality Date    HX BUNIONECTOMY  2019    HX CAROTID ENDARTERECTOMY Left 01/2020    Followed by Dr. Suzanne Curtis      S/P stent to left circumflex in May, 2015; s/p stent to LAD in August, 2019    [address] [facility] [id number]  2009, 7/17    per heart cath of 7/13/2017, discrete 40 % stenosis. in proximal LAD, discrete 40 % stenosis in proximal RCA, 30% stenosis in distal RCA    HX HERNIA REPAIR      [place]/umbilical    HX KNEE REPLACEMENT Left 07/23/2011    HX ORTHOPAEDIC      left shoulder manipulation    HX UROLOGICAL      basket extraction of kidney stones    IL COLONOSCOPY FLX DX W/COLLJ SPEC WHEN PFRMD  8/5/2010         IL COLSC FLX W/RMVL OF TUMOR POLYP LESION SNARE TQ  9/24/2014          Family History   Problem Relation Age of Onset    Heart Disease Father     Hypertension Father     Other Father         abdominal aneurysm     Dementia Mother     Alzheimer's Disease Mother     Heart Disease Brother     Heart Attack Brother     Heart Disease Maternal Grandmother     Heart Disease Paternal Grandmother     Heart Attack Paternal Grandmother     Heart Disease Paternal Grandfather     Heart Attack Paternal Grandfather     Elevated Lipids Son     Elevated Lipids Daughter     Cancer Neg Hx     Diabetes Neg Hx     Stroke Neg Hx       Social History     Tobacco Use    Smoking status: Former     Packs/day: 1.00     Years: 10.00     Pack years: 10.00 Types: Cigarettes     Quit date: 1968     Years since quittin.1    Smokeless tobacco: Never   Substance Use Topics    Alcohol use: No     Alcohol/week: 0.0 standard drinks         Current Outpatient Medications:     pantoprazole (PROTONIX) 40 mg tablet, TAKE 1 TABLET BY MOUTH EVERY DAY, Disp: 90 Tablet, Rfl: 1    citalopram (CELEXA) 40 mg tablet, TAKE 1 TABLET BY MOUTH EVERY DAY, Disp: 90 Tablet, Rfl: 3    fluticasone propionate (FLONASE) 50 mcg/actuation nasal spray, 2 Sprays by Both Nostrils route daily. , Disp: , Rfl:     Trelegy Ellipta 100-62.5-25 mcg inhaler, TAKE 1 PUFF BY MOUTH EVERY DAY, Disp: , Rfl:     colchicine 0.6 mg tablet, Take 0.6 mg by mouth two (2) times daily as needed for Gout or Pain., Disp: , Rfl:     loratadine (CLARITIN) 10 mg tablet, Take 10 mg by mouth daily. , Disp: , Rfl:     atorvastatin (LIPITOR) 80 mg tablet, TAKE 1 TABLET BY MOUTH EVERY DAY, Disp: 90 Tablet, Rfl: 3    tamsulosin (FLOMAX) 0.4 mg capsule, Take 0.4 mg by mouth daily. , Disp: , Rfl:     isosorbide mononitrate ER (IMDUR) 30 mg tablet, TAKE 1/2 TABLET BY MOUTH EVERY DAY, Disp: 45 Tablet, Rfl: 3    metoprolol succinate (TOPROL-XL) 25 mg XL tablet, TAKE 1 TABLET BY MOUTH EVERY DAY, Disp: 90 Tablet, Rfl: 1    furosemide (LASIX) 80 mg tablet, Take 40 mg by mouth daily. , Disp: , Rfl:     sodium bicarbonate 325 mg tablet, Take 1 Tablet by mouth two (2) times a day., Disp: 60 Tablet, Rfl: 0    amLODIPine (NORVASC) 10 mg tablet, Take 1 Tablet by mouth daily. (Patient taking differently: Take 5 mg by mouth daily.), Disp: 30 Tablet, Rfl: 1    albuterol (PROVENTIL HFA, VENTOLIN HFA, PROAIR HFA) 90 mcg/actuation inhaler, Take 1 Puff by inhalation every four (4) hours. , Disp: , Rfl:     ketoconazole (NIZORAL) 2 % shampoo, Apply 1 mL to affected area daily as needed for Itching., Disp: , Rfl:     nitroglycerin (NITROSTAT) 0.4 mg SL tablet, TAKE 1 TABLET BY SUBLINGUAL ROUTE EVERY 5 MINUTES AS NEEDED FOR CHEST PAIN., Disp: 1 Bottle, Rfl: 0    VITAMIN D3 2,000 unit cap capsule, Take 2,000 Units by mouth daily. , Disp: , Rfl: 2    GLUCOSAMINE HCL/CHONDR PETERSEN A NA (GLUCOSAMINE-CHONDROITIN) 750-600 mg Tab, Take 1 Tab by mouth daily. Brand: Move Free, Disp: , Rfl:     aspirin delayed-release 81 mg tablet, Take 81 mg by mouth nightly., Disp: , Rfl:     MULTIVITS W-FE,OTHER MIN (CENTRUM PO), Take 1 Tab by mouth daily. , Disp: , Rfl:         Allergies   Allergen Reactions    Bactrim [Sulfamethoxazole-Trimethoprim] Hives    Codeine Itching    Lisinopril (Bulk) Cough    Neurontin [Gabapentin] Other (comments)     drowsy    Zostavax [Zoster Vaccine Live (Pf)] Rash     See 9/10/13 visit    Penicillins Hives     Pt states he has taken Keflex before without problems      MRI Results (most recent):  Results from Hospital Encounter encounter on 01/08/20    MRI BRAIN WO CONT    Narrative  INDICATION:  Visual disturbance    COMPARISON:  CT head 1/8/2020    TECHNIQUE:  MR imaging of the brain was performed with sagittal T1, axial T1,  T2, FLAIR, GRE, DWI/ADC;    FINDINGS:    The ventricles are midline without hydrocephalus. There is no acute intra or extra-axial fluid collection. Mild periventricular  and focal left frontal subcortical increased T2/flair signal abnormalities are  nonspecific but may represent changes of chronic small vessel ischemic disease. No evidence for acute infarction. The major intracranial vascular flow-voids are patent. No abnormal signal in the mastoid air cells or visualized paranasal sinuses. Visualized soft tissues unremarkable. Impression  IMPRESSION:    No evidence for acute infarction    Mild periventricular and focal left frontal subcortical increased T2/flair  signal abnormalities are nonspecific but may represent changes of chronic small  vessel ischemic disease.       Results from [place] encounter on 01/08/20    MRI BRAIN WO CONT    Narrative  INDICATION:  Visual disturbance    COMPARISON:  CT head 1/8/2020    TECHNIQUE:  MR imaging of the brain was performed with sagittal T1, axial T1,  T2, FLAIR, GRE, DWI/ADC;    FINDINGS:    The ventricles are midline without hydrocephalus. There is no acute intra or extra-axial fluid collection. Mild periventricular  and focal left frontal subcortical increased T2/flair signal abnormalities are  nonspecific but may represent changes of chronic small vessel ischemic disease. No evidence for acute infarction. The major intracranial vascular flow-voids are patent. No abnormal signal in the mastoid air cells or visualized paranasal sinuses. Visualized soft tissues unremarkable. Impression  IMPRESSION:    No evidence for acute infarction    Mild periventricular and focal left frontal subcortical increased T2/flair  signal abnormalities are nonspecific but may represent changes of chronic small  vessel ischemic disease. Review of Systems:  A comprehensive review of systems was negative except for: Constitutional: positive for fatigue and malaise  Ears, nose, mouth, throat, and face: positive for hearing loss and tinnitus  Musculoskeletal: positive for myalgias, arthralgias, stiff joints and back pain  Neurological: positive for paresthesia and Visual loss and endarterectomy  Behvioral/Psych: positive for depression   Vitals:    01/25/23 1254   BP: (!) 118/50   Pulse: 72   Resp: 18   Temp: 97.5 °F (36.4 °C)   SpO2: 97%   Weight: 244 lb 9.6 oz (110.9 kg)   Height: 5' 10\" (1.778 m)     Objective:     I      NEUROLOGICAL EXAM:    Appearance: The patient is well developed, well nourished, provides a fair history and is in no acute distress. Mental Status: Oriented to time, place and person, and the president, cognitive function is slow but probably normal and speech is fluent and no aphasia or dysarthria. Mood and affect appropriate but slightly anxious and depressed. Cranial Nerves:   Intact visual fields.  Fundi are benign, disc are flat, no lesions seen on funduscopy. CLAUDIA, EOM's full, no nystagmus, no ptosis. Facial sensation is normal. Corneal reflexes are not tested. Facial movement is symmetric. Hearing is abnormal bilaterally. Palate is midline with normal sternocleidomastoid and trapezius muscles are normal. Tongue is midline. Neck without meningismus or bruits  Temporal arteries are not tender or enlarged  TMJ areas are not tender on palpation   Motor:  4/5 strength in upper and lower proximal and distal muscles. Normal bulk and tone. No fasciculations. Rapid alternating movement is symmetric and intact bilaterally   Reflexes:   Deep tendon reflexes 1+/4 and symmetrical.  No babinski or clonus present   Sensory:   Abnormal to touch, pinprick and vibration and temperature in both feet to midcalf level. DSS is intact   Gait:  Normal gait for patient's age, though patient does move a little slowly due to his arthritis, and has a slight limp on his right leg. Tremor:   No tremor noted. Cerebellar:   Mildly abnormal cerebellar signs on Romberg and tandem testing and finger-nose-finger exam.   Neurovascular:  Normal heart sounds and regular rhythm, peripheral pulses decreased, and no carotid bruits. Assessment:       ICD-10-CM ICD-9-CM    1. Bilateral carotid artery stenosis  I65.23 433.10      433.30       2. Severe obesity (BMI 35.0-39. 9) with comorbidity ([facility]  E66.01 278.01       3. Convulsions, unspecified convulsion type ([facility]  R56.9 780.39       4. Idiopathic peripheral neuropathy  G60.9 356.9       5. Benign essential tremor  G25.0 333.1       6. Diabetic peripheral neuropathy associated with type 2 diabetes mellitus (HCC)  E11.42 250.60      357.2         Active Problems:    * No active hospital problems. *      Plan:     Patient with new problem of recent fall, mild concussion, back in June, had a normal CT of the head, doing well watching his bowels more, not had any recurrent falls or new neurological symptoms.   He just had his carotid Doppler done by Dr. Melissa Gaona we saw him for his previous right carotid endarterectomy. Patient with new problem of jerks when he sleeping, that looks like periodic limb movements of sleep, but could be possibly restless leg syndrome, but I think it is more like the former, we did check an EEG July 2022 that was normal to see if there is any abnormal discharge because his movements can be pretty pronounced, and his family is concerned about it. They stopped the Requip and he is actually doing a lot better as far as her sleep, but still has the periodic limb movements of sleep  His restless leg syndrome does not seem to cause him to get up and walk around that much at nighttime. For his carotid stenosis and TIA in the past with amaurosis in his left eye, he is status post left carotid endarterectomy and gets a yearly Doppler and just had 1 this year, and has had no recurrent stenosis or need for surgical intervention and continues his aspirin therapy and high-dose statin, and controlling his blood pressure and by not smoking, and try to control his diabetes which his family said he could do better on, and we encouraged him to take his vitamins and vitamin D and stay mentally and physically active. Noreen Neal He tried Lyrica and Neurontin and Cymbalta all in the past, and did not like the side effect of these medications and takes no other medicine for his neuropathy. Patient did not appear to have any other active neurologic problem at this time. We will check him again in 6 months time or earlier if need be. 28 -minute spent with patient, going over his records, reviewing his inpatient records, reviewing his MRI scan, reviewing his CTA, reviewing his duplex scan, I do agree that he has symptomatic left carotid stenosis as now corrected with endarterectomy and is doing well antiplatelet therapy without recurrent neurologic symptoms except for his neuropathy worse.   He will call back if any problem, we will see him again in 12 months time or earlier as needed. Medications renewed for the patient. Signed By: Chacha Perales MD     January 25, 2023       CC:  Fercho Taveras MD  FAX: [phone]

## 2023-01-31 ENCOUNTER — VIRTUAL VISIT (OUTPATIENT)
Dept: INTERNAL MEDICINE CLINIC | Age: 78
End: 2023-01-31
Payer: MEDICARE

## 2023-01-31 ENCOUNTER — OFFICE VISIT (OUTPATIENT)
Dept: INTERNAL MEDICINE CLINIC | Age: 78
End: 2023-01-31
Payer: MEDICARE

## 2023-01-31 DIAGNOSIS — M54.50 ACUTE LOW BACK PAIN WITHOUT SCIATICA, UNSPECIFIED BACK PAIN LATERALITY: Primary | ICD-10-CM

## 2023-01-31 DIAGNOSIS — R35.0 URINARY FREQUENCY: ICD-10-CM

## 2023-01-31 DIAGNOSIS — M54.50 ACUTE BILATERAL LOW BACK PAIN WITHOUT SCIATICA: Primary | ICD-10-CM

## 2023-01-31 PROCEDURE — G9717 DOC PT DX DEP/BP F/U NT REQ: HCPCS | Performed by: FAMILY MEDICINE

## 2023-01-31 PROCEDURE — 99213 OFFICE O/P EST LOW 20 MIN: CPT | Performed by: FAMILY MEDICINE

## 2023-01-31 PROCEDURE — G8536 NO DOC ELDER MAL SCRN: HCPCS | Performed by: FAMILY MEDICINE

## 2023-01-31 PROCEDURE — G8417 CALC BMI ABV UP PARAM F/U: HCPCS | Performed by: FAMILY MEDICINE

## 2023-01-31 PROCEDURE — G0463 HOSPITAL OUTPT CLINIC VISIT: HCPCS | Performed by: FAMILY MEDICINE

## 2023-01-31 PROCEDURE — 1101F PT FALLS ASSESS-DOCD LE1/YR: CPT | Performed by: FAMILY MEDICINE

## 2023-01-31 PROCEDURE — G8428 CUR MEDS NOT DOCUMENT: HCPCS | Performed by: FAMILY MEDICINE

## 2023-01-31 RX ORDER — FLUTICASONE FUROATE, UMECLIDINIUM BROMIDE AND VILANTEROL TRIFENATATE 100; 62.5; 25 UG/1; UG/1; UG/1
POWDER RESPIRATORY (INHALATION)
Qty: 60 EACH | Refills: 3 | Status: SHIPPED | OUTPATIENT
Start: 2023-01-31

## 2023-01-31 NOTE — PROGRESS NOTES
Herbert Flores. is a 76 y.o. male who presents with daughter. Reports pain in mid back. Pain is worse with movement. Using heat and tylenol, some relief. Has lidoderm, has not tried. No urinary symptoms. Sleeping more. Per daughter, some confusion, comes and goes. This is an established visit conducted via telemedicine with video. The patient has been instructed that this meets HIPAA criteria and acknowledges and agrees to this method of visitation. Pursuant to the emergency declaration under the [facility], [facility] waiver authority and the [employer] Act, this Virtual Visit was conducted, with patient's consent, to reduce the patient's risk of exposure to COVID-19 and provide continuity of care for an established patient. Services were provided through a video synchronous discussion virtually to substitute for in-person clinic visit.         Past Medical History:   Diagnosis Date    Abnormal stress echo 5/12/2015    Anemia NEC 03/2013    Anxiety     Borderline diabetes     CAD (coronary artery disease) 2009    cath [facility]) revealed 20% RCA and 50% 2nd diagonal    Calculus of kidney     CKD (chronic kidney disease) stage 4, GFR 15-29 ml/min (HCC)     COPD, mild (HCC)     Followed by pulmonary associates    DJD (degenerative joint disease)     Environmental allergies     Fatty liver     GERD (gastroesophageal reflux disease) 10/11/2009    Gout     Hypertension     MELODY on CPAP 10/11/2009    nasal pillows; pressure set at 10-11 -     Peripheral neuropathy 10/11/2009    bilateral feet (numbness)    Renal cell cancer, right ([address] 01/2021    RLS (restless legs syndrome) 10/11/2009    S/P coronary artery stent placement 05/12/2015    PCI./AMLI to LCx, 8/2019 PCI/MALI Prox LAD (RCA 40-50% lesions)       Family History   Problem Relation Age of Onset    Heart Disease Father     Hypertension Father     Other Father abdominal aneurysm     Dementia Mother     Alzheimer's Disease Mother     Heart Disease Brother     Heart Attack Brother     Heart Disease Maternal Grandmother     Heart Disease Paternal Grandmother     Heart Attack Paternal Grandmother     Heart Disease Paternal Grandfather     Heart Attack Paternal Grandfather     Elevated Lipids Son     Elevated Lipids Daughter     Cancer Neg Hx     Diabetes Neg Hx     Stroke Neg Hx        Social History     Socioeconomic History    Marital status:      Spouse name: Marie Salazar    Number of children: 2    Years of education: graduated then 4 year apprenticship    Highest education level: Associate degree: academic program   Occupational History    Not on file   Tobacco Use    Smoking status: Former     Packs/day: 1.00     Years: 10.00     Pack years: 10.00     Types: Cigarettes     Quit date: 1968     Years since quittin.1    Smokeless tobacco: Never   Vaping Use    Vaping Use: Never used   Substance and Sexual Activity    Alcohol use: No     Alcohol/week: 0.0 standard drinks    Drug use: No    Sexual activity: Yes     Partners: Female     Birth control/protection: None   Other Topics Concern     Service Not Asked    Blood Transfusions Not Asked    Caffeine Concern Not Asked    Occupational Exposure Not Asked    Hobby Hazards Not Asked    Sleep Concern Not Asked    Stress Concern Not Asked    Weight Concern Not Asked    Special Diet Not Asked    Back Care Not Asked    Exercise Not Asked    Bike Helmet Not Asked    Seat Belt Not Asked    Self-Exams Not Asked   Social History Narrative    Not on file     Social Determinants of Health     Financial Resource Strain: Low Risk     Difficulty of Paying Living Expenses: Not hard at all   Food Insecurity: No Food Insecurity    Worried About Running Out of Food in the Last Year: Never true    Ran Out of Food in the Last Year: Never true   Transportation Needs: Not on file   Physical Activity: Not on file   Stress: Not on file   Social Connections: Not on file   Intimate Partner Violence: Not on file   Housing Stability: Not on file       Current Outpatient Medications on File Prior to Visit   Medication Sig Dispense Refill    pantoprazole (PROTONIX) 40 mg tablet TAKE 1 TABLET BY MOUTH EVERY DAY 90 Tablet 1    citalopram (CELEXA) 40 mg tablet TAKE 1 TABLET BY MOUTH EVERY DAY 90 Tablet 3    colchicine 0.6 mg tablet Take 0.6 mg by mouth two (2) times daily as needed for Gout or Pain.      loratadine (CLARITIN) 10 mg tablet Take 10 mg by mouth daily. [DISCONTINUED] Trelegy Ellipta 100-62.5-25 mcg inhaler TAKE 1 PUFF BY MOUTH EVERY DAY      atorvastatin (LIPITOR) 80 mg tablet TAKE 1 TABLET BY MOUTH EVERY DAY 90 Tablet 3    tamsulosin (FLOMAX) 0.4 mg capsule Take 0.4 mg by mouth daily. isosorbide mononitrate ER (IMDUR) 30 mg tablet TAKE 1/2 TABLET BY MOUTH EVERY DAY 45 Tablet 3    metoprolol succinate (TOPROL-XL) 25 mg XL tablet TAKE 1 TABLET BY MOUTH EVERY DAY 90 Tablet 1    furosemide (LASIX) 80 mg tablet Take 40 mg by mouth daily. sodium bicarbonate 325 mg tablet Take 1 Tablet by mouth two (2) times a day. 60 Tablet 0    amLODIPine (NORVASC) 10 mg tablet Take 1 Tablet by mouth daily. (Patient taking differently: Take 5 mg by mouth daily.) 30 Tablet 1    albuterol (PROVENTIL HFA, VENTOLIN HFA, PROAIR HFA) 90 mcg/actuation inhaler Take 1 Puff by inhalation every four (4) hours. ketoconazole (NIZORAL) 2 % shampoo Apply 1 mL to affected area daily as needed for Itching. nitroglycerin (NITROSTAT) 0.4 mg SL tablet TAKE 1 TABLET BY SUBLINGUAL ROUTE EVERY 5 MINUTES AS NEEDED FOR CHEST PAIN. 1 Bottle 0    VITAMIN D3 2,000 unit cap capsule Take 2,000 Units by mouth daily. 2    GLUCOSAMINE HCL/CHONDR PETERSEN A NA (GLUCOSAMINE-CHONDROITIN) 750-600 mg Tab Take 1 Tab by mouth daily. Brand: Move Free      aspirin delayed-release 81 mg tablet Take 81 mg by mouth nightly.       MULTIVITS W-FE,OTHER MIN (CENTRUM PO) Take 1 Tab by mouth daily. fluticasone propionate (FLONASE) 50 mcg/actuation nasal spray 2 Sprays by Both Nostrils route daily. (Patient not taking: Reported on 1/31/2023)       No current facility-administered medications on file prior to visit. Review of Systems  Pertinent items are noted in HPI. Objective:     Gen: well appearing male  HEENT: normal conjunctiva, no audible congestion, patient does not see oral erythema, has MMM  Neck: patient does not feel enlarged or tender LAD or masses  Resp: normal respiratory effort, no audible wheezing. CV: patient does not feel palpitations or heart irregularity  Abd: patient does not feel abdominal tenderness or mass, patient does not notice distension  Extrem: patient does not see swelling in ankles or joints. Neuro: Alert and oriented, able to answer questions without difficulty, normal gait. Back- reports lower back pain, worse with movement. Assessment/Plan:       ICD-10-CM ICD-9-CM    1. Acute low back pain without sciatica, unspecified back pain laterality  M54.50 724.2          Heat, tylenol, lidoderm patch, increase activity. Daughter concerned about UTI, can go to office for urine test    This was a telemedicine visit with video.         Jacob Patel MD

## 2023-01-31 NOTE — TELEPHONE ENCOUNTER
PCP: Lamont Jordan MD    Last appt: 10/17/2022  Future Appointments   Date Time Provider Seth Skelton   1/31/2023  9:45 AM Lamont Jordan MD [facility] BS AMB   4/19/2023  1:30 PM Lamont Jordan MD [facility] BS AMB   7/21/2023  9:20 AM Evangelina Gomez NP [address] BS AMB   11/28/2023  1:40 PM MD DEANNA Duran BS AMB       Requested Prescriptions     Pending Prescriptions Disp Refills    Trelegy Ellipta 100-62.5-25 mcg inhaler 60 Each

## 2023-01-31 NOTE — PROGRESS NOTES
Razia Hamilton. is a 76 y.o. male who presents with daughter. Daughter is concerned the patient may have a urinary tract infection. Patient has no urinary symptoms. He reports low back pain sometimes on the right and sometimes on the left. He states it feels like there is a \"knot\". Pain is worse with movement. He has been using heat and taking Tylenol with some relief. The patient sits a lot and has been inactive. The daughter reports he has had episodic confusion. Patient is able to answer questions today without difficulty. This is an established visit conducted via telemedicine with video. The patient has been instructed that this meets HIPAA criteria and acknowledges and agrees to this method of visitation. Pursuant to the emergency declaration under the [facility], [facility] waiver authority and the [employer] Act, this Virtual Visit was conducted, with patient's consent, to reduce the patient's risk of exposure to COVID-19 and provide continuity of care for an established patient. Services were provided through a video synchronous discussion virtually to substitute for in-person clinic visit.         Past Medical History:   Diagnosis Date    Abnormal stress echo 5/12/2015    Anemia NEC 03/2013    Anxiety     Borderline diabetes     CAD (coronary artery disease) 2009    cath [facility]) revealed 20% RCA and 50% 2nd diagonal    Calculus of kidney     CKD (chronic kidney disease) stage 4, GFR 15-29 ml/min (HCC)     COPD, mild (HCC)     Followed by pulmonary associates    DJD (degenerative joint disease)     Environmental allergies     Fatty liver     GERD (gastroesophageal reflux disease) 10/11/2009    Gout     Hypertension     MELODY on CPAP 10/11/2009    nasal pillows; pressure set at 10-11 -     Peripheral neuropathy 10/11/2009    bilateral feet (numbness)    Renal cell cancer, right ([facility] 01/2021 RLS (restless legs syndrome) 10/11/2009    S/P coronary artery stent placement 2015    PCI./MALI to LCx, 2019 PCI/MALI Prox LAD (RCA 40-50% lesions)       Family History   Problem Relation Age of Onset    Heart Disease Father     Hypertension Father     Other Father         abdominal aneurysm     Dementia Mother     Alzheimer's Disease Mother     Heart Disease Brother     Heart Attack Brother     Heart Disease Maternal Grandmother     Heart Disease Paternal Grandmother     Heart Attack Paternal Grandmother     Heart Disease Paternal Grandfather     Heart Attack Paternal Grandfather     Elevated Lipids Son     Elevated Lipids Daughter     Cancer Neg Hx     Diabetes Neg Hx     Stroke Neg Hx        Social History     Socioeconomic History    Marital status:      Spouse name: Henry Whitt    Number of children: 2    Years of education: graduated then 4 year apprenticship    Highest education level: Associate degree: academic program   Occupational History    Not on file   Tobacco Use    Smoking status: Former     Packs/day: 1.00     Years: 10.00     Pack years: 10.00     Types: Cigarettes     Quit date: 1968     Years since quittin.1    Smokeless tobacco: Never   Vaping Use    Vaping Use: Never used   Substance and Sexual Activity    Alcohol use: No     Alcohol/week: 0.0 standard drinks    Drug use: No    Sexual activity: Yes     Partners: Female     Birth control/protection: None   Other Topics Concern     Service Not Asked    Blood Transfusions Not Asked    Caffeine Concern Not Asked    Occupational Exposure Not Asked    Hobby Hazards Not Asked    Sleep Concern Not Asked    Stress Concern Not Asked    Weight Concern Not Asked    Special Diet Not Asked    Back Care Not Asked    Exercise Not Asked    Bike Helmet Not Asked    Seat Belt Not Asked    Self-Exams Not Asked   Social History Narrative    Not on file     Social Determinants of Health     Financial Resource Strain: Low Risk Difficulty of Paying Living Expenses: Not hard at all   Food Insecurity: No Food Insecurity    Worried About Running Out of Food in the Last Year: Never true    Ran Out of Food in the Last Year: Never true   Transportation Needs: Not on file   Physical Activity: Not on file   Stress: Not on file   Social Connections: Not on file   Intimate Partner Violence: Not on file   Housing Stability: Not on file       Current Outpatient Medications on File Prior to Visit   Medication Sig Dispense Refill    Trelegy Ellipta 100-62.5-25 mcg inhaler TAKE 1 PUFF BY MOUTH EVERY DAY 60 Each 3    pantoprazole (PROTONIX) 40 mg tablet TAKE 1 TABLET BY MOUTH EVERY DAY 90 Tablet 1    citalopram (CELEXA) 40 mg tablet TAKE 1 TABLET BY MOUTH EVERY DAY 90 Tablet 3    fluticasone propionate (FLONASE) 50 mcg/actuation nasal spray 2 Sprays by Both Nostrils route daily. (Patient not taking: Reported on 1/31/2023)      colchicine 0.6 mg tablet Take 0.6 mg by mouth two (2) times daily as needed for Gout or Pain.      loratadine (CLARITIN) 10 mg tablet Take 10 mg by mouth daily. [DISCONTINUED] Trelegy Ellipta 100-62.5-25 mcg inhaler TAKE 1 PUFF BY MOUTH EVERY DAY      atorvastatin (LIPITOR) 80 mg tablet TAKE 1 TABLET BY MOUTH EVERY DAY 90 Tablet 3    tamsulosin (FLOMAX) 0.4 mg capsule Take 0.4 mg by mouth daily. isosorbide mononitrate ER (IMDUR) 30 mg tablet TAKE 1/2 TABLET BY MOUTH EVERY DAY 45 Tablet 3    metoprolol succinate (TOPROL-XL) 25 mg XL tablet TAKE 1 TABLET BY MOUTH EVERY DAY 90 Tablet 1    furosemide (LASIX) 80 mg tablet Take 40 mg by mouth daily. sodium bicarbonate 325 mg tablet Take 1 Tablet by mouth two (2) times a day. 60 Tablet 0    amLODIPine (NORVASC) 10 mg tablet Take 1 Tablet by mouth daily. (Patient taking differently: Take 5 mg by mouth daily.) 30 Tablet 1    albuterol (PROVENTIL HFA, VENTOLIN HFA, PROAIR HFA) 90 mcg/actuation inhaler Take 1 Puff by inhalation every four (4) hours.       ketoconazole (NIZORAL) 2 % shampoo Apply 1 mL to affected area daily as needed for Itching. nitroglycerin (NITROSTAT) 0.4 mg SL tablet TAKE 1 TABLET BY SUBLINGUAL ROUTE EVERY 5 MINUTES AS NEEDED FOR CHEST PAIN. 1 Bottle 0    VITAMIN D3 2,000 unit cap capsule Take 2,000 Units by mouth daily. 2    GLUCOSAMINE HCL/CHONDR PETERSEN A NA (GLUCOSAMINE-CHONDROITIN) 750-600 mg Tab Take 1 Tab by mouth daily. Brand: Move Free      aspirin delayed-release 81 mg tablet Take 81 mg by mouth nightly. MULTIVITS W-FE,OTHER MIN (CENTRUM PO) Take 1 Tab by mouth daily. No current facility-administered medications on file prior to visit. Review of Systems  Pertinent items are noted in HPI. Objective:     Gen: well appearing male  HEENT: normal conjunctiva, no audible congestion, patient does not see oral erythema, has MMM  Neck: patient does not feel enlarged or tender LAD or masses  Resp: normal respiratory effort, no audible wheezing. CV: patient does not feel palpitations or heart irregularity  Abd: patient does not feel abdominal tenderness or mass, patient does not notice distension  Extrem: patient does not see swelling in ankles or joints. Neuro: Alert and oriented, able to answer questions without difficulty, able to move all extremities and walk normally  Back- patient reports pain in the lower back especially with movement        Assessment/Plan:       ICD-10-CM ICD-9-CM    1. Acute bilateral low back pain without sciatica  M54.50 724.2      338.19       Use heat, Tylenol, lidocaine patches. Increase activity. Patient is to come in the office to test urine today. Has a history of prior UTIs. This was a telemedicine visit with video.         Suma Goel MD

## 2023-01-31 NOTE — PROGRESS NOTES
1. \"Have you been to the ER, urgent care clinic since your last visit? Hospitalized since your last visit? \" No    2. \"Have you seen or consulted any other health care providers outside of the [address] [place] [address] since your last visit? \" No     3. For patients aged 39-70: Has the patient had a colonoscopy / FIT/ Cologuard? Yes - Care Gap present.  Most recent result on file

## 2023-02-02 LAB
BACTERIA SPEC CULT: NORMAL
SERVICE CMNT-IMP: NORMAL

## 2023-02-03 ENCOUNTER — PATIENT MESSAGE (OUTPATIENT)
Dept: INTERNAL MEDICINE CLINIC | Age: 78
End: 2023-02-03

## 2023-02-14 RX ORDER — ATORVASTATIN CALCIUM 80 MG/1
TABLET, FILM COATED ORAL
Qty: 90 TABLET | Refills: 3 | Status: SHIPPED | OUTPATIENT
Start: 2023-02-14

## 2023-02-15 NOTE — PROGRESS NOTES
Bedside and Verbal shift change report given to Anel RN (oncoming nurse) by Isael Bowser RN (offgoing nurse). Report included the following information SBAR, ED Summary and Recent Results.     Zone Phone:   [phone]        Significant changes during shift:  7.34 second burst of V-tach with dizziness and c/o continued headache, no apparent distress noted, Hospitalist notified new orders to check magnesium levels in the a.m. and continue to monitor.  Tylenol administered for headache x2 this shift.         Patient Information     Brooke Standing  66 y.o.  1/8/2020  9:19 PM by Lazaro Kevin MD. Bulmaro Joseph Jr was admitted from Home     Problem List             Patient Active Problem List     Diagnosis Date Noted    Symptomatic carotid artery stenosis, bilateral 01/08/2020    Acute CVA (cerebrovascular accident) ([address] 01/08/2020    Pure hypercholesterolemia 08/30/2019    Right sided sciatica 08/30/2019    Stable angina ([address] 07/31/2019    History of kidney stones 07/12/2018    Chest pain 07/07/2017    Coronary artery disease due to lipid rich plaque 04/27/2016    Coronary artery disease involving native coronary artery of native heart without angina pectoris 03/16/2016    S/P coronary artery stent placement 05/12/2015    Benign essential tremor 01/22/2014    Hypertensive kidney disease with chronic kidney disease stage III ([address] 11/22/2013    Iron deficiency 05/23/2013    Obesity 01/30/2013    Gout 01/30/2013    Prediabetes 01/07/2012    Chronic kidney disease, stage III (moderate) ([address] 10/11/2009    Mixed hyperlipidemia 10/11/2009    Obstructive sleep apnea 10/11/2009    RLS (restless legs syndrome) 10/11/2009    Peripheral neuropathy 10/11/2009    DJD (degenerative joint disease), multiple sites 10/11/2009    Essential hypertension 10/11/2009    Allergic rhinitis 10/11/2009    GERD (gastroesophageal reflux disease) 10/11/2009    ED (erectile dysfunction) 10/11/2009    Anxiety associated with depression 10/11/2009              Past Medical History:   Diagnosis Date    Abnormal stress echo 5/12/2015    Anemia NEC 03/2013     Last 2-3 months; finished taking iron supplement    Anxiety      CAD (coronary artery disease) 2009     [facility]) revealed 20%RCA and 50%2nd diagonal    Calculus of kidney      Chronic kidney disease       sees nephrologist every 6 months    Chronic kidney disease, stage III (moderate) ([facility]) 10/11/2009     30% kidney function    Diabetes ([address]       Pre-diabetic    DJD (degenerative joint disease)      GERD (gastroesophageal reflux disease) 10/11/2009     controlled with medication    Gout      Hypertension      Liver disease       fatty liver    Obesity      Obstructive sleep apnea (adult) (pediatric) 10/11/2009     nasal pillows; pressure set at 10-11 - uses cpap    Peripheral neuropathy 10/11/2009     bilateral feet (numbness)    Prediabetes      RLS (restless legs syndrome) 10/11/2009    S/P coronary artery stent placement 5/12/2015     5/11/15 PCI./MALI to LCx            Core Measures:     CVA: Yes Yes     Activity Status:     OOB to Chair Yes  Ambulated this shift Yes   Bed Rest No        DVT prophylaxis:     DVT prophylaxis Med- Yes  DVT prophylaxis SCD or PHONG- No      Wounds: (If Applicable)     Wounds- No     Patient Safety:     Falls Score Total Score: 2  Safety Level_______  Bed Alarm On? Yes  Sitter? No     Plan for upcoming shift:  safety, neuro checks      Discharge Plan: No TBD     Active Consults:  IP CONSULT TO NEUROLOGY  IP CONSULT TO VASCULAR SURGERY    minimum assist (75% patients effort)

## 2023-04-18 PROBLEM — C64.1 RENAL CELL CARCINOMA OF RIGHT KIDNEY (HCC): Status: ACTIVE | Noted: 2023-04-18

## 2023-04-18 NOTE — PROGRESS NOTES
Jolanta Jc. is a 76 y.o. male who presents for follow up. Daughter present. Seen Jan 31 with lower back pain. Persistent, not worsening. Taking tylenol, 2 at bedtime. Not using heat. Prior PT. Not very active at home. Pain at location of shingles. Has a wound on left buttocks. Diabetic. HbA1C 5.9%. Trying to follow healthy diet. Has neuropathy, no neuropathic pain. Treated for HLP. LDL 68. On statin. No myalgias. Treated for HTN. BP controlled. Sees Dr Edgard Palmer, cardiology. no CP, some SMITH. History of CKD. Followed by Dr Delmer Cisneros, nephrology. every 3 months.  hgb 10.4 and creatinine 4.7 in October. every 3 months. Saw pulmonary, for COPD, some SMITH. On trelegy. Followed by Dr Larissa Quintana, urology. On flomax. urination better. Up 2 times at night, BPH. Prior right partial nephrectomy in April 2021. Treated for anxiety, on celexa. Up to date on eye exam. Jan 25.             Past Medical History:   Diagnosis Date    Abnormal stress echo 5/12/2015    Anemia NEC 03/2013    Anxiety     Borderline diabetes     CAD (coronary artery disease) 2009    cath [facility]) revealed 20% RCA and 50% 2nd diagonal    Calculus of kidney     CKD (chronic kidney disease) stage 4, GFR 15-29 ml/min (HCC)     COPD, mild (HCC)     Followed by pulmonary associates    DJD (degenerative joint disease)     Environmental allergies     Fatty liver     GERD (gastroesophageal reflux disease) 10/11/2009    Gout     Hypertension     MELODY on CPAP 10/11/2009    nasal pillows; pressure set at 10-11 -     Peripheral neuropathy 10/11/2009    bilateral feet (numbness)    Renal cell cancer, right ([facility] 01/2021    RLS (restless legs syndrome) 10/11/2009    S/P coronary artery stent placement 05/12/2015    PCI./MALI to LCx, 8/2019 PCI/MALI Prox LAD (RCA 40-50% lesions)       Family History   Problem Relation Age of Onset    Heart Disease Father     Hypertension Father     Other Father abdominal aneurysm     Dementia Mother     Alzheimer's Disease Mother     Heart Disease Brother     Heart Attack Brother     Heart Disease Maternal Grandmother     Heart Disease Paternal Grandmother     Heart Attack Paternal Grandmother     Heart Disease Paternal Grandfather     Heart Attack Paternal Grandfather     Elevated Lipids Son     Elevated Lipids Daughter     Cancer Neg Hx     Diabetes Neg Hx     Stroke Neg Hx        Social History     Socioeconomic History    Marital status:      Spouse name: Rafal Ndiaye    Number of children: 2    Years of education: graduated then 4 year apprenticship    Highest education level: Associate degree: academic program   Occupational History    Not on file   Tobacco Use    Smoking status: Former     Packs/day: 1.00     Years: 10.00     Pack years: 10.00     Types: Cigarettes     Quit date: 1968     Years since quittin.3    Smokeless tobacco: Never   Vaping Use    Vaping Use: Never used   Substance and Sexual Activity    Alcohol use: No     Alcohol/week: 0.0 standard drinks    Drug use: No    Sexual activity: Yes     Partners: Female     Birth control/protection: None   Other Topics Concern     Service Not Asked    Blood Transfusions Not Asked    Caffeine Concern Not Asked    Occupational Exposure Not Asked    Hobby Hazards Not Asked    Sleep Concern Not Asked    Stress Concern Not Asked    Weight Concern Not Asked    Special Diet Not Asked    Back Care Not Asked    Exercise Not Asked    Bike Helmet Not Asked    Seat Belt Not Asked    Self-Exams Not Asked   Social History Narrative    Not on file     Social Determinants of Health     Financial Resource Strain: Low Risk     Difficulty of Paying Living Expenses: Not hard at all   Food Insecurity: No Food Insecurity    Worried About Running Out of Food in the Last Year: Never true    Ran Out of Food in the Last Year: Never true   Transportation Needs: Not on file   Physical Activity: Not on file   Stress: Not on file   Social Connections: Not on file   Intimate Partner Violence: Not on file   Housing Stability: Not on file       Current Outpatient Medications on File Prior to Visit   Medication Sig Dispense Refill    atorvastatin (LIPITOR) 80 mg tablet TAKE 1 TABLET BY MOUTH EVERY DAY 90 Tablet 3    Trelegy Ellipta 100-62.5-25 mcg inhaler TAKE 1 PUFF BY MOUTH EVERY DAY 60 Each 3    pantoprazole (PROTONIX) 40 mg tablet TAKE 1 TABLET BY MOUTH EVERY DAY 90 Tablet 1    citalopram (CELEXA) 40 mg tablet TAKE 1 TABLET BY MOUTH EVERY DAY 90 Tablet 3    fluticasone propionate (FLONASE) 50 mcg/actuation nasal spray 2 Sprays by Both Nostrils route daily. colchicine 0.6 mg tablet Take 1 Tablet by mouth two (2) times daily as needed for Gout or Pain.      loratadine (CLARITIN) 10 mg tablet Take 1 Tablet by mouth daily. tamsulosin (FLOMAX) 0.4 mg capsule Take 1 Capsule by mouth daily. isosorbide mononitrate ER (IMDUR) 30 mg tablet TAKE 1/2 TABLET BY MOUTH EVERY DAY 45 Tablet 3    metoprolol succinate (TOPROL-XL) 25 mg XL tablet TAKE 1 TABLET BY MOUTH EVERY DAY 90 Tablet 1    furosemide (LASIX) 80 mg tablet Take 0.5 Tablets by mouth daily. sodium bicarbonate 325 mg tablet Take 1 Tablet by mouth two (2) times a day. 60 Tablet 0    amLODIPine (NORVASC) 10 mg tablet Take 1 Tablet by mouth daily. (Patient taking differently: Take 0.5 Tablets by mouth daily.) 30 Tablet 1    albuterol (PROVENTIL HFA, VENTOLIN HFA, PROAIR HFA) 90 mcg/actuation inhaler Take 1 Puff by inhalation every four (4) hours. ketoconazole (NIZORAL) 2 % shampoo Apply 1 mL to affected area daily as needed for Itching. nitroglycerin (NITROSTAT) 0.4 mg SL tablet TAKE 1 TABLET BY SUBLINGUAL ROUTE EVERY 5 MINUTES AS NEEDED FOR CHEST PAIN. 1 Bottle 0    VITAMIN D3 2,000 unit cap capsule Take 1 Capsule by mouth daily. 2    GLUCOSAMINE HCL/CHONDR PETERSEN A NA (GLUCOSAMINE-CHONDROITIN) 750-600 mg Tab Take 1 Tab by mouth daily. Brand:   Move Free aspirin delayed-release 81 mg tablet Take 1 Tablet by mouth nightly. MULTIVITS W-FE,OTHER MIN (CENTRUM PO) Take 1 Tab by mouth daily. No current facility-administered medications on file prior to visit. Review of Systems  Pertinent items are noted in HPI. Objective:     Visit Vitals  /72   Pulse 69   Temp 97.2 °F (36.2 °C)   Resp 16   Ht 5' 10\" (1.778 m)   Wt 242 lb (109.8 kg)   SpO2 95%   BMI 34.72 kg/m²     Gen: well appearing male  HEENT:   PERRL,normal conjunctiva. External ear and canals normal, TMs no opacification or erythema,  OP no erythema, no exudates, MMM  Neck:  No masses or LAD  Resp:  No wheezing, no rhonchi, no rales. CV:  RRR, normal S1S2, no murmur. GI: soft, nontender, without masses. Extrem:  +1 edema, warm distally  Skin: left ischial pressure sore. Assessment/Plan:       ICD-10-CM ICD-9-CM    1. Controlled type 2 diabetes mellitus with stage 4 chronic kidney disease, without long-term current use of insulin ([facility])  E11.22 250.40 AMB POC HEMOGLOBIN A1C    N18.4 585.4       2. Essential hypertension  I10 401.9       3. Renal cell carcinoma of right kidney ([facility])  C64.1 189.0       4. CKD (chronic kidney disease) stage 4, GFR 15-29 ml/min ([facility])  N18.4 585.4       5. Coronary artery disease due to lipid rich plaque  I25.10 414.00     I25.83 414.3       6. Pure hypercholesterolemia  E78.00 272.0       7. Postherpetic neuralgia  B02.29 053.19 pregabalin (LYRICA) 50 mg capsule      Add lyrica for PHN,  back pain and LE neuropathy    Increase mobitlity    To follow up with cardiology    Follow-up and Dispositions    Return in about 6 months (around 10/19/2023) for medicare wellness/fasting labs.   Otilia King MD

## 2023-04-19 ENCOUNTER — OFFICE VISIT (OUTPATIENT)
Dept: INTERNAL MEDICINE CLINIC | Age: 78
End: 2023-04-19
Payer: MEDICARE

## 2023-04-19 VITALS
TEMPERATURE: 97.2 F | OXYGEN SATURATION: 95 % | HEART RATE: 69 BPM | WEIGHT: 242 LBS | SYSTOLIC BLOOD PRESSURE: 117 MMHG | BODY MASS INDEX: 34.65 KG/M2 | HEIGHT: 70 IN | RESPIRATION RATE: 16 BRPM | DIASTOLIC BLOOD PRESSURE: 72 MMHG

## 2023-04-19 DIAGNOSIS — B02.29 POSTHERPETIC NEURALGIA: ICD-10-CM

## 2023-04-19 DIAGNOSIS — I25.83 CORONARY ARTERY DISEASE DUE TO LIPID RICH PLAQUE: ICD-10-CM

## 2023-04-19 DIAGNOSIS — C64.1 RENAL CELL CARCINOMA OF RIGHT KIDNEY (HCC): ICD-10-CM

## 2023-04-19 DIAGNOSIS — I10 ESSENTIAL HYPERTENSION: ICD-10-CM

## 2023-04-19 DIAGNOSIS — N18.4 CKD (CHRONIC KIDNEY DISEASE) STAGE 4, GFR 15-29 ML/MIN (HCC): ICD-10-CM

## 2023-04-19 DIAGNOSIS — E78.00 PURE HYPERCHOLESTEROLEMIA: ICD-10-CM

## 2023-04-19 DIAGNOSIS — N18.4 CONTROLLED TYPE 2 DIABETES MELLITUS WITH STAGE 4 CHRONIC KIDNEY DISEASE, WITHOUT LONG-TERM CURRENT USE OF INSULIN (HCC): Primary | ICD-10-CM

## 2023-04-19 DIAGNOSIS — I25.10 CORONARY ARTERY DISEASE DUE TO LIPID RICH PLAQUE: ICD-10-CM

## 2023-04-19 DIAGNOSIS — E11.22 CONTROLLED TYPE 2 DIABETES MELLITUS WITH STAGE 4 CHRONIC KIDNEY DISEASE, WITHOUT LONG-TERM CURRENT USE OF INSULIN (HCC): Primary | ICD-10-CM

## 2023-04-19 LAB — HBA1C MFR BLD HPLC: 5.9 % (ref 4.8–5.6)

## 2023-04-19 PROCEDURE — G0463 HOSPITAL OUTPT CLINIC VISIT: HCPCS | Performed by: FAMILY MEDICINE

## 2023-04-19 PROCEDURE — 83036 HEMOGLOBIN GLYCOSYLATED A1C: CPT | Performed by: FAMILY MEDICINE

## 2023-04-19 RX ORDER — PREGABALIN 50 MG/1
50 CAPSULE ORAL 2 TIMES DAILY
Qty: 60 CAPSULE | Refills: 0 | Status: SHIPPED | OUTPATIENT
Start: 2023-04-19

## 2023-04-19 NOTE — PATIENT INSTRUCTIONS
Team Member Role and Specialty Contact Info Address Start End Comments   Emily Ghosh MD ([facility]) Phone: [phone] Fax: [phone]  [address] [address] 4/19/2023 - -       Schedule follow up with your cardiologist, Dr Marissa Peguero.

## 2023-04-19 NOTE — PROGRESS NOTES
1. \"Have you been to the ER, urgent care clinic since your last visit? Hospitalized since your last visit? \" No    2. \"Have you seen or consulted any other health care providers outside of the [address] [place] [address] since your last visit? \" Yes Pulmonary doctor   3/13/2023 foot doctor, Nephrologist 3/14/2023, Eye doctor 3/28/2023, 1/16/2023 Dermatologist, Vascular doctor 1/5/2023    3. For patients aged 39-70: Has the patient had a colonoscopy / FIT/ Cologuard?  No

## 2023-05-23 ENCOUNTER — CLINICAL DOCUMENTATION (OUTPATIENT)
Age: 78
End: 2023-05-23

## 2023-05-23 NOTE — PROGRESS NOTES
Medical records request from [facility] received on 5/23/2023. Records sent on 5/23/2023. Scanned records into the chart.

## 2023-06-08 ENCOUNTER — CLINICAL DOCUMENTATION (OUTPATIENT)
Age: 78
End: 2023-06-08

## 2023-06-08 NOTE — PROGRESS NOTES
Received medical records request from [facility], faxed to [facility] and scanned request into media.

## 2023-07-11 NOTE — TELEPHONE ENCOUNTER
If he remains asymptomatic from a cardiac perspective he can be cleared for surgery. Hold ASA 7 days prior to procedure, resume asap.
Received fax from Dr. Demler Douglas, ph # [phone], fax # [phone]. Verified patient with two patient identifiers. Is pt cleared for right robotic assisted lap partial nephrectomy under general on 4-5-2021. Last seen here Oct 2020. Pls advise.
Spoke with patient. Verified patient with two patient identifiers. States he is not having any cardiac symptoms. Advised is cleared from a cardiac standpoint. May hold ASA up to 7 days preop, resume ASAP post surgery, follow surgeon's instructions. Patient verbalized understanding. Faxed this note, last OV, EKG to Dr. Kevin Osborne, fax # [phone].
16

## 2023-07-14 ENCOUNTER — OFFICE VISIT (OUTPATIENT)
Age: 78
End: 2023-07-14
Payer: MEDICARE

## 2023-07-14 VITALS
HEART RATE: 88 BPM | WEIGHT: 247 LBS | OXYGEN SATURATION: 93 % | BODY MASS INDEX: 35.36 KG/M2 | SYSTOLIC BLOOD PRESSURE: 120 MMHG | HEIGHT: 70 IN | TEMPERATURE: 99.3 F | DIASTOLIC BLOOD PRESSURE: 63 MMHG

## 2023-07-14 DIAGNOSIS — I10 ESSENTIAL (PRIMARY) HYPERTENSION: ICD-10-CM

## 2023-07-14 DIAGNOSIS — G47.33 OBSTRUCTIVE SLEEP APNEA (ADULT) (PEDIATRIC): Primary | ICD-10-CM

## 2023-07-14 PROCEDURE — 99213 OFFICE O/P EST LOW 20 MIN: CPT | Performed by: NURSE PRACTITIONER

## 2023-07-14 PROCEDURE — 3078F DIAST BP <80 MM HG: CPT | Performed by: NURSE PRACTITIONER

## 2023-07-14 PROCEDURE — 1123F ACP DISCUSS/DSCN MKR DOCD: CPT | Performed by: NURSE PRACTITIONER

## 2023-07-14 PROCEDURE — 1036F TOBACCO NON-USER: CPT | Performed by: NURSE PRACTITIONER

## 2023-07-14 PROCEDURE — 3074F SYST BP LT 130 MM HG: CPT | Performed by: NURSE PRACTITIONER

## 2023-07-14 PROCEDURE — G8427 DOCREV CUR MEDS BY ELIG CLIN: HCPCS | Performed by: NURSE PRACTITIONER

## 2023-07-14 PROCEDURE — G8417 CALC BMI ABV UP PARAM F/U: HCPCS | Performed by: NURSE PRACTITIONER

## 2023-07-14 ASSESSMENT — SLEEP AND FATIGUE QUESTIONNAIRES
ESS TOTAL SCORE: 22
HOW LIKELY ARE YOU TO NOD OFF OR FALL ASLEEP WHILE WATCHING TV: 3
HOW LIKELY ARE YOU TO NOD OFF OR FALL ASLEEP WHILE SITTING AND READING: 3
HOW LIKELY ARE YOU TO NOD OFF OR FALL ASLEEP WHILE SITTING QUIETLY AFTER LUNCH WITHOUT ALCOHOL: 3
HOW LIKELY ARE YOU TO NOD OFF OR FALL ASLEEP WHILE SITTING INACTIVE IN A PUBLIC PLACE: 3
HOW LIKELY ARE YOU TO NOD OFF OR FALL ASLEEP IN A CAR, WHILE STOPPED FOR A FEW MINUTES IN TRAFFIC: 2
HOW LIKELY ARE YOU TO NOD OFF OR FALL ASLEEP WHILE LYING DOWN TO REST IN THE AFTERNOON WHEN CIRCUMSTANCES PERMIT: 3
HOW LIKELY ARE YOU TO NOD OFF OR FALL ASLEEP WHEN YOU ARE A PASSENGER IN A CAR FOR AN HOUR WITHOUT A BREAK: 3
HOW LIKELY ARE YOU TO NOD OFF OR FALL ASLEEP WHILE SITTING AND TALKING TO SOMEONE: 2

## 2023-07-14 NOTE — PROGRESS NOTES
[address] [facility] [place], [address]   Tel.  [phone]   Fax. [facility], [address]   Tel.  [phone]   Fax. [phone]  [facility], [address] [facility]   Tel.  [phone]   Fax. [phone]     Ivana Powell (: 1945) is a 76 y.o. male, established patient, seen for positive airway pressure follow-up and evaluation. He was last seen by me on 7/15/2022, previously seen by Dr. Jennifer Colon on 10/3/2019, prior notes and sleep testing reviewed in detail. Initial sleep apnea diagnosis 25+ years ago. In lab split sleep test 2014 showed AHI of 49.5/hr with a lowest SpO2 of 82%, adequate CPAP titration. Weight at time of sleep testing 246 pounds. ASSESSMENT/PLAN:   Diagnosis Orders   1. Obstructive sleep apnea (adult) (pediatric)        2. Essential (primary) hypertension        3. Adult BMI 35.0-35.9 kg/sq m            AHI = 49.5(2014). On Respironics DS 2 APAP:  12-16 cmH2O. Set up 7/15/2019. New device set up 2022. He is not compliant with PAP therapy although when used PAP shows benefit to the patient and remains necessary for control of his sleep apnea. There is continued clinical benefit from the hours of use demonstrated by AHI reduced from 49.5/hr to 7.3/hr. Follow-up and Dispositions    Return in about 3 months (around 10/14/2023) for 3 month compliance follow up. Sleep Apnea -   Sleep apnea condition has been exacerbated. Change in treatment plan may be needed. Continue on current pressures. He will resume using device and we will follow up on PAP tolerance concerns and CO2 levels - possible need for BiPAP    * Counseling was provided regarding the importance of regular PAP use with emphasis on ensuring sufficient total sleep time, proper sleep hygiene, and safe driving. * Re-enforced proper and regular cleaning for the device.     * He was asked to contact our office for any

## 2023-07-14 NOTE — PATIENT INSTRUCTIONS
[facility] [place] Macdonald, [address]  Tel.  [phone]  Fax. [address]  [address] [place] [address], [address] [place] [address]  Tel.  [phone]  Fax. [phone] [address]  Tel.  [phone]  Fax. [phone]     Learning About CPAP for Sleep Apnea  What is CPAP? CPAP is a small machine that you use at home every night while you sleep. It increases air pressure in your throat to keep your airway open. When you have sleep apnea, this can help you sleep better so you feel much better. CPAP stands for \"continuous positive airway pressure. \"  The CPAP machine will have one of the following:  A mask that covers your nose and mouth  Prongs that fit into your nose  A mask that covers your nose only, the most common type. This type is called NCPAP. The N stands for \"nasal.\"  Why is it done? CPAP is usually the best treatment for obstructive sleep apnea. It is the first treatment choice and the most widely used. Your doctor may suggest CPAP if you have: Moderate to severe sleep apnea. Sleep apnea and coronary artery disease (CAD) or heart failure. How does it help? CPAP can help you have more normal sleep, so you feel less sleepy and more alert during the daytime. CPAP may help keep heart failure or other heart problems from getting worse. NCPAP may help lower your blood pressure. If you use CPAP, your bed partner may also sleep better because you are not snoring or restless. What are the side effects? Some people who use CPAP have:  A dry or stuffy nose and a sore throat. Irritated skin on the face. Sore eyes. Bloating. If you have any of these problems, work with your doctor to fix them. Here are some things you can try:  Be sure the mask or nasal prongs fit well. See if your doctor can adjust the pressure of your CPAP. If your nose is dry, try a humidifier.   If your nose is runny or stuffy, try decongestant medicine or a steroid

## 2023-07-19 ENCOUNTER — CLINICAL DOCUMENTATION (OUTPATIENT)
Age: 78
End: 2023-07-19

## 2023-10-19 NOTE — PROGRESS NOTES
Kaylee Leonardo. is a 78 y.o. male who presents for follow-up. Daughter present. Has knee pain. Stopped chondroitin/glucosamine for one month. Has increased knee stiffness. Has not recently seen in Ortho. Patient and. Has leg swelling, no worsening SOB. On lasix 80mg daily. Trying to reduce sodium intake    Sees Dr Adrianna Rubi, derm, for psoriasis. Has been bothered by the symptoms, especially on the scalp. States using topicals that were prescribed. Diabetic. HbA1C 5.9%. Trying to follow healthy diet. Has neuropathy, no neuropathic pain. Treated for HLP. On statin. No myalgias. Treated for HTN. BP controlled. Sees Dr Hero Zayas, cardiology. History of CKD, stage IV. Followed by Dr Vipul Bailon, nephrology. every 3 months.  hgb 10.4 and creatinine 4.7 in October. has discussed HD. Saw Dr. Abigail Herrera,  vascular surg. Saw pulmonary, for COPD, On trelegy. Followed by Dr Karthik Diallo, urology, for BPH. On flomax. urination better. Prior right partial nephrectomy for cancer in April 2021. Treated for anxiety, on celexa.         Up to date on eye exam. Jan 2023        Past Medical History:   Diagnosis Date    Abnormal stress echo 5/12/2015    Anxiety     Borderline diabetes     CAD (coronary artery disease) 2009    cath [facility]) revealed 20% RCA and 50% 2nd diagonal    Calculus of kidney     CKD (chronic kidney disease) stage 4, GFR 15-29 ml/min (HCC)     COPD, mild (HCC)     Followed by pulmonary associates    DJD (degenerative joint disease)     Environmental allergies     Fatty liver     GERD (gastroesophageal reflux disease) 10/11/2009    Gout     Hypertension     JAILYN on CPAP 10/11/2009    nasal pillows; pressure set at 10-11 -     Peripheral neuropathy 10/11/2009    bilateral feet (numbness)    Renal cell cancer, right ([address]) 01/2021    RLS (restless legs syndrome) 10/11/2009    S/P coronary artery stent placement 05/12/2015    PCI./HAO to LCx, 8/2019 PCI/HAO Prox

## 2023-10-20 ENCOUNTER — OFFICE VISIT (OUTPATIENT)
Age: 78
End: 2023-10-20
Payer: MEDICARE

## 2023-10-20 VITALS
TEMPERATURE: 98.5 F | HEART RATE: 76 BPM | BODY MASS INDEX: 36.39 KG/M2 | SYSTOLIC BLOOD PRESSURE: 115 MMHG | DIASTOLIC BLOOD PRESSURE: 67 MMHG | RESPIRATION RATE: 17 BRPM | HEIGHT: 70 IN | WEIGHT: 254.2 LBS

## 2023-10-20 DIAGNOSIS — Z00.00 MEDICARE ANNUAL WELLNESS VISIT, SUBSEQUENT: ICD-10-CM

## 2023-10-20 DIAGNOSIS — N18.4 TYPE 2 DIABETES MELLITUS WITH STAGE 4 CHRONIC KIDNEY DISEASE, WITHOUT LONG-TERM CURRENT USE OF INSULIN (HCC): ICD-10-CM

## 2023-10-20 DIAGNOSIS — Z23 NEEDS FLU SHOT: ICD-10-CM

## 2023-10-20 DIAGNOSIS — I25.83 CORONARY ATHEROSCLEROSIS DUE TO LIPID RICH PLAQUE (CODE): ICD-10-CM

## 2023-10-20 DIAGNOSIS — E11.22 TYPE 2 DIABETES MELLITUS WITH STAGE 4 CHRONIC KIDNEY DISEASE, WITHOUT LONG-TERM CURRENT USE OF INSULIN (HCC): ICD-10-CM

## 2023-10-20 DIAGNOSIS — G62.9 PERIPHERAL POLYNEUROPATHY: ICD-10-CM

## 2023-10-20 DIAGNOSIS — I10 ESSENTIAL (PRIMARY) HYPERTENSION: Primary | ICD-10-CM

## 2023-10-20 DIAGNOSIS — N18.4 CHRONIC KIDNEY DISEASE, STAGE 4 (SEVERE) (HCC): ICD-10-CM

## 2023-10-20 PROCEDURE — 90694 VACC AIIV4 NO PRSRV 0.5ML IM: CPT | Performed by: FAMILY MEDICINE

## 2023-10-20 PROCEDURE — 99214 OFFICE O/P EST MOD 30 MIN: CPT | Performed by: FAMILY MEDICINE

## 2023-10-20 ASSESSMENT — LIFESTYLE VARIABLES
HOW OFTEN DO YOU HAVE A DRINK CONTAINING ALCOHOL: NEVER
HOW MANY STANDARD DRINKS CONTAINING ALCOHOL DO YOU HAVE ON A TYPICAL DAY: PATIENT DOES NOT DRINK

## 2023-10-20 ASSESSMENT — PATIENT HEALTH QUESTIONNAIRE - PHQ9
SUM OF ALL RESPONSES TO PHQ QUESTIONS 1-9: 0
SUM OF ALL RESPONSES TO PHQ QUESTIONS 1-9: 0
1. LITTLE INTEREST OR PLEASURE IN DOING THINGS: 0
SUM OF ALL RESPONSES TO PHQ9 QUESTIONS 1 & 2: 0
2. FEELING DOWN, DEPRESSED OR HOPELESS: 0
SUM OF ALL RESPONSES TO PHQ QUESTIONS 1-9: 0
SUM OF ALL RESPONSES TO PHQ QUESTIONS 1-9: 0

## 2023-10-20 NOTE — PATIENT INSTRUCTIONS
copay.    Some of these benefits include a comprehensive review of your medical history including lifestyle, illnesses that may run in your family, and various assessments and screenings as appropriate. After reviewing your medical record and screening and assessments performed today your provider may have ordered immunizations, labs, imaging, and/or referrals for you. A list of these orders (if applicable) as well as your Preventive Care list are included within your After Visit Summary for your review. Other Preventive Recommendations:    A preventive eye exam performed by an eye specialist is recommended every 1-2 years to screen for glaucoma; cataracts, macular degeneration, and other eye disorders. A preventive dental visit is recommended every 6 months. Try to get at least 150 minutes of exercise per week or 10,000 steps per day on a pedometer . Order or download the FREE \"Exercise & Physical Activity: Your Everyday Guide\" from [employer]. Call 5-556.172.2096 or search [employer] online. You need 8878-4591 mg of calcium and 8205-4593 IU of vitamin D per day. It is possible to meet your calcium requirement with diet alone, but a vitamin D supplement is usually necessary to meet this goal.  When exposed to the sun, use a sunscreen that protects against both UVA and UVB radiation with an SPF of 30 or greater. Reapply every 2 to 3 hours or after sweating, drying off with a towel, or swimming. Always wear a seat belt when traveling in a car. Always wear a helmet when riding a bicycle or motorcycle.

## 2023-10-21 LAB
ALBUMIN SERPL-MCNC: 4.1 G/DL (ref 3.8–4.8)
ALP SERPL-CCNC: 102 IU/L (ref 44–121)
ALT SERPL-CCNC: 13 IU/L (ref 0–44)
AST SERPL-CCNC: 19 IU/L (ref 0–40)
BILIRUB DIRECT SERPL-MCNC: 0.2 MG/DL (ref 0–0.4)
BILIRUB SERPL-MCNC: 0.6 MG/DL (ref 0–1.2)
CHOLEST SERPL-MCNC: 128 MG/DL (ref 100–199)
HBA1C MFR BLD: 6.1 % (ref 4.8–5.6)
HDLC SERPL-MCNC: 42 MG/DL
LDLC SERPL CALC-MCNC: 58 MG/DL (ref 0–99)
PROT SERPL-MCNC: 6.4 G/DL (ref 6–8.5)
TRIGL SERPL-MCNC: 165 MG/DL (ref 0–149)
VLDLC SERPL CALC-MCNC: 28 MG/DL (ref 5–40)

## 2023-10-31 RX ORDER — FLUTICASONE FUROATE, UMECLIDINIUM BROMIDE AND VILANTEROL TRIFENATATE 100; 62.5; 25 UG/1; UG/1; UG/1
1 POWDER RESPIRATORY (INHALATION) DAILY
Qty: 60 EACH | Refills: 3 | Status: SHIPPED | OUTPATIENT
Start: 2023-10-31

## 2023-11-03 RX ORDER — CITALOPRAM 40 MG/1
40 TABLET ORAL DAILY
Qty: 30 TABLET | Refills: 1 | Status: SHIPPED | OUTPATIENT
Start: 2023-11-03

## 2023-11-03 NOTE — TELEPHONE ENCOUNTER
----- Message from [address] [place] [address] [address]. Micah Cainmili on behalf of Shantel Diez. sent at 11/3/2023 12:49 PM EDT -----  Regarding: Prescription refill   Contact: [phone]  Can you please refill the citropram 40mg. It has been locked out and won't let me click on it. I have already put in a request to get the pharmacy to fill it. But it's like it's lost in the system or something. He has been without it for about a week now and needs to get it filled. He has been agitated and shaky. I know that its a waiting period for refills but  I hate for him to have to go through the weekend without it. Thank you .

## 2023-11-08 ENCOUNTER — TELEPHONE (OUTPATIENT)
Age: 78
End: 2023-11-08

## 2023-11-09 ASSESSMENT — SLEEP AND FATIGUE QUESTIONNAIRES
HOW LIKELY ARE YOU TO NOD OFF OR FALL ASLEEP WHILE LYING DOWN TO REST IN THE AFTERNOON WHEN CIRCUMSTANCES PERMIT: 2
ESS TOTAL SCORE: 10
HOW LIKELY ARE YOU TO NOD OFF OR FALL ASLEEP WHILE WATCHING TV: MODERATE CHANCE OF DOZING
HOW LIKELY ARE YOU TO NOD OFF OR FALL ASLEEP WHILE SITTING INACTIVE IN A PUBLIC PLACE: SLIGHT CHANCE OF DOZING
HOW LIKELY ARE YOU TO NOD OFF OR FALL ASLEEP WHEN YOU ARE A PASSENGER IN A CAR FOR AN HOUR WITHOUT A BREAK: SLIGHT CHANCE OF DOZING
HOW LIKELY ARE YOU TO NOD OFF OR FALL ASLEEP WHILE SITTING AND READING: 2
HOW LIKELY ARE YOU TO NOD OFF OR FALL ASLEEP IN A CAR, WHILE STOPPED FOR A FEW MINUTES IN TRAFFIC: WOULD NEVER DOZE
DO YOU HAVE DIFFICULTY WATCHING A MOVIE OR VIDEO BECAUSE YOU BECOME SLEEPY OR TIRED: YES, A LITTLE
FOSQ SCORE: 16
HOW LIKELY ARE YOU TO NOD OFF OR FALL ASLEEP WHILE SITTING QUIETLY AFTER LUNCH WITHOUT ALCOHOL: 1
HOW LIKELY ARE YOU TO NOD OFF OR FALL ASLEEP WHILE SITTING AND TALKING TO SOMEONE: SLIGHT CHANCE OF DOZING
DO YOU HAVE DIFFICULTY VISITING YOUR FAMILY OR FRIENDS IN THEIR HOME BECAUSE YOU BECOME SLEEPY OR TIRED: NO
HOW LIKELY ARE YOU TO NOD OFF OR FALL ASLEEP WHILE SITTING AND READING: MODERATE CHANCE OF DOZING
HOW LIKELY ARE YOU TO NOD OFF OR FALL ASLEEP WHILE SITTING AND TALKING TO SOMEONE: 1
DO YOU GENERALLY HAVE DIFFICULTY REMEMBERING THINGS BECAUSE YOU ARE SLEEPY OR TIRED: YES, A LITTLE
HOW LIKELY ARE YOU TO NOD OFF OR FALL ASLEEP WHILE SITTING QUIETLY AFTER LUNCH WITHOUT ALCOHOL: SLIGHT CHANCE OF DOZING
DO YOU HAVE DIFFICULTY BEING AS ACTIVE AS YOU WANT TO BE IN THE MORNING BECAUSE YOU ARE SLEEPY OR TIRED: YES, MODERATE
HOW LIKELY ARE YOU TO NOD OFF OR FALL ASLEEP IN A CAR, WHILE STOPPED FOR A FEW MINUTES IN TRAFFIC: 0
DO YOU HAVE DIFFICULTY OPERATING A MOTOR VEHICLE FOR LONG DISTANCES (GREATER THAN 100 MILES) BECAUSE YOU BECOME SLEEPY: YES, A LITTLE
HOW LIKELY ARE YOU TO NOD OFF OR FALL ASLEEP WHEN YOU ARE A PASSENGER IN A CAR FOR AN HOUR WITHOUT A BREAK: 1
HOW LIKELY ARE YOU TO NOD OFF OR FALL ASLEEP WHILE WATCHING TV: 2
HAS YOUR RELATIONSHIP WITH FAMILY, FRIENDS OR WORK COLLEAGUES BEEN AFFECTED BECAUSE YOU ARE SLEEPY OR TIRED: YES, A LITTLE
HOW LIKELY ARE YOU TO NOD OFF OR FALL ASLEEP WHILE LYING DOWN TO REST IN THE AFTERNOON WHEN CIRCUMSTANCES PERMIT: MODERATE CHANCE OF DOZING
HAS YOUR MOOD BEEN AFFECTED BECAUSE YOU ARE SLEEPY OR TIRED: YES, LITTLE
DO YOU HAVE DIFFICULTY BEING AS ACTIVE AS YOU WANT TO BE IN THE EVENING BECAUSE YOU ARE SLEEPY OR TIRED: NO
DO YOU HAVE DIFFICULTY CONCENTRATING ON THE THINGS YOU DO BECAUSE YOU ARE SLEEPY OR TIRED: YES, A LITTLE
HOW LIKELY ARE YOU TO NOD OFF OR FALL ASLEEP WHILE SITTING INACTIVE IN A PUBLIC PLACE: 1
DO YOU HAVE DIFFICULTY OPERATING A MOTOR VEHICLE FOR SHORT DISTANCES (LESS THAN 100 MILES) BECAUSE YOU BECOME SLEEPY: NO

## 2023-11-10 ENCOUNTER — TELEMEDICINE (OUTPATIENT)
Age: 78
End: 2023-11-10
Payer: MEDICARE

## 2023-11-10 DIAGNOSIS — G47.33 OBSTRUCTIVE SLEEP APNEA (ADULT) (PEDIATRIC): Primary | ICD-10-CM

## 2023-11-10 DIAGNOSIS — I10 ESSENTIAL (PRIMARY) HYPERTENSION: ICD-10-CM

## 2023-11-10 PROCEDURE — G8484 FLU IMMUNIZE NO ADMIN: HCPCS | Performed by: NURSE PRACTITIONER

## 2023-11-10 PROCEDURE — G8417 CALC BMI ABV UP PARAM F/U: HCPCS | Performed by: NURSE PRACTITIONER

## 2023-11-10 PROCEDURE — G8427 DOCREV CUR MEDS BY ELIG CLIN: HCPCS | Performed by: NURSE PRACTITIONER

## 2023-11-10 PROCEDURE — 99214 OFFICE O/P EST MOD 30 MIN: CPT | Performed by: NURSE PRACTITIONER

## 2023-11-10 PROCEDURE — 1123F ACP DISCUSS/DSCN MKR DOCD: CPT | Performed by: NURSE PRACTITIONER

## 2023-11-10 PROCEDURE — 1036F TOBACCO NON-USER: CPT | Performed by: NURSE PRACTITIONER

## 2023-11-10 NOTE — PATIENT INSTRUCTIONS
[facility].[place], [address]  Tel.  [phone]  Fax. [address] [facility], [address] [place] [address]  Tel.  [phone]  Fax. [phone] [facility], [facility]  Tel.  [phone]  Fax. [phone]     Learning About CPAP for Sleep Apnea  What is CPAP? CPAP is a small machine that you use at home every night while you sleep. It increases air pressure in your throat to keep your airway open. When you have sleep apnea, this can help you sleep better so you feel much better. CPAP stands for \"continuous positive airway pressure. \"  The CPAP machine will have one of the following:  A mask that covers your nose and mouth  Prongs that fit into your nose  A mask that covers your nose only, the most common type. This type is called NCPAP. The N stands for \"nasal.\"  Why is it done? CPAP is usually the best treatment for obstructive sleep apnea. It is the first treatment choice and the most widely used. Your doctor may suggest CPAP if you have: Moderate to severe sleep apnea. Sleep apnea and coronary artery disease (CAD) or heart failure. How does it help? CPAP can help you have more normal sleep, so you feel less sleepy and more alert during the daytime. CPAP may help keep heart failure or other heart problems from getting worse. NCPAP may help lower your blood pressure. If you use CPAP, your bed partner may also sleep better because you are not snoring or restless. What are the side effects? Some people who use CPAP have:  A dry or stuffy nose and a sore throat. Irritated skin on the face. Sore eyes. Bloating. If you have any of these problems, work with your doctor to fix them. Here are some things you can try:  Be sure the mask or nasal prongs fit well. See if your doctor can adjust the pressure of your CPAP. If your nose is dry, try a humidifier.   If your nose is runny or stuffy, try decongestant medicine or a steroid

## 2023-11-10 NOTE — PROGRESS NOTES
Reyna Che (: 1945) is a 78 y.o. male, established patient, seen for positive airway pressure follow-up, he was last seen by me on 2023, previously seen by Dr. Chanda Bone on 10/3/2019, prior notes and sleep testing reviewed in detail. Initial sleep apnea diagnosis 25+ years ago. In lab split sleep test 2014 showed AHI of 49.5/hr with a lowest SpO2 of 82%, adequate CPAP titration. ASSESSMENT/PLAN:   Diagnosis Orders   1. Obstructive sleep apnea (adult) (pediatric)  DME Order for (Specify) as OP    DME Order for (Specify) as OP      2. Essential (primary) hypertension        3. Adult BMI 36.0-36.9 kg/sq m            AHI = 49.5(2014). On Respironics DS 2 APAP:  12-16 cmH2O. Set up 7/15/2019. New device set up 2022. He is adherent with PAP therapy and PAP continues to benefit patient and remains necessary for control of his sleep apnea. Follow-up and Dispositions    Return in about 19 weeks (around 3/22/2024) for compliance follow up for in person or VV. Sleep Apnea -   Sleep apnea condition has been exacerbated. Change in treatment plan is needed. Change current pressure settings to  APAP 12-16 cmH2O. Changes made by provider in  Care  system and sent to [facility]. * Tech to download in 1 week to assess for AHI improvement with new settings    * Supplies - nasal pillows mask and heated tubing    Orders Placed This Encounter   Procedures    DME Order for (Specify) as OP     Diagnosis: (G47.33) JAILYN (obstructive sleep apnea)  (primary encounter diagnosis)     Pressure change APAP to 12-16 cmH2O. Changes made in sleep lab. CORDELL Coburn; NPI: [id number]    Electronically signed. Date:- 11/10/23    DME Order for [facility]) as OP     Diagnosis: (G47.33) JAILYN (obstructive sleep apnea)  (primary encounter diagnosis)     Replacement Supplies for Positive Airway Pressure Therapy Device:   Duration of need: 99 months.         Nasal Pillows

## 2023-11-28 ENCOUNTER — OFFICE VISIT (OUTPATIENT)
Age: 78
End: 2023-11-28
Payer: MEDICARE

## 2023-11-28 VITALS
SYSTOLIC BLOOD PRESSURE: 138 MMHG | TEMPERATURE: 97.8 F | OXYGEN SATURATION: 98 % | HEIGHT: 70 IN | BODY MASS INDEX: 35.76 KG/M2 | RESPIRATION RATE: 17 BRPM | DIASTOLIC BLOOD PRESSURE: 88 MMHG | HEART RATE: 89 BPM | WEIGHT: 249.8 LBS

## 2023-11-28 DIAGNOSIS — G25.81 RLS (RESTLESS LEGS SYNDROME): ICD-10-CM

## 2023-11-28 DIAGNOSIS — R41.82 ACUTE ALTERATION IN MENTAL STATUS: ICD-10-CM

## 2023-11-28 DIAGNOSIS — E11.42 DIABETIC PERIPHERAL NEUROPATHY ASSOCIATED WITH TYPE 2 DIABETES MELLITUS (HCC): ICD-10-CM

## 2023-11-28 DIAGNOSIS — R26.0 SENSORY ATAXIC GAIT: ICD-10-CM

## 2023-11-28 DIAGNOSIS — G25.0 BENIGN ESSENTIAL TREMOR: ICD-10-CM

## 2023-11-28 DIAGNOSIS — Z51.81 THERAPEUTIC DRUG MONITORING: ICD-10-CM

## 2023-11-28 DIAGNOSIS — Z98.890 HISTORY OF LEFT-SIDED CAROTID ENDARTERECTOMY: ICD-10-CM

## 2023-11-28 DIAGNOSIS — G47.52 RBD (REM BEHAVIORAL DISORDER): ICD-10-CM

## 2023-11-28 DIAGNOSIS — I65.23 BILATERAL CAROTID ARTERY STENOSIS: Primary | ICD-10-CM

## 2023-11-28 DIAGNOSIS — G63 IMMUNE-MEDIATED NEUROPATHY (HCC): ICD-10-CM

## 2023-11-28 DIAGNOSIS — D89.89 IMMUNE-MEDIATED NEUROPATHY (HCC): ICD-10-CM

## 2023-11-28 PROCEDURE — 99214 OFFICE O/P EST MOD 30 MIN: CPT | Performed by: PSYCHIATRY & NEUROLOGY

## 2023-11-28 PROCEDURE — 1036F TOBACCO NON-USER: CPT | Performed by: PSYCHIATRY & NEUROLOGY

## 2023-11-28 PROCEDURE — 1123F ACP DISCUSS/DSCN MKR DOCD: CPT | Performed by: PSYCHIATRY & NEUROLOGY

## 2023-11-28 PROCEDURE — G8484 FLU IMMUNIZE NO ADMIN: HCPCS | Performed by: PSYCHIATRY & NEUROLOGY

## 2023-11-28 PROCEDURE — 3044F HG A1C LEVEL LT 7.0%: CPT | Performed by: PSYCHIATRY & NEUROLOGY

## 2023-11-28 PROCEDURE — 3078F DIAST BP <80 MM HG: CPT | Performed by: PSYCHIATRY & NEUROLOGY

## 2023-11-28 PROCEDURE — G8417 CALC BMI ABV UP PARAM F/U: HCPCS | Performed by: PSYCHIATRY & NEUROLOGY

## 2023-11-28 PROCEDURE — G8427 DOCREV CUR MEDS BY ELIG CLIN: HCPCS | Performed by: PSYCHIATRY & NEUROLOGY

## 2023-11-28 PROCEDURE — 3075F SYST BP GE 130 - 139MM HG: CPT | Performed by: PSYCHIATRY & NEUROLOGY

## 2023-11-28 RX ORDER — VITAMIN B COMPLEX
1 CAPSULE ORAL DAILY
COMMUNITY

## 2023-11-28 ASSESSMENT — PATIENT HEALTH QUESTIONNAIRE - PHQ9
1. LITTLE INTEREST OR PLEASURE IN DOING THINGS: 0
2. FEELING DOWN, DEPRESSED OR HOPELESS: 0
1. LITTLE INTEREST OR PLEASURE IN DOING THINGS: 0
SUM OF ALL RESPONSES TO PHQ QUESTIONS 1-9: 0
SUM OF ALL RESPONSES TO PHQ9 QUESTIONS 1 & 2: 0
SUM OF ALL RESPONSES TO PHQ9 QUESTIONS 1 & 2: 0
SUM OF ALL RESPONSES TO PHQ QUESTIONS 1-9: 0
2. FEELING DOWN, DEPRESSED OR HOPELESS: 0
SUM OF ALL RESPONSES TO PHQ QUESTIONS 1-9: 0

## 2023-11-29 NOTE — PROGRESS NOTES
Consult  REFERRED BY:  Meg Donaldson MD    CHIEF COMPLAINT: New problem of worsening neuropathy and pain in his feet      Subjective:     Rema Cain is a 78 y.o. right-handed  male seen at the request of Dr. Domingo Smith for evaluation of new problem of increasing numbness and pain in his feet, and difficulty walking because loss of balance, and sensory ataxia, and wanting to know if there is anything causing this. He is a latent diabetic with a hemoglobin A1c recently checked at 6.1, has had an elevated A1c for years. His last EMG study was about 5 years ago and has not had one recently so we will repeat that again to see if there is much change in his neuropathy or anything else that could be causing it and metabolic studies were sent off today to rule out other causes of his neuropathy in addition. He refuses seen 1 year ago for fall in June 2022 when he fell over and lost his balance picking up a piece of wood, and sustained a laceration to the scalp and a mild postconcussive syndrome, recent physical therapy for balance training, and it helped him maintain his balance better, and he had a normal CT of the head in June after the fall in the emergency room, because he has a sensory ataxia from worsening neuropathy in his feet, and having a little more difficulty walking with balance and coordination problems at times, and his neuropathy work-up last year when he saw him again, showed only that his hemoglobin A1c ran around 6.1, and 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 He also is having more difficulty with a new problem of increasing difficulty with jerks in his legs, and on looking at a video, it almost looks more like periodic leg movements of sleep, and supposedly he is on a CPAP machine now, and finally getting his seen back after the recall 1 year ago, and hopefully that will help some in addition.   He was treated transiently with Requip

## 2023-12-12 LAB — CK SERPL-CCNC: 103 U/L (ref 41–331)

## 2023-12-13 LAB
ALBUMIN SERPL ELPH-MCNC: 3.7 G/DL (ref 2.9–4.4)
ALBUMIN/GLOB SERPL: 1.3 {RATIO} (ref 0.7–1.7)
ALPHA1 GLOB SERPL ELPH-MCNC: 0.2 G/DL (ref 0–0.4)
ALPHA2 GLOB SERPL ELPH-MCNC: 0.8 G/DL (ref 0.4–1)
B-GLOBULIN SERPL ELPH-MCNC: 1 G/DL (ref 0.7–1.3)
GAMMA GLOB SERPL ELPH-MCNC: 0.9 G/DL (ref 0.4–1.8)
GLOBULIN SER-MCNC: 3 G/DL (ref 2.2–3.9)
IGA SERPL-MCNC: 288 MG/DL (ref 61–437)
IGG SERPL-MCNC: 953 MG/DL (ref 603–1613)
IGM SERPL-MCNC: 65 MG/DL (ref 15–143)
INTERPRETATION SERPL IEP-IMP: NORMAL
LABORATORY COMMENT REPORT: NORMAL
M PROTEIN SERPL ELPH-MCNC: NORMAL G/DL
PROT SERPL-MCNC: 6.7 G/DL (ref 6–8.5)

## 2023-12-14 LAB
INTERPRETATION: NORMAL
MAG IGM SER-ACNC: <900 BTU (ref 0–999)

## 2023-12-28 LAB
ANTI NEURONAL CELL AB METHOD: NORMAL
INTERPRETATION: NORMAL
NEURONAL AB SER IA-ACNC: 15 UNITS (ref 0–54)

## 2023-12-29 ENCOUNTER — HOSPITAL ENCOUNTER (OUTPATIENT)
Facility: HOSPITAL | Age: 78
End: 2023-12-29
Payer: MEDICARE

## 2023-12-29 VITALS
HEIGHT: 70 IN | OXYGEN SATURATION: 98 % | RESPIRATION RATE: 18 BRPM | HEART RATE: 75 BPM | TEMPERATURE: 98.8 F | BODY MASS INDEX: 36.2 KG/M2 | DIASTOLIC BLOOD PRESSURE: 64 MMHG | WEIGHT: 252.87 LBS | SYSTOLIC BLOOD PRESSURE: 129 MMHG

## 2023-12-29 LAB
ANION GAP SERPL CALC-SCNC: 6 MMOL/L (ref 5–15)
BUN SERPL-MCNC: 35 MG/DL (ref 6–20)
BUN/CREAT SERPL: 13 (ref 12–20)
CALCIUM SERPL-MCNC: 9 MG/DL (ref 8.5–10.1)
CHLORIDE SERPL-SCNC: 110 MMOL/L (ref 97–108)
CO2 SERPL-SCNC: 26 MMOL/L (ref 21–32)
CREAT SERPL-MCNC: 2.8 MG/DL (ref 0.7–1.3)
ERYTHROCYTE [DISTWIDTH] IN BLOOD BY AUTOMATED COUNT: 14.5 % (ref 11.5–14.5)
GLUCOSE SERPL-MCNC: 151 MG/DL (ref 65–100)
HCT VFR BLD AUTO: 32.1 % (ref 36.6–50.3)
HGB BLD-MCNC: 9.9 G/DL (ref 12.1–17)
MCH RBC QN AUTO: 28.9 PG (ref 26–34)
MCHC RBC AUTO-ENTMCNC: 30.8 G/DL (ref 30–36.5)
MCV RBC AUTO: 93.9 FL (ref 80–99)
NRBC # BLD: 0 K/UL (ref 0–0.01)
NRBC BLD-RTO: 0 PER 100 WBC
PLATELET # BLD AUTO: 219 K/UL (ref 150–400)
PMV BLD AUTO: 9.5 FL (ref 8.9–12.9)
POTASSIUM SERPL-SCNC: 3.6 MMOL/L (ref 3.5–5.1)
RBC # BLD AUTO: 3.42 M/UL (ref 4.1–5.7)
SODIUM SERPL-SCNC: 142 MMOL/L (ref 136–145)
WBC # BLD AUTO: 10.4 K/UL (ref 4.1–11.1)

## 2023-12-29 PROCEDURE — 85027 COMPLETE CBC AUTOMATED: CPT

## 2023-12-29 PROCEDURE — 80048 BASIC METABOLIC PNL TOTAL CA: CPT

## 2023-12-29 PROCEDURE — 36415 COLL VENOUS BLD VENIPUNCTURE: CPT

## 2023-12-29 RX ORDER — SODIUM CHLORIDE 9 MG/ML
INJECTION, SOLUTION INTRAVENOUS CONTINUOUS
Status: CANCELLED | OUTPATIENT
Start: 2024-01-03

## 2023-12-29 RX ORDER — FLUOCINONIDE TOPICAL SOLUTION USP, 0.05% 0.5 MG/ML
SOLUTION TOPICAL 2 TIMES DAILY PRN
COMMUNITY

## 2023-12-29 ASSESSMENT — PAIN DESCRIPTION - DESCRIPTORS: DESCRIPTORS: ACHING

## 2023-12-29 ASSESSMENT — PAIN DESCRIPTION - LOCATION: LOCATION: KNEE

## 2023-12-29 ASSESSMENT — PAIN SCALES - GENERAL: PAINLEVEL_OUTOF10: 6

## 2023-12-29 ASSESSMENT — PAIN DESCRIPTION - ORIENTATION: ORIENTATION: LEFT;RIGHT

## 2023-12-31 RX ORDER — CITALOPRAM 40 MG/1
40 TABLET ORAL DAILY
Qty: 30 TABLET | Refills: 1 | Status: SHIPPED | OUTPATIENT
Start: 2023-12-31

## 2024-01-02 ENCOUNTER — ANESTHESIA EVENT (OUTPATIENT)
Facility: HOSPITAL | Age: 79
End: 2024-01-02
Payer: MEDICARE

## 2024-01-03 ENCOUNTER — HOSPITAL ENCOUNTER (OUTPATIENT)
Facility: HOSPITAL | Age: 79
Setting detail: OUTPATIENT SURGERY
Discharge: HOME | End: 2024-01-03
Attending: SURGERY | Admitting: SURGERY
Payer: MEDICARE

## 2024-01-03 ENCOUNTER — ANESTHESIA (OUTPATIENT)
Facility: HOSPITAL | Age: 79
End: 2024-01-03
Payer: MEDICARE

## 2024-01-03 VITALS
HEIGHT: 70 IN | HEART RATE: 71 BPM | TEMPERATURE: 97.9 F | OXYGEN SATURATION: 90 % | RESPIRATION RATE: 19 BRPM | SYSTOLIC BLOOD PRESSURE: 126 MMHG | WEIGHT: 243.39 LBS | DIASTOLIC BLOOD PRESSURE: 62 MMHG | BODY MASS INDEX: 34.84 KG/M2

## 2024-01-03 DIAGNOSIS — N18.5 STAGE 5 CHRONIC KIDNEY DISEASE NOT ON CHRONIC DIALYSIS (HCC): Primary | ICD-10-CM

## 2024-01-03 LAB
ANION GAP BLD CALC-SCNC: 10 (ref 10–20)
BASE EXCESS BLD CALC-SCNC: 1.7 MMOL/L
CA-I BLD-MCNC: 1.17 MMOL/L (ref 1.12–1.32)
CHLORIDE BLD-SCNC: 106 MMOL/L (ref 100–108)
CO2 BLD-SCNC: 26 MMOL/L (ref 19–24)
CREAT UR-MCNC: 3 MG/DL (ref 0.6–1.3)
GLUCOSE BLD STRIP.AUTO-MCNC: 100 MG/DL (ref 65–117)
GLUCOSE BLD STRIP.AUTO-MCNC: 92 MG/DL (ref 74–106)
HCO3 BLDA-SCNC: 26 MMOL/L
LACTATE BLD-SCNC: 1.8 MMOL/L (ref 0.4–2)
PCO2 BLDV: 40.1 MMHG (ref 41–51)
PH BLDV: 7.42 (ref 7.32–7.42)
PO2 BLDV: 51 MMHG (ref 25–40)
POTASSIUM BLD-SCNC: 4.4 MMOL/L (ref 3.5–5.5)
SAO2 % BLD: 87 %
SERVICE CMNT-IMP: NORMAL
SODIUM BLD-SCNC: 142 MMOL/L (ref 136–145)
SPECIMEN SITE: ABNORMAL

## 2024-01-03 PROCEDURE — 3600000002 HC SURGERY LEVEL 2 BASE: Performed by: SURGERY

## 2024-01-03 PROCEDURE — 2500000003 HC RX 250 WO HCPCS: Performed by: NURSE ANESTHETIST, CERTIFIED REGISTERED

## 2024-01-03 PROCEDURE — 84295 ASSAY OF SERUM SODIUM: CPT

## 2024-01-03 PROCEDURE — 2580000003 HC RX 258: Performed by: SURGERY

## 2024-01-03 PROCEDURE — 6360000002 HC RX W HCPCS: Performed by: ANESTHESIOLOGY

## 2024-01-03 PROCEDURE — 2580000003 HC RX 258: Performed by: ANESTHESIOLOGY

## 2024-01-03 PROCEDURE — 2709999900 HC NON-CHARGEABLE SUPPLY: Performed by: SURGERY

## 2024-01-03 PROCEDURE — 82947 ASSAY GLUCOSE BLOOD QUANT: CPT

## 2024-01-03 PROCEDURE — 82962 GLUCOSE BLOOD TEST: CPT

## 2024-01-03 PROCEDURE — 82330 ASSAY OF CALCIUM: CPT

## 2024-01-03 PROCEDURE — 3600000012 HC SURGERY LEVEL 2 ADDTL 15MIN: Performed by: SURGERY

## 2024-01-03 PROCEDURE — 6360000002 HC RX W HCPCS: Performed by: SURGERY

## 2024-01-03 PROCEDURE — 3700000000 HC ANESTHESIA ATTENDED CARE: Performed by: SURGERY

## 2024-01-03 PROCEDURE — 7100000001 HC PACU RECOVERY - ADDTL 15 MIN: Performed by: SURGERY

## 2024-01-03 PROCEDURE — 82803 BLOOD GASES ANY COMBINATION: CPT

## 2024-01-03 PROCEDURE — 84132 ASSAY OF SERUM POTASSIUM: CPT

## 2024-01-03 PROCEDURE — 3700000001 HC ADD 15 MINUTES (ANESTHESIA): Performed by: SURGERY

## 2024-01-03 PROCEDURE — 2720000010 HC SURG SUPPLY STERILE: Performed by: SURGERY

## 2024-01-03 PROCEDURE — A4217 STERILE WATER/SALINE, 500 ML: HCPCS | Performed by: SURGERY

## 2024-01-03 PROCEDURE — 7100000000 HC PACU RECOVERY - FIRST 15 MIN: Performed by: SURGERY

## 2024-01-03 PROCEDURE — 6370000000 HC RX 637 (ALT 250 FOR IP): Performed by: SURGERY

## 2024-01-03 PROCEDURE — 64415 NJX AA&/STRD BRCH PLXS IMG: CPT | Performed by: ANESTHESIOLOGY

## 2024-01-03 PROCEDURE — 6360000002 HC RX W HCPCS: Performed by: NURSE ANESTHETIST, CERTIFIED REGISTERED

## 2024-01-03 RX ORDER — OXYCODONE HYDROCHLORIDE AND ACETAMINOPHEN 5; 325 MG/1; MG/1
1 TABLET ORAL EVERY 6 HOURS PRN
Qty: 20 TABLET | Refills: 0 | Status: SHIPPED | OUTPATIENT
Start: 2024-01-03 | End: 2024-01-08

## 2024-01-03 RX ORDER — LIDOCAINE HYDROCHLORIDE 20 MG/ML
INJECTION, SOLUTION EPIDURAL; INFILTRATION; INTRACAUDAL; PERINEURAL PRN
Status: DISCONTINUED | OUTPATIENT
Start: 2024-01-03 | End: 2024-01-03 | Stop reason: SDUPTHER

## 2024-01-03 RX ORDER — HEPARIN SODIUM 5000 [USP'U]/ML
INJECTION, SOLUTION INTRAVENOUS; SUBCUTANEOUS PRN
Status: DISCONTINUED | OUTPATIENT
Start: 2024-01-03 | End: 2024-01-03 | Stop reason: SDUPTHER

## 2024-01-03 RX ORDER — PHENYLEPHRINE HCL IN 0.9% NACL 0.4MG/10ML
SYRINGE (ML) INTRAVENOUS PRN
Status: DISCONTINUED | OUTPATIENT
Start: 2024-01-03 | End: 2024-01-03 | Stop reason: SDUPTHER

## 2024-01-03 RX ORDER — SODIUM CHLORIDE 9 MG/ML
INJECTION, SOLUTION INTRAVENOUS CONTINUOUS
Status: DISCONTINUED | OUTPATIENT
Start: 2024-01-03 | End: 2024-01-03 | Stop reason: HOSPADM

## 2024-01-03 RX ORDER — ONDANSETRON 2 MG/ML
INJECTION INTRAMUSCULAR; INTRAVENOUS PRN
Status: DISCONTINUED | OUTPATIENT
Start: 2024-01-03 | End: 2024-01-03 | Stop reason: SDUPTHER

## 2024-01-03 RX ORDER — PROTAMINE SULFATE 10 MG/ML
INJECTION, SOLUTION INTRAVENOUS PRN
Status: DISCONTINUED | OUTPATIENT
Start: 2024-01-03 | End: 2024-01-03 | Stop reason: SDUPTHER

## 2024-01-03 RX ADMIN — Medication 3 AMPULE: at 14:24

## 2024-01-03 RX ADMIN — PROTAMINE SULFATE 20 MG: 10 INJECTION, SOLUTION INTRAVENOUS at 15:44

## 2024-01-03 RX ADMIN — WATER 2000 MG: 1 INJECTION INTRAMUSCULAR; INTRAVENOUS; SUBCUTANEOUS at 14:44

## 2024-01-03 RX ADMIN — ONDANSETRON 4 MG: 2 INJECTION INTRAMUSCULAR; INTRAVENOUS at 14:54

## 2024-01-03 RX ADMIN — Medication 40 MCG: at 15:42

## 2024-01-03 RX ADMIN — LIDOCAINE HYDROCHLORIDE 50 MG: 20 INJECTION, SOLUTION EPIDURAL; INFILTRATION; INTRACAUDAL; PERINEURAL at 14:37

## 2024-01-03 RX ADMIN — PROPOFOL 10 MG: 10 INJECTION, EMULSION INTRAVENOUS at 15:33

## 2024-01-03 RX ADMIN — VANCOMYCIN HYDROCHLORIDE 1500 MG: 1.5 INJECTION, POWDER, LYOPHILIZED, FOR SOLUTION INTRAVENOUS at 14:25

## 2024-01-03 RX ADMIN — PROPOFOL 90 MG: 10 INJECTION, EMULSION INTRAVENOUS at 15:35

## 2024-01-03 RX ADMIN — HEPARIN SODIUM 5000 UNITS: 5000 INJECTION INTRAVENOUS; SUBCUTANEOUS at 15:14

## 2024-01-03 RX ADMIN — PROPOFOL 20 MG: 10 INJECTION, EMULSION INTRAVENOUS at 14:10

## 2024-01-03 RX ADMIN — PROPOFOL 50 MCG/KG/MIN: 10 INJECTION, EMULSION INTRAVENOUS at 14:37

## 2024-01-03 RX ADMIN — SODIUM CHLORIDE: 9 INJECTION, SOLUTION INTRAVENOUS at 14:24

## 2024-01-03 RX ADMIN — MEPIVACAINE HYDROCHLORIDE 20 ML: 20 INJECTION, SOLUTION EPIDURAL; INFILTRATION at 14:14

## 2024-01-03 ASSESSMENT — COPD QUESTIONNAIRES: CAT_SEVERITY: MILD

## 2024-01-03 ASSESSMENT — PAIN SCALES - GENERAL: PAINLEVEL_OUTOF10: 0

## 2024-01-03 NOTE — H&P
Vascular Surgery History & Physical    Subjective:     Joss Jean Jr. is a 78 y.o.  male with CKD progressing towards dialysis that needs permanent access patient.    Past Medical History:   Diagnosis Date    Abnormal stress echo 5/12/2015    Anxiety     Borderline diabetes     CAD (coronary artery disease) 2009    cath ([facility]) revealed 20% RCA and 50% 2nd diagonal    Calculus of kidney     CKD (chronic kidney disease) stage 4, GFR 15-29 ml/min ([facility])     COPD, mild (HCC)     Followed by pulmonary associates    DJD (degenerative joint disease)     Environmental allergies     Fatty liver     GERD (gastroesophageal reflux disease) 10/11/2009    Gout     High cholesterol     History of blood transfusion     Hypertension     JAILYN on CPAP 10/11/2009    nasal pillows; pressure set at 10-11 -  uses cpap    Peripheral neuropathy 10/11/2009    bilateral feet (numbness)    Renal cell cancer, right (HCC) 01/2021    RLS (restless legs syndrome) 10/11/2009    S/P coronary artery stent placement 05/12/2015    PCI./HAO to LCx, 8/2019 PCI/HAO Prox LAD (RCA 40-50% lesions)    Staph infection     after foot surgery      Past Surgical History:   Procedure Laterality Date    BUNIONECTOMY Left 2019    CARDIAC CATHETERIZATION  2009, 7/17    per heart cath of 7/13/2017, discrete 40 % stenosis.in proximal LAD, discrete 40 % stenosis in proximal RCA, 30% stenosis in distal RCA    CAROTID ENDARTERECTOMY Left 01/2020    Followed by Dr. Fam    COLONOSCOPY FLX DX W/COLLJ SPEC WHEN PFRMD  8/5/2010         COLSC FLX W/RMVL OF TUMOR POLYP LESION SNARE TQ  9/24/2014         CORONARY ANGIOPLASTY WITH STENT PLACEMENT      S/P stent to left circumflex in May, 2015; s/p stent to LAD in August, 2019    CT BIOPSY RENAL  02/02/2021    CT BIOPSY RENAL 2/2/2021 [facility] RAD CT    HERNIA REPAIR      [place]/umbilical    LITHOTRIPSY      basket extraction of kidney stones    PARTIAL NEPHRECTOMY Right     SHOULDER SURGERY Left     left shoulder

## 2024-01-03 NOTE — ANESTHESIA POSTPROCEDURE EVALUATION
Department of Anesthesiology  Postprocedure Note    Patient: Joss Jean Jr.  MRN: [id number]  YOB: 1945  Date of evaluation: 1/3/2024    Procedure Summary       Date: 01/03/24 Room / Location: [facility] MAIN OR M3 / [facility] MAIN OR    Anesthesia Start: 1431 Anesthesia Stop: 1603    Procedure: LEFT BRACHIOCEPHALIC FISTULA CREATION (AXILLARY) (Left: Elbow) Diagnosis:       End stage renal disease (HCC)      (End stage renal disease (HCC) [N18.6])    Providers: Tima Fam MD Responsible Provider: Justin Earl MD    Anesthesia Type: MAC, Regional ASA Status: 3            Anesthesia Type: MAC, Regional    Hever Phase I: Hever Score: 10    Hever Phase II:      Anesthesia Post Evaluation    Patient location during evaluation: PACU  Patient participation: complete - patient participated  Level of consciousness: awake  Pain score: 0  Airway patency: patent  Nausea & Vomiting: no nausea and no vomiting  Cardiovascular status: hemodynamically stable  Respiratory status: acceptable  Hydration status: stable  Multimodal analgesia pain management approach  Pain management: adequate    No notable events documented.

## 2024-01-03 NOTE — OP NOTE
[facility]  OPERATIVE REPORT     Name: Joss Jean Jr.  MR#: [id number]  : 1945  DATE OF SERVICE:  24     PREOPERATIVE DIAGNOSIS: ESRD     POSTOPERATIVE DIAGNOSIS: ESRD     PROCEDURE PERFORMED:  Creation of left brachiocephalic fistula    SURGEON:  Tima Fam MD.     ANESTHESIA:  General.     COMPLICATIONS:  None.     SPECIMENS REMOVED:  None.     IMPLANTS:  None.     ESTIMATED BLOOD LOSS:  minimal     INDICATIONS:  The patient is a  78 year old male who has chronic kidney disease that is progressing towards towards dialysis and needs placement of permanent access. Vein mapping showed adequate left cephalic vein. Risks and benefits of procedure discussed with patient and they wished to proceed.         PROCEDURE: After informed consent was obtained, the patient was taken to the operating room. The patient was placed in the supine position. The patient received intravenous sedation. The left arm was prepped and draped in the usual sterile fashion.    We made a small transverse incision in the left cubital fossa. The cephalic vein was identified and mobilized. The fascia was incised, and the brachial artery was also identified and mobilized. The brachial artery was free off significant disease. A good pulse was noted. The cephalic vein was mobilized proximally and distally. The brachial artery was mobilized proximally and distally. Heparin was given. The brachial artery was then clamped proximally and distally. The cephalic vein was ligated distally and clamped proximally. Longitudinal arteriotomy was made in brachial artery. We then sewed the vein to the artery in a end-to-side fashion using a running 6-0 Prolene suture.    Just prior to completion of the anastomosis, it was flushed, and the anastomosis was then completed. A great thrill was noted. A great thrill remained in the fistula. Hemostasis was secured. We then closed the wound using interrupted 3-0 vicryl sutures

## 2024-01-03 NOTE — PERIOP NOTE
5PM  Pt assisted to sitting position to get dressed. Noted RUE swelling distal to incision, semi-firm. Bruit/thrill+  Pressure held and MD notified. Pt assisted back to supine with LUE elevated.     5:15 PM  Site now with minimal swelling, soft. Bruit/thrill +  MD updated.    5:24 PM  Site unchanged with minimal swelling, soft.   Pt to be discharged. Notified daughter to watch for any significant swelling or bleeding and how to intervene/notify MD if this occurs.

## 2024-01-03 NOTE — DISCHARGE INSTRUCTIONS
Patient Discharge Instructions    Joss Jean JrVicente / [id number] : 1945    Admitted 1/3/2024 Discharged: 1/3/2024     Take Home Medications       It is important that you take the medication exactly as they are prescribed.   Keep your medication in the bottles provided by the pharmacist and keep a list of the medication names, dosages, and times to be taken in your wallet.   Do not take other medications without consulting your doctor.       What to do at Home    Wound Care: You may shower.     Recommended activity: As Tolerated. No Strenuous activity or heavy lifting    If you experience any of the following symptoms: bleeding, hand numbness/weakness, fevers, chills then please call the office    Follow-up with Dr Fam in 2 weeks [phone]        Information obtained by :  I understand that if any problems occur once I am at home I am to contact my physician.    I understand and acknowledge receipt of the instructions indicated above.                                                                                                                                           Physician's or R.N.'s Signature                                                                  Date/Time                                                                                                                                              Patient or Representative Signature                                                          Date/Time      DISCHARGE SUMMARY from Nurse    PATIENT INSTRUCTIONS:    After general anesthesia or intravenous sedation, for 24 hours or while taking prescription narcotics:    Have someone responsible help you with your care  Limit your activities  Do not drive and operate hazardous machinery  Do not make important personal, legal or business decisions  Do not drink alcoholic beverages  If you have not urinated within 8 hours after discharge, please contact your surgeon on call  Resume your

## 2024-01-03 NOTE — ANESTHESIA PROCEDURE NOTES
Peripheral Block    Patient location during procedure: pre-op  Reason for block: post-op pain management and at surgeon's request  Start time: 1/3/2024 2:10 PM  End time: 1/3/2024 2:13 PM  Staffing  Performed: anesthesiologist   Anesthesiologist: Justin Earl MD  Performed by: Justin Earl MD  Authorized by: Justin Earl MD    Preanesthetic Checklist  Completed: patient identified, IV checked, site marked, risks and benefits discussed, surgical/procedural consents, equipment checked, pre-op evaluation, timeout performed, anesthesia consent given, oxygen available, monitors applied/VS acknowledged and fire risk safety assessment completed and verbalized  Peripheral Block   Patient position: sitting  Prep: ChloraPrep  Provider prep: mask and sterile gloves  Patient monitoring: cardiac monitor, continuous pulse ox, continuous capnometry, frequent blood pressure checks, IV access, oxygen and responsive to questions  Block type: Brachial plexus  Supraclavicular  Laterality: left  Injection technique: single-shot  Guidance: ultrasound guided  Local infiltration: lidocaine  Infiltration strength: 2 %  Local infiltration: lidocaine  Dose: 2 mL    Needle   Needle type: insulated echogenic nerve stimulator needle   Needle gauge: 22 G  Needle localization: anatomical landmarks  Needle length: 5 cm  Assessment   Injection assessment: negative aspiration for heme, no paresthesia on injection, local visualized surrounding nerve on ultrasound and no intravascular symptoms  Paresthesia pain: none  Slow fractionated injection: yes  Hemodynamics: stable  Real-time US image taken/store: yes  Outcomes: uncomplicated and patient tolerated procedure well

## 2024-01-03 NOTE — ANESTHESIA PRE PROCEDURE
12/29/2023 08:19 AM    CO2 26 12/29/2023 08:19 AM    BUN 35 12/29/2023 08:19 AM    CREATININE 2.80 12/29/2023 08:19 AM    GFRAA 15 10/07/2021 11:06 PM    AGRATIO 0.8 10/07/2021 11:06 PM    LABGLOM 22 12/29/2023 08:19 AM    GLUCOSE 151 12/29/2023 08:19 AM    PROT 6.7 12/11/2023 02:28 PM    CALCIUM 9.0 12/29/2023 08:19 AM    BILITOT 0.6 10/20/2023 12:38 PM    ALKPHOS 102 10/20/2023 12:38 PM    AST 19 10/20/2023 12:38 PM    ALT 13 10/20/2023 12:38 PM       POC Tests: No results for input(s): \"POCGLU\", \"POCNA\", \"POCK\", \"POCCL\", \"POCBUN\", \"POCHEMO\", \"POCHCT\" in the last 72 hours.    Coags:   Lab Results   Component Value Date/Time    PROTIME 10.9 01/08/2020 09:40 PM    INR 1.1 01/08/2020 09:40 PM    APTT 25.2 01/15/2020 07:25 AM       HCG (If Applicable): No results found for: \"PREGTESTUR\", \"PREGSERUM\", \"HCG\", \"HCGQUANT\"     ABGs: No results found for: \"PHART\", \"PO2ART\", \"KRB9DQY\", \"ZNM4INI\", \"BEART\", \"J9TULQCY\"     Type & Screen (If Applicable):  No results found for: \"LABABO\", \"LABRH\"    Drug/Infectious Status (If Applicable):  Lab Results   Component Value Date/Time    HEPCAB <0.1 07/26/2021 11:20 AM       COVID-19 Screening (If Applicable):   Lab Results   Component Value Date/Time    COVID19 Negative 09/22/2021 06:57 PM    COVID19 Not Detected 09/22/2021 12:00 AM    COVID19 Performed 09/22/2021 12:00 AM           Anesthesia Evaluation  Patient summary reviewed  Airway: Mallampati: II  TM distance: >3 FB   Neck ROM: full  Mouth opening: > = 3 FB   Dental: normal exam         Pulmonary:normal exam    (+)  COPD: mild,    sleep apnea:                                  Cardiovascular:  Exercise tolerance: poor (<4 METS)  (+) hypertension:, CAD: no interval change, CABG/stent: no interval change, hyperlipidemia        Rhythm: regular  Rate: normal                 ROS comment: 2021  : Estimated LVEF is 55 - 60%. Normal cavity size and systolic function (ejection fraction normal). Mild concentric hypertrophy. Mild

## 2024-01-31 ENCOUNTER — PROCEDURE VISIT (OUTPATIENT)
Age: 79
End: 2024-01-31
Payer: MEDICARE

## 2024-01-31 DIAGNOSIS — R26.0 SENSORY ATAXIC GAIT: ICD-10-CM

## 2024-01-31 DIAGNOSIS — G60.9 IDIOPATHIC SMALL FIBER PERIPHERAL NEUROPATHY: ICD-10-CM

## 2024-01-31 DIAGNOSIS — Z51.81 THERAPEUTIC DRUG MONITORING: ICD-10-CM

## 2024-01-31 DIAGNOSIS — D89.89 IMMUNE-MEDIATED NEUROPATHY (HCC): ICD-10-CM

## 2024-01-31 DIAGNOSIS — E66.01 SEVERE OBESITY (BMI 35.0-39.9) WITH COMORBIDITY (HCC): ICD-10-CM

## 2024-01-31 DIAGNOSIS — M47.27 LUMBOSACRAL RADICULOPATHY DUE TO DEGENERATIVE JOINT DISEASE OF SPINE: ICD-10-CM

## 2024-01-31 DIAGNOSIS — G63 IMMUNE-MEDIATED NEUROPATHY (HCC): ICD-10-CM

## 2024-01-31 DIAGNOSIS — E11.42 DIABETIC PERIPHERAL NEUROPATHY ASSOCIATED WITH TYPE 2 DIABETES MELLITUS (HCC): Primary | ICD-10-CM

## 2024-01-31 PROCEDURE — 95913 NRV CNDJ TEST 13/> STUDIES: CPT | Performed by: PSYCHIATRY & NEUROLOGY

## 2024-01-31 PROCEDURE — 95886 MUSC TEST DONE W/N TEST COMP: CPT | Performed by: PSYCHIATRY & NEUROLOGY

## 2024-01-31 NOTE — PROCEDURES
Nml Nml Nml Nml        Waveforms:                                               __________________  Dalton House M.D.

## 2024-02-01 NOTE — PROGRESS NOTES
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

## 2024-02-05 RX ORDER — ATORVASTATIN CALCIUM 80 MG/1
80 TABLET, FILM COATED ORAL DAILY
Qty: 90 TABLET | Refills: 3 | Status: SHIPPED | OUTPATIENT
Start: 2024-02-05

## 2024-02-29 RX ORDER — CITALOPRAM 40 MG/1
40 TABLET ORAL DAILY
Qty: 90 TABLET | Refills: 1 | Status: SHIPPED | OUTPATIENT
Start: 2024-02-29

## 2024-03-04 ENCOUNTER — TELEPHONE (OUTPATIENT)
Age: 79
End: 2024-03-04

## 2024-03-04 SDOH — ECONOMIC STABILITY: TRANSPORTATION INSECURITY
IN THE PAST 12 MONTHS, HAS LACK OF TRANSPORTATION KEPT YOU FROM MEETINGS, WORK, OR FROM GETTING THINGS NEEDED FOR DAILY LIVING?: NO

## 2024-03-04 SDOH — ECONOMIC STABILITY: INCOME INSECURITY: HOW HARD IS IT FOR YOU TO PAY FOR THE VERY BASICS LIKE FOOD, HOUSING, MEDICAL CARE, AND HEATING?: NOT HARD AT ALL

## 2024-03-04 SDOH — ECONOMIC STABILITY: HOUSING INSECURITY
IN THE LAST 12 MONTHS, WAS THERE A TIME WHEN YOU DID NOT HAVE A STEADY PLACE TO SLEEP OR SLEPT IN A SHELTER (INCLUDING NOW)?: NO

## 2024-03-04 SDOH — ECONOMIC STABILITY: FOOD INSECURITY: WITHIN THE PAST 12 MONTHS, THE FOOD YOU BOUGHT JUST DIDN'T LAST AND YOU DIDN'T HAVE MONEY TO GET MORE.: NEVER TRUE

## 2024-03-04 SDOH — ECONOMIC STABILITY: FOOD INSECURITY: WITHIN THE PAST 12 MONTHS, YOU WORRIED THAT YOUR FOOD WOULD RUN OUT BEFORE YOU GOT MONEY TO BUY MORE.: NEVER TRUE

## 2024-03-04 NOTE — TELEPHONE ENCOUNTER
Patient's Daughter, Waleska, states she needs a call back to discuss Patient fall yesterday off of Lawnmower & has a Big Bruise & Knot on Left Leg. Please call to discuss Plan of Care or if patient can be seen by Dr. Gonzalez. Thank you

## 2024-03-04 NOTE — TELEPHONE ENCOUNTER
Patient daughter states he fell off the lawnmower. He has a bruise on his leg. Patient will come in on Wed 3/6.

## 2024-03-05 NOTE — PROGRESS NOTES
Joss Jean Jr. is a 78 y.o. male who presents with daughter with report of injury from recent fall on 4 days ago.  Fell onto riding . Swelling and bruising of left leg,  redness of left foot.  No pain with weight bearing.  Has PAD, peripheral neuropathy.          Past Medical History:   Diagnosis Date    Abnormal stress echo 5/12/2015    Anxiety     Borderline diabetes     CAD (coronary artery disease) 2009    cath ([facility]) revealed 20% RCA and 50% 2nd diagonal    Calculus of kidney     CKD (chronic kidney disease) stage 4, GFR 15-29 ml/min ([facility])     COPD, mild ([facility])     Followed by pulmonary associates    Depression     DJD (degenerative joint disease)     Environmental allergies     Fatty liver     GERD (gastroesophageal reflux disease) 10/11/2009    Gout     High cholesterol     History of blood transfusion     Hypertension     Obesity     JAILYN on CPAP 10/11/2009    nasal pillows; pressure set at 10-11 -  uses cpap    Peripheral neuropathy 10/11/2009    bilateral feet (numbness)    Renal cell cancer, right (HCC) 01/2021    Restless legs syndrome     RLS (restless legs syndrome) 10/11/2009    S/P coronary artery stent placement 05/12/2015    PCI./HAO to LCx, 8/2019 PCI/HAO Prox LAD (RCA 40-50% lesions)    Staph infection     after foot surgery       Family History   Problem Relation Age of Onset    Heart Disease Father     Hypertension Father     Other Father         abdominal aneurysm     Dementia Mother     Alzheimer's Disease Mother     Heart Disease Brother     Heart Attack Brother     Heart Disease Maternal Grandmother     Heart Disease Paternal Grandmother     Heart Attack Paternal Grandmother     Heart Disease Paternal Grandfather     Heart Attack Paternal Grandfather     Elevated Lipids Son     Elevated Lipids Daughter     Stroke Neg Hx     Cancer Neg Hx     Diabetes Neg Hx         Social History     Socioeconomic History    Marital status:      Spouse name: Not on file    Number of

## 2024-03-06 ENCOUNTER — OFFICE VISIT (OUTPATIENT)
Age: 79
End: 2024-03-06
Payer: MEDICARE

## 2024-03-06 ENCOUNTER — HOSPITAL ENCOUNTER (OUTPATIENT)
Facility: HOSPITAL | Age: 79
Discharge: HOME | End: 2024-03-09
Payer: MEDICARE

## 2024-03-06 VITALS
HEART RATE: 85 BPM | HEIGHT: 69 IN | WEIGHT: 261.4 LBS | TEMPERATURE: 99.5 F | OXYGEN SATURATION: 94 % | DIASTOLIC BLOOD PRESSURE: 59 MMHG | BODY MASS INDEX: 38.72 KG/M2 | RESPIRATION RATE: 16 BRPM | SYSTOLIC BLOOD PRESSURE: 108 MMHG

## 2024-03-06 DIAGNOSIS — L03.116 CELLULITIS OF LEFT LOWER EXTREMITY: ICD-10-CM

## 2024-03-06 DIAGNOSIS — S80.12XA CONTUSION OF LEFT LOWER LEG, INITIAL ENCOUNTER: Primary | ICD-10-CM

## 2024-03-06 DIAGNOSIS — S80.12XA CONTUSION OF LEFT LOWER LEG, INITIAL ENCOUNTER: ICD-10-CM

## 2024-03-06 PROCEDURE — 1036F TOBACCO NON-USER: CPT | Performed by: FAMILY MEDICINE

## 2024-03-06 PROCEDURE — G8427 DOCREV CUR MEDS BY ELIG CLIN: HCPCS | Performed by: FAMILY MEDICINE

## 2024-03-06 PROCEDURE — 73590 X-RAY EXAM OF LOWER LEG: CPT

## 2024-03-06 PROCEDURE — G8417 CALC BMI ABV UP PARAM F/U: HCPCS | Performed by: FAMILY MEDICINE

## 2024-03-06 PROCEDURE — 3074F SYST BP LT 130 MM HG: CPT | Performed by: FAMILY MEDICINE

## 2024-03-06 PROCEDURE — 1123F ACP DISCUSS/DSCN MKR DOCD: CPT | Performed by: FAMILY MEDICINE

## 2024-03-06 PROCEDURE — 99213 OFFICE O/P EST LOW 20 MIN: CPT | Performed by: FAMILY MEDICINE

## 2024-03-06 PROCEDURE — G8484 FLU IMMUNIZE NO ADMIN: HCPCS | Performed by: FAMILY MEDICINE

## 2024-03-06 PROCEDURE — 3078F DIAST BP <80 MM HG: CPT | Performed by: FAMILY MEDICINE

## 2024-03-06 RX ORDER — FERROUS SULFATE 325(65) MG
325 TABLET ORAL
COMMUNITY
Start: 2024-01-10

## 2024-03-06 RX ORDER — DOXYCYCLINE HYCLATE 100 MG
100 TABLET ORAL 2 TIMES DAILY
Qty: 14 TABLET | Refills: 0 | Status: SHIPPED | OUTPATIENT
Start: 2024-03-06 | End: 2024-03-13

## 2024-03-06 ASSESSMENT — PATIENT HEALTH QUESTIONNAIRE - PHQ9
6. FEELING BAD ABOUT YOURSELF - OR THAT YOU ARE A FAILURE OR HAVE LET YOURSELF OR YOUR FAMILY DOWN: 0
8. MOVING OR SPEAKING SO SLOWLY THAT OTHER PEOPLE COULD HAVE NOTICED. OR THE OPPOSITE, BEING SO FIGETY OR RESTLESS THAT YOU HAVE BEEN MOVING AROUND A LOT MORE THAN USUAL: 0
2. FEELING DOWN, DEPRESSED OR HOPELESS: 0
SUM OF ALL RESPONSES TO PHQ QUESTIONS 1-9: 0
SUM OF ALL RESPONSES TO PHQ QUESTIONS 1-9: 0
4. FEELING TIRED OR HAVING LITTLE ENERGY: 0
7. TROUBLE CONCENTRATING ON THINGS, SUCH AS READING THE NEWSPAPER OR WATCHING TELEVISION: 0
3. TROUBLE FALLING OR STAYING ASLEEP: 0
1. LITTLE INTEREST OR PLEASURE IN DOING THINGS: 0
9. THOUGHTS THAT YOU WOULD BE BETTER OFF DEAD, OR OF HURTING YOURSELF: 0
SUM OF ALL RESPONSES TO PHQ QUESTIONS 1-9: 0
SUM OF ALL RESPONSES TO PHQ9 QUESTIONS 1 & 2: 0
SUM OF ALL RESPONSES TO PHQ QUESTIONS 1-9: 0
5. POOR APPETITE OR OVEREATING: 0

## 2024-03-06 NOTE — PROGRESS NOTES
\"Have you been to the ER, urgent care clinic since your last visit?  Hospitalized since your last visit?\"    NO    “Have you seen or consulted any other health care providers outside of [facility] since your last visit?”    NO

## 2024-03-07 ENCOUNTER — TELEPHONE (OUTPATIENT)
Age: 79
End: 2024-03-07

## 2024-03-07 NOTE — TELEPHONE ENCOUNTER
Please notify patient's family x-ray negative for fracture.  Knee replacement looks stable, soft tissue swelling seen.  X-ray is as expected.

## 2024-03-11 ENCOUNTER — TELEPHONE (OUTPATIENT)
Age: 79
End: 2024-03-11

## 2024-03-11 DIAGNOSIS — L03.116 CELLULITIS OF LEFT LOWER EXTREMITY: Primary | ICD-10-CM

## 2024-03-11 RX ORDER — CEPHALEXIN 500 MG/1
500 CAPSULE ORAL 3 TIMES DAILY
Qty: 30 CAPSULE | Refills: 0 | Status: SHIPPED | OUTPATIENT
Start: 2024-03-11

## 2024-03-11 NOTE — TELEPHONE ENCOUNTER
Called and spoke to patients daughter. Informed new medication was added and he should elevate leg

## 2024-03-11 NOTE — TELEPHONE ENCOUNTER
Foot ankle and shin has blisters . Patients left leg is still swollen and red. She states that it has a yellow tone to the outside . She does not think the antibiotics are working. Do you want to see him again?

## 2024-03-11 NOTE — TELEPHONE ENCOUNTER
Patient's daughter, Waleska, states she needs a call back to discuss Patient's Persistent Leg Swelling & Redness that patient was seen for last week for Fall that happened.. Waleska needs a call back to discuss as she is concerned antibiotics are not working. Please call. Thank you

## 2024-03-11 NOTE — TELEPHONE ENCOUNTER
Waleska states that antibiotic called into pharm is not good for his kidneys the pharmacist states.     Not sure what pt can take as all goes through the kidneys that doctor can prescribe.      Waleska is not sure at this point what you want to do.    Please call to let her know.     Waiting on call back from Dr. Santamaria.

## 2024-03-19 ENCOUNTER — TELEPHONE (OUTPATIENT)
Age: 79
End: 2024-03-19

## 2024-03-19 ENCOUNTER — HOSPITAL ENCOUNTER (OUTPATIENT)
Facility: HOSPITAL | Age: 79
Discharge: HOME | End: 2024-03-22
Payer: MEDICARE

## 2024-03-19 VITALS
WEIGHT: 255.95 LBS | HEART RATE: 69 BPM | TEMPERATURE: 98.3 F | OXYGEN SATURATION: 99 % | BODY MASS INDEX: 37.91 KG/M2 | HEIGHT: 69 IN | RESPIRATION RATE: 16 BRPM

## 2024-03-19 LAB
ANION GAP SERPL CALC-SCNC: 5 MMOL/L (ref 5–15)
BUN SERPL-MCNC: 43 MG/DL (ref 6–20)
BUN/CREAT SERPL: 14 (ref 12–20)
CALCIUM SERPL-MCNC: 8.7 MG/DL (ref 8.5–10.1)
CHLORIDE SERPL-SCNC: 108 MMOL/L (ref 97–108)
CO2 SERPL-SCNC: 27 MMOL/L (ref 21–32)
CREAT SERPL-MCNC: 3.12 MG/DL (ref 0.7–1.3)
ERYTHROCYTE [DISTWIDTH] IN BLOOD BY AUTOMATED COUNT: 15.6 % (ref 11.5–14.5)
GLUCOSE SERPL-MCNC: 108 MG/DL (ref 65–100)
HCT VFR BLD AUTO: 34.2 % (ref 36.6–50.3)
HGB BLD-MCNC: 10.7 G/DL (ref 12.1–17)
MCH RBC QN AUTO: 30.5 PG (ref 26–34)
MCHC RBC AUTO-ENTMCNC: 31.3 G/DL (ref 30–36.5)
MCV RBC AUTO: 97.4 FL (ref 80–99)
NRBC # BLD: 0 K/UL (ref 0–0.01)
NRBC BLD-RTO: 0 PER 100 WBC
PLATELET # BLD AUTO: 237 K/UL (ref 150–400)
PMV BLD AUTO: 9.2 FL (ref 8.9–12.9)
POTASSIUM SERPL-SCNC: 3.6 MMOL/L (ref 3.5–5.1)
RBC # BLD AUTO: 3.51 M/UL (ref 4.1–5.7)
SODIUM SERPL-SCNC: 140 MMOL/L (ref 136–145)
WBC # BLD AUTO: 12.6 K/UL (ref 4.1–11.1)

## 2024-03-19 PROCEDURE — 85027 COMPLETE CBC AUTOMATED: CPT

## 2024-03-19 PROCEDURE — 36415 COLL VENOUS BLD VENIPUNCTURE: CPT

## 2024-03-19 PROCEDURE — 80048 BASIC METABOLIC PNL TOTAL CA: CPT

## 2024-03-19 RX ORDER — SODIUM CHLORIDE 9 MG/ML
INJECTION, SOLUTION INTRAVENOUS CONTINUOUS
OUTPATIENT
Start: 2024-03-25

## 2024-03-19 ASSESSMENT — PAIN DESCRIPTION - DESCRIPTORS: DESCRIPTORS: SORE

## 2024-03-19 ASSESSMENT — PAIN DESCRIPTION - ORIENTATION: ORIENTATION: LEFT;OUTER

## 2024-03-19 ASSESSMENT — PAIN DESCRIPTION - LOCATION: LOCATION: LEG

## 2024-03-19 ASSESSMENT — PAIN SCALES - GENERAL: PAINLEVEL_OUTOF10: 3

## 2024-03-19 NOTE — PROGRESS NOTES
At PAT appointment, patient's daughter states that the patient fell on 3/3/24 and was seen by PCP on 3/6/24 for left leg swelling, bruising, and redness on top of left foot. Patient given antibiotics for cellulitis per PCP note. Patient then changed to another antibiotic because it was not improving. Patient still on keflex. Patient's daughter states patient's top of right foot is now red. Assessed both feet. Top of right foot is red and warm. No drainage or open areas. Rabia Garcia, NP at bedside to evaluate his foot. She told patient and daughter to get an appointment with his PCP this week to evaluate his right foot redness. They verbalized understanding.   3/22/24 1051.  Spoke with patient's daughter, Waleska, to follow up on patient's foot redness. She states it is not any better or worse. She has spoken to PCP office and the PCP said to call the nephrologist, Dr. Santamaria. She has a call out to his office this morning. She asked if patient can take his medicine for gout (colchicine) and I told her he could take it prior to surgery.   
EKG done on 5/5/23 and copy in media.  
your hair loose or down, no ponytails, buns, russell pins or clips.  All body piercings must be removed.  Please shower with antibacterial soap for three consecutive days before and on the morning of surgery, but do not apply any lotions, powders or deodorants after the shower on the day of surgery. Please use a fresh towels after each shower. Please sleep in clean clothes and change bed linens the night before surgery.  Please do not shave for 48 hours prior to surgery. Shaving of the face is acceptable.    7. You should understand that if you do not follow these instructions your surgery may be cancelled.  If your physical condition changes (I.e. fever, cold or flu) please contact your surgeon as soon as possible.    8. It is important that you be on time.  If a situation occurs where you may be late, please call [phone] (OR Holding Area).    9. If you have any questions and or problems, please call [phone] (Pre-admission Testing).    10. Your surgery time may be subject to change.  You will receive a phone call the evening prior if your time changes.    11.  If having outpatient surgery, you must have someone to drive you here, stay with you during the duration of your stay, and to drive you home at time of discharge.       TAKE ALL MEDICATIONS DAY OF SURGERY EXCEPT:can take all morning meds with sip of water      I understand a pre-operative phone call will be made to verify my surgery time.  In the event that I am not available, I give permission for a message to be left on my answering service and/or with another person?  yes         ___________________      __________   _________    (Signature of Patient)             (Witness)                (Date and Time)

## 2024-03-19 NOTE — TELEPHONE ENCOUNTER
Pt's daughter called the office requesting an appt. Pt is having surgery on 3/25. Pt had a fall and PCP gave pt antibiotics to help and now top of right foot is red and both feet are swollen. Pt was advised to see PCP. Pt has been elevating both feet but nothing seems to help.

## 2024-03-20 NOTE — TELEPHONE ENCOUNTER
Patient had a  fall you gave him  antibiotics to help and now top of right foot is red and both feet are swollen.  Pt has been elevating both feet but nothing seems to help.

## 2024-03-21 NOTE — TELEPHONE ENCOUNTER
Spoke with pt's daughter notified her of Dr. Gonzalez's recommendations  Pt verbalized understanding      Patient is already on Lasix 80 mg daily and has kidney failure.  Chronic swelling is very difficult to treat with his condition.  I recommend he schedule an appointment with his nephrologist to see if the nephrologist can help with his chronic swelling

## 2024-03-22 ENCOUNTER — TELEMEDICINE (OUTPATIENT)
Age: 79
End: 2024-03-22
Payer: MEDICARE

## 2024-03-22 DIAGNOSIS — G47.33 OBSTRUCTIVE SLEEP APNEA (ADULT) (PEDIATRIC): Primary | ICD-10-CM

## 2024-03-22 DIAGNOSIS — N18.4 CKD (CHRONIC KIDNEY DISEASE) STAGE 4, GFR 15-29 ML/MIN (HCC): ICD-10-CM

## 2024-03-22 DIAGNOSIS — I10 ESSENTIAL (PRIMARY) HYPERTENSION: ICD-10-CM

## 2024-03-22 PROCEDURE — G8417 CALC BMI ABV UP PARAM F/U: HCPCS | Performed by: NURSE PRACTITIONER

## 2024-03-22 PROCEDURE — G8427 DOCREV CUR MEDS BY ELIG CLIN: HCPCS | Performed by: NURSE PRACTITIONER

## 2024-03-22 PROCEDURE — 1123F ACP DISCUSS/DSCN MKR DOCD: CPT | Performed by: NURSE PRACTITIONER

## 2024-03-22 PROCEDURE — 99214 OFFICE O/P EST MOD 30 MIN: CPT | Performed by: NURSE PRACTITIONER

## 2024-03-22 PROCEDURE — 1036F TOBACCO NON-USER: CPT | Performed by: NURSE PRACTITIONER

## 2024-03-22 PROCEDURE — G8484 FLU IMMUNIZE NO ADMIN: HCPCS | Performed by: NURSE PRACTITIONER

## 2024-03-22 ASSESSMENT — SLEEP AND FATIGUE QUESTIONNAIRES
DO YOU HAVE DIFFICULTY BEING AS ACTIVE AS YOU WANT TO BE IN THE MORNING BECAUSE YOU ARE SLEEPY OR TIRED: YES, LITTLE
HOW LIKELY ARE YOU TO NOD OFF OR FALL ASLEEP WHEN YOU ARE A PASSENGER IN A CAR FOR AN HOUR WITHOUT A BREAK: SLIGHT CHANCE OF DOZING
HOW LIKELY ARE YOU TO NOD OFF OR FALL ASLEEP IN A CAR, WHILE STOPPED FOR A FEW MINUTES IN TRAFFIC: WOULD NEVER DOZE
HOW LIKELY ARE YOU TO NOD OFF OR FALL ASLEEP WHEN YOU ARE A PASSENGER IN A CAR FOR AN HOUR WITHOUT A BREAK: SLIGHT CHANCE OF DOZING
HOW LIKELY ARE YOU TO NOD OFF OR FALL ASLEEP WHILE SITTING AND READING: MODERATE CHANCE OF DOZING
DO YOU HAVE DIFFICULTY VISITING YOUR FAMILY OR FRIENDS IN THEIR HOME BECAUSE YOU BECOME SLEEPY OR TIRED: YES, A LITTLE
HOW LIKELY ARE YOU TO NOD OFF OR FALL ASLEEP WHILE SITTING QUIETLY AFTER LUNCH WITHOUT ALCOHOL: SLIGHT CHANCE OF DOZING
HOW LIKELY ARE YOU TO NOD OFF OR FALL ASLEEP WHILE SITTING AND READING: MODERATE CHANCE OF DOZING
HOW LIKELY ARE YOU TO NOD OFF OR FALL ASLEEP WHILE WATCHING TV: MODERATE CHANCE OF DOZING
DO YOU HAVE DIFFICULTY WATCHING A MOVIE OR VIDEO BECAUSE YOU BECOME SLEEPY OR TIRED: YES, A LITTLE
DO YOU HAVE DIFFICULTY OPERATING A MOTOR VEHICLE FOR SHORT DISTANCES (LESS THAN 100 MILES) BECAUSE YOU BECOME SLEEPY: NO
HOW LIKELY ARE YOU TO NOD OFF OR FALL ASLEEP WHILE LYING DOWN TO REST IN THE AFTERNOON WHEN CIRCUMSTANCES PERMIT: MODERATE CHANCE OF DOZING
HAS YOUR RELATIONSHIP WITH FAMILY, FRIENDS OR WORK COLLEAGUES BEEN AFFECTED BECAUSE YOU ARE SLEEPY OR TIRED: NO
HAS YOUR MOOD BEEN AFFECTED BECAUSE YOU ARE SLEEPY OR TIRED: NO
DO YOU HAVE DIFFICULTY BEING AS ACTIVE AS YOU WANT TO BE IN THE EVENING BECAUSE YOU ARE SLEEPY OR TIRED: YES, LITTLE
HOW LIKELY ARE YOU TO NOD OFF OR FALL ASLEEP WHILE SITTING AND TALKING TO SOMEONE: SLIGHT CHANCE OF DOZING
DO YOU GENERALLY HAVE DIFFICULTY REMEMBERING THINGS BECAUSE YOU ARE SLEEPY OR TIRED: YES, A LITTLE
HOW LIKELY ARE YOU TO NOD OFF OR FALL ASLEEP WHILE SITTING INACTIVE IN A PUBLIC PLACE: SLIGHT CHANCE OF DOZING
HOW LIKELY ARE YOU TO NOD OFF OR FALL ASLEEP WHILE LYING DOWN TO REST IN THE AFTERNOON WHEN CIRCUMSTANCES PERMIT: MODERATE CHANCE OF DOZING
DO YOU HAVE DIFFICULTY CONCENTRATING ON THE THINGS YOU DO BECAUSE YOU ARE SLEEPY OR TIRED: YES, A LITTLE
DO YOU HAVE DIFFICULTY OPERATING A MOTOR VEHICLE FOR LONG DISTANCES (GREATER THAN 100 MILES) BECAUSE YOU BECOME SLEEPY: YES, A LITTLE
HOW LIKELY ARE YOU TO NOD OFF OR FALL ASLEEP WHILE SITTING INACTIVE IN A PUBLIC PLACE: SLIGHT CHANCE OF DOZING
FOSQ SCORE: 16.5
HOW LIKELY ARE YOU TO NOD OFF OR FALL ASLEEP IN A CAR, WHILE STOPPED FOR A FEW MINUTES IN TRAFFIC: WOULD NEVER DOZE
HOW LIKELY ARE YOU TO NOD OFF OR FALL ASLEEP WHILE SITTING AND TALKING TO SOMEONE: SLIGHT CHANCE OF DOZING
HOW LIKELY ARE YOU TO NOD OFF OR FALL ASLEEP WHILE SITTING QUIETLY AFTER LUNCH WITHOUT ALCOHOL: SLIGHT CHANCE OF DOZING
ESS TOTAL SCORE: 10
HOW LIKELY ARE YOU TO NOD OFF OR FALL ASLEEP WHILE WATCHING TV: MODERATE CHANCE OF DOZING

## 2024-03-22 NOTE — PATIENT INSTRUCTIONS
[address]  [place] [address]  Tel.  [phone]  Fax. [phone] [address]  [place] [address]  Tel.  [phone]  Fax. [phone] [address] [place] [address]  Tel.  [phone]  Fax. [phone]     Learning About CPAP for Sleep Apnea  What is CPAP?              CPAP is a small machine that you use at home every night while you sleep. It increases air pressure in your throat to keep your airway open. When you have sleep apnea, this can help you sleep better so you feel much better. CPAP stands for \"continuous positive airway pressure.\"  The CPAP machine will have one of the following:  A mask that covers your nose and mouth  Prongs that fit into your nose  A mask that covers your nose only, the most common type. This type is called NCPAP. The N stands for \"nasal.\"  Why is it done?  CPAP is usually the best treatment for obstructive sleep apnea. It is the first treatment choice and the most widely used. Your doctor may suggest CPAP if you have:  Moderate to severe sleep apnea.  Sleep apnea and coronary artery disease (CAD) or heart failure.  How does it help?  CPAP can help you have more normal sleep, so you feel less sleepy and more alert during the daytime.  CPAP may help keep heart failure or other heart problems from getting worse.  NCPAP may help lower your blood pressure.  If you use CPAP, your bed partner may also sleep better because you are not snoring or restless.  What are the side effects?  Some people who use CPAP have:  A dry or stuffy nose and a sore throat.  Irritated skin on the face.  Sore eyes.  Bloating.  If you have any of these problems, work with your doctor to fix them. Here are some things you can try:  Be sure the mask or nasal prongs fit well.  See if your doctor can adjust the pressure of your CPAP.  If your nose is dry, try a humidifier.  If your nose is runny or stuffy, try decongestant medicine or a steroid

## 2024-03-22 NOTE — PROGRESS NOTES
Joss Jean Jr. (: 1945) is a 78 y.o. male, established patient, seen for positive airway pressure follow-up, he was last seen by me on 11/10/2023, previously seen by Dr. Romero on 10/3/2019, prior notes and sleep testing reviewed in detail.  Initial sleep apnea diagnosis 25+ years ago.  In lab split sleep test 2014 showed AHI of 49.5/hr with a lowest SpO2 of 82%, adequate CPAP titration.       ASSESSMENT/PLAN:   Diagnosis Orders   1. Obstructive sleep apnea (adult) (pediatric)  SLEEP LAB (PAP TITRATION)      2. Essential (primary) hypertension        3. CKD (chronic kidney disease) stage 4, GFR 15-29 ml/min ([facility])        4. Adult BMI 37.0-37.9 kg/sq m            AHI = 49.5(2014).  On Respironics DS 2 APAP:  14-17 cmH2O. Set up 7/15/2019.  New device set up 2022.       He is adherent with PAP therapy and PAP continues to benefit patient and remains necessary for control of his sleep apnea.  His AHI remains elevated at max pressure.    Follow-up and Dispositions    Return in about 6 months (around 2024) for compliance follow up.         Sleep Apnea -  Sleep apnea condition has been exacerbated.  Change in treatment plan is needed.  His clinical response has not been adequate to date, patient continues to have elevated AHI despite use of APAP device. CPAP failure. In lab sleep study BiPAP titration to determine optimal BIPAP device pressure and complete mask desensitization.      Orders Placed This Encounter   Procedures    SLEEP LAB (PAP TITRATION)     Standing Status:   Future     Standing Expiration Date:   3/22/2025     Scheduling Instructions:      Please route to Dr. Romero for Interp.            BiPAP titration       *  Counseling was provided regarding the importance of regular PAP use with emphasis on ensuring sufficient total sleep time, proper sleep hygiene, and safe driving.    * Re-enforced proper and regular cleaning for the device.    * He was asked to contact our office for any

## 2024-03-25 ENCOUNTER — HOSPITAL ENCOUNTER (OUTPATIENT)
Facility: HOSPITAL | Age: 79
Setting detail: OUTPATIENT SURGERY
Discharge: HOME | End: 2024-03-25
Attending: SURGERY | Admitting: SURGERY
Payer: MEDICARE

## 2024-03-25 ENCOUNTER — ANESTHESIA EVENT (OUTPATIENT)
Facility: HOSPITAL | Age: 79
End: 2024-03-25
Payer: MEDICARE

## 2024-03-25 ENCOUNTER — ANESTHESIA (OUTPATIENT)
Facility: HOSPITAL | Age: 79
End: 2024-03-25
Payer: MEDICARE

## 2024-03-25 VITALS
WEIGHT: 250.44 LBS | RESPIRATION RATE: 17 BRPM | SYSTOLIC BLOOD PRESSURE: 124 MMHG | BODY MASS INDEX: 33.92 KG/M2 | TEMPERATURE: 98 F | DIASTOLIC BLOOD PRESSURE: 64 MMHG | HEIGHT: 72 IN | HEART RATE: 60 BPM | OXYGEN SATURATION: 94 %

## 2024-03-25 DIAGNOSIS — N18.6 ESRD (END STAGE RENAL DISEASE) ON DIALYSIS (HCC): Primary | ICD-10-CM

## 2024-03-25 DIAGNOSIS — Z99.2 ESRD (END STAGE RENAL DISEASE) ON DIALYSIS (HCC): Primary | ICD-10-CM

## 2024-03-25 LAB
ANION GAP BLD CALC-SCNC: 10.9 MMOL/L (ref 10–20)
CA-I BLD-MCNC: 1.15 MMOL/L (ref 1.12–1.32)
CHLORIDE BLD-SCNC: 108 MMOL/L (ref 98–107)
CO2 BLD-SCNC: 25.1 MMOL/L (ref 21–32)
CREAT BLD-MCNC: 3.15 MG/DL (ref 0.6–1.3)
GLUCOSE BLD-MCNC: 86 MG/DL (ref 65–100)
POTASSIUM BLD-SCNC: 3.6 MMOL/L (ref 3.5–5.1)
SERVICE CMNT-IMP: ABNORMAL
SODIUM BLD-SCNC: 144 MMOL/L (ref 136–145)

## 2024-03-25 PROCEDURE — 7100000001 HC PACU RECOVERY - ADDTL 15 MIN: Performed by: SURGERY

## 2024-03-25 PROCEDURE — 2500000003 HC RX 250 WO HCPCS: Performed by: ANESTHESIOLOGY

## 2024-03-25 PROCEDURE — 2709999900 HC NON-CHARGEABLE SUPPLY: Performed by: SURGERY

## 2024-03-25 PROCEDURE — 7100000000 HC PACU RECOVERY - FIRST 15 MIN: Performed by: SURGERY

## 2024-03-25 PROCEDURE — 6360000002 HC RX W HCPCS: Performed by: ANESTHESIOLOGY

## 2024-03-25 PROCEDURE — 80047 BASIC METABLC PNL IONIZED CA: CPT

## 2024-03-25 PROCEDURE — A4217 STERILE WATER/SALINE, 500 ML: HCPCS | Performed by: SURGERY

## 2024-03-25 PROCEDURE — 2580000003 HC RX 258: Performed by: SURGERY

## 2024-03-25 PROCEDURE — 64415 NJX AA&/STRD BRCH PLXS IMG: CPT | Performed by: STUDENT IN AN ORGANIZED HEALTH CARE EDUCATION/TRAINING PROGRAM

## 2024-03-25 PROCEDURE — 7100000010 HC PHASE II RECOVERY - FIRST 15 MIN: Performed by: SURGERY

## 2024-03-25 PROCEDURE — 6360000002 HC RX W HCPCS: Performed by: STUDENT IN AN ORGANIZED HEALTH CARE EDUCATION/TRAINING PROGRAM

## 2024-03-25 PROCEDURE — 3600000012 HC SURGERY LEVEL 2 ADDTL 15MIN: Performed by: SURGERY

## 2024-03-25 PROCEDURE — 6360000002 HC RX W HCPCS: Performed by: SURGERY

## 2024-03-25 PROCEDURE — 2580000003 HC RX 258: Performed by: ANESTHESIOLOGY

## 2024-03-25 PROCEDURE — 3700000000 HC ANESTHESIA ATTENDED CARE: Performed by: SURGERY

## 2024-03-25 PROCEDURE — 2500000003 HC RX 250 WO HCPCS: Performed by: STUDENT IN AN ORGANIZED HEALTH CARE EDUCATION/TRAINING PROGRAM

## 2024-03-25 PROCEDURE — 3600000002 HC SURGERY LEVEL 2 BASE: Performed by: SURGERY

## 2024-03-25 PROCEDURE — 7100000011 HC PHASE II RECOVERY - ADDTL 15 MIN: Performed by: SURGERY

## 2024-03-25 PROCEDURE — 3700000001 HC ADD 15 MINUTES (ANESTHESIA): Performed by: SURGERY

## 2024-03-25 PROCEDURE — 6370000000 HC RX 637 (ALT 250 FOR IP): Performed by: SURGERY

## 2024-03-25 RX ORDER — NALOXONE HYDROCHLORIDE 0.4 MG/ML
INJECTION, SOLUTION INTRAMUSCULAR; INTRAVENOUS; SUBCUTANEOUS PRN
Status: DISCONTINUED | OUTPATIENT
Start: 2024-03-25 | End: 2024-03-25 | Stop reason: HOSPADM

## 2024-03-25 RX ORDER — DEXTROSE MONOHYDRATE 100 MG/ML
INJECTION, SOLUTION INTRAVENOUS CONTINUOUS PRN
Status: DISCONTINUED | OUTPATIENT
Start: 2024-03-25 | End: 2024-03-25 | Stop reason: HOSPADM

## 2024-03-25 RX ORDER — SODIUM CHLORIDE 0.9 % (FLUSH) 0.9 %
5-40 SYRINGE (ML) INJECTION EVERY 12 HOURS SCHEDULED
Status: DISCONTINUED | OUTPATIENT
Start: 2024-03-25 | End: 2024-03-25 | Stop reason: HOSPADM

## 2024-03-25 RX ORDER — PROCHLORPERAZINE EDISYLATE 5 MG/ML
5 INJECTION INTRAMUSCULAR; INTRAVENOUS
Status: DISCONTINUED | OUTPATIENT
Start: 2024-03-25 | End: 2024-03-25 | Stop reason: HOSPADM

## 2024-03-25 RX ORDER — IPRATROPIUM BROMIDE AND ALBUTEROL SULFATE 2.5; .5 MG/3ML; MG/3ML
1 SOLUTION RESPIRATORY (INHALATION)
Status: DISCONTINUED | OUTPATIENT
Start: 2024-03-25 | End: 2024-03-25 | Stop reason: HOSPADM

## 2024-03-25 RX ORDER — SODIUM CHLORIDE 0.9 % (FLUSH) 0.9 %
5-40 SYRINGE (ML) INJECTION PRN
Status: DISCONTINUED | OUTPATIENT
Start: 2024-03-25 | End: 2024-03-25 | Stop reason: HOSPADM

## 2024-03-25 RX ORDER — PHENYLEPHRINE HCL IN 0.9% NACL 0.4MG/10ML
SYRINGE (ML) INTRAVENOUS PRN
Status: DISCONTINUED | OUTPATIENT
Start: 2024-03-25 | End: 2024-03-25 | Stop reason: SDUPTHER

## 2024-03-25 RX ORDER — FENTANYL CITRATE 50 UG/ML
25 INJECTION, SOLUTION INTRAMUSCULAR; INTRAVENOUS EVERY 5 MIN PRN
Status: DISCONTINUED | OUTPATIENT
Start: 2024-03-25 | End: 2024-03-25 | Stop reason: HOSPADM

## 2024-03-25 RX ORDER — HYDROMORPHONE HYDROCHLORIDE 1 MG/ML
0.5 INJECTION, SOLUTION INTRAMUSCULAR; INTRAVENOUS; SUBCUTANEOUS EVERY 5 MIN PRN
Status: DISCONTINUED | OUTPATIENT
Start: 2024-03-25 | End: 2024-03-25 | Stop reason: HOSPADM

## 2024-03-25 RX ORDER — GLUCAGON 1 MG/ML
1 KIT INJECTION PRN
Status: DISCONTINUED | OUTPATIENT
Start: 2024-03-25 | End: 2024-03-25 | Stop reason: HOSPADM

## 2024-03-25 RX ORDER — OXYCODONE HYDROCHLORIDE AND ACETAMINOPHEN 5; 325 MG/1; MG/1
1 TABLET ORAL EVERY 6 HOURS PRN
Qty: 25 TABLET | Refills: 0 | Status: SHIPPED | OUTPATIENT
Start: 2024-03-25 | End: 2024-04-01

## 2024-03-25 RX ORDER — LIDOCAINE HYDROCHLORIDE 20 MG/ML
INJECTION, SOLUTION EPIDURAL; INFILTRATION; INTRACAUDAL; PERINEURAL PRN
Status: DISCONTINUED | OUTPATIENT
Start: 2024-03-25 | End: 2024-03-25 | Stop reason: SDUPTHER

## 2024-03-25 RX ORDER — ONDANSETRON 2 MG/ML
4 INJECTION INTRAMUSCULAR; INTRAVENOUS
Status: DISCONTINUED | OUTPATIENT
Start: 2024-03-25 | End: 2024-03-25 | Stop reason: HOSPADM

## 2024-03-25 RX ORDER — KETAMINE HCL IN NACL, ISO-OSM 100MG/10ML
SYRINGE (ML) INJECTION PRN
Status: DISCONTINUED | OUTPATIENT
Start: 2024-03-25 | End: 2024-03-25 | Stop reason: SDUPTHER

## 2024-03-25 RX ORDER — SODIUM CHLORIDE 9 MG/ML
INJECTION, SOLUTION INTRAVENOUS CONTINUOUS
Status: DISCONTINUED | OUTPATIENT
Start: 2024-03-25 | End: 2024-03-25 | Stop reason: HOSPADM

## 2024-03-25 RX ORDER — ONDANSETRON 2 MG/ML
INJECTION INTRAMUSCULAR; INTRAVENOUS PRN
Status: DISCONTINUED | OUTPATIENT
Start: 2024-03-25 | End: 2024-03-25 | Stop reason: SDUPTHER

## 2024-03-25 RX ORDER — SODIUM CHLORIDE 9 MG/ML
INJECTION, SOLUTION INTRAVENOUS PRN
Status: DISCONTINUED | OUTPATIENT
Start: 2024-03-25 | End: 2024-03-25 | Stop reason: HOSPADM

## 2024-03-25 RX ADMIN — PROPOFOL 30 MG: 10 INJECTION, EMULSION INTRAVENOUS at 07:31

## 2024-03-25 RX ADMIN — Medication 10 MG: at 07:50

## 2024-03-25 RX ADMIN — VANCOMYCIN HYDROCHLORIDE 1000 MG: 1.5 INJECTION, POWDER, LYOPHILIZED, FOR SOLUTION INTRAVENOUS at 07:28

## 2024-03-25 RX ADMIN — PROPOFOL 150 MCG/KG/MIN: 10 INJECTION, EMULSION INTRAVENOUS at 07:45

## 2024-03-25 RX ADMIN — Medication 40 MCG: at 08:30

## 2024-03-25 RX ADMIN — LIDOCAINE HYDROCHLORIDE 25 MG: 20 INJECTION, SOLUTION EPIDURAL; INFILTRATION; INTRACAUDAL; PERINEURAL at 07:45

## 2024-03-25 RX ADMIN — ONDANSETRON HYDROCHLORIDE 4 MG: 2 INJECTION, SOLUTION INTRAMUSCULAR; INTRAVENOUS at 08:27

## 2024-03-25 RX ADMIN — VANCOMYCIN HYDROCHLORIDE 1000 MG: 1 INJECTION, POWDER, LYOPHILIZED, FOR SOLUTION INTRAVENOUS at 07:21

## 2024-03-25 RX ADMIN — SODIUM CHLORIDE: 9 INJECTION, SOLUTION INTRAVENOUS at 07:05

## 2024-03-25 RX ADMIN — Medication 10 MG: at 07:45

## 2024-03-25 RX ADMIN — Medication 3 AMPULE: at 07:05

## 2024-03-25 RX ADMIN — Medication 10 MG: at 08:17

## 2024-03-25 RX ADMIN — MEPIVACAINE HYDROCHLORIDE 30 ML: 20 INJECTION, SOLUTION EPIDURAL; INFILTRATION at 07:35

## 2024-03-25 RX ADMIN — SODIUM CHLORIDE: 9 INJECTION, SOLUTION INTRAVENOUS at 07:22

## 2024-03-25 RX ADMIN — LIDOCAINE HYDROCHLORIDE 10 ML: 20 INJECTION, SOLUTION EPIDURAL; INFILTRATION; INTRACAUDAL; PERINEURAL at 07:36

## 2024-03-25 RX ADMIN — PROPOFOL 100 MCG/KG/MIN: 10 INJECTION, EMULSION INTRAVENOUS at 07:50

## 2024-03-25 RX ADMIN — Medication 80 MCG: at 08:35

## 2024-03-25 ASSESSMENT — PAIN - FUNCTIONAL ASSESSMENT
PAIN_FUNCTIONAL_ASSESSMENT: 0-10
PAIN_FUNCTIONAL_ASSESSMENT: 0-10

## 2024-03-25 ASSESSMENT — COPD QUESTIONNAIRES: CAT_SEVERITY: MILD

## 2024-03-25 NOTE — PROGRESS NOTES
Patient meets d/c criteria, patient reports no pain, d/c instructions provided to patient and daughter, allowed time for questions, written instructions provided, iv removed, patient assisted with dressing, patient d/c'd with glasses, transported to car in wheelchair

## 2024-03-25 NOTE — ANESTHESIA POSTPROCEDURE EVALUATION
Department of Anesthesiology  Postprocedure Note    Patient: Joss Jean Jr.  MRN: [id number]  YOB: 1945  Date of evaluation: 3/25/2024    Procedure Summary       Date: 03/25/24 Room / Location: [facility] MAIN OR M2 / [facility] MA[facility] OR    Anesthesia Start: 0742 Anesthesia Stop: 0857    Procedure: LEFT ARM BRACHIOCEPHALIC ARTERIOVENOUS FISTULA--STAGE 2 (Left: Arm Upper) Diagnosis:       End stage renal disease (HCC)      (End stage renal disease (HCC) [N18.6])    Providers: Tima Fam MD Responsible Provider: Beck Mirza MD    Anesthesia Type: MAC, Regional ASA Status: 3            Anesthesia Type: MAC, Regional    Hever Phase I: Hever Score: 10    Hever Phase II:      Anesthesia Post Evaluation    Patient location during evaluation: PACU  Patient participation: complete - patient participated  Level of consciousness: awake and alert  Airway patency: patent  Nausea & Vomiting: no nausea  Cardiovascular status: hemodynamically stable  Respiratory status: acceptable  Hydration status: euvolemic  Multimodal analgesia pain management approach  Pain management: adequate    No notable events documented.

## 2024-03-25 NOTE — DISCHARGE INSTRUCTIONS
Patient Discharge Instructions    Joss Jean Jr. / [id number] : 1945    Admitted 3/25/2024 Discharged: 3/25/2024     Take Home Medications     {Medication reconciliation information is now added to the patient's AVS automatically when it is printed.  There is no need to use this SmartLink in discharge instructions.  Highlight this text and delete it to clear this message}       It is important that you take the medication exactly as they are prescribed.   Keep your medication in the bottles provided by the pharmacist and keep a list of the medication names, dosages, and times to be taken in your wallet.   Do not take other medications without consulting your doctor.       What to do at Home    Wound Care: You may leave open to air    Recommended activity: As Tolerated. No Strenuous activity or heavy lifting    If you experience any of the following symptoms: bleeding, hand numbness/weakness, fevers, chills then please call the office    Follow-up with Dr Fam in 2 weeks [phone]        Information obtained by :  I understand that if any problems occur once I am at home I am to contact my physician.    I understand and acknowledge receipt of the instructions indicated above.                                                                                                                                           Physician's or R.N.'s Signature                                                                  Date/Time                                                                                                                                              Patient or Representative Signature                                                          Date/Time

## 2024-03-25 NOTE — ANESTHESIA PRE PROCEDURE
Reconciliation, Ar   isosorbide mononitrate (IMDUR) 30 MG extended release tablet Take 0.5 tablets by mouth daily 12/6/21   Automatic Reconciliation, Ar   ketoconazole (NIZORAL) 2 % shampoo Apply topically daily as needed    Automatic Reconciliation, Ar   loratadine (CLARITIN) 10 MG tablet Take 1 tablet by mouth every evening    Automatic Reconciliation, Ar   metoprolol succinate (TOPROL XL) 25 MG extended release tablet Take 1 tablet by mouth every evening 11/8/21   Automatic Reconciliation, Ar   nitroGLYCERIN (NITROSTAT) 0.4 MG SL tablet Place 1 tablet under the tongue every 5 minutes as needed  Patient not taking: Reported on 3/25/2024 3/4/20   Automatic Reconciliation, Ar   pantoprazole (PROTONIX) 40 MG tablet Take 1 tablet by mouth daily 12/18/22   Automatic Reconciliation, Ar   sodium bicarbonate 325 MG tablet Take 1 tablet by mouth 2 times daily 9/15/21   Automatic Reconciliation, Ar   tamsulosin (FLOMAX) 0.4 MG capsule Take 1 capsule by mouth every evening 12/17/21   Automatic Reconciliation, Ar       Current medications:    Current Facility-Administered Medications   Medication Dose Route Frequency Provider Last Rate Last Admin    vancomycin (VANCOCIN) 1,500 mg in sodium chloride 0.9 % 250 mL IVPB ([id number])  1,500 mg IntraVENous Once Tima Fam MD        ceFAZolin (ANCEF) 2,000 mg in sterile water 20 mL IV syringe  2,000 mg IntraVENous Once Tima Fam MD        0.9 % sodium chloride infusion   IntraVENous Continuous Kurt Bowers MD        alcohol 62% (NOZIN) nasal  3 ampule  3 ampule Nasal Once Tima Fam MD        [id number] adaptor                Allergies:    Allergies   Allergen Reactions    Penicillins Hives and Other (See Comments)     Pt states he has taken Keflex before without problems    Codeine Itching and Other (See Comments)    Gabapentin Other (See Comments)     drowsy    Hydrocortisone Other (See Comments)    Lisinopril Cough    Pravastatin Other (See

## 2024-03-25 NOTE — OP NOTE
[facility]  OPERATIVE REPORT    Name:  Joss Jean Jr.  MR#:  [id number]  :  1945  DATE OF SERVICE: 24     PREOPERATIVE DIAGNOSIS:  End-stage renal disease.     POSTOPERATIVE DIAGNOSIS:  End-stage renal disease.     PROCEDURE PERFORMED:  Left brachiocephalic fistula superficialization.     SURGEON:  Tima Morejon MD     SURGICAL ASSISTANT:  Natalie    ANESTHESIA:  Axillary block/MAC     COMPLICATIONS:  None.     SPECIMENS REMOVED:  None.     IMPLANTS:  None.     ESTIMATED BLOOD LOSS:  Minimal.     INDICATIONS FOR PROCEDURE:  The patient is a 78-year-old male who previously had a brachiocephalic fistula with issues accessing and presents today for superficialization and transposition.  The risks and benefits of surgery were discussed with the patient and she wished to proceed.     DESCRIPTION OF PROCEDURE IN DETAIL:  The patient was brought into the operating room, and prepped and draped in the usual sterile fashion.  An incision was made in the upper arm overlying the fistula.  The brachiocephalic fistula was dissected free.  Side branches were ligated on the vein side with silk ties and clips on the other side. The fistula was then brought closer to the skin and the tissue underneath was closed with interrupted Vicryl sutures.  Next, the subcutaneous tissue was closed with 3-0 Vicryl followed by 4-0 Monocryl and skin glue.  All needle and sponge counts were correct at the end of the case.  The patient tolerated the procedure well and was taken to PACU in stable condition.        TIMA MOREJON MD

## 2024-03-25 NOTE — H&P
Vascular Surgery History & Physical    Subjective:     Joss Jean Jr. is a 78 y.o.  male with ESRD presenting for second stage left arm fistula     Past Medical History:   Diagnosis Date    Abnormal stress echo 5/12/2015    Anxiety     Borderline diabetes     CAD (coronary artery disease) 2009    cath ([facility]) revealed 20% RCA and 50% 2nd diagonal    Calculus of kidney     CKD (chronic kidney disease) stage 4, GFR 15-29 ml/min ([facility])     COPD, mild ([facility])     Followed by pulmonary associates    Depression     DJD (degenerative joint disease)     Environmental allergies     Fatty liver     GERD (gastroesophageal reflux disease) 10/11/2009    Gout     High cholesterol     History of blood transfusion     Hypertension     Obesity     JAILYN on CPAP 10/11/2009    nasal pillows; pressure set at 10-11 -  uses cpap    Peripheral neuropathy 10/11/2009    bilateral feet (numbness)    Renal cell cancer, right (HCC) 01/2021    RLS (restless legs syndrome) 10/11/2009    S/P coronary artery stent placement 05/12/2015    PCI./HAO to LCx, 8/2019 PCI/HAO Prox LAD (RCA 40-50% lesions)    Staph infection     after foot surgery      Past Surgical History:   Procedure Laterality Date    BUNIONECTOMY Left 2019    CARDIAC CATHETERIZATION  2009, 7/17    per heart cath of 7/13/2017, discrete 40 % stenosis.in proximal LAD, discrete 40 % stenosis in proximal RCA, 30% stenosis in distal RCA    CAROTID ENDARTERECTOMY Left 01/2020    Followed by Dr. Fam    COLONOSCOPY FLX DX W/COLLJ SPEC WHEN PFRMD  8/5/2010         COLSC FLX W/RMVL OF TUMOR POLYP LESION SNARE TQ  9/24/2014         CORONARY ANGIOPLASTY WITH STENT PLACEMENT      S/P stent to left circumflex in May, 2015; s/p stent to LAD in August, 2019    CT BIOPSY RENAL  02/02/2021    CT BIOPSY RENAL 2/2/2021 [facility] RAD CT    DIALYSIS FISTULA CREATION Left 01/03/2024    LEFT BRACHIOCEPHALIC FISTULA CREATION (AXILLARY) performed by Tima Fam MD at [facility] MAIN OR    EYE SURGERY

## 2024-03-25 NOTE — ANESTHESIA PROCEDURE NOTES
Peripheral Block    Patient location during procedure: holding area  Reason for block: post-op pain management, primary anesthetic and at surgeon's request  Start time: 3/25/2024 7:30 AM  End time: 3/25/2024 7:40 AM  Staffing  Performed: anesthesiologist   Anesthesiologist: Beck Mirza MD  Performed by: Beck Mirza MD  Authorized by: Beck Mirza MD    Preanesthetic Checklist  Completed: patient identified, IV checked, site marked, risks and benefits discussed, surgical/procedural consents, equipment checked, pre-op evaluation, timeout performed, anesthesia consent given, oxygen available, monitors applied/VS acknowledged, fire risk safety assessment completed and verbalized and blood product R/B/A discussed and consented  Peripheral Block   Patient position: sitting  Prep: ChloraPrep  Provider prep: mask and sterile gloves  Patient monitoring: cardiac monitor, continuous pulse ox, continuous capnometry, frequent blood pressure checks, IV access, oxygen and responsive to questions  Block type: Brachial plexus  Supraclavicular  Laterality: left  Injection technique: single-shot  Guidance: ultrasound guided    Needle   Needle type: insulated echogenic nerve stimulator needle   Needle gauge: 20 G  Needle localization: ultrasound guidance  Needle length: 10 cm  Assessment   Injection assessment: negative aspiration for heme, no paresthesia on injection, local visualized surrounding nerve on ultrasound and no intravascular symptoms  Paresthesia pain: immediately resolved  Slow fractionated injection: yes  Hemodynamics: stable  Outcomes: patient tolerated procedure well and uncomplicated    Additional Notes  With intercostobrachial 10cc of 2% lidocaine           - TSH elevated at 19.43 with normal free T4 10.41, spoke with son on administration of LTX and was told that patient was taking medication after eating breakfast which may be cause of decreased absorption, however given worsening agitation may be contributing to psychiatric disease   - per [employer] last TSH also elevated ar 14-->12, per family no changes to LTX medications recently   - continue with home LTX at this time, spoke with Endo attending Sr. Jackson no need for inpatient consultation, patient will need to have TFTs repeated in 4 weeks, especially since LTX was administered incorrectly (after food at home)   - continue with home dose of 125mcgs here   - TPO ab wnl

## 2024-03-28 NOTE — TELEPHONE ENCOUNTER
----- Message from [facility] sent at 10/7/2021  9:37 AM EDT -----  Regarding: /Telephone  Contact: [phone]  General Message/Vendor Calls    Caller's first and last name: Roel Daniel ,Daughter      Reason for call: would like 's nurse to give her a call to discuss symptoms occurring in pt right now before scheduled appt       Callback required yes/no and why: yes      Best contact number(s): [phone]    Message from [facility] Prescription approved per [facility] Refill Protocol.

## 2024-04-10 RX ORDER — FLUTICASONE FUROATE, UMECLIDINIUM BROMIDE AND VILANTEROL TRIFENATATE 100; 62.5; 25 UG/1; UG/1; UG/1
1 POWDER RESPIRATORY (INHALATION) DAILY
Qty: 60 EACH | Refills: 3 | Status: SHIPPED | OUTPATIENT
Start: 2024-04-10

## 2024-04-16 ENCOUNTER — HOSPITAL ENCOUNTER (OUTPATIENT)
Facility: HOSPITAL | Age: 79
Discharge: HOME | End: 2024-04-19
Payer: MEDICARE

## 2024-04-16 DIAGNOSIS — G47.33 OBSTRUCTIVE SLEEP APNEA (ADULT) (PEDIATRIC): ICD-10-CM

## 2024-04-16 PROCEDURE — 95811 POLYSOM 6/>YRS CPAP 4/> PARM: CPT | Performed by: INTERNAL MEDICINE

## 2024-04-17 VITALS
DIASTOLIC BLOOD PRESSURE: 59 MMHG | HEART RATE: 87 BPM | SYSTOLIC BLOOD PRESSURE: 128 MMHG | OXYGEN SATURATION: 93 % | BODY MASS INDEX: 33.86 KG/M2 | HEIGHT: 72 IN | WEIGHT: 250 LBS | TEMPERATURE: 98.6 F

## 2024-04-17 NOTE — PROGRESS NOTES
[address]  [place] [address]  Tel.  [phone]  Fax. [phone] [address]  [place] [address]  Tel.  [phone]  Fax. [phone] [address] [place] [address]  Tel.  [phone]  Fax. [phone]     Sleep Study Technical Notes    Disclaimer:  all notes have not been confirmed by interpreting physician      PRE-Test:  Joss Jr Ted  (: 1945) arrived on time. Vitals taken: temperature (98.6)  BP (128/59) SpO2 (93%) HR (87). He was shown directly to the room. Paperwork completed.  He is here for a Bi-PAP titration study. Procedure explained, questions answered and he expressed understanding. He currently uses nasal pillows. Electrodes were applied without incident. Once settled in bed, the study was started.    Acquisition Notes:  Lights off: 9:56pm  Sleep onset: 10:51pm  Respiratory events: O,H  Snoring:  Mild  PAP titration: Bi-PAP  Mask(s) Used:   Res Med P30i nasal mask - standard headgear/medium pillows (removed for oral venting)  Res Med F30i full face mask - standard headgear/wide cushion (applied after pillows removed)  F&P Vitera full face mask - Large (removed hybrid-used this for the remainder of the study)  Other comments:   Bi-PAP initiated at 16/12cm, Easy-Breathe=On, No Humidity  Pressure increased for events  Oral venting observed - patient also was talking in his sleep occasionally  Severe PLMs  The patient has neuropathy in both legs causing movement and discomfort  The nasal pillow mask was removed and was replaced with a hybrid full face mask  Pressure was reduced to determine what pressure is needed on the FFM without oral venting  The hybrid mask had a leak issue - changed to a standard full face mask  The tech had problems with the pulse ox signal  Tech ended the study, started a new study and two studies were merged in the morning  Changed the headbox  Changed the pulse ox probe multiple times  The study froze

## 2024-04-21 PROBLEM — C64.1 RENAL CELL CARCINOMA OF RIGHT KIDNEY (HCC): Status: RESOLVED | Noted: 2023-04-18 | Resolved: 2024-04-21

## 2024-04-21 PROBLEM — J96.01 ACUTE HYPOXEMIC RESPIRATORY FAILURE (HCC): Status: RESOLVED | Noted: 2021-04-08 | Resolved: 2024-04-21

## 2024-04-21 PROBLEM — R56.9 CONVULSIONS (HCC): Status: RESOLVED | Noted: 2022-05-25 | Resolved: 2024-04-21

## 2024-04-21 NOTE — PROGRESS NOTES
Joss Jean Jr. is a 78 y.o. male who presents for follow up. In with daughter.      Last seen in March with contusion and large hematoma to left lower leg.  Per daughter, has hit both shins, right one yesterday while getting on tractor.  Has left foot great toe wound.  Podiatrist appt.  Prior treatment for cellulitis, keflex.     Diabetic. HbA1C 6.1%. Trying to follow healthy diet.        Has neuropathy, no neuropathic pain.       Treated for HLP.   On statin. No myalgias.      Treated for HTN. BP controlled. Sees Dr Costa, cardiology.     History of CKD, stage IV.  Followed by Dr Chino Santamaria, nephrology. every 3 months.  hgb 10.4 and creatinine 4.7 in October.   has discussed HD.  Saw Dr. Fam,  vascular surg.  Has av fistula placed left arm.      Saw pulmonary, for COPD, On trelegy.  no change in GARCIA.       Followed by Dr Black, urology, for BPH. On flomax. urination better. Prior right partial nephrectomy for cancer in April 2021.      Treated for anxiety, on celexa.        Up to date on eye exam. Jan 2023    Sees derm. For psoriasis.           Past Medical History:   Diagnosis Date    Abnormal stress echo 5/12/2015    Anxiety     Borderline diabetes     CAD (coronary artery disease) 2009    cath ([facility]) revealed 20% RCA and 50% 2nd diagonal    Calculus of kidney     CKD (chronic kidney disease) stage 4, GFR 15-29 ml/min (HCC)     COPD, mild (HCC)     Followed by pulmonary associates    Depression     DJD (degenerative joint disease)     Environmental allergies     Fatty liver     GERD (gastroesophageal reflux disease) 10/11/2009    Gout     High cholesterol     History of blood transfusion     Hypertension     Obesity     JAILYN on CPAP 10/11/2009    nasal pillows; pressure set at 10-11 -  uses cpap    Peripheral neuropathy 10/11/2009    bilateral feet (numbness)    Renal cell cancer, right (HCC) 01/2021    RLS (restless legs syndrome) 10/11/2009    S/P coronary artery stent placement 05/12/2015

## 2024-04-22 ENCOUNTER — OFFICE VISIT (OUTPATIENT)
Age: 79
End: 2024-04-22
Payer: MEDICARE

## 2024-04-22 VITALS
SYSTOLIC BLOOD PRESSURE: 127 MMHG | DIASTOLIC BLOOD PRESSURE: 85 MMHG | HEART RATE: 83 BPM | BODY MASS INDEX: 34.27 KG/M2 | RESPIRATION RATE: 16 BRPM | TEMPERATURE: 97.3 F | OXYGEN SATURATION: 95 % | HEIGHT: 72 IN | WEIGHT: 253 LBS

## 2024-04-22 DIAGNOSIS — R60.9 EDEMA, UNSPECIFIED TYPE: ICD-10-CM

## 2024-04-22 DIAGNOSIS — E11.22 TYPE 2 DIABETES MELLITUS WITH STAGE 4 CHRONIC KIDNEY DISEASE, WITHOUT LONG-TERM CURRENT USE OF INSULIN (HCC): ICD-10-CM

## 2024-04-22 DIAGNOSIS — N18.4 CKD (CHRONIC KIDNEY DISEASE) STAGE 4, GFR 15-29 ML/MIN (HCC): ICD-10-CM

## 2024-04-22 DIAGNOSIS — I25.119 ATHEROSCLEROSIS OF NATIVE CORONARY ARTERY OF NATIVE HEART WITH ANGINA PECTORIS (HCC): ICD-10-CM

## 2024-04-22 DIAGNOSIS — D89.89 IMMUNE-MEDIATED NEUROPATHY (HCC): ICD-10-CM

## 2024-04-22 DIAGNOSIS — G63 IMMUNE-MEDIATED NEUROPATHY (HCC): ICD-10-CM

## 2024-04-22 DIAGNOSIS — L03.116 CELLULITIS OF LEFT LOWER EXTREMITY: Primary | ICD-10-CM

## 2024-04-22 DIAGNOSIS — I10 ESSENTIAL (PRIMARY) HYPERTENSION: ICD-10-CM

## 2024-04-22 DIAGNOSIS — N18.4 TYPE 2 DIABETES MELLITUS WITH STAGE 4 CHRONIC KIDNEY DISEASE, WITHOUT LONG-TERM CURRENT USE OF INSULIN (HCC): ICD-10-CM

## 2024-04-22 DIAGNOSIS — E11.42 DIABETIC PERIPHERAL NEUROPATHY ASSOCIATED WITH TYPE 2 DIABETES MELLITUS (HCC): ICD-10-CM

## 2024-04-22 PROCEDURE — 3074F SYST BP LT 130 MM HG: CPT | Performed by: FAMILY MEDICINE

## 2024-04-22 PROCEDURE — 3079F DIAST BP 80-89 MM HG: CPT | Performed by: FAMILY MEDICINE

## 2024-04-22 PROCEDURE — 1123F ACP DISCUSS/DSCN MKR DOCD: CPT | Performed by: FAMILY MEDICINE

## 2024-04-22 PROCEDURE — 99214 OFFICE O/P EST MOD 30 MIN: CPT | Performed by: FAMILY MEDICINE

## 2024-04-22 PROCEDURE — 1036F TOBACCO NON-USER: CPT | Performed by: FAMILY MEDICINE

## 2024-04-22 PROCEDURE — G8427 DOCREV CUR MEDS BY ELIG CLIN: HCPCS | Performed by: FAMILY MEDICINE

## 2024-04-22 PROCEDURE — G8417 CALC BMI ABV UP PARAM F/U: HCPCS | Performed by: FAMILY MEDICINE

## 2024-04-22 RX ORDER — NITROGLYCERIN 0.4 MG/1
TABLET SUBLINGUAL
Qty: 25 TABLET | Refills: 0 | Status: SHIPPED | OUTPATIENT
Start: 2024-04-22

## 2024-04-22 RX ORDER — CEPHALEXIN 500 MG/1
500 CAPSULE ORAL 3 TIMES DAILY
Qty: 30 CAPSULE | Refills: 0 | Status: SHIPPED | OUTPATIENT
Start: 2024-04-22

## 2024-04-22 NOTE — PROGRESS NOTES
\"Have you been to the ER, urgent care clinic since your last visit?  Hospitalized since your last visit?\"    NO    “Have you seen or consulted any other health care providers outside of [facility] since your last visit?”    NO          Click Here for Release of Records Request

## 2024-04-24 ENCOUNTER — CLINICAL DOCUMENTATION (OUTPATIENT)
Age: 79
End: 2024-04-24

## 2024-04-24 DIAGNOSIS — G47.33 OBSTRUCTIVE SLEEP APNEA (ADULT) (PEDIATRIC): Primary | ICD-10-CM

## 2024-04-24 NOTE — PROGRESS NOTES
(Saw ANI Duggan-NP in clinic)     Results of sleep study in [employer] tech to convey results to patient    BIPAP titration was performed to optimize airflow settings to control his apnea/respiratory events. It was found that a setting of 17/13 cmH20 adequately controlled his respiratory events. Oxygen saturation was maintained at or above 91% at this setting.   He had difficulty sleeping in lab. Technically limited, by poor sleep efficiency. He may need slightly higher settings at home.     We will order a PAP device for his home use. It will start a little lower than the final pressure setting in the lab ,for his comfort,and will adjust up during the night. he should be seen in the sleep center after he has been on PAP for 4-6 weeks. We will assess how he is doing on PAP therapy and make any further adjustments (if needed).     Patient should call the office the day he gets set up with new PAP device so we can schedule him for an adherence/compliance visit within 31-90 days of obtaining a new device.     PAP order attached.  Staff to kindly order PAP and call patient and let them know which [employer] company they should be hearing from.    (patient will need a first adherence visit after 4-6 weeks on therapy)   Follow up to be scheduled with RE[facility]UGIO

## 2024-04-25 ENCOUNTER — CLINICAL DOCUMENTATION (OUTPATIENT)
Age: 79
End: 2024-04-25

## 2024-04-29 ENCOUNTER — TELEPHONE (OUTPATIENT)
Age: 79
End: 2024-04-29

## 2024-04-29 NOTE — TELEPHONE ENCOUNTER
Reviewed sleep study results with patient and his daughter. He expressed understanding and is willing to proceed with the ordering of BIPAP.

## 2024-05-28 ENCOUNTER — OFFICE VISIT (OUTPATIENT)
Age: 79
End: 2024-05-28
Payer: MEDICARE

## 2024-05-28 VITALS
DIASTOLIC BLOOD PRESSURE: 60 MMHG | SYSTOLIC BLOOD PRESSURE: 130 MMHG | RESPIRATION RATE: 18 BRPM | HEART RATE: 90 BPM | HEIGHT: 72 IN | OXYGEN SATURATION: 96 % | TEMPERATURE: 97.7 F | WEIGHT: 253.4 LBS | BODY MASS INDEX: 34.32 KG/M2

## 2024-05-28 DIAGNOSIS — E11.42 DIABETIC PERIPHERAL NEUROPATHY ASSOCIATED WITH TYPE 2 DIABETES MELLITUS (HCC): ICD-10-CM

## 2024-05-28 DIAGNOSIS — Z98.890 HISTORY OF LEFT-SIDED CAROTID ENDARTERECTOMY: ICD-10-CM

## 2024-05-28 DIAGNOSIS — E53.8 B12 DEFICIENCY: ICD-10-CM

## 2024-05-28 DIAGNOSIS — R26.0 SENSORY ATAXIC GAIT: ICD-10-CM

## 2024-05-28 DIAGNOSIS — G60.9 IDIOPATHIC SMALL FIBER PERIPHERAL NEUROPATHY: ICD-10-CM

## 2024-05-28 DIAGNOSIS — Z51.81 THERAPEUTIC DRUG MONITORING: ICD-10-CM

## 2024-05-28 DIAGNOSIS — M47.27 LUMBOSACRAL RADICULOPATHY DUE TO DEGENERATIVE JOINT DISEASE OF SPINE: ICD-10-CM

## 2024-05-28 DIAGNOSIS — G25.81 RLS (RESTLESS LEGS SYNDROME): ICD-10-CM

## 2024-05-28 DIAGNOSIS — I65.23 BILATERAL CAROTID ARTERY STENOSIS: Primary | ICD-10-CM

## 2024-05-28 LAB
FOLATE SERPL-MCNC: 34.1 NG/ML (ref 5–21)
VIT B12 SERPL-MCNC: 925 PG/ML (ref 193–986)

## 2024-05-28 PROCEDURE — 3075F SYST BP GE 130 - 139MM HG: CPT | Performed by: PSYCHIATRY & NEUROLOGY

## 2024-05-28 PROCEDURE — 3078F DIAST BP <80 MM HG: CPT | Performed by: PSYCHIATRY & NEUROLOGY

## 2024-05-28 PROCEDURE — 1036F TOBACCO NON-USER: CPT | Performed by: PSYCHIATRY & NEUROLOGY

## 2024-05-28 PROCEDURE — 1123F ACP DISCUSS/DSCN MKR DOCD: CPT | Performed by: PSYCHIATRY & NEUROLOGY

## 2024-05-28 PROCEDURE — G8417 CALC BMI ABV UP PARAM F/U: HCPCS | Performed by: PSYCHIATRY & NEUROLOGY

## 2024-05-28 PROCEDURE — G8427 DOCREV CUR MEDS BY ELIG CLIN: HCPCS | Performed by: PSYCHIATRY & NEUROLOGY

## 2024-05-28 PROCEDURE — 99214 OFFICE O/P EST MOD 30 MIN: CPT | Performed by: PSYCHIATRY & NEUROLOGY

## 2024-05-28 RX ORDER — LANOLIN ALCOHOL/MO/W.PET/CERES
1000 CREAM (GRAM) TOPICAL DAILY
COMMUNITY

## 2024-05-28 ASSESSMENT — PATIENT HEALTH QUESTIONNAIRE - PHQ9
SUM OF ALL RESPONSES TO PHQ QUESTIONS 1-9: 0
2. FEELING DOWN, DEPRESSED OR HOPELESS: NOT AT ALL
SUM OF ALL RESPONSES TO PHQ QUESTIONS 1-9: 0
1. LITTLE INTEREST OR PLEASURE IN DOING THINGS: NOT AT ALL
SUM OF ALL RESPONSES TO PHQ9 QUESTIONS 1 & 2: 0

## 2024-05-28 NOTE — PROGRESS NOTES
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 History of left-sided carotid endarterectomy        6. Therapeutic drug monitoring        7. Sensory ataxic gait        8. RLS (restless legs syndrome)        9. B12 deficiency  Vitamin B12 & Folate        Active Problems:    * No active hospital problems. *  Resolved Problems:    * No resolved hospital problems. *      Plan:     Patient with new problem of worsening gait, the patient had a fall and bruised his left leg, is doing better now and has not been able to exercise much and I think that has a lot to do with his worsening, because of lack of exercise.  He did much better after physical therapy and gait training last time.  He will start getting up exercising more now if that does not work we will send her back to therapy.    We advised him to try to do balance exercises flexibility exercises like juliette chi or yoga which can help significantly also.  He is also to stay physically mentally active and eat a Mediterranean type diet with a lot of bioflavonoids and antioxidant vitamins.  We counseled the patient and his wife in great detail.  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  He is having the balance problems, we encouraged him to exercise regularly, please had no more falls and no more head injury which is good news, and he may need referral to physical therapy for gait training but he said he can do it right now.    Just to be sure were not missing anything, we will recheck his B12 levels, rest of his labs were all checked by his PCP and they look stable.  Patient with new problem of jerks when he sleeping, that looks like periodic limb movements of sleep, but could

## 2024-05-31 ENCOUNTER — OFFICE VISIT (OUTPATIENT)
Age: 79
End: 2024-05-31
Payer: MEDICARE

## 2024-05-31 ENCOUNTER — TELEPHONE (OUTPATIENT)
Age: 79
End: 2024-05-31

## 2024-05-31 VITALS
BODY MASS INDEX: 34.13 KG/M2 | SYSTOLIC BLOOD PRESSURE: 129 MMHG | HEIGHT: 72 IN | TEMPERATURE: 98.2 F | RESPIRATION RATE: 16 BRPM | DIASTOLIC BLOOD PRESSURE: 66 MMHG | OXYGEN SATURATION: 94 % | HEART RATE: 83 BPM | WEIGHT: 252 LBS

## 2024-05-31 DIAGNOSIS — Z01.818 PREOP EXAMINATION: ICD-10-CM

## 2024-05-31 DIAGNOSIS — E11.22 TYPE 2 DIABETES MELLITUS WITH STAGE 4 CHRONIC KIDNEY DISEASE, WITHOUT LONG-TERM CURRENT USE OF INSULIN (HCC): ICD-10-CM

## 2024-05-31 DIAGNOSIS — N18.4 TYPE 2 DIABETES MELLITUS WITH STAGE 4 CHRONIC KIDNEY DISEASE, WITHOUT LONG-TERM CURRENT USE OF INSULIN (HCC): ICD-10-CM

## 2024-05-31 DIAGNOSIS — E11.42 DIABETIC PERIPHERAL NEUROPATHY ASSOCIATED WITH TYPE 2 DIABETES MELLITUS (HCC): ICD-10-CM

## 2024-05-31 DIAGNOSIS — I10 ESSENTIAL (PRIMARY) HYPERTENSION: ICD-10-CM

## 2024-05-31 DIAGNOSIS — H25.9 AGE-RELATED CATARACT OF BOTH EYES, UNSPECIFIED AGE-RELATED CATARACT TYPE: Primary | ICD-10-CM

## 2024-05-31 LAB — HBA1C MFR BLD: 6 %

## 2024-05-31 PROCEDURE — 99214 OFFICE O/P EST MOD 30 MIN: CPT | Performed by: FAMILY MEDICINE

## 2024-05-31 PROCEDURE — PBSHW AMB POC HEMOGLOBIN A1C: Performed by: FAMILY MEDICINE

## 2024-05-31 PROCEDURE — G8427 DOCREV CUR MEDS BY ELIG CLIN: HCPCS | Performed by: FAMILY MEDICINE

## 2024-05-31 PROCEDURE — 1036F TOBACCO NON-USER: CPT | Performed by: FAMILY MEDICINE

## 2024-05-31 PROCEDURE — G8417 CALC BMI ABV UP PARAM F/U: HCPCS | Performed by: FAMILY MEDICINE

## 2024-05-31 PROCEDURE — 1123F ACP DISCUSS/DSCN MKR DOCD: CPT | Performed by: FAMILY MEDICINE

## 2024-05-31 PROCEDURE — 83036 HEMOGLOBIN GLYCOSYLATED A1C: CPT | Performed by: FAMILY MEDICINE

## 2024-05-31 PROCEDURE — 3078F DIAST BP <80 MM HG: CPT | Performed by: FAMILY MEDICINE

## 2024-05-31 PROCEDURE — 3074F SYST BP LT 130 MM HG: CPT | Performed by: FAMILY MEDICINE

## 2024-05-31 NOTE — PROGRESS NOTES
Chief complaint:    Chief Complaint   Patient presents with    Sinus Problem     X 1 week          Referring Provider:  No referring provider defined for this encounter.      History of present illness, 5/6/22:     Mr. Osman is a 40 y.o. presenting for evaluation of sinus issues.     Frequent anterior rhinorrhea (thick, green, usually left), left retrorbital and forehead pain, left nasal obstruction.     Had a similar episode about 2 months ago. Treated with abx, then got mostly better, but never totally cleared. Now symptoms more severe again over last couple weeks.     No inciting incident.      Denies history of allergies. No treatment.      Denies frequent prior sinus infections.       Prior sinus surgery: no.     Asthma history: No     NSAID/ASA allergy: No     Current smoker:  No    Return clinic visit, 8/22/22    Main symptom now is green rhinorrhea, more on right. Thicker in the morning.  Every day for last several months. All day.     A few days of right retro-orbital pain, but not frequent.     Denies nasal obstruction or anosmia.     Tried a nasal spray, but not routinely.     All started shortly after wisdom tooth extraction in May. Has had three rounds of antibiotics. Did not resolved on shorter course of abx.     Denies history of allergies.      Return clinic visit, 1/23/23    COVID 2 weeks ago. URI symptoms resolved. Since then purulent rhinorrhea, nasal obstruction, hyposmia. Mucinex, tylenol.          History      Past Medical History:   Past Medical History:   Diagnosis Date    GERD (gastroesophageal reflux disease)          Past Surgical History:No past surgical history on file.      Medications: Medication list reviewed. He  has a current medication list which includes the following prescription(s): fluticasone propionate, levofloxacin, prednisone, esomeprazole, and prednisone.     Allergies: Review of patient's allergies indicates:  No Known Allergies      Family history: family history is not 
\"Have you been to the ER, urgent care clinic since your last visit?  Hospitalized since your last visit?\"    NO    “Have you seen or consulted any other health care providers outside of [facility] since your last visit?”    NO          Click Here for Release of Records Request  
on file.         Social History          Alcohol use:  reports current alcohol use.            Tobacco:  reports that he has quit smoking. He has never used smokeless tobacco.         Physical Examination      Vitals: Temperature 98.2 °F (36.8 °C), weight 133.2 kg (293 lb 10.4 oz).      General: Well developed, well nourished, well hydrated.     Voice: no dysphonia, no dysarthria      Head/Face: Normocephalic, atraumatic. No scars or lesions. Facial musculature equal.     Eyes: No scleral icterus or conjunctival hemorrhage. EOMI. PERRLA.     Ears:     Right ear: No gross deformity. EAC is clear of debris and erythema. TM are intact with a pneumatized middle ear. No signs of retraction, fluid or infection.      Left ear: No gross deformity. EAC is clear of debris and erythema. TM are intact with a pneumatized middle ear. No signs of retraction, fluid or infection.      Nose: No gross deformity or lesions. See procedure note.    Mouth/Oropharynx: Lips without any lesions. No mucosal lesions within the oropharynx. No tonsillar exudate or lesions. Pharyngeal walls symmetrical. Uvula midline. Tongue midline without lesions.     Neurologic: Moving all extremities without gross abnormality.CN II-XII grossly intact. House-Brackmann 1/6. No signs of nystagmus.          Data reviewed      Review of records:      I reviewed records from the referring provider's office visits describing the history, workup, and/or treatment of this problem thus far.         Procedures:    Procedure Note - Rigid Nasal Endoscopy     Surgeon: Thomas Roman MD  Anesthesia: topical oxymetazoline and 4% lidocaine.    Technique: The nose was sprayed with oxymetazoline and 4% lidocaine. With the patient in the upright position, a 2.7mm 30-degree endscope was inserted into the patient's right and left nare.  Where visible, nasal secretions and mucosal crusting were removed with a suction. The overall appearance of the nasal cavity and paranasal sinuses 
INHALE 1 PUFF BY MOUTH EVERY DAY 60 each 3    ferrous sulfate (IRON 325) 325 (65 Fe) MG tablet 1 tablet      citalopram (CELEXA) 40 MG tablet TAKE 1 TABLET BY MOUTH EVERY DAY 90 tablet 1    atorvastatin (LIPITOR) 80 MG tablet TAKE 1 TABLET BY MOUTH EVERY DAY 90 tablet 3    fluocinonide (LIDEX) 0.05 % external solution Apply topically 2 times daily as needed Apply topically 2 times daily.      Multiple Vitamins-Minerals (CENTRUM ADULTS PO) Take 1 tablet by mouth daily      albuterol sulfate HFA (PROVENTIL;VENTOLIN;PROAIR) 108 (90 Base) MCG/ACT inhaler Inhale 1 puff into the lungs every 4 hours      aspirin 81 MG EC tablet Take 1 tablet by mouth every evening      Cholecalciferol 50 MCG (2000 UT) CAPS Take 1 capsule by mouth daily      colchicine (COLCRYS) 0.6 MG tablet Take 1 tablet by mouth 2 times daily as needed      furosemide (LASIX) 80 MG tablet Take 1 tablet by mouth daily      isosorbide mononitrate (IMDUR) 30 MG extended release tablet Take 0.5 tablets by mouth daily      ketoconazole (NIZORAL) 2 % shampoo Apply topically daily as needed      loratadine (CLARITIN) 10 MG tablet Take 1 tablet by mouth every evening      metoprolol succinate (TOPROL XL) 25 MG extended release tablet Take 1 tablet by mouth every evening      pantoprazole (PROTONIX) 40 MG tablet Take 1 tablet by mouth daily      sodium bicarbonate 325 MG tablet Take 1 tablet by mouth 2 times daily      tamsulosin (FLOMAX) 0.4 MG capsule Take 1 capsule by mouth every evening       No current facility-administered medications on file prior to visit.       Review of Systems  Pertinent items are noted in HPI.    Objective:     /66   Pulse 83   Temp 98.2 °F (36.8 °C) (Temporal)   Resp 16   Ht 1.829 m (6')   Wt 114.3 kg (252 lb)   SpO2 94%   BMI 34.18 kg/m²   Gen: well appearing male  HEENT:   PERRL,normal conjunctiva. OP no erythema, no exudates, MMM  Neck:  no bruits, kyphosis.  No masses or LAD  Resp:  No wheezing, no rhonchi, no 
were noted and the findings are described below.     Findings: The nasal septum was intact and was not deviated.  The inferior turbinates were enlarged. There was evidence of nasal polyps from left middle turbinate.  Mucopurulent drainage was noted at the bilateral middle meatus and sphenoethmoidal recess.         Assessment/Plan:    Chronic sinusitis, unspecified location     Manolo  presents today for initial evaluation.  The patient presents with significant evidence of chronic sinusitis with nasal polypsis.  My recommendation is treatment of chronic rhinosinusitis with a course of oral steroids and prolonged course of antibiotics. The patient will also adhere to an aggressive sinus protocol consisting of a short course of Afrin, and daily Flonase and saline irrigations. The patient will return in 3-4 weeks after completion of treatment with a CT scan. Will also check IgE and CBC with differential. We discussed the possible need for sinus surgery in the event of persistent sinusitis resistant to medical management. We discussed at length the risk of sinus surgery including, but not limited to, recurrence of disease, bleeding, infection, septal perforation, tooth or cheek numbness, vision changes, orbital injury, CSF leak and changes in smell.  The patient had opportunity to ask questions and I answered all of them to their satisfaction.     Return clinic visit, 1/23/23    Previously treated with prolonged course of abx for Chronic Rhinosinusitis, but did not get post-treatment scan. Recurrent symptoms and evidence of Chronic Rhinosinusitis today - will treat again medically with antibiotics, steroids, continue nasal regimen.. Will proceed with sinus CT.    Discussed that recalcitrant Chronic Rhinosinusitis after maximal medical therapy and polyposis will likely need FESS.     Will follow up after scan.       Thomas Roman MD  [facility]   [facility]  [address] 
[place] [address]  P: [phone]  F: [phone]

## 2024-06-03 ENCOUNTER — TELEPHONE (OUTPATIENT)
Age: 79
End: 2024-06-03

## 2024-06-03 NOTE — TELEPHONE ENCOUNTER
----- Message from Tonya Gonzalez MD sent at 6/3/2024  3:17 PM EDT -----  Please request preop form from [facility] for patient's upcoming cataract surgery

## 2024-06-05 ENCOUNTER — TELEPHONE (OUTPATIENT)
Age: 79
End: 2024-06-05

## 2024-06-05 NOTE — TELEPHONE ENCOUNTER
Garima Aguilar//[facility] State she needs to get Surgical Clearance From & office note from Pre-op appt on 5/31/24 to be faxed for patient's upcoming Cataract Surgery on 6/13/24. Please call if any questions. Thank you      Fax# is [phone]    ATTN: Garima Aguilar

## 2024-07-31 ENCOUNTER — TELEPHONE (OUTPATIENT)
Age: 79
End: 2024-07-31

## 2024-07-31 NOTE — TELEPHONE ENCOUNTER
Bianka, with [facility] with Nephrology called  [phone]      States courtesy call to update pcp:    Dermatology appt completed for refills.    Cataract surgery completed on 6/13 and 6/27; took antibiotic drops.

## 2024-08-07 ENCOUNTER — HOSPITAL ENCOUNTER (EMERGENCY)
Facility: HOSPITAL | Age: 79
Discharge: HOME | End: 2024-08-07
Payer: MEDICARE

## 2024-08-07 ENCOUNTER — APPOINTMENT (OUTPATIENT)
Facility: HOSPITAL | Age: 79
End: 2024-08-07
Payer: MEDICARE

## 2024-08-07 VITALS
TEMPERATURE: 98.1 F | OXYGEN SATURATION: 99 % | WEIGHT: 250 LBS | HEART RATE: 88 BPM | DIASTOLIC BLOOD PRESSURE: 68 MMHG | BODY MASS INDEX: 33.86 KG/M2 | RESPIRATION RATE: 18 BRPM | HEIGHT: 72 IN | SYSTOLIC BLOOD PRESSURE: 158 MMHG

## 2024-08-07 DIAGNOSIS — S50.11XA CONTUSION OF RIGHT FOREARM, INITIAL ENCOUNTER: ICD-10-CM

## 2024-08-07 DIAGNOSIS — S50.01XA CONTUSION OF RIGHT ELBOW, INITIAL ENCOUNTER: Primary | ICD-10-CM

## 2024-08-07 PROCEDURE — 73080 X-RAY EXAM OF ELBOW: CPT

## 2024-08-07 PROCEDURE — 73030 X-RAY EXAM OF SHOULDER: CPT

## 2024-08-07 PROCEDURE — 73130 X-RAY EXAM OF HAND: CPT

## 2024-08-07 PROCEDURE — 99283 EMERGENCY DEPT VISIT LOW MDM: CPT

## 2024-08-07 PROCEDURE — 73090 X-RAY EXAM OF FOREARM: CPT

## 2024-08-07 ASSESSMENT — LIFESTYLE VARIABLES
HOW MANY STANDARD DRINKS CONTAINING ALCOHOL DO YOU HAVE ON A TYPICAL DAY: PATIENT DOES NOT DRINK
HOW OFTEN DO YOU HAVE A DRINK CONTAINING ALCOHOL: NEVER

## 2024-08-07 ASSESSMENT — PAIN SCALES - GENERAL
PAINLEVEL_OUTOF10: 8
PAINLEVEL_OUTOF10: 8

## 2024-08-07 NOTE — DISCHARGE INSTRUCTIONS
Thank You!    It was a pleasure taking care of you in our Emergency Department today. We know that when you come to our Emergency Department, you are entrusting us with your health, comfort, and safety. Our physicians and nurses honor that trust, and truly appreciate the opportunity to care for you and your loved ones.      We also value your feedback. If you receive a survey about your Emergency Department experience today, please fill it out.  We care about our patients' feedback, and we listen to what you have to say.  Thank you.    Jose Wood MD  ________________________________________________________________________  I have included a copy of your lab results and/or radiologic studies from today's visit so you can have them easily available at your follow-up visit. We hope you feel better and please do not hesitate to contact the ED if you have any questions at all!    No results found for this or any previous visit (from the past 12 hour(s)).  XR SHOULDER RIGHT (MIN 2 VIEWS)   Final Result   No acute fracture or dislocation demonstrated.      Electronically signed by Sawyer Coronado MD      XR RADIUS ULNA RIGHT (2 VIEWS)   Final Result   No acute abnormality.      Electronically signed by MORIAH ROBLES      XR ELBOW RIGHT (MIN 3 VIEWS)   Final Result   Joint effusion. No apparent fracture.      Electronically signed by FIDELINA DOZIER      XR HAND RIGHT (MIN 3 VIEWS)   Final Result   No acute abnormality.      Electronically signed by FIDELINA DOZIER          The exam and treatment you received in the Emergency Department were for an urgent problem and are not intended as complete care. It is important that you follow up with a doctor, nurse practitioner, or physician assistant for ongoing care. If your symptoms become worse or you do not improve as expected and you are unable to reach your usual health care provider, you should return to the Emergency Department. We are available 24 hours a day.    Please take  your discharge instructions with you when you go to your follow-up appointment.     If a prescription has been provided, please have it filled as soon as possible to prevent a delay in treatment. Read the entire medication instruction sheet provided to you by the pharmacy. If you have any questions or reservations about taking the medication due to side effects or interactions with other medications, please call your primary care physician or contact the ER to speak with the charge nurse.     Please make an appointment with your family doctor or the physician you were referred to for follow-up of this visit as instructed on your discharge paperwork. Return to the ER if you are unable to be seen or if you are unable to be seen in a timely manner.    Should you experience abdominal pain lasting greater than 6 hours, chest pain, difficulty breathing, fever/chills, numbness/tingling, skin changes or other symptoms that concern you, return to the ED sooner. If you feel worse over the next 24 hours, please return to the ED. We are available 24 hours a day. Thank you for trusting us with your care!

## 2024-08-07 NOTE — ED NOTES
Anupam RN has reviewed discharge instructions with the patient and family at this time. Patient and family has been made aware of prescription(s) called into pharmacy on file for , follow up care, and diagnoses care instructions. The Patient and Family member verbalized understanding and denies any further questions. VSS and pt AOx4. Patient ambulatory out to waiting room at this time.

## 2024-08-07 NOTE — ED PROVIDER NOTES
[facility] EMERGENCY DEPT  EMERGENCY DEPARTMENT ENCOUNTER    Patient Name: Joss Jean Jr.  MRN: [id number]  YOB: 1945  Provider: Jose Wood MD  PCP: Tonya Gonzalez MD    Time/Date of evaluation: 6:05 PM EDT on 8/7/24    History of Presenting Illness     Chief Complaint   Patient presents with    Arm Pain     Ambulatory to triage c/o pain in L hand, elbow, and upper arm after FOOSH 2 days ago. Denies LOC during fall, denies head strike, denies use of blood thinners other than aspirin     History Provided by: Patient   History is limited by: Nothing    HISTORY (Narrative):   Joss Jean Jr. is a 78 y.o. male with history listed below presenting for ground-level fall where the patient fell on an outstretched arm.  Patient complains of left hand, left elbow and left shoulder pain.  Patient denies hitting his head, loss of consciousness, chest pain, shortness of breath, abdominal pain, nausea, vomiting, dizziness, lightheadedness, vision changes, numbness, tingling, and other associated symptoms.      Nursing Notes were all reviewed and agreed with or any disagreements were addressed in the HPI.    Past History     PAST MEDICAL HISTORY:  Past Medical History:   Diagnosis Date    Abnormal stress echo 5/12/2015    Anxiety     Borderline diabetes     CAD (coronary artery disease) 2009    cath ([facility]) revealed 20% RCA and 50% 2nd diagonal    Calculus of kidney     CKD (chronic kidney disease) stage 4, GFR 15-29 ml/min (HCC)     COPD, mild (HCC)     Followed by pulmonary associates    Depression     DJD (degenerative joint disease)     Environmental allergies     Fatty liver     GERD (gastroesophageal reflux disease) 10/11/2009    Gout     High cholesterol     History of blood transfusion     Hypertension     Obesity     JAILYN on CPAP 10/11/2009    nasal pillows; pressure set at 10-11 -  uses cpap    Peripheral neuropathy 10/11/2009    bilateral feet (numbness)    Renal cell cancer, right (HCC)

## 2024-08-08 ENCOUNTER — TELEPHONE (OUTPATIENT)
Age: 79
End: 2024-08-08

## 2024-08-08 NOTE — TELEPHONE ENCOUNTER
----- Message from Addie Chavarria sent at 8/8/2024 12:33 PM EDT -----  Regarding: [facility] Appointment Request  [facility] Appointment Request    Patient needs appointment for [facility] Appointment Type: ED Follow-Up.    Patient Requested Dates(s): as soon as possible   Patient Requested Time: any time   Provider Name: Tonya Gonzalez MD    Reason for Appointment Request: Established Patient - Available appointments did not meet patient need    Patient wants to schedule an appointment for ER Follow up . Patient visit the ER because of the swollen last August 7, 2024 at [facility].   --------------------------------------------------------------------------------------------------------------------------    Relationship to Patient: Other Allyson - Daughter     Call Back Information: OK to leave message on voicemail  Preferred Call Back Number: Phone [phone]

## 2024-08-14 ENCOUNTER — TELEMEDICINE (OUTPATIENT)
Age: 79
End: 2024-08-14
Payer: MEDICARE

## 2024-08-14 DIAGNOSIS — M25.511 ACUTE PAIN OF RIGHT SHOULDER: ICD-10-CM

## 2024-08-14 DIAGNOSIS — S40.021D ARM CONTUSION, RIGHT, SUBSEQUENT ENCOUNTER: Primary | ICD-10-CM

## 2024-08-14 PROCEDURE — G8417 CALC BMI ABV UP PARAM F/U: HCPCS | Performed by: FAMILY MEDICINE

## 2024-08-14 PROCEDURE — 1123F ACP DISCUSS/DSCN MKR DOCD: CPT | Performed by: FAMILY MEDICINE

## 2024-08-14 PROCEDURE — 99213 OFFICE O/P EST LOW 20 MIN: CPT | Performed by: FAMILY MEDICINE

## 2024-08-14 PROCEDURE — G8427 DOCREV CUR MEDS BY ELIG CLIN: HCPCS | Performed by: FAMILY MEDICINE

## 2024-08-14 PROCEDURE — 1036F TOBACCO NON-USER: CPT | Performed by: FAMILY MEDICINE

## 2024-08-14 RX ORDER — GLUCOSAMINE SULFATE 500 MG
CAPSULE ORAL
COMMUNITY

## 2024-08-14 RX ORDER — FAMOTIDINE 20 MG/1
20 TABLET, FILM COATED ORAL 2 TIMES DAILY
COMMUNITY

## 2024-08-14 SDOH — ECONOMIC STABILITY: INCOME INSECURITY: HOW HARD IS IT FOR YOU TO PAY FOR THE VERY BASICS LIKE FOOD, HOUSING, MEDICAL CARE, AND HEATING?: NOT HARD AT ALL

## 2024-08-14 SDOH — ECONOMIC STABILITY: FOOD INSECURITY: WITHIN THE PAST 12 MONTHS, THE FOOD YOU BOUGHT JUST DIDN'T LAST AND YOU DIDN'T HAVE MONEY TO GET MORE.: NEVER TRUE

## 2024-08-14 SDOH — ECONOMIC STABILITY: FOOD INSECURITY: WITHIN THE PAST 12 MONTHS, YOU WORRIED THAT YOUR FOOD WOULD RUN OUT BEFORE YOU GOT MONEY TO BUY MORE.: NEVER TRUE

## 2024-08-14 ASSESSMENT — PATIENT HEALTH QUESTIONNAIRE - PHQ9
2. FEELING DOWN, DEPRESSED OR HOPELESS: NOT AT ALL
1. LITTLE INTEREST OR PLEASURE IN DOING THINGS: NOT AT ALL
SUM OF ALL RESPONSES TO PHQ QUESTIONS 1-9: 0
SUM OF ALL RESPONSES TO PHQ9 QUESTIONS 1 & 2: 0
SUM OF ALL RESPONSES TO PHQ QUESTIONS 1-9: 0

## 2024-08-14 NOTE — PROGRESS NOTES
allergies     Fatty liver     GERD (gastroesophageal reflux disease) 10/11/2009    Gout     High cholesterol     History of blood transfusion     Hypertension     Obesity     JAILYN on CPAP 10/11/2009    nasal pillows; pressure set at 10- -  uses cpap    Peripheral neuropathy 10/11/2009    bilateral feet (numbness)    Renal cell cancer, right (HCC) 2021    RLS (restless legs syndrome) 10/11/2009    S/P coronary artery stent placement 2015    PCI./HAO to LCx, 2019 PCI/HAO Prox LAD (RCA 40-50% lesions)    Staph infection     after foot surgery       Family History   Problem Relation Age of Onset    Heart Disease Father     Hypertension Father     Other Father         abdominal aneurysm     Dementia Mother     Alzheimer's Disease Mother     Heart Disease Brother     Heart Attack Brother     Heart Disease Maternal Grandmother     Heart Disease Paternal Grandmother     Heart Attack Paternal Grandmother     Heart Disease Paternal Grandfather     Heart Attack Paternal Grandfather     Elevated Lipids Son     Elevated Lipids Daughter     Stroke Neg Hx     Cancer Neg Hx     Diabetes Neg Hx         Social History     Socioeconomic History    Marital status:      Spouse name: Not on file    Number of children: Not on file    Years of education: graduated then 4 year apprenticship    Highest education level: Not on file   Occupational History    Not on file   Tobacco Use    Smoking status: Former     Current packs/day: 0.00     Types: Cigarettes     Quit date: 1968     Years since quittin.6    Smokeless tobacco: Never   Vaping Use    Vaping status: Never Used   Substance and Sexual Activity    Alcohol use: No    Drug use: No    Sexual activity: Not Currently     Partners: Female     Birth control/protection: None   Other Topics Concern    Not on file   Social History Narrative    Not on file     Social Determinants of Health     Financial Resource Strain: Low Risk  (2024)    Overall Financial

## 2024-08-20 NOTE — PROGRESS NOTES
[address]   [place] [address]   Tel.  [phone]   Fax. [phone]  [address]   [place] [address]   Tel.  [phone]   Fax. [phone] [address] [place] [address]   Tel.  [phone]   Fax. [phone]     Joss Jean Jr. (: 1945) is a 78 y.o. male, established patient, seen for positive airway pressure follow-up, he was last seen by Waleska Brown NP on 3/2024, previously seen by Dr. Romero on 10/3/2019, prior notes and sleep testing reviewed in detail.  Initial sleep apnea diagnosis 25+ years ago.  In lab split sleep test 2014 showed AHI of 49.5/hr with a lowest SpO2 of 82%, adequate CPAP titration. He had continued CPAP intolerance and Bilevel titration was obtained 2024 showing sub optimal control on Bilevel therapy. He is seen today for first adherence.     ASSESSMENT/PLAN:   Diagnosis Orders   1. Obstructive sleep apnea (adult) (pediatric)  DME Order for (Specify) as OP      2. Essential (primary) hypertension        3. BMI 33.0-33.9,adult          AHI = 49.5(2014).  On ResMed BiLevel : Max IPAP 19 cmH2O; Min EPAP 13 cmH2O; PS 4 cmH2O. Set up 2024    He is adherent with PAP therapy and PAP continues to benefit patient and remains necessary for control of his sleep apnea.     No follow-up provider specified.    Sleep Apnea - Continue on current pressures. He has noticed leaking from his mask. Leak also noted on download. He would like to try a different nasal mask. We may look to switch to full face if the nasal mask continues to cause leakage. He does admit to feeling and sleeping better on BiPAP. He will contact his supplier about looking at different style nasal masks and head gears.     Orders Placed This Encounter   Procedures    DME Order for (Specify) as OP     Diagnosis: (G47.33) JAILYN (obstructive sleep apnea)  (primary encounter diagnosis)     Replacement Supplies for Positive Airway Pressure

## 2024-08-21 ENCOUNTER — TELEMEDICINE (OUTPATIENT)
Age: 79
End: 2024-08-21

## 2024-08-21 ENCOUNTER — CLINICAL DOCUMENTATION (OUTPATIENT)
Age: 79
End: 2024-08-21

## 2024-08-21 DIAGNOSIS — G47.33 OBSTRUCTIVE SLEEP APNEA (ADULT) (PEDIATRIC): Primary | ICD-10-CM

## 2024-08-21 DIAGNOSIS — I10 ESSENTIAL (PRIMARY) HYPERTENSION: ICD-10-CM

## 2024-08-21 ASSESSMENT — SLEEP AND FATIGUE QUESTIONNAIRES
HOW LIKELY ARE YOU TO NOD OFF OR FALL ASLEEP WHILE LYING DOWN TO REST IN THE AFTERNOON WHEN CIRCUMSTANCES PERMIT: MODERATE CHANCE OF DOZING
HOW LIKELY ARE YOU TO NOD OFF OR FALL ASLEEP WHILE SITTING INACTIVE IN A PUBLIC PLACE: SLIGHT CHANCE OF DOZING
ESS TOTAL SCORE: 11
HOW LIKELY ARE YOU TO NOD OFF OR FALL ASLEEP IN A CAR, WHILE STOPPED FOR A FEW MINUTES IN TRAFFIC: WOULD NEVER DOZE
HOW LIKELY ARE YOU TO NOD OFF OR FALL ASLEEP WHEN YOU ARE A PASSENGER IN A CAR FOR AN HOUR WITHOUT A BREAK: MODERATE CHANCE OF DOZING
HOW LIKELY ARE YOU TO NOD OFF OR FALL ASLEEP WHILE SITTING AND TALKING TO SOMEONE: WOULD NEVER DOZE
HOW LIKELY ARE YOU TO NOD OFF OR FALL ASLEEP WHILE SITTING QUIETLY AFTER LUNCH WITHOUT ALCOHOL: MODERATE CHANCE OF DOZING
HOW LIKELY ARE YOU TO NOD OFF OR FALL ASLEEP WHILE WATCHING TV: MODERATE CHANCE OF DOZING
HOW LIKELY ARE YOU TO NOD OFF OR FALL ASLEEP WHILE SITTING AND READING: MODERATE CHANCE OF DOZING

## 2024-08-21 NOTE — PATIENT INSTRUCTIONS
[address]  [place] [address]  Tel.  [phone]  Fax. [phone] [address]  [place] [address]  Tel.  [phone]  Fax. [phone] [address] [place] [address]  Tel.  [phone]  Fax. [phone]     PROPER SLEEP HYGIENE    What to avoid  Do not have drinks with caffeine, such as coffee or black tea, for 8 hours before bed.  Do not smoke or use other types of tobacco near bedtime. Nicotine is a stimulant and can keep you awake.  Avoid drinking alcohol late in the evening, because it can cause you to wake in the middle of the night.  Do not eat a big meal close to bedtime. If you are hungry, eat a light snack.  Do not drink a lot of water close to bedtime, because the need to urinate may wake you up during the night.  Do not read or watch TV in bed. Use the bed only for sleeping and sexual activity.  What to try  Go to bed at the same time every night, and wake up at the same time every morning. Do not take naps during the day.  Keep your bedroom quiet, dark, and cool.  Get regular exercise, but not within 3 to 4 hours of your bedtime..  Sleep on a comfortable pillow and mattress.  If watching the clock makes you anxious, turn it facing away from you so you cannot see the time.  If you worry when you lie down, start a worry book. Well before bedtime, write down your worries, and then set the book and your concerns aside.  Try meditation or other relaxation techniques before you go to bed.  If you cannot fall asleep, get up and go to another room until you feel sleepy. Do something relaxing. Repeat your bedtime routine before you go to bed again.  Make your house quiet and calm about an hour before bedtime. Turn down the lights, turn off the TV, log off the computer, and turn down the volume on music. This can help you relax after a busy day.    Drowsy Driving  The [place] National Highway Traffic Safety Administration cites drowsiness as a

## 2024-08-26 RX ORDER — CITALOPRAM HYDROBROMIDE 40 MG/1
40 TABLET ORAL DAILY
Qty: 90 TABLET | Refills: 1 | Status: SHIPPED | OUTPATIENT
Start: 2024-08-26

## 2024-09-03 ENCOUNTER — TELEPHONE (OUTPATIENT)
Age: 79
End: 2024-09-03

## 2024-09-12 RX ORDER — FLUTICASONE FUROATE, UMECLIDINIUM BROMIDE AND VILANTEROL TRIFENATATE 100; 62.5; 25 UG/1; UG/1; UG/1
1 POWDER RESPIRATORY (INHALATION) DAILY
Qty: 60 EACH | Refills: 3 | Status: SHIPPED | OUTPATIENT
Start: 2024-09-12

## 2024-09-30 ENCOUNTER — APPOINTMENT (OUTPATIENT)
Facility: HOSPITAL | Age: 79
End: 2024-09-30
Payer: MEDICARE

## 2024-09-30 ENCOUNTER — HOSPITAL ENCOUNTER (EMERGENCY)
Facility: HOSPITAL | Age: 79
Discharge: HOME | End: 2024-09-30
Attending: STUDENT IN AN ORGANIZED HEALTH CARE EDUCATION/TRAINING PROGRAM
Payer: MEDICARE

## 2024-09-30 ENCOUNTER — TELEPHONE (OUTPATIENT)
Age: 79
End: 2024-09-30

## 2024-09-30 VITALS
RESPIRATION RATE: 19 BRPM | TEMPERATURE: 98.1 F | HEIGHT: 72 IN | OXYGEN SATURATION: 96 % | SYSTOLIC BLOOD PRESSURE: 152 MMHG | DIASTOLIC BLOOD PRESSURE: 72 MMHG | HEART RATE: 74 BPM | BODY MASS INDEX: 33.91 KG/M2

## 2024-09-30 DIAGNOSIS — M54.6 PAIN IN THORACIC SPINE: Primary | ICD-10-CM

## 2024-09-30 DIAGNOSIS — L03.119 CELLULITIS OF FOOT: ICD-10-CM

## 2024-09-30 DIAGNOSIS — T14.8XXA ABRASION: ICD-10-CM

## 2024-09-30 LAB
ALBUMIN SERPL-MCNC: 3.5 G/DL (ref 3.5–5)
ALBUMIN/GLOB SERPL: 1 (ref 1.1–2.2)
ALP SERPL-CCNC: 106 U/L (ref 45–117)
ALT SERPL-CCNC: 23 U/L (ref 12–78)
ANION GAP SERPL CALC-SCNC: 6 MMOL/L (ref 2–12)
APPEARANCE UR: CLEAR
AST SERPL-CCNC: 31 U/L (ref 15–37)
BACTERIA URNS QL MICRO: NEGATIVE /HPF
BASOPHILS # BLD: 0.1 K/UL (ref 0–0.1)
BASOPHILS NFR BLD: 1 % (ref 0–1)
BILIRUB SERPL-MCNC: 1.8 MG/DL (ref 0.2–1)
BILIRUB UR QL: NEGATIVE
BUN SERPL-MCNC: 32 MG/DL (ref 6–20)
BUN/CREAT SERPL: 10 (ref 12–20)
CALCIUM SERPL-MCNC: 9.3 MG/DL (ref 8.5–10.1)
CHLORIDE SERPL-SCNC: 106 MMOL/L (ref 97–108)
CO2 SERPL-SCNC: 28 MMOL/L (ref 21–32)
COLOR UR: ABNORMAL
CREAT SERPL-MCNC: 3.35 MG/DL (ref 0.7–1.3)
DIFFERENTIAL METHOD BLD: ABNORMAL
EOSINOPHIL # BLD: 0.2 K/UL (ref 0–0.4)
EOSINOPHIL NFR BLD: 2 % (ref 0–7)
EPITH CASTS URNS QL MICRO: ABNORMAL /LPF
ERYTHROCYTE [DISTWIDTH] IN BLOOD BY AUTOMATED COUNT: 15 % (ref 11.5–14.5)
GLOBULIN SER CALC-MCNC: 3.5 G/DL (ref 2–4)
GLUCOSE SERPL-MCNC: 106 MG/DL (ref 65–100)
GLUCOSE UR STRIP.AUTO-MCNC: NEGATIVE MG/DL
HCT VFR BLD AUTO: 35.2 % (ref 36.6–50.3)
HGB BLD-MCNC: 11.3 G/DL (ref 12.1–17)
HGB UR QL STRIP: NEGATIVE
HYALINE CASTS URNS QL MICRO: ABNORMAL /LPF (ref 0–2)
IMM GRANULOCYTES # BLD AUTO: 0.1 K/UL (ref 0–0.04)
IMM GRANULOCYTES NFR BLD AUTO: 1 % (ref 0–0.5)
KETONES UR QL STRIP.AUTO: NEGATIVE MG/DL
LEUKOCYTE ESTERASE UR QL STRIP.AUTO: ABNORMAL
LYMPHOCYTES # BLD: 1.9 K/UL (ref 0.8–3.5)
LYMPHOCYTES NFR BLD: 18 % (ref 12–49)
MAGNESIUM SERPL-MCNC: 2 MG/DL (ref 1.6–2.4)
MCH RBC QN AUTO: 30.5 PG (ref 26–34)
MCHC RBC AUTO-ENTMCNC: 32.1 G/DL (ref 30–36.5)
MCV RBC AUTO: 94.9 FL (ref 80–99)
MONOCYTES # BLD: 1.5 K/UL (ref 0–1)
MONOCYTES NFR BLD: 13 % (ref 5–13)
NEUTS SEG # BLD: 7.1 K/UL (ref 1.8–8)
NEUTS SEG NFR BLD: 65 % (ref 32–75)
NITRITE UR QL STRIP.AUTO: NEGATIVE
NRBC # BLD: 0 K/UL (ref 0–0.01)
NRBC BLD-RTO: 0 PER 100 WBC
NT PRO BNP: 1120 PG/ML
PH UR STRIP: 6 (ref 5–8)
PHOSPHATE SERPL-MCNC: 3.1 MG/DL (ref 2.6–4.7)
PLATELET # BLD AUTO: 218 K/UL (ref 150–400)
PMV BLD AUTO: 9.4 FL (ref 8.9–12.9)
POTASSIUM SERPL-SCNC: 4.3 MMOL/L (ref 3.5–5.1)
PROT SERPL-MCNC: 7 G/DL (ref 6.4–8.2)
PROT UR STRIP-MCNC: 30 MG/DL
RBC # BLD AUTO: 3.71 M/UL (ref 4.1–5.7)
RBC #/AREA URNS HPF: ABNORMAL /HPF (ref 0–5)
SODIUM SERPL-SCNC: 140 MMOL/L (ref 136–145)
SP GR UR REFRACTOMETRY: 1.01
URINE CULTURE IF INDICATED: ABNORMAL
UROBILINOGEN UR QL STRIP.AUTO: 1 EU/DL (ref 0.2–1)
WBC # BLD AUTO: 10.9 K/UL (ref 4.1–11.1)
WBC URNS QL MICRO: ABNORMAL /HPF (ref 0–4)

## 2024-09-30 PROCEDURE — 71045 X-RAY EXAM CHEST 1 VIEW: CPT

## 2024-09-30 PROCEDURE — 81001 URINALYSIS AUTO W/SCOPE: CPT

## 2024-09-30 PROCEDURE — 80053 COMPREHEN METABOLIC PANEL: CPT

## 2024-09-30 PROCEDURE — 83735 ASSAY OF MAGNESIUM: CPT

## 2024-09-30 PROCEDURE — 72070 X-RAY EXAM THORAC SPINE 2VWS: CPT

## 2024-09-30 PROCEDURE — 84100 ASSAY OF PHOSPHORUS: CPT

## 2024-09-30 PROCEDURE — 93005 ELECTROCARDIOGRAM TRACING: CPT | Performed by: STUDENT IN AN ORGANIZED HEALTH CARE EDUCATION/TRAINING PROGRAM

## 2024-09-30 PROCEDURE — 36415 COLL VENOUS BLD VENIPUNCTURE: CPT

## 2024-09-30 PROCEDURE — 83880 ASSAY OF NATRIURETIC PEPTIDE: CPT

## 2024-09-30 PROCEDURE — 99285 EMERGENCY DEPT VISIT HI MDM: CPT

## 2024-09-30 PROCEDURE — 6370000000 HC RX 637 (ALT 250 FOR IP): Performed by: STUDENT IN AN ORGANIZED HEALTH CARE EDUCATION/TRAINING PROGRAM

## 2024-09-30 PROCEDURE — 85025 COMPLETE CBC W/AUTO DIFF WBC: CPT

## 2024-09-30 RX ORDER — ACETAMINOPHEN 500 MG
1000 TABLET ORAL
Status: COMPLETED | OUTPATIENT
Start: 2024-09-30 | End: 2024-09-30

## 2024-09-30 RX ORDER — DOXYCYCLINE HYCLATE 100 MG
100 TABLET ORAL 2 TIMES DAILY
Qty: 14 TABLET | Refills: 0 | Status: SHIPPED | OUTPATIENT
Start: 2024-09-30 | End: 2024-10-07

## 2024-09-30 RX ADMIN — ACETAMINOPHEN 1000 MG: 500 TABLET ORAL at 13:28

## 2024-09-30 ASSESSMENT — PAIN DESCRIPTION - LOCATION
LOCATION: BACK

## 2024-09-30 ASSESSMENT — PAIN DESCRIPTION - ORIENTATION
ORIENTATION: LOWER
ORIENTATION: MID
ORIENTATION: MID

## 2024-09-30 ASSESSMENT — PAIN DESCRIPTION - DESCRIPTORS
DESCRIPTORS: THROBBING
DESCRIPTORS: ACHING

## 2024-09-30 ASSESSMENT — PAIN - FUNCTIONAL ASSESSMENT: PAIN_FUNCTIONAL_ASSESSMENT: 0-10

## 2024-09-30 ASSESSMENT — PAIN SCALES - GENERAL
PAINLEVEL_OUTOF10: 6
PAINLEVEL_OUTOF10: 7
PAINLEVEL_OUTOF10: 5

## 2024-09-30 NOTE — ED PROVIDER NOTES
(NITROSTAT) 0.4 MG SL tablet Place 1 tablet under the tongue every 5 minutes as needed 25 tablet 0    ferrous sulfate (IRON 325) 325 (65 Fe) MG tablet 1 tablet      atorvastatin (LIPITOR) 80 MG tablet TAKE 1 TABLET BY MOUTH EVERY DAY 90 tablet 3    fluocinonide (LIDEX) 0.05 % external solution Apply topically 2 times daily as needed Apply topically 2 times daily.      Multiple Vitamins-Minerals (CENTRUM ADULTS PO) Take 1 tablet by mouth daily      albuterol sulfate HFA (PROVENTIL;VENTOLIN;PROAIR) 108 (90 Base) MCG/ACT inhaler Inhale 1 puff into the lungs every 4 hours      aspirin 81 MG EC tablet Take 1 tablet by mouth every evening      Cholecalciferol 50 MCG (2000 UT) CAPS Take 1 capsule by mouth daily      colchicine (COLCRYS) 0.6 MG tablet Take 1 tablet by mouth 2 times daily as needed      furosemide (LASIX) 80 MG tablet Take 1 tablet by mouth daily      isosorbide mononitrate (IMDUR) 30 MG extended release tablet Take 0.5 tablets by mouth daily      ketoconazole (NIZORAL) 2 % shampoo Apply topically daily as needed      loratadine (CLARITIN) 10 MG tablet Take 1 tablet by mouth every evening      metoprolol succinate (TOPROL XL) 25 MG extended release tablet Take 1 tablet by mouth every evening Taking a half tablet daily.      pantoprazole (PROTONIX) 40 MG tablet Take 1 tablet by mouth daily      sodium bicarbonate 325 MG tablet Take 1 tablet by mouth 2 times daily      tamsulosin (FLOMAX) 0.4 MG capsule Take 1 capsule by mouth every evening         Past History     Past Medical History:  Past Medical History:   Diagnosis Date    Abnormal stress echo 5/12/2015    Anxiety     Borderline diabetes     CAD (coronary artery disease) 2009    cath ([facility]) revealed 20% RCA and 50% 2nd diagonal    Calculus of kidney     CKD (chronic kidney disease) stage 4, GFR 15-29 ml/min (HCC)     COPD, mild (HCC)     Followed by pulmonary associates    Depression     DJD (degenerative joint disease)     Environmental allergies

## 2024-09-30 NOTE — TELEPHONE ENCOUNTER
----- Message from Art D sent at 9/30/2024  3:57 PM EDT -----  Regarding: [facility] Appointment Request  E[facility] Appointment Request    Patient needs appointment for E[facility] Appointment Type: ED Follow-Up.    Patient Requested Dates(s):anyday oct  Patient Requested Time: anytime  Provider Name: Tonya Gonzalez MD    Reason for Appointment Request: Established Patient - Available appointments did not meet patient need  --------------------------------------------------------------------------------------------------------------------------    Relationship to Patient: Self     Call Back Information: OK to leave message on voicemail  Preferred Call Back Number: Phone

## 2024-10-03 LAB
EKG ATRIAL RATE: 75 BPM
EKG DIAGNOSIS: NORMAL
EKG P AXIS: 66 DEGREES
EKG P-R INTERVAL: 136 MS
EKG Q-T INTERVAL: 470 MS
EKG QRS DURATION: 114 MS
EKG QTC CALCULATION (BAZETT): 524 MS
EKG R AXIS: 32 DEGREES
EKG T AXIS: -32 DEGREES
EKG VENTRICULAR RATE: 75 BPM

## 2024-10-06 NOTE — PROGRESS NOTES
ketoconazole (NIZORAL) 2 % shampoo Apply topically daily as needed      loratadine (CLARITIN) 10 MG tablet Take 1 tablet by mouth every evening      metoprolol succinate (TOPROL XL) 25 MG extended release tablet Take 1 tablet by mouth every evening Taking a half tablet daily.      sodium bicarbonate 325 MG tablet Take 1 tablet by mouth 2 times daily      tamsulosin (FLOMAX) 0.4 MG capsule Take 1 capsule by mouth every evening      doxycycline hyclate (VIBRA-TABS) 100 MG tablet Take 1 tablet by mouth 2 times daily for 7 days (Patient not taking: Reported on 10/7/2024) 14 tablet 0    ferrous sulfate (IRON 325) 325 (65 Fe) MG tablet 1 tablet      pantoprazole (PROTONIX) 40 MG tablet Take 1 tablet by mouth daily       No current facility-administered medications on file prior to visit.       Review of Systems  Pertinent items are noted in HPI.    Objective:     BP (!) 147/75 (Site: Left Upper Arm, Position: Sitting, Cuff Size: Large Adult)   Pulse 87   Temp 97 °F (36.1 °C) (Temporal)   Resp 16   Ht 1.829 m (6')   Wt 113.4 kg (250 lb)   SpO2 98%   BMI 33.91 kg/m²   Gen: well appearing male  Resp:  No wheezing, no rhonchi, no rales.  CV:  RRR, normal S1S2, no murmur.    Extrem:  +2 pulses, no edema, warm distally, ecchymoses on arms  Back:  tender bilateral thoracic musculature, no bruising on back.     Assessment/Plan:      Diagnosis Orders   1. Frequent falls  [facility]      2. Needs flu shot  Influenza, FLUAD Trivalent, (age 65 y+), IM, Preservative Free, 0.5mL      3. Idiopathic small fiber peripheral neuropathy  [facility]        Refer to PT.  Balance therapy  Daughter getting a new shower chair.  Has nonslip mats and shower bars.      Tonya Gonzalez MD

## 2024-10-07 ENCOUNTER — OFFICE VISIT (OUTPATIENT)
Age: 79
End: 2024-10-07
Payer: MEDICARE

## 2024-10-07 VITALS
HEART RATE: 87 BPM | OXYGEN SATURATION: 98 % | BODY MASS INDEX: 33.86 KG/M2 | DIASTOLIC BLOOD PRESSURE: 75 MMHG | SYSTOLIC BLOOD PRESSURE: 147 MMHG | TEMPERATURE: 97 F | WEIGHT: 250 LBS | HEIGHT: 72 IN | RESPIRATION RATE: 16 BRPM

## 2024-10-07 DIAGNOSIS — R29.6 FREQUENT FALLS: Primary | ICD-10-CM

## 2024-10-07 DIAGNOSIS — G60.9 IDIOPATHIC SMALL FIBER PERIPHERAL NEUROPATHY: ICD-10-CM

## 2024-10-07 DIAGNOSIS — Z23 NEEDS FLU SHOT: ICD-10-CM

## 2024-10-07 PROCEDURE — 90653 IIV ADJUVANT VACCINE IM: CPT | Performed by: FAMILY MEDICINE

## 2024-10-07 PROCEDURE — 1123F ACP DISCUSS/DSCN MKR DOCD: CPT | Performed by: FAMILY MEDICINE

## 2024-10-07 PROCEDURE — PBSHW INFLUENZA, FLUAD TRIVALENT, (AGE 65 Y+), IM, PRESERVATIVE FREE, 0.5ML: Performed by: FAMILY MEDICINE

## 2024-10-07 PROCEDURE — G8427 DOCREV CUR MEDS BY ELIG CLIN: HCPCS | Performed by: FAMILY MEDICINE

## 2024-10-07 PROCEDURE — 99214 OFFICE O/P EST MOD 30 MIN: CPT | Performed by: FAMILY MEDICINE

## 2024-10-07 PROCEDURE — G8417 CALC BMI ABV UP PARAM F/U: HCPCS | Performed by: FAMILY MEDICINE

## 2024-10-07 PROCEDURE — 3077F SYST BP >= 140 MM HG: CPT | Performed by: FAMILY MEDICINE

## 2024-10-07 PROCEDURE — 1036F TOBACCO NON-USER: CPT | Performed by: FAMILY MEDICINE

## 2024-10-07 PROCEDURE — G8482 FLU IMMUNIZE ORDER/ADMIN: HCPCS | Performed by: FAMILY MEDICINE

## 2024-10-07 PROCEDURE — 3078F DIAST BP <80 MM HG: CPT | Performed by: FAMILY MEDICINE

## 2024-10-07 SDOH — ECONOMIC STABILITY: INCOME INSECURITY: HOW HARD IS IT FOR YOU TO PAY FOR THE VERY BASICS LIKE FOOD, HOUSING, MEDICAL CARE, AND HEATING?: NOT HARD AT ALL

## 2024-10-07 SDOH — ECONOMIC STABILITY: FOOD INSECURITY: WITHIN THE PAST 12 MONTHS, YOU WORRIED THAT YOUR FOOD WOULD RUN OUT BEFORE YOU GOT MONEY TO BUY MORE.: NEVER TRUE

## 2024-10-07 SDOH — ECONOMIC STABILITY: FOOD INSECURITY: WITHIN THE PAST 12 MONTHS, THE FOOD YOU BOUGHT JUST DIDN'T LAST AND YOU DIDN'T HAVE MONEY TO GET MORE.: NEVER TRUE

## 2024-10-07 ASSESSMENT — PATIENT HEALTH QUESTIONNAIRE - PHQ9
1. LITTLE INTEREST OR PLEASURE IN DOING THINGS: NOT AT ALL
2. FEELING DOWN, DEPRESSED OR HOPELESS: SEVERAL DAYS
SUM OF ALL RESPONSES TO PHQ QUESTIONS 1-9: 1
SUM OF ALL RESPONSES TO PHQ9 QUESTIONS 1 & 2: 1
SUM OF ALL RESPONSES TO PHQ QUESTIONS 1-9: 1

## 2024-10-16 ENCOUNTER — TELEPHONE (OUTPATIENT)
Age: 79
End: 2024-10-16

## 2024-10-16 DIAGNOSIS — R39.81 FUNCTIONAL URINARY INCONTINENCE: ICD-10-CM

## 2024-10-16 DIAGNOSIS — R54 AGE-RELATED PHYSICAL DEBILITY: Primary | ICD-10-CM

## 2024-10-16 NOTE — TELEPHONE ENCOUNTER
Patient's daughter called the office requesting a call back from the nurse. Patient's daughter tested positive for covid and so did the patient.    Patient's daughter would like to know what to do next. Please call Brandon

## 2024-10-16 NOTE — TELEPHONE ENCOUNTER
Pt's daughter states symptoms (fever, runny nose, sore throat and cough) started Saturday. Tested positive today.

## 2024-10-17 NOTE — TELEPHONE ENCOUNTER
Spoke to pt's daughter and used two pt identifiers.  Patient's daughter notified of response from Tonya Gonzalez MD    States understanding

## 2024-10-17 NOTE — TELEPHONE ENCOUNTER
Please notify patient's daughter, Paxlovid reduced dose due to kidney disease has been sent to the pharmacy.  The patient is to hold atorvastatin and also do not take colchicine while on this medication.

## 2024-10-18 NOTE — TELEPHONE ENCOUNTER
Waleska states that she is worried about pt falling as he has been falling and wetting his pants (incontinence).  Is there anyway or can you assist how to get diapers for pt through medicare?  (Please let her know and can you get started?)    Went to ED a couple of weeks ago after fall in shower.    Continues when getting out of bed he falls. He fell last night and caught toe under dresser and toenail came off.      Please call to advise as she needs some help with her dad.

## 2024-10-20 NOTE — PROGRESS NOTES
Club or Organization Meetings: Never     Marital Status:    Intimate Partner Violence: Not on file   Housing Stability: Unknown (10/7/2024)    Housing Stability Vital Sign     Unable to Pay for Housing in the Last Year: Not on file     Number of Times Moved in the Last Year: Not on file     Homeless in the Last Year: No        Current Outpatient Medications on File Prior to Visit   Medication Sig Dispense Refill    nirmatrelvir/ritonavir (PAXLOVID) 10 x 150 MG & 10 x 100MG TBPK Take 2 tablets (one 150 mg nirmatrelvir and one 100 mg ritonavir tablets) by mouth every 12 hours for 5 days. 20 tablet 0    TRELEGY ELLIPTA 100-62.5-25 MCG/ACT AEPB inhaler INHALE 1 PUFF BY MOUTH EVERY DAY 60 each 3    citalopram (CELEXA) 40 MG tablet TAKE 1 TABLET BY MOUTH EVERY DAY 90 tablet 1    Glucosamine 500 MG CAPS Take by mouth      famotidine (PEPCID) 20 MG tablet Take 1 tablet by mouth 2 times daily      vitamin B-12 (CYANOCOBALAMIN) 1000 MCG tablet Take 1 tablet by mouth daily      nitroGLYCERIN (NITROSTAT) 0.4 MG SL tablet Place 1 tablet under the tongue every 5 minutes as needed 25 tablet 0    atorvastatin (LIPITOR) 80 MG tablet TAKE 1 TABLET BY MOUTH EVERY DAY 90 tablet 3    fluocinonide (LIDEX) 0.05 % external solution Apply topically 2 times daily as needed Apply topically 2 times daily.      Multiple Vitamins-Minerals (CENTRUM ADULTS PO) Take 1 tablet by mouth daily      albuterol sulfate HFA (PROVENTIL;VENTOLIN;PROAIR) 108 (90 Base) MCG/ACT inhaler Inhale 1 puff into the lungs every 4 hours      aspirin 81 MG EC tablet Take 1 tablet by mouth every evening      Cholecalciferol 50 MCG (2000 UT) CAPS Take 1 capsule by mouth daily      colchicine (COLCRYS) 0.6 MG tablet Take 1 tablet by mouth 2 times daily as needed      furosemide (LASIX) 80 MG tablet Take 1 tablet by mouth daily      isosorbide mononitrate (IMDUR) 30 MG extended release tablet Take 0.5 tablets by mouth daily      ketoconazole (NIZORAL) 2 % shampoo

## 2024-10-21 ENCOUNTER — TELEMEDICINE (OUTPATIENT)
Age: 79
End: 2024-10-21
Payer: MEDICARE

## 2024-10-21 DIAGNOSIS — E11.22 TYPE 2 DIABETES MELLITUS WITH STAGE 4 CHRONIC KIDNEY DISEASE, WITHOUT LONG-TERM CURRENT USE OF INSULIN (HCC): ICD-10-CM

## 2024-10-21 DIAGNOSIS — G60.9 IDIOPATHIC SMALL FIBER PERIPHERAL NEUROPATHY: ICD-10-CM

## 2024-10-21 DIAGNOSIS — I25.10 CORONARY ARTERY DISEASE DUE TO LIPID RICH PLAQUE: ICD-10-CM

## 2024-10-21 DIAGNOSIS — I25.83 CORONARY ARTERY DISEASE DUE TO LIPID RICH PLAQUE: ICD-10-CM

## 2024-10-21 DIAGNOSIS — I10 ESSENTIAL HYPERTENSION: ICD-10-CM

## 2024-10-21 DIAGNOSIS — R54 AGE-RELATED PHYSICAL DEBILITY: ICD-10-CM

## 2024-10-21 DIAGNOSIS — U07.1 COVID-19: ICD-10-CM

## 2024-10-21 DIAGNOSIS — J40 BRONCHITIS: Primary | ICD-10-CM

## 2024-10-21 DIAGNOSIS — N18.4 TYPE 2 DIABETES MELLITUS WITH STAGE 4 CHRONIC KIDNEY DISEASE, WITHOUT LONG-TERM CURRENT USE OF INSULIN (HCC): ICD-10-CM

## 2024-10-21 DIAGNOSIS — R29.6 FREQUENT FALLS: ICD-10-CM

## 2024-10-21 PROCEDURE — 1036F TOBACCO NON-USER: CPT | Performed by: FAMILY MEDICINE

## 2024-10-21 PROCEDURE — G8427 DOCREV CUR MEDS BY ELIG CLIN: HCPCS | Performed by: FAMILY MEDICINE

## 2024-10-21 PROCEDURE — G8417 CALC BMI ABV UP PARAM F/U: HCPCS | Performed by: FAMILY MEDICINE

## 2024-10-21 PROCEDURE — G8482 FLU IMMUNIZE ORDER/ADMIN: HCPCS | Performed by: FAMILY MEDICINE

## 2024-10-21 PROCEDURE — 1123F ACP DISCUSS/DSCN MKR DOCD: CPT | Performed by: FAMILY MEDICINE

## 2024-10-21 PROCEDURE — 99214 OFFICE O/P EST MOD 30 MIN: CPT | Performed by: FAMILY MEDICINE

## 2024-10-21 RX ORDER — AZITHROMYCIN 250 MG/1
TABLET, FILM COATED ORAL
Qty: 6 TABLET | Refills: 0 | Status: SHIPPED | OUTPATIENT
Start: 2024-10-21 | End: 2024-10-31

## 2024-10-22 ENCOUNTER — TELEPHONE (OUTPATIENT)
Age: 79
End: 2024-10-22

## 2024-10-22 NOTE — TELEPHONE ENCOUNTER
Katie Physical Therapist with [facility]     Phone [phone]        Requesting verbal orders:  Change Physical Therapy start of care date to nov 7        Appointments:  Last seen at [facility]:   10/21/2024   Future appointment [facility]:  Visit date not found         .            Caller confirms readback of documented phone/fax number(s) as correct.

## 2024-10-22 NOTE — TELEPHONE ENCOUNTER
Called and spoke to Katie Physical Therapist with [facility]    Gave verbal ok to start PT on Nov 7 Per Daughter's request.

## 2024-10-30 ENCOUNTER — HOSPITAL ENCOUNTER (OUTPATIENT)
Age: 79
Discharge: HOME | End: 2024-11-02
Payer: MEDICARE

## 2024-10-30 DIAGNOSIS — R04.2 HEMOPTYSIS: ICD-10-CM

## 2024-10-30 PROCEDURE — 71250 CT THORAX DX C-: CPT

## 2024-11-25 ENCOUNTER — TELEPHONE (OUTPATIENT)
Age: 79
End: 2024-11-25

## 2024-11-25 NOTE — TELEPHONE ENCOUNTER
Called number provided. No answer and no vm to leave a message.     IF they call back please have them refax forms they need to have signed.

## 2024-11-25 NOTE — TELEPHONE ENCOUNTER
Jorge calero called to request the orders they sent over 10 days ago to be signed and faxed back to their office.

## 2024-12-16 ENCOUNTER — TELEPHONE (OUTPATIENT)
Age: 79
End: 2024-12-16

## 2024-12-16 NOTE — TELEPHONE ENCOUNTER
[employer] calling to get medical records on patient. Going to fax over a form for medical records request.

## 2025-01-10 NOTE — TELEPHONE ENCOUNTER
PCP: Tonya Gonzalez MD    Last appt:   10/21/2024    Future Appointments   Date Time Provider Department Center   1/22/2025  2:00 PM Tonya Gonzalez MD [facility]   2/3/2025  1:40 PM Dalton House MD [facility]   4/1/2025 11:00 AM Lety Romero MD [facility]       Requested Prescriptions     Pending Prescriptions Disp Refills    famotidine (PEPCID) 20 MG tablet 60 tablet 1     Sig: Take 1 tablet by mouth 2 times daily

## 2025-01-11 RX ORDER — FAMOTIDINE 20 MG/1
20 TABLET, FILM COATED ORAL 2 TIMES DAILY
Qty: 180 TABLET | Refills: 3 | Status: SHIPPED | OUTPATIENT
Start: 2025-01-11

## 2025-01-21 PROBLEM — N18.6 END STAGE RENAL DISEASE (HCC): Status: ACTIVE | Noted: 2025-01-21

## 2025-01-21 SDOH — ECONOMIC STABILITY: INCOME INSECURITY: IN THE LAST 12 MONTHS, WAS THERE A TIME WHEN YOU WERE NOT ABLE TO PAY THE MORTGAGE OR RENT ON TIME?: NO

## 2025-01-21 SDOH — ECONOMIC STABILITY: FOOD INSECURITY: WITHIN THE PAST 12 MONTHS, THE FOOD YOU BOUGHT JUST DIDN'T LAST AND YOU DIDN'T HAVE MONEY TO GET MORE.: NEVER TRUE

## 2025-01-21 SDOH — ECONOMIC STABILITY: FOOD INSECURITY: WITHIN THE PAST 12 MONTHS, YOU WORRIED THAT YOUR FOOD WOULD RUN OUT BEFORE YOU GOT MONEY TO BUY MORE.: NEVER TRUE

## 2025-01-21 SDOH — ECONOMIC STABILITY: TRANSPORTATION INSECURITY
IN THE PAST 12 MONTHS, HAS THE LACK OF TRANSPORTATION KEPT YOU FROM MEDICAL APPOINTMENTS OR FROM GETTING MEDICATIONS?: NO

## 2025-01-22 ENCOUNTER — OFFICE VISIT (OUTPATIENT)
Age: 80
End: 2025-01-22
Payer: MEDICARE

## 2025-01-22 VITALS
DIASTOLIC BLOOD PRESSURE: 76 MMHG | WEIGHT: 245 LBS | BODY MASS INDEX: 33.18 KG/M2 | RESPIRATION RATE: 18 BRPM | HEART RATE: 74 BPM | HEIGHT: 72 IN | TEMPERATURE: 98.1 F | SYSTOLIC BLOOD PRESSURE: 145 MMHG | OXYGEN SATURATION: 97 %

## 2025-01-22 DIAGNOSIS — M17.11 PRIMARY OSTEOARTHRITIS OF RIGHT KNEE: ICD-10-CM

## 2025-01-22 DIAGNOSIS — G60.9 IDIOPATHIC SMALL FIBER PERIPHERAL NEUROPATHY: ICD-10-CM

## 2025-01-22 DIAGNOSIS — I25.10 CORONARY ARTERY DISEASE DUE TO LIPID RICH PLAQUE: ICD-10-CM

## 2025-01-22 DIAGNOSIS — E11.22 TYPE 2 DIABETES MELLITUS WITH STAGE 4 CHRONIC KIDNEY DISEASE, WITHOUT LONG-TERM CURRENT USE OF INSULIN (HCC): Primary | ICD-10-CM

## 2025-01-22 DIAGNOSIS — J44.9 CHRONIC OBSTRUCTIVE PULMONARY DISEASE, UNSPECIFIED COPD TYPE (HCC): ICD-10-CM

## 2025-01-22 DIAGNOSIS — I25.83 CORONARY ARTERY DISEASE DUE TO LIPID RICH PLAQUE: ICD-10-CM

## 2025-01-22 DIAGNOSIS — R54 AGE-RELATED PHYSICAL DEBILITY: ICD-10-CM

## 2025-01-22 DIAGNOSIS — E78.00 PURE HYPERCHOLESTEROLEMIA: ICD-10-CM

## 2025-01-22 DIAGNOSIS — Z00.00 MEDICARE ANNUAL WELLNESS VISIT, SUBSEQUENT: ICD-10-CM

## 2025-01-22 DIAGNOSIS — N18.4 TYPE 2 DIABETES MELLITUS WITH STAGE 4 CHRONIC KIDNEY DISEASE, WITHOUT LONG-TERM CURRENT USE OF INSULIN (HCC): Primary | ICD-10-CM

## 2025-01-22 DIAGNOSIS — I10 ESSENTIAL HYPERTENSION: ICD-10-CM

## 2025-01-22 LAB
ALBUMIN SERPL-MCNC: 3.7 G/DL (ref 3.5–5)
ALBUMIN/GLOB SERPL: 1.2 (ref 1.1–2.2)
ALP SERPL-CCNC: 116 U/L (ref 45–117)
ALT SERPL-CCNC: 28 U/L (ref 12–78)
ANION GAP SERPL CALC-SCNC: 4 MMOL/L (ref 2–12)
AST SERPL-CCNC: 24 U/L (ref 15–37)
BASOPHILS # BLD: 0.08 K/UL (ref 0–0.1)
BASOPHILS NFR BLD: 0.7 % (ref 0–1)
BILIRUB SERPL-MCNC: 0.9 MG/DL (ref 0.2–1)
BUN SERPL-MCNC: 42 MG/DL (ref 6–20)
BUN/CREAT SERPL: 13 (ref 12–20)
CALCIUM SERPL-MCNC: 9.7 MG/DL (ref 8.5–10.1)
CHLORIDE SERPL-SCNC: 107 MMOL/L (ref 97–108)
CHOLEST SERPL-MCNC: 129 MG/DL
CO2 SERPL-SCNC: 30 MMOL/L (ref 21–32)
CREAT SERPL-MCNC: 3.22 MG/DL (ref 0.7–1.3)
DIFFERENTIAL METHOD BLD: ABNORMAL
EOSINOPHIL # BLD: 0.25 K/UL (ref 0–0.4)
EOSINOPHIL NFR BLD: 2.2 % (ref 0–7)
ERYTHROCYTE [DISTWIDTH] IN BLOOD BY AUTOMATED COUNT: 14.4 % (ref 11.5–14.5)
EST. AVERAGE GLUCOSE BLD GHB EST-MCNC: 126 MG/DL
GLOBULIN SER CALC-MCNC: 3 G/DL (ref 2–4)
GLUCOSE SERPL-MCNC: 100 MG/DL (ref 65–100)
HBA1C MFR BLD: 6 % (ref 4–5.6)
HCT VFR BLD AUTO: 35.8 % (ref 36.6–50.3)
HDLC SERPL-MCNC: 55 MG/DL
HDLC SERPL: 2.3 (ref 0–5)
HGB BLD-MCNC: 11.4 G/DL (ref 12.1–17)
IMM GRANULOCYTES # BLD AUTO: 0.09 K/UL (ref 0–0.04)
IMM GRANULOCYTES NFR BLD AUTO: 0.8 % (ref 0–0.5)
LDLC SERPL CALC-MCNC: 50.6 MG/DL (ref 0–100)
LYMPHOCYTES # BLD: 2.86 K/UL (ref 0.8–3.5)
LYMPHOCYTES NFR BLD: 25.1 % (ref 12–49)
MCH RBC QN AUTO: 30.9 PG (ref 26–34)
MCHC RBC AUTO-ENTMCNC: 31.8 G/DL (ref 30–36.5)
MCV RBC AUTO: 97 FL (ref 80–99)
MONOCYTES # BLD: 1.27 K/UL (ref 0–1)
MONOCYTES NFR BLD: 11.1 % (ref 5–13)
NEUTS SEG # BLD: 6.86 K/UL (ref 1.8–8)
NEUTS SEG NFR BLD: 60.1 % (ref 32–75)
NRBC # BLD: 0 K/UL (ref 0–0.01)
NRBC BLD-RTO: 0 PER 100 WBC
PLATELET # BLD AUTO: 239 K/UL (ref 150–400)
PMV BLD AUTO: 10.2 FL (ref 8.9–12.9)
POTASSIUM SERPL-SCNC: 4.1 MMOL/L (ref 3.5–5.1)
PROT SERPL-MCNC: 6.7 G/DL (ref 6.4–8.2)
RBC # BLD AUTO: 3.69 M/UL (ref 4.1–5.7)
SODIUM SERPL-SCNC: 141 MMOL/L (ref 136–145)
TRIGL SERPL-MCNC: 117 MG/DL
VLDLC SERPL CALC-MCNC: 23.4 MG/DL
WBC # BLD AUTO: 11.4 K/UL (ref 4.1–11.1)

## 2025-01-22 PROCEDURE — 99214 OFFICE O/P EST MOD 30 MIN: CPT | Performed by: FAMILY MEDICINE

## 2025-01-22 SDOH — ECONOMIC STABILITY: FOOD INSECURITY: WITHIN THE PAST 12 MONTHS, YOU WORRIED THAT YOUR FOOD WOULD RUN OUT BEFORE YOU GOT MONEY TO BUY MORE.: NEVER TRUE

## 2025-01-22 SDOH — ECONOMIC STABILITY: FOOD INSECURITY: WITHIN THE PAST 12 MONTHS, THE FOOD YOU BOUGHT JUST DIDN'T LAST AND YOU DIDN'T HAVE MONEY TO GET MORE.: NEVER TRUE

## 2025-01-22 ASSESSMENT — PATIENT HEALTH QUESTIONNAIRE - PHQ9
1. LITTLE INTEREST OR PLEASURE IN DOING THINGS: NOT AT ALL
6. FEELING BAD ABOUT YOURSELF - OR THAT YOU ARE A FAILURE OR HAVE LET YOURSELF OR YOUR FAMILY DOWN: NOT AT ALL
5. POOR APPETITE OR OVEREATING: NOT AT ALL
2. FEELING DOWN, DEPRESSED OR HOPELESS: NOT AT ALL
3. TROUBLE FALLING OR STAYING ASLEEP: NOT AT ALL
SUM OF ALL RESPONSES TO PHQ QUESTIONS 1-9: 3
9. THOUGHTS THAT YOU WOULD BE BETTER OFF DEAD, OR OF HURTING YOURSELF: NOT AT ALL
SUM OF ALL RESPONSES TO PHQ QUESTIONS 1-9: 3
10. IF YOU CHECKED OFF ANY PROBLEMS, HOW DIFFICULT HAVE THESE PROBLEMS MADE IT FOR YOU TO DO YOUR WORK, TAKE CARE OF THINGS AT HOME, OR GET ALONG WITH OTHER PEOPLE: NOT DIFFICULT AT ALL
4. FEELING TIRED OR HAVING LITTLE ENERGY: NOT AT ALL
7. TROUBLE CONCENTRATING ON THINGS, SUCH AS READING THE NEWSPAPER OR WATCHING TELEVISION: NEARLY EVERY DAY
SUM OF ALL RESPONSES TO PHQ9 QUESTIONS 1 & 2: 0
SUM OF ALL RESPONSES TO PHQ QUESTIONS 1-9: 3
SUM OF ALL RESPONSES TO PHQ QUESTIONS 1-9: 3

## 2025-01-22 ASSESSMENT — LIFESTYLE VARIABLES: HOW MANY STANDARD DRINKS CONTAINING ALCOHOL DO YOU HAVE ON A TYPICAL DAY: PATIENT DOES NOT DRINK

## 2025-01-22 NOTE — PROGRESS NOTES
\"Have you been to the ER, urgent care clinic since your last visit?  Hospitalized since your last visit?\"    NO    “Have you seen or consulted any other health care providers outside our system since your last visit?”    NO         
abdominal aneurysm     Dementia Mother     Alzheimer's Disease Mother     Heart Disease Brother     Heart Attack Brother     Heart Disease Maternal Grandmother     Heart Disease Paternal Grandmother     Heart Attack Paternal Grandmother     Heart Disease Paternal Grandfather     Heart Attack Paternal Grandfather     Elevated Lipids Son     Elevated Lipids Daughter     Stroke Neg Hx     Cancer Neg Hx     Diabetes Neg Hx         Social History     Socioeconomic History    Marital status:      Spouse name: Not on file    Number of children: Not on file    Years of education: graduated then 4 year apprenticship    Highest education level: Not on file   Occupational History    Not on file   Tobacco Use    Smoking status: Former     Current packs/day: 0.00     Types: Cigarettes     Quit date: 1968     Years since quittin.1    Smokeless tobacco: Never   Vaping Use    Vaping status: Never Used   Substance and Sexual Activity    Alcohol use: No    Drug use: No    Sexual activity: Not Currently     Partners: Female     Birth control/protection: None   Other Topics Concern    Not on file   Social History Narrative    Not on file     Social Determinants of Health     Financial Resource Strain: Low Risk  (10/7/2024)    Overall Financial Resource Strain (CARDIA)     Difficulty of Paying Living Expenses: Not hard at all   Food Insecurity: No Food Insecurity (2025)    Hunger Vital Sign     Worried About Running Out of Food in the Last Year: Never true     Ran Out of Food in the Last Year: Never true   Transportation Needs: No Transportation Needs (2025)    PRAPARE - Transportation     Lack of Transportation (Medical): No     Lack of Transportation (Non-Medical): No   Physical Activity: Inactive (2025)    Exercise Vital Sign     Days of Exercise per Week: 0 days     Minutes of Exercise per Session: 0 min   Stress: Stress Concern Present (2019)    Received from [facility]  (prior

## 2025-01-22 NOTE — PATIENT INSTRUCTIONS
problems.  Where can you learn more?  Go to https://www.healthknowNormal.net/patientEd and enter F075 to learn more about \"A Healthy Heart: Care Instructions.\"  Current as of: July 31, 2024  Content Version: 14.3  © 2024 [employer].   Care instructions adapted under license by [employer]. If you have questions about a medical condition or this instruction, always ask your healthcare professional. [employer] disclaims any warranty or liability for your use of this information.    Personalized Preventive Plan for Joss Jean Jr. - 1/22/2025  Medicare offers a range of preventive health benefits. Some of the tests and screenings are paid in full while other may be subject to a deductible, co-insurance, and/or copay.  Some of these benefits include a comprehensive review of your medical history including lifestyle, illnesses that may run in your family, and various assessments and screenings as appropriate.  After reviewing your medical record and screening and assessments performed today your provider may have ordered immunizations, labs, imaging, and/or referrals for you.  A list of these orders (if applicable) as well as your Preventive Care list are included within your After Visit Summary for your review.

## 2025-01-23 PROBLEM — N18.6 END STAGE RENAL DISEASE (HCC): Status: RESOLVED | Noted: 2025-01-21 | Resolved: 2025-01-23

## 2025-01-28 ENCOUNTER — TELEMEDICINE (OUTPATIENT)
Age: 80
End: 2025-01-28
Payer: MEDICARE
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
## 2025-01-28 PROBLEM — K21.00 GERD WITH ESOPHAGITIS: Status: ACTIVE | Noted: 2025-01-28

## 2025-01-28 PROBLEM — I25.10 CORONARY ATHEROSCLEROSIS: Status: ACTIVE | Noted: 2022-05-16

## 2025-01-28 PROBLEM — E87.0 HYPEROSMOLALITY AND HYPERNATREMIA: Chronic | Status: ACTIVE | Noted: 2021-04-26

## 2025-01-28 PROBLEM — I25.10 ARTERIOSCLEROSIS OF CORONARY ARTERY: Status: ACTIVE | Noted: 2025-01-28

## 2025-01-28 PROBLEM — N18.4 ANEMIA DUE TO STAGE 4 CHRONIC KIDNEY DISEASE (HCC): Status: ACTIVE | Noted: 2024-09-04

## 2025-01-28 PROBLEM — E55.9 VITAMIN D DEFICIENCY: Chronic | Status: ACTIVE | Noted: 2020-04-17

## 2025-01-28 PROBLEM — N40.0 BENIGN PROSTATIC HYPERPLASIA: Status: ACTIVE | Noted: 2018-09-19

## 2025-01-28 PROBLEM — N28.89: Status: ACTIVE | Noted: 2024-09-04

## 2025-01-28 PROBLEM — R80.9 PROTEINURIA: Status: ACTIVE | Noted: 2022-12-08

## 2025-01-28 PROBLEM — I77.0: Status: ACTIVE | Noted: 2024-09-04

## 2025-01-28 PROBLEM — N17.9 ACUTE RENAL FAILURE (HCC): Status: ACTIVE | Noted: 2023-10-27

## 2025-01-28 PROBLEM — Z87.442 HISTORY OF RENAL CALCULI: Status: ACTIVE | Noted: 2022-05-09

## 2025-01-28 PROBLEM — Z90.5 HISTORY OF NEPHRECTOMY: Status: ACTIVE | Noted: 2025-01-28

## 2025-01-28 PROBLEM — Z91.81 AT RISK FOR FALLS: Status: ACTIVE | Noted: 2024-09-04

## 2025-01-28 PROBLEM — D63.1 ANEMIA DUE TO STAGE 4 CHRONIC KIDNEY DISEASE (HCC): Status: ACTIVE | Noted: 2024-09-04

## 2025-01-28 PROBLEM — E87.20 METABOLIC ACIDOSIS: Status: ACTIVE | Noted: 2021-11-24

## 2025-01-28 PROBLEM — E53.8 COBALAMIN DEFICIENCY: Status: ACTIVE | Noted: 2024-05-28

## 2025-01-28 PROBLEM — J44.9 CHRONIC OBSTRUCTIVE PULMONARY DISEASE (HCC): Status: ACTIVE | Noted: 2025-01-28

## 2025-01-28 PROCEDURE — 99214 OFFICE O/P EST MOD 30 MIN: CPT | Performed by: FAMILY MEDICINE

## 2025-01-28 RX ORDER — CLINDAMYCIN HYDROCHLORIDE 300 MG/1
300 CAPSULE ORAL 3 TIMES DAILY
Qty: 21 CAPSULE | Refills: 0 | Status: SHIPPED | OUTPATIENT
Start: 2025-01-28 | End: 2025-02-04

## 2025-01-28 RX ORDER — CEPHALEXIN 500 MG/1
500 CAPSULE ORAL 2 TIMES DAILY
Qty: 14 CAPSULE | Refills: 0 | Status: SHIPPED | OUTPATIENT
Start: 2025-01-28 | End: 2025-02-04

## 2025-01-28 NOTE — PROGRESS NOTES
Joss Jean Jr. is a 79 y.o. male who presents accompanied by daughter.  Patient was bitten by his cat a few days ago.  Right hand is red, swollen, painful.  Animal is up-to-date on immunizations.    Reviewed recent labs.  Has had recent follow-up with nephrology.    Has noticed that stool is a darker brown color.  Some intermittent left lower quadrant pain.  Has not seen bright red blood per rectum or melena.  Hemoglobin is improving on recent labs.          Joss Jean Jr., was evaluated through a synchronous (real-time) audio-video encounter. The patient (or guardian if applicable) is aware that this is a billable service, which includes applicable co-pays. This Virtual Visit was conducted with patient's (and/or legal guardian's) consent. Patient identification was verified, and a caregiver was present when appropriate.   The patient was located at Home: [address] [place] [id number]  Provider was located at Home (Appt Dept State): [place]  Confirm you are appropriately licensed, registered, or certified to deliver care in the state where the patient is located as indicated above. If you are not or unsure, please re-schedule the visit: Yes, I confirm.        Are you appropriately licensed in the patient's state?: Yes      Total time spent for this encounter: Not billed by time    --Tonya Gonzalez MD on 1/28/2025     An electronic signature was used to authenticate this note.       Past Medical History:   Diagnosis Date    Abnormal stress echo 5/12/2015    Anxiety     Borderline diabetes     CAD (coronary artery disease) 2009    cath ([facility]) revealed 20% RCA and 50% 2nd diagonal    Calculus of kidney     CKD (chronic kidney disease) stage 4, GFR 15-29 ml/min (HCC)     COPD, mild (HCC)     Followed by pulmonary associates    Depression     DJD (degenerative joint disease)     Environmental allergies     Fatty liver     GERD (gastroesophageal reflux disease) 10/11/2009    Gout     High

## 2025-02-03 ENCOUNTER — OFFICE VISIT (OUTPATIENT)
Age: 80
End: 2025-02-03
Payer: MEDICARE

## 2025-02-03 VITALS
WEIGHT: 249.2 LBS | TEMPERATURE: 99.1 F | BODY MASS INDEX: 33.03 KG/M2 | DIASTOLIC BLOOD PRESSURE: 66 MMHG | SYSTOLIC BLOOD PRESSURE: 138 MMHG | OXYGEN SATURATION: 98 % | HEART RATE: 88 BPM | HEIGHT: 73 IN | RESPIRATION RATE: 17 BRPM

## 2025-02-03 DIAGNOSIS — G47.52 RBD (REM BEHAVIORAL DISORDER): ICD-10-CM

## 2025-02-03 DIAGNOSIS — Z98.890 HISTORY OF LEFT-SIDED CAROTID ENDARTERECTOMY: ICD-10-CM

## 2025-02-03 DIAGNOSIS — G60.9 IDIOPATHIC SMALL FIBER PERIPHERAL NEUROPATHY: ICD-10-CM

## 2025-02-03 DIAGNOSIS — M47.27 LUMBOSACRAL RADICULOPATHY DUE TO DEGENERATIVE JOINT DISEASE OF SPINE: ICD-10-CM

## 2025-02-03 DIAGNOSIS — R26.0 SENSORY ATAXIC GAIT: ICD-10-CM

## 2025-02-03 DIAGNOSIS — E11.42 DIABETIC PERIPHERAL NEUROPATHY ASSOCIATED WITH TYPE 2 DIABETES MELLITUS (HCC): ICD-10-CM

## 2025-02-03 DIAGNOSIS — Z51.81 THERAPEUTIC DRUG MONITORING: ICD-10-CM

## 2025-02-03 DIAGNOSIS — G25.0 BENIGN ESSENTIAL TREMOR: ICD-10-CM

## 2025-02-03 DIAGNOSIS — I65.23 BILATERAL CAROTID ARTERY STENOSIS: Primary | ICD-10-CM

## 2025-02-03 DIAGNOSIS — I67.89 CEREBRAL MICROVASCULAR DISEASE: ICD-10-CM

## 2025-02-03 PROCEDURE — 1123F ACP DISCUSS/DSCN MKR DOCD: CPT | Performed by: PSYCHIATRY & NEUROLOGY

## 2025-02-03 PROCEDURE — 1160F RVW MEDS BY RX/DR IN RCRD: CPT | Performed by: PSYCHIATRY & NEUROLOGY

## 2025-02-03 PROCEDURE — G8427 DOCREV CUR MEDS BY ELIG CLIN: HCPCS | Performed by: PSYCHIATRY & NEUROLOGY

## 2025-02-03 PROCEDURE — 3044F HG A1C LEVEL LT 7.0%: CPT | Performed by: PSYCHIATRY & NEUROLOGY

## 2025-02-03 PROCEDURE — 1036F TOBACCO NON-USER: CPT | Performed by: PSYCHIATRY & NEUROLOGY

## 2025-02-03 PROCEDURE — G8417 CALC BMI ABV UP PARAM F/U: HCPCS | Performed by: PSYCHIATRY & NEUROLOGY

## 2025-02-03 PROCEDURE — 99214 OFFICE O/P EST MOD 30 MIN: CPT | Performed by: PSYCHIATRY & NEUROLOGY

## 2025-02-03 PROCEDURE — 3075F SYST BP GE 130 - 139MM HG: CPT | Performed by: PSYCHIATRY & NEUROLOGY

## 2025-02-03 PROCEDURE — 1125F AMNT PAIN NOTED PAIN PRSNT: CPT | Performed by: PSYCHIATRY & NEUROLOGY

## 2025-02-03 PROCEDURE — 3078F DIAST BP <80 MM HG: CPT | Performed by: PSYCHIATRY & NEUROLOGY

## 2025-02-03 PROCEDURE — 1159F MED LIST DOCD IN RCRD: CPT | Performed by: PSYCHIATRY & NEUROLOGY

## 2025-02-03 ASSESSMENT — PATIENT HEALTH QUESTIONNAIRE - PHQ9
2. FEELING DOWN, DEPRESSED OR HOPELESS: SEVERAL DAYS
SUM OF ALL RESPONSES TO PHQ QUESTIONS 1-9: 2
SUM OF ALL RESPONSES TO PHQ QUESTIONS 1-9: 2
SUM OF ALL RESPONSES TO PHQ9 QUESTIONS 1 & 2: 2
SUM OF ALL RESPONSES TO PHQ QUESTIONS 1-9: 2
SUM OF ALL RESPONSES TO PHQ QUESTIONS 1-9: 2
1. LITTLE INTEREST OR PLEASURE IN DOING THINGS: SEVERAL DAYS

## 2025-02-04 NOTE — PROGRESS NOTES
Consult  REFERRED BY:  Tonya Gonzalez MD    CHIEF COMPLAINT: Patient seen today for worsening problem of his gait, mainly because of his arthritis in his knees and back, and because of his stage IV kidney disease he is already had a AV fistula put in his left arm will probably start dialysis soon and is not a surgical candidate and is having more trouble walking needs a cane because of his right knee that might give away anytime.  He is very depressed overall this and he cannot take anti-inflammatories because of his kidney disease and seems to be just feeling gradually a little bit.  He was previously seen for problem of worsening neuropathy and pain in his feet and balance issues from his latent diabetes and diabetic neuropathy.      Subjective:     Joss Jean Jr. is a 79 y.o. right-handed  male seen at the request of Dr. Gonzalez for evaluation of new problem of Patient seen today for worsening problem of his gait, mainly because of his arthritis in his knees and back, and because of his stage IV kidney disease he is already had a AV fistula put in his left arm will probably start dialysis soon and is not a surgical candidate and is having more trouble walking needs a cane because of his right knee that might give away anytime.  He is very depressed overall this and he cannot take anti-inflammatories because of his kidney disease and seems to be just feeling gradually a little bit.  He was previously seen for problem of worsening neuropathy and pain in his feet and balance issues from his latent diabetes and diabetic neuropathy.  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

## 2025-02-20 RX ORDER — ATORVASTATIN CALCIUM 80 MG/1
80 TABLET, FILM COATED ORAL DAILY
Qty: 90 TABLET | Refills: 3 | Status: SHIPPED | OUTPATIENT
Start: 2025-02-20

## 2025-02-21 RX ORDER — CITALOPRAM HYDROBROMIDE 40 MG/1
40 TABLET ORAL DAILY
Qty: 90 TABLET | Refills: 1 | Status: SHIPPED | OUTPATIENT
Start: 2025-02-21

## 2025-03-05 NOTE — TELEPHONE ENCOUNTER
Received JENNY request from   [facility] on 12/9/24. Faxed request to [employer] on 12/10.    
Admitted

## 2025-04-01 ENCOUNTER — OFFICE VISIT (OUTPATIENT)
Age: 80
End: 2025-04-01
Payer: MEDICARE

## 2025-04-01 VITALS
HEIGHT: 73 IN | DIASTOLIC BLOOD PRESSURE: 63 MMHG | HEART RATE: 66 BPM | WEIGHT: 241.1 LBS | BODY MASS INDEX: 31.95 KG/M2 | TEMPERATURE: 98 F | OXYGEN SATURATION: 94 % | SYSTOLIC BLOOD PRESSURE: 131 MMHG

## 2025-04-01 DIAGNOSIS — E66.811 OBESITY, CLASS I, BMI 30-34.9: ICD-10-CM

## 2025-04-01 DIAGNOSIS — I10 ESSENTIAL (PRIMARY) HYPERTENSION: ICD-10-CM

## 2025-04-01 DIAGNOSIS — G47.33 OBSTRUCTIVE SLEEP APNEA (ADULT) (PEDIATRIC): Primary | ICD-10-CM

## 2025-04-01 PROCEDURE — G8417 CALC BMI ABV UP PARAM F/U: HCPCS | Performed by: INTERNAL MEDICINE

## 2025-04-01 PROCEDURE — 3078F DIAST BP <80 MM HG: CPT | Performed by: INTERNAL MEDICINE

## 2025-04-01 PROCEDURE — 1036F TOBACCO NON-USER: CPT | Performed by: INTERNAL MEDICINE

## 2025-04-01 PROCEDURE — G8427 DOCREV CUR MEDS BY ELIG CLIN: HCPCS | Performed by: INTERNAL MEDICINE

## 2025-04-01 PROCEDURE — 1160F RVW MEDS BY RX/DR IN RCRD: CPT | Performed by: INTERNAL MEDICINE

## 2025-04-01 PROCEDURE — 99214 OFFICE O/P EST MOD 30 MIN: CPT | Performed by: INTERNAL MEDICINE

## 2025-04-01 PROCEDURE — 1159F MED LIST DOCD IN RCRD: CPT | Performed by: INTERNAL MEDICINE

## 2025-04-01 PROCEDURE — 3075F SYST BP GE 130 - 139MM HG: CPT | Performed by: INTERNAL MEDICINE

## 2025-04-01 PROCEDURE — 1123F ACP DISCUSS/DSCN MKR DOCD: CPT | Performed by: INTERNAL MEDICINE

## 2025-04-01 ASSESSMENT — SLEEP AND FATIGUE QUESTIONNAIRES
HOW LIKELY ARE YOU TO NOD OFF OR FALL ASLEEP WHILE SITTING AND READING: HIGH CHANCE OF DOZING
HOW LIKELY ARE YOU TO NOD OFF OR FALL ASLEEP WHEN YOU ARE A PASSENGER IN A CAR FOR AN HOUR WITHOUT A BREAK: SLIGHT CHANCE OF DOZING
HOW LIKELY ARE YOU TO NOD OFF OR FALL ASLEEP WHILE SITTING INACTIVE IN A PUBLIC PLACE: HIGH CHANCE OF DOZING
HOW LIKELY ARE YOU TO NOD OFF OR FALL ASLEEP WHILE WATCHING TV: HIGH CHANCE OF DOZING
HOW LIKELY ARE YOU TO NOD OFF OR FALL ASLEEP WHILE LYING DOWN TO REST IN THE AFTERNOON WHEN CIRCUMSTANCES PERMIT: HIGH CHANCE OF DOZING
HOW LIKELY ARE YOU TO NOD OFF OR FALL ASLEEP WHILE SITTING AND TALKING TO SOMEONE: SLIGHT CHANCE OF DOZING
HOW LIKELY ARE YOU TO NOD OFF OR FALL ASLEEP IN A CAR, WHILE STOPPED FOR A FEW MINUTES IN TRAFFIC: SLIGHT CHANCE OF DOZING
ESS TOTAL SCORE: 18
HOW LIKELY ARE YOU TO NOD OFF OR FALL ASLEEP WHILE SITTING QUIETLY AFTER LUNCH WITHOUT ALCOHOL: HIGH CHANCE OF DOZING

## 2025-04-01 NOTE — PROGRESS NOTES
[address]  [place] [address]  Tel.  [phone]  Fax. [phone] [address]  [place] [address]  Tel.  [phone]  Fax. [phone] [address] [place] [address]  Tel.  [phone]  Fax. [phone]     S>Joss Jean Jr. is a 79 y.o. male seen for a positive airway pressure follow-up.  He reports no problems using the device.  The following problems are identified:    Drowsiness no Problems exhaling no   Snoring no Forget to put on no   Mask Comfortable yes Can't fall asleep no   Dry Mouth no Mask falls off no   Air Leaking Yes Frequent awakenings no     Estimated AHI flow from download shows 11/hour.   + significant leak  Averaging 10/hours use on nights used  Days with usage 100%  Adherence 100%  BMI  from last visit 33      He admits that his sleep has improved.    Allergies   Allergen Reactions    Penicillins Hives and Other (See Comments)     Pt states he has taken Keflex before without problems    Codeine Itching, Other (See Comments) and Hives    Gabapentin Other (See Comments)     drowsy    Hydrocortisone Other (See Comments)    Lisinopril Cough and Other (See Comments)    Pravastatin Other (See Comments)    Prednisone Other (See Comments)    Sulfamethoxazole-Trimethoprim Hives and Nausea Only    Zoster Vac Recomb Adjuvanted Rash     See 9/10/13 visit    Zoster Vaccine Live Rash     See 9/10/13 visit       He has a current medication list which includes the following prescription(s): citalopram, atorvastatin, famotidine, trelegy ellipta, glucosamine, vitamin b-12, nitroglycerin, fluocinonide, multiple vitamins-minerals, albuterol sulfate hfa, aspirin, vitamin d, colchicine, furosemide, isosorbide mononitrate, ketoconazole, loratadine, metoprolol succinate, sodium bicarbonate, and tamsulosin..      He  has a past medical history of Abnormal stress echo, Anxiety, Borderline diabetes, CAD (coronary artery disease), Calculus of

## 2025-04-01 NOTE — PROGRESS NOTES
A mask fit was completed for Mr. Jean and his current mask is leaking. He was able to try a Resmed F30i FFM, size: medium, which he found comfortable. Mr. Jean will give this mask a try.

## 2025-04-01 NOTE — PATIENT INSTRUCTIONS
[address]  [place] [address]  Tel.  [phone]  Fax. [phone] [address]  [place] [address]  Tel.  [phone]  Fax. [phone] [address] [address]  Tel.  [phone]  Fax. [phone]     PROPER SLEEP HYGIENE    What to avoid  Do not have drinks with caffeine, such as coffee or black tea, for 8 hours before bed.  Do not smoke or use other types of tobacco near bedtime. Nicotine is a stimulant and can keep you awake.  Avoid drinking alcohol late in the evening, because it can cause you to wake in the middle of the night.  Do not eat a big meal close to bedtime. If you are hungry, eat a light snack.  Do not drink a lot of water close to bedtime, because the need to urinate may wake you up during the night.  Do not read or watch TV in bed. Use the bed only for sleeping and sexual activity.  What to try  Go to bed at the same time every night, and wake up at the same time every morning. Do not take naps during the day.  Keep your bedroom quiet, dark, and cool.  Get regular exercise, but not within 3 to 4 hours of your bedtime..  Sleep on a comfortable pillow and mattress.  If watching the clock makes you anxious, turn it facing away from you so you cannot see the time.  If you worry when you lie down, start a worry book. Well before bedtime, write down your worries, and then set the book and your concerns aside.  Try meditation or other relaxation techniques before you go to bed.  If you cannot fall asleep, get up and go to another room until you feel sleepy. Do something relaxing. Repeat your bedtime routine before you go to bed again.  Make your house quiet and calm about an hour before bedtime. Turn down the lights, turn off the TV, log off the computer, and turn down the volume on music. This can help you relax after a busy day.    Drowsy Driving  The [place] National Highway Traffic Safety Administration cites drowsiness as a

## 2025-05-06 ENCOUNTER — TELEMEDICINE (OUTPATIENT)
Age: 80
End: 2025-05-06
Payer: MEDICARE

## 2025-05-06 DIAGNOSIS — I10 ESSENTIAL HYPERTENSION: ICD-10-CM

## 2025-05-06 DIAGNOSIS — J01.00 ACUTE NON-RECURRENT MAXILLARY SINUSITIS: Primary | ICD-10-CM

## 2025-05-06 PROCEDURE — 1159F MED LIST DOCD IN RCRD: CPT | Performed by: FAMILY MEDICINE

## 2025-05-06 PROCEDURE — 99214 OFFICE O/P EST MOD 30 MIN: CPT | Performed by: FAMILY MEDICINE

## 2025-05-06 PROCEDURE — G8427 DOCREV CUR MEDS BY ELIG CLIN: HCPCS | Performed by: FAMILY MEDICINE

## 2025-05-06 PROCEDURE — 1123F ACP DISCUSS/DSCN MKR DOCD: CPT | Performed by: FAMILY MEDICINE

## 2025-05-06 PROCEDURE — 1036F TOBACCO NON-USER: CPT | Performed by: FAMILY MEDICINE

## 2025-05-06 PROCEDURE — G8417 CALC BMI ABV UP PARAM F/U: HCPCS | Performed by: FAMILY MEDICINE

## 2025-05-06 PROCEDURE — 1160F RVW MEDS BY RX/DR IN RCRD: CPT | Performed by: FAMILY MEDICINE

## 2025-05-06 RX ORDER — AZITHROMYCIN 250 MG/1
TABLET, FILM COATED ORAL
Qty: 6 TABLET | Refills: 0 | Status: SHIPPED | OUTPATIENT
Start: 2025-05-06 | End: 2025-05-16

## 2025-05-06 NOTE — PROGRESS NOTES
Have you been to the ER, urgent care clinic since your last visit?  Hospitalized since your last visit?   NO.     Have you seen or consulted any other health care providers outside our system since your last visit?   Dr. Fernando Iqbal

## 2025-05-06 NOTE — PROGRESS NOTES
Joss Jean Jr. is a 79 y.o. male who presents accompanied by daughter.      Concern of nasal congestion, productive cough green mucous.  Per daughter, cough has been for several weeks.  Saw pulmonary, chest clear. Was on high dose Trelegy, dose reduced to 100mcg recently    Treated for hypertension.  Blood pressure has been elevated at home, -175. Using monitor that is sent to nephrology. /63 on 4/1.        Joss eJan Jr., was evaluated through a synchronous (real-time) audio-video encounter. The patient (or guardian if applicable) is aware that this is a billable service, which includes applicable co-pays. This Virtual Visit was conducted with patient's (and/or legal guardian's) consent. Patient identification was verified, and a caregiver was present when appropriate.   The patient was located at Home: [address] [place] [id number]  Provider was located at Home (Appt Dept State): [place]  Confirm you are appropriately licensed, registered, or certified to deliver care in the state where the patient is located as indicated above. If you are not or unsure, please re-schedule the visit: Yes, I confirm.        Are you appropriately licensed in the patient's state?: Yes      Total time spent for this encounter: Not billed by time    --Tonya Gonzalez MD on 5/6/2025     An electronic signature was used to authenticate this note.       Past Medical History:   Diagnosis Date    Abnormal stress echo 5/12/2015    Anxiety     Borderline diabetes     CAD (coronary artery disease) 2009    cath ([facility]) revealed 20% RCA and 50% 2nd diagonal    Calculus of kidney     CKD (chronic kidney disease) stage 4, GFR 15-29 ml/min (HCC)     COPD, mild (HCC)     Followed by pulmonary associates    Depression     DJD (degenerative joint disease)     Environmental allergies     Fatty liver     GERD (gastroesophageal reflux disease) 10/11/2009    Gout     High cholesterol     History of blood transfusion

## 2025-05-10 ENCOUNTER — HOSPITAL ENCOUNTER (EMERGENCY)
Facility: HOSPITAL | Age: 80
Discharge: HOME | End: 2025-05-10
Payer: MEDICARE

## 2025-05-10 ENCOUNTER — APPOINTMENT (OUTPATIENT)
Facility: HOSPITAL | Age: 80
End: 2025-05-10
Payer: MEDICARE

## 2025-05-10 VITALS
HEART RATE: 80 BPM | OXYGEN SATURATION: 92 % | TEMPERATURE: 98.6 F | DIASTOLIC BLOOD PRESSURE: 90 MMHG | WEIGHT: 241 LBS | BODY MASS INDEX: 31.8 KG/M2 | RESPIRATION RATE: 16 BRPM | SYSTOLIC BLOOD PRESSURE: 153 MMHG

## 2025-05-10 DIAGNOSIS — M70.22 OLECRANON BURSITIS OF LEFT ELBOW: Primary | ICD-10-CM

## 2025-05-10 DIAGNOSIS — N18.9 CHRONIC KIDNEY DISEASE, UNSPECIFIED CKD STAGE: ICD-10-CM

## 2025-05-10 LAB
ALBUMIN SERPL-MCNC: 3.3 G/DL (ref 3.5–5)
ALBUMIN/GLOB SERPL: 1.1 (ref 1.1–2.2)
ALP SERPL-CCNC: 124 U/L (ref 45–117)
ALT SERPL-CCNC: 24 U/L (ref 12–78)
ANION GAP SERPL CALC-SCNC: 4 MMOL/L (ref 2–12)
APPEARANCE UR: CLEAR
AST SERPL-CCNC: 24 U/L (ref 15–37)
BACTERIA URNS QL MICRO: NEGATIVE /HPF
BASOPHILS # BLD: 0.08 K/UL (ref 0–0.1)
BASOPHILS NFR BLD: 0.7 % (ref 0–1)
BILIRUB SERPL-MCNC: 1.1 MG/DL (ref 0.2–1)
BILIRUB UR QL: NEGATIVE
BUN SERPL-MCNC: 27 MG/DL (ref 6–20)
BUN/CREAT SERPL: 10 (ref 12–20)
CALCIUM SERPL-MCNC: 9.1 MG/DL (ref 8.5–10.1)
CHLORIDE SERPL-SCNC: 109 MMOL/L (ref 97–108)
CO2 SERPL-SCNC: 28 MMOL/L (ref 21–32)
COLOR UR: NORMAL
CREAT SERPL-MCNC: 2.76 MG/DL (ref 0.7–1.3)
DIFFERENTIAL METHOD BLD: ABNORMAL
EOSINOPHIL # BLD: 0.27 K/UL (ref 0–0.4)
EOSINOPHIL NFR BLD: 2.3 % (ref 0–7)
EPITH CASTS URNS QL MICRO: NORMAL /LPF
ERYTHROCYTE [DISTWIDTH] IN BLOOD BY AUTOMATED COUNT: 14.2 % (ref 11.5–14.5)
GLOBULIN SER CALC-MCNC: 3.1 G/DL (ref 2–4)
GLUCOSE SERPL-MCNC: 98 MG/DL (ref 65–100)
GLUCOSE UR STRIP.AUTO-MCNC: NEGATIVE MG/DL
HCT VFR BLD AUTO: 33.2 % (ref 36.6–50.3)
HGB BLD-MCNC: 10.3 G/DL (ref 12.1–17)
HGB UR QL STRIP: NEGATIVE
HYALINE CASTS URNS QL MICRO: NORMAL /LPF (ref 0–2)
IMM GRANULOCYTES # BLD AUTO: 0.06 K/UL (ref 0–0.04)
IMM GRANULOCYTES NFR BLD AUTO: 0.5 % (ref 0–0.5)
KETONES UR QL STRIP.AUTO: NEGATIVE MG/DL
LEUKOCYTE ESTERASE UR QL STRIP.AUTO: NEGATIVE
LYMPHOCYTES # BLD: 2.44 K/UL (ref 0.8–3.5)
LYMPHOCYTES NFR BLD: 20.7 % (ref 12–49)
MCH RBC QN AUTO: 30.1 PG (ref 26–34)
MCHC RBC AUTO-ENTMCNC: 31 G/DL (ref 30–36.5)
MCV RBC AUTO: 97.1 FL (ref 80–99)
MONOCYTES # BLD: 1.5 K/UL (ref 0–1)
MONOCYTES NFR BLD: 12.8 % (ref 5–13)
NEUTS SEG # BLD: 7.41 K/UL (ref 1.8–8)
NEUTS SEG NFR BLD: 63 % (ref 32–75)
NITRITE UR QL STRIP.AUTO: NEGATIVE
NRBC # BLD: 0 K/UL (ref 0–0.01)
NRBC BLD-RTO: 0 PER 100 WBC
PH UR STRIP: 6 (ref 5–8)
PLATELET # BLD AUTO: 244 K/UL (ref 150–400)
PMV BLD AUTO: 9.2 FL (ref 8.9–12.9)
POTASSIUM SERPL-SCNC: 3.8 MMOL/L (ref 3.5–5.1)
PROT SERPL-MCNC: 6.4 G/DL (ref 6.4–8.2)
PROT UR STRIP-MCNC: NEGATIVE MG/DL
RBC # BLD AUTO: 3.42 M/UL (ref 4.1–5.7)
RBC #/AREA URNS HPF: NORMAL /HPF (ref 0–5)
SODIUM SERPL-SCNC: 141 MMOL/L (ref 136–145)
SP GR UR REFRACTOMETRY: 1.01
URINE CULTURE IF INDICATED: NORMAL
UROBILINOGEN UR QL STRIP.AUTO: 0.2 EU/DL (ref 0.2–1)
WBC # BLD AUTO: 11.8 K/UL (ref 4.1–11.1)
WBC URNS QL MICRO: NORMAL /HPF (ref 0–4)

## 2025-05-10 PROCEDURE — 36415 COLL VENOUS BLD VENIPUNCTURE: CPT

## 2025-05-10 PROCEDURE — 85025 COMPLETE CBC W/AUTO DIFF WBC: CPT

## 2025-05-10 PROCEDURE — 73070 X-RAY EXAM OF ELBOW: CPT

## 2025-05-10 PROCEDURE — 80053 COMPREHEN METABOLIC PANEL: CPT

## 2025-05-10 PROCEDURE — 99284 EMERGENCY DEPT VISIT MOD MDM: CPT

## 2025-05-10 PROCEDURE — 81001 URINALYSIS AUTO W/SCOPE: CPT

## 2025-05-10 RX ORDER — ACETAMINOPHEN 325 MG/1
650 TABLET ORAL EVERY 6 HOURS PRN
Qty: 20 TABLET | Refills: 0 | Status: SHIPPED | OUTPATIENT
Start: 2025-05-10

## 2025-05-10 ASSESSMENT — PAIN DESCRIPTION - LOCATION
LOCATION: ARM

## 2025-05-10 ASSESSMENT — PAIN SCALES - GENERAL
PAINLEVEL_OUTOF10: 8

## 2025-05-10 ASSESSMENT — PAIN DESCRIPTION - ORIENTATION
ORIENTATION: LEFT

## 2025-05-10 ASSESSMENT — PAIN DESCRIPTION - DESCRIPTORS
DESCRIPTORS: SORE

## 2025-05-10 ASSESSMENT — PAIN - FUNCTIONAL ASSESSMENT
PAIN_FUNCTIONAL_ASSESSMENT: 0-10

## 2025-05-10 NOTE — ED NOTES
This RN has reviewed discharge instructions with the patient at this time. The Patient and Family member verbalized understanding and denies any further questions. Patient has been made aware of prescription(s) called into pharmacy for . Patient wheeled out to waiting room at this time.

## 2025-05-10 NOTE — DISCHARGE INSTRUCTIONS
Thank You!    It was a pleasure taking care of you in our Emergency Department today. We know that when you come to our Emergency Department, you are entrusting us with your health, comfort, and safety. Our physicians and nurses honor that trust, and truly appreciate the opportunity to care for you and your loved ones.      We also value your feedback. If you receive a survey about your Emergency Department experience today, please fill it out.  We care about our patients' feedback, and we listen to what you have to say.  Thank you.    César Marcial PA-C      ________________________________________________________________________  I have included a copy of your lab results and/or radiologic studies from today's visit so you can have them easily available at your follow-up visit. We hope you feel better and please do not hesitate to contact the ED if you have any questions at all!    Recent Results (from the past 12 hours)   CBC with Auto Differential    Collection Time: 05/10/25  3:09 PM   Result Value Ref Range    WBC 11.8 (H) 4.1 - 11.1 K/uL    RBC 3.42 (L) 4.10 - 5.70 M/uL    Hemoglobin 10.3 (L) 12.1 - 17.0 g/dL    Hematocrit 33.2 (L) 36.6 - 50.3 %    MCV 97.1 80.0 - 99.0 FL    MCH 30.1 26.0 - 34.0 PG    MCHC 31.0 30.0 - 36.5 g/dL    RDW 14.2 11.5 - 14.5 %    Platelets 244 150 - 400 K/uL    MPV 9.2 8.9 - 12.9 FL    Nucleated RBCs 0.0 0  WBC    nRBC 0.00 0.00 - 0.01 K/uL    Neutrophils % 63.0 32.0 - 75.0 %    Lymphocytes % 20.7 12.0 - 49.0 %    Monocytes % 12.8 5.0 - 13.0 %    Eosinophils % 2.3 0.0 - 7.0 %    Basophils % 0.7 0.0 - 1.0 %    Immature Granulocytes % 0.5 0.0 - 0.5 %    Neutrophils Absolute 7.41 1.80 - 8.00 K/UL    Lymphocytes Absolute 2.44 0.80 - 3.50 K/UL    Monocytes Absolute 1.50 (H) 0.00 - 1.00 K/UL    Eosinophils Absolute 0.27 0.00 - 0.40 K/UL    Basophils Absolute 0.08 0.00 - 0.10 K/UL    Immature Granulocytes Absolute 0.06 (H) 0.00 - 0.04 K/UL    Differential Type AUTOMATED    provider, you should return to the Emergency Department. We are available 24 hours a day.    Please take your discharge instructions with you when you go to your follow-up appointment.     If a prescription has been provided, please have it filled as soon as possible to prevent a delay in treatment. Read the entire medication instruction sheet provided to you by the pharmacy. If you have any questions or reservations about taking the medication due to side effects or interactions with other medications, please call your primary care physician or contact the ER to speak with the charge nurse.     Please make an appointment with your family doctor or the physician you were referred to for follow-up of this visit as instructed on your discharge paperwork. Return to the ER if you are unable to be seen or if you are unable to be seen in a timely manner.    Should you experience abdominal pain lasting greater than 6 hours, chest pain, difficulty breathing, fever/chills, numbness/tingling, skin changes or other symptoms that concern you, return to the ED sooner. If you feel worse over the next 24 hours, please return to the ED. We are available 24 hours a day. Thank you for trusting us with your care!

## 2025-05-10 NOTE — ED PROVIDER NOTES
[facility] EMERGENCY DEPARTMENT  EMERGENCY DEPARTMENT ENCOUNTER       Pt Name: Joss Jean Jr.  MRN: [id number]  Birthdate 1945  Date of evaluation: 5/10/2025  Provider: MOOK Owen   PCP: Tonya Gonzalez MD  Note Started: 4:13 PM EDT 5/10/25     CHIEF COMPLAINT       Chief Complaint   Patient presents with    Arm Pain     Patient ambulatory w/ cane to triage c/o pain to the L upper arm x3 days. Patient reports waking up 3 days ago and reports that above the elbow was swollen, painful, and tender to touch which has been continuing over the last 3 days. Patient reports he has a fistula in the L arm. Patient reports that he has pain with moving the arm and denies injury.        HISTORY OF PRESENT ILLNESS: 1 or more elements      History From: Patient and Patient's Daughter  HPI Limitations: None     Joss Jean Jr. is a 79 y.o. male who presents with his daughter with concern of pain at the back of the left elbow.  Symptoms have been present for several days.  Denies any injury.  He does have an AV fistula in the left upper extremity that was placed anticipating future need for dialysis.  He does not currently attend dialysis.  The daughter tells me they did a telemedicine visit several days ago when he was placed on azithromycin for suspected sinus infection.  There has been no fever.  The daughter seems concerned he may have a urinary tract infection because he has had some urinary urgency.  Daughter describes a history of enlarged prostate.  Patient denies any dysuria, hematuria, flank pain, abdominal pain, nausea, vomiting, diarrhea.     Nursing Notes were all reviewed and agreed with or any disagreements were addressed in the HPI.     REVIEW OF SYSTEMS      Review of Systems     Positives and Pertinent negatives as per HPI.    PAST HISTORY     Past Medical History:  Past Medical History:   Diagnosis Date    Abnormal stress echo 5/12/2015    Anxiety     Borderline diabetes     CAD  R-0Normal      citalopram (CELEXA) 40 MG tablet TAKE 1 TABLET BY MOUTH EVERY DAY, Disp-90 tablet, R-1Normal      atorvastatin (LIPITOR) 80 MG tablet TAKE 1 TABLET BY MOUTH EVERY DAY, Disp-90 tablet, R-3Normal      famotidine (PEPCID) 20 MG tablet Take 1 tablet by mouth 2 times daily, Disp-180 tablet, R-3Normal      TRELEGY ELLIPTA 100-62.5-25 MCG/ACT AEPB inhaler INHALE 1 PUFF BY MOUTH EVERY DAY, Disp-60 each, R-3, DAWNormal      Glucosamine 500 MG CAPS Take by mouthHistorical Med      vitamin B-12 (CYANOCOBALAMIN) 1000 MCG tablet Take 1 tablet by mouth dailyHistorical Med      nitroGLYCERIN (NITROSTAT) 0.4 MG SL tablet Place 1 tablet under the tongue every 5 minutes as needed, Disp-25 tablet, R-0Normal      fluocinonide (LIDEX) 0.05 % external solution Apply topically 2 times daily as needed Apply topically 2 times daily., Topical, 2 TIMES DAILY PRN, Historical Med      Multiple Vitamins-Minerals (CENTRUM ADULTS PO) Take 1 tablet by mouth dailyHistorical Med      albuterol sulfate HFA (PROVENTIL;VENTOLIN;PROAIR) 108 (90 Base) MCG/ACT inhaler Inhale 1 puff into the lungs every 4 hoursHistorical Med      aspirin 81 MG EC tablet Take 1 tablet by mouth every eveningHistorical Med      Cholecalciferol 50 MCG (2000 UT) CAPS Take 1 capsule by mouth dailyHistorical Med      colchicine (COLCRYS) 0.6 MG tablet Take 1 tablet by mouth 2 times daily as neededHistorical Med      furosemide (LASIX) 80 MG tablet Take 1 tablet by mouth dailyHistorical Med      isosorbide mononitrate (IMDUR) 30 MG extended release tablet Take 0.5 tablets by mouth dailyHistorical Med      ketoconazole (NIZORAL) 2 % shampoo Apply topically daily as needed, Topical, DAILY PRN, Historical Med      loratadine (CLARITIN) 10 MG tablet Take 1 tablet by mouth every eveningHistorical Med      metoprolol succinate (TOPROL XL) 25 MG extended release tablet Take 1 tablet by mouth every evening Taking a half tablet daily.Historical Med      sodium bicarbonate 325

## 2025-05-22 PROBLEM — Z91.81 AT RISK FOR FALLS: Status: RESOLVED | Noted: 2024-09-04 | Resolved: 2025-05-22

## 2025-05-22 PROBLEM — R26.0 SENSORY ATAXIC GAIT: Status: RESOLVED | Noted: 2023-11-28 | Resolved: 2025-05-22

## 2025-05-22 PROBLEM — R10.30 INTRACTABLE LOWER ABDOMINAL PAIN: Status: RESOLVED | Noted: 2021-09-12 | Resolved: 2025-05-22

## 2025-05-22 PROBLEM — I25.10 CORONARY ATHEROSCLEROSIS: Status: RESOLVED | Noted: 2022-05-16 | Resolved: 2025-05-22

## 2025-05-22 PROBLEM — D64.9 ANEMIA: Status: RESOLVED | Noted: 2021-04-08 | Resolved: 2025-05-22

## 2025-05-22 PROBLEM — I25.10 ARTERIOSCLEROSIS OF CORONARY ARTERY: Status: RESOLVED | Noted: 2025-01-28 | Resolved: 2025-05-22

## 2025-05-22 PROBLEM — R10.9 ABDOMINAL PAIN: Status: RESOLVED | Noted: 2021-09-13 | Resolved: 2025-05-22

## 2025-05-22 PROBLEM — J44.9 CHRONIC OBSTRUCTIVE PULMONARY DISEASE, UNSPECIFIED (HCC): Status: RESOLVED | Noted: 2025-01-22 | Resolved: 2025-05-22

## 2025-05-22 PROBLEM — Z86.73 HX OF COMPLETED STROKE: Status: RESOLVED | Noted: 2021-04-08 | Resolved: 2025-05-22

## 2025-05-22 PROBLEM — N12 PYELONEPHRITIS: Status: RESOLVED | Noted: 2021-09-04 | Resolved: 2025-05-22

## 2025-05-22 PROBLEM — N17.9 ACUTE RENAL FAILURE: Status: RESOLVED | Noted: 2023-10-27 | Resolved: 2025-05-22

## 2025-05-22 PROBLEM — Z98.890 HISTORY OF LEFT-SIDED CAROTID ENDARTERECTOMY: Status: RESOLVED | Noted: 2020-03-03 | Resolved: 2025-05-22

## 2025-05-22 PROBLEM — Z51.81 THERAPEUTIC DRUG MONITORING: Status: RESOLVED | Noted: 2023-11-28 | Resolved: 2025-05-22

## 2025-05-22 PROBLEM — E87.20 METABOLIC ACIDOSIS: Status: RESOLVED | Noted: 2021-11-24 | Resolved: 2025-05-22

## 2025-05-22 PROBLEM — Z87.442 HISTORY OF RENAL CALCULI: Status: RESOLVED | Noted: 2022-05-09 | Resolved: 2025-05-22

## 2025-05-22 PROBLEM — Z90.5 HISTORY OF NEPHRECTOMY: Status: RESOLVED | Noted: 2025-01-28 | Resolved: 2025-05-22

## 2025-05-22 PROBLEM — R41.82 ACUTE ALTERATION IN MENTAL STATUS: Status: RESOLVED | Noted: 2022-05-25 | Resolved: 2025-05-22

## 2025-07-20 NOTE — PROGRESS NOTES
Joss Jean Jr. is a 79 y.o. male who presents for follow-up.  In with daughter.    Fell recently, bruising on right ear and left hip.  Lower extremity neuropathy which affects gait stability, but causes no neuropathic pain.  Using cane when out of the house.  No assist device used at home. When getting out of bed, when falls occur.      Right knee OA with some discomfort.   injection Feb 2025.  Prior left TKA.  Has chronic shoulder pain.    Seen in ED with left olecranon bursitis on 5/10. Better.     Treated for hypertension.  Followed by nephrology with Dr. Chino Santamaria,  and cardiology. Dr Costa.      Due to worsening renal function now has vascular access left arm.  Dr Fam.  Not yet on dialysis.      Diabetic.  Diet controlled.HbA1C 6% January 2025.      Treated for hyperlipidemia.  On statin.  No myalgias. LDL 51.     Follow-up with pulmonary medicine for COPD.  On Trelegy inhaler.  On nucala injection, twice so far tolerated.      Some incontinence.   Followed by urology Dr. Black, for BPH.  Prior right partial nephrectomy for cancer in 2021.    Up to date on eye exam.          Past Medical History:   Diagnosis Date    Abdominal pain 09/13/2021    Abnormal stress echo 5/12/2015    Acute alteration in mental status 05/25/2022    Acute renal failure 10/27/2023    Anemia 04/08/2021    Anxiety     Arteriosclerosis of coronary artery 01/28/2025    At risk for falls 09/04/2024    Borderline diabetes     CAD (coronary artery disease) 2009    cath ([facility]) revealed 20% RCA and 50% 2nd diagonal    Calculus of kidney     Chronic kidney disease, stage III (moderate) (HCC) 10/11/2009    Chronic obstructive pulmonary disease, unspecified (J44.9) 01/22/2025    CKD (chronic kidney disease) stage 4, GFR 15-29 ml/min ([facility])     COPD, mild ([facility])     Followed by pulmonary associates    Coronary artery disease involving native coronary artery of native heart without angina pectoris 03/16/2016    Coronary

## 2025-07-21 NOTE — DISCHARGE INSTRUCTIONS
PAIN CONTROL  Tylenol 1000 mg every 6 hours  Ibuprofen 600mg every 6 hours  oxycodone 5mg every 4 hours as needed for breakthrough pain  Lidocaine Patch 4% (Over the counter - called Salonpas)  Heat, ice, massage RN messaged pt [facility] review    7/18- PAD 12  7/19- PAD 13  7/20- PAD 9  7/21- PAD 5    Goal 20    Pt reports continuing to lose weight for no reason. Feeling well was able to play around of gold this weekend successfully

## 2025-07-23 ENCOUNTER — OFFICE VISIT (OUTPATIENT)
Age: 80
End: 2025-07-23
Payer: MEDICARE

## 2025-07-23 VITALS
BODY MASS INDEX: 31.29 KG/M2 | HEIGHT: 72 IN | DIASTOLIC BLOOD PRESSURE: 68 MMHG | OXYGEN SATURATION: 97 % | TEMPERATURE: 98 F | SYSTOLIC BLOOD PRESSURE: 156 MMHG | HEART RATE: 74 BPM | RESPIRATION RATE: 20 BRPM | WEIGHT: 231 LBS

## 2025-07-23 DIAGNOSIS — E78.00 PURE HYPERCHOLESTEROLEMIA: ICD-10-CM

## 2025-07-23 DIAGNOSIS — I25.83 CORONARY ARTERY DISEASE DUE TO LIPID RICH PLAQUE: ICD-10-CM

## 2025-07-23 DIAGNOSIS — G60.9 IDIOPATHIC SMALL FIBER PERIPHERAL NEUROPATHY: ICD-10-CM

## 2025-07-23 DIAGNOSIS — I25.10 CORONARY ARTERY DISEASE DUE TO LIPID RICH PLAQUE: ICD-10-CM

## 2025-07-23 DIAGNOSIS — M17.11 PRIMARY OSTEOARTHRITIS OF RIGHT KNEE: ICD-10-CM

## 2025-07-23 DIAGNOSIS — N18.4 TYPE 2 DIABETES MELLITUS WITH STAGE 4 CHRONIC KIDNEY DISEASE, WITHOUT LONG-TERM CURRENT USE OF INSULIN (HCC): Primary | ICD-10-CM

## 2025-07-23 DIAGNOSIS — R54 AGE-RELATED PHYSICAL DEBILITY: ICD-10-CM

## 2025-07-23 DIAGNOSIS — I10 ESSENTIAL HYPERTENSION: ICD-10-CM

## 2025-07-23 DIAGNOSIS — J44.9 CHRONIC OBSTRUCTIVE PULMONARY DISEASE, UNSPECIFIED COPD TYPE (HCC): ICD-10-CM

## 2025-07-23 DIAGNOSIS — E11.22 TYPE 2 DIABETES MELLITUS WITH STAGE 4 CHRONIC KIDNEY DISEASE, WITHOUT LONG-TERM CURRENT USE OF INSULIN (HCC): Primary | ICD-10-CM

## 2025-07-23 DIAGNOSIS — E66.811 CLASS 1 OBESITY WITH SERIOUS COMORBIDITY AND BODY MASS INDEX (BMI) OF 31.0 TO 31.9 IN ADULT, UNSPECIFIED OBESITY TYPE: ICD-10-CM

## 2025-07-23 LAB — HBA1C MFR BLD: 5.5 %

## 2025-07-23 PROCEDURE — 3077F SYST BP >= 140 MM HG: CPT | Performed by: FAMILY MEDICINE

## 2025-07-23 PROCEDURE — PBSHW AMB POC HEMOGLOBIN A1C: Performed by: FAMILY MEDICINE

## 2025-07-23 PROCEDURE — G8417 CALC BMI ABV UP PARAM F/U: HCPCS | Performed by: FAMILY MEDICINE

## 2025-07-23 PROCEDURE — 99214 OFFICE O/P EST MOD 30 MIN: CPT | Performed by: FAMILY MEDICINE

## 2025-07-23 PROCEDURE — 83036 HEMOGLOBIN GLYCOSYLATED A1C: CPT | Performed by: FAMILY MEDICINE

## 2025-07-23 PROCEDURE — 1160F RVW MEDS BY RX/DR IN RCRD: CPT | Performed by: FAMILY MEDICINE

## 2025-07-23 PROCEDURE — 1159F MED LIST DOCD IN RCRD: CPT | Performed by: FAMILY MEDICINE

## 2025-07-23 PROCEDURE — 3044F HG A1C LEVEL LT 7.0%: CPT | Performed by: FAMILY MEDICINE

## 2025-07-23 PROCEDURE — G8427 DOCREV CUR MEDS BY ELIG CLIN: HCPCS | Performed by: FAMILY MEDICINE

## 2025-07-23 PROCEDURE — 3023F SPIROM DOC REV: CPT | Performed by: FAMILY MEDICINE

## 2025-07-23 PROCEDURE — 1123F ACP DISCUSS/DSCN MKR DOCD: CPT | Performed by: FAMILY MEDICINE

## 2025-07-23 PROCEDURE — 1036F TOBACCO NON-USER: CPT | Performed by: FAMILY MEDICINE

## 2025-07-23 PROCEDURE — 3078F DIAST BP <80 MM HG: CPT | Performed by: FAMILY MEDICINE

## 2025-07-23 RX ORDER — MUPIROCIN 2 %
OINTMENT (GRAM) TOPICAL
COMMUNITY
Start: 2025-06-24

## 2025-07-23 RX ORDER — EPINEPHRINE 0.3 MG/.3ML
INJECTION SUBCUTANEOUS
COMMUNITY
Start: 2025-06-13

## 2025-07-23 RX ORDER — DOXYCYCLINE HYCLATE 100 MG
100 TABLET ORAL 2 TIMES DAILY
COMMUNITY
Start: 2025-07-16 | End: 2025-07-23

## 2025-07-23 NOTE — PROGRESS NOTES
Have you been to the ER, urgent care clinic since your last visit?  Hospitalized since your last visit?   No.     Have you seen or consulted any other health care providers outside our system since your last visit?   Pulmonary: Dr. Iqbal   Podiatry: Mariano Santamaria; see encounters

## 2025-07-30 ENCOUNTER — TELEPHONE (OUTPATIENT)
Age: 80
End: 2025-07-30

## 2025-07-30 RX ORDER — FAMOTIDINE 20 MG/1
20 TABLET, FILM COATED ORAL 2 TIMES DAILY
Qty: 180 TABLET | Refills: 3 | Status: SHIPPED | OUTPATIENT
Start: 2025-07-30

## 2025-07-30 NOTE — TELEPHONE ENCOUNTER
famotidine (PEPCID) 20 MG tablet    Pt takes 2/day and pt has run out.  But they must have listed as one/day due to running out too soon.    Send refill to [facility] #[phone]

## 2025-07-30 NOTE — TELEPHONE ENCOUNTER
Waleska calling to have pt seen for swollen picky finger/right.      Pt had fallen and hit head with a knot and is doing ok.      There are no appts until Monday in office.  Waleska would like to get an x ray if possible.    Will Dr. Gonzalez put an order in for that?    Please call

## 2025-07-30 NOTE — TELEPHONE ENCOUNTER
Called and spoke to patient. Waleska states that there is not any significant limitation in the movement, severe pain, or finger deformity. She will get the splint and ice his finger.   She will call us if it gets worse.

## 2025-08-22 RX ORDER — CITALOPRAM HYDROBROMIDE 40 MG/1
40 TABLET ORAL DAILY
Qty: 90 TABLET | Refills: 1 | Status: SHIPPED | OUTPATIENT
Start: 2025-08-22

## 2025-08-27 ENCOUNTER — OFFICE VISIT (OUTPATIENT)
Age: 80
End: 2025-08-27
Payer: MEDICARE

## 2025-08-27 VITALS
SYSTOLIC BLOOD PRESSURE: 130 MMHG | OXYGEN SATURATION: 94 % | HEART RATE: 65 BPM | RESPIRATION RATE: 19 BRPM | DIASTOLIC BLOOD PRESSURE: 62 MMHG | WEIGHT: 233.2 LBS | TEMPERATURE: 98 F | HEIGHT: 72 IN | BODY MASS INDEX: 31.59 KG/M2

## 2025-08-27 DIAGNOSIS — I65.23 BILATERAL CAROTID ARTERY STENOSIS: ICD-10-CM

## 2025-08-27 DIAGNOSIS — R48.2 GAIT APRAXIA OF ELDERLY: ICD-10-CM

## 2025-08-27 DIAGNOSIS — R26.0 SENSORY ATAXIC GAIT: ICD-10-CM

## 2025-08-27 DIAGNOSIS — G60.8 IDIOPATHIC SMALL AND LARGE FIBER SENSORY NEUROPATHY: ICD-10-CM

## 2025-08-27 DIAGNOSIS — E13.40: ICD-10-CM

## 2025-08-27 DIAGNOSIS — I67.89 CEREBRAL MICROVASCULAR DISEASE: Primary | ICD-10-CM

## 2025-08-27 PROCEDURE — G8417 CALC BMI ABV UP PARAM F/U: HCPCS | Performed by: PSYCHIATRY & NEUROLOGY

## 2025-08-27 PROCEDURE — 99214 OFFICE O/P EST MOD 30 MIN: CPT | Performed by: PSYCHIATRY & NEUROLOGY

## 2025-08-27 PROCEDURE — G8427 DOCREV CUR MEDS BY ELIG CLIN: HCPCS | Performed by: PSYCHIATRY & NEUROLOGY

## 2025-08-27 PROCEDURE — 1036F TOBACCO NON-USER: CPT | Performed by: PSYCHIATRY & NEUROLOGY

## 2025-08-27 PROCEDURE — 3078F DIAST BP <80 MM HG: CPT | Performed by: PSYCHIATRY & NEUROLOGY

## 2025-08-27 PROCEDURE — 1126F AMNT PAIN NOTED NONE PRSNT: CPT | Performed by: PSYCHIATRY & NEUROLOGY

## 2025-08-27 PROCEDURE — 3075F SYST BP GE 130 - 139MM HG: CPT | Performed by: PSYCHIATRY & NEUROLOGY

## 2025-08-27 PROCEDURE — 1159F MED LIST DOCD IN RCRD: CPT | Performed by: PSYCHIATRY & NEUROLOGY

## 2025-08-27 PROCEDURE — 1160F RVW MEDS BY RX/DR IN RCRD: CPT | Performed by: PSYCHIATRY & NEUROLOGY

## 2025-08-27 PROCEDURE — 1123F ACP DISCUSS/DSCN MKR DOCD: CPT | Performed by: PSYCHIATRY & NEUROLOGY

## 2025-08-27 ASSESSMENT — PATIENT HEALTH QUESTIONNAIRE - PHQ9
SUM OF ALL RESPONSES TO PHQ QUESTIONS 1-9: 0
1. LITTLE INTEREST OR PLEASURE IN DOING THINGS: NOT AT ALL
2. FEELING DOWN, DEPRESSED OR HOPELESS: NOT AT ALL
SUM OF ALL RESPONSES TO PHQ QUESTIONS 1-9: 0

## (undated) DEVICE — AIRLIFE™ ADULT CUSHION NASAL CANNULA 14 FOOT (4.3) CRUSH-RESISTANT OXYGEN TUBING, AND U/CONNECT-IT ADAPTER: Brand: AIRLIFE™

## (undated) DEVICE — VISUALIZATION SYSTEM: Brand: CLEARIFY

## (undated) DEVICE — Device

## (undated) DEVICE — OPTIFOAM GENTLE SA, POSTOP, 4X8: Brand: MEDLINE

## (undated) DEVICE — SUT PROL 7-0 18IN BV1 DA BLU --

## (undated) DEVICE — VASCULAR-RICHMOND-LF: Brand: MEDLINE INDUSTRIES, INC.

## (undated) DEVICE — ROSEN CURVED WIRE GUIDE: Brand: ROSEN

## (undated) DEVICE — ARM DRAPE

## (undated) DEVICE — STERILE POLYISOPRENE POWDER-FREE SURGICAL GLOVES: Brand: PROTEXIS

## (undated) DEVICE — CATH GUID COR JR4.0 6FR 100CM -- LAUNCHER

## (undated) DEVICE — STERILE COTTON BALLS LARGE 5/P: Brand: MEDLINE

## (undated) DEVICE — SUTURE VCRL SZ 3-0 L18IN ABSRB UD PS-2 L19MM 1/2 CIR J497G

## (undated) DEVICE — PREP SKN CHLRAPRP APL 26ML STR --

## (undated) DEVICE — TELFA NON-ADHERENT PADS PREPAK: Brand: TELFA

## (undated) DEVICE — RADIFOCUS OPTITORQUE ANGIOGRAPHIC CATHETER: Brand: OPTITORQUE

## (undated) DEVICE — INFECTION CONTROL KIT SYS

## (undated) DEVICE — TRI-LUMEN FILTERED TUBE SET WITH ACTIVATED CHARCOAL FILTER: Brand: AIRSEAL

## (undated) DEVICE — BLADE CLIPPER GEN PURP NS

## (undated) DEVICE — DRAPE,EXTREMITY,89X128,STERILE: Brand: MEDLINE

## (undated) DEVICE — SUTURE ETHLN SZ 4-0 L18IN NONABSORBABLE BLK L19MM PS-2 3/8 1667H

## (undated) DEVICE — PACK PROCEDURE SURG HRT CATH

## (undated) DEVICE — REM POLYHESIVE ADULT PATIENT RETURN ELECTRODE: Brand: VALLEYLAB

## (undated) DEVICE — SUTURE VCRL SZ 3-0 L27IN ABSRB UD L26MM SH 1/2 CIR J416H

## (undated) DEVICE — NEEDLE HYPO 25GA L5/8IN ORNG HUB S STL LATCH BVL UP

## (undated) DEVICE — COVER LT HNDL PLAS RIG 1 PER PK

## (undated) DEVICE — PRESSURE GUIDEWIRE: Brand: COMET

## (undated) DEVICE — GLOVE SURG SZ 75 CRM LTX FREE POLYISOPRENE POLYMER BEAD ANTI

## (undated) DEVICE — MAGNETIC INSTRUMENT PAD 10" X 16"; MEDIUM; DISPOSABLE: Brand: CARDINAL HEALTH

## (undated) DEVICE — STAPLER INT L340MM 45MM STD 12 FIRING B FRM PWR + GRIPPING

## (undated) DEVICE — SYR ART 700 CLEAR MARK 7 -- ARTERION

## (undated) DEVICE — Device: Brand: JELCO

## (undated) DEVICE — HANDLE LT SNAP ON ULT DURABLE LENS FOR TRUMPF ALC DISPOSABLE

## (undated) DEVICE — SUTURE MCRYL SZ 4-0 L27IN ABSRB UD L19MM PS-2 1/2 CIR PRIM Y426H

## (undated) DEVICE — LABEL MED CARD MRMC STRL

## (undated) DEVICE — PROBE VASC 8MHZ WTRPRF

## (undated) DEVICE — SUTURE DEV SZ 2-0 WND CLSR ABSRB GS-22 VLOC COVIDIEN VLOCM2145

## (undated) DEVICE — BOWL UTIL GRAD 32OZ STRL --

## (undated) DEVICE — SUTURE PROL SZ 5-0 L24IN NONABSORBABLE BLU RB-2 L13IN 1/2 8554H

## (undated) DEVICE — LIQUIBAND RAPID ADHESIVE 36/CS 0.8ML: Brand: MEDLINE

## (undated) DEVICE — SEALANT TISS 4 CC TISSEEL KT

## (undated) DEVICE — 3M™ IOBAN™ 2 ANTIMICROBIAL INCISE DRAPE 6650EZ: Brand: IOBAN™ 2

## (undated) DEVICE — SUTURE NONABSORBABLE MONOFILAMENT 5-0 C-1 1X24 IN PROLENE 8725H

## (undated) DEVICE — STRETCH BANDAGE ROLL: Brand: DERMACEA

## (undated) DEVICE — SYRINGE TB 1ML TRNSLUC BRL WHT PLUNG BLK MRK CONVENTIONAL

## (undated) DEVICE — APPLICATOR ENDOSCP L34CM W/ S STL CANN PLAS OBT STYL FOR

## (undated) DEVICE — TOTAL TRAY, 16FR 10ML SIL FOLEY, URN: Brand: MEDLINE

## (undated) DEVICE — AEGIS 1" DISK 4MM HOLE, PEEL OPEN: Brand: MEDLINE

## (undated) DEVICE — CLICKLINE SCISSORS INSERT: Brand: CLICKLINE

## (undated) DEVICE — SUTURE ETHLN SZ 2-0 L18IN NONABSORBABLE BLK L26MM FS 3/8 664G

## (undated) DEVICE — SOL IRR SOD CL 0.9% 1000ML BTL --

## (undated) DEVICE — AGENT HEMSTAT W4XL4IN OXIDIZED REGENERATED CELOS ABSRB SFT

## (undated) DEVICE — OCCLUSIVE GAUZE STRIP,3% BISMUTH TRIBROMOPHENATE IN PETROLATUM BLEND: Brand: XEROFORM

## (undated) DEVICE — HOOK LOCK LATEX FREE ELASTIC BANDAGE 4INX5YD

## (undated) DEVICE — COPILOT BLEEDBACK CONTROL VALVE: Brand: COPILOT

## (undated) DEVICE — BLADELESS OBTURATOR: Brand: WECK VISTA

## (undated) DEVICE — CANISTER, RIGID, 3000CC: Brand: MEDLINE INDUSTRIES, INC.

## (undated) DEVICE — SEAL UNIV 5-8MM DISP BX/10 -- DA VINCI XI - SNGL USE

## (undated) DEVICE — ANGIOGRAPHY KIT CUST [K0910930B] [MERIT MEDICAL SYSTEMS INC]

## (undated) DEVICE — SURGICAL PROCEDURE KIT GEN LAPAROSCOPY LF

## (undated) DEVICE — DERMABOND SKIN ADH 0.7ML -- DERMABOND ADVANCED 12/BX

## (undated) DEVICE — LIGHT HANDLE: Brand: DEVON

## (undated) DEVICE — KERLIX BANDAGE ROLL: Brand: KERLIX

## (undated) DEVICE — AIRSEAL 12 MM ACCESS PORT AND PALM GRIP OBTURATOR WITH BLADELESS OPTICAL TIP, 120 MM LENGTH: Brand: AIRSEAL

## (undated) DEVICE — PAD BD MATTRESS 73X32 IN STD CONVOLUTED FOAM LTX FREE

## (undated) DEVICE — TOWEL SURG W17XL27IN STD BLU COT NONFENESTRATED PREWASHED

## (undated) DEVICE — APPLICATOR TISS 20 GAX32 CM W/ SNAP LCK SS DUPLOTIP DISP

## (undated) DEVICE — EXTREMITY III-LF: Brand: MEDLINE INDUSTRIES, INC.

## (undated) DEVICE — COVER MPLR TIP CRV SCIS ACC DA VINCI

## (undated) DEVICE — 450 ML BOTTLE OF 0.05% CHLORHEXIDINE GLUCONATE IN 99.95% STERILE WATER FOR IRRIGATION, USP AND APPLICATOR.: Brand: IRRISEPT ANTIMICROBIAL WOUND LAVAGE

## (undated) DEVICE — SUTURE VCRL SZ 2-0 L27IN ABSRB UD L26MM SH 1/2 CIR J417H

## (undated) DEVICE — STRAP,POSITIONING,KNEE/BODY,FOAM,4X60": Brand: MEDLINE

## (undated) DEVICE — GLIDESHEATH SLENDER ACCESS KIT: Brand: GLIDESHEATH SLENDER

## (undated) DEVICE — INTENDED FOR TISSUE SEPARATION, AND OTHER PROCEDURES THAT REQUIRE A SHARP SURGICAL BLADE TO PUNCTURE OR CUT.: Brand: BARD-PARKER ® CARBON RIB-BACK BLADES

## (undated) DEVICE — SOLUTION IV 1000ML 0.9% SOD CHL

## (undated) DEVICE — INSUFFLATION NEEDLE: Brand: SURGINEEDLE

## (undated) DEVICE — GOWN,SIRUS,POLYRNF,BRTHSLV,XL,30/CS: Brand: MEDLINE

## (undated) DEVICE — VESSEL LOOPS,MAXI, BLUE: Brand: DEVON

## (undated) DEVICE — SYR 10ML LUER LOK 1/5ML GRAD --

## (undated) DEVICE — NEEDLE HYPO 18GA L1.5IN PNK S STL HUB POLYPR SHLD REG BVL

## (undated) DEVICE — SPONGE GZ W4XL4IN COT 12 PLY TYP VII WVN C FLD DSGN

## (undated) DEVICE — ARGYLE FRAZIER SURGICAL SUCTION INSTRUMENT 10 FR/CH (3.3 MM): Brand: ARGYLE

## (undated) DEVICE — Z DISC USE 2220190 SUTURE VCRL SZ 3-0 L27IN ABSRB UD L26MM SH 1/2 CIR J416H

## (undated) DEVICE — SURGIFOAM SPNG SZ 100

## (undated) DEVICE — DEVON™ KNEE AND BODY STRAP 60" X 3" (1.5 M X 7.6 CM): Brand: DEVON

## (undated) DEVICE — 1200 GUARD II KIT W/5MM TUBE W/O VAC TUBE: Brand: GUARDIAN

## (undated) DEVICE — Z DISCONTINUED USE 2131664 WIPE INSTR W3XL3IN NONLINTING

## (undated) DEVICE — HI-TORQUE VERSACORE FLOPPY GUIDE WIRE SYSTEM 145 CM: Brand: HI-TORQUE VERSACORE

## (undated) DEVICE — SOLUTION IV 500ML 0.9% SOD CHL FLX CONT

## (undated) DEVICE — TR BAND RADIAL ARTERY COMPRESSION DEVICE: Brand: TR BAND

## (undated) DEVICE — SPONGE HEMOSTAT CELLULS 4X8IN -- SURGICEL

## (undated) DEVICE — TUBING PRSS MON L6IN PVC M FEM CONN

## (undated) DEVICE — SUTURE VCRL SZ 0 L27IN ABSRB UD L26MM CT-2 1/2 CIR J270H

## (undated) DEVICE — EYE SPEAR / FINE DISSECTOR: Brand: DEROYAL

## (undated) DEVICE — DRAIN SURG W10XL20CM SIL SMOOTH FLAT 3/4 PERF DBL WRP

## (undated) DEVICE — CATH GUID COR EB35 6FR 100CM -- LAUNCHER

## (undated) DEVICE — (D)PREP SKN CHLRAPRP APPL 26ML -- CONVERT TO ITEM 371833

## (undated) DEVICE — SUTURE VCRL SZ 4-0 L27IN ABSRB VLT L26MM SH 1/2 CIR J315H

## (undated) DEVICE — AV FISTULA - MRMC: Brand: MEDLINE INDUSTRIES, INC.

## (undated) DEVICE — SUTURE PERMAHAND SZ 0 L30IN NONABSORBABLE BLK SILK BRAID A306H

## (undated) DEVICE — SUTURE VCRL SZ 0 L18IN ABSRB UD POLYGLACTIN 910 COAT BRAID J646H

## (undated) DEVICE — DRAPE,REIN 53X77,STERILE: Brand: MEDLINE

## (undated) DEVICE — ZIMMER® STERILE DISPOSABLE TOURNIQUET CUFF WITH PROTECTIVE SLEEVE AND PLC, DUAL PORT, SINGLE BLADDER, 18 IN. (46 CM)

## (undated) DEVICE — GOWN,SIRUS,NONRNF,SETINSLV,XL,20/CS: Brand: MEDLINE

## (undated) DEVICE — NEEDLE HYPO 22GA L1.5IN BLK S STL HUB POLYPR SHLD REG BVL

## (undated) DEVICE — SOLUTION IRRIG 1000ML 0.9% SOD CHL USP POUR PLAS BTL

## (undated) DEVICE — SUTURE N ABSRB MONOFILAMENT 4-0 RB1 30 IN BLU PROLENE 8871H

## (undated) DEVICE — CATHETER ANGIO JR4 AD 5 FRX100 CM 25 CM PERFORMA

## (undated) DEVICE — SHUNT CAR EXT SUNDT 3X4MM 30CM -- SUNDT
Type: IMPLANTABLE DEVICE | Site: CAROTID | Status: NON-FUNCTIONAL
Removed: 2020-01-15

## (undated) DEVICE — RESERVOIR,SUCTION,100CC,SILICONE: Brand: MEDLINE

## (undated) DEVICE — SUTURE VCRL SZ 4-0 L27IN ABSRB UD L19MM PS-2 3/8 CIR PRIM J426H

## (undated) DEVICE — CLIP LIG ABSRB HEM-LOK LG PUR --

## (undated) DEVICE — BANDAGE COMPR 9 FTX4 IN SMOOTH COMFORTABLE SYNTH ESMRK LF

## (undated) DEVICE — NEEDLE HYPO 25GA L1.5IN BVL ORIENTED ECLIPSE

## (undated) DEVICE — 3M™ TEGADERM™ TRANSPARENT FILM DRESSING FRAME STYLE, 1626W, 4 IN X 4-3/4 IN (10 CM X 12 CM), 50/CT 4CT/CASE: Brand: 3M™ TEGADERM™

## (undated) DEVICE — PENCIL ES L3M BTTN SWCH S STL HEX LOK BLDE ELECTRD HOLSTER

## (undated) DEVICE — Z DISCONTINUED PER MEDLINE (LOW STOCK)  USE 2422770 DRAPE C ARM W54XL78IN FOR FLROSCN

## (undated) DEVICE — BASIC PACK: Brand: CONVERTORS

## (undated) DEVICE — STAPLER INT L34CM 60MM LNG ENDOSCP ARTC PWR + ECHELON FLX

## (undated) DEVICE — TAPE,CLOTH/SILK,CURAD,3"X10YD,LF,40/CS: Brand: CURAD

## (undated) DEVICE — SINGLE USE DEVICE INTENDED TO COVER EXPOSED ENDS OF ORTHOPEDIC PIN AND K-WIRES TO HELP PROTECT THE EXPOSED WIRE FROM SNAGGING ON CLOTHING.: Brand: OXBORO™ PIN COVER

## (undated) DEVICE — PADDING CST 4INX4YD --

## (undated) DEVICE — GAUZE SPONGES,12 PLY: Brand: CURITY

## (undated) DEVICE — ELECTRO LUBE IS A SINGLE PATIENT USE DEVICE THAT IS INTENDED TO BE USED ON ELECTROSURGICAL ELECTRODES TO REDUCE STICKING.: Brand: KEY SURGICAL ELECTRO LUBE

## (undated) DEVICE — PRECISION THIN (9.0 X 0.38 X 31.0MM)

## (undated) DEVICE — CATHETER ETER ANGIO L110CM OD5FR ID046IN L75CM 038IN 145DEG CARD

## (undated) DEVICE — SUT PROL 6-0 24IN BV1 DA BLU --

## (undated) DEVICE — SUTURE PERMAHAND SZ 3-0 L18IN NONABSORBABLE BLK L26MM SH C013D

## (undated) DEVICE — BLADE OPHTH 180DEG CUT SURF BLU STR SHRP DBL BVL GRINDLESS

## (undated) DEVICE — DRAPE PRB US TRNSDCR 6X96IN --

## (undated) DEVICE — SOLUTION IV 500ML 0.9% SOD CHL PH 5 INJ USP VIAFLX PLAS

## (undated) DEVICE — PADDING CST CRMPD 3INX4YD NS --

## (undated) DEVICE — SPONGE: SPECIALTY PEANUT XR 100/CS: Brand: MEDICAL ACTION INDUSTRIES

## (undated) DEVICE — SET ADMIN IV PMP 20GTT 117IN -- 2 NDLS INJ SITE

## (undated) DEVICE — RELOAD STPL L45MM H1-2.6MM MESENTERY THN TISS WHT GRIPPING

## (undated) DEVICE — TISSUE RETRIEVAL SYSTEM: Brand: INZII RETRIEVAL SYSTEM

## (undated) DEVICE — SUTURE PROL SZ 6-0 L24IN NONABSORBABLE BLU L13MM C-1 3/8 8726H

## (undated) DEVICE — BLADE ASSEMB CLP HAIR FINE --

## (undated) DEVICE — CUSTOM KT PTCA INFL DEV K05 00053H

## (undated) DEVICE — TREK CORONARY DILATATION CATHETER 3.0 MM X 15 MM / RAPID-EXCHANGE: Brand: TREK

## (undated) DEVICE — COTTON TIPPED APPLICATORS: Brand: DEROYAL

## (undated) DEVICE — DRAIN SURG FLAT W7MMXL20CM FULL PERF

## (undated) DEVICE — Z DISCONTINUED NO SUB IDED SET EXTN W/ 4 W STPCOCK M SPIN LOK 36IN

## (undated) DEVICE — RELOAD STPL L60MM H1-2.6MM MESENTERY THN TISS WHT 6 ROW

## (undated) DEVICE — SUTURE VCRL SZ 3-0 L27IN ABSRB UD L36MM CT-1 1/2 CIR J258H

## (undated) DEVICE — DECANTER FLD L85IN IV FLX TBNG CLMP DLP

## (undated) DEVICE — SEALANT HEMOSTAT W/THROM 8ML -- SURGIFLO MATRIX

## (undated) DEVICE — SPLINT WR POS F/ARTERIAL ACC -- BX/10

## (undated) DEVICE — SUTURE SZ 0 27IN 5/8 CIR UR-6  TAPER PT VIOLET ABSRB VICRYL J603H